# Patient Record
Sex: FEMALE | Race: BLACK OR AFRICAN AMERICAN | NOT HISPANIC OR LATINO | Employment: OTHER | URBAN - METROPOLITAN AREA
[De-identification: names, ages, dates, MRNs, and addresses within clinical notes are randomized per-mention and may not be internally consistent; named-entity substitution may affect disease eponyms.]

---

## 2017-01-13 ENCOUNTER — TELEPHONE (OUTPATIENT)
Dept: FAMILY MEDICINE | Facility: CLINIC | Age: 77
End: 2017-01-13

## 2017-01-13 NOTE — TELEPHONE ENCOUNTER
----- Message from Tiesha Lovett sent at 1/13/2017  9:00 AM CST -----  Patient wants to know if she has to fast for appt Monday. Please call at [phone] thank you!

## 2017-01-13 NOTE — TELEPHONE ENCOUNTER
Returned call, NINA for patient noting that it her her annual appt and labs may be done, she can fast or she can schedule to have labs done at another time if needed.

## 2017-01-16 ENCOUNTER — OFFICE VISIT (OUTPATIENT)
Dept: FAMILY MEDICINE | Facility: CLINIC | Age: 77
End: 2017-01-16
Payer: MEDICARE

## 2017-01-16 ENCOUNTER — LAB VISIT (OUTPATIENT)
Dept: LAB | Facility: HOSPITAL | Age: 77
End: 2017-01-16
Attending: FAMILY MEDICINE
Payer: MEDICARE

## 2017-01-16 VITALS
HEART RATE: 69 BPM | WEIGHT: 251.75 LBS | SYSTOLIC BLOOD PRESSURE: 126 MMHG | RESPIRATION RATE: 12 BRPM | DIASTOLIC BLOOD PRESSURE: 62 MMHG | TEMPERATURE: 99 F | HEIGHT: 62 IN | OXYGEN SATURATION: 99 % | BODY MASS INDEX: 46.33 KG/M2

## 2017-01-16 DIAGNOSIS — I73.9 PERIPHERAL VASCULAR DISEASE, UNSPECIFIED: ICD-10-CM

## 2017-01-16 DIAGNOSIS — N18.4 CONTROLLED TYPE 2 DIABETES MELLITUS WITH STAGE 4 CHRONIC KIDNEY DISEASE, WITH LONG-TERM CURRENT USE OF INSULIN: ICD-10-CM

## 2017-01-16 DIAGNOSIS — E11.65 UNCONTROLLED TYPE 2 DIABETES MELLITUS WITH STAGE 4 CHRONIC KIDNEY DISEASE, UNSPECIFIED LONG TERM INSULIN USE STATUS: ICD-10-CM

## 2017-01-16 DIAGNOSIS — N18.4 UNCONTROLLED TYPE 2 DIABETES MELLITUS WITH STAGE 4 CHRONIC KIDNEY DISEASE, UNSPECIFIED LONG TERM INSULIN USE STATUS: ICD-10-CM

## 2017-01-16 DIAGNOSIS — E11.65 UNCONTROLLED TYPE 2 DIABETES MELLITUS WITH STAGE 4 CHRONIC KIDNEY DISEASE, UNSPECIFIED LONG TERM INSULIN USE STATUS: Primary | ICD-10-CM

## 2017-01-16 DIAGNOSIS — Z00.00 WELL ADULT EXAM: Primary | ICD-10-CM

## 2017-01-16 DIAGNOSIS — E11.22 UNCONTROLLED TYPE 2 DIABETES MELLITUS WITH STAGE 4 CHRONIC KIDNEY DISEASE, UNSPECIFIED LONG TERM INSULIN USE STATUS: ICD-10-CM

## 2017-01-16 DIAGNOSIS — Z79.4 CONTROLLED TYPE 2 DIABETES MELLITUS WITH STAGE 4 CHRONIC KIDNEY DISEASE, WITH LONG-TERM CURRENT USE OF INSULIN: ICD-10-CM

## 2017-01-16 DIAGNOSIS — E66.01 MORBID OBESITY WITH BMI OF 45.0-49.9, ADULT: ICD-10-CM

## 2017-01-16 DIAGNOSIS — Z12.31 ENCOUNTER FOR SCREENING MAMMOGRAM FOR BREAST CANCER: ICD-10-CM

## 2017-01-16 DIAGNOSIS — N18.4 UNCONTROLLED TYPE 2 DIABETES MELLITUS WITH STAGE 4 CHRONIC KIDNEY DISEASE, UNSPECIFIED LONG TERM INSULIN USE STATUS: Primary | ICD-10-CM

## 2017-01-16 DIAGNOSIS — E11.9 TYPE 2 DIABETES MELLITUS WITHOUT COMPLICATION: ICD-10-CM

## 2017-01-16 DIAGNOSIS — E11.22 UNCONTROLLED TYPE 2 DIABETES MELLITUS WITH STAGE 4 CHRONIC KIDNEY DISEASE, UNSPECIFIED LONG TERM INSULIN USE STATUS: Primary | ICD-10-CM

## 2017-01-16 DIAGNOSIS — E11.22 CONTROLLED TYPE 2 DIABETES MELLITUS WITH STAGE 4 CHRONIC KIDNEY DISEASE, WITH LONG-TERM CURRENT USE OF INSULIN: ICD-10-CM

## 2017-01-16 DIAGNOSIS — I10 ESSENTIAL HYPERTENSION: ICD-10-CM

## 2017-01-16 DIAGNOSIS — G47.00 INSOMNIA, UNSPECIFIED TYPE: ICD-10-CM

## 2017-01-16 LAB
ALBUMIN SERPL BCP-MCNC: 3 G/DL
ALP SERPL-CCNC: 116 U/L
ALT SERPL W/O P-5'-P-CCNC: 13 U/L
ANION GAP SERPL CALC-SCNC: 5 MMOL/L
AST SERPL-CCNC: 18 U/L
BILIRUB SERPL-MCNC: 0.4 MG/DL
BUN SERPL-MCNC: 23 MG/DL
CALCIUM SERPL-MCNC: 9.2 MG/DL
CHLORIDE SERPL-SCNC: 111 MMOL/L
CHOLEST/HDLC SERPL: 3.3 {RATIO}
CO2 SERPL-SCNC: 23 MMOL/L
CREAT SERPL-MCNC: 2 MG/DL
EST. GFR  (AFRICAN AMERICAN): 27 ML/MIN/1.73 M^2
EST. GFR  (NON AFRICAN AMERICAN): 23.4 ML/MIN/1.73 M^2
GLUCOSE SERPL-MCNC: 71 MG/DL
HDL/CHOLESTEROL RATIO: 30.3 %
HDLC SERPL-MCNC: 27 MG/DL
HDLC SERPL-MCNC: 89 MG/DL
LDLC SERPL CALC-MCNC: 47.8 MG/DL
NONHDLC SERPL-MCNC: 62 MG/DL
POTASSIUM SERPL-SCNC: 5.3 MMOL/L
PROT SERPL-MCNC: 7.2 G/DL
SODIUM SERPL-SCNC: 139 MMOL/L
TRIGL SERPL-MCNC: 71 MG/DL

## 2017-01-16 PROCEDURE — 99499 UNLISTED E&M SERVICE: CPT | Mod: S$GLB,,, | Performed by: FAMILY MEDICINE

## 2017-01-16 PROCEDURE — 36415 COLL VENOUS BLD VENIPUNCTURE: CPT | Mod: PO

## 2017-01-16 PROCEDURE — 80061 LIPID PANEL: CPT

## 2017-01-16 PROCEDURE — 80053 COMPREHEN METABOLIC PANEL: CPT

## 2017-01-16 PROCEDURE — 99999 PR PBB SHADOW E&M-EST. PATIENT-LVL IV: CPT | Mod: PBBFAC,,, | Performed by: FAMILY MEDICINE

## 2017-01-16 PROCEDURE — 99397 PER PM REEVAL EST PAT 65+ YR: CPT | Mod: S$GLB,,, | Performed by: FAMILY MEDICINE

## 2017-01-16 PROCEDURE — 3078F DIAST BP <80 MM HG: CPT | Mod: S$GLB,,, | Performed by: FAMILY MEDICINE

## 2017-01-16 PROCEDURE — 83036 HEMOGLOBIN GLYCOSYLATED A1C: CPT

## 2017-01-16 PROCEDURE — 3074F SYST BP LT 130 MM HG: CPT | Mod: S$GLB,,, | Performed by: FAMILY MEDICINE

## 2017-01-16 RX ORDER — TRAZODONE HYDROCHLORIDE 100 MG/1
100 TABLET ORAL NIGHTLY
Qty: 90 TABLET | Refills: 3 | Status: SHIPPED | OUTPATIENT
Start: 2017-01-16 | End: 2017-02-22 | Stop reason: SDUPTHER

## 2017-01-16 RX ORDER — CILOSTAZOL 50 MG/1
50 TABLET ORAL 2 TIMES DAILY
Qty: 180 TABLET | Refills: 3 | Status: SHIPPED | OUTPATIENT
Start: 2017-01-16 | End: 2018-01-07 | Stop reason: SDUPTHER

## 2017-01-16 NOTE — PROGRESS NOTES
Subjective:       Patient ID: Ana James is a 76 y.o. female.    Chief Complaint: Annual Exam    HPI:  Here for annual exam.  Patient with chronic diabetes with stage 4 kidney disease.  Recently had kidney stones removed.  Hba1c in 6% range.  Reports chronic HILLS.  Seen by cardiology 2 months ago.  Diagnosed with PVD, not currently on treatment.  Review of Systems   Constitutional: Positive for fatigue and unexpected weight change. Negative for appetite change, chills, diaphoresis and fever.   HENT: Negative for hearing loss, sinus pressure and trouble swallowing.    Eyes: Negative for visual disturbance.   Respiratory: Positive for shortness of breath. Negative for cough, chest tightness and wheezing.    Cardiovascular: Negative for chest pain, palpitations and leg swelling.        Claudication   Gastrointestinal: Positive for constipation. Negative for abdominal pain, blood in stool, diarrhea, nausea and vomiting.        Occasional indigestion, nausea and diarrhea since gallbladder surgery   Genitourinary: Negative for decreased urine volume, difficulty urinating, dysuria, hematuria, menstrual problem, pelvic pain and vaginal discharge.   Musculoskeletal: Negative for back pain, joint swelling and neck pain.   Skin: Negative for rash.   Neurological: Negative for dizziness, numbness and headaches.   Hematological: Negative for adenopathy. Does not bruise/bleed easily.   Psychiatric/Behavioral: Positive for sleep disturbance. Negative for dysphoric mood. The patient is not nervous/anxious.        Objective:      Physical Exam   Constitutional: She is oriented to person, place, and time. She appears well-developed and well-nourished.   obese   Eyes: No scleral icterus.   Neck: Normal range of motion. Neck supple. No thyromegaly present.   Cardiovascular: Normal rate and regular rhythm.  Exam reveals no gallop and no friction rub.    No murmur heard.  Pulmonary/Chest: Effort normal and breath sounds  normal. She has no wheezes. She has no rales.   Abdominal: Soft. She exhibits no distension and no mass. There is no hepatosplenomegaly. There is no tenderness.   Musculoskeletal: She exhibits no edema.   Lymphadenopathy:     She has no cervical adenopathy.     She has no axillary adenopathy.        Right: No supraclavicular adenopathy present.        Left: No supraclavicular adenopathy present.   Neurological: She is alert and oriented to person, place, and time.   Skin: Skin is warm and dry. Rash (hyperpigmented rash LE, scaling noted) noted.   Psychiatric: She has a normal mood and affect. Her behavior is normal.         Assessment:       1. Well adult exam    2. Encounter for screening mammogram for breast cancer    3. 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 Morbid obesity with BMI of 45.0-49.9, adult    5. Peripheral vascular disease, unspecified    6. Insomnia, unspecified type        Plan:       Well adult exam  -     CBC auto differential; Future; Expected date: 1/16/17  -     TSH; Future; Expected date: 1/16/17  Health maintenance UTD.    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  Blood sugars stable.      Morbid obesity with BMI of 45.0-49.9, adult  Decrease calorie intake and Carbs in diet.    Peripheral vascular disease, unspecified  Start Pletal 50 mg 1 tablet twice daily.  Follow up with cardiology as recommended.      Reports occasional claudication.  Continue ASA    Return in about 4 weeks (around 2/13/2017) for PVD and insomnia.

## 2017-01-16 NOTE — PATIENT INSTRUCTIONS
Excess Gas  Certain foods produce gas when digested. In some people, the amounts of gas produced by these foods are excessive. This may cause bloating, burping or increased flatus (passing gas through the rectum).    The following foods are more likely to cause this problem. Reduce or eliminate them from your diet.  · Broccoli  · Cauliflower  · [place] sprouts  · Cabbage  · Cooked dried beans  · Carbonated beverages (sparkling water, soda, beer, champagne)  Other causes of excess gas include:  1. Eating too fast or talking while you chew may cause you to swallow air. This increases the amount of gas in the stomach and may worsen your symptoms. Chew each mouthful completely before swallowing. Take your time.  2. Chewing on gum or sucking on hard candy causes you to swallow more frequently, and some of what you are swallowing is air. This leads to increased gas in your stomach. Avoid chewing gum and hard candy.  3. Overeating may increase the feeling of being bloated and cause more gas. When you are full, stop eating.   4. Constipation can increase the amount of normal intestinal gas. Avoid constipation by increasing the amount of fiber in your diet by including whole cereal grains, fresh vegetables (except those in the above list) and fresh fruits. High-fiber foods absorb water and carry it out of the body. When increasing the amount of fiber in your diet, you also need to increase the amount of water that you drink. You should drink at least 8, 8-ounce glasses of water (2 quarts) per day.  © 8200-8745 [employer]. [address] [place] [address], [place] [address]. All rights reserved. This information is not intended as a substitute for professional medical care. Always follow your healthcare professional's instructions.

## 2017-01-16 NOTE — MR AVS SNAPSHOT
[facility]  [address]  [place] [address]  Phone: [phone]  Fax: [phone]                  Ana James   2017 10:40 AM   Office Visit    Description:  Female : 1940   Provider:  Jaky Schmidt MD   Department:  [facility]           Reason for Visit     Annual Exam           Diagnoses this Visit        Comments    Well adult exam    -  Primary     Encounter for screening mammogram for breast cancer         Controlled type 2 diabetes mellitus with stage 4 chronic kidney disease, with long-term current use of insulin         Morbid obesity with BMI of 45.0-49.9, adult         Peripheral vascular disease, unspecified         Insomnia, unspecified type                To Do List           Future Appointments        Provider Department Dept Phone    2017 10:40 AM Jaky Schmidt MD [facility] [phone]    2017 10:00 AM Alex Navarrete MD [facility] - Cardiology [phone]    3/23/2017 10:00 AM [facility] UROLOGY US1 [facility] [phone]    3/30/2017 10:00 AM Minnie Tellez MD [facility] Urology [phone]      Goals (5 Years of Data)     None      Follow-Up and Disposition     Return in about 4 weeks (around 2017) for PVD.       These Medications        Disp Refills Start End    trazodone (DESYREL) 100 MG tablet 90 tablet 3 2017    Take 1 tablet (100 mg total) by mouth every evening. - Oral    Pharmacy: [facility]/pharmacy #[id number] - [address] Ph #: [phone]       cilostazol (PLETAL) 50 MG Tab 180 tablet 3 2017    Take 1 tablet (50 mg total) by mouth 2 (two) times daily. - Oral    Pharmacy: [facility]/pharmacy #[id number] - [place] [address] [facility] Ph #: [phone]         [facility] On Call     [facility] On [facility] Nurse Care Line -  Assistance  Registered nurses in the [facility] On Call Center provide  "clinical advisement, health education, appointment booking, and other advisory services.  Call for this free service at [phone].             Medications           Message regarding Medications     Verify the changes and/or additions to your medication regime listed below are the same as discussed with your clinician today.  If any of these changes or additions are incorrect, please notify your healthcare provider.        START taking these NEW medications        Refills    trazodone (DESYREL) 100 MG tablet 3    Sig: Take 1 tablet (100 mg total) by mouth every evening.    Class: Normal    Route: Oral    cilostazol (PLETAL) 50 MG Tab 3    Sig: Take 1 tablet (50 mg total) by mouth 2 (two) times daily.    Class: Normal    Route: Oral      STOP taking these medications     oxybutynin (DITROPAN) 5 MG Tab Take 1 tablet (5 mg total) by mouth 3 (three) times daily as needed (bladder spasms).    escitalopram oxalate (LEXAPRO) 10 MG tablet Take 10 mg by mouth.            Verify that the below list of medications is an accurate representation of the medications you are currently taking.  If none reported, the list may be blank. If incorrect, please contact your healthcare provider. Carry this list with you in case of emergency.           Current Medications     albuterol 90 mcg/actuation inhaler Inhale 2 puffs into the lungs every 6 (six) hours as needed for Wheezing.    amlodipine (NORVASC) 10 MG tablet Take 1 tablet (10 mg total) by mouth once daily.    aspirin (ECOTRIN) 81 MG EC tablet Take 81 mg by mouth once daily.    atorvastatin (LIPITOR) 20 MG tablet TAKE 1 TABLET BY MOUTH EVERY DAY    BD ULTRA-FINE BREE PEN NEEDLES 32 gauge x 5/32" Ndle INJECT INSULIN THREE TIMES DAILY    BENICAR HCT 40-12.5 mg Tab TAKE 1 TABLET BY MOUTH ONCE DAILY.    blood sugar diagnostic Strp Test 3 times daily    carvedilol (COREG) 12.5 MG tablet Take 12.5 mg by mouth 2 (two) times daily.    clotrimazole-betamethasone 1-0.05% (LOTRISONE) " "cream Apply topically 2 (two) times daily.    hydrocodone-acetaminophen 5-325mg (NORCO) 5-325 mg per tablet Take 1 tablet by mouth every 6 (six) hours as needed for Pain.    insulin glargine (LANTUS SOLOSTAR) 100 unit/mL (3 mL) InPn pen Inject 32 Units into the skin once daily.    lancets Misc Test 3 times daily    latanoprost 0.005 % ophthalmic solution Place 1 drop into both eyes every evening.    linagliptin (TRADJENTA) 5 mg Tab tablet Take 1 tablet (5 mg total) by mouth once daily.    potassium citrate (UROCIT-K 15) 15 mEq TbSR Take 15 mEq by mouth once daily.    timolol maleate 0.5% (TIMOPTIC) 0.5 % Drop 1 drop every morning.     trospium (SANCTURA) 20 mg Tab tablet TAKE 1 TABLET BY MOUTH TWICE A DAY    blood-glucose meter kit Use as instructed    cilostazol (PLETAL) 50 MG Tab Take 1 tablet (50 mg total) by mouth 2 (two) times daily.    furosemide (LASIX) 40 MG tablet Take 40 mg by mouth once daily.    insulin aspart (NOVOLOG) 100 unit/mL InPn pen 4 units with with breakfast and 20 units with lunch and dinner    trazodone (DESYREL) 100 MG tablet Take 1 tablet (100 mg total) by mouth every evening.           Clinical Reference Information           Vital Signs - Last Recorded  Most recent update: 1/16/2017  9:39 AM by Safia Weeks MA    BP Pulse Temp Resp Ht Wt    126/62 (BP Location: Left arm, Patient Position: Sitting, BP Method: Manual) 69 98.9 °F (37.2 °C) (Oral) 12 5' 2" (1.575 m) 114.2 kg (251 lb 12.3 oz)    SpO2 BMI             99% 46.05 kg/m2         Blood Pressure          Most Recent Value    BP  126/62      Allergies as of 1/16/2017     Adhesive      Immunizations Administered on Date of Encounter - 1/16/2017     None      Orders Placed During Today's Visit      Normal Orders This Visit    Ambulatory referral to Nephrology     Ambulatory referral to Podiatry     Future Labs/Procedures Expected by Expires    CBC auto differential  1/16/2017 1/16/2018    Mammo Digital Screening Bilateral With CAD  " 1/16/2017 3/16/2018    TSH  1/16/2017 1/16/2018

## 2017-01-17 LAB
ESTIMATED AVG GLUCOSE: 154 MG/DL
HBA1C MFR BLD HPLC: 7 %

## 2017-01-18 ENCOUNTER — TELEPHONE (OUTPATIENT)
Dept: FAMILY MEDICINE | Facility: CLINIC | Age: 77
End: 2017-01-18

## 2017-01-18 DIAGNOSIS — E87.5 SERUM POTASSIUM ELEVATED: Primary | ICD-10-CM

## 2017-01-18 NOTE — TELEPHONE ENCOUNTER
Received letter from [facility] stating that benicar hct 40-12.5mg tablet is no longer covered; do you want prior authorization completed or change to different medication

## 2017-01-19 RX ORDER — CANDESARTAN CILEXETIL AND HYDROCHLOROTHIAZIDE 32; 12.5 MG/1; MG/1
1 TABLET ORAL DAILY
Qty: 90 TABLET | Refills: 1 | Status: SHIPPED | OUTPATIENT
Start: 2017-01-19 | End: 2017-07-20 | Stop reason: SDUPTHER

## 2017-01-19 NOTE — TELEPHONE ENCOUNTER
diovan HCT not covered; following is list of meds on tier 1 preferred list:  Amlodipine/valsartan;  Amlodipine/benazepril;  Amlodipine/valsartan/hctz;  Candesartan/cilexetil;  Candesartan/hctz;  Captopril/hctz;  Enalapril/hctz;  Irbesartan/hctz;  Lisinopril/hctz

## 2017-01-19 NOTE — TELEPHONE ENCOUNTER
----- Message from Sena Marino sent at 1/19/2017  9:24 AM CST -----  Contact: self  Patient requesting a call to discuss directions on medications and if she should stop taking aspirin. Please contact her at [phone].    Thanks!

## 2017-01-19 NOTE — TELEPHONE ENCOUNTER
I spoke to patient she just wanted to clarify the new medication pletal. Patient informed to take pletal twice a day and continue asa daily.

## 2017-01-20 NOTE — TELEPHONE ENCOUNTER
----- Message from Jaky Schmidt MD sent at 1/20/2017 12:57 AM CST -----  Kidney function remains stable at 2.0.  Her potassium is slightly elevated at 5.3.  Patient needs to follow a low potassium diet.  Please mail a low potassium diet to patient.  Cholesterol panel remains low normal.

## 2017-01-20 NOTE — TELEPHONE ENCOUNTER
I spoke to patient , she confirms she taking a potassium pill daily. She will stop the potassium and recheck potaaium 1/25/17

## 2017-01-23 ENCOUNTER — TELEPHONE (OUTPATIENT)
Dept: FAMILY MEDICINE | Facility: CLINIC | Age: 77
End: 2017-01-23

## 2017-01-23 NOTE — TELEPHONE ENCOUNTER
----- Message from Lashon Pierce sent at 1/23/2017 11:48 AM CST -----  Contact: Self/[phone]  Patient would like to speak to staff in regards to her prescription:  candesartan-hydrochlorothiazid (ATACAND HCT) 32-12.5 mg per tablet. She states that she was informed not to take this medication and BENICAR HCT 40-12.5 mg Tab. Thank you.

## 2017-01-23 NOTE — TELEPHONE ENCOUNTER
Patient stated that  gave her a new medication Atacand and was told by the pharmacist that she should not be taking that and Benicar together, patient inquiring if she should stop taking either one. Also that her insurance is no longer covering the Benicar. Please advise.

## 2017-02-03 ENCOUNTER — LAB VISIT (OUTPATIENT)
Dept: LAB | Facility: HOSPITAL | Age: 77
End: 2017-02-03
Attending: FAMILY MEDICINE
Payer: MEDICARE

## 2017-02-03 DIAGNOSIS — E11.22 UNCONTROLLED TYPE 2 DIABETES MELLITUS WITH STAGE 4 CHRONIC KIDNEY DISEASE, UNSPECIFIED LONG TERM INSULIN USE STATUS: ICD-10-CM

## 2017-02-03 DIAGNOSIS — N18.4 UNCONTROLLED TYPE 2 DIABETES MELLITUS WITH STAGE 4 CHRONIC KIDNEY DISEASE, UNSPECIFIED LONG TERM INSULIN USE STATUS: ICD-10-CM

## 2017-02-03 DIAGNOSIS — E11.65 UNCONTROLLED TYPE 2 DIABETES MELLITUS WITH STAGE 4 CHRONIC KIDNEY DISEASE, UNSPECIFIED LONG TERM INSULIN USE STATUS: ICD-10-CM

## 2017-02-03 LAB
ALBUMIN SERPL BCP-MCNC: 3 G/DL
ALP SERPL-CCNC: 114 U/L
ALT SERPL W/O P-5'-P-CCNC: 15 U/L
ANION GAP SERPL CALC-SCNC: 6 MMOL/L
AST SERPL-CCNC: 17 U/L
BILIRUB SERPL-MCNC: 0.4 MG/DL
BUN SERPL-MCNC: 26 MG/DL
CALCIUM SERPL-MCNC: 9.1 MG/DL
CHLORIDE SERPL-SCNC: 113 MMOL/L
CO2 SERPL-SCNC: 19 MMOL/L
CREAT SERPL-MCNC: 1.9 MG/DL
EST. GFR  (AFRICAN AMERICAN): 28.7 ML/MIN/1.73 M^2
EST. GFR  (NON AFRICAN AMERICAN): 24.9 ML/MIN/1.73 M^2
GLUCOSE SERPL-MCNC: 76 MG/DL
POTASSIUM SERPL-SCNC: 5.1 MMOL/L
PROT SERPL-MCNC: 7.2 G/DL
SODIUM SERPL-SCNC: 138 MMOL/L

## 2017-02-03 PROCEDURE — 80053 COMPREHEN METABOLIC PANEL: CPT

## 2017-02-03 PROCEDURE — 36415 COLL VENOUS BLD VENIPUNCTURE: CPT | Mod: PO

## 2017-02-16 ENCOUNTER — OFFICE VISIT (OUTPATIENT)
Dept: CARDIOLOGY | Facility: CLINIC | Age: 77
End: 2017-02-16
Payer: MEDICARE

## 2017-02-16 VITALS
BODY MASS INDEX: 48.64 KG/M2 | OXYGEN SATURATION: 98 % | WEIGHT: 264.31 LBS | DIASTOLIC BLOOD PRESSURE: 62 MMHG | SYSTOLIC BLOOD PRESSURE: 139 MMHG | HEIGHT: 62 IN | HEART RATE: 71 BPM

## 2017-02-16 DIAGNOSIS — N18.4 UNCONTROLLED TYPE 2 DIABETES MELLITUS WITH STAGE 4 CHRONIC KIDNEY DISEASE, UNSPECIFIED LONG TERM INSULIN USE STATUS: ICD-10-CM

## 2017-02-16 DIAGNOSIS — R06.02 SOB (SHORTNESS OF BREATH): Primary | ICD-10-CM

## 2017-02-16 DIAGNOSIS — I10 ESSENTIAL HYPERTENSION: ICD-10-CM

## 2017-02-16 DIAGNOSIS — I73.9 PERIPHERAL VASCULAR DISEASE, UNSPECIFIED: ICD-10-CM

## 2017-02-16 DIAGNOSIS — E11.65 UNCONTROLLED TYPE 2 DIABETES MELLITUS WITH STAGE 4 CHRONIC KIDNEY DISEASE, UNSPECIFIED LONG TERM INSULIN USE STATUS: ICD-10-CM

## 2017-02-16 DIAGNOSIS — E11.22 UNCONTROLLED TYPE 2 DIABETES MELLITUS WITH STAGE 4 CHRONIC KIDNEY DISEASE, UNSPECIFIED LONG TERM INSULIN USE STATUS: ICD-10-CM

## 2017-02-16 DIAGNOSIS — R42 DIZZINESS: ICD-10-CM

## 2017-02-16 PROCEDURE — 93000 ELECTROCARDIOGRAM COMPLETE: CPT | Mod: S$GLB,,, | Performed by: INTERNAL MEDICINE

## 2017-02-16 PROCEDURE — 1126F AMNT PAIN NOTED NONE PRSNT: CPT | Mod: S$GLB,,, | Performed by: INTERNAL MEDICINE

## 2017-02-16 PROCEDURE — 99999 PR PBB SHADOW E&M-EST. PATIENT-LVL IV: CPT | Mod: PBBFAC,,, | Performed by: INTERNAL MEDICINE

## 2017-02-16 PROCEDURE — 1157F ADVNC CARE PLAN IN RCRD: CPT | Mod: S$GLB,,, | Performed by: INTERNAL MEDICINE

## 2017-02-16 PROCEDURE — 1160F RVW MEDS BY RX/DR IN RCRD: CPT | Mod: S$GLB,,, | Performed by: INTERNAL MEDICINE

## 2017-02-16 PROCEDURE — 3075F SYST BP GE 130 - 139MM HG: CPT | Mod: S$GLB,,, | Performed by: INTERNAL MEDICINE

## 2017-02-16 PROCEDURE — 3078F DIAST BP <80 MM HG: CPT | Mod: S$GLB,,, | Performed by: INTERNAL MEDICINE

## 2017-02-16 PROCEDURE — 99214 OFFICE O/P EST MOD 30 MIN: CPT | Mod: S$GLB,,, | Performed by: INTERNAL MEDICINE

## 2017-02-16 PROCEDURE — 1159F MED LIST DOCD IN RCRD: CPT | Mod: S$GLB,,, | Performed by: INTERNAL MEDICINE

## 2017-02-16 PROCEDURE — 99499 UNLISTED E&M SERVICE: CPT | Mod: S$GLB,,, | Performed by: INTERNAL MEDICINE

## 2017-02-16 NOTE — PROGRESS NOTES
Subjective:    Patient ID:  Ana James is a 76 y.o. female who presents for follow-up of Hypertension and Shortness of Breath      HPI   HTN, PAD, DM, SOB, CRI - Cr 2.0       Referred by Dr. Farnsworth for abnormal JIGAR. She has bilateral leg pain mainly in her ankles. Follows with [facility] cardiology Dr. Martin. 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 Walks with walker for 2 blocks but also limited by sob. Wears diabetic shoes. Has some swelling occasionally but otherwise in her usual state of health.            LE arterial doppler 7/17/15  There is no definite areas of doubling of the velocity to suggest underlying greater than 50% stenosis.  There is decreased velocity seen within the distal left lower extremity arteries compared to the right lower extremity arteries and there are more monophasic waveform configuration in the distal left lower extremity concerning for distal left lower   extremity significant atherosclerotic plaque and vessel hardening.    JIGAR 10/27/16  Right leg: The JIGAR is 2.0 which is abnormally elevated .  Left leg: The JIGAR is 2.1 which abnormally elevated.      Stress test 10/27/16  LVEF: 68 %  Impression: NORMAL MYOCARDIAL PERFUSION  1. The perfusion scan is free of evidence for myocardial ischemia or injury.   2. Resting wall motion is physiologic.   3. Resting LV function is normal.     Echo 10/27/16    1 - Normal left ventricular systolic function (EF 55-60%).     2 - Left ventricular diastolic dysfunction.     3 - Normal appearing valves and Doppler exam.     Pletal was started by Dr Schmidt for PAD  Activity is limited by HILLS and not claudication  EKG NSR - ok  Denies CP    Review of Systems   Constitution: Negative for decreased appetite.   HENT: Negative for ear discharge.    Eyes: Negative for blurred vision.   Respiratory: Negative for hemoptysis.    Endocrine: Negative for polyphagia.   Hematologic/Lymphatic: Negative for adenopathy.   Skin: Negative for color change.    Musculoskeletal: Negative for joint swelling.   Neurological: Negative for brief paralysis.   Psychiatric/Behavioral: Negative for hallucinations.        Objective:    Physical Exam   Constitutional: She appears well-developed and well-nourished.   HENT:   Head: Normocephalic and atraumatic.   Neck: No thyromegaly present.   Cardiovascular: Normal rate and regular rhythm.    No murmur heard.  Pulmonary/Chest: Effort normal and breath sounds normal. No respiratory distress.   Abdominal: Soft. Bowel sounds are normal.   Musculoskeletal: She exhibits no edema.         Assessment:       1. SOB (shortness of breath)    2. Essential hypertension    3. Peripheral vascular disease, unspecified    4. Uncontrolled type 2 diabetes mellitus with stage 4 chronic kidney disease, unspecified long term insulin use status    5. Dizziness         Plan:       Continue with medical Rx for PAD  If claudication becomes limiting will refer to Dr Loza for possible LE angio  Otherwise cardiac stable  OV 6 months

## 2017-02-16 NOTE — MR AVS SNAPSHOT
[facility] [place] [place] Cardiology  [address] [facility] [place] [address]  Phone: [phone]                  Ana James   2017 10:15 AM   Office Visit    Description:  Female : 1940   Provider:  Alex Navarrete MD   [facility]  [facility]           Reason for Visit     Hypertension     Shortness of Breath                To Do List           Future Appointments        Provider Department Dept Phone    2017 10:15 AM Alex Navarrete MD [facility] [place] [place] Cardiology [phone]    2017 10:45 AM Janet Farnsworth DPM [place] - Podiatry [phone]    3/23/2017 10:00 AM [facility] UROLOGY US1 [facility] [phone]    3/30/2017 10:00 AM Minnie Tellez MD [facility] [place] [place] Urology [phone]    2017 1:00 PM Kann[facility] Rice MD [facility]  [facility] [phone]      Goals (5 Years of Data)     None      [facility] On Call     [facility] On [facility] Nurse Care Line -  Assistance  Registered nurses in the [facility] On Call Center provide clinical advisement, health education, appointment booking, and other advisory services.  Call for this free service at [phone].             Medications           Message regarding Medications     Verify the changes and/or additions to your medication regime listed below are the same as discussed with your clinician today.  If any of these changes or additions are incorrect, please notify your healthcare provider.             Verify that the below list of medications is an accurate representation of the medications you are currently taking.  If none reported, the list may be blank. If incorrect, please contact your healthcare provider. Carry this list with you in case of emergency.           Current Medications     albuterol 90 mcg/actuation inhaler Inhale 2 puffs into the lungs every 6 (six) hours as needed for Wheezing.    amlodipine (NORVASC) 10 MG tablet Take 1 tablet (10 mg total) by mouth once daily.    aspirin (ECOTRIN) 81  "MG EC tablet Take 81 mg by mouth once daily.    atorvastatin (LIPITOR) 20 MG tablet TAKE 1 TABLET BY MOUTH EVERY DAY    BD ULTRA-FINE BREE PEN NEEDLES 32 gauge x 5/32" Ndle INJECT INSULIN THREE TIMES DAILY    BENICAR HCT 40-12.5 mg Tab TAKE 1 TABLET BY MOUTH ONCE DAILY.    blood sugar diagnostic Strp Test 3 times daily    blood-glucose meter kit Use as instructed    candesartan-hydrochlorothiazid (ATACAND HCT) 32-12.5 mg per tablet Take 1 tablet by mouth once daily.    carvedilol (COREG) 12.5 MG tablet Take 12.5 mg by mouth 2 (two) times daily.    cilostazol (PLETAL) 50 MG Tab Take 1 tablet (50 mg total) by mouth 2 (two) times daily.    clotrimazole-betamethasone 1-0.05% (LOTRISONE) cream Apply topically 2 (two) times daily.    furosemide (LASIX) 40 MG tablet Take 40 mg by mouth once daily.    hydrocodone-acetaminophen 5-325mg (NORCO) 5-325 mg per tablet Take 1 tablet by mouth every 6 (six) hours as needed for Pain.    insulin aspart (NOVOLOG) 100 unit/mL InPn pen 4 units with with breakfast and 20 units with lunch and dinner    insulin glargine (LANTUS SOLOSTAR) 100 unit/mL (3 mL) InPn pen Inject 32 Units into the skin once daily.    lancets Misc Test 3 times daily    latanoprost 0.005 % ophthalmic solution Place 1 drop into both eyes every evening.    potassium citrate (UROCIT-K 15) 15 mEq TbSR Take 15 mEq by mouth once daily.    timolol maleate 0.5% (TIMOPTIC) 0.5 % Drop 1 drop every morning.     TRADJENTA 5 mg Tab tablet TAKE 1 TABLET (5 MG TOTAL) BY MOUTH ONCE DAILY.    trazodone (DESYREL) 100 MG tablet Take 1 tablet (100 mg total) by mouth every evening.    trospium (SANCTURA) 20 mg Tab tablet TAKE 1 TABLET BY MOUTH TWICE A DAY           Clinical Reference Information           Your Vitals Were     BP Pulse Height Weight SpO2 BMI    139/62 (BP Location: Left arm, Patient Position: Sitting, BP Method: Automatic) 71 5' 2" (1.575 m) 119.9 kg (264 lb 5.3 oz) 98% 48.35 kg/m2      Blood Pressure          Most Recent " Value    BP  139/62      Allergies as of 2/16/2017     Adhesive      Immunizations Administered on Date of Encounter - 2/16/2017     None      Language Assistance Services     ATTENTION: Language assistance services are available, free of charge. Please call [phone].      ATENCIÓN: Si habla ynes, tiene a calvo disposición servicios gratuitos de asistencia lingüística. Llame al [phone].     CHÚ Ý: N?u b?n nói Ti?ng Vi?t, có các d?ch v? h? tr? ngôn ng? mi?n phí dành cho b?n. G?i s? [phone].         [facility] complies with applicable Federal civil rights laws and does not discriminate on the basis of race, color, national origin, age, disability, or sex.

## 2017-02-21 ENCOUNTER — OFFICE VISIT (OUTPATIENT)
Dept: PODIATRY | Facility: CLINIC | Age: 77
End: 2017-02-21
Payer: MEDICARE

## 2017-02-21 VITALS
WEIGHT: 264 LBS | HEIGHT: 62 IN | DIASTOLIC BLOOD PRESSURE: 62 MMHG | BODY MASS INDEX: 48.58 KG/M2 | SYSTOLIC BLOOD PRESSURE: 140 MMHG

## 2017-02-21 DIAGNOSIS — B35.1 ONYCHOMYCOSIS DUE TO DERMATOPHYTE: ICD-10-CM

## 2017-02-21 DIAGNOSIS — E11.51 TYPE II DIABETES MELLITUS WITH PERIPHERAL CIRCULATORY DISORDER: Primary | ICD-10-CM

## 2017-02-21 DIAGNOSIS — I87.8 VENOUS STASIS OF BOTH LOWER EXTREMITIES: ICD-10-CM

## 2017-02-21 DIAGNOSIS — M20.10 HALLUX ABDUCTO VALGUS, UNSPECIFIED LATERALITY: ICD-10-CM

## 2017-02-21 DIAGNOSIS — I73.9 PAD (PERIPHERAL ARTERY DISEASE): ICD-10-CM

## 2017-02-21 DIAGNOSIS — L84 CORN OR CALLUS: ICD-10-CM

## 2017-02-21 PROCEDURE — 11721 DEBRIDE NAIL 6 OR MORE: CPT | Mod: 59,Q9,S$GLB, | Performed by: PODIATRIST

## 2017-02-21 PROCEDURE — 99499 UNLISTED E&M SERVICE: CPT | Mod: S$GLB,,, | Performed by: PODIATRIST

## 2017-02-21 PROCEDURE — 11056 PARNG/CUTG B9 HYPRKR LES 2-4: CPT | Mod: Q9,S$GLB,, | Performed by: PODIATRIST

## 2017-02-21 PROCEDURE — 99999 PR PBB SHADOW E&M-EST. PATIENT-LVL III: CPT | Mod: PBBFAC,,, | Performed by: PODIATRIST

## 2017-02-21 NOTE — MR AVS SNAPSHOT
[facility]  [address] [facility]  [place] [address]  Phone: [phone]                  Ana James   2017 10:45 AM   Office Visit    Description:  Female : 1940   Provider:  Janet Farnsworth DPM   Department:  [facility]           Reason for Visit     Diabetes Mellitus     Diabetic Foot Exam     Nail Care           Diagnoses this Visit        Comments    Type II diabetes mellitus with peripheral circulatory disorder    -  Primary     PAD (peripheral artery disease)         Venous stasis of both lower extremities         Hallux abducto valgus, unspecified laterality         Onychomycosis due to dermatophyte         Corn or callus                To Do List           Future Appointments        Provider Department Dept Phone    3/23/2017 10:00 AM [facility] [phone]    3/30/2017 10:00 AM Minnie Tellez MD [facility] [phone]    2017 1:00 PM Kannan Rice MD [facility] Nephrology [phone]      Goals (5 Years of Data)     None      [facility] -  Assistance  Registered nurses in the [facility] provide clinical advisement, health education, appointment booking, and other advisory services.  Call for this free service at [phone].             Medications           Message regarding Medications     Verify the changes and/or additions to your medication regime listed below are the same as discussed with your clinician today.  If any of these changes or additions are incorrect, please notify your healthcare provider.             Verify that the below list of medications is an accurate representation of the medications you are currently taking.  If none reported, the list may be blank. If incorrect, please contact your healthcare provider. Carry this list with you in case of emergency.           Current Medications     albuterol 90 mcg/actuation  "inhaler Inhale 2 puffs into the lungs every 6 (six) hours as needed for Wheezing.    amlodipine (NORVASC) 10 MG tablet Take 1 tablet (10 mg total) by mouth once daily.    aspirin (ECOTRIN) 81 MG EC tablet Take 81 mg by mouth once daily.    atorvastatin (LIPITOR) 20 MG tablet TAKE 1 TABLET BY MOUTH EVERY DAY    BD ULTRA-FINE BREE PEN NEEDLES 32 gauge x 5/32" Ndle INJECT INSULIN THREE TIMES DAILY    BENICAR HCT 40-12.5 mg Tab TAKE 1 TABLET BY MOUTH ONCE DAILY.    blood sugar diagnostic Strp Test 3 times daily    candesartan-hydrochlorothiazid (ATACAND HCT) 32-12.5 mg per tablet Take 1 tablet by mouth once daily.    carvedilol (COREG) 12.5 MG tablet Take 12.5 mg by mouth 2 (two) times daily.    cilostazol (PLETAL) 50 MG Tab Take 1 tablet (50 mg total) by mouth 2 (two) times daily.    clotrimazole-betamethasone 1-0.05% (LOTRISONE) cream Apply topically 2 (two) times daily.    furosemide (LASIX) 40 MG tablet Take 40 mg by mouth once daily.    hydrocodone-acetaminophen 5-325mg (NORCO) 5-325 mg per tablet Take 1 tablet by mouth every 6 (six) hours as needed for Pain.    insulin aspart (NOVOLOG) 100 unit/mL InPn pen 4 units with with breakfast and 20 units with lunch and dinner    insulin glargine (LANTUS SOLOSTAR) 100 unit/mL (3 mL) InPn pen Inject 32 Units into the skin once daily.    lancets Misc Test 3 times daily    latanoprost 0.005 % ophthalmic solution Place 1 drop into both eyes every evening.    potassium citrate (UROCIT-K 15) 15 mEq TbSR Take 15 mEq by mouth once daily.    timolol maleate 0.5% (TIMOPTIC) 0.5 % Drop 1 drop every morning.     TRADJENTA 5 mg Tab tablet TAKE 1 TABLET (5 MG TOTAL) BY MOUTH ONCE DAILY.    trazodone (DESYREL) 100 MG tablet Take 1 tablet (100 mg total) by mouth every evening.    trospium (SANCTURA) 20 mg Tab tablet TAKE 1 TABLET BY MOUTH TWICE A DAY    blood-glucose meter kit Use as instructed           Clinical Reference Information           Your Vitals Were     BP Height Weight BMI    " "   140/62 5' 2" (1.575 m) 119.7 kg (264 lb) 48.29 kg/m2       Blood Pressure          Most Recent Value    BP  (!)  140/62      Allergies as of 2/21/2017     Adhesive      Immunizations Administered on Date of Encounter - 2/21/2017     None      Instructions    Recommend lotions: eucerin, aquaphor, A&D ointment, gold bond for diabetics, sween    Shoe recommendations: (try [email or web address], [email or web address] , nordstromrack.[email or web address], or shoes.YupiCall for discounted prices) you can visit DSW shoes in [place] as well    Asics (GT 1000 or gel foundations), new balance, saucony (stabil c3),  Winter (transcend), vionic, propet (tennis shoe)    soft brand, clarks, crocs, aerosoles, naturalizers, SAS, ecco, chary, vinay chase, rockports (dress shoes)    Vionic, volitiles, burkenstocks, fitflops, propet (sandals)    Nike comfort thong sandals, crocs (house shoes)    Nail Home remedy:  Vicks Vapor rub OR Listerine and apple cider vinegar in a spray bottle to nails    Occasional soaks for 15-20 mins in luke warm water with 1 cup of listerine and 1 cup of apple cider vinegar are ok You may add several drops of oil of oregano or tea tree oil as well      Diabetes: Inspecting Your Feet  Diabetes increases your chances of developing foot problems. So inspect your feet every day. This helps you find small skin irritations before they become serious infections. If you have trouble seeing the bottoms of your feet, use a mirror or ask a family member or friend to help.     Pressure spots on the bottom of the foot are common areas where problems develop.   How to check your feet  Below are tips to help you look for foot problems. Try to check your feet at the same time each day, such as when you get out of bed in the morning:  · Check the top of each foot. The tops of toes, back of the heel, and outer edge of the foot can get a lot of rubbing from poor-fitting shoes.  · Check the bottom of each foot. Daily wear and tear often leads to problems at pressure " spots.  · Check the toes and nails. Fungal infections often occur between toes. Toenail problems can also be a sign of fungal infections or lead to breaks in the skin.  · Check your shoes, too. Loose objects inside a shoe can injure the foot. Use your hand to feel inside your shoes for things like avery, loose stitching, or rough areas that could irritate your skin.  Warning signs  Look for any color changes in the foot. Redness with streaks can signal a severe infection, which needs immediate medical attention. Tell your doctor right away if you have any of these problems:  · Swelling, sometimes with color changes, may be a sign of poor blood flow or infection. Symptoms include tenderness and an increase in the size of your foot.  · Warm or hot areas on your feet may be signs of infection. A foot that is cold may not be getting enough blood.  · Sensations such as burning, tingling, or pins and needles can be signs of a problem. Also check for areas that may be numb.  · Hot spots are caused by friction or pressure. Look for hot spots in areas that get a lot of rubbing. Hot spots can turn into blisters, calluses, or sores.  · Cracks and sores are caused by dry or irritated skin. They are a sign that the skin is breaking down, which can lead to infection.  · Toenail problems to watch for include nails growing into the skin (ingrown toenail) and causing redness or pain. Thick, yellow, or discolored nails can signal a fungal infection.  · Drainage and odor can develop from untreated sores and ulcers. Call your doctor right away if you notice white or yellow drainage, bleeding, or unpleasant odor.   © 6157-9756 [employer]. [address] [place] [address]. All rights reserved. This information is not intended as a substitute for professional medical care. Always follow your healthcare professional's instructions.               Language Assistance Services     ATTENTION: Language assistance  services are available, free of charge. Please call [phone].      ATENCIÓN: Si habla ynes, tiene a calvo disposición servicios gratuitos de asistencia lingüística. Llame al [phone].     CHÚ Ý: N?u b?n nói Ti?ng Vi?t, có các d?ch v? h? tr? ngôn ng? mi?n phí dành cho b?n. G?i s? [phone].         [facility] complies with applicable Federal civil rights laws and does not discriminate on the basis of race, color, national origin, age, disability, or sex.

## 2017-02-21 NOTE — PATIENT INSTRUCTIONS
Recommend lotions: eucerin, aquaphor, A&D ointment, gold bond for diabetics, sween    Shoe recommendations: (try 6pm.com, zappos.com , nordstromrack.com, or shoes.com for discounted prices) you can visit DSW shoes in [employer] as well    Asics (GT 1000 or gel foundations), new balance, saucony (stabil c3),  Winter (transcend), vionic, propet (tennis shoe)    soft brand, clarks, crocs, aerosoles, naturalizers, SAS, ecco, chary, vinay chase, rockports (dress shoes)    Vionic, volitiles, burkenstocks, fitflops, propet (sandals)    Nike comfort thong sandals, crocs (house shoes)    Nail Home remedy:  Vicks Vapor rub OR Listerine and apple cider vinegar in a spray bottle to nails    Occasional soaks for 15-20 mins in luke warm water with 1 cup of listerine and 1 cup of apple cider vinegar are ok You may add several drops of oil of oregano or tea tree oil as well      Diabetes: Inspecting Your Feet  Diabetes increases your chances of developing foot problems. So inspect your feet every day. This helps you find small skin irritations before they become serious infections. If you have trouble seeing the bottoms of your feet, use a mirror or ask a family member or friend to help.     Pressure spots on the bottom of the foot are common areas where problems develop.   How to check your feet  Below are tips to help you look for foot problems. Try to check your feet at the same time each day, such as when you get out of bed in the morning:  · Check the top of each foot. The tops of toes, back of the heel, and outer edge of the foot can get a lot of rubbing from poor-fitting shoes.  · Check the bottom of each foot. Daily wear and tear often leads to problems at pressure spots.  · Check the toes and nails. Fungal infections often occur between toes. Toenail problems can also be a sign of fungal infections or lead to breaks in the skin.  · Check your shoes, too. Loose objects inside a shoe can injure the foot. Use your hand to feel  inside your shoes for things like avery, loose stitching, or rough areas that could irritate your skin.  Warning signs  Look for any color changes in the foot. Redness with streaks can signal a severe infection, which needs immediate medical attention. Tell your doctor right away if you have any of these problems:  · Swelling, sometimes with color changes, may be a sign of poor blood flow or infection. Symptoms include tenderness and an increase in the size of your foot.  · Warm or hot areas on your feet may be signs of infection. A foot that is cold may not be getting enough blood.  · Sensations such as burning, tingling, or pins and needles can be signs of a problem. Also check for areas that may be numb.  · Hot spots are caused by friction or pressure. Look for hot spots in areas that get a lot of rubbing. Hot spots can turn into blisters, calluses, or sores.  · Cracks and sores are caused by dry or irritated skin. They are a sign that the skin is breaking down, which can lead to infection.  · Toenail problems to watch for include nails growing into the skin (ingrown toenail) and causing redness or pain. Thick, yellow, or discolored nails can signal a fungal infection.  · Drainage and odor can develop from untreated sores and ulcers. Call your doctor right away if you notice white or yellow drainage, bleeding, or unpleasant odor.   © 1093-5461 [employer]. [address] [place] [address], [place] [address]. All rights reserved. This information is not intended as a substitute for professional medical care. Always follow your healthcare professional's instructions.

## 2017-02-21 NOTE — PROGRESS NOTES
Subjective:      Patient ID: Ana James is a 76 y.o. female.    Chief Complaint: Diabetes Mellitus (Pcp Dr. Schmidt 01/16/2017); Diabetic Foot Exam; and Nail Care    Ana is a 76 y.o. female who presents to the clinic for evaluation and treatment of high risk feet. Ana has a past medical history of Arthritis (lower extremities); Asthma; Diabetes mellitus; Gall stones; Hypertension; Kidney stones; Obesity; Renal disorder; Retinopathy; and Vaginal delivery. The patient's chief complaint is long, thick toenails.  This patient has documented high risk feet requiring routine maintenance secondary to diabetes mellitis and those secondary complications of diabetes, as mentioned..    PCP: Jaky Schmidt MD    Date Last Seen by PCP:   Chief Complaint   Patient presents with    Diabetes Mellitus     Pcp Dr. Schmidt 01/16/2017    Diabetic Foot Exam    Nail Care       Current shoe gear:  rx diab shoes    Hemoglobin A1C   Date Value Ref Range Status   01/16/2017 7.0 (H) 4.5 - 6.2 % Final     Comment:     According to ADA guidelines, hemoglobin A1C <7.0% represents  optimal control in non-pregnant diabetic patients.  Different  metrics may apply to specific populations.   Standards of Medical Care in Diabetes - 2016.  For the purpose of screening for the presence of diabetes:  <5.7%     Consistent with the absence of diabetes  5.7-6.4%  Consistent with increasing risk for diabetes   (prediabetes)  >or=6.5%  Consistent with diabetes  Currently no consensus exists for use of hemoglobin A1C  for diagnosis of diabetes for children.     04/06/2016 6.6 (H) 4.5 - 6.2 % Final   01/12/2016 8.7 (H) 4.5 - 6.2 % Final     Patient Active Problem List   Diagnosis    Dizziness    SOB (shortness of breath)    Hypertension    Protein calorie malnutrition    Type 2 diabetes, uncontrolled, with renal manifestation    BMI 45.0-49.9, adult    CKD (chronic kidney disease) stage 3, GFR 30-59 ml/min    Peripheral  "vascular disease, unspecified    Urinary incontinence    Nephrolithiasis    Calculus of gallbladder with cholecystitis     Current Outpatient Prescriptions on File Prior to Visit   Medication Sig Dispense Refill    albuterol 90 mcg/actuation inhaler Inhale 2 puffs into the lungs every 6 (six) hours as needed for Wheezing. 1 each 2    amlodipine (NORVASC) 10 MG tablet Take 1 tablet (10 mg total) by mouth once daily. 90 tablet 3    aspirin (ECOTRIN) 81 MG EC tablet Take 81 mg by mouth once daily.      atorvastatin (LIPITOR) 20 MG tablet TAKE 1 TABLET BY MOUTH EVERY DAY 90 tablet 0    BD ULTRA-FINE BREE PEN NEEDLES 32 gauge x 5/32" Ndle INJECT INSULIN THREE TIMES DAILY 200 each 3    BENICAR HCT 40-12.5 mg Tab TAKE 1 TABLET BY MOUTH ONCE DAILY. 90 tablet 3    blood sugar diagnostic Strp Test 3 times daily 100 each 11    candesartan-hydrochlorothiazid (ATACAND HCT) 32-12.5 mg per tablet Take 1 tablet by mouth once daily. 90 tablet 1    carvedilol (COREG) 12.5 MG tablet Take 12.5 mg by mouth 2 (two) times daily.  3    cilostazol (PLETAL) 50 MG Tab Take 1 tablet (50 mg total) by mouth 2 (two) times daily. 180 tablet 3    clotrimazole-betamethasone 1-0.05% (LOTRISONE) cream Apply topically 2 (two) times daily. 45 g 5    furosemide (LASIX) 40 MG tablet Take 40 mg by mouth once daily.  4    hydrocodone-acetaminophen 5-325mg (NORCO) 5-325 mg per tablet Take 1 tablet by mouth every 6 (six) hours as needed for Pain. 30 tablet 0    insulin aspart (NOVOLOG) 100 unit/mL InPn pen 4 units with with breakfast and 20 units with lunch and dinner (Patient taking differently: 4 units with with breakfast and 16 units with lunch and  16 dinner) 45 mL 3    insulin glargine (LANTUS SOLOSTAR) 100 unit/mL (3 mL) InPn pen Inject 32 Units into the skin once daily. (Patient taking differently: Inject 20 Units into the skin every evening. ) 60 Syringe 3    lancets Misc Test 3 times daily 100 each 11    latanoprost 0.005 % " ophthalmic solution Place 1 drop into both eyes every evening.  3    potassium citrate (UROCIT-K 15) 15 mEq TbSR Take 15 mEq by mouth once daily. 90 tablet 3    timolol maleate 0.5% (TIMOPTIC) 0.5 % Drop 1 drop every morning.   2    TRADJENTA 5 mg Tab tablet TAKE 1 TABLET (5 MG TOTAL) BY MOUTH ONCE DAILY. 90 tablet 3    trospium (SANCTURA) 20 mg Tab tablet TAKE 1 TABLET BY MOUTH TWICE A  tablet 3    blood-glucose meter kit Use as instructed 1 each 0     No current facility-administered medications on file prior to visit.      Review of patient's allergies indicates:   Allergen Reactions    Adhesive Rash     Paper tape     Past Surgical History   Procedure Laterality Date    Cataract      Hysterectomy      Cardiac catheterization      Eye surgery       Rt cataract surgery    Cholecystectomy      Ureteral stent placement       Family History   Problem Relation Age of Onset    Kidney failure Mother     Hypertension Father     Diabetes Brother     Cancer Sister      Social History     Social History    Marital status:      Spouse name: N/A    Number of children: N/A    Years of education: N/A     Occupational History    retired      Social History Main Topics    Smoking status: Never Smoker    Smokeless tobacco: Never Used    Alcohol use No    Drug use: No    Sexual activity: Yes     Partners: Male     Other Topics Concern    Not on file     Social History Narrative       Review of Systems   Constitution: Negative for chills, fever and weakness.   Cardiovascular: Positive for claudication and leg swelling. Negative for chest pain.   Respiratory: Positive for cough and wheezing. Negative for shortness of breath.    Skin: Positive for dry skin and nail changes. Negative for itching and rash.   Musculoskeletal: Positive for arthritis, back pain, joint pain, muscle cramps and myalgias. Negative for muscle weakness.   Gastrointestinal: Negative for diarrhea, nausea and vomiting.  "  Neurological: Positive for paresthesias. Negative for numbness and tremors.   Psychiatric/Behavioral: Negative for altered mental status and hallucinations.           Objective:       Vitals:    02/21/17 1055   BP: (!) 140/62   Weight: 119.7 kg (264 lb)   Height: 5' 2" (1.575 m)   PainSc:   6   PainLoc: Leg       Physical Exam   Constitutional:   General: Pt. is well-developed, well-nourished, appears stated age, in no acute distress, alert and oriented x 3. No evidence of depression, anxiety, or agitation. Calm, cooperative, and communicative. Appropriate interactions and affect.       Cardiovascular:   Pulses:       Dorsalis pedis pulses are 1+ on the right side, and 1+ on the left side.        Posterior tibial pulses are 0 on the right side, and 1+ on the left side.   Dorsalis pedis and posterior tibial pulses are diminished Bilaterally. Toes are cool to touch. Feet are warm proximally.There is decreased digital hair. Skin is atrophic,  hyperpigmented, and edematous       Musculoskeletal:        Right ankle: She exhibits swelling.        Left ankle: She exhibits swelling.        Right foot: There is swelling. There is normal range of motion.        Left foot: There is swelling. There is normal range of motion.   Exquisite tenderness to calf bilaterally    Muscle strength is 5/5 in all groups bilaterally.    Patient has hammertoes of digits 2-5 bilateral partially reducible without symptom today.    Visible and palpable bunion without pain at dorsomedial 1st metatarsal head right and left.  Hallux abducted right and left partially reducible, tracks laterally without being track bound.  No ecchymosis, erythema, edema, or cardinal signs infection or signs of trauma same foot.     Neurological: No sensory deficit.   Sullivan-Marcelo 5.07 monofilament is intact bilateral feet. Sharp/dull sensation is also intact Bilateral feet.           Skin: Skin is warm, dry and intact. No abrasion, no ecchymosis and no lesion " noted. She is not diaphoretic. No cyanosis or erythema. No pallor. Nails show no clubbing.   Toenails 1-5 bilaterally are elongated by 2-3 mm, thickened by 2-3 mm, discolored/yellowed, dystrophic, brittle with subungual debris. Tender to distal nail plate pressure of right hallux, without periungual skin abnormality of each.     Focal hyperkeratotic lesion consisting entirely of hyperkeratotic tissue without open skin, drainage, pus, fluctuance, malodor, or signs of infection sub 1st MTPJ karan    Interdigital Spaces clean, dry and without evidence of break in skin integrity   Psychiatric: She has a normal mood and affect. Her speech is normal.   Nursing note and vitals reviewed.      Assessment:       Encounter Diagnoses   Name Primary?    Type II diabetes mellitus with peripheral circulatory disorder Yes    PAD (peripheral artery disease)     Venous stasis of both lower extremities     Hallux abducto valgus, unspecified laterality     Onychomycosis due to dermatophyte     Corn or callus          Plan:       Ana was seen today for diabetes mellitus, diabetic foot exam and nail care.    Diagnoses and all orders for this visit:    Type II diabetes mellitus with peripheral circulatory disorder    PAD (peripheral artery disease)    Venous stasis of both lower extremities    Hallux abducto valgus, unspecified laterality    Onychomycosis due to dermatophyte    Corn or callus    I counseled the patient on her conditions, their implications and medical management.     - Shoe inspection. Diabetic Foot Education. Patient reminded of the importance of good nutrition and blood sugar control to help prevent podiatric complications of diabetes. Patient instructed on proper foot hygeine. We discussed wearing proper shoe gear, daily foot inspections, never walking without protective shoe gear, never putting sharp instruments to feet.    - Tubigrips to BLE; patient is to elevate legs. When sleeping, place a pillow under  lower extremities. When sitting, support the legs so that they are level with the waist.    - With patient's permission, nails were aggressively reduced and debrided x 10 to their soft tissue attachment mechanically and with electric , removing all offending nail and debris. Patient relates relief following the procedure. After cleansing the  area w/ alcohol prep pad the above mentioned hyperkeratosis was trimmed utilizing No 15 scapel, to a smooth base with out incident. Patient tolerated this  well and reported comfort to the area of sub 1st MTPJ karan.   She will continue to monitor the areas daily, inspect her feet, wear protective shoe gear when ambulatory, moisturizer to maintain skin integrity and follow in this office in approximately 3-5 months, sooner p.r.n.

## 2017-02-22 ENCOUNTER — TELEPHONE (OUTPATIENT)
Dept: FAMILY MEDICINE | Facility: CLINIC | Age: 77
End: 2017-02-22

## 2017-02-22 DIAGNOSIS — G47.00 INSOMNIA, UNSPECIFIED TYPE: ICD-10-CM

## 2017-02-22 RX ORDER — TRAZODONE HYDROCHLORIDE 50 MG/1
50 TABLET ORAL NIGHTLY
Qty: 90 TABLET | Refills: 3 | Status: SHIPPED | OUTPATIENT
Start: 2017-02-22 | End: 2018-02-05 | Stop reason: SDUPTHER

## 2017-02-22 NOTE — TELEPHONE ENCOUNTER
Patient stated that her desyrel is too much for her and is requesting to take 1/2 of the medication. Please advise.

## 2017-02-22 NOTE — TELEPHONE ENCOUNTER
----- Message from Charosavanah Escobedo sent at 2/21/2017  2:50 PM CST -----  Contact: SELF  Pt would like to speak to the nurse regarding about her sleep medication. Please contact the pt at [phone]. Thanks!

## 2017-02-23 NOTE — TELEPHONE ENCOUNTER
Informed pt ok to to take 1/2 tablet; pt verbalized understanding; pt states she did not start taking the medication yet, was afraid it was going to make her sleep too hard and not be able to get up to use the restroom, she will try 1/2 tablet first to see how it affects her

## 2017-02-26 RX ORDER — ATORVASTATIN CALCIUM 20 MG/1
TABLET, FILM COATED ORAL
Qty: 90 TABLET | Refills: 0 | Status: SHIPPED | OUTPATIENT
Start: 2017-02-26 | End: 2017-05-31 | Stop reason: SDUPTHER

## 2017-03-14 DIAGNOSIS — R06.2 WHEEZING: ICD-10-CM

## 2017-03-15 RX ORDER — ALBUTEROL SULFATE 90 UG/1
AEROSOL, METERED RESPIRATORY (INHALATION)
Qty: 8.5 INHALER | Refills: 0 | OUTPATIENT
Start: 2017-03-15

## 2017-03-15 RX ORDER — ALBUTEROL SULFATE 90 UG/1
2 AEROSOL, METERED RESPIRATORY (INHALATION) EVERY 6 HOURS PRN
Qty: 8 G | Refills: 5 | Status: SHIPPED | OUTPATIENT
Start: 2017-03-15 | End: 2018-12-12 | Stop reason: SDUPTHER

## 2017-03-20 DIAGNOSIS — I10 ESSENTIAL HYPERTENSION: Primary | ICD-10-CM

## 2017-03-20 RX ORDER — AMLODIPINE BESYLATE 10 MG/1
10 TABLET ORAL DAILY
Qty: 90 TABLET | Refills: 3 | Status: SHIPPED | OUTPATIENT
Start: 2017-03-20 | End: 2018-02-05 | Stop reason: SDUPTHER

## 2017-03-20 NOTE — TELEPHONE ENCOUNTER
----- Message from Janice Calderón sent at 3/20/2017  9:28 AM CDT -----  Contact: self  Pt needs amlodipine (NORVASC) 10 MG tablet refilled. Please call [phone]. thanks

## 2017-03-22 ENCOUNTER — TELEPHONE (OUTPATIENT)
Dept: UROLOGY | Facility: CLINIC | Age: 77
End: 2017-03-22

## 2017-03-22 NOTE — TELEPHONE ENCOUNTER
----- Message from Naty Schmidt sent at 3/22/2017 11:12 AM CDT -----  Contact: Self  X   1st Request  _  2nd Request  _  3rd Request        Who: BRUNO ROBERT [[id number]]    Why: Pt states she had to change her Ultrasound appt to 04/11. Pt would like to know if she can still come on 03/30 or does she need to wait until after her Ultrasound. Please call, thanks!    What Number to Call Back: [phone]    When to Expect a call back: (Before the end of the day)   -- if the call is after 12:00, the call back will be tomorrow.

## 2017-04-04 ENCOUNTER — OFFICE VISIT (OUTPATIENT)
Dept: NEPHROLOGY | Facility: CLINIC | Age: 77
End: 2017-04-04
Payer: MEDICARE

## 2017-04-04 ENCOUNTER — LAB VISIT (OUTPATIENT)
Dept: LAB | Facility: HOSPITAL | Age: 77
End: 2017-04-04
Attending: INTERNAL MEDICINE
Payer: MEDICARE

## 2017-04-04 VITALS
WEIGHT: 261.94 LBS | SYSTOLIC BLOOD PRESSURE: 110 MMHG | DIASTOLIC BLOOD PRESSURE: 60 MMHG | BODY MASS INDEX: 48.2 KG/M2 | HEART RATE: 76 BPM | OXYGEN SATURATION: 96 % | HEIGHT: 62 IN

## 2017-04-04 DIAGNOSIS — E11.22 CONTROLLED TYPE 2 DIABETES MELLITUS WITH STAGE 3 CHRONIC KIDNEY DISEASE, WITH LONG-TERM CURRENT USE OF INSULIN: ICD-10-CM

## 2017-04-04 DIAGNOSIS — N20.0 NEPHROLITHIASIS: ICD-10-CM

## 2017-04-04 DIAGNOSIS — N18.30 CONTROLLED TYPE 2 DIABETES MELLITUS WITH STAGE 3 CHRONIC KIDNEY DISEASE, WITH LONG-TERM CURRENT USE OF INSULIN: ICD-10-CM

## 2017-04-04 DIAGNOSIS — N18.30 CHRONIC KIDNEY DISEASE, STAGE III (MODERATE): ICD-10-CM

## 2017-04-04 DIAGNOSIS — N18.30 CHRONIC KIDNEY DISEASE, STAGE III (MODERATE): Primary | ICD-10-CM

## 2017-04-04 DIAGNOSIS — Z79.4 CONTROLLED TYPE 2 DIABETES MELLITUS WITH STAGE 3 CHRONIC KIDNEY DISEASE, WITH LONG-TERM CURRENT USE OF INSULIN: ICD-10-CM

## 2017-04-04 PROCEDURE — 36415 COLL VENOUS BLD VENIPUNCTURE: CPT | Mod: PO

## 2017-04-04 PROCEDURE — 99499 UNLISTED E&M SERVICE: CPT | Mod: S$GLB,,, | Performed by: INTERNAL MEDICINE

## 2017-04-04 PROCEDURE — 1126F AMNT PAIN NOTED NONE PRSNT: CPT | Mod: S$GLB,,, | Performed by: INTERNAL MEDICINE

## 2017-04-04 PROCEDURE — 1159F MED LIST DOCD IN RCRD: CPT | Mod: S$GLB,,, | Performed by: INTERNAL MEDICINE

## 2017-04-04 PROCEDURE — 80069 RENAL FUNCTION PANEL: CPT

## 2017-04-04 PROCEDURE — 1157F ADVNC CARE PLAN IN RCRD: CPT | Mod: S$GLB,,, | Performed by: INTERNAL MEDICINE

## 2017-04-04 PROCEDURE — 3078F DIAST BP <80 MM HG: CPT | Mod: S$GLB,,, | Performed by: INTERNAL MEDICINE

## 2017-04-04 PROCEDURE — 99999 PR PBB SHADOW E&M-EST. PATIENT-LVL II: CPT | Mod: PBBFAC,,, | Performed by: INTERNAL MEDICINE

## 2017-04-04 PROCEDURE — 3074F SYST BP LT 130 MM HG: CPT | Mod: S$GLB,,, | Performed by: INTERNAL MEDICINE

## 2017-04-04 PROCEDURE — 99204 OFFICE O/P NEW MOD 45 MIN: CPT | Mod: S$GLB,,, | Performed by: INTERNAL MEDICINE

## 2017-04-04 PROCEDURE — 1160F RVW MEDS BY RX/DR IN RCRD: CPT | Mod: S$GLB,,, | Performed by: INTERNAL MEDICINE

## 2017-04-04 NOTE — LETTER
April 11, 2017      Jaky Schmidt MD  [address]  [place] [address]           [facility]  [address]  [place] [address]  Phone: [phone]          Patient: Ana James   MR Number: [id number]   YOB: 1940   Date of Visit: 4/4/2017       Dear Juan Schmidt:    Thank you for referring Ana James to me for evaluation. Attached you will find relevant portions of my assessment and plan of care.    If you have questions, please do not hesitate to call me. I look forward to following Ana James along with you.    Sincerely,    Kannan Rice MD    Enclosure  CC:  No Recipients    If you would like to receive this communication electronically, please contact [email or web address] or [phone] to request more information on [employer] access.    For providers and/or their staff who would like to refer a patient to [facility], please contact us through our one-stop-shop provider referral line, [facility] , at [phone].    If you feel you have received this communication in error or would no longer like to receive these types of communications, please e-mail [email or web address]

## 2017-04-05 LAB
ALBUMIN SERPL BCP-MCNC: 3.3 G/DL
ANION GAP SERPL CALC-SCNC: 10 MMOL/L
BUN SERPL-MCNC: 25 MG/DL
CALCIUM SERPL-MCNC: 9.2 MG/DL
CHLORIDE SERPL-SCNC: 111 MMOL/L
CO2 SERPL-SCNC: 20 MMOL/L
CREAT SERPL-MCNC: 2 MG/DL
EST. GFR  (AFRICAN AMERICAN): 27 ML/MIN/1.73 M^2
EST. GFR  (NON AFRICAN AMERICAN): 23.4 ML/MIN/1.73 M^2
GLUCOSE SERPL-MCNC: 96 MG/DL
PHOSPHATE SERPL-MCNC: 3.3 MG/DL
POTASSIUM SERPL-SCNC: 4.3 MMOL/L
SODIUM SERPL-SCNC: 141 MMOL/L

## 2017-04-11 ENCOUNTER — HOSPITAL ENCOUNTER (OUTPATIENT)
Dept: RADIOLOGY | Facility: HOSPITAL | Age: 77
Discharge: HOME | End: 2017-04-11
Attending: UROLOGY
Payer: MEDICARE

## 2017-04-11 DIAGNOSIS — N20.0 NEPHROLITHIASIS: ICD-10-CM

## 2017-04-11 PROCEDURE — 76770 US EXAM ABDO BACK WALL COMP: CPT | Mod: 26,,, | Performed by: RADIOLOGY

## 2017-04-11 PROCEDURE — 76770 US EXAM ABDO BACK WALL COMP: CPT | Mod: TC

## 2017-04-11 NOTE — PROGRESS NOTES
Subjective:       Patient ID: Ana James is a 76 y.o. Black or  female who presents for new evaluation of Chronic Kidney Disease    HPI  Ms. James is a 76 year old woman with medical history of diabetes, hypertension, nephrolithiasis presenting for evaluation of chronic kidney disease.  Patient creatinine previously 1.5-1.8mg/dL, elevated since urologic intervention May/June 2016 for uric acid stones, up to 3.0mg/dL, stable at 1.9-2.0mg/dL since.  She reports blood sugars well-controlled, blood pressure usually 120-130's systolic.  She reports moderate daily fluid intake, denies any NSAID use.  She otherwise denies any fever, chest pain, shortness of breath, abdominal pain, diarrhea, dysuria/hematuria.     Review of Systems   Constitutional: Negative for appetite change, fatigue and fever.   Respiratory: Negative for chest tightness and shortness of breath.    Cardiovascular: Negative for chest pain and leg swelling.   Gastrointestinal: Negative for abdominal pain, constipation, diarrhea, nausea and vomiting.   Genitourinary: Negative for difficulty urinating, dysuria, flank pain, frequency, hematuria and urgency.   Musculoskeletal: Negative for arthralgias, joint swelling and myalgias.   Skin: Negative for rash and wound.   Neurological: Negative for dizziness, weakness and light-headedness.   All other systems reviewed and are negative.      Objective:      Physical Exam   Constitutional: She appears well-developed and well-nourished.   Cardiovascular: Normal rate, regular rhythm and normal heart sounds.  Exam reveals no gallop and no friction rub.    No murmur heard.  Pulmonary/Chest: Effort normal and breath sounds normal. No respiratory distress. She has no wheezes. She has no rales.   Abdominal: Soft. Bowel sounds are normal. There is no tenderness.   Musculoskeletal: She exhibits no edema.   Neurological: She is alert.   Skin: Skin is warm and dry. No rash noted. No erythema.    Psychiatric: She has a normal mood and affect.   Vitals reviewed.      Assessment:       1. Chronic kidney disease, stage III (moderate)    2. Controlled type 2 diabetes mellitus with stage 3 chronic kidney disease, with long-term current use of insulin    3. Nephrolithiasis        Plan:     Ms. James is a 76 year old woman with medical history of diabetes, hypertension, nephrolithiasis presenting for evaluation of chronic kidney disease.  Patient creatinine previously 1.5-1.8mg/dL, elevated since urologic intervention May/June 2016, up to 3.0mg/dL, stable at 1.9-2.0mg/dL since.  Patient with baseline CKD stage III, likely due to diabetic nephropathy, previously with acute kidney injury likely due to obstructive uropathy/UTI, will repeat renal panel to trend.  Encouraged fluid intake, stressed importance of blood pressure/glycemic control to prevent any further progression of kidney disease, patient voiced understanding.     Return to clinic in 3-4 months with renal/heme panel, iron/TIBC/ferritin, urinalysis/culture, urine protein/creatinine ratio, PTH/VitD prior to next visit

## 2017-04-26 DIAGNOSIS — I10 ESSENTIAL HYPERTENSION: Primary | ICD-10-CM

## 2017-04-26 RX ORDER — CARVEDILOL 12.5 MG/1
12.5 TABLET ORAL 2 TIMES DAILY
Qty: 90 TABLET | Refills: 2 | Status: SHIPPED | OUTPATIENT
Start: 2017-04-26 | End: 2017-09-03 | Stop reason: SDUPTHER

## 2017-04-26 NOTE — TELEPHONE ENCOUNTER
----- Message from Janice Calderón sent at 4/26/2017 10:27 AM CDT -----  Contact: [facility]  Pt needs carvedilol (COREG) 12.5 MG tablet refills. Please call [phone]. Thanks

## 2017-05-01 ENCOUNTER — TELEPHONE (OUTPATIENT)
Dept: UROLOGY | Facility: CLINIC | Age: 77
End: 2017-05-01

## 2017-05-01 NOTE — TELEPHONE ENCOUNTER
----- Message from Charosavanah Escobedo sent at 5/1/2017  2:03 PM CDT -----  Contact: self   Pt request to speak to the nurse regarding about her upcoming appointment. Please contact the pt at [phone]. Thanks!

## 2017-05-19 RX ORDER — PEN NEEDLE, DIABETIC 31 GX5/16"
NEEDLE, DISPOSABLE MISCELLANEOUS
Qty: 200 EACH | Refills: 11 | Status: SHIPPED | OUTPATIENT
Start: 2017-05-19 | End: 2018-06-24 | Stop reason: SDUPTHER

## 2017-05-26 ENCOUNTER — HOSPITAL ENCOUNTER (OUTPATIENT)
Facility: HOSPITAL | Age: 77
Discharge: HOME | End: 2017-05-27
Attending: EMERGENCY MEDICINE | Admitting: INTERNAL MEDICINE
Payer: MEDICARE

## 2017-05-26 DIAGNOSIS — N18.9 RENAL FAILURE, ACUTE ON CHRONIC: ICD-10-CM

## 2017-05-26 DIAGNOSIS — N18.4 UNCONTROLLED TYPE 2 DIABETES MELLITUS WITH STAGE 4 CHRONIC KIDNEY DISEASE, UNSPECIFIED LONG TERM INSULIN USE STATUS: ICD-10-CM

## 2017-05-26 DIAGNOSIS — R19.7 VOMITING AND DIARRHEA: Primary | ICD-10-CM

## 2017-05-26 DIAGNOSIS — E11.22 UNCONTROLLED TYPE 2 DIABETES MELLITUS WITH STAGE 4 CHRONIC KIDNEY DISEASE, UNSPECIFIED LONG TERM INSULIN USE STATUS: ICD-10-CM

## 2017-05-26 DIAGNOSIS — I10 ESSENTIAL HYPERTENSION: ICD-10-CM

## 2017-05-26 DIAGNOSIS — E86.0 DEHYDRATION: ICD-10-CM

## 2017-05-26 DIAGNOSIS — N18.30 CKD (CHRONIC KIDNEY DISEASE) STAGE 3, GFR 30-59 ML/MIN: ICD-10-CM

## 2017-05-26 DIAGNOSIS — E11.65 UNCONTROLLED TYPE 2 DIABETES MELLITUS WITH STAGE 4 CHRONIC KIDNEY DISEASE, UNSPECIFIED LONG TERM INSULIN USE STATUS: ICD-10-CM

## 2017-05-26 DIAGNOSIS — E87.5 HYPERKALEMIA: ICD-10-CM

## 2017-05-26 DIAGNOSIS — N17.9 RENAL FAILURE, ACUTE ON CHRONIC: ICD-10-CM

## 2017-05-26 DIAGNOSIS — N17.9 AKI (ACUTE KIDNEY INJURY): ICD-10-CM

## 2017-05-26 DIAGNOSIS — R11.10 VOMITING AND DIARRHEA: Primary | ICD-10-CM

## 2017-05-26 LAB
ALBUMIN SERPL BCP-MCNC: 3.7 G/DL
ALP SERPL-CCNC: 102 U/L
ALT SERPL W/O P-5'-P-CCNC: 14 U/L
ANION GAP SERPL CALC-SCNC: 7 MMOL/L
AST SERPL-CCNC: 16 U/L
BASOPHILS # BLD AUTO: 0.01 K/UL
BASOPHILS NFR BLD: 0.2 %
BILIRUB SERPL-MCNC: 0.5 MG/DL
BUN SERPL-MCNC: 31 MG/DL
CALCIUM SERPL-MCNC: 10.1 MG/DL
CHLORIDE SERPL-SCNC: 113 MMOL/L
CO2 SERPL-SCNC: 20 MMOL/L
CREAT SERPL-MCNC: 2.7 MG/DL
DIFFERENTIAL METHOD: ABNORMAL
EOSINOPHIL # BLD AUTO: 0.1 K/UL
EOSINOPHIL NFR BLD: 2.2 %
ERYTHROCYTE [DISTWIDTH] IN BLOOD BY AUTOMATED COUNT: 14.3 %
EST. GFR  (AFRICAN AMERICAN): 19 ML/MIN/1.73 M^2
EST. GFR  (NON AFRICAN AMERICAN): 16 ML/MIN/1.73 M^2
GLUCOSE SERPL-MCNC: 123 MG/DL
HCT VFR BLD AUTO: 34.9 %
HGB BLD-MCNC: 11.4 G/DL
LYMPHOCYTES # BLD AUTO: 1.1 K/UL
LYMPHOCYTES NFR BLD: 17.2 %
MAGNESIUM SERPL-MCNC: 2 MG/DL
MCH RBC QN AUTO: 28.4 PG
MCHC RBC AUTO-ENTMCNC: 32.7 %
MCV RBC AUTO: 87 FL
MONOCYTES # BLD AUTO: 0.5 K/UL
MONOCYTES NFR BLD: 8.3 %
NEUTROPHILS # BLD AUTO: 4.5 K/UL
NEUTROPHILS NFR BLD: 71.9 %
PLATELET # BLD AUTO: 279 K/UL
PMV BLD AUTO: 9.6 FL
POCT GLUCOSE: 123 MG/DL (ref 70–110)
POCT GLUCOSE: 137 MG/DL (ref 70–110)
POTASSIUM SERPL-SCNC: 5.2 MMOL/L
PROT SERPL-MCNC: 8.6 G/DL
RBC # BLD AUTO: 4.01 M/UL
SODIUM SERPL-SCNC: 140 MMOL/L
WBC # BLD AUTO: 6.28 K/UL

## 2017-05-26 PROCEDURE — G0378 HOSPITAL OBSERVATION PER HR: HCPCS

## 2017-05-26 PROCEDURE — 99284 EMERGENCY DEPT VISIT MOD MDM: CPT | Mod: 25

## 2017-05-26 PROCEDURE — 63600175 PHARM REV CODE 636 W HCPCS: Performed by: EMERGENCY MEDICINE

## 2017-05-26 PROCEDURE — 85025 COMPLETE CBC W/AUTO DIFF WBC: CPT

## 2017-05-26 PROCEDURE — 80053 COMPREHEN METABOLIC PANEL: CPT

## 2017-05-26 PROCEDURE — 82962 GLUCOSE BLOOD TEST: CPT

## 2017-05-26 PROCEDURE — 96374 THER/PROPH/DIAG INJ IV PUSH: CPT

## 2017-05-26 PROCEDURE — 25000003 PHARM REV CODE 250: Performed by: EMERGENCY MEDICINE

## 2017-05-26 PROCEDURE — 83735 ASSAY OF MAGNESIUM: CPT

## 2017-05-26 RX ORDER — TRAZODONE HYDROCHLORIDE 50 MG/1
50 TABLET ORAL NIGHTLY
Status: DISCONTINUED | OUTPATIENT
Start: 2017-05-26 | End: 2017-05-27 | Stop reason: HOSPADM

## 2017-05-26 RX ORDER — GLUCAGON 1 MG
1 KIT INJECTION
Status: DISCONTINUED | OUTPATIENT
Start: 2017-05-26 | End: 2017-05-27 | Stop reason: HOSPADM

## 2017-05-26 RX ORDER — ENOXAPARIN SODIUM 100 MG/ML
30 INJECTION SUBCUTANEOUS EVERY 24 HOURS
Status: DISCONTINUED | OUTPATIENT
Start: 2017-05-26 | End: 2017-05-27 | Stop reason: HOSPADM

## 2017-05-26 RX ORDER — LATANOPROST 50 UG/ML
1 SOLUTION/ DROPS OPHTHALMIC NIGHTLY
Status: DISCONTINUED | OUTPATIENT
Start: 2017-05-26 | End: 2017-05-27 | Stop reason: HOSPADM

## 2017-05-26 RX ORDER — ATORVASTATIN CALCIUM 10 MG/1
20 TABLET, FILM COATED ORAL DAILY
Status: DISCONTINUED | OUTPATIENT
Start: 2017-05-27 | End: 2017-05-27 | Stop reason: HOSPADM

## 2017-05-26 RX ORDER — ASPIRIN 81 MG/1
81 TABLET ORAL DAILY
Status: DISCONTINUED | OUTPATIENT
Start: 2017-05-27 | End: 2017-05-27 | Stop reason: HOSPADM

## 2017-05-26 RX ORDER — CARVEDILOL 6.25 MG/1
12.5 TABLET ORAL 2 TIMES DAILY
Status: DISCONTINUED | OUTPATIENT
Start: 2017-05-26 | End: 2017-05-27 | Stop reason: HOSPADM

## 2017-05-26 RX ORDER — IBUPROFEN 200 MG
16 TABLET ORAL
Status: DISCONTINUED | OUTPATIENT
Start: 2017-05-26 | End: 2017-05-27 | Stop reason: HOSPADM

## 2017-05-26 RX ORDER — AMLODIPINE BESYLATE 5 MG/1
10 TABLET ORAL DAILY
Status: DISCONTINUED | OUTPATIENT
Start: 2017-05-27 | End: 2017-05-27 | Stop reason: HOSPADM

## 2017-05-26 RX ORDER — TIMOLOL MALEATE 5 MG/ML
1 SOLUTION/ DROPS OPHTHALMIC 2 TIMES DAILY
Status: DISCONTINUED | OUTPATIENT
Start: 2017-05-26 | End: 2017-05-27 | Stop reason: HOSPADM

## 2017-05-26 RX ORDER — CILOSTAZOL 50 MG/1
50 TABLET ORAL 2 TIMES DAILY
Status: DISCONTINUED | OUTPATIENT
Start: 2017-05-26 | End: 2017-05-27 | Stop reason: HOSPADM

## 2017-05-26 RX ORDER — ONDANSETRON 2 MG/ML
8 INJECTION INTRAMUSCULAR; INTRAVENOUS
Status: COMPLETED | OUTPATIENT
Start: 2017-05-26 | End: 2017-05-26

## 2017-05-26 RX ORDER — ONDANSETRON 2 MG/ML
4 INJECTION INTRAMUSCULAR; INTRAVENOUS EVERY 8 HOURS PRN
Status: DISCONTINUED | OUTPATIENT
Start: 2017-05-26 | End: 2017-05-26 | Stop reason: SDUPTHER

## 2017-05-26 RX ORDER — IBUPROFEN 200 MG
24 TABLET ORAL
Status: DISCONTINUED | OUTPATIENT
Start: 2017-05-26 | End: 2017-05-27 | Stop reason: HOSPADM

## 2017-05-26 RX ORDER — INSULIN ASPART 100 [IU]/ML
1-10 INJECTION, SOLUTION INTRAVENOUS; SUBCUTANEOUS
Status: DISCONTINUED | OUTPATIENT
Start: 2017-05-26 | End: 2017-05-27 | Stop reason: HOSPADM

## 2017-05-26 RX ORDER — ALBUTEROL SULFATE 90 UG/1
2 AEROSOL, METERED RESPIRATORY (INHALATION) EVERY 6 HOURS PRN
Status: DISCONTINUED | OUTPATIENT
Start: 2017-05-26 | End: 2017-05-27 | Stop reason: HOSPADM

## 2017-05-26 RX ORDER — HYDROCODONE BITARTRATE AND ACETAMINOPHEN 5; 325 MG/1; MG/1
1 TABLET ORAL EVERY 4 HOURS PRN
Status: DISCONTINUED | OUTPATIENT
Start: 2017-05-26 | End: 2017-05-27 | Stop reason: HOSPADM

## 2017-05-26 RX ORDER — SODIUM CHLORIDE 9 MG/ML
INJECTION, SOLUTION INTRAVENOUS CONTINUOUS
Status: DISCONTINUED | OUTPATIENT
Start: 2017-05-26 | End: 2017-05-27 | Stop reason: HOSPADM

## 2017-05-26 RX ORDER — ONDANSETRON 2 MG/ML
4 INJECTION INTRAMUSCULAR; INTRAVENOUS EVERY 8 HOURS PRN
Status: DISCONTINUED | OUTPATIENT
Start: 2017-05-26 | End: 2017-05-27 | Stop reason: HOSPADM

## 2017-05-26 RX ADMIN — SODIUM CHLORIDE: 0.9 INJECTION, SOLUTION INTRAVENOUS at 09:05

## 2017-05-26 RX ADMIN — ENOXAPARIN SODIUM 30 MG: 100 INJECTION SUBCUTANEOUS at 09:05

## 2017-05-26 RX ADMIN — TRAZODONE HYDROCHLORIDE 50 MG: 50 TABLET ORAL at 09:05

## 2017-05-26 RX ADMIN — ONDANSETRON 8 MG: 2 INJECTION INTRAMUSCULAR; INTRAVENOUS at 03:05

## 2017-05-26 RX ADMIN — LATANOPROST 1 DROP: 50 SOLUTION/ DROPS OPHTHALMIC at 09:05

## 2017-05-26 RX ADMIN — CILOSTAZOL 50 MG: 50 TABLET ORAL at 09:05

## 2017-05-26 RX ADMIN — CARVEDILOL 12.5 MG: 6.25 TABLET, FILM COATED ORAL at 09:05

## 2017-05-26 NOTE — ED TRIAGE NOTES
Pt presents to ER for vomiting and  For 1 day. Pt states any time she attempts to eat anything she has vomiting and diarrhea

## 2017-05-26 NOTE — ED PROVIDER NOTES
"Encounter Date: 5/26/2017    SCRIBE #1 NOTE: I, Emelia Cloud, am scribing for, and in the presence of,  Yosi Lerma MD. I have scribed the following portions of the note - Other sections scribed: ROS and HPI.       History     Chief Complaint   Patient presents with    Nausea     Pt arrived via EMS duee to nausea, vomitting,and diaarhea since yesterday" I'm a diabetic." denies abdomninal pain    Diarrhea     Review of patient's allergies indicates:   Allergen Reactions    Adhesive Rash     Paper tape     CC: Nausea; Diarrhea  HPI: This 76 y.o. female with a past medical history of Arthritis; Asthma; Diabetes mellitus; Hypertension; Kidney stones; Obesity; Renal disorder; Retinopathy, presents to the ED complaining of 3-4 episodes of diarrhea and vomiting since last night. No exacerbating or alleviating factors are reported. She denies fever, abdominal pain, dysuria, numbness, weakness or any other associated sx. She reports chronic shortness of breath upon exertion. No prior medical intervention.           Past Medical History:   Diagnosis Date    Arthritis lower extremities    Asthma     Diabetes mellitus     Gall stones     Hypertension     Kidney stones     Obesity     Renal disorder     Retinopathy     Vaginal delivery     x1     Past Surgical History:   Procedure Laterality Date    CARDIAC CATHETERIZATION      cataract      CHOLECYSTECTOMY      EYE SURGERY      Rt cataract surgery    HYSTERECTOMY      URETERAL STENT PLACEMENT       Family History   Problem Relation Age of Onset    Kidney failure Mother     Hypertension Father     Diabetes Brother     Cancer Sister      Social History   Substance Use Topics    Smoking status: Never Smoker    Smokeless tobacco: Never Used    Alcohol use No     Review of Systems   Constitutional: Negative for fever.   HENT: Negative for congestion.    Eyes: Negative for visual disturbance.   Respiratory: Negative for cough and shortness of breath.  "   Cardiovascular: Negative for chest pain.   Gastrointestinal: Positive for diarrhea and vomiting. Negative for abdominal pain and constipation.   Genitourinary: Negative for dysuria.   Musculoskeletal: Negative for back pain.   Skin: Negative for rash.   Neurological: Negative for headaches.       Physical Exam     Initial Vitals [05/26/17 1321]   BP Pulse Resp Temp SpO2   134/73 84 18 98.6 °F (37 °C) 97 %     Physical Exam  The patient was examined specifically for the following:   General:No significant distress, Good color, Warm and dry. Head and neck:Scalp atraumatic, Neck supple. Neurological:Appropriate conversation, Gross motor deficits. Eyes:Conjugate gaze, Clear corneas. ENT: No epistaxis. Cardiac: Regular rate and rhythm, Grossly normal heart tones. Pulmonary: Wheezing, Rales. Gastrointestinal: Abdominal tenderness, Abdominal distention. Musculoskeletal: Extremity deformity, Apparent pain with range of motion of the joints. Skin: Rash.   The findings on examination were normal except for the following: Patient is morbidly obese.  The lungs are clear and free of wheezing rales of the rhonchi.  Heart tones are normal.  The patient has a regular rate and rhythm.  There is no evidence of respiratory distress.  The lungs are clear and free wheezing rales rubs and rhonchi.  There is no significant pedal edema.  The abdomen is completely nontender.  ED Course   Critical Care  Date/Time: 6/5/2017 8:34 AM  Performed by: ISSA SUN  Authorized by: ANMOL SANDS   Direct patient critical care time: 22 minutes  Additional history critical care time: 11 minutes  Ordering / reviewing critical care time: 11 minutes  Documentation critical care time: 17 minutes  Consulting other physicians critical care time: 3 minutes  Consult with family critical care time: 3 minutes  Total critical care time (exclusive of procedural time) : 67 minutes  Critical care time was exclusive of separately billable procedures and  treating other patients and teaching time.  Critical care was necessary to treat or prevent imminent or life-threatening deterioration of the following conditions: dehydration, renal failure and metabolic crisis.  Critical care was time spent personally by me on the following activities: discussions with primary provider, evaluation of patient's response to treatment, examination of patient, obtaining history from patient or surrogate, ordering and performing treatments and interventions, ordering and review of laboratory studies and review of old charts.        Labs Reviewed   COMPREHENSIVE METABOLIC PANEL - Abnormal; Notable for the following:        Result Value    Potassium 5.2 (*)     Chloride 113 (*)     CO2 20 (*)     Glucose 123 (*)     BUN, Bld 31 (*)     Creatinine 2.7 (*)     Total Protein 8.6 (*)     Anion Gap 7 (*)     eGFR if  19 (*)     eGFR if non  16 (*)     All other components within normal limits   CBC W/ AUTO DIFFERENTIAL - Abnormal; Notable for the following:     Hemoglobin 11.4 (*)     Hematocrit 34.9 (*)     Lymph% 17.2 (*)     All other components within normal limits   POCT GLUCOSE - Abnormal; Notable for the following:     POCT Glucose 123 (*)     All other components within normal limits   MAGNESIUM   POCT GLUCOSE MONITORING CONTINUOUS         Medical decision making: This patient presents emergency with vomiting and diarrhea.  She has a potassium of 5.3.  Her creatinine is been raised from 2.0-2.7.  Her GFR is around 20.  I will admit this patient for hydration.  I will treat her for nausea.  I will hydrate her to have her followed by hospital medicine.  I will put her on a clear liquid diet.  I will treat her symptomatically.  If her creatinine improves, I will discharge her tomorrow.                   Scribe Attestation:   Scribe #1: I performed the above scribed service and the documentation accurately describes the services I performed. I attest to the  accuracy of the note.    Attending Attestation:           Physician Attestation for Scribe:  Physician Attestation Statement for Scribe #1: I, Yosi Lerma MD, reviewed documentation, as scribed by Emelia Cloud in my presence, and it is both accurate and complete.                 ED Course     Clinical Impression:   The primary encounter diagnosis was Vomiting and diarrhea. Diagnoses of Dehydration, Renal failure, acute on chronic, and Hyperkalemia were also pertinent to this visit.          Yosi Lerma MD  05/26/17 1739       Yosi Lerma MD  06/05/17 1730

## 2017-05-27 VITALS
WEIGHT: 248.88 LBS | HEIGHT: 62 IN | TEMPERATURE: 98 F | HEART RATE: 69 BPM | OXYGEN SATURATION: 96 % | BODY MASS INDEX: 45.8 KG/M2 | DIASTOLIC BLOOD PRESSURE: 62 MMHG | SYSTOLIC BLOOD PRESSURE: 134 MMHG | RESPIRATION RATE: 17 BRPM

## 2017-05-27 PROBLEM — E87.5 HYPERKALEMIA: Status: ACTIVE | Noted: 2017-05-27

## 2017-05-27 PROBLEM — N17.9 AKI (ACUTE KIDNEY INJURY): Status: ACTIVE | Noted: 2017-05-27

## 2017-05-27 LAB
ANION GAP SERPL CALC-SCNC: 7 MMOL/L
BUN SERPL-MCNC: 29 MG/DL
CALCIUM SERPL-MCNC: 9.3 MG/DL
CHLORIDE SERPL-SCNC: 116 MMOL/L
CO2 SERPL-SCNC: 18 MMOL/L
CREAT SERPL-MCNC: 2.4 MG/DL
EST. GFR  (AFRICAN AMERICAN): 22 ML/MIN/1.73 M^2
EST. GFR  (NON AFRICAN AMERICAN): 19 ML/MIN/1.73 M^2
GLUCOSE SERPL-MCNC: 81 MG/DL
POCT GLUCOSE: 89 MG/DL (ref 70–110)
POCT GLUCOSE: 97 MG/DL (ref 70–110)
POTASSIUM SERPL-SCNC: 5 MMOL/L
SODIUM SERPL-SCNC: 141 MMOL/L

## 2017-05-27 PROCEDURE — 94760 N-INVAS EAR/PLS OXIMETRY 1: CPT

## 2017-05-27 PROCEDURE — 96361 HYDRATE IV INFUSION ADD-ON: CPT

## 2017-05-27 PROCEDURE — 96372 THER/PROPH/DIAG INJ SC/IM: CPT

## 2017-05-27 PROCEDURE — 83036 HEMOGLOBIN GLYCOSYLATED A1C: CPT

## 2017-05-27 PROCEDURE — 36415 COLL VENOUS BLD VENIPUNCTURE: CPT

## 2017-05-27 PROCEDURE — 80048 BASIC METABOLIC PNL TOTAL CA: CPT

## 2017-05-27 PROCEDURE — 96360 HYDRATION IV INFUSION INIT: CPT

## 2017-05-27 PROCEDURE — G0378 HOSPITAL OBSERVATION PER HR: HCPCS

## 2017-05-27 PROCEDURE — 63600175 PHARM REV CODE 636 W HCPCS: Performed by: EMERGENCY MEDICINE

## 2017-05-27 PROCEDURE — 25000003 PHARM REV CODE 250: Performed by: PHYSICIAN ASSISTANT

## 2017-05-27 PROCEDURE — 25000003 PHARM REV CODE 250: Performed by: EMERGENCY MEDICINE

## 2017-05-27 RX ORDER — ONDANSETRON 4 MG/1
4 TABLET, ORALLY DISINTEGRATING ORAL EVERY 6 HOURS PRN
Qty: 9 TABLET | Refills: 0 | Status: SHIPPED | OUTPATIENT
Start: 2017-05-27 | End: 2017-06-16 | Stop reason: SDUPTHER

## 2017-05-27 RX ORDER — ONDANSETRON 4 MG/1
4 TABLET, ORALLY DISINTEGRATING ORAL EVERY 6 HOURS PRN
Qty: 1 TABLET | Refills: 0 | Status: SHIPPED | OUTPATIENT
Start: 2017-05-27 | End: 2017-11-20

## 2017-05-27 RX ADMIN — SODIUM CHLORIDE: 0.9 INJECTION, SOLUTION INTRAVENOUS at 10:05

## 2017-05-27 RX ADMIN — ATORVASTATIN CALCIUM 20 MG: 10 TABLET, FILM COATED ORAL at 09:05

## 2017-05-27 RX ADMIN — AMLODIPINE BESYLATE 10 MG: 5 TABLET ORAL at 09:05

## 2017-05-27 RX ADMIN — SODIUM CHLORIDE: 0.9 INJECTION, SOLUTION INTRAVENOUS at 04:05

## 2017-05-27 RX ADMIN — TIMOLOL MALEATE 1 DROP: 5 SOLUTION/ DROPS OPHTHALMIC at 09:05

## 2017-05-27 RX ADMIN — ONDANSETRON 4 MG: 2 INJECTION INTRAMUSCULAR; INTRAVENOUS at 07:05

## 2017-05-27 RX ADMIN — CILOSTAZOL 50 MG: 50 TABLET ORAL at 09:05

## 2017-05-27 RX ADMIN — CARVEDILOL 12.5 MG: 6.25 TABLET, FILM COATED ORAL at 09:05

## 2017-05-27 NOTE — H&P
"[facility]  History & Physical    Patient Name: Ana James  MRN: [id number]  Admission Date: 5/26/2017  Attending Physician: Darin Garcia MD   Primary Care Provider: Jaky Schmidt MD         Patient information was obtained from patient and ER records.     Subjective:     Principal Problem:Vomiting and diarrhea    Chief Complaint:   Chief Complaint   Patient presents with    Nausea     Pt arrived via EMS duee to nausea, vomitting,and diaarhea since yesterday" I'm a diabetic." denies abdomninal pain    Diarrhea        HPI: Patient is 75 yo female with HTN, DMII (A1c 7.0 Jan 2017) with renal manifestations, diastolic CHF (EF 55% +DD Oct. 2016), asthma, obesity who presents with complaint of acute onset vomiting and diarrhea. Patient reports around 4 am yesterday morning acute onset vomiting (2-3 episodes) and diarrhea (2-3 episodes). She was no feeling unwell prior to this and feels it may be related to her lack of gallbladder as sometimes has issues depending on what she eats. Recalls eating 2 baked potatoes, pudding, and cherry Dr. Pepper the night prior to onset and ate nothing yesterday. She denies sick contacts, recent illness, fever/chills, CP, dysuria. She did not eat any undercooked foods. No new medications. On presentation patient with acute on chronic kidney injury with creatinine of 2.7 (baseline usually 2.0) likely secondary to GI losses. Mildly elevated potassium at 5.2--no kayexalate given in ED given diarrhea and plan to attempt correction first with hydration as this may be secondary to fluid shift. Placed in observation for IVF hydration and supportive care.     Past Medical History:   Diagnosis Date    Arthritis lower extremities    Asthma     Diabetes mellitus     Gall stones     Hypertension     Kidney stones     Obesity     Renal disorder     Retinopathy     Vaginal delivery     x1       Past Surgical History:   Procedure " "Laterality Date    CARDIAC CATHETERIZATION      cataract      CHOLECYSTECTOMY      EYE SURGERY      Rt cataract surgery    HYSTERECTOMY      URETERAL STENT PLACEMENT         Review of patient's allergies indicates:   Allergen Reactions    Adhesive Rash     Paper tape       No current facility-administered medications on file prior to encounter.      Current Outpatient Prescriptions on File Prior to Encounter   Medication Sig    albuterol 90 mcg/actuation inhaler Inhale 2 puffs into the lungs every 6 (six) hours as needed for Wheezing. Rescue    amlodipine (NORVASC) 10 MG tablet Take 1 tablet (10 mg total) by mouth once daily.    aspirin (ECOTRIN) 81 MG EC tablet Take 81 mg by mouth once daily.    atorvastatin (LIPITOR) 20 MG tablet TAKE 1 TABLET BY MOUTH EVERY DAY    candesartan-hydrochlorothiazid (ATACAND HCT) 32-12.5 mg per tablet Take 1 tablet by mouth once daily.    carvedilol (COREG) 12.5 MG tablet Take 1 tablet (12.5 mg total) by mouth 2 (two) times daily.    cilostazol (PLETAL) 50 MG Tab Take 1 tablet (50 mg total) by mouth 2 (two) times daily.    furosemide (LASIX) 40 MG tablet Take 40 mg by mouth once daily.    hydrocodone-acetaminophen 5-325mg (NORCO) 5-325 mg per tablet Take 1 tablet by mouth every 6 (six) hours as needed for Pain.    insulin glargine (LANTUS SOLOSTAR) 100 unit/mL (3 mL) InPn pen Inject 32 Units into the skin once daily. (Patient taking differently: Inject 20 Units into the skin every evening. )    latanoprost 0.005 % ophthalmic solution Place 1 drop into both eyes every evening.    potassium citrate (UROCIT-K 15) 15 mEq TbSR Take 15 mEq by mouth once daily.    TRADJENTA 5 mg Tab tablet TAKE 1 TABLET (5 MG TOTAL) BY MOUTH ONCE DAILY.    trazodone (DESYREL) 50 MG tablet Take 1 tablet (50 mg total) by mouth every evening.    BD ULTRA-FINE BREE PEN NEEDLES 32 gauge x 5/32" Ndle INJECT INSULIN THREE TIMES DAILY    BENICAR HCT 40-12.5 mg Tab TAKE 1 TABLET BY MOUTH ONCE " DAILY.    blood sugar diagnostic Strp Test 3 times daily    blood-glucose meter kit Use as instructed    clotrimazole-betamethasone 1-0.05% (LOTRISONE) cream Apply topically 2 (two) times daily.    lancets Misc Test 3 times daily    timolol maleate 0.5% (TIMOPTIC) 0.5 % Drop 1 drop every morning.     trospium (SANCTURA) 20 mg Tab tablet TAKE 1 TABLET BY MOUTH TWICE A DAY     Family History     Problem Relation (Age of Onset)    Cancer Sister    Diabetes Brother    Hypertension Father    Kidney failure Mother        Social History Main Topics    Smoking status: Never Smoker    Smokeless tobacco: Never Used    Alcohol use No    Drug use: No    Sexual activity: Yes     Partners: Male     Review of Systems   Constitutional: Positive for appetite change. Negative for chills and fever.   HENT: Negative for congestion and sore throat.    Respiratory: Negative for cough and shortness of breath.    Cardiovascular: Negative for chest pain and palpitations.   Gastrointestinal: Positive for abdominal pain, diarrhea, nausea and vomiting.   Endocrine: Negative for cold intolerance and heat intolerance.   Genitourinary: Negative for dysuria and frequency.   Musculoskeletal: Negative for arthralgias and myalgias.   Skin: Negative for color change and rash.   Neurological: Negative for dizziness and headaches.   Psychiatric/Behavioral: Negative for behavioral problems and confusion.     Objective:     Vital Signs (Most Recent):  Temp: 98.1 °F (36.7 °C) (05/27/17 0835)  Pulse: 68 (05/27/17 0835)  Resp: 17 (05/27/17 0835)  BP: 128/60 (05/27/17 0835)  SpO2: 95 % (05/27/17 0835) Vital Signs (24h Range):  Temp:  [97.8 °F (36.6 °C)-98.6 °F (37 °C)] 98.1 °F (36.7 °C)  Pulse:  [68-84] 68  Resp:  [17-20] 17  SpO2:  [94 %-98 %] 95 %  BP: (121-169)/(58-73) 128/60     Weight: 112.9 kg (248 lb 14.4 oz)  Body mass index is 45.52 kg/m².    Physical Exam   Constitutional: She is oriented to person, place, and time. She appears  well-developed and well-nourished. No distress.   HENT:   Head: Normocephalic and atraumatic.   Eyes: EOM are normal. Pupils are equal, round, and reactive to light.   Neck: Normal range of motion. Neck supple.   Cardiovascular: Normal rate and regular rhythm.    No murmur heard.  Pulmonary/Chest: Effort normal and breath sounds normal.   Abdominal: Soft. Bowel sounds are normal. There is no tenderness.   Musculoskeletal: Normal range of motion.   Neurological: She is alert and oriented to person, place, and time.   Skin: Skin is warm and dry. No rash noted.   Psychiatric: She has a normal mood and affect. Her behavior is normal.        Significant Labs:   CBC:   Recent Labs  Lab 05/26/17  1433   WBC 6.28   HGB 11.4*   HCT 34.9*        CMP:   Recent Labs  Lab 05/26/17  1433      K 5.2*   *   CO2 20*   *   BUN 31*   CREATININE 2.7*   CALCIUM 10.1   PROT 8.6*   ALBUMIN 3.7   BILITOT 0.5   ALKPHOS 102   AST 16   ALT 14   ANIONGAP 7*   EGFRNONAA 16*           Assessment/Plan:     AUTUMN (acute kidney injury)    Patient with acute on chronic kidney injury--baseline creatinine around 2.0 and on presentation at 2.7. Likely 2/2 mild dehydration from GI losses. IVF hydration and repeat BMP.           * Vomiting and diarrhea    Patient with what seems to be acute viral illness and several episodes vomiting and diarrhea resulting in dehydration. No bleeding. Afebrile and without leukocytosis. Will provide IVFs and supportive care and repeat labs to assess for improvement.           Hyperkalemia    Patient with hyperkalemia 5.2 on presentation          CKD (chronic kidney disease) stage 3, GFR 30-59 ml/min    Patient with baseline creatinine of 2.0 and AUTUMN on chronic at presentation. Will monitor.           BMI 45.0-49.9, adult    Patient counseled on negative impact of obesity on health and urged toward diet and lifestyle modifications to aid in weight reduction.           Type 2 diabetes, uncontrolled,  with renal manifestation    A1c 7 Jan 2017. Well controlled. POCT checks, hypoglycemia protocol, diabetic diet, and medium dose SSI.         Hypertension    BP well controlled on presentation. Will continue home anti hypertensive regimen and monitor.             VTE Risk Mitigation         Ordered     enoxaparin injection 30 mg  Daily     Route:  Subcutaneous        05/26/17 1956     Medium Risk of VTE  Once      05/26/17 1956        Luisa Corona PA-C  Hospitalist-Department of Hospital Medicine  [address] [place] [address]  Office [phone] Pager [phone]

## 2017-05-27 NOTE — ASSESSMENT & PLAN NOTE
Patient counseled on negative impact of obesity on health and urged toward diet and lifestyle modifications to aid in weight reduction.

## 2017-05-27 NOTE — HOSPITAL COURSE
Patient presented with what seems to be acute viral GI illness vs upset stomach from food ingestion. She was with several episodes vomiting and diarrhea resulting in dehydration on top of chronic renal disease. Creatinine 2.7 on presentation and baseline ~2.0.  Afebrile and without leukocytosis. Provide IVFs, anti emetics, and supportive care. No further vomiting overnight but still with mild nausea. Repeat labs show renal function improving back toward baseline. Patient also with hyperkalemia 5.2 on presentation. Did not give kayexulate as patient was with diarrhea which was cause of dehydration. Instead reassessed following IVF and resolved. She was able to tolerate ordered diet. Will return to home today. She is to rest, drink plenty of fluids, and continue diet with GI bland foods until feeling better. She should return to ED if symptoms return or worsen.

## 2017-05-27 NOTE — SUBJECTIVE & OBJECTIVE
Past Medical History:   Diagnosis Date    Arthritis lower extremities    Asthma     Diabetes mellitus     Gall stones     Hypertension     Kidney stones     Obesity     Renal disorder     Retinopathy     Vaginal delivery     x1       Past Surgical History:   Procedure Laterality Date    CARDIAC CATHETERIZATION      cataract      CHOLECYSTECTOMY      EYE SURGERY      Rt cataract surgery    HYSTERECTOMY      URETERAL STENT PLACEMENT         Review of patient's allergies indicates:   Allergen Reactions    Adhesive Rash     Paper tape       No current facility-administered medications on file prior to encounter.      Current Outpatient Prescriptions on File Prior to Encounter   Medication Sig    albuterol 90 mcg/actuation inhaler Inhale 2 puffs into the lungs every 6 (six) hours as needed for Wheezing. Rescue    amlodipine (NORVASC) 10 MG tablet Take 1 tablet (10 mg total) by mouth once daily.    aspirin (ECOTRIN) 81 MG EC tablet Take 81 mg by mouth once daily.    atorvastatin (LIPITOR) 20 MG tablet TAKE 1 TABLET BY MOUTH EVERY DAY    candesartan-hydrochlorothiazid (ATACAND HCT) 32-12.5 mg per tablet Take 1 tablet by mouth once daily.    carvedilol (COREG) 12.5 MG tablet Take 1 tablet (12.5 mg total) by mouth 2 (two) times daily.    cilostazol (PLETAL) 50 MG Tab Take 1 tablet (50 mg total) by mouth 2 (two) times daily.    furosemide (LASIX) 40 MG tablet Take 40 mg by mouth once daily.    hydrocodone-acetaminophen 5-325mg (NORCO) 5-325 mg per tablet Take 1 tablet by mouth every 6 (six) hours as needed for Pain.    insulin glargine (LANTUS SOLOSTAR) 100 unit/mL (3 mL) InPn pen Inject 32 Units into the skin once daily. (Patient taking differently: Inject 20 Units into the skin every evening. )    latanoprost 0.005 % ophthalmic solution Place 1 drop into both eyes every evening.    potassium citrate (UROCIT-K 15) 15 mEq TbSR Take 15 mEq by mouth once daily.    TRADJENTA 5 mg Tab tablet TAKE 1  "TABLET (5 MG TOTAL) BY MOUTH ONCE DAILY.    trazodone (DESYREL) 50 MG tablet Take 1 tablet (50 mg total) by mouth every evening.    BD ULTRA-FINE BREE PEN NEEDLES 32 gauge x 5/32" Ndle INJECT INSULIN THREE TIMES DAILY    BENICAR HCT 40-12.5 mg Tab TAKE 1 TABLET BY MOUTH ONCE DAILY.    blood sugar diagnostic Strp Test 3 times daily    blood-glucose meter kit Use as instructed    clotrimazole-betamethasone 1-0.05% (LOTRISONE) cream Apply topically 2 (two) times daily.    lancets Misc Test 3 times daily    timolol maleate 0.5% (TIMOPTIC) 0.5 % Drop 1 drop every morning.     trospium (SANCTURA) 20 mg Tab tablet TAKE 1 TABLET BY MOUTH TWICE A DAY     Family History     Problem Relation (Age of Onset)    Cancer Sister    Diabetes Brother    Hypertension Father    Kidney failure Mother        Social History Main Topics    Smoking status: Never Smoker    Smokeless tobacco: Never Used    Alcohol use No    Drug use: No    Sexual activity: Yes     Partners: Male     Review of Systems   Constitutional: Positive for appetite change. Negative for chills and fever.   HENT: Negative for congestion and sore throat.    Respiratory: Negative for cough and shortness of breath.    Cardiovascular: Negative for chest pain and palpitations.   Gastrointestinal: Positive for abdominal pain, diarrhea, nausea and vomiting.   Endocrine: Negative for cold intolerance and heat intolerance.   Genitourinary: Negative for dysuria and frequency.   Musculoskeletal: Negative for arthralgias and myalgias.   Skin: Negative for color change and rash.   Neurological: Negative for dizziness and headaches.   Psychiatric/Behavioral: Negative for behavioral problems and confusion.     Objective:     Vital Signs (Most Recent):  Temp: 98.1 °F (36.7 °C) (05/27/17 0835)  Pulse: 68 (05/27/17 0835)  Resp: 17 (05/27/17 0835)  BP: 128/60 (05/27/17 0835)  SpO2: 95 % (05/27/17 0835) Vital Signs (24h Range):  Temp:  [97.8 °F (36.6 °C)-98.6 °F (37 °C)] 98.1 " °F (36.7 °C)  Pulse:  [68-84] 68  Resp:  [17-20] 17  SpO2:  [94 %-98 %] 95 %  BP: (121-169)/(58-73) 128/60     Weight: 112.9 kg (248 lb 14.4 oz)  Body mass index is 45.52 kg/m².    Physical Exam   Constitutional: She is oriented to person, place, and time. She appears well-developed and well-nourished. No distress.   HENT:   Head: Normocephalic and atraumatic.   Eyes: EOM are normal. Pupils are equal, round, and reactive to light.   Neck: Normal range of motion. Neck supple.   Cardiovascular: Normal rate and regular rhythm.    No murmur heard.  Pulmonary/Chest: Effort normal and breath sounds normal.   Abdominal: Soft. Bowel sounds are normal. There is no tenderness.   Musculoskeletal: Normal range of motion.   Neurological: She is alert and oriented to person, place, and time.   Skin: Skin is warm and dry. No rash noted.   Psychiatric: She has a normal mood and affect. Her behavior is normal.        Significant Labs:   CBC:   Recent Labs  Lab 05/26/17  1433   WBC 6.28   HGB 11.4*   HCT 34.9*        CMP:   Recent Labs  Lab 05/26/17  1433      K 5.2*   *   CO2 20*   *   BUN 31*   CREATININE 2.7*   CALCIUM 10.1   PROT 8.6*   ALBUMIN 3.7   BILITOT 0.5   ALKPHOS 102   AST 16   ALT 14   ANIONGAP 7*   EGFRNONAA 16*

## 2017-05-27 NOTE — PLAN OF CARE
05/27/17 1518   Final Note   Assessment Type Final Discharge Note   Discharge Disposition Home   Discharge planning education complete? Yes   What phone number can be called within the next 1-3 days to see how you are doing after discharge? [phone]   Hospital Follow Up  Appt(s) scheduled? No   Discharge plans and expectations educations in teach back method with documentation complete? Yes   Offered [facility] -- Bedside Delivery? n/a   [facility] n/a   Referral to Outpatient Case Management complete? n/a   Referral to / orders for Home Health Complete? n/a   30 day supply of medicines given at discharge, if documented non-compliance / non-adherence? n/a   Any social issues identified prior to discharge? No   Did you assess the readiness or willingness of the family or caregiver to support self management of care? Yes   Right Care Referral Info   Post Acute Recommendation No Care

## 2017-05-27 NOTE — ASSESSMENT & PLAN NOTE
A1c 7 Jan 2017. Well controlled. POCT checks, hypoglycemia protocol, diabetic diet, and medium dose SSI.

## 2017-05-27 NOTE — PROGRESS NOTES
Patient escorted to family vehicle via wheelchair for discharge home. Patient escorted by Pct.  No apparent distress noted.

## 2017-05-27 NOTE — PROGRESS NOTES
Discharge instructions given to patient and family at bedside. Patient and family verbalized understanding and states willingness to comply. Saline lock removed. Tele monitoring removed.

## 2017-05-27 NOTE — PLAN OF CARE
05/27/17 0916   Discharge Assessment   Assessment Type Discharge Planning Assessment   Confirmed/corrected address and phone number on facesheet? Yes   Assessment information obtained from? Patient   Communicated expected length of stay with patient/caregiver no   Type of Healthcare Directive Received (N/A [facility] Elder is next of Kin )   Prior to hospitilization cognitive status: Alert/Oriented   Prior to hospitalization functional status: Needs Assistance;Assistive Equipment  (Someone comes to clean once a week.)   Current cognitive status: Alert/Oriented   Current Functional Status: Assistive Equipment;Needs Assistance   Arrived From home or self-care   Lives With alone   Able to Return to Prior Arrangements yes   Is patient able to care for self after discharge? Yes   How many people do you have in your home that can help with your care after discharge? 1   Who are your caregiver(s) and their phone number(s)? Caregiver with [facility] 1 hour/week.  On the list for Medicaid for [facility]   Patient's perception of discharge disposition home or selfcare   Readmission Within The Last 30 Days no previous admission in last 30 days   Patient currently being followed by outpatient case management? No   Patient currently receives home health services? No   Does the patient currently use HME? Yes   Patient currently receives private duty nursing? No   Patient currently receives any other outside agency services? Yes   How many hours a day does the patient receive services? (Someone comes 1 hour/week.)   Name and contact number of agency or person providing outside services [facility]   Equipment Currently Used at Home wheelchair;walker, rolling;cane, straight;bath bench  (Scooter)   Do you have any problems affording any of your prescribed medications? No  (Sometimes)   Is the patient taking medications as prescribed? yes   Do you have any financial concerns preventing you from receiving the healthcare you  need? No   Does the patient have transportation to healthcare appointments? Yes   Transportation Available other (see comments)  ([employer] (3 days notice))   On Dialysis? No   Does the patient receive services at the [facility]? No   Are there any open cases? No   Discharge Plan A Home   Discharge Plan B Home   Patient/Family In Agreement With Plan yes       Patient prefers mid-morning appointments at discharge. She uses [employer] and will need 3 days notice.  Patient is unsure about transportation home after discharge. She stated he brother usually picks her up, but he is currently out of town.  Patient has no help at home, with the exception of Mondays when someone from [facility] comes to help with housekeeping.

## 2017-05-27 NOTE — ASSESSMENT & PLAN NOTE
Patient with acute on chronic kidney injury--baseline creatinine around 2.0 and on presentation at 2.7. Likely 2/2 mild dehydration from GI losses. IVF hydration and repeat BMP.

## 2017-05-27 NOTE — ASSESSMENT & PLAN NOTE
Patient with what seems to be acute viral illness and several episodes vomiting and diarrhea resulting in dehydration. No bleeding. Afebrile and without leukocytosis. Will provide IVFs and supportive care and repeat labs to assess for improvement.

## 2017-05-27 NOTE — HPI
Patient is 75 yo female with HTN, DMII (A1c 7.0 Jan 2017) with renal manifestations, diastolic CHF (EF 55% +DD Oct. 2016), asthma, obesity who presents with complaint of acute onset vomiting and diarrhea. Patient reports around 4 am yesterday morning acute onset vomiting (2-3 episodes) and diarrhea (2-3 episodes). She was no feeling unwell prior to this and feels it may be related to her lack of gallbladder as sometimes has issues depending on what she eats. Recalls eating 2 baked potatoes, pudding, and cherry Dr. Pepper the night prior to onset and ate nothing yesterday. She denies sick contacts, recent illness, fever/chills, CP, dysuria. She did not eat any undercooked foods. No new medications. On presentation patient with acute on chronic kidney injury with creatinine of 2.7 (baseline usually 2.0) likely secondary to GI losses. Mildly elevated potassium at 5.2--no kayexalate given in ED given diarrhea and plan to attempt correction first with hydration as this may be secondary to fluid shift. Placed in observation for IVF hydration and supportive care.

## 2017-05-27 NOTE — PLAN OF CARE
Problem: Fall Risk (Adult)  Goal: Identify Related Risk Factors and Signs and Symptoms  Related risk factors and signs and symptoms are identified upon initiation of Human Response Clinical Practice Guideline (CPG)    05/27/17 1432   Fall Risk   Related Risk Factors (Fall Risk) age-related changes;gait/mobility problems   Signs and Symptoms (Fall Risk) presence of risk factors     Goal: Absence of Falls  Patient will demonstrate the desired outcomes by discharge/transition of care.   Outcome: Outcome(s) achieved Date Met: 05/27/17 05/27/17 1432   Fall Risk (Adult)   Absence of Falls achieves outcome       Problem: Patient Care Overview  Goal: Plan of Care Review  Outcome: Outcome(s) achieved Date Met: 05/27/17 05/27/17 1432   Coping/Psychosocial   Plan Of Care Reviewed With patient       Problem: Nausea/Vomiting (Adult)  Goal: Symptom Relief  Patient will demonstrate the desired outcomes by discharge/transition of care.   Outcome: Outcome(s) achieved Date Met: 05/27/17 05/27/17 1432   Nausea/Vomiting (Adult)   Symptom Relief achieves outcome     Goal: Adequate Hydration  Patient will demonstrate the desired outcomes by discharge/transition of care.   Outcome: Outcome(s) achieved Date Met: 05/27/17 05/27/17 1432   Nausea/Vomiting (Adult)   Adequate Hydration achieves outcome

## 2017-05-27 NOTE — PROGRESS NOTES
Received patient from ER to room via stretcher. Patient accompanied by transport.Transferred patient to bed. Evaluated general patient appearance and condition. Patient denies any nausea or any other discomfort at this time.  Admit assessment initiated. Tele monitoring initiated. Saline lock intact. No apparent distress noted at this time. Oriented to room bed and call light is within reach. Instructed to call for assistance prior to getting out of bed.

## 2017-05-28 LAB
ESTIMATED AVG GLUCOSE: 146 MG/DL
HBA1C MFR BLD HPLC: 6.7 %

## 2017-05-29 NOTE — DISCHARGE SUMMARY
[facility] Hospital Medicine  Discharge Summary      Patient Name: Ana James  MRN: [id number]  Admission Date: 5/26/2017  Hospital Length of Stay: 0 days  Discharge Date and Time: 5/27/2017  3:37 PM  Attending Physician: No att. providers found   Discharging Provider: Luisa Corona PA-C  Primary Care Provider: Jaky Schmidt MD      HPI:   Patient is 77 yo female with HTN, DMII (A1c 7.0 Jan 2017) with renal manifestations, diastolic CHF (EF 55% +DD Oct. 2016), asthma, obesity who presents with complaint of acute onset vomiting and diarrhea. Patient reports around 4 am yesterday morning acute onset vomiting (2-3 episodes) and diarrhea (2-3 episodes). She was no feeling unwell prior to this and feels it may be related to her lack of gallbladder as sometimes has issues depending on what she eats. Recalls eating 2 baked potatoes, pudding, and cherry Dr. Pepper the night prior to onset and ate nothing yesterday. She denies sick contacts, recent illness, fever/chills, CP, dysuria. She did not eat any undercooked foods. No new medications. On presentation patient with acute on chronic kidney injury with creatinine of 2.7 (baseline usually 2.0) likely secondary to GI losses. Mildly elevated potassium at 5.2--no kayexalate given in ED given diarrhea and plan to attempt correction first with hydration as this may be secondary to fluid shift. Placed in observation for IVF hydration and supportive care.     * No surgery found *      Indwelling Lines/Drains at time of discharge:   Lines/Drains/Airways     Drain                 Ureteral Drain/Stent 08/05/16 0912 Right ureter 6 Fr. 297 days              Hospital Course:   Patient presented with what seems to be acute viral GI illness vs upset stomach from food ingestion. She was with several episodes vomiting and diarrhea resulting in dehydration on top of chronic renal disease. Creatinine 2.7 on presentation and baseline ~2.0.  Afebrile and  without leukocytosis. Provide IVFs, anti emetics, and supportive care. No further vomiting overnight but still with mild nausea. Repeat labs show renal function improving back toward baseline. Patient also with hyperkalemia 5.2 on presentation. Did not give kayexulate as patient was with diarrhea which was cause of dehydration. Instead reassessed following IVF and resolved. She was able to tolerate ordered diet. Will return to home today. She is to rest, drink plenty of fluids, and continue diet with GI bland foods until feeling better. She should return to ED if symptoms return or worsen.      Consults:     Significant Diagnostic Studies:     Pending Diagnostic Studies:     None        Final Active Diagnoses:    Diagnosis Date Noted POA    PRINCIPAL PROBLEM:  Vomiting and diarrhea [R11.10, R19.7] 05/26/2017 Yes    AUTUMN (acute kidney injury) [N17.9] 05/27/2017 Yes    Hyperkalemia [E87.5] 05/27/2017 Yes    Type 2 diabetes, uncontrolled, with renal manifestation [E11.29, E11.65] 11/12/2014 Yes    BMI 45.0-49.9, adult [Z68.42] 11/12/2014 Not Applicable    CKD (chronic kidney disease) stage 3, GFR 30-59 ml/min [N18.3] 11/12/2014 Yes    Hypertension [I10] 03/02/2014 Yes      Problems Resolved During this Admission:    Diagnosis Date Noted Date Resolved POA      No new Assessment & Plan notes have been filed under this hospital service since the last note was generated.  Service: Hospital Medicine      Discharged Condition: fair    Disposition: Home or Self Care    Follow Up:  Follow-up Information     Jaky Schmidt MD.    Specialty:  Family Medicine  Why:  Call for Appointment, As needed  Contact information:  [address]  [place] [id number] [phone]                 Patient Instructions:     Diet general   Order Specific Question Answer Comments   Additional restrictions: Cardiac (Low Na/Chol)    Additional restrictions: Diabetic 1800      Activity as tolerated       Medications:  Reconciled Home  "Medications:   Discharge Medication List as of 5/27/2017  3:00 PM      START taking these medications    Details   !! ondansetron (ZOFRAN-ODT) 4 MG TbDL Take 1 tablet (4 mg total) by mouth every 6 (six) hours as needed (nausea)., Starting Sat 5/27/2017, Normal      !! ondansetron (ZOFRAN-ODT) 4 MG TbDL Take 1 tablet (4 mg total) by mouth every 6 (six) hours as needed., Starting Sat 5/27/2017, Normal       !! - Potential duplicate medications found. Please discuss with provider.      CONTINUE these medications which have NOT CHANGED    Details   albuterol 90 mcg/actuation inhaler Inhale 2 puffs into the lungs every 6 (six) hours as needed for Wheezing. Rescue, Starting 3/15/2017, Until Discontinued, Normal      amlodipine (NORVASC) 10 MG tablet Take 1 tablet (10 mg total) by mouth once daily., Starting 3/20/2017, Until Discontinued, Normal      aspirin (ECOTRIN) 81 MG EC tablet Take 81 mg by mouth once daily., Until Discontinued, Historical Med      atorvastatin (LIPITOR) 20 MG tablet TAKE 1 TABLET BY MOUTH EVERY DAY, Normal      BD ULTRA-FINE BREE PEN NEEDLES 32 gauge x 5/32" Ndle INJECT INSULIN THREE TIMES DAILY, Normal      BENICAR HCT 40-12.5 mg Tab TAKE 1 TABLET BY MOUTH ONCE DAILY., Normal      blood sugar diagnostic Strp Test 3 times daily, Print      blood-glucose meter kit Use as instructed, Print      candesartan-hydrochlorothiazid (ATACAND HCT) 32-12.5 mg per tablet Take 1 tablet by mouth once daily., Starting 1/19/2017, Until Fri 1/19/18, Normal      carvedilol (COREG) 12.5 MG tablet Take 1 tablet (12.5 mg total) by mouth 2 (two) times daily., Starting Wed 4/26/2017, Normal      cilostazol (PLETAL) 50 MG Tab Take 1 tablet (50 mg total) by mouth 2 (two) times daily., Starting 1/16/2017, Until Tue 1/16/18, Normal      clotrimazole-betamethasone 1-0.05% (LOTRISONE) cream Apply topically 2 (two) times daily., Starting 3/24/2015, Until Discontinued, Normal      furosemide (LASIX) 40 MG tablet Take 40 mg by " mouth once daily., Starting 7/27/2016, Until Discontinued, Historical Med      hydrocodone-acetaminophen 5-325mg (NORCO) 5-325 mg per tablet Take 1 tablet by mouth every 6 (six) hours as needed for Pain., Starting 8/5/2016, Until Discontinued, Print      insulin glargine (LANTUS SOLOSTAR) 100 unit/mL (3 mL) InPn pen Inject 32 Units into the skin once daily., Starting 6/29/2016, Until Discontinued, Normal      lancets Misc Test 3 times daily, Print      latanoprost 0.005 % ophthalmic solution Place 1 drop into both eyes every evening., Starting 7/24/2014, Until Discontinued, Historical Med      potassium citrate (UROCIT-K 15) 15 mEq TbSR Take 15 mEq by mouth once daily., Starting 12/7/2016, Until Thu 12/7/17, Normal      timolol maleate 0.5% (TIMOPTIC) 0.5 % Drop 1 drop every morning. , Starting 6/27/2014, Until Discontinued, Historical Med      TRADJENTA 5 mg Tab tablet TAKE 1 TABLET (5 MG TOTAL) BY MOUTH ONCE DAILY., Normal      trazodone (DESYREL) 50 MG tablet Take 1 tablet (50 mg total) by mouth every evening., Starting 2/22/2017, Until Thu 2/22/18, Phone In      trospium (SANCTURA) 20 mg Tab tablet TAKE 1 TABLET BY MOUTH TWICE A DAY, Normal         STOP taking these medications       insulin aspart (NOVOLOG) 100 unit/mL InPn pen Comments:   Reason for Stopping:             Time spent on the discharge of patient: less than thirty minutes    Luisa Corona PA-C  Department of Hospital Medicine  [facility]

## 2017-05-31 RX ORDER — ATORVASTATIN CALCIUM 20 MG/1
TABLET, FILM COATED ORAL
Qty: 90 TABLET | Refills: 1 | Status: SHIPPED | OUTPATIENT
Start: 2017-05-31 | End: 2017-11-27 | Stop reason: SDUPTHER

## 2017-06-06 ENCOUNTER — OFFICE VISIT (OUTPATIENT)
Dept: FAMILY MEDICINE | Facility: CLINIC | Age: 77
End: 2017-06-06
Payer: MEDICARE

## 2017-06-06 ENCOUNTER — LAB VISIT (OUTPATIENT)
Dept: LAB | Facility: HOSPITAL | Age: 77
End: 2017-06-06
Attending: FAMILY MEDICINE
Payer: MEDICARE

## 2017-06-06 VITALS
WEIGHT: 260.38 LBS | RESPIRATION RATE: 20 BRPM | DIASTOLIC BLOOD PRESSURE: 64 MMHG | OXYGEN SATURATION: 97 % | SYSTOLIC BLOOD PRESSURE: 100 MMHG | TEMPERATURE: 98 F | HEIGHT: 62 IN | BODY MASS INDEX: 47.92 KG/M2 | HEART RATE: 74 BPM

## 2017-06-06 DIAGNOSIS — N18.4 CKD (CHRONIC KIDNEY DISEASE) STAGE 4, GFR 15-29 ML/MIN: ICD-10-CM

## 2017-06-06 DIAGNOSIS — K52.9 GASTROENTERITIS: Primary | ICD-10-CM

## 2017-06-06 LAB
ALBUMIN SERPL BCP-MCNC: 3.2 G/DL
ALP SERPL-CCNC: 105 U/L
ALT SERPL W/O P-5'-P-CCNC: 11 U/L
ANION GAP SERPL CALC-SCNC: 8 MMOL/L
AST SERPL-CCNC: 18 U/L
BILIRUB SERPL-MCNC: 0.4 MG/DL
BUN SERPL-MCNC: 31 MG/DL
CALCIUM SERPL-MCNC: 9.3 MG/DL
CHLORIDE SERPL-SCNC: 112 MMOL/L
CO2 SERPL-SCNC: 17 MMOL/L
CREAT SERPL-MCNC: 2.6 MG/DL
EST. GFR  (AFRICAN AMERICAN): 19.6 ML/MIN/1.73 M^2
EST. GFR  (NON AFRICAN AMERICAN): 17 ML/MIN/1.73 M^2
GLUCOSE SERPL-MCNC: 81 MG/DL
POTASSIUM SERPL-SCNC: 5 MMOL/L
PROT SERPL-MCNC: 7.6 G/DL
SODIUM SERPL-SCNC: 137 MMOL/L

## 2017-06-06 PROCEDURE — 99213 OFFICE O/P EST LOW 20 MIN: CPT | Mod: S$GLB,,, | Performed by: PHYSICIAN ASSISTANT

## 2017-06-06 PROCEDURE — 99999 PR PBB SHADOW E&M-EST. PATIENT-LVL V: CPT | Mod: PBBFAC,,, | Performed by: PHYSICIAN ASSISTANT

## 2017-06-06 PROCEDURE — 1159F MED LIST DOCD IN RCRD: CPT | Mod: S$GLB,,, | Performed by: PHYSICIAN ASSISTANT

## 2017-06-06 PROCEDURE — 1126F AMNT PAIN NOTED NONE PRSNT: CPT | Mod: S$GLB,,, | Performed by: PHYSICIAN ASSISTANT

## 2017-06-06 PROCEDURE — 80053 COMPREHEN METABOLIC PANEL: CPT

## 2017-06-06 PROCEDURE — 36415 COLL VENOUS BLD VENIPUNCTURE: CPT | Mod: PO

## 2017-06-06 PROCEDURE — 99499 UNLISTED E&M SERVICE: CPT | Mod: S$GLB,,, | Performed by: PHYSICIAN ASSISTANT

## 2017-06-06 NOTE — PROGRESS NOTES
Subjective:       Patient ID: Ana James is a 76 y.o. female.    Chief Complaint: Hospital Follow Up    HPI: 75 yo female presents for hospital follow up. Went to ED on 5/26 for uncontrolled vomiting and diarrhea that started the day before. Labs showed AUTUMN GFR of 19 and an elevated potassium and an elevated glucose. She was kept over night and sent home with zofran. GFR improved to 22 at discharge.  Last vomiting episode was 2 days ago. She was able to hold down food and liquids yesterday and today. Feeling good today. Denies fever.    Review of Systems   Constitutional: Negative for fever.   Respiratory: Positive for shortness of breath.    Cardiovascular: Negative for chest pain.   Gastrointestinal: Negative for abdominal pain, blood in stool, constipation, diarrhea, nausea and rectal pain.       Objective:      Physical Exam   Constitutional: She is oriented to person, place, and time. She appears well-developed.   HENT:   Head: Normocephalic and atraumatic.   Cardiovascular: Normal rate and regular rhythm.    Pulmonary/Chest: Effort normal and breath sounds normal.   Abdominal: Soft. Bowel sounds are normal. She exhibits no distension. There is no tenderness.   Neurological: She is alert and oriented to person, place, and time.   Psychiatric: She has a normal mood and affect. Her behavior is normal.   Vitals reviewed.      Assessment:       1. Gastroenteritis    2. CKD (chronic kidney disease) stage 4, GFR 15-29 ml/min    3. BMI 45.0-49.9, adult        Plan:         Ana was seen today for hospital follow up.    Diagnoses and all orders for this visit:    Gastroenteritis       -   Improved, monitor     CKD (chronic kidney disease) stage 4, GFR 15-29 ml/min  -     Comprehensive metabolic panel; Future  -     Lab today to assess    BMI 45.0-49.9, adult  -   Monitor, low fat low carb diet

## 2017-06-13 ENCOUNTER — TELEPHONE (OUTPATIENT)
Dept: FAMILY MEDICINE | Facility: CLINIC | Age: 77
End: 2017-06-13

## 2017-06-13 NOTE — TELEPHONE ENCOUNTER
----- Message from Ann Ward PA-C sent at 6/8/2017  7:14 AM CDT -----  Please inform pt kidney function went down since discharge. Increase fluid intake. Follow up with nephrology

## 2017-06-13 NOTE — TELEPHONE ENCOUNTER
Patient informed of lab results and need for follow up with nephrology.  Patient verbalized understanding, stated she has appointments scheduled with nephrology and urology.

## 2017-06-16 RX ORDER — ONDANSETRON 4 MG/1
4 TABLET, ORALLY DISINTEGRATING ORAL EVERY 6 HOURS PRN
Qty: 9 TABLET | Refills: 0 | Status: SHIPPED | OUTPATIENT
Start: 2017-06-16 | End: 2017-07-07 | Stop reason: SDUPTHER

## 2017-07-07 RX ORDER — ONDANSETRON 4 MG/1
4 TABLET, ORALLY DISINTEGRATING ORAL EVERY 6 HOURS PRN
Qty: 9 TABLET | Refills: 0 | Status: SHIPPED | OUTPATIENT
Start: 2017-07-07 | End: 2017-09-01 | Stop reason: SDUPTHER

## 2017-07-11 ENCOUNTER — OFFICE VISIT (OUTPATIENT)
Dept: PODIATRY | Facility: CLINIC | Age: 77
End: 2017-07-11
Payer: MEDICARE

## 2017-07-11 VITALS
BODY MASS INDEX: 47.84 KG/M2 | HEIGHT: 62 IN | SYSTOLIC BLOOD PRESSURE: 118 MMHG | HEART RATE: 72 BPM | WEIGHT: 260 LBS | DIASTOLIC BLOOD PRESSURE: 56 MMHG

## 2017-07-11 DIAGNOSIS — M20.10 HALLUX ABDUCTO VALGUS, UNSPECIFIED LATERALITY: ICD-10-CM

## 2017-07-11 DIAGNOSIS — M20.41 HAMMER TOES OF BOTH FEET: ICD-10-CM

## 2017-07-11 DIAGNOSIS — M21.41 PES PLANUS OF BOTH FEET: ICD-10-CM

## 2017-07-11 DIAGNOSIS — M21.42 PES PLANUS OF BOTH FEET: ICD-10-CM

## 2017-07-11 DIAGNOSIS — M20.42 HAMMER TOES OF BOTH FEET: ICD-10-CM

## 2017-07-11 DIAGNOSIS — B35.1 ONYCHOMYCOSIS DUE TO DERMATOPHYTE: ICD-10-CM

## 2017-07-11 DIAGNOSIS — L84 CORN OR CALLUS: ICD-10-CM

## 2017-07-11 DIAGNOSIS — I73.9 PAD (PERIPHERAL ARTERY DISEASE): ICD-10-CM

## 2017-07-11 DIAGNOSIS — I87.8 VENOUS STASIS OF BOTH LOWER EXTREMITIES: ICD-10-CM

## 2017-07-11 DIAGNOSIS — E11.51 TYPE II DIABETES MELLITUS WITH PERIPHERAL CIRCULATORY DISORDER: Primary | ICD-10-CM

## 2017-07-11 PROCEDURE — 99499 UNLISTED E&M SERVICE: CPT | Mod: S$GLB,,, | Performed by: PODIATRIST

## 2017-07-11 PROCEDURE — 11721 DEBRIDE NAIL 6 OR MORE: CPT | Mod: 59,Q9,S$GLB, | Performed by: PODIATRIST

## 2017-07-11 PROCEDURE — 99999 PR PBB SHADOW E&M-EST. PATIENT-LVL III: CPT | Mod: PBBFAC,,, | Performed by: PODIATRIST

## 2017-07-11 PROCEDURE — 11056 PARNG/CUTG B9 HYPRKR LES 2-4: CPT | Mod: Q9,S$GLB,, | Performed by: PODIATRIST

## 2017-07-11 NOTE — PROGRESS NOTES
Subjective:      Patient ID: Ana James is a 76 y.o. female.    Chief Complaint: Diabetes Mellitus (pcp Dr. Schmidt 6-6-17); Diabetic Foot Exam; and Nail Care    Ana is a 76 y.o. female who presents to the clinic for evaluation and treatment of high risk feet. Ana has a past medical history of Arthritis (lower extremities); Asthma; Diabetes mellitus; Gall stones; Hypertension; Kidney stones; Obesity; Renal disorder; Retinopathy; and Vaginal delivery. The patient's chief complaint is long, thick toenails.  This patient has documented high risk feet requiring routine maintenance secondary to diabetes mellitis and those secondary complications of diabetes, as mentioned..    PCP: Jaky Schmidt MD    Date Last Seen by PCP:   Chief Complaint   Patient presents with    Diabetes Mellitus     pcp Dr. Schmidt 6-6-17    Diabetic Foot Exam    Nail Care       Current shoe gear:  rx diab shoes    Hemoglobin A1C   Date Value Ref Range Status   05/27/2017 6.7 (H) 4.5 - 6.2 % Final     Comment:     According to ADA guidelines, hemoglobin A1C <7.0% represents  optimal control in non-pregnant diabetic patients.  Different  metrics may apply to specific populations.   Standards of Medical Care in Diabetes - 2016.  For the purpose of screening for the presence of diabetes:  <5.7%     Consistent with the absence of diabetes  5.7-6.4%  Consistent with increasing risk for diabetes   (prediabetes)  >or=6.5%  Consistent with diabetes  Currently no consensus exists for use of hemoglobin A1C  for diagnosis of diabetes for children.     01/16/2017 7.0 (H) 4.5 - 6.2 % Final     Comment:     According to ADA guidelines, hemoglobin A1C <7.0% represents  optimal control in non-pregnant diabetic patients.  Different  metrics may apply to specific populations.   Standards of Medical Care in Diabetes - 2016.  For the purpose of screening for the presence of diabetes:  <5.7%     Consistent with the absence of  "diabetes  5.7-6.4%  Consistent with increasing risk for diabetes   (prediabetes)  >or=6.5%  Consistent with diabetes  Currently no consensus exists for use of hemoglobin A1C  for diagnosis of diabetes for children.     04/06/2016 6.6 (H) 4.5 - 6.2 % Final     Patient Active Problem List   Diagnosis    Dizziness    SOB (shortness of breath)    Hypertension    Protein calorie malnutrition    Type 2 diabetes, uncontrolled, with renal manifestation    BMI 45.0-49.9, adult    CKD (chronic kidney disease) stage 4, GFR 15-29 ml/min    Peripheral vascular disease, unspecified    Urinary incontinence    Nephrolithiasis    Calculus of gallbladder with cholecystitis    Vomiting and diarrhea    AUTUMN (acute kidney injury)    Hyperkalemia     Current Outpatient Prescriptions on File Prior to Visit   Medication Sig Dispense Refill    albuterol 90 mcg/actuation inhaler Inhale 2 puffs into the lungs every 6 (six) hours as needed for Wheezing. Rescue 8 g 5    amlodipine (NORVASC) 10 MG tablet Take 1 tablet (10 mg total) by mouth once daily. 90 tablet 3    aspirin (ECOTRIN) 81 MG EC tablet Take 81 mg by mouth once daily.      atorvastatin (LIPITOR) 20 MG tablet TAKE 1 TABLET BY MOUTH EVERY DAY 90 tablet 1    BD ULTRA-FINE BREE PEN NEEDLES 32 gauge x 5/32" Ndle INJECT INSULIN THREE TIMES DAILY 200 each 11    BENICAR HCT 40-12.5 mg Tab TAKE 1 TABLET BY MOUTH ONCE DAILY. 90 tablet 3    blood sugar diagnostic Strp Test 3 times daily 100 each 11    candesartan-hydrochlorothiazid (ATACAND HCT) 32-12.5 mg per tablet Take 1 tablet by mouth once daily. 90 tablet 1    carvedilol (COREG) 12.5 MG tablet Take 1 tablet (12.5 mg total) by mouth 2 (two) times daily. 90 tablet 2    cilostazol (PLETAL) 50 MG Tab Take 1 tablet (50 mg total) by mouth 2 (two) times daily. 180 tablet 3    clotrimazole-betamethasone 1-0.05% (LOTRISONE) cream Apply topically 2 (two) times daily. 45 g 5    furosemide (LASIX) 40 MG tablet Take 40 mg by " mouth once daily.  4    hydrocodone-acetaminophen 5-325mg (NORCO) 5-325 mg per tablet Take 1 tablet by mouth every 6 (six) hours as needed for Pain. 30 tablet 0    insulin glargine (LANTUS SOLOSTAR) 100 unit/mL (3 mL) InPn pen Inject 32 Units into the skin once daily. (Patient taking differently: Inject 20 Units into the skin every evening. ) 60 Syringe 3    lancets Misc Test 3 times daily 100 each 11    latanoprost 0.005 % ophthalmic solution Place 1 drop into both eyes every evening.  3    ondansetron (ZOFRAN-ODT) 4 MG TbDL Take 1 tablet (4 mg total) by mouth every 6 (six) hours as needed (nausea). 1 tablet 0    ondansetron (ZOFRAN-ODT) 4 MG TbDL TAKE 1 TABLET (4 MG TOTAL) BY MOUTH EVERY 6 (SIX) HOURS AS NEEDED. 9 tablet 0    potassium citrate (UROCIT-K 15) 15 mEq TbSR Take 15 mEq by mouth once daily. 90 tablet 3    timolol maleate 0.5% (TIMOPTIC) 0.5 % Drop 1 drop every morning.   2    TRADJENTA 5 mg Tab tablet TAKE 1 TABLET (5 MG TOTAL) BY MOUTH ONCE DAILY. 90 tablet 3    trazodone (DESYREL) 50 MG tablet Take 1 tablet (50 mg total) by mouth every evening. 90 tablet 3    trospium (SANCTURA) 20 mg Tab tablet TAKE 1 TABLET BY MOUTH TWICE A  tablet 3    blood-glucose meter kit Use as instructed 1 each 0     No current facility-administered medications on file prior to visit.      Review of patient's allergies indicates:   Allergen Reactions    Adhesive Rash     Paper tape     Past Surgical History:   Procedure Laterality Date    CARDIAC CATHETERIZATION      cataract      CHOLECYSTECTOMY      EYE SURGERY      Rt cataract surgery    HYSTERECTOMY      URETERAL STENT PLACEMENT       Family History   Problem Relation Age of Onset    Kidney failure Mother     Hypertension Father     Diabetes Brother     Cancer Sister      Social History     Social History    Marital status:      Spouse name: N/A    Number of children: N/A    Years of education: N/A     Occupational History     "retired      Social History Main Topics    Smoking status: Never Smoker    Smokeless tobacco: Never Used    Alcohol use No    Drug use: No    Sexual activity: Yes     Partners: Male     Other Topics Concern    Not on file     Social History Narrative    No narrative on file       Review of Systems   Constitution: Negative for chills, fever and weakness.   Cardiovascular: Positive for claudication and leg swelling. Negative for chest pain.   Respiratory: Positive for cough and wheezing. Negative for shortness of breath.    Skin: Positive for dry skin and nail changes. Negative for itching and rash.   Musculoskeletal: Positive for arthritis, back pain, joint pain, muscle cramps and myalgias. Negative for muscle weakness.   Gastrointestinal: Negative for diarrhea, nausea and vomiting.   Neurological: Positive for paresthesias. Negative for numbness and tremors.   Psychiatric/Behavioral: Negative for altered mental status and hallucinations.           Objective:       Vitals:    07/11/17 0933   BP: (!) 118/56   Pulse: 72   Weight: 117.9 kg (260 lb)   Height: 5' 2" (1.575 m)   PainSc: 0-No pain       Physical Exam   Constitutional:   General: Pt. is well-developed, well-nourished, appears stated age, in no acute distress, alert and oriented x 3. No evidence of depression, anxiety, or agitation. Calm, cooperative, and communicative. Appropriate interactions and affect.       Cardiovascular:   Pulses:       Dorsalis pedis pulses are 1+ on the right side, and 1+ on the left side.        Posterior tibial pulses are 0 on the right side, and 1+ on the left side.   Dorsalis pedis and posterior tibial pulses are diminished Bilaterally. Toes are cool to touch. Feet are warm proximally.There is decreased digital hair. Skin is atrophic,  hyperpigmented, and edematous       Musculoskeletal:        Right ankle: She exhibits swelling.        Left ankle: She exhibits swelling.        Right foot: There is swelling. There is " normal range of motion.        Left foot: There is swelling. There is normal range of motion.   Exquisite tenderness to calf bilaterally    Muscle strength is 5/5 in all groups bilaterally.    Patient has hammertoes of digits 2-5 bilateral partially reducible without symptom today.    Visible and palpable bunion without pain at dorsomedial 1st metatarsal head right and left.  Hallux abducted right and left partially reducible, tracks laterally without being track bound.  No ecchymosis, erythema, edema, or cardinal signs infection or signs of trauma same foot.     Neurological: No sensory deficit.   McCrory-Marcelo 5.07 monofilament is intact bilateral feet. Sharp/dull sensation is also intact Bilateral feet.           Skin: Skin is warm, dry and intact. No abrasion, no ecchymosis and no lesion noted. She is not diaphoretic. No cyanosis or erythema. No pallor. Nails show no clubbing.   Toenails 1-5 bilaterally are elongated by 2-3 mm, thickened by 2-3 mm, discolored/yellowed, dystrophic, brittle with subungual debris. Tender to distal nail plate pressure of right hallux, without periungual skin abnormality of each.     Focal hyperkeratotic lesion consisting entirely of hyperkeratotic tissue without open skin, drainage, pus, fluctuance, malodor, or signs of infection sub 1st MTPJ karan    Interdigital Spaces clean, dry and without evidence of break in skin integrity   Psychiatric: She has a normal mood and affect. Her speech is normal.   Nursing note and vitals reviewed.      Assessment:       Encounter Diagnoses   Name Primary?    Type II diabetes mellitus with peripheral circulatory disorder Yes    PAD (peripheral artery disease)     Venous stasis of both lower extremities     Hallux abducto valgus, unspecified laterality     Hammer toes of both feet     Pes planus of both feet     Onychomycosis due to dermatophyte     Corn or callus          Plan:       Ana was seen today for diabetes mellitus, diabetic  foot exam and nail care.    Diagnoses and all orders for this visit:    Type II diabetes mellitus with peripheral circulatory disorder  -     DIABETIC SHOES FOR HOME USE    PAD (peripheral artery disease)    Venous stasis of both lower extremities    Hallux abducto valgus, unspecified laterality  -     DIABETIC SHOES FOR HOME USE    Hammer toes of both feet  -     DIABETIC SHOES FOR HOME USE    Pes planus of both feet  -     DIABETIC SHOES FOR HOME USE    Onychomycosis due to dermatophyte    Corn or callus  -     DIABETIC SHOES FOR HOME USE    Other orders  -     Cancel: DIABETIC SHOES FOR HOME USE      I counseled the patient on her conditions, their implications and medical management.     - Shoe inspection. Diabetic Foot Education. Patient reminded of the importance of good nutrition and blood sugar control to help prevent podiatric complications of diabetes. Patient instructed on proper foot hygeine. We discussed wearing proper shoe gear, daily foot inspections, never walking without protective shoe gear, never putting sharp instruments to feet.    - Rx diabetic shoes for protection and support    - Patient is to elevate legs. When sleeping, place a pillow under lower extremities. When sitting, support the legs so that they are level with the waist.    - With patient's permission, nails were aggressively reduced and debrided x 10 to their soft tissue attachment mechanically and with electric , removing all offending nail and debris. Patient relates relief following the procedure. After cleansing the  area w/ alcohol prep pad the above mentioned hyperkeratosis was trimmed utilizing No 15 scapel, to a smooth base with out incident. Patient tolerated this  well and reported comfort to the area of sub 1st MTPJ karan.   She will continue to monitor the areas daily, inspect her feet, wear protective shoe gear when ambulatory, moisturizer to maintain skin integrity and follow in this office in approximately 3-5  months, sooner p.r.n.

## 2017-07-11 NOTE — PATIENT INSTRUCTIONS
Recommend lotions: eucerin, aquaphor, A&D ointment, gold bond for diabetics, sween    Shoe recommendations: (try 6pm.com, zappos.com , [email or web address].[email or web address], or shoes.com for discounted prices) you can visit DSW shoes in [place] as well    Asics (GT 1000 or gel foundations), new balance, saucony (stabil c3),  Winter (transcend), vionic, propet (tennis shoe)    soft brand, clarks, crocs, aerosoles, naturalizers, SAS, ecco, chary, vinay chase, rockports (dress shoes)    Vionic, volitiles, burkenstocks, fitflops, propet (sandals)    Nike comfort thong sandals, crocs (house shoes)    Nail Home remedy:  Vicks Vapor rub OR Listerine and apple cider vinegar in a spray bottle to nails    Occasional soaks for 15-20 mins in luke warm water with 1 cup of listerine and 1 cup of apple cider vinegar are ok You may add several drops of oil of oregano or tea tree oil as well      Diabetes: Inspecting Your Feet  Diabetes increases your chances of developing foot problems. So inspect your feet every day. This helps you find small skin irritations before they become serious infections. If you have trouble seeing the bottoms of your feet, use a mirror or ask a family member or friend to help.     Pressure spots on the bottom of the foot are common areas where problems develop.   How to check your feet  Below are tips to help you look for foot problems. Try to check your feet at the same time each day, such as when you get out of bed in the morning:  · Check the top of each foot. The tops of toes, back of the heel, and outer edge of the foot can get a lot of rubbing from poor-fitting shoes.  · Check the bottom of each foot. Daily wear and tear often leads to problems at pressure spots.  · Check the toes and nails. Fungal infections often occur between toes. Toenail problems can also be a sign of fungal infections or lead to breaks in the skin.  · Check your shoes, too. Loose objects inside a shoe can injure the foot. Use your hand to feel  inside your shoes for things like avery, loose stitching, or rough areas that could irritate your skin.  Warning signs  Look for any color changes in the foot. Redness with streaks can signal a severe infection, which needs immediate medical attention. Tell your doctor right away if you have any of these problems:  · Swelling, sometimes with color changes, may be a sign of poor blood flow or infection. Symptoms include tenderness and an increase in the size of your foot.  · Warm or hot areas on your feet may be signs of infection. A foot that is cold may not be getting enough blood.  · Sensations such as burning, tingling, or pins and needles can be signs of a problem. Also check for areas that may be numb.  · Hot spots are caused by friction or pressure. Look for hot spots in areas that get a lot of rubbing. Hot spots can turn into blisters, calluses, or sores.  · Cracks and sores are caused by dry or irritated skin. They are a sign that the skin is breaking down, which can lead to infection.  · Toenail problems to watch for include nails growing into the skin (ingrown toenail) and causing redness or pain. Thick, yellow, or discolored nails can signal a fungal infection.  · Drainage and odor can develop from untreated sores and ulcers. Call your doctor right away if you notice white or yellow drainage, bleeding, or unpleasant odor.   © 8204-4475 [employer]. [address] [place] [address], [place] [address]. All rights reserved. This information is not intended as a substitute for professional medical care. Always follow your healthcare professional's instructions.

## 2017-07-13 ENCOUNTER — TELEPHONE (OUTPATIENT)
Dept: NEPHROLOGY | Facility: CLINIC | Age: 77
End: 2017-07-13

## 2017-07-13 DIAGNOSIS — N18.30 CHRONIC KIDNEY DISEASE, STAGE III (MODERATE): Primary | ICD-10-CM

## 2017-07-20 RX ORDER — CANDESARTAN CILEXETIL AND HYDROCHLOROTHIAZIDE 32; 12.5 MG/1; MG/1
1 TABLET ORAL DAILY
Qty: 90 TABLET | Refills: 1 | Status: SHIPPED | OUTPATIENT
Start: 2017-07-20 | End: 2018-01-12 | Stop reason: SDUPTHER

## 2017-07-24 ENCOUNTER — LAB VISIT (OUTPATIENT)
Dept: LAB | Facility: HOSPITAL | Age: 77
End: 2017-07-24
Attending: INTERNAL MEDICINE
Payer: MEDICARE

## 2017-07-24 DIAGNOSIS — N18.30 CHRONIC KIDNEY DISEASE, STAGE III (MODERATE): ICD-10-CM

## 2017-07-24 LAB
25(OH)D3+25(OH)D2 SERPL-MCNC: 5 NG/ML
ALBUMIN SERPL BCP-MCNC: 3.1 G/DL
ANION GAP SERPL CALC-SCNC: 6 MMOL/L
BUN SERPL-MCNC: 29 MG/DL
CALCIUM SERPL-MCNC: 9.3 MG/DL
CHLORIDE SERPL-SCNC: 115 MMOL/L
CO2 SERPL-SCNC: 20 MMOL/L
CREAT SERPL-MCNC: 2.2 MG/DL
EST. GFR  (AFRICAN AMERICAN): 24 ML/MIN/1.73 M^2
EST. GFR  (NON AFRICAN AMERICAN): 20.9 ML/MIN/1.73 M^2
GLUCOSE SERPL-MCNC: 76 MG/DL
PHOSPHATE SERPL-MCNC: 3.4 MG/DL
POTASSIUM SERPL-SCNC: 4.8 MMOL/L
PTH-INTACT SERPL-MCNC: 164 PG/ML
SODIUM SERPL-SCNC: 141 MMOL/L

## 2017-07-24 PROCEDURE — 36415 COLL VENOUS BLD VENIPUNCTURE: CPT | Mod: PO

## 2017-07-24 PROCEDURE — 82306 VITAMIN D 25 HYDROXY: CPT

## 2017-07-24 PROCEDURE — 83970 ASSAY OF PARATHORMONE: CPT

## 2017-07-24 PROCEDURE — 80069 RENAL FUNCTION PANEL: CPT

## 2017-07-25 ENCOUNTER — OFFICE VISIT (OUTPATIENT)
Dept: NEPHROLOGY | Facility: CLINIC | Age: 77
End: 2017-07-25
Payer: MEDICARE

## 2017-07-25 VITALS
SYSTOLIC BLOOD PRESSURE: 95 MMHG | OXYGEN SATURATION: 96 % | DIASTOLIC BLOOD PRESSURE: 53 MMHG | HEIGHT: 62 IN | WEIGHT: 256.81 LBS | HEART RATE: 83 BPM | TEMPERATURE: 99 F | BODY MASS INDEX: 47.26 KG/M2

## 2017-07-25 DIAGNOSIS — N18.30 CONTROLLED TYPE 2 DIABETES MELLITUS WITH STAGE 3 CHRONIC KIDNEY DISEASE, WITH LONG-TERM CURRENT USE OF INSULIN: Primary | ICD-10-CM

## 2017-07-25 DIAGNOSIS — Z79.4 CONTROLLED TYPE 2 DIABETES MELLITUS WITH STAGE 3 CHRONIC KIDNEY DISEASE, WITH LONG-TERM CURRENT USE OF INSULIN: Primary | ICD-10-CM

## 2017-07-25 DIAGNOSIS — E11.22 CONTROLLED TYPE 2 DIABETES MELLITUS WITH STAGE 3 CHRONIC KIDNEY DISEASE, WITH LONG-TERM CURRENT USE OF INSULIN: Primary | ICD-10-CM

## 2017-07-25 PROCEDURE — 99213 OFFICE O/P EST LOW 20 MIN: CPT | Mod: S$GLB,,, | Performed by: INTERNAL MEDICINE

## 2017-07-25 PROCEDURE — 1125F AMNT PAIN NOTED PAIN PRSNT: CPT | Mod: S$GLB,,, | Performed by: INTERNAL MEDICINE

## 2017-07-25 PROCEDURE — 1159F MED LIST DOCD IN RCRD: CPT | Mod: S$GLB,,, | Performed by: INTERNAL MEDICINE

## 2017-07-25 PROCEDURE — 99999 PR PBB SHADOW E&M-EST. PATIENT-LVL III: CPT | Mod: PBBFAC,,, | Performed by: INTERNAL MEDICINE

## 2017-07-25 NOTE — PROGRESS NOTES
Subjective:       Patient ID: Ana James is a 76 y.o. Black or  female who presents for follow up of Chronic Kidney Disease    HPI    Ms. James is a 76 year old woman with medical history of diabetes, hypertension, nephrolithiasis presenting for evaluation of chronic kidney disease.  Patien reports blood sugars well-controlled, blood pressure usually 120-130's systolic.  She reports more adequate daily fluid intake, denies any NSAID use.  She otherwise denies any fever, chest pain, shortness of breath, abdominal pain, diarrhea, dysuria/hematuria.     Review of Systems   Constitutional: Negative for appetite change, fatigue and fever.   Respiratory: Negative for chest tightness and shortness of breath.    Cardiovascular: Negative for chest pain and leg swelling.   Gastrointestinal: Negative for abdominal pain, constipation, diarrhea, nausea and vomiting.   Genitourinary: Negative for difficulty urinating, dysuria, flank pain, frequency, hematuria and urgency.   Musculoskeletal: Negative for arthralgias, joint swelling and myalgias.   Skin: Negative for rash and wound.   Neurological: Negative for dizziness, weakness and light-headedness.   All other systems reviewed and are negative.      Objective:      Physical Exam   Constitutional: She appears well-developed and well-nourished.   Cardiovascular: Normal rate, regular rhythm and normal heart sounds.  Exam reveals no gallop and no friction rub.    No murmur heard.  Pulmonary/Chest: Effort normal and breath sounds normal. No respiratory distress. She has no wheezes. She has no rales.   Abdominal: Soft. Bowel sounds are normal. There is no tenderness.   Musculoskeletal: She exhibits no edema.   Neurological: She is alert.   Skin: Skin is warm and dry. No rash noted. No erythema.   Psychiatric: She has a normal mood and affect.   Vitals reviewed.      Assessment:       1. Controlled type 2 diabetes mellitus with stage 3 chronic kidney  disease, with long-term current use of insulin        Plan:     Ms. James is a 76 year old woman with medical history of diabetes, hypertension, nephrolithiasis presenting for follow up of chronic kidney disease.  Patient creatinine previously 1.5-1.8mg/dL, elevated since urologic intervention May/June 2016, up to 3.0mg/dL, previously stable at 1.9-2.0mg/dL.  Patient with baseline CKD stage III, likely due to diabetic nephropathy, previously with acute kidney injury likely due to obstructive uropathy/UTI, will repeat renal panel to trend.  Encouraged fluid intake (with sodium restriction), stressed importance of blood pressure/glycemic control to prevent any further progression of kidney disease, patient voiced understanding.     Return to clinic in 3-4 months with renal/heme panel, iron/TIBC/ferritin, urinalysis/culture, urine protein/creatinine ratio, PTH/VitD prior to next visit

## 2017-08-03 RX ORDER — TROSPIUM CHLORIDE 20 MG/1
TABLET, FILM COATED ORAL
Qty: 180 TABLET | Refills: 1 | Status: SHIPPED | OUTPATIENT
Start: 2017-08-03 | End: 2018-01-24 | Stop reason: SDUPTHER

## 2017-09-03 DIAGNOSIS — I10 ESSENTIAL HYPERTENSION: ICD-10-CM

## 2017-09-05 RX ORDER — CARVEDILOL 12.5 MG/1
12.5 TABLET ORAL 2 TIMES DAILY
Qty: 90 TABLET | Refills: 2 | Status: SHIPPED | OUTPATIENT
Start: 2017-09-05 | End: 2018-01-14 | Stop reason: SDUPTHER

## 2017-09-07 RX ORDER — ONDANSETRON 4 MG/1
TABLET, ORALLY DISINTEGRATING ORAL
Qty: 9 TABLET | Refills: 0 | Status: SHIPPED | OUTPATIENT
Start: 2017-09-07 | End: 2017-11-20 | Stop reason: SDUPTHER

## 2017-11-20 RX ORDER — ONDANSETRON 4 MG/1
TABLET, ORALLY DISINTEGRATING ORAL
Qty: 9 TABLET | Refills: 0 | Status: SHIPPED | OUTPATIENT
Start: 2017-11-20 | End: 2018-03-10 | Stop reason: SDUPTHER

## 2017-11-27 RX ORDER — ATORVASTATIN CALCIUM 20 MG/1
TABLET, FILM COATED ORAL
Qty: 90 TABLET | Refills: 1 | Status: SHIPPED | OUTPATIENT
Start: 2017-11-27 | End: 2018-02-05 | Stop reason: SDUPTHER

## 2017-12-18 RX ORDER — INSULIN GLARGINE 100 [IU]/ML
32 INJECTION, SOLUTION SUBCUTANEOUS DAILY
Qty: 60 SYRINGE | Refills: 2 | Status: SHIPPED | OUTPATIENT
Start: 2017-12-18 | End: 2018-02-08

## 2018-01-07 DIAGNOSIS — I73.9 PERIPHERAL VASCULAR DISEASE, UNSPECIFIED: ICD-10-CM

## 2018-01-08 RX ORDER — CILOSTAZOL 50 MG/1
50 TABLET ORAL 2 TIMES DAILY
Qty: 180 TABLET | Refills: 3 | Status: SHIPPED | OUTPATIENT
Start: 2018-01-08 | End: 2019-01-16 | Stop reason: SDUPTHER

## 2018-01-12 RX ORDER — CANDESARTAN CILEXETIL AND HYDROCHLOROTHIAZIDE 32; 12.5 MG/1; MG/1
1 TABLET ORAL DAILY
Qty: 90 TABLET | Refills: 1 | Status: SHIPPED | OUTPATIENT
Start: 2018-01-12 | End: 2018-07-11 | Stop reason: SDUPTHER

## 2018-01-14 DIAGNOSIS — I10 ESSENTIAL HYPERTENSION: ICD-10-CM

## 2018-01-15 RX ORDER — CARVEDILOL 12.5 MG/1
12.5 TABLET ORAL 2 TIMES DAILY
Qty: 90 TABLET | Refills: 2 | Status: SHIPPED | OUTPATIENT
Start: 2018-01-15 | End: 2018-05-27 | Stop reason: SDUPTHER

## 2018-01-24 RX ORDER — TROSPIUM CHLORIDE 20 MG/1
TABLET, FILM COATED ORAL
Qty: 180 TABLET | Refills: 1 | Status: SHIPPED | OUTPATIENT
Start: 2018-01-24 | End: 2018-07-23 | Stop reason: SDUPTHER

## 2018-02-05 ENCOUNTER — LAB VISIT (OUTPATIENT)
Dept: LAB | Facility: HOSPITAL | Age: 78
End: 2018-02-05
Attending: FAMILY MEDICINE
Payer: MEDICARE

## 2018-02-05 ENCOUNTER — OFFICE VISIT (OUTPATIENT)
Dept: FAMILY MEDICINE | Facility: CLINIC | Age: 78
End: 2018-02-05
Payer: MEDICARE

## 2018-02-05 VITALS
HEART RATE: 79 BPM | BODY MASS INDEX: 47.75 KG/M2 | SYSTOLIC BLOOD PRESSURE: 112 MMHG | HEIGHT: 62 IN | TEMPERATURE: 98 F | RESPIRATION RATE: 16 BRPM | WEIGHT: 259.5 LBS | DIASTOLIC BLOOD PRESSURE: 60 MMHG | OXYGEN SATURATION: 95 %

## 2018-02-05 DIAGNOSIS — N18.4 CONTROLLED TYPE 2 DIABETES MELLITUS WITH STAGE 4 CHRONIC KIDNEY DISEASE, WITH LONG-TERM CURRENT USE OF INSULIN: ICD-10-CM

## 2018-02-05 DIAGNOSIS — M94.9 DISORDER OF BONE AND CARTILAGE: ICD-10-CM

## 2018-02-05 DIAGNOSIS — N25.81 HYPERPARATHYROIDISM, SECONDARY RENAL: ICD-10-CM

## 2018-02-05 DIAGNOSIS — Z23 NEED FOR PNEUMOCOCCAL VACCINATION: ICD-10-CM

## 2018-02-05 DIAGNOSIS — Z23 NEEDS FLU SHOT: ICD-10-CM

## 2018-02-05 DIAGNOSIS — Z79.4 CONTROLLED TYPE 2 DIABETES MELLITUS WITH STAGE 4 CHRONIC KIDNEY DISEASE, WITH LONG-TERM CURRENT USE OF INSULIN: ICD-10-CM

## 2018-02-05 DIAGNOSIS — I73.9 PVD (PERIPHERAL VASCULAR DISEASE): ICD-10-CM

## 2018-02-05 DIAGNOSIS — I10 ESSENTIAL HYPERTENSION: ICD-10-CM

## 2018-02-05 DIAGNOSIS — G47.00 INSOMNIA, UNSPECIFIED TYPE: ICD-10-CM

## 2018-02-05 DIAGNOSIS — N20.0 KIDNEY STONES: ICD-10-CM

## 2018-02-05 DIAGNOSIS — E11.22 CONTROLLED TYPE 2 DIABETES MELLITUS WITH STAGE 4 CHRONIC KIDNEY DISEASE, WITH LONG-TERM CURRENT USE OF INSULIN: ICD-10-CM

## 2018-02-05 DIAGNOSIS — I87.2 STASIS DERMATITIS OF BOTH LEGS: ICD-10-CM

## 2018-02-05 DIAGNOSIS — Z00.00 WELL ADULT EXAM: Primary | ICD-10-CM

## 2018-02-05 DIAGNOSIS — M89.9 DISORDER OF BONE AND CARTILAGE: ICD-10-CM

## 2018-02-05 LAB
ALBUMIN SERPL BCP-MCNC: 3.3 G/DL
ALP SERPL-CCNC: 119 U/L
ALT SERPL W/O P-5'-P-CCNC: 12 U/L
ANION GAP SERPL CALC-SCNC: 6 MMOL/L
AST SERPL-CCNC: 17 U/L
BILIRUB SERPL-MCNC: 0.3 MG/DL
BUN SERPL-MCNC: 25 MG/DL
CALCIUM SERPL-MCNC: 9.6 MG/DL
CHLORIDE SERPL-SCNC: 112 MMOL/L
CHOLEST SERPL-MCNC: 118 MG/DL
CHOLEST/HDLC SERPL: 2.8 {RATIO}
CO2 SERPL-SCNC: 23 MMOL/L
CREAT SERPL-MCNC: 2.1 MG/DL
EST. GFR  (AFRICAN AMERICAN): 25.2 ML/MIN/1.73 M^2
EST. GFR  (NON AFRICAN AMERICAN): 21.9 ML/MIN/1.73 M^2
ESTIMATED AVG GLUCOSE: 137 MG/DL
GLUCOSE SERPL-MCNC: 130 MG/DL
HBA1C MFR BLD HPLC: 6.4 %
HDLC SERPL-MCNC: 42 MG/DL
HDLC SERPL: 35.6 %
LDLC SERPL CALC-MCNC: 63.4 MG/DL
NONHDLC SERPL-MCNC: 76 MG/DL
POTASSIUM SERPL-SCNC: 5.2 MMOL/L
PROT SERPL-MCNC: 8 G/DL
PTH-INTACT SERPL-MCNC: 203 PG/ML
SODIUM SERPL-SCNC: 141 MMOL/L
TRIGL SERPL-MCNC: 63 MG/DL

## 2018-02-05 PROCEDURE — 80053 COMPREHEN METABOLIC PANEL: CPT

## 2018-02-05 PROCEDURE — 90662 IIV NO PRSV INCREASED AG IM: CPT | Mod: S$GLB,,, | Performed by: FAMILY MEDICINE

## 2018-02-05 PROCEDURE — 90732 PPSV23 VACC 2 YRS+ SUBQ/IM: CPT | Mod: S$GLB,,, | Performed by: FAMILY MEDICINE

## 2018-02-05 PROCEDURE — 83970 ASSAY OF PARATHORMONE: CPT

## 2018-02-05 PROCEDURE — 99499 UNLISTED E&M SERVICE: CPT | Mod: S$GLB,,, | Performed by: FAMILY MEDICINE

## 2018-02-05 PROCEDURE — G0008 ADMIN INFLUENZA VIRUS VAC: HCPCS | Mod: S$GLB,,, | Performed by: FAMILY MEDICINE

## 2018-02-05 PROCEDURE — 99397 PER PM REEVAL EST PAT 65+ YR: CPT | Mod: S$GLB,,, | Performed by: FAMILY MEDICINE

## 2018-02-05 PROCEDURE — G0009 ADMIN PNEUMOCOCCAL VACCINE: HCPCS | Mod: S$GLB,,, | Performed by: FAMILY MEDICINE

## 2018-02-05 PROCEDURE — 83036 HEMOGLOBIN GLYCOSYLATED A1C: CPT

## 2018-02-05 PROCEDURE — 36415 COLL VENOUS BLD VENIPUNCTURE: CPT | Mod: PO

## 2018-02-05 PROCEDURE — 80061 LIPID PANEL: CPT

## 2018-02-05 PROCEDURE — 99999 PR PBB SHADOW E&M-EST. PATIENT-LVL V: CPT | Mod: PBBFAC,,, | Performed by: FAMILY MEDICINE

## 2018-02-05 RX ORDER — AMLODIPINE BESYLATE 10 MG/1
10 TABLET ORAL DAILY
Qty: 90 TABLET | Refills: 3 | Status: SHIPPED | OUTPATIENT
Start: 2018-02-05 | End: 2019-02-15 | Stop reason: SDUPTHER

## 2018-02-05 RX ORDER — ATORVASTATIN CALCIUM 20 MG/1
20 TABLET, FILM COATED ORAL DAILY
Qty: 90 TABLET | Refills: 3 | Status: SHIPPED | OUTPATIENT
Start: 2018-02-05 | End: 2019-03-09 | Stop reason: SDUPTHER

## 2018-02-05 RX ORDER — POTASSIUM CITRATE 15 MEQ/1
15 TABLET, EXTENDED RELEASE ORAL DAILY
Qty: 90 TABLET | Refills: 3 | Status: SHIPPED | OUTPATIENT
Start: 2018-02-05 | End: 2022-01-24

## 2018-02-05 RX ORDER — TRAZODONE HYDROCHLORIDE 50 MG/1
50 TABLET ORAL NIGHTLY
Qty: 90 TABLET | Refills: 3 | Status: SHIPPED | OUTPATIENT
Start: 2018-02-05 | End: 2019-02-03 | Stop reason: SDUPTHER

## 2018-02-05 RX ORDER — POTASSIUM CITRATE 15 MEQ/1
15 TABLET, EXTENDED RELEASE ORAL DAILY
Qty: 90 TABLET | Refills: 3 | Status: CANCELLED | OUTPATIENT
Start: 2018-02-05 | End: 2019-02-05

## 2018-02-05 RX ORDER — TRIAMCINOLONE ACETONIDE 1 MG/G
CREAM TOPICAL 3 TIMES DAILY
Qty: 454 TUBE | Refills: 3 | Status: SHIPPED | OUTPATIENT
Start: 2018-02-05 | End: 2018-08-21 | Stop reason: SDUPTHER

## 2018-02-05 NOTE — PROGRESS NOTES
Pt tolerated flu vaccine to right deltoid without difficulty; no adverse reaction noted; VIS given; pt also tolerated pneumococcal vaccine to right deltoid without difficulty; no adverse reaction noted; VIS given

## 2018-02-05 NOTE — PROGRESS NOTES
Subjective:       Patient ID: Ana James is a 77 y.o. female.    Chief Complaint: Diabetes and Hypertension (Very dry skin,has used otc lotions nothing has helped)    HPI:  Here for annual exam. Patient with stable diabetes, HTN and CKD 4.  No cardiac complaints.    Review of Systems   Constitutional: Negative for appetite change, chills, diaphoresis, fatigue and fever.   HENT: Negative for hearing loss, sinus pressure and trouble swallowing.    Eyes: Negative for visual disturbance.   Respiratory: Negative for cough, chest tightness, shortness of breath and wheezing.    Cardiovascular: Negative for chest pain, palpitations and leg swelling.        Claudiaction   Gastrointestinal: Negative for abdominal pain, blood in stool, constipation, diarrhea, nausea and vomiting.   Genitourinary: Positive for frequency. Negative for decreased urine volume, difficulty urinating, dysuria, hematuria, menstrual problem, pelvic pain and vaginal discharge.   Musculoskeletal: Positive for back pain. Negative for joint swelling and neck pain.   Skin: Negative for rash.   Neurological: Negative for dizziness, numbness and headaches.   Hematological: Negative for adenopathy. Does not bruise/bleed easily.   Psychiatric/Behavioral: Negative for dysphoric mood and sleep disturbance. The patient is not nervous/anxious.        Objective:      Physical Exam   Constitutional: She is oriented to person, place, and time. She appears well-developed and well-nourished.   HENT:   Head: Normocephalic and atraumatic.   Mouth/Throat: No oropharyngeal exudate.   Eyes: No scleral icterus.   Cloudy cornea left eye   Neck: Normal range of motion. Neck supple. No thyromegaly present.   Cardiovascular: Normal rate and regular rhythm.  Exam reveals no gallop and no friction rub.    No murmur heard.  Pulmonary/Chest: Effort normal and breath sounds normal. She has no wheezes. She has no rales.   Abdominal: Soft. Bowel sounds are normal. She  exhibits no distension and no mass. There is no hepatosplenomegaly. There is no tenderness.   Musculoskeletal: She exhibits edema.   Lymphadenopathy:     She has no cervical adenopathy.     She has no axillary adenopathy.        Right: No supraclavicular adenopathy present.        Left: No supraclavicular adenopathy present.   Neurological: She is alert and oriented to person, place, and time.   Skin: Skin is warm and dry. Rash (LE) noted.   Psychiatric: She has a normal mood and affect. Her behavior is normal.         Assessment:       1. Well adult exam    2. Insomnia, unspecified type    3. Essential hypertension    4. Kidney stones    5. Needs flu shot    6. Need for pneumococcal vaccination    7. Disorder of bone and cartilage    8. Controlled type 2 diabetes mellitus with stage 4 chronic kidney disease, with long-term current use of insulin    9. Body mass index 45.0-49.9, adult    10. PVD (peripheral vascular disease)    11. Stasis dermatitis of both legs        Plan:       Well adult exam  Health maintenance updated. Discussed diet and weight loss    Insomnia, unspecified type  -     traZODone (DESYREL) 50 MG tablet; Take 1 tablet (50 mg total) by mouth every evening.  Dispense: 90 tablet; Refill: 3    Essential hypertension  -     amLODIPine (NORVASC) 10 MG tablet; Take 1 tablet (10 mg total) by mouth once daily.  Dispense: 90 tablet; Refill: 3  -     Comprehensive metabolic panel; Future; Expected date: 02/05/2018    Kidney stones  -     potassium citrate (UROCIT-K 15) 15 mEq TbSR; Take 15 mEq by mouth once daily.  Dispense: 90 tablet; Refill: 3    Needs flu shot  -     Influenza - High Dose (65+) (PF) (IM)    Need for pneumococcal vaccination  -     Pneumococcal Polysaccharide Vaccine (23 Valent) (SQ/IM)    Disorder of bone and cartilage  -     DXA Bone Density Spine And Hip; Future; Expected date: 02/05/2018    Controlled type 2 diabetes mellitus with stage 4 chronic kidney disease, with long-term  current use of insulin  -     atorvastatin (LIPITOR) 20 MG tablet; Take 1 tablet (20 mg total) by mouth once daily.  Dispense: 90 tablet; Refill: 3    Body mass index 45.0-49.9, adult  Mild weight gain,  Encourage diet and increase activity    PVD (peripheral vascular disease)  Intermittent claudication.      Stasis dermatitis of both legs  -     triamcinolone acetonide 0.1% (KENALOG) 0.1 % cream; Apply topically 3 (three) times daily.  Dispense: 454 Tube; Refill: 3     Follow-up in about 1 year (around 2/5/2019).

## 2018-02-08 ENCOUNTER — TELEPHONE (OUTPATIENT)
Dept: FAMILY MEDICINE | Facility: CLINIC | Age: 78
End: 2018-02-08

## 2018-02-08 DIAGNOSIS — N18.4 UNCONTROLLED TYPE 2 DIABETES MELLITUS WITH STAGE 4 CHRONIC KIDNEY DISEASE, UNSPECIFIED LONG TERM INSULIN USE STATUS: Primary | ICD-10-CM

## 2018-02-08 DIAGNOSIS — E11.65 UNCONTROLLED TYPE 2 DIABETES MELLITUS WITH STAGE 4 CHRONIC KIDNEY DISEASE, UNSPECIFIED LONG TERM INSULIN USE STATUS: Primary | ICD-10-CM

## 2018-02-08 DIAGNOSIS — E11.22 UNCONTROLLED TYPE 2 DIABETES MELLITUS WITH STAGE 4 CHRONIC KIDNEY DISEASE, UNSPECIFIED LONG TERM INSULIN USE STATUS: Primary | ICD-10-CM

## 2018-02-08 NOTE — TELEPHONE ENCOUNTER
Spoke with patient. States that she has plenty of the Lantus left. Would like to know what should she do. Per Ann Ward it is ok for patient to use all of the Lantus first and then use the knew script. Verbalized understanding.

## 2018-02-08 NOTE — TELEPHONE ENCOUNTER
----- Message from Marlin Erwin sent at 2/8/2018  2:40 PM CST -----  Contact: Self  Patients says she was given new insulin and would like to know is she supposed to just stop taking it our finish it them begin the new script. Please call at [phone].

## 2018-02-08 NOTE — TELEPHONE ENCOUNTER
Spoke with patient. States that she received a letter from [employer] stating they are no longer going to cover Lantus. Would like to know if it can be changed to something else.Please advise.

## 2018-02-08 NOTE — TELEPHONE ENCOUNTER
----- Message from Mariano Marie sent at 2/8/2018 10:36 AM CST -----  Contact: self  Pt called regarding additional questions about LANTUS SOLOSTAR 100 unit/mL (3 mL) InPn pen. Contact pt at [phone].    Thanks-

## 2018-02-09 ENCOUNTER — TELEPHONE (OUTPATIENT)
Dept: FAMILY MEDICINE | Facility: CLINIC | Age: 78
End: 2018-02-09

## 2018-02-09 NOTE — TELEPHONE ENCOUNTER
----- Message from Jaky Schmidt MD sent at 2/9/2018  2:22 PM CST -----  Kidney function stable.  Potassium slightly elevated.  Monitor potassium intake in diet.

## 2018-02-09 NOTE — TELEPHONE ENCOUNTER
Notified patient of information below. Verbalized understanding. Pt recently received Rx for potassium citrate (UROCIT-K 15) 15 mEq TbSR wondering if pt still need to be on it. Pt would like results for Lipid Panel and A1c as well. Please advise     Component      Latest Ref Rng & Units 2/5/2018   Cholesterol      120 - 199 mg/dL 118 (L)   Triglycerides      30 - 150 mg/dL 63   HDL      40 - 75 mg/dL 42   LDL Cholesterol      63.0 - 159.0 mg/dL 63.4   HDL/Chol Ratio      20.0 - 50.0 % 35.6   Total Cholesterol/HDL Ratio      2.0 - 5.0 2.8   Non-HDL Cholesterol      mg/dL 76   Hemoglobin A1C      4.0 - 5.6 % 6.4 (H)   Estimated Avg Glucose      68 - 131 mg/dL 137 (H)

## 2018-02-09 NOTE — TELEPHONE ENCOUNTER
I do not suggest starting potassium citrate for kidney stones.  Will have to consider another medication if stones reoccur.  Hba1c 6.4%, lipids normal as below.

## 2018-02-19 ENCOUNTER — TELEPHONE (OUTPATIENT)
Dept: FAMILY MEDICINE | Facility: CLINIC | Age: 78
End: 2018-02-19

## 2018-02-19 NOTE — TELEPHONE ENCOUNTER
----- Message from Sanjuana Lovett sent at 2/19/2018 11:02 AM CST -----  Contact: Self   Patient says she thinks her sleep medication is to strong. Please call at [phone].      traZODone (DESYREL) 50 MG tablet      [facility] #[id number] - [place] - [address]

## 2018-02-19 NOTE — TELEPHONE ENCOUNTER
Pt states the trazodone 50mg tablet is too strong; states the next day she feels lightheaded and weak, takes her 1/2 day to get herself together; pt is wanting to take half pill and see how that does; please advise

## 2018-03-12 ENCOUNTER — TELEPHONE (OUTPATIENT)
Dept: FAMILY MEDICINE | Facility: CLINIC | Age: 78
End: 2018-03-12

## 2018-03-12 RX ORDER — ONDANSETRON 4 MG/1
TABLET, ORALLY DISINTEGRATING ORAL
Qty: 20 TABLET | Refills: 2 | Status: SHIPPED | OUTPATIENT
Start: 2018-03-12 | End: 2021-01-27

## 2018-03-12 NOTE — TELEPHONE ENCOUNTER
Spoke to pt and verified she did request this ointment from pharmacy; form placed on Dr Schmidt desk to sign

## 2018-04-03 ENCOUNTER — TELEPHONE (OUTPATIENT)
Dept: FAMILY MEDICINE | Facility: CLINIC | Age: 78
End: 2018-04-03

## 2018-04-03 NOTE — TELEPHONE ENCOUNTER
Pt states she started with diarrhea on Monday, messed the bed up 3 times last night, she does not have anyway to get to office for appointment; also c/o dry cough, states when she blows her nose it is clear; pt would like medication sent in to pharmacy for diarrhea and cough

## 2018-04-03 NOTE — TELEPHONE ENCOUNTER
Recommend robitussin or Mucinex DM OTC for cough.  Patient may consider Imodium for diarrhea as long as she is not having fever, abdominal pain or blood in the stool.  If symptoms becomes worse, persists for > 1 week or if any of the other symptoms mentioned occurs, she may need office visit and testing of stool.  Increase fluid intake and avoid dairy or fatty foods.

## 2018-04-03 NOTE — TELEPHONE ENCOUNTER
----- Message from Luz Schultz sent at 4/3/2018 10:42 AM CDT -----  Contact: self  Pt calling to discuss upset stomach and cough. Please call [phone]

## 2018-05-07 RX ORDER — INSULIN DETEMIR 100 [IU]/ML
32 INJECTION, SOLUTION SUBCUTANEOUS NIGHTLY
Qty: 9.6 ML | Refills: 2 | Status: SHIPPED | OUTPATIENT
Start: 2018-05-07 | End: 2018-07-29 | Stop reason: SDUPTHER

## 2018-05-27 DIAGNOSIS — I10 ESSENTIAL HYPERTENSION: ICD-10-CM

## 2018-05-28 RX ORDER — CARVEDILOL 12.5 MG/1
12.5 TABLET ORAL 2 TIMES DAILY
Qty: 90 TABLET | Refills: 2 | Status: SHIPPED | OUTPATIENT
Start: 2018-05-28 | End: 2018-10-02 | Stop reason: SDUPTHER

## 2018-06-25 RX ORDER — PEN NEEDLE, DIABETIC 31 GX5/16"
NEEDLE, DISPOSABLE MISCELLANEOUS
Qty: 200 EACH | Refills: 1 | Status: SHIPPED | OUTPATIENT
Start: 2018-06-25 | End: 2018-10-17 | Stop reason: SDUPTHER

## 2018-06-27 ENCOUNTER — TELEPHONE (OUTPATIENT)
Dept: FAMILY MEDICINE | Facility: CLINIC | Age: 78
End: 2018-06-27

## 2018-06-27 NOTE — TELEPHONE ENCOUNTER
Spoke to patient and she states they came to pick her up in a big van and she is not able to climb the 4 steps to get in, she states they used to come in a smaller van and she was able to get in with no problem; spoke to Sarah with [facility] and was informed pt should be able to request small vehicle, I informed pt of this and she states she did tell them she needed small van or car and they told her they have to send whatever vehicle is available at that time; pt states she really needs to see Dr and doesn't know what else to do

## 2018-06-27 NOTE — TELEPHONE ENCOUNTER
Called pt to reschedule missed appointment . Pt stated she will not be able to make any appointments because [employer] does not want to help her get in and out of the van. Pt wanted to know if it is any way possible for us to send a letter to her insurance stating that she needs help. Please advise.

## 2018-07-11 ENCOUNTER — OFFICE VISIT (OUTPATIENT)
Dept: FAMILY MEDICINE | Facility: CLINIC | Age: 78
End: 2018-07-11
Payer: MEDICARE

## 2018-07-11 VITALS
DIASTOLIC BLOOD PRESSURE: 56 MMHG | OXYGEN SATURATION: 96 % | HEIGHT: 62 IN | SYSTOLIC BLOOD PRESSURE: 118 MMHG | HEART RATE: 78 BPM | BODY MASS INDEX: 49.74 KG/M2 | WEIGHT: 270.31 LBS | TEMPERATURE: 99 F

## 2018-07-11 DIAGNOSIS — Z79.4 CONTROLLED TYPE 2 DIABETES MELLITUS WITH STAGE 4 CHRONIC KIDNEY DISEASE, WITH LONG-TERM CURRENT USE OF INSULIN: ICD-10-CM

## 2018-07-11 DIAGNOSIS — N18.4 CKD (CHRONIC KIDNEY DISEASE) STAGE 4, GFR 15-29 ML/MIN: ICD-10-CM

## 2018-07-11 DIAGNOSIS — R06.09 DOE (DYSPNEA ON EXERTION): Primary | ICD-10-CM

## 2018-07-11 DIAGNOSIS — E11.22 CONTROLLED TYPE 2 DIABETES MELLITUS WITH STAGE 4 CHRONIC KIDNEY DISEASE, WITH LONG-TERM CURRENT USE OF INSULIN: ICD-10-CM

## 2018-07-11 DIAGNOSIS — N18.4 CONTROLLED TYPE 2 DIABETES MELLITUS WITH STAGE 4 CHRONIC KIDNEY DISEASE, WITH LONG-TERM CURRENT USE OF INSULIN: ICD-10-CM

## 2018-07-11 PROBLEM — N17.9 AKI (ACUTE KIDNEY INJURY): Status: RESOLVED | Noted: 2017-05-27 | Resolved: 2018-07-11

## 2018-07-11 PROBLEM — E87.5 HYPERKALEMIA: Status: RESOLVED | Noted: 2017-05-27 | Resolved: 2018-07-11

## 2018-07-11 PROBLEM — R19.7 VOMITING AND DIARRHEA: Status: RESOLVED | Noted: 2017-05-26 | Resolved: 2018-07-11

## 2018-07-11 PROBLEM — R11.10 VOMITING AND DIARRHEA: Status: RESOLVED | Noted: 2017-05-26 | Resolved: 2018-07-11

## 2018-07-11 PROCEDURE — 99214 OFFICE O/P EST MOD 30 MIN: CPT | Mod: S$GLB,,, | Performed by: FAMILY MEDICINE

## 2018-07-11 PROCEDURE — 99999 PR PBB SHADOW E&M-EST. PATIENT-LVL IV: CPT | Mod: PBBFAC,,, | Performed by: FAMILY MEDICINE

## 2018-07-11 PROCEDURE — 3078F DIAST BP <80 MM HG: CPT | Mod: CPTII,S$GLB,, | Performed by: FAMILY MEDICINE

## 2018-07-11 PROCEDURE — 3074F SYST BP LT 130 MM HG: CPT | Mod: CPTII,S$GLB,, | Performed by: FAMILY MEDICINE

## 2018-07-11 PROCEDURE — 93010 ELECTROCARDIOGRAM REPORT: CPT | Mod: S$GLB,,, | Performed by: INTERNAL MEDICINE

## 2018-07-11 PROCEDURE — 93005 ELECTROCARDIOGRAM TRACING: CPT | Mod: S$GLB,,, | Performed by: FAMILY MEDICINE

## 2018-07-11 RX ORDER — CANDESARTAN CILEXETIL AND HYDROCHLOROTHIAZIDE 32; 12.5 MG/1; MG/1
1 TABLET ORAL DAILY
Qty: 90 TABLET | Refills: 1 | Status: SHIPPED | OUTPATIENT
Start: 2018-07-11 | End: 2018-12-19 | Stop reason: SDUPTHER

## 2018-07-11 NOTE — PROGRESS NOTES
"Subjective:       Patient ID: Ana James     Chief Complaint: Shortness of Breath      HPIAna James is a 77 y.o. female. Reports wheezing, SOB and feeling like she wants to pass out with walking x 1 month.  Has not been using inhalers.  Reports intermittent sharp chest pain lasting 30 seconds.      Review of patient's allergies indicates:   Allergen Reactions    Adhesive Rash     Paper tape       Current Outpatient Prescriptions:     amLODIPine (NORVASC) 10 MG tablet, Take 1 tablet (10 mg total) by mouth once daily., Disp: 90 tablet, Rfl: 3    aspirin (ECOTRIN) 81 MG EC tablet, Take 81 mg by mouth once daily., Disp: , Rfl:     atorvastatin (LIPITOR) 20 MG tablet, Take 1 tablet (20 mg total) by mouth once daily., Disp: 90 tablet, Rfl: 3    BD ULTRA-FINE BREE PEN NEEDLE 32 gauge x 5/32" Ndle, INJECT INSULIN THREE TIMES DAILY, Disp: 200 each, Rfl: 1    blood sugar diagnostic Strp, Test 3 times daily, Disp: 100 each, Rfl: 11    candesartan-hydrochlorothiazid (ATACAND HCT) 32-12.5 mg per tablet, TAKE 1 TABLET BY MOUTH ONCE DAILY., Disp: 90 tablet, Rfl: 1    carvedilol (COREG) 12.5 MG tablet, TAKE 1 TABLET (12.5 MG TOTAL) BY MOUTH 2 (TWO) TIMES DAILY., Disp: 90 tablet, Rfl: 2    cilostazol (PLETAL) 50 MG Tab, TAKE 1 TABLET (50 MG TOTAL) BY MOUTH 2 (TWO) TIMES DAILY., Disp: 180 tablet, Rfl: 3    clotrimazole-betamethasone 1-0.05% (LOTRISONE) cream, Apply topically 2 (two) times daily., Disp: 45 g, Rfl: 5    lancets Misc, Test 3 times daily, Disp: 100 each, Rfl: 11    latanoprost 0.005 % ophthalmic solution, Place 1 drop into both eyes every evening., Disp: , Rfl: 3    LEVEMIR FLEXTOUCH U-100 INSULN 100 unit/mL (3 mL) InPn pen, INJECT 32 UNITS INTO THE SKIN EVERY EVENING., Disp: 9.6 mL, Rfl: 2    ondansetron (ZOFRAN-ODT) 4 MG TbDL, TAKE 1 TABLET BY MOUTH EVERY 6 HOURS AS NEEDED, Disp: 20 tablet, Rfl: 2    potassium citrate (UROCIT-K 15) 15 mEq TbSR, Take 15 mEq by mouth once daily., " Disp: 90 tablet, Rfl: 3    timolol maleate 0.5% (TIMOPTIC) 0.5 % Drop, 1 drop every morning. , Disp: , Rfl: 2    TRADJENTA 5 mg Tab tablet, TAKE 1 TABLET (5 MG TOTAL) BY MOUTH ONCE DAILY., Disp: 90 tablet, Rfl: 3    traZODone (DESYREL) 50 MG tablet, Take 1 tablet (50 mg total) by mouth every evening., Disp: 90 tablet, Rfl: 3    trospium (SANCTURA) 20 mg Tab tablet, TAKE 1 TABLET BY MOUTH TWICE A DAY, Disp: 180 tablet, Rfl: 1    albuterol 90 mcg/actuation inhaler, Inhale 2 puffs into the lungs every 6 (six) hours as needed for Wheezing. Rescue, Disp: 8 g, Rfl: 5    blood-glucose meter kit, Use as instructed, Disp: 1 each, Rfl: 0    triamcinolone acetonide 0.1% (KENALOG) 0.1 % cream, Apply topically 3 (three) times daily., Disp: 454 Tube, Rfl: 3    Past Medical History:   Diagnosis Date    Arthritis lower extremities    Asthma     Diabetes mellitus     Gall stones     Hypertension     Kidney stones     Nephrolithiasis 1/22/2016    Obesity     Renal disorder     Retinopathy     Vaginal delivery     x1     Review of Systems   Constitutional: Negative for diaphoresis.   HENT:        No jaw pain   Gastrointestinal: Negative for nausea and vomiting.   Endocrine: Positive for cold intolerance.   Musculoskeletal: Negative for neck pain.   Psychiatric/Behavioral: Positive for dysphoric mood.       Objective:      Physical Exam   Constitutional: She appears well-developed.   Morbidly obese   Cardiovascular: Regular rhythm.    Pulmonary/Chest: Effort normal and breath sounds normal. She has no wheezes.   Musculoskeletal: She exhibits edema.   Neurological: She is alert.   Psychiatric: She has a normal mood and affect.       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 HILLS (dyspnea on exertion)    2. Controlled type 2 diabetes mellitus with stage 4 chronic kidney disease, with long-term current use of insulin    3. CKD (chronic kidney disease) stage 4, GFR  15-29 ml/min        Plan:       Ana was seen today for shortness of breath.    Diagnoses and all orders for this visit:    HILLS (dyspnea on exertion)  Suspect cardiac.  Patient with abnormal EKG.  Will follow up wit cardiology  -     Basic metabolic panel; Future  -     CBC auto differential; Future  -     TSH; Future  -     Ambulatory referral to Cardiology    Controlled type 2 diabetes mellitus with stage 4 chronic kidney disease, with long-term current use of insulin  -     Ambulatory referral to Nephrology    CKD (chronic kidney disease) stage 4, GFR 15-29 ml/min  Kidney function unchanged

## 2018-07-13 ENCOUNTER — LAB VISIT (OUTPATIENT)
Dept: LAB | Facility: HOSPITAL | Age: 78
End: 2018-07-13
Attending: FAMILY MEDICINE
Payer: MEDICARE

## 2018-07-13 ENCOUNTER — OFFICE VISIT (OUTPATIENT)
Dept: CARDIOLOGY | Facility: CLINIC | Age: 78
End: 2018-07-13
Payer: MEDICARE

## 2018-07-13 VITALS
OXYGEN SATURATION: 100 % | BODY MASS INDEX: 49.65 KG/M2 | HEIGHT: 62 IN | HEART RATE: 75 BPM | WEIGHT: 269.81 LBS | DIASTOLIC BLOOD PRESSURE: 58 MMHG | SYSTOLIC BLOOD PRESSURE: 130 MMHG

## 2018-07-13 DIAGNOSIS — Z79.4 CONTROLLED TYPE 2 DIABETES MELLITUS WITH STAGE 4 CHRONIC KIDNEY DISEASE, WITH LONG-TERM CURRENT USE OF INSULIN: ICD-10-CM

## 2018-07-13 DIAGNOSIS — E11.22 CONTROLLED TYPE 2 DIABETES MELLITUS WITH STAGE 4 CHRONIC KIDNEY DISEASE, WITH LONG-TERM CURRENT USE OF INSULIN: ICD-10-CM

## 2018-07-13 DIAGNOSIS — Z91.89 AT RISK FOR CORONARY ARTERY DISEASE: ICD-10-CM

## 2018-07-13 DIAGNOSIS — I10 ESSENTIAL HYPERTENSION: Primary | ICD-10-CM

## 2018-07-13 DIAGNOSIS — R07.89 CHEST PAIN, ATYPICAL: ICD-10-CM

## 2018-07-13 DIAGNOSIS — R06.09 DOE (DYSPNEA ON EXERTION): ICD-10-CM

## 2018-07-13 DIAGNOSIS — N18.4 CONTROLLED TYPE 2 DIABETES MELLITUS WITH STAGE 4 CHRONIC KIDNEY DISEASE, WITH LONG-TERM CURRENT USE OF INSULIN: ICD-10-CM

## 2018-07-13 DIAGNOSIS — I73.9 PERIPHERAL VASCULAR DISEASE, UNSPECIFIED: ICD-10-CM

## 2018-07-13 PROCEDURE — 84443 ASSAY THYROID STIM HORMONE: CPT

## 2018-07-13 PROCEDURE — 85025 COMPLETE CBC W/AUTO DIFF WBC: CPT

## 2018-07-13 PROCEDURE — 99214 OFFICE O/P EST MOD 30 MIN: CPT | Mod: S$GLB,,, | Performed by: INTERNAL MEDICINE

## 2018-07-13 PROCEDURE — 36415 COLL VENOUS BLD VENIPUNCTURE: CPT | Mod: PO

## 2018-07-13 PROCEDURE — 99999 PR PBB SHADOW E&M-EST. PATIENT-LVL III: CPT | Mod: PBBFAC,,, | Performed by: INTERNAL MEDICINE

## 2018-07-13 PROCEDURE — 3078F DIAST BP <80 MM HG: CPT | Mod: CPTII,S$GLB,, | Performed by: INTERNAL MEDICINE

## 2018-07-13 PROCEDURE — 80048 BASIC METABOLIC PNL TOTAL CA: CPT

## 2018-07-13 PROCEDURE — 3075F SYST BP GE 130 - 139MM HG: CPT | Mod: CPTII,S$GLB,, | Performed by: INTERNAL MEDICINE

## 2018-07-13 NOTE — PROGRESS NOTES
Subjective:    Patient ID:  Ana James is a 77 y.o. female who presents for evaluation of Abnormal ECG      HPI     Last saw me 2/16/17    HTN, PAD, DM, SOB, CRI - Cr 2.0        Referred by Dr. Farnsworth for abnormal JIGAR. She has bilateral leg pain mainly in her ankles. Follows with [facility] Dr. Martin. 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 Walks with walker for 2 blocks but also limited by sob. Wears diabetic shoes. Has some swelling occasionally but otherwise in her usual state of health.            LE arterial doppler 7/17/15  There is no definite areas of doubling of the velocity to suggest underlying greater than 50% stenosis.  There is decreased velocity seen within the distal left lower extremity arteries compared to the right lower extremity arteries and there are more monophasic waveform configuration in the distal left lower extremity concerning for distal left lower   extremity significant atherosclerotic plaque and vessel hardening.     JIGAR 10/27/16  Right leg: The JIGAR is 2.0 which is abnormally elevated .  Left leg: The JIGAR is 2.1 which abnormally elevated.      Stress test 10/27/16  LVEF: 68 %  Impression: NORMAL MYOCARDIAL PERFUSION  1. The perfusion scan is free of evidence for myocardial ischemia or injury.   2. Resting wall motion is physiologic.   3. Resting LV function is normal.     Echo 10/27/16    1 - Normal left ventricular systolic function (EF 55-60%).     2 - Left ventricular diastolic dysfunction.     3 - Normal appearing valves and Doppler exam.      2/16/17 Pletal was started by Dr Schmidt for PAD  Activity is limited by HILLS and not claudication  EKG NSR - ok  Denies CP    Saw Dr Schmidt 7/11/18  HPIAna James is a 77 y.o. female. Reports wheezing, SOB and feeling like she wants to pass out with walking x 1 month.  Has not been using inhalers.  Reports intermittent sharp chest pain lasting 30 seconds.    EKG 7/11/18 NSR old IMI, PRWP, NSSTT changes  -similar  to previous EKG    Review of Systems   Constitution: Negative for decreased appetite.   HENT: Negative for ear discharge.    Eyes: Negative for blurred vision.   Respiratory: Negative for hemoptysis.    Endocrine: Negative for polyphagia.   Hematologic/Lymphatic: Negative for adenopathy.   Skin: Negative for color change.   Musculoskeletal: Negative for joint swelling.   Neurological: Negative for brief paralysis.   Psychiatric/Behavioral: Negative for hallucinations.        Objective:    Physical Exam   Constitutional: She appears well-developed and well-nourished.   HENT:   Head: Normocephalic and atraumatic.   Neck: No thyromegaly present.   Cardiovascular: Normal rate and regular rhythm.    No murmur heard.  Pulmonary/Chest: Effort normal and breath sounds normal. No respiratory distress.   Abdominal: Soft. Bowel sounds are normal.   Musculoskeletal: She exhibits edema.         Assessment:       1. Essential hypertension    2. Peripheral vascular disease, unspecified    3. 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 Chest pain, atypical         Plan:       Echo and lexiscan myoview for HILLS and CP  JIGAR for PAD  CMP, BNP - may need additional diuresis

## 2018-07-14 LAB
ANION GAP SERPL CALC-SCNC: 10 MMOL/L
BASOPHILS # BLD AUTO: 0.02 K/UL
BASOPHILS NFR BLD: 0.3 %
BUN SERPL-MCNC: 31 MG/DL
CALCIUM SERPL-MCNC: 9.4 MG/DL
CHLORIDE SERPL-SCNC: 113 MMOL/L
CO2 SERPL-SCNC: 20 MMOL/L
CREAT SERPL-MCNC: 2 MG/DL
DIFFERENTIAL METHOD: ABNORMAL
EOSINOPHIL # BLD AUTO: 0.3 K/UL
EOSINOPHIL NFR BLD: 4.3 %
ERYTHROCYTE [DISTWIDTH] IN BLOOD BY AUTOMATED COUNT: 15.9 %
EST. GFR  (AFRICAN AMERICAN): 26.8 ML/MIN/1.73 M^2
EST. GFR  (NON AFRICAN AMERICAN): 23.2 ML/MIN/1.73 M^2
GLUCOSE SERPL-MCNC: 89 MG/DL
HCT VFR BLD AUTO: 31.7 %
HGB BLD-MCNC: 9.7 G/DL
IMM GRANULOCYTES # BLD AUTO: 0.03 K/UL
IMM GRANULOCYTES NFR BLD AUTO: 0.4 %
LYMPHOCYTES # BLD AUTO: 1.4 K/UL
LYMPHOCYTES NFR BLD: 19.1 %
MCH RBC QN AUTO: 28.4 PG
MCHC RBC AUTO-ENTMCNC: 30.6 G/DL
MCV RBC AUTO: 93 FL
MONOCYTES # BLD AUTO: 0.8 K/UL
MONOCYTES NFR BLD: 10.6 %
NEUTROPHILS # BLD AUTO: 4.9 K/UL
NEUTROPHILS NFR BLD: 65.3 %
NRBC BLD-RTO: 0 /100 WBC
PLATELET # BLD AUTO: 225 K/UL
PMV BLD AUTO: 10.1 FL
POTASSIUM SERPL-SCNC: 4.5 MMOL/L
RBC # BLD AUTO: 3.41 M/UL
SODIUM SERPL-SCNC: 143 MMOL/L
TSH SERPL DL<=0.005 MIU/L-ACNC: 2.37 UIU/ML
WBC # BLD AUTO: 7.47 K/UL

## 2018-07-16 ENCOUNTER — TELEPHONE (OUTPATIENT)
Dept: CARDIOLOGY | Facility: CLINIC | Age: 78
End: 2018-07-16

## 2018-07-16 ENCOUNTER — TELEPHONE (OUTPATIENT)
Dept: FAMILY MEDICINE | Facility: CLINIC | Age: 78
End: 2018-07-16

## 2018-07-16 ENCOUNTER — TELEPHONE (OUTPATIENT)
Dept: ADMINISTRATIVE | Facility: HOSPITAL | Age: 78
End: 2018-07-16

## 2018-07-16 DIAGNOSIS — Z12.11 COLON CANCER SCREENING: Primary | ICD-10-CM

## 2018-07-16 NOTE — TELEPHONE ENCOUNTER
Pt informed of results and recommendations; verbalized understanding; ok with doing ifobt  [employer] was given to patient on 7/16/2018 10:52 AM

## 2018-07-16 NOTE — TELEPHONE ENCOUNTER
----- Message from Jaky Schmidt MD sent at 7/15/2018  6:04 PM CDT -----  Kidney function unchanged.  Patient now with anemia, may be related to kidney disease or colon.  Recommend ifobt

## 2018-07-18 ENCOUNTER — TELEPHONE (OUTPATIENT)
Dept: CARDIOLOGY | Facility: CLINIC | Age: 78
End: 2018-07-18

## 2018-07-18 RX ORDER — OXYCODONE AND ACETAMINOPHEN 5; 325 MG/1; MG/1
1 TABLET ORAL EVERY 4 HOURS PRN
Qty: 1 TABLET | Refills: 0 | Status: SHIPPED | OUTPATIENT
Start: 2018-07-18 | End: 2018-12-12 | Stop reason: ALTCHOICE

## 2018-07-23 ENCOUNTER — TELEPHONE (OUTPATIENT)
Dept: PODIATRY | Facility: CLINIC | Age: 78
End: 2018-07-23

## 2018-07-23 RX ORDER — TROSPIUM CHLORIDE 20 MG/1
TABLET, FILM COATED ORAL
Qty: 180 TABLET | Refills: 1 | Status: SHIPPED | OUTPATIENT
Start: 2018-07-23 | End: 2018-12-12

## 2018-07-23 NOTE — TELEPHONE ENCOUNTER
----- Message from Jasmyn Bailey sent at 7/20/2018  4:06 PM CDT -----  Contact: [phone]/Pt  Calling To speak with nurse regarding a sooner appt. Pt states toenails are horrible and worrying about health concerns

## 2018-07-23 NOTE — TELEPHONE ENCOUNTER
Spoke with pt and schedule appt with Dr. Luna. Pt hasn't been seen since July/2017. Mailed appt letter today. Pt states that she needs her toe nails clipped.

## 2018-07-25 ENCOUNTER — TELEPHONE (OUTPATIENT)
Dept: FAMILY MEDICINE | Facility: CLINIC | Age: 78
End: 2018-07-25

## 2018-07-25 NOTE — TELEPHONE ENCOUNTER
----- Message from Marlin Erwin sent at 7/25/2018 12:31 PM CDT -----  Contact: Self   She Is checking the status of her paperwork that was dropped off for [employer] transportation. Please call at [phone].

## 2018-07-25 NOTE — TELEPHONE ENCOUNTER
Left message for patient to return call. Unable to find forms regarding information below. Unsure if PCP had took paper work with her to her office to advise of it

## 2018-07-30 RX ORDER — INSULIN DETEMIR 100 [IU]/ML
32 INJECTION, SOLUTION SUBCUTANEOUS NIGHTLY
Qty: 9 ML | Refills: 2 | Status: SHIPPED | OUTPATIENT
Start: 2018-07-30 | End: 2018-12-13

## 2018-08-01 ENCOUNTER — TELEPHONE (OUTPATIENT)
Dept: FAMILY MEDICINE | Facility: CLINIC | Age: 78
End: 2018-08-01

## 2018-08-01 NOTE — TELEPHONE ENCOUNTER
----- Message from Mariano Marie sent at 8/1/2018  9:22 AM CDT -----  Contact: self  Pt would like to pick forms up from the office. Pt [phone].    Thanks-

## 2018-08-01 NOTE — TELEPHONE ENCOUNTER
Called to inform pt forms completed, need to know if she will  or wants mailed to her; LM to call office

## 2018-08-10 ENCOUNTER — OFFICE VISIT (OUTPATIENT)
Dept: PODIATRY | Facility: CLINIC | Age: 78
End: 2018-08-10
Payer: MEDICARE

## 2018-08-10 VITALS
WEIGHT: 269.81 LBS | SYSTOLIC BLOOD PRESSURE: 120 MMHG | DIASTOLIC BLOOD PRESSURE: 70 MMHG | BODY MASS INDEX: 49.65 KG/M2 | HEIGHT: 62 IN

## 2018-08-10 DIAGNOSIS — M21.42 PES PLANUS OF BOTH FEET: ICD-10-CM

## 2018-08-10 DIAGNOSIS — I73.9 PAD (PERIPHERAL ARTERY DISEASE): ICD-10-CM

## 2018-08-10 DIAGNOSIS — E11.51 TYPE II DIABETES MELLITUS WITH PERIPHERAL CIRCULATORY DISORDER: Primary | ICD-10-CM

## 2018-08-10 DIAGNOSIS — I87.8 VENOUS STASIS OF BOTH LOWER EXTREMITIES: ICD-10-CM

## 2018-08-10 DIAGNOSIS — M20.42 HAMMER TOES OF BOTH FEET: ICD-10-CM

## 2018-08-10 DIAGNOSIS — M21.41 PES PLANUS OF BOTH FEET: ICD-10-CM

## 2018-08-10 DIAGNOSIS — B35.1 ONYCHOMYCOSIS DUE TO DERMATOPHYTE: ICD-10-CM

## 2018-08-10 DIAGNOSIS — M20.41 HAMMER TOES OF BOTH FEET: ICD-10-CM

## 2018-08-10 PROCEDURE — 11721 DEBRIDE NAIL 6 OR MORE: CPT | Mod: PBBFAC,PO | Performed by: PODIATRIST

## 2018-08-10 PROCEDURE — 99999 PR PBB SHADOW E&M-EST. PATIENT-LVL IV: CPT | Mod: PBBFAC,,, | Performed by: PODIATRIST

## 2018-08-10 PROCEDURE — 99499 UNLISTED E&M SERVICE: CPT | Mod: S$GLB,,, | Performed by: PODIATRIST

## 2018-08-10 NOTE — PROGRESS NOTES
Subjective:      Patient ID: Ana James is a 77 y.o. female.    Chief Complaint: Diabetes Mellitus (pcp Brayan ); Diabetic Foot Exam; and Nail Care    Ana is a 77 y.o. female who presents to the clinic for evaluation and treatment of high risk feet. Ana has a past medical history of Arthritis (lower extremities); Asthma; Diabetes mellitus; Gall stones; Hypertension; Kidney stones; Nephrolithiasis (1/22/2016); Obesity; Renal disorder; Retinopathy; and Vaginal delivery. The patient's chief complaint is long, thick toenails. States she has not been able to come to clinic due to transportation issues.   This patient has documented high risk feet requiring routine maintenance secondary to diabetes mellitis and those secondary complications of diabetes, as mentioned..    PCP: Jaky Schmidt MD    Date Last Seen by PCP: 7/11/18  Chief Complaint   Patient presents with    Diabetes Mellitus     pcp Brayan     Diabetic Foot Exam    Nail Care       Current shoe gear:  rx diab shoes    Hemoglobin A1C   Date Value Ref Range Status   02/05/2018 6.4 (H) 4.0 - 5.6 % Final     Comment:     According to ADA guidelines, hemoglobin A1c <7.0% represents  optimal control in non-pregnant diabetic patients. Different  metrics may apply to specific patient populations.   Standards of Medical Care in Diabetes-2016.  For the purpose of screening for the presence of diabetes:  <5.7%     Consistent with the absence of diabetes  5.7-6.4%  Consistent with increasing risk for diabetes   (prediabetes)  >or=6.5%  Consistent with diabetes  Currently, no consensus exists for use of hemoglobin A1c  for diagnosis of diabetes for children.  This Hemoglobin A1c assay has significant interference with fetal   hemoglobin   (HbF). The results are invalid for patients with abnormal amounts of   HbF,   including those with known Hereditary Persistence   of Fetal Hemoglobin. Heterozygous hemoglobin variants (HbAS, HbAC,   HbAD,  HbAE, HbA2) do not significantly interfere with this assay;   however, presence of multiple variants in a sample may impact the %   interference.     05/27/2017 6.7 (H) 4.5 - 6.2 % Final     Comment:     According to ADA guidelines, hemoglobin A1C <7.0% represents  optimal control in non-pregnant diabetic patients.  Different  metrics may apply to specific populations.   Standards of Medical Care in Diabetes - 2016.  For the purpose of screening for the presence of diabetes:  <5.7%     Consistent with the absence of diabetes  5.7-6.4%  Consistent with increasing risk for diabetes   (prediabetes)  >or=6.5%  Consistent with diabetes  Currently no consensus exists for use of hemoglobin A1C  for diagnosis of diabetes for children.     01/16/2017 7.0 (H) 4.5 - 6.2 % Final     Comment:     According to ADA guidelines, hemoglobin A1C <7.0% represents  optimal control in non-pregnant diabetic patients.  Different  metrics may apply to specific populations.   Standards of Medical Care in Diabetes - 2016.  For the purpose of screening for the presence of diabetes:  <5.7%     Consistent with the absence of diabetes  5.7-6.4%  Consistent with increasing risk for diabetes   (prediabetes)  >or=6.5%  Consistent with diabetes  Currently no consensus exists for use of hemoglobin A1C  for diagnosis of diabetes for children.       Patient Active Problem List   Diagnosis    Hypertension    BMI 45.0-49.9, adult    Peripheral vascular disease, unspecified    Controlled type 2 diabetes mellitus with stage 4 chronic kidney disease, with long-term current use of insulin    Chest pain, atypical     Current Outpatient Prescriptions on File Prior to Visit   Medication Sig Dispense Refill    albuterol 90 mcg/actuation inhaler Inhale 2 puffs into the lungs every 6 (six) hours as needed for Wheezing. Rescue 8 g 5    amLODIPine (NORVASC) 10 MG tablet Take 1 tablet (10 mg total) by mouth once daily. 90 tablet 3    aspirin (ECOTRIN) 81 MG EC  "tablet Take 81 mg by mouth once daily.      atorvastatin (LIPITOR) 20 MG tablet Take 1 tablet (20 mg total) by mouth once daily. 90 tablet 3    BD ULTRA-FINE BREE PEN NEEDLE 32 gauge x 5/32" Ndle INJECT INSULIN THREE TIMES DAILY 200 each 1    blood sugar diagnostic Strp Test 3 times daily 100 each 11    candesartan-hydrochlorothiazid (ATACAND HCT) 32-12.5 mg per tablet TAKE 1 TABLET BY MOUTH ONCE DAILY. 90 tablet 1    carvedilol (COREG) 12.5 MG tablet TAKE 1 TABLET (12.5 MG TOTAL) BY MOUTH 2 (TWO) TIMES DAILY. 90 tablet 2    cilostazol (PLETAL) 50 MG Tab TAKE 1 TABLET (50 MG TOTAL) BY MOUTH 2 (TWO) TIMES DAILY. 180 tablet 3    clotrimazole-betamethasone 1-0.05% (LOTRISONE) cream Apply topically 2 (two) times daily. 45 g 5    lancets Misc Test 3 times daily 100 each 11    latanoprost 0.005 % ophthalmic solution Place 1 drop into both eyes every evening.  3    LEVEMIR FLEXTOUCH U-100 INSULN 100 unit/mL (3 mL) InPn pen INJECT 32 UNITS INTO THE SKIN EVERY EVENING. 9 mL 2    ondansetron (ZOFRAN-ODT) 4 MG TbDL TAKE 1 TABLET BY MOUTH EVERY 6 HOURS AS NEEDED 20 tablet 2    oxyCODONE-acetaminophen (PERCOCET) 5-325 mg per tablet Take 1 tablet by mouth every 4 (four) hours as needed for Pain. 1 tablet 0    potassium citrate (UROCIT-K 15) 15 mEq TbSR Take 15 mEq by mouth once daily. 90 tablet 3    timolol maleate 0.5% (TIMOPTIC) 0.5 % Drop 1 drop every morning.   2    TRADJENTA 5 mg Tab tablet TAKE 1 TABLET (5 MG TOTAL) BY MOUTH ONCE DAILY. 90 tablet 3    traZODone (DESYREL) 50 MG tablet Take 1 tablet (50 mg total) by mouth every evening. 90 tablet 3    trospium (SANCTURA) 20 mg Tab tablet TAKE 1 TABLET BY MOUTH TWICE A  tablet 1    blood-glucose meter kit Use as instructed 1 each 0    triamcinolone acetonide 0.1% (KENALOG) 0.1 % cream Apply topically 3 (three) times daily. 454 Tube 3     No current facility-administered medications on file prior to visit.      Review of patient's allergies indicates: " "  Allergen Reactions    Adhesive Rash     Paper tape     Past Surgical History:   Procedure Laterality Date    CARDIAC CATHETERIZATION      cataract      CHOLECYSTECTOMY      EYE SURGERY      Rt cataract surgery    HYSTERECTOMY      URETERAL STENT PLACEMENT       Family History   Problem Relation Age of Onset    Kidney failure Mother     Hypertension Father     Diabetes Brother     Cancer Sister      Social History     Social History    Marital status:      Spouse name: N/A    Number of children: N/A    Years of education: N/A     Occupational History    retired      Social History Main Topics    Smoking status: Never Smoker    Smokeless tobacco: Never Used    Alcohol use No    Drug use: No    Sexual activity: Yes     Partners: Male     Other Topics Concern    Not on file     Social History Narrative    No narrative on file       Review of Systems   Constitution: Negative for chills, fever and weakness.   Cardiovascular: Positive for claudication and leg swelling. Negative for chest pain.   Respiratory: Positive for cough and wheezing. Negative for shortness of breath.    Skin: Positive for dry skin and nail changes. Negative for itching and rash.   Musculoskeletal: Positive for arthritis, back pain, joint pain, muscle cramps and myalgias. Negative for muscle weakness.   Gastrointestinal: Negative for diarrhea, nausea and vomiting.   Neurological: Positive for paresthesias. Negative for numbness and tremors.   Psychiatric/Behavioral: Negative for altered mental status and hallucinations.           Objective:       Vitals:    08/10/18 1334   BP: 120/70   Weight: 122.4 kg (269 lb 13.5 oz)   Height: 5' 2" (1.575 m)   PainSc: 0-No pain       Physical Exam   Constitutional:   General: Pt. is well-developed, well-nourished, appears stated age, in no acute distress, alert and oriented x 3. No evidence of depression, anxiety, or agitation. Calm, cooperative, and communicative. Appropriate " interactions and affect.       Cardiovascular:   Pulses:       Dorsalis pedis pulses are 1+ on the right side, and 1+ on the left side.        Posterior tibial pulses are 0 on the right side, and 1+ on the left side.   Dorsalis pedis and posterior tibial pulses are diminished Bilaterally. Toes are cool to touch. Feet are warm proximally.There is decreased digital hair. Skin is atrophic,  hyperpigmented, and edematous       Musculoskeletal:        Right ankle: She exhibits swelling.        Left ankle: She exhibits swelling.        Right foot: There is swelling. There is normal range of motion.        Left foot: There is swelling. There is normal range of motion.   Exquisite tenderness to calf bilaterally    Muscle strength is 5/5 in all groups bilaterally.    Patient has hammertoes of digits 2-5 bilateral partially reducible without symptom today.    Visible and palpable bunion without pain at dorsomedial 1st metatarsal head right and left.  Hallux abducted right and left partially reducible, tracks laterally without being track bound.  No ecchymosis, erythema, edema, or cardinal signs infection or signs of trauma same foot.     Feet:   Right Foot:   Protective Sensation: 10 sites tested. 8 sites sensed.   Left Foot:   Protective Sensation: 10 sites tested. 7 sites sensed.   Neurological: No sensory deficit.   Bern-Marcelo 5.07 monofilament is diminished bilateral feet. Sharp/dull sensation is also diminished  Bilateral feet.           Skin: Skin is warm, dry and intact. No abrasion, no ecchymosis and no lesion noted. She is not diaphoretic. No cyanosis or erythema. No pallor. Nails show no clubbing.   Toenails 1-5 bilaterally are elongated by 2-3 mm, thickened by 2-3 mm, discolored/yellowed, dystrophic, brittle with subungual debris. Tender to distal nail plate pressure of right hallux, without periungual skin abnormality of each.       Interdigital Spaces clean, dry and without evidence of break in skin  integrity   Psychiatric: She has a normal mood and affect. Her speech is normal.   Nursing note and vitals reviewed.      Assessment:       Encounter Diagnoses   Name Primary?    Type II diabetes mellitus with peripheral circulatory disorder Yes    PAD (peripheral artery disease)     Venous stasis of both lower extremities     Hammer toes of both feet     Pes planus of both feet     Onychomycosis due to dermatophyte          Plan:       Ana was seen today for diabetes mellitus, diabetic foot exam and nail care.    Diagnoses and all orders for this visit:    Type II diabetes mellitus with peripheral circulatory disorder  -     DIABETIC SHOES FOR HOME USE  -     COMPRESSION STOCKINGS    PAD (peripheral artery disease)    Venous stasis of both lower extremities    Hammer toes of both feet  -     DIABETIC SHOES FOR HOME USE  -     COMPRESSION STOCKINGS    Pes planus of both feet  -     DIABETIC SHOES FOR HOME USE  -     COMPRESSION STOCKINGS    Onychomycosis due to dermatophyte  -     DIABETIC SHOES FOR HOME USE  -     COMPRESSION STOCKINGS      I counseled the patient on her conditions, their implications and medical management.     - Shoe inspection. Diabetic Foot Education. Patient reminded of the importance of good nutrition and blood sugar control to help prevent podiatric complications of diabetes. Patient instructed on proper foot hygeine. We discussed wearing proper shoe gear, daily foot inspections, never walking without protective shoe gear, never putting sharp instruments to feet.    - Rx diabetic shoes with custom molded inserts to be worn at all times while ambulating. Prescription provided with list of local retailers.     - Patient is to elevate legs. When sleeping, place a pillow under lower extremities. When sitting, support the legs so that they are level with the waist.    Discussed the use of compression stockings b/l to help control edema. Tubigrip applied today. Discussed proper and  consistent elevation of lower extremities, above the level of the heart, while at rest, to help control/improve edema. Prescription for compression stockings 15-20 mmhg dispensed.     - Nail debridement done see procedure note    F/u 3 months    Ioana Luna DPM

## 2018-08-10 NOTE — PROCEDURES
Routine Foot Care  Date/Time: 8/10/2018 2:42 PM  Performed by: TERESO FAITH  Authorized by: TERESO FAITH     Consent Done?:  Yes (Verbal)  Hyperkeratotic Skin Lesions?: No      Nail Care Type:  Debride  Location(s): All  (Left 1st Toe, Left 3rd Toe, Left 2nd Toe, Left 4th Toe, Left 5th Toe, Right 1st Toe, Right 2nd Toe, Right 3rd Toe, Right 4th Toe and Right 5th Toe)  Patient tolerance:  Patient tolerated the procedure well with no immediate complications

## 2018-08-10 NOTE — PATIENT INSTRUCTIONS
Recommend lotions: eucerin, eucerin for diabetics, aquaphor, A&D ointment, gold bond for diabetics, sween, [place]'s Bees all purpose baby ointment,  urea 40 with aloe (found on amazon.com)    Shoe recommendations: (try 6pm.com, zappos.com , nordstromrack.EasyCopay, or shoes.EasyCopay for discounted prices) you can visit [employer]SW shoes in [place]  or [facility] in the [facility] (there are also several shoe brand outlets in the [facility])    Asics (GT 2000 or gel foundations), new balance stability type shoes, saucony (stabil c3),  Winter (GTS or Beast or transcend), vionic, propet (tennis shoe)    sofft brand, clarks, crocs, aerosoles, naturalizers, SAS, ecco, born, vinay chase, rockports (dress shoes)    Vionic, burkenstocks, fitflops, propet (sandals)  Nike comfort thong sandals, crocs, propet (house shoes)    Nail Home remedy:  Vicks Vapor rub to nails for easier managability    Occasional soaks for 15-20 mins in luke warm water with 1 cup of listerine and 1 cup of apple cider vinegar are ok You may add several drops of oil of oregano or tea tree oil as well        Diabetes: Inspecting Your Feet  Diabetes increases your chances of developing foot problems. So inspect your feet every day. This helps you find small skin irritations before they become serious infections. If you have trouble seeing the bottoms of your feet, use a mirror or ask a family member or friend to help.     Pressure spots on the bottom of the foot are common areas where problems develop.   How to check your feet  Below are tips to help you look for foot problems. Try to check your feet at the same time each day, such as when you get out of bed in the morning:  · Check the top of each foot. The tops of toes, back of the heel, and outer edge of the foot can get a lot of rubbing from poor-fitting shoes.  · Check the bottom of each foot. Daily wear and tear often leads to problems at pressure spots.  · Check the toes and nails. Fungal infections often occur  between toes. Toenail problems can also be a sign of fungal infections or lead to breaks in the skin.  · Check your shoes, too. Loose objects inside a shoe can injure the foot. Use your hand to feel inside your shoes for things like avery, loose stitching, or rough areas that could irritate your skin.  Warning signs  Look for any color changes in the foot. Redness with streaks can signal a severe infection, which needs immediate medical attention. Tell your doctor right away if you have any of these problems:  · Swelling, sometimes with color changes, may be a sign of poor blood flow or infection. Symptoms include tenderness and an increase in the size of your foot.  · Warm or hot areas on your feet may be signs of infection. A foot that is cold may not be getting enough blood.  · Sensations such as burning, tingling, or pins and needles can be signs of a problem. Also check for areas that may be numb.  · Hot spots are caused by friction or pressure. Look for hot spots in areas that get a lot of rubbing. Hot spots can turn into blisters, calluses, or sores.  · Cracks and sores are caused by dry or irritated skin. They are a sign that the skin is breaking down, which can lead to infection.  · Toenail problems to watch for include nails growing into the skin (ingrown toenail) and causing redness or pain. Thick, yellow, or discolored nails can signal a fungal infection.  · Drainage and odor can develop from untreated sores and ulcers. Call your doctor right away if you notice white or yellow drainage, bleeding, or unpleasant odor.   © 7367-2364 [employer]. [address] [place] [address]. All rights reserved. This information is not intended as a substitute for professional medical care. Always follow your healthcare professional's instructions.        Step-by-Step:  Inspecting Your Feet (Diabetes)    Date Last Reviewed: 10/1/2016  © 6342-0413 [employer]. [address] [place] [address]  Road, [place] [address]. All rights reserved. This information is not intended as a substitute for professional medical care. Always follow your healthcare professional's instructions.

## 2018-08-13 DIAGNOSIS — N18.4 CHRONIC KIDNEY DISEASE, STAGE IV (SEVERE): Primary | ICD-10-CM

## 2018-08-14 ENCOUNTER — OFFICE VISIT (OUTPATIENT)
Dept: NEPHROLOGY | Facility: CLINIC | Age: 78
End: 2018-08-14
Payer: MEDICARE

## 2018-08-14 VITALS
HEIGHT: 62 IN | OXYGEN SATURATION: 95 % | DIASTOLIC BLOOD PRESSURE: 60 MMHG | WEIGHT: 270 LBS | SYSTOLIC BLOOD PRESSURE: 110 MMHG | HEART RATE: 79 BPM | BODY MASS INDEX: 49.69 KG/M2

## 2018-08-14 DIAGNOSIS — N18.30 CONTROLLED TYPE 2 DIABETES MELLITUS WITH STAGE 3 CHRONIC KIDNEY DISEASE, WITH LONG-TERM CURRENT USE OF INSULIN: Primary | ICD-10-CM

## 2018-08-14 DIAGNOSIS — N18.30 CHRONIC KIDNEY DISEASE, STAGE III (MODERATE): ICD-10-CM

## 2018-08-14 DIAGNOSIS — E11.22 CONTROLLED TYPE 2 DIABETES MELLITUS WITH STAGE 3 CHRONIC KIDNEY DISEASE, WITH LONG-TERM CURRENT USE OF INSULIN: Primary | ICD-10-CM

## 2018-08-14 DIAGNOSIS — N20.0 NEPHROLITHIASIS: ICD-10-CM

## 2018-08-14 DIAGNOSIS — Z79.4 CONTROLLED TYPE 2 DIABETES MELLITUS WITH STAGE 3 CHRONIC KIDNEY DISEASE, WITH LONG-TERM CURRENT USE OF INSULIN: Primary | ICD-10-CM

## 2018-08-14 PROCEDURE — 3074F SYST BP LT 130 MM HG: CPT | Mod: CPTII,S$GLB,, | Performed by: INTERNAL MEDICINE

## 2018-08-14 PROCEDURE — 3078F DIAST BP <80 MM HG: CPT | Mod: CPTII,S$GLB,, | Performed by: INTERNAL MEDICINE

## 2018-08-14 PROCEDURE — 99499 UNLISTED E&M SERVICE: CPT | Mod: S$GLB,,, | Performed by: INTERNAL MEDICINE

## 2018-08-14 PROCEDURE — 99999 PR PBB SHADOW E&M-EST. PATIENT-LVL III: CPT | Mod: PBBFAC,,, | Performed by: INTERNAL MEDICINE

## 2018-08-14 PROCEDURE — 99214 OFFICE O/P EST MOD 30 MIN: CPT | Mod: S$GLB,,, | Performed by: INTERNAL MEDICINE

## 2018-08-14 NOTE — PROGRESS NOTES
Subjective:       Patient ID: Ana James is a 77 y.o. Black or  female who presents for follow up of Chronic Kidney Disease    HPI    Ms. James is a 77 year old woman with medical history of diabetes, hypertension, nephrolithiasis presenting for follow up of chronic kidney disease.  Patien reports blood sugars well-controlled, blood pressure usually 120-130's systolic.  She reports more adequate daily fluid intake, denies any NSAID use.  She otherwise denies any fever, chest pain, shortness of breath, abdominal pain, diarrhea, dysuria/hematuria.     Review of Systems   Constitutional: Negative for appetite change, fatigue and fever.   Respiratory: Negative for chest tightness and shortness of breath.    Cardiovascular: Negative for chest pain and leg swelling.   Gastrointestinal: Negative for abdominal pain, constipation, diarrhea, nausea and vomiting.   Genitourinary: Negative for difficulty urinating, dysuria, flank pain, frequency, hematuria and urgency.   Musculoskeletal: Negative for arthralgias, joint swelling and myalgias.   Skin: Negative for rash and wound.   Neurological: Negative for dizziness, weakness and light-headedness.   All other systems reviewed and are negative.      Objective:      Physical Exam   Constitutional: She appears well-developed and well-nourished.   Cardiovascular: Normal rate, regular rhythm and normal heart sounds. Exam reveals no gallop and no friction rub.   No murmur heard.  Pulmonary/Chest: Effort normal and breath sounds normal. No respiratory distress. She has no wheezes. She has no rales.   Abdominal: Soft. Bowel sounds are normal. There is no tenderness.   Musculoskeletal: She exhibits no edema.   Neurological: She is alert.   Skin: Skin is warm and dry. No rash noted. No erythema.   Psychiatric: She has a normal mood and affect.   Vitals reviewed.      Assessment:       1. Controlled type 2 diabetes mellitus with stage 3 chronic kidney  disease, with long-term current use of insulin    2. Chronic kidney disease, stage III (moderate)    3. Nephrolithiasis        Plan:     Ms. James is a 77 year old woman with medical history of diabetes, hypertension, nephrolithiasis presenting for follow up of chronic kidney disease.  Patient creatinine previously 1.5-1.8mg/dL, elevated since urologic intervention May/June 2016, up to 3.0mg/dL, previously stable at 1.9-2.0mg/dL.  Patient with baseline CKD stage III, likely due to diabetic nephropathy, previously with acute kidney injury likely due to obstructive uropathy/UTI, will repeat renal panel to trend, improved since last visit.  Encouraged fluid intake (with sodium restriction), stressed importance of blood pressure/glycemic control to prevent any further progression of kidney disease, patient voiced understanding.     Return to clinic in 3-4 months with renal/heme panel, iron/TIBC/ferritin, urinalysis/culture, urine protein/creatinine ratio, PTH/VitD prior to next visit

## 2018-08-14 NOTE — LETTER
August 17, 2018      Jaky Schmidt MD  [address]  [place] [address]           [facility]  [address] [facility]  [place] [address]  Phone: [phone]          Patient: Ana James   MR Number: [id number]   YOB: 1940   Date of Visit: 8/14/2018       Dear Dr. Jaky Schmidt:    Thank you for referring Ana James to me for evaluation. Attached you will find relevant portions of my assessment and plan of care.    If you have questions, please do not hesitate to call me. I look forward to following Ana James along with you.    Sincerely,    Kannan Rice MD    Enclosure  CC:  No Recipients    If you would like to receive this communication electronically, please contact [email or web address] or [phone] to request more information on [employer] access.    For providers and/or their staff who would like to refer a patient to [facility], please contact us through our one-stop-shop provider referral line, [facility], at [phone].    If you feel you have received this communication in error or would no longer like to receive these types of communications, please e-mail [email or web address]

## 2018-08-21 ENCOUNTER — TELEPHONE (OUTPATIENT)
Dept: FAMILY MEDICINE | Facility: CLINIC | Age: 78
End: 2018-08-21

## 2018-08-21 DIAGNOSIS — I87.2 STASIS DERMATITIS OF BOTH LEGS: ICD-10-CM

## 2018-08-21 RX ORDER — TRIAMCINOLONE ACETONIDE 1 MG/G
CREAM TOPICAL 3 TIMES DAILY
Qty: 80 G | Refills: 0 | Status: SHIPPED | OUTPATIENT
Start: 2018-08-21 | End: 2022-01-24

## 2018-08-21 NOTE — TELEPHONE ENCOUNTER
----- Message from Marlin Erwin sent at 8/21/2018  9:59 AM CDT -----  Contact: Self   Pt needs a medication for itching . She has little scratches on her leg and now its beginning to spread to her shoulder. She hasn't taken any new meds or anything. She would like to know if there is anything you can give her because the itching is interfering with her sleeping at night. Please call at [phone].    Pt says she has scratched so much she has a sore on her right leg, they have a little sore with a scab. She said she has no way of getting here and she would like a script to be sent to the pharmacy. .     [facility]

## 2018-08-22 ENCOUNTER — TELEPHONE (OUTPATIENT)
Dept: PODIATRY | Facility: CLINIC | Age: 78
End: 2018-08-22

## 2018-08-22 NOTE — TELEPHONE ENCOUNTER
----- Message from Letha pepekristaamanda sent at 8/22/2018 12:31 PM CDT -----  Contact: self / [phone]  Pt would like to check on the status of her shoe and compression stockings that Dr. uLna ordered for her. She states she called the company and they states they do not yet have the orders.

## 2018-08-22 NOTE — TELEPHONE ENCOUNTER
Spoke with patient. Advised MNF, order and notes faxed to [facility] for authorization     Order and notes also faxed to [facility] [phone] as requested

## 2018-10-02 DIAGNOSIS — I10 ESSENTIAL HYPERTENSION: ICD-10-CM

## 2018-10-02 RX ORDER — CARVEDILOL 12.5 MG/1
12.5 TABLET ORAL 2 TIMES DAILY
Qty: 90 TABLET | Refills: 3 | Status: SHIPPED | OUTPATIENT
Start: 2018-10-02 | End: 2019-02-20 | Stop reason: SDUPTHER

## 2018-10-17 RX ORDER — PEN NEEDLE, DIABETIC 31 GX5/16"
NEEDLE, DISPOSABLE MISCELLANEOUS
Qty: 200 EACH | Refills: 1 | Status: SHIPPED | OUTPATIENT
Start: 2018-10-17 | End: 2019-02-02 | Stop reason: SDUPTHER

## 2018-11-02 ENCOUNTER — TELEPHONE (OUTPATIENT)
Dept: FAMILY MEDICINE | Facility: CLINIC | Age: 78
End: 2018-11-02

## 2018-11-02 RX ORDER — INSULIN PUMP SYRINGE, 3 ML
EACH MISCELLANEOUS
Qty: 1 EACH | Refills: 0 | Status: SHIPPED | OUTPATIENT
Start: 2018-11-02 | End: 2021-06-18

## 2018-11-02 NOTE — TELEPHONE ENCOUNTER
----- Message from Francine Maldonado sent at 11/2/2018  2:58 PM CDT -----  Contact: Karo with [facility]/ [phone]  Member requesting new gluometer to test diabetes level. Please fax new orders and medical necessity form to [phone]. Thank you.

## 2018-12-12 ENCOUNTER — LAB VISIT (OUTPATIENT)
Dept: LAB | Facility: HOSPITAL | Age: 78
End: 2018-12-12
Attending: FAMILY MEDICINE
Payer: MEDICARE

## 2018-12-12 ENCOUNTER — OFFICE VISIT (OUTPATIENT)
Dept: FAMILY MEDICINE | Facility: CLINIC | Age: 78
End: 2018-12-12
Payer: MEDICARE

## 2018-12-12 VITALS
RESPIRATION RATE: 16 BRPM | DIASTOLIC BLOOD PRESSURE: 68 MMHG | SYSTOLIC BLOOD PRESSURE: 130 MMHG | HEIGHT: 62 IN | BODY MASS INDEX: 49.38 KG/M2 | HEART RATE: 74 BPM | OXYGEN SATURATION: 96 % | TEMPERATURE: 97 F

## 2018-12-12 DIAGNOSIS — Z79.4 CONTROLLED TYPE 2 DIABETES MELLITUS WITH STAGE 4 CHRONIC KIDNEY DISEASE, WITH LONG-TERM CURRENT USE OF INSULIN: Primary | ICD-10-CM

## 2018-12-12 DIAGNOSIS — N18.4 CONTROLLED TYPE 2 DIABETES MELLITUS WITH STAGE 4 CHRONIC KIDNEY DISEASE, WITH LONG-TERM CURRENT USE OF INSULIN: Primary | ICD-10-CM

## 2018-12-12 DIAGNOSIS — E11.22 CONTROLLED TYPE 2 DIABETES MELLITUS WITH STAGE 4 CHRONIC KIDNEY DISEASE, WITH LONG-TERM CURRENT USE OF INSULIN: Primary | ICD-10-CM

## 2018-12-12 DIAGNOSIS — R06.2 WHEEZING: ICD-10-CM

## 2018-12-12 DIAGNOSIS — E11.22 CONTROLLED TYPE 2 DIABETES MELLITUS WITH STAGE 4 CHRONIC KIDNEY DISEASE, WITH LONG-TERM CURRENT USE OF INSULIN: ICD-10-CM

## 2018-12-12 DIAGNOSIS — Z79.4 CONTROLLED TYPE 2 DIABETES MELLITUS WITH STAGE 4 CHRONIC KIDNEY DISEASE, WITH LONG-TERM CURRENT USE OF INSULIN: ICD-10-CM

## 2018-12-12 DIAGNOSIS — N18.4 CONTROLLED TYPE 2 DIABETES MELLITUS WITH STAGE 4 CHRONIC KIDNEY DISEASE, WITH LONG-TERM CURRENT USE OF INSULIN: ICD-10-CM

## 2018-12-12 PROBLEM — R07.89 CHEST PAIN, ATYPICAL: Status: RESOLVED | Noted: 2018-07-13 | Resolved: 2018-12-12

## 2018-12-12 LAB
ESTIMATED AVG GLUCOSE: 143 MG/DL
GLUCOSE SERPL-MCNC: 85 MG/DL (ref 70–110)
HBA1C MFR BLD HPLC: 6.6 %

## 2018-12-12 PROCEDURE — 36415 COLL VENOUS BLD VENIPUNCTURE: CPT | Mod: PO

## 2018-12-12 PROCEDURE — 1100F PTFALLS ASSESS-DOCD GE2>/YR: CPT | Mod: CPTII,S$GLB,, | Performed by: FAMILY MEDICINE

## 2018-12-12 PROCEDURE — 99214 OFFICE O/P EST MOD 30 MIN: CPT | Mod: S$GLB,,, | Performed by: FAMILY MEDICINE

## 2018-12-12 PROCEDURE — 99999 PR PBB SHADOW E&M-EST. PATIENT-LVL III: CPT | Mod: PBBFAC,,, | Performed by: FAMILY MEDICINE

## 2018-12-12 PROCEDURE — 3288F FALL RISK ASSESSMENT DOCD: CPT | Mod: CPTII,S$GLB,, | Performed by: FAMILY MEDICINE

## 2018-12-12 PROCEDURE — 3075F SYST BP GE 130 - 139MM HG: CPT | Mod: CPTII,S$GLB,, | Performed by: FAMILY MEDICINE

## 2018-12-12 PROCEDURE — 83036 HEMOGLOBIN GLYCOSYLATED A1C: CPT

## 2018-12-12 PROCEDURE — 3078F DIAST BP <80 MM HG: CPT | Mod: CPTII,S$GLB,, | Performed by: FAMILY MEDICINE

## 2018-12-12 PROCEDURE — 82962 GLUCOSE BLOOD TEST: CPT | Mod: S$GLB,,, | Performed by: FAMILY MEDICINE

## 2018-12-12 RX ORDER — ALBUTEROL SULFATE 90 UG/1
2 AEROSOL, METERED RESPIRATORY (INHALATION) EVERY 6 HOURS PRN
Qty: 8 G | Refills: 11 | Status: SHIPPED | OUTPATIENT
Start: 2018-12-12 | End: 2020-06-05 | Stop reason: SDUPTHER

## 2018-12-12 RX ORDER — TRAVOPROST 0.04 MG/ML
1 SOLUTION/ DROPS OPHTHALMIC NIGHTLY
COMMUNITY
Start: 2018-12-10 | End: 2022-01-24

## 2018-12-12 RX ORDER — TROSPIUM CHLORIDE 20 MG/1
20 TABLET, FILM COATED ORAL NIGHTLY
Qty: 90 TABLET | Refills: 3 | Status: SHIPPED | OUTPATIENT
Start: 2018-12-12 | End: 2019-01-16

## 2018-12-12 NOTE — PROGRESS NOTES
"Subjective:       Patient ID: Ana James     Chief Complaint: Diabetes and Shortness of Breath      HPIAna James is a 78 y.o. female reports recurrence of wheezing and  SOB with exertion x several weeks.  Requesting refill on Albuterol.  Reports lightheadedness due to low sugar.      Review of patient's allergies indicates:   Allergen Reactions    Adhesive Rash     Paper tape       Current Outpatient Medications:     amLODIPine (NORVASC) 10 MG tablet, Take 1 tablet (10 mg total) by mouth once daily., Disp: 90 tablet, Rfl: 3    aspirin (ECOTRIN) 81 MG EC tablet, Take 81 mg by mouth once daily., Disp: , Rfl:     atorvastatin (LIPITOR) 20 MG tablet, Take 1 tablet (20 mg total) by mouth once daily., Disp: 90 tablet, Rfl: 3    BD ULTRA-FINE BREE PEN NEEDLE 32 gauge x 5/32" Ndle, INJECT INSULIN THREE TIMES DAILY, Disp: 200 each, Rfl: 1    blood sugar diagnostic Strp, Test 3 times daily, Disp: 100 each, Rfl: 11    blood-glucose meter kit, Use as instructed to check blood sugar 3 times daily, Disp: 1 each, Rfl: 0    candesartan-hydrochlorothiazid (ATACAND HCT) 32-12.5 mg per tablet, TAKE 1 TABLET BY MOUTH ONCE DAILY., Disp: 90 tablet, Rfl: 1    carvedilol (COREG) 12.5 MG tablet, TAKE 1 TABLET (12.5 MG TOTAL) BY MOUTH 2 (TWO) TIMES DAILY., Disp: 90 tablet, Rfl: 3    cilostazol (PLETAL) 50 MG Tab, TAKE 1 TABLET (50 MG TOTAL) BY MOUTH 2 (TWO) TIMES DAILY., Disp: 180 tablet, Rfl: 3    lancets Misc, Test 3 times daily, Disp: 100 each, Rfl: 11    latanoprost 0.005 % ophthalmic solution, Place 1 drop into both eyes every evening., Disp: , Rfl: 3    LEVEMIR FLEXTOUCH U-100 INSULN 100 unit/mL (3 mL) InPn pen, INJECT 32 UNITS INTO THE SKIN EVERY EVENING., Disp: 9 mL, Rfl: 2    ondansetron (ZOFRAN-ODT) 4 MG TbDL, TAKE 1 TABLET BY MOUTH EVERY 6 HOURS AS NEEDED, Disp: 20 tablet, Rfl: 2    potassium citrate (UROCIT-K 15) 15 mEq TbSR, Take 15 mEq by mouth once daily., Disp: 90 tablet, Rfl: 3    " timolol maleate 0.5% (TIMOPTIC) 0.5 % Drop, 1 drop every morning. , Disp: , Rfl: 2    TRADJENTA 5 mg Tab tablet, TAKE 1 TABLET (5 MG TOTAL) BY MOUTH ONCE DAILY., Disp: 90 tablet, Rfl: 3    TRAVATAN Z 0.004 % ophthalmic solution, , Disp: , Rfl:     traZODone (DESYREL) 50 MG tablet, Take 1 tablet (50 mg total) by mouth every evening., Disp: 90 tablet, Rfl: 3    trospium (SANCTURA) 20 mg Tab tablet, Take 1 tablet (20 mg total) by mouth every evening., Disp: 90 tablet, Rfl: 3    albuterol (PROVENTIL/VENTOLIN HFA) 90 mcg/actuation inhaler, Inhale 2 puffs into the lungs every 6 (six) hours as needed for Wheezing. Rescue, Disp: 8 g, Rfl: 11    clotrimazole-betamethasone 1-0.05% (LOTRISONE) cream, Apply topically 2 (two) times daily., Disp: 45 g, Rfl: 5    triamcinolone acetonide 0.1% (KENALOG) 0.1 % cream, Apply topically 3 (three) times daily. for 10 days, Disp: 80 g, Rfl: 0    Past Medical History:   Diagnosis Date    Arthritis lower extremities    Asthma     Diabetes mellitus     Gall stones     Hypertension     Kidney stones     Nephrolithiasis 1/22/2016    Obesity     Renal disorder     Retinopathy     Vaginal delivery     x1     Review of Systems   Constitutional: Negative for fever.   HENT: Positive for rhinorrhea.    Respiratory: Positive for cough and wheezing. Negative for chest tightness.    Cardiovascular: Negative for chest pain.   Genitourinary: Positive for frequency.   Neurological: Positive for light-headedness.       Objective:      Physical Exam   Constitutional: She appears well-developed and well-nourished.   HENT:   Head: Normocephalic.   Neck: Neck supple.   Cardiovascular: Normal rate and regular rhythm.   Pulmonary/Chest: Effort normal. She has wheezes.   Abdominal: Soft. Bowel sounds are normal. She exhibits no mass. There is no tenderness.   Musculoskeletal: She exhibits no edema.   Neurological: She is alert.   Skin: Skin is warm and dry.   Psychiatric: She has a normal mood  and affect.      Results for orders placed or performed in visit on 12/12/18   POCT Glucose, Hand-Held Device   Result Value Ref Range    POC Glucose 85 70 - 110 MG/DL     Assessment:       1. Controlled type 2 diabetes mellitus with stage 4 chronic kidney disease, with long-term current use of insulin    2. Wheezing        Plan:       Ana was seen today for diabetes and shortness of breath.    Diagnoses and all orders for this visit:    Controlled type 2 diabetes mellitus with stage 4 chronic kidney disease, with long-term current use of insulin  Patient with symptoms of hypoglycemia, despite low normal blood sugars.  Recommend decreasing Levemir to 30 units.    -     Hemoglobin A1c; Future    Wheezing  -     albuterol (PROVENTIL/VENTOLIN HFA) 90 mcg/actuation inhaler; Inhale 2 puffs into the lungs every 6 (six) hours as needed for Wheezing. Rescue    Other orders  -     trospium (SANCTURA) 20 mg Tab tablet; Take 1 tablet (20 mg total) by mouth every evening.

## 2018-12-19 RX ORDER — CANDESARTAN CILEXETIL AND HYDROCHLOROTHIAZIDE 32; 12.5 MG/1; MG/1
1 TABLET ORAL DAILY
Qty: 90 TABLET | Refills: 1 | Status: SHIPPED | OUTPATIENT
Start: 2018-12-19 | End: 2019-02-20 | Stop reason: SDUPTHER

## 2019-01-02 NOTE — TELEPHONE ENCOUNTER
----- Message from Marlin Erwin sent at 1/2/2019  1:22 PM CST -----  Contact: Self   Request medicaton- [phone]  Patients says she is completely out of medication.     TRADJENTA 5 mg Tab tablet    [id number] - [facility] [address] [address]

## 2019-01-09 ENCOUNTER — TELEPHONE (OUTPATIENT)
Dept: FAMILY MEDICINE | Facility: CLINIC | Age: 79
End: 2019-01-09

## 2019-01-09 NOTE — TELEPHONE ENCOUNTER
Placed call to pharmacy and was told refill not due until 1/20/19; pt states she does not know what happened to medication, she has been out for 3 weeks; informed pt to call PH[facility] and explain to them that she lost medication so they can approve override for her to get filled early; pt verbalized understanding

## 2019-01-09 NOTE — TELEPHONE ENCOUNTER
----- Message from Francine Nolasco sent at 1/9/2019 11:12 AM CST -----  Contact: pt  Can the clinic reply in [facility]:       Please refill the medication(s) listed below. The patient can be reached at this phone number [phone] once it is called into the pharmacy.      Medication #1linagliptin (TRADJENTA) 5 mg Tab tablet - please call the pt. Pt is out of this medication before refill date.  Cash is $1000.00      Medication #      Preferred Pharmacy:[facility]

## 2019-01-16 DIAGNOSIS — I73.9 PERIPHERAL VASCULAR DISEASE, UNSPECIFIED: ICD-10-CM

## 2019-01-16 RX ORDER — CILOSTAZOL 50 MG/1
50 TABLET ORAL 2 TIMES DAILY
Qty: 180 TABLET | Refills: 3 | Status: SHIPPED | OUTPATIENT
Start: 2019-01-16 | End: 2019-02-20 | Stop reason: SDUPTHER

## 2019-01-16 RX ORDER — TROSPIUM CHLORIDE 20 MG/1
TABLET, FILM COATED ORAL
Qty: 180 TABLET | Refills: 1 | Status: SHIPPED | OUTPATIENT
Start: 2019-01-16 | End: 2019-02-20 | Stop reason: SDUPTHER

## 2019-02-03 DIAGNOSIS — G47.00 INSOMNIA, UNSPECIFIED TYPE: ICD-10-CM

## 2019-02-04 RX ORDER — TRAZODONE HYDROCHLORIDE 50 MG/1
50 TABLET ORAL NIGHTLY
Qty: 90 TABLET | Refills: 3 | Status: ON HOLD | OUTPATIENT
Start: 2019-02-04 | End: 2020-02-27 | Stop reason: SDUPTHER

## 2019-02-04 RX ORDER — PEN NEEDLE, DIABETIC 31 GX5/16"
NEEDLE, DISPOSABLE MISCELLANEOUS
Qty: 200 EACH | Refills: 1 | Status: SHIPPED | OUTPATIENT
Start: 2019-02-04 | End: 2019-05-03 | Stop reason: SDUPTHER

## 2019-02-11 DIAGNOSIS — E11.22 CONTROLLED TYPE 2 DIABETES MELLITUS WITH STAGE 4 CHRONIC KIDNEY DISEASE, WITH LONG-TERM CURRENT USE OF INSULIN: ICD-10-CM

## 2019-02-11 DIAGNOSIS — Z79.4 CONTROLLED TYPE 2 DIABETES MELLITUS WITH STAGE 4 CHRONIC KIDNEY DISEASE, WITH LONG-TERM CURRENT USE OF INSULIN: ICD-10-CM

## 2019-02-11 DIAGNOSIS — N18.4 CONTROLLED TYPE 2 DIABETES MELLITUS WITH STAGE 4 CHRONIC KIDNEY DISEASE, WITH LONG-TERM CURRENT USE OF INSULIN: ICD-10-CM

## 2019-02-14 RX ORDER — INSULIN DETEMIR 100 [IU]/ML
32 INJECTION, SOLUTION SUBCUTANEOUS NIGHTLY
Qty: 9 ML | Refills: 2 | Status: SHIPPED | OUTPATIENT
Start: 2019-02-14 | End: 2019-04-26 | Stop reason: SDUPTHER

## 2019-02-15 DIAGNOSIS — I73.9 PERIPHERAL VASCULAR DISEASE, UNSPECIFIED: ICD-10-CM

## 2019-02-15 DIAGNOSIS — I10 ESSENTIAL HYPERTENSION: ICD-10-CM

## 2019-02-18 RX ORDER — AMLODIPINE BESYLATE 10 MG/1
10 TABLET ORAL DAILY
Qty: 90 TABLET | Refills: 1 | Status: SHIPPED | OUTPATIENT
Start: 2019-02-18 | End: 2019-05-21 | Stop reason: SDUPTHER

## 2019-02-20 RX ORDER — CANDESARTAN CILEXETIL AND HYDROCHLOROTHIAZIDE 32; 12.5 MG/1; MG/1
1 TABLET ORAL DAILY
Qty: 90 TABLET | Refills: 1 | Status: SHIPPED | OUTPATIENT
Start: 2019-02-20 | End: 2019-10-14 | Stop reason: SDUPTHER

## 2019-02-20 RX ORDER — TROSPIUM CHLORIDE 20 MG/1
20 TABLET, FILM COATED ORAL 2 TIMES DAILY
Qty: 180 TABLET | Refills: 1 | Status: SHIPPED | OUTPATIENT
Start: 2019-02-20 | End: 2019-10-14 | Stop reason: SDUPTHER

## 2019-02-20 RX ORDER — CILOSTAZOL 50 MG/1
50 TABLET ORAL 2 TIMES DAILY
Qty: 180 TABLET | Refills: 3 | Status: SHIPPED | OUTPATIENT
Start: 2019-02-20 | End: 2019-10-24 | Stop reason: SDUPTHER

## 2019-02-20 RX ORDER — CARVEDILOL 12.5 MG/1
12.5 TABLET ORAL 2 TIMES DAILY
Qty: 90 TABLET | Refills: 3 | Status: SHIPPED | OUTPATIENT
Start: 2019-02-20 | End: 2019-09-06 | Stop reason: SDUPTHER

## 2019-03-09 DIAGNOSIS — Z79.4 CONTROLLED TYPE 2 DIABETES MELLITUS WITH STAGE 4 CHRONIC KIDNEY DISEASE, WITH LONG-TERM CURRENT USE OF INSULIN: ICD-10-CM

## 2019-03-09 DIAGNOSIS — E11.22 CONTROLLED TYPE 2 DIABETES MELLITUS WITH STAGE 4 CHRONIC KIDNEY DISEASE, WITH LONG-TERM CURRENT USE OF INSULIN: ICD-10-CM

## 2019-03-09 DIAGNOSIS — N18.4 CONTROLLED TYPE 2 DIABETES MELLITUS WITH STAGE 4 CHRONIC KIDNEY DISEASE, WITH LONG-TERM CURRENT USE OF INSULIN: ICD-10-CM

## 2019-03-09 RX ORDER — ATORVASTATIN CALCIUM 20 MG/1
20 TABLET, FILM COATED ORAL DAILY
Qty: 90 TABLET | Refills: 2 | Status: SHIPPED | OUTPATIENT
Start: 2019-03-09 | End: 2019-12-09 | Stop reason: SDUPTHER

## 2019-04-11 RX ORDER — LINAGLIPTIN 5 MG/1
TABLET, FILM COATED ORAL
Qty: 90 TABLET | Refills: 0 | Status: SHIPPED | OUTPATIENT
Start: 2019-04-11 | End: 2019-11-06 | Stop reason: SDUPTHER

## 2019-04-29 RX ORDER — INSULIN DETEMIR 100 [IU]/ML
32 INJECTION, SOLUTION SUBCUTANEOUS NIGHTLY
Qty: 28.8 ML | Refills: 0 | Status: SHIPPED | OUTPATIENT
Start: 2019-04-29 | End: 2019-07-25 | Stop reason: SDUPTHER

## 2019-05-03 RX ORDER — PEN NEEDLE, DIABETIC 31 GX5/16"
NEEDLE, DISPOSABLE MISCELLANEOUS
Qty: 200 EACH | Refills: 0 | Status: SHIPPED | OUTPATIENT
Start: 2019-05-03 | End: 2019-06-24 | Stop reason: SDUPTHER

## 2019-05-06 RX ORDER — PEN NEEDLE, DIABETIC 31 GX5/16"
NEEDLE, DISPOSABLE MISCELLANEOUS
Qty: 200 EACH | Refills: 1 | Status: SHIPPED | OUTPATIENT
Start: 2019-05-06 | End: 2020-10-09 | Stop reason: SDUPTHER

## 2019-05-16 ENCOUNTER — OFFICE VISIT (OUTPATIENT)
Dept: PODIATRY | Facility: CLINIC | Age: 79
End: 2019-05-16
Payer: MEDICARE

## 2019-05-16 VITALS — BODY MASS INDEX: 49.69 KG/M2 | HEIGHT: 62 IN | WEIGHT: 270 LBS

## 2019-05-16 DIAGNOSIS — M21.42 PES PLANUS OF BOTH FEET: ICD-10-CM

## 2019-05-16 DIAGNOSIS — I87.8 VENOUS STASIS OF BOTH LOWER EXTREMITIES: ICD-10-CM

## 2019-05-16 DIAGNOSIS — M20.41 HAMMER TOES OF BOTH FEET: ICD-10-CM

## 2019-05-16 DIAGNOSIS — L84 CORN OR CALLUS: ICD-10-CM

## 2019-05-16 DIAGNOSIS — I73.9 PAD (PERIPHERAL ARTERY DISEASE): ICD-10-CM

## 2019-05-16 DIAGNOSIS — M20.5X1 HALLUX LIMITUS, ACQUIRED, RIGHT: ICD-10-CM

## 2019-05-16 DIAGNOSIS — B35.1 ONYCHOMYCOSIS DUE TO DERMATOPHYTE: ICD-10-CM

## 2019-05-16 DIAGNOSIS — E11.51 TYPE II DIABETES MELLITUS WITH PERIPHERAL CIRCULATORY DISORDER: Primary | ICD-10-CM

## 2019-05-16 DIAGNOSIS — M20.42 HAMMER TOES OF BOTH FEET: ICD-10-CM

## 2019-05-16 DIAGNOSIS — M21.41 PES PLANUS OF BOTH FEET: ICD-10-CM

## 2019-05-16 DIAGNOSIS — M20.5X2 HALLUX LIMITUS, ACQUIRED, LEFT: ICD-10-CM

## 2019-05-16 PROCEDURE — 11056 PR TRIM BENIGN HYPERKERATOTIC SKIN LESION,2-4: ICD-10-PCS | Mod: Q9,S$GLB,, | Performed by: PODIATRIST

## 2019-05-16 PROCEDURE — 11056 PARNG/CUTG B9 HYPRKR LES 2-4: CPT | Mod: Q9,S$GLB,, | Performed by: PODIATRIST

## 2019-05-16 PROCEDURE — 99499 NO LOS: ICD-10-PCS | Mod: S$GLB,,, | Performed by: PODIATRIST

## 2019-05-16 PROCEDURE — 99999 PR PBB SHADOW E&M-EST. PATIENT-LVL III: ICD-10-PCS | Mod: PBBFAC,,, | Performed by: PODIATRIST

## 2019-05-16 PROCEDURE — 99499 UNLISTED E&M SERVICE: CPT | Mod: S$GLB,,, | Performed by: PODIATRIST

## 2019-05-16 PROCEDURE — 11721 PR DEBRIDEMENT OF NAILS, 6 OR MORE: ICD-10-PCS | Mod: 59,Q9,S$GLB, | Performed by: PODIATRIST

## 2019-05-16 PROCEDURE — 99999 PR PBB SHADOW E&M-EST. PATIENT-LVL III: CPT | Mod: PBBFAC,,, | Performed by: PODIATRIST

## 2019-05-16 PROCEDURE — 11721 DEBRIDE NAIL 6 OR MORE: CPT | Mod: 59,Q9,S$GLB, | Performed by: PODIATRIST

## 2019-05-16 NOTE — PROGRESS NOTES
Subjective:      Patient ID: Ana James is a 78 y.o. female.    Chief Complaint: Diabetes Mellitus (left great toenail pain (PCP Dr Schmidt 12/12/18)); Diabetic Foot Exam; and Nail Care    Ana is a 78 y.o. female who presents to the clinic for evaluation and treatment of high risk feet. Ana has a past medical history of Arthritis (lower extremities), Asthma, Diabetes mellitus, Gall stones, Hypertension, Kidney stones, Nephrolithiasis (1/22/2016), Obesity, Renal disorder, Retinopathy, and Vaginal delivery. The patient's chief complaint is long, thick toenails.  This patient has documented high risk feet requiring routine maintenance secondary to diabetes mellitis and those secondary complications of diabetes, as mentioned..    PCP: Jaky Schmidt MD    Date Last Seen by PCP:   Chief Complaint   Patient presents with    Diabetes Mellitus     left great toenail pain (PCP Dr Schmidt 12/12/18)    Diabetic Foot Exam    Nail Care       Current shoe gear:  rx diab shoes, worn    Hemoglobin A1C   Date Value Ref Range Status   12/12/2018 6.6 (H) 4.0 - 5.6 % Final     Comment:     ADA Screening Guidelines:  5.7-6.4%  Consistent with prediabetes  >or=6.5%  Consistent with diabetes  High levels of fetal hemoglobin interfere with the HbA1C  assay. Heterozygous hemoglobin variants (HbS, HgC, etc)do  not significantly interfere with this assay.   However, presence of multiple variants may affect accuracy.     02/05/2018 6.4 (H) 4.0 - 5.6 % Final     Comment:     According to ADA guidelines, hemoglobin A1c <7.0% represents  optimal control in non-pregnant diabetic patients. Different  metrics may apply to specific patient populations.   Standards of Medical Care in Diabetes-2016.  For the purpose of screening for the presence of diabetes:  <5.7%     Consistent with the absence of diabetes  5.7-6.4%  Consistent with increasing risk for diabetes   (prediabetes)  >or=6.5%  Consistent with  "diabetes  Currently, no consensus exists for use of hemoglobin A1c  for diagnosis of diabetes for children.  This Hemoglobin A1c assay has significant interference with fetal   hemoglobin   (HbF). The results are invalid for patients with abnormal amounts of   HbF,   including those with known Hereditary Persistence   of Fetal Hemoglobin. Heterozygous hemoglobin variants (HbAS, HbAC,   HbAD, HbAE, HbA2) do not significantly interfere with this assay;   however, presence of multiple variants in a sample may impact the %   interference.     05/27/2017 6.7 (H) 4.5 - 6.2 % Final     Comment:     According to ADA guidelines, hemoglobin A1C <7.0% represents  optimal control in non-pregnant diabetic patients.  Different  metrics may apply to specific populations.   Standards of Medical Care in Diabetes - 2016.  For the purpose of screening for the presence of diabetes:  <5.7%     Consistent with the absence of diabetes  5.7-6.4%  Consistent with increasing risk for diabetes   (prediabetes)  >or=6.5%  Consistent with diabetes  Currently no consensus exists for use of hemoglobin A1C  for diagnosis of diabetes for children.       Patient Active Problem List   Diagnosis    Hypertension    BMI 45.0-49.9, adult    Peripheral vascular disease, unspecified    Controlled type 2 diabetes mellitus with stage 4 chronic kidney disease, with long-term current use of insulin     Current Outpatient Medications on File Prior to Visit   Medication Sig Dispense Refill    albuterol (PROVENTIL/VENTOLIN HFA) 90 mcg/actuation inhaler Inhale 2 puffs into the lungs every 6 (six) hours as needed for Wheezing. Rescue 8 g 11    amLODIPine (NORVASC) 10 MG tablet TAKE 1 TABLET (10 MG TOTAL) BY MOUTH ONCE DAILY. 90 tablet 1    aspirin (ECOTRIN) 81 MG EC tablet Take 81 mg by mouth once daily.      atorvastatin (LIPITOR) 20 MG tablet TAKE 1 TABLET (20 MG TOTAL) BY MOUTH ONCE DAILY. 90 tablet 2    BD ULTRA-FINE BREE PEN NEEDLE 32 gauge x 5/32" " "Ndle INJECT INSULIN THREE TIMES DAILY 200 each 0    BD ULTRA-FINE BREE PEN NEEDLE 32 gauge x 5/32" Ndle INJECT INSULIN THREE TIMES DAILY 200 each 1    blood sugar diagnostic Strp Test 3 times daily 100 each 11    blood-glucose meter kit Use as instructed to check blood sugar 3 times daily 1 each 0    candesartan-hydrochlorothiazid (ATACAND HCT) 32-12.5 mg per tablet Take 1 tablet by mouth once daily. 90 tablet 1    carvedilol (COREG) 12.5 MG tablet Take 1 tablet (12.5 mg total) by mouth 2 (two) times daily. 90 tablet 3    cilostazol (PLETAL) 50 MG Tab Take 1 tablet (50 mg total) by mouth 2 (two) times daily. 180 tablet 3    clotrimazole-betamethasone 1-0.05% (LOTRISONE) cream Apply topically 2 (two) times daily. 45 g 5    insulin detemir U-100 (LEVEMIR FLEXTOUCH U-100 INSULN) 100 unit/mL (3 mL) SubQ InPn pen Inject 30 Units into the skin every evening. 9 mL 2    lancets Misc Test 3 times daily 100 each 11    latanoprost 0.005 % ophthalmic solution Place 1 drop into both eyes every evening.  3    LEVEMIR FLEXTOUCH U-100 INSULN 100 unit/mL (3 mL) InPn pen INJECT 32 UNITS INTO THE SKIN EVERY EVENING 28.8 mL 0    ondansetron (ZOFRAN-ODT) 4 MG TbDL TAKE 1 TABLET BY MOUTH EVERY 6 HOURS AS NEEDED 20 tablet 2    timolol maleate 0.5% (TIMOPTIC) 0.5 % Drop 1 drop every morning.   2    TRADJENTA 5 mg Tab tablet TAKE 1 TABLET BY MOUTH EVERY DAY 90 tablet 0    TRAVATAN Z 0.004 % ophthalmic solution       traZODone (DESYREL) 50 MG tablet TAKE 1 TABLET (50 MG TOTAL) BY MOUTH EVERY EVENING. 90 tablet 3    trospium (SANCTURA) 20 mg Tab tablet Take 1 tablet (20 mg total) by mouth 2 (two) times daily. 180 tablet 1    potassium citrate (UROCIT-K 15) 15 mEq TbSR Take 15 mEq by mouth once daily. 90 tablet 3    triamcinolone acetonide 0.1% (KENALOG) 0.1 % cream Apply topically 3 (three) times daily. for 10 days 80 g 0     No current facility-administered medications on file prior to visit.      Review of patient's " allergies indicates:   Allergen Reactions    Adhesive Rash     Paper tape     Past Surgical History:   Procedure Laterality Date    CARDIAC CATHETERIZATION      cataract      CHOLECYSTECTOMY      CHOLECYSTECTOMY-LAPAROSCOPIC N/A 5/11/2016    [facility]    CYSTOSCOPY/ RETROGRADE PYELOGRAM/ STENT PLACEMENT  5/24/2016    [facility]    EXTRACTION-STONE-URETEROSCOPY Right 8/5/2016    [facility]    EYE SURGERY      Rt cataract surgery    HYSTERECTOMY      LITHOTRIPSY-EXTRACORPOREAL SHOCK WAVE Right 5/24/2016    [facility] OR    LITHOTRIPSY-LASER Right 8/5/2016    [facility]    REPLACEMENT-STENT Right 8/5/2016    [facility]    URETERAL STENT PLACEMENT       Family History   Problem Relation Age of Onset    Kidney failure Mother     Hypertension Father     Diabetes Brother     Cancer Sister      Social History     Socioeconomic History    Marital status:      Spouse name: Not on file    Number of children: Not on file    Years of education: Not on file    Highest education level: Not on file   Occupational History    Occupation: retired   Social Needs    Financial resource strain: Not on file    Food insecurity:     Worry: Not on file     Inability: Not on file    Transportation needs:     Medical: Not on file     Non-medical: Not on file   Tobacco Use    Smoking status: Never Smoker    Smokeless tobacco: Never Used   Substance and Sexual Activity    Alcohol use: No    Drug use: No    Sexual activity: Yes     Partners: Male   Lifestyle    Physical activity:     Days per week: Not on file     Minutes per session: Not on file    Stress: Not on file   Relationships    Social connections:     Talks on phone: Not on file     Gets together: Not on file     Attends Church  "service: Not on file     Active member of club or organization: Not on file     Attends meetings of clubs or organizations: Not on file     Relationship status: Not on file   Other Topics Concern    Not on file   Social History Narrative    Not on file       Review of Systems   Constitution: Negative for chills and fever.   Cardiovascular: Positive for claudication and leg swelling. Negative for chest pain.   Respiratory: Positive for cough. Negative for shortness of breath.    Skin: Positive for dry skin and nail changes. Negative for itching and rash.   Musculoskeletal: Positive for arthritis, back pain, joint pain, muscle cramps and myalgias. Negative for muscle weakness.   Gastrointestinal: Negative for diarrhea, nausea and vomiting.   Neurological: Positive for paresthesias. Negative for numbness, tremors and weakness.   Psychiatric/Behavioral: Negative for altered mental status and hallucinations.           Objective:       Vitals:    05/16/19 1505   Weight: 122.5 kg (270 lb)   Height: 5' 2" (1.575 m)   PainSc:   6       Physical Exam   Constitutional:   General: Pt. is well-developed, well-nourished, appears stated age, in no acute distress, alert and oriented x 3. No evidence of depression, anxiety, or agitation. Calm, cooperative, and communicative. Appropriate interactions and affect.       Cardiovascular:   Pulses:       Dorsalis pedis pulses are 1+ on the right side, and 1+ on the left side.        Posterior tibial pulses are 0 on the right side, and 1+ on the left side.   Dorsalis pedis and posterior tibial pulses are diminished Bilaterally. Toes are cool to touch. Feet are warm proximally.There is decreased digital hair. Skin is atrophic,  hyperpigmented, and edematous       Musculoskeletal:        Right ankle: She exhibits swelling.        Left ankle: She exhibits swelling.        Right foot: There is swelling. There is normal range of motion.        Left foot: There is swelling. There is normal " range of motion.   Exquisite tenderness to calf bilaterally    Muscle strength is 5/5 in all groups bilaterally.    Patient has hammertoes of digits 2-5 bilateral partially reducible without symptom today.    Visible and palpable bunion without pain at dorsomedial 1st metatarsal head right and left.  Hallux abducted right and left partially reducible, tracks laterally without being track bound.  No ecchymosis, erythema, edema, or cardinal signs infection or signs of trauma same foot.     Neurological: No sensory deficit.   [place] 5.07 monofilament is intact bilateral feet. Sharp/dull sensation is also intact Bilateral feet.           Skin: Skin is warm, dry and intact. No abrasion, no ecchymosis and no lesion noted. She is not diaphoretic. No cyanosis or erythema. No pallor. Nails show no clubbing.   Toenails 1-5 bilaterally are elongated by 3-5 mm, thickened by 2-3 mm, discolored/yellowed, dystrophic, brittle with subungual debris. Tender to distal nail plate pressure of right hallux, without periungual skin abnormality of each.     Focal hyperkeratotic lesion consisting entirely of hyperkeratotic tissue without open skin, drainage, pus, fluctuance, malodor, or signs of infection sub 1st MTPJ karan    Interdigital Spaces clean, dry and without evidence of break in skin integrity   Psychiatric: She has a normal mood and affect. Her speech is normal.   Nursing note and vitals reviewed.      Assessment:       Encounter Diagnoses   Name Primary?    Type II diabetes mellitus with peripheral circulatory disorder Yes    PAD (peripheral artery disease)     Venous stasis of both lower extremities     Hammer toes of both feet     Pes planus of both feet     Onychomycosis due to dermatophyte     Corn or callus     Hallux limitus, acquired, left     Hallux limitus, acquired, right          Plan:       Ana was seen today for diabetes mellitus, diabetic foot exam and nail care.    Diagnoses and all orders  for this visit:    Type II diabetes mellitus with peripheral circulatory disorder  -     DIABETIC SHOES FOR HOME USE    PAD (peripheral artery disease)  -     DIABETIC SHOES FOR HOME USE    Venous stasis of both lower extremities    Hammer toes of both feet  -     DIABETIC SHOES FOR HOME USE    Pes planus of both feet  -     DIABETIC SHOES FOR HOME USE    Onychomycosis due to dermatophyte    Corn or callus  -     DIABETIC SHOES FOR HOME USE    Hallux limitus, acquired, left  -     DIABETIC SHOES FOR HOME USE    Hallux limitus, acquired, right  -     DIABETIC SHOES FOR HOME USE      I counseled the patient on her conditions, their implications and medical management.     - Shoe inspection. Diabetic Foot Education. Patient reminded of the importance of good nutrition and blood sugar control to help prevent podiatric complications of diabetes. Patient instructed on proper foot hygeine. We discussed wearing proper shoe gear, daily foot inspections, never walking without protective shoe gear, never putting sharp instruments to feet.    - Rx diabetic shoes for protection and support    - Patient is to elevate legs. When sleeping, place a pillow under lower extremities. When sitting, support the legs so that they are level with the waist.    - With patient's permission, nails were aggressively reduced and debrided x 10 to their soft tissue attachment mechanically and with electric , removing all offending nail and debris. Patient relates relief following the procedure. After cleansing the  area w/ alcohol prep pad the above mentioned hyperkeratosis was trimmed utilizing No 15 scapel, to a smooth base with out incident. Patient tolerated this  well and reported comfort to the area of sub 1st MTPJ karan.   She will continue to monitor the areas daily, inspect her feet, wear protective shoe gear when ambulatory, moisturizer to maintain skin integrity and follow in this office in approximately 3-5 months, sooner  [place]

## 2019-05-16 NOTE — PATIENT INSTRUCTIONS
Recommend lotions: eucerin, eucerin for diabetics, aquaphor, A&D ointment, gold bond for diabetics, sween, Roscoe's Bees all purpose baby ointment,  urea 40 with aloe (found on amazon.com)    Shoe recommendations: (try 6pm.com, zappos.com , nordstromrack.[place], or shoes.[place] for discounted prices) you can visit [employer] shoes in [place]  or [facility] in the [facility] (there are also several shoe brand outlets in the [facility])    Asics (GT 2000 or gel foundations), new balance stability type shoes, saucony (stabil c3),  Winter (GTS or Beast or transcend), propet (tennis shoe)    Sofhayes Carter (women) Armen&Josemanuel (men), clarks, crocs, aerosoles, naturalizers, SAS, ecco, born, vinay chase, rockports (dress shoes)    Vionic, burkenstocks, fitflops, propet (sandals)  Nike comfort thong sandals, crocs, propet (house shoes)    Nail Home remedy:  Vicks Vapor rub to nails for easier manageability      Diabetes: Inspecting Your Feet  Diabetes increases your chances of developing foot problems. So inspect your feet every day. This helps you find small skin irritations before they become serious infections. If you have trouble seeing the bottoms of your feet, use a mirror or ask a family member or friend to help.     Pressure spots on the bottom of the foot are common areas where problems develop.   How to check your feet  Below are tips to help you look for foot problems. Try to check your feet at the same time each day, such as when you get out of bed in the morning:  · Check the top of each foot. The tops of toes, back of the heel, and outer edge of the foot can get a lot of rubbing from poor-fitting shoes.  · Check the bottom of each foot. Daily wear and tear often leads to problems at pressure spots.  · Check the toes and nails. Fungal infections often occur between toes. Toenail problems can also be a sign of fungal infections or lead to breaks in the skin.  · Check your shoes, too. Loose objects inside a shoe can injure the  foot. Use your hand to feel inside your shoes for things like avery, loose stitching, or rough areas that could irritate your skin.  Warning signs  Look for any color changes in the foot. Redness with streaks can signal a severe infection, which needs immediate medical attention. Tell your doctor right away if you have any of these problems:  · Swelling, sometimes with color changes, may be a sign of poor blood flow or infection. Symptoms include tenderness and an increase in the size of your foot.  · Warm or hot areas on your feet may be signs of infection. A foot that is cold may not be getting enough blood.  · Sensations such as burning, tingling, or pins and needles can be signs of a problem. Also check for areas that may be numb.  · Hot spots are caused by friction or pressure. Look for hot spots in areas that get a lot of rubbing. Hot spots can turn into blisters, calluses, or sores.  · Cracks and sores are caused by dry or irritated skin. They are a sign that the skin is breaking down, which can lead to infection.  · Toenail problems to watch for include nails growing into the skin (ingrown toenail) and causing redness or pain. Thick, yellow, or discolored nails can signal a fungal infection.  · Drainage and odor can develop from untreated sores and ulcers. Call your doctor right away if you notice white or yellow drainage, bleeding, or unpleasant odor.   © 1790-2079 [employer]. [address] [place] [address]. All rights reserved. This information is not intended as a substitute for professional medical care. Always follow your healthcare professional's instructions.        Step-by-Step:  Inspecting Your Feet (Diabetes)    Date Last Reviewed: 10/1/2016  © 0987-6429 [employer]. [address] [place] [address]. All rights reserved. This information is not intended as a substitute for professional medical care. Always follow your healthcare professional's  instructions.

## 2019-05-21 DIAGNOSIS — I10 ESSENTIAL HYPERTENSION: ICD-10-CM

## 2019-05-21 RX ORDER — AMLODIPINE BESYLATE 10 MG/1
10 TABLET ORAL DAILY
Qty: 90 TABLET | Refills: 1 | Status: SHIPPED | OUTPATIENT
Start: 2019-05-21 | End: 2020-02-13 | Stop reason: SDUPTHER

## 2019-05-29 ENCOUNTER — TELEPHONE (OUTPATIENT)
Dept: PODIATRY | Facility: CLINIC | Age: 79
End: 2019-05-29

## 2019-05-29 NOTE — TELEPHONE ENCOUNTER
----- Message from Cuhck Mccauley sent at 5/29/2019  9:57 AM CDT -----  Contact: Self  Type: Patient Call Back    Who called:Self    What is the request in detail: Prior auth need to be sent to [facility]  for orthopedic shoes at [facility]. Pateint called [facility] they are waiting for the script and clinicals to be submitted.    Can the clinic reply by [facility]?no    Would the patient rather a call back or a response via [facility]? Call    Best call back number: [phone]

## 2019-05-29 NOTE — TELEPHONE ENCOUNTER
----- Message from Mica Maxwell sent at 5/29/2019  9:45 AM CDT -----  Contact: [facility]  Type: Patient Call Back    Who called: Edward Abreu    What is the request in detail: Edward Abreu is requesting medical necessity form and clinical notes for diabetic shoes.    Can the clinic reply by [facility]? No    Would the patient rather a call back or a response via [facility]? Call back    Best call back number: [phone]    Additional Information:

## 2019-06-17 NOTE — PROGRESS NOTES
Patient, Ana James (MRN #[id number]), presented with a recorded BMI of 49.38 kg/m^2 consistent with the definition of morbid obesity (ICD-10 E66.01). The patient's morbid obesity was monitored, evaluated, addressed and/or treated. This addendum to the medical record is made on 06/17/2019.

## 2019-06-24 ENCOUNTER — TELEPHONE (OUTPATIENT)
Dept: FAMILY MEDICINE | Facility: CLINIC | Age: 79
End: 2019-06-24

## 2019-06-24 RX ORDER — LANCETS
EACH MISCELLANEOUS
Qty: 100 EACH | Refills: 11 | Status: ON HOLD | OUTPATIENT
Start: 2019-06-24 | End: 2022-07-18

## 2019-06-24 RX ORDER — PEN NEEDLE, DIABETIC 30 GX3/16"
NEEDLE, DISPOSABLE MISCELLANEOUS
Qty: 200 EACH | Refills: 0 | Status: SHIPPED | OUTPATIENT
Start: 2019-06-24 | End: 2021-01-27

## 2019-06-24 NOTE — TELEPHONE ENCOUNTER
----- Message from Francine Nolasco sent at 6/24/2019 10:51 AM CDT -----  Contact: pecry with [employer] [phone]  Type: RX Refill Request    Who Called: percy with [employer] [phone]    Refill or New Rx:lancets and test strips - needs mnf and orders to be faxed [phone]  RX Name and Strength:    How is the patient currently taking it? (ex. 1XDay):    Is this a 30 day or 90 day RX:    Preferred Pharmacy with phone number:    Local or Mail Order:    Ordering Provider:    Would the patient rather a call back or a response via [facility]?     Best Call Back Number:    Additional Information:

## 2019-07-22 ENCOUNTER — TELEPHONE (OUTPATIENT)
Dept: FAMILY MEDICINE | Facility: CLINIC | Age: 79
End: 2019-07-22

## 2019-07-22 DIAGNOSIS — I10 ESSENTIAL HYPERTENSION: ICD-10-CM

## 2019-07-22 NOTE — TELEPHONE ENCOUNTER
Orders signed for CMP, lipid and hemoglobin A1c; pt had abnormal CBC 7/13/18 do you want to repeat

## 2019-07-22 NOTE — TELEPHONE ENCOUNTER
----- Message from Chuck Mccauley sent at 7/22/2019 12:49 PM CDT -----  Contact: Self  Type: Lab    Caller is requesting to schedule their Lab appointment prior to annual appointment.    Order is not listed in EPIC.  Please enter order and contact patient to schedule.    Name of Caller: self    Preferred Date and Time of Labs: 08/01/19    Date of EPP Appointment:08/01/19    Where would they like the lab performed?lab    Would the patient rather a call back or a response via [facility]? call    Best Call Back Number:[phone]

## 2019-07-23 NOTE — TELEPHONE ENCOUNTER
Pt informed lab orders entered; pt states she will have to wait until OV to get done due to transportation

## 2019-07-25 RX ORDER — INSULIN DETEMIR 100 [IU]/ML
32 INJECTION, SOLUTION SUBCUTANEOUS NIGHTLY
Qty: 9.6 ML | Refills: 0 | Status: SHIPPED | OUTPATIENT
Start: 2019-07-25 | End: 2019-09-08 | Stop reason: SDUPTHER

## 2019-08-01 ENCOUNTER — OFFICE VISIT (OUTPATIENT)
Dept: FAMILY MEDICINE | Facility: CLINIC | Age: 79
End: 2019-08-01
Payer: MEDICARE

## 2019-08-01 ENCOUNTER — LAB VISIT (OUTPATIENT)
Dept: LAB | Facility: HOSPITAL | Age: 79
End: 2019-08-01
Attending: FAMILY MEDICINE
Payer: MEDICARE

## 2019-08-01 VITALS
RESPIRATION RATE: 16 BRPM | OXYGEN SATURATION: 97 % | BODY MASS INDEX: 47.46 KG/M2 | TEMPERATURE: 98 F | HEART RATE: 70 BPM | DIASTOLIC BLOOD PRESSURE: 58 MMHG | WEIGHT: 259.5 LBS | SYSTOLIC BLOOD PRESSURE: 94 MMHG

## 2019-08-01 DIAGNOSIS — E66.01 MORBID OBESITY WITH BMI OF 40.0-44.9, ADULT: ICD-10-CM

## 2019-08-01 DIAGNOSIS — Z79.4 CONTROLLED TYPE 2 DIABETES MELLITUS WITH STAGE 4 CHRONIC KIDNEY DISEASE, WITH LONG-TERM CURRENT USE OF INSULIN: ICD-10-CM

## 2019-08-01 DIAGNOSIS — M89.9 DISORDER OF BONE AND ARTICULAR CARTILAGE: ICD-10-CM

## 2019-08-01 DIAGNOSIS — M94.9 DISORDER OF BONE AND ARTICULAR CARTILAGE: ICD-10-CM

## 2019-08-01 DIAGNOSIS — Z00.00 WELL ADULT EXAM: Primary | ICD-10-CM

## 2019-08-01 DIAGNOSIS — N25.81 SECONDARY HYPERPARATHYROIDISM OF RENAL ORIGIN: ICD-10-CM

## 2019-08-01 DIAGNOSIS — N18.4 CONTROLLED TYPE 2 DIABETES MELLITUS WITH STAGE 4 CHRONIC KIDNEY DISEASE, WITH LONG-TERM CURRENT USE OF INSULIN: ICD-10-CM

## 2019-08-01 DIAGNOSIS — I10 ESSENTIAL HYPERTENSION: ICD-10-CM

## 2019-08-01 DIAGNOSIS — E11.22 CONTROLLED TYPE 2 DIABETES MELLITUS WITH STAGE 4 CHRONIC KIDNEY DISEASE, WITH LONG-TERM CURRENT USE OF INSULIN: ICD-10-CM

## 2019-08-01 PROBLEM — R53.81 PHYSICAL DEBILITY: Status: ACTIVE | Noted: 2019-08-01

## 2019-08-01 LAB
ALBUMIN SERPL BCP-MCNC: 3.4 G/DL (ref 3.5–5.2)
ALP SERPL-CCNC: 117 U/L (ref 55–135)
ALT SERPL W/O P-5'-P-CCNC: 8 U/L (ref 10–44)
ANION GAP SERPL CALC-SCNC: 13 MMOL/L (ref 8–16)
AST SERPL-CCNC: 16 U/L (ref 10–40)
BASOPHILS # BLD AUTO: 0.03 K/UL (ref 0–0.2)
BASOPHILS NFR BLD: 0.5 % (ref 0–1.9)
BILIRUB SERPL-MCNC: 0.4 MG/DL (ref 0.1–1)
BUN SERPL-MCNC: 30 MG/DL (ref 8–23)
CALCIUM SERPL-MCNC: 9.4 MG/DL (ref 8.7–10.5)
CHLORIDE SERPL-SCNC: 112 MMOL/L (ref 95–110)
CHOLEST SERPL-MCNC: 105 MG/DL (ref 120–199)
CHOLEST/HDLC SERPL: 3.2 {RATIO} (ref 2–5)
CO2 SERPL-SCNC: 16 MMOL/L (ref 23–29)
CREAT SERPL-MCNC: 2.5 MG/DL (ref 0.5–1.4)
DIFFERENTIAL METHOD: ABNORMAL
EOSINOPHIL # BLD AUTO: 0.3 K/UL (ref 0–0.5)
EOSINOPHIL NFR BLD: 5 % (ref 0–8)
ERYTHROCYTE [DISTWIDTH] IN BLOOD BY AUTOMATED COUNT: 15.4 % (ref 11.5–14.5)
EST. GFR  (AFRICAN AMERICAN): 20.3 ML/MIN/1.73 M^2
EST. GFR  (NON AFRICAN AMERICAN): 17.6 ML/MIN/1.73 M^2
ESTIMATED AVG GLUCOSE: 134 MG/DL (ref 68–131)
GLUCOSE SERPL-MCNC: 85 MG/DL (ref 70–110)
HBA1C MFR BLD HPLC: 6.3 % (ref 4–5.6)
HCT VFR BLD AUTO: 33.2 % (ref 37–48.5)
HDLC SERPL-MCNC: 33 MG/DL (ref 40–75)
HDLC SERPL: 31.4 % (ref 20–50)
HGB BLD-MCNC: 10.5 G/DL (ref 12–16)
IMM GRANULOCYTES # BLD AUTO: 0.02 K/UL (ref 0–0.04)
IMM GRANULOCYTES NFR BLD AUTO: 0.3 % (ref 0–0.5)
LDLC SERPL CALC-MCNC: 55.8 MG/DL (ref 63–159)
LYMPHOCYTES # BLD AUTO: 1.4 K/UL (ref 1–4.8)
LYMPHOCYTES NFR BLD: 21.5 % (ref 18–48)
MCH RBC QN AUTO: 28.5 PG (ref 27–31)
MCHC RBC AUTO-ENTMCNC: 31.6 G/DL (ref 32–36)
MCV RBC AUTO: 90 FL (ref 82–98)
MONOCYTES # BLD AUTO: 0.6 K/UL (ref 0.3–1)
MONOCYTES NFR BLD: 9.8 % (ref 4–15)
NEUTROPHILS # BLD AUTO: 4.1 K/UL (ref 1.8–7.7)
NEUTROPHILS NFR BLD: 62.9 % (ref 38–73)
NONHDLC SERPL-MCNC: 72 MG/DL
NRBC BLD-RTO: 0 /100 WBC
PLATELET # BLD AUTO: 230 K/UL (ref 150–350)
PMV BLD AUTO: 9.8 FL (ref 9.2–12.9)
POTASSIUM SERPL-SCNC: 4.7 MMOL/L (ref 3.5–5.1)
PROT SERPL-MCNC: 7.7 G/DL (ref 6–8.4)
RBC # BLD AUTO: 3.69 M/UL (ref 4–5.4)
SODIUM SERPL-SCNC: 141 MMOL/L (ref 136–145)
TRIGL SERPL-MCNC: 81 MG/DL (ref 30–150)
WBC # BLD AUTO: 6.43 K/UL (ref 3.9–12.7)

## 2019-08-01 PROCEDURE — 3074F SYST BP LT 130 MM HG: CPT | Mod: CPTII,S$GLB,, | Performed by: FAMILY MEDICINE

## 2019-08-01 PROCEDURE — 80061 LIPID PANEL: CPT

## 2019-08-01 PROCEDURE — 99499 RISK ADDL DX/OHS AUDIT: ICD-10-PCS | Mod: S$GLB,,, | Performed by: FAMILY MEDICINE

## 2019-08-01 PROCEDURE — 85025 COMPLETE CBC W/AUTO DIFF WBC: CPT

## 2019-08-01 PROCEDURE — 3074F PR MOST RECENT SYSTOLIC BLOOD PRESSURE < 130 MM HG: ICD-10-PCS | Mod: CPTII,S$GLB,, | Performed by: FAMILY MEDICINE

## 2019-08-01 PROCEDURE — 99499 UNLISTED E&M SERVICE: CPT | Mod: S$GLB,,, | Performed by: FAMILY MEDICINE

## 2019-08-01 PROCEDURE — 3078F DIAST BP <80 MM HG: CPT | Mod: CPTII,S$GLB,, | Performed by: FAMILY MEDICINE

## 2019-08-01 PROCEDURE — 99999 PR PBB SHADOW E&M-EST. PATIENT-LVL III: ICD-10-PCS | Mod: PBBFAC,,, | Performed by: FAMILY MEDICINE

## 2019-08-01 PROCEDURE — 83036 HEMOGLOBIN GLYCOSYLATED A1C: CPT

## 2019-08-01 PROCEDURE — 3078F PR MOST RECENT DIASTOLIC BLOOD PRESSURE < 80 MM HG: ICD-10-PCS | Mod: CPTII,S$GLB,, | Performed by: FAMILY MEDICINE

## 2019-08-01 PROCEDURE — 36415 COLL VENOUS BLD VENIPUNCTURE: CPT | Mod: PO

## 2019-08-01 PROCEDURE — 99999 PR PBB SHADOW E&M-EST. PATIENT-LVL III: CPT | Mod: PBBFAC,,, | Performed by: FAMILY MEDICINE

## 2019-08-01 PROCEDURE — 80053 COMPREHEN METABOLIC PANEL: CPT

## 2019-08-01 PROCEDURE — 99214 PR OFFICE/OUTPT VISIT, EST, LEVL IV, 30-39 MIN: ICD-10-PCS | Mod: S$GLB,,, | Performed by: FAMILY MEDICINE

## 2019-08-01 PROCEDURE — 99214 OFFICE O/P EST MOD 30 MIN: CPT | Mod: S$GLB,,, | Performed by: FAMILY MEDICINE

## 2019-08-01 NOTE — PROGRESS NOTES
"Subjective:       Patient ID: Ana James     Chief Complaint: Annual Exam      HPIAna James is a 78 y.o. female.here for annual exam.  Diabetes stable.  Decreased mobility.    Review of patient's allergies indicates:   Allergen Reactions    Adhesive Rash     Paper tape       Current Outpatient Medications:     albuterol (PROVENTIL/VENTOLIN HFA) 90 mcg/actuation inhaler, Inhale 2 puffs into the lungs every 6 (six) hours as needed for Wheezing. Rescue, Disp: 8 g, Rfl: 11    amLODIPine (NORVASC) 10 MG tablet, TAKE 1 TABLET (10 MG TOTAL) BY MOUTH ONCE DAILY., Disp: 90 tablet, Rfl: 1    aspirin (ECOTRIN) 81 MG EC tablet, Take 81 mg by mouth once daily., Disp: , Rfl:     atorvastatin (LIPITOR) 20 MG tablet, TAKE 1 TABLET (20 MG TOTAL) BY MOUTH ONCE DAILY., Disp: 90 tablet, Rfl: 2    BD ULTRA-FINE BREE PEN NEEDLE 32 gauge x 5/32" Ndle, INJECT INSULIN THREE TIMES DAILY, Disp: 200 each, Rfl: 1    blood sugar diagnostic Strp, Test 3 times daily, Disp: 100 each, Rfl: 11    blood-glucose meter kit, Use as instructed to check blood sugar 3 times daily, Disp: 1 each, Rfl: 0    candesartan-hydrochlorothiazid (ATACAND HCT) 32-12.5 mg per tablet, Take 1 tablet by mouth once daily., Disp: 90 tablet, Rfl: 1    carvedilol (COREG) 12.5 MG tablet, Take 1 tablet (12.5 mg total) by mouth 2 (two) times daily., Disp: 90 tablet, Rfl: 3    cilostazol (PLETAL) 50 MG Tab, Take 1 tablet (50 mg total) by mouth 2 (two) times daily., Disp: 180 tablet, Rfl: 3    clotrimazole-betamethasone 1-0.05% (LOTRISONE) cream, Apply topically 2 (two) times daily., Disp: 45 g, Rfl: 5    insulin detemir U-100 (LEVEMIR FLEXTOUCH U-100 INSULN) 100 unit/mL (3 mL) SubQ InPn pen, Inject 30 Units into the skin every evening., Disp: 9 mL, Rfl: 2    lancets Misc, Test 3 times daily, Disp: 100 each, Rfl: 11    latanoprost 0.005 % ophthalmic solution, Place 1 drop into both eyes every evening., Disp: , Rfl: 3    LEVEMIR FLEXTOUCH " "U-100 INSULN 100 unit/mL (3 mL) InPn pen, INJECT 32 UNITS INTO THE SKIN EVERY EVENING, Disp: 9.6 mL, Rfl: 0    ondansetron (ZOFRAN-ODT) 4 MG TbDL, TAKE 1 TABLET BY MOUTH EVERY 6 HOURS AS NEEDED, Disp: 20 tablet, Rfl: 2    pen needle, diabetic (BD ULTRA-FINE BREE PEN NEEDLE) 32 gauge x 5/32" Ndle, INJECT INSULIN THREE TIMES DAILY, Disp: 200 each, Rfl: 0    potassium citrate (UROCIT-K 15) 15 mEq TbSR, Take 15 mEq by mouth once daily., Disp: 90 tablet, Rfl: 3    timolol maleate 0.5% (TIMOPTIC) 0.5 % Drop, 1 drop every morning. , Disp: , Rfl: 2    TRADJENTA 5 mg Tab tablet, TAKE 1 TABLET BY MOUTH EVERY DAY, Disp: 90 tablet, Rfl: 0    TRAVATAN Z 0.004 % ophthalmic solution, , Disp: , Rfl:     traZODone (DESYREL) 50 MG tablet, TAKE 1 TABLET (50 MG TOTAL) BY MOUTH EVERY EVENING., Disp: 90 tablet, Rfl: 3    triamcinolone acetonide 0.1% (KENALOG) 0.1 % cream, Apply topically 3 (three) times daily. for 10 days, Disp: 80 g, Rfl: 0    trospium (SANCTURA) 20 mg Tab tablet, Take 1 tablet (20 mg total) by mouth 2 (two) times daily., Disp: 180 tablet, Rfl: 1    Past Medical History:   Diagnosis Date    Arthritis lower extremities    Asthma     Diabetes mellitus     Gall stones     Hypertension     Kidney stones     Nephrolithiasis 1/22/2016    Obesity     Renal disorder     Retinopathy     Vaginal delivery     x1     Review of Systems   Constitutional: Positive for unexpected weight change. Negative for appetite change, chills, diaphoresis, fatigue and fever.   HENT: Negative for sinus pressure and trouble swallowing.    Eyes: Negative for pain and visual disturbance.   Respiratory: Positive for shortness of breath. Negative for cough, chest tightness and wheezing.    Cardiovascular: Negative for chest pain, palpitations and leg swelling.   Gastrointestinal: Negative for abdominal pain, blood in stool, constipation, diarrhea, nausea and vomiting.   Endocrine: Negative for polydipsia and polyphagia. "   Genitourinary: Negative for difficulty urinating, dysuria, hematuria, menstrual problem, pelvic pain and vaginal discharge.   Musculoskeletal: Negative for back pain, joint swelling and neck pain.   Skin: Negative for color change and rash.   Allergic/Immunologic: Negative for environmental allergies and food allergies.   Neurological: Negative for dizziness, numbness and headaches.   Hematological: Negative for adenopathy. Does not bruise/bleed easily.   Psychiatric/Behavioral: Negative for dysphoric mood and sleep disturbance. The patient is nervous/anxious.        Objective:      Physical Exam   Constitutional: She appears well-developed and well-nourished.   HENT:   Head: Normocephalic and atraumatic.   Neck: Normal range of motion. Neck supple.   Cardiovascular: Normal rate and regular rhythm.   Pulmonary/Chest: Effort normal and breath sounds normal.   Abdominal: Soft. Bowel sounds are normal. There is no tenderness.   Musculoskeletal: She exhibits no edema or deformity.   Neurological: She is alert.   Skin: Skin is warm and dry.   Psychiatric: She has a normal mood and affect.       Assessment:       1. Well adult exam    2. Controlled type 2 diabetes mellitus with stage 4 chronic kidney disease, with long-term current use of insulin    3. Morbid obesity with BMI of 40.0-44.9, adult    4. Secondary hyperparathyroidism of renal origin    5. Physical debility    6. Disorder of bone and articular cartilage        Plan:       Ana was seen today for annual exam.    Diagnoses and all orders for this visit:    Well adult exam  Encourage weight loss    Controlled type 2 diabetes mellitus with stage 4 chronic kidney disease, with long-term current use of insulin  Continue current regimen, no complications    Morbid obesity with BMI of 40.0-44.9, adult  Patient with weight loss of 11 lbs over past 3 months    Secondary hyperparathyroidism of renal origin  Calcium levels stable    Disorder of bone and articular  cartilage  -     DXA Bone Density Spine And Hip; Future

## 2019-08-05 ENCOUNTER — TELEPHONE (OUTPATIENT)
Dept: FAMILY MEDICINE | Facility: CLINIC | Age: 79
End: 2019-08-05

## 2019-08-05 NOTE — TELEPHONE ENCOUNTER
----- Message from Jaky Schmidt MD sent at 8/2/2019  6:43 PM CDT -----  Inform patient her kidney function is slightly worse, therefore recommend she sees Dr. Rice, her nephrologist. Her diabetes, blood count, and cholesterol panel acceptable

## 2019-09-06 DIAGNOSIS — I10 ESSENTIAL HYPERTENSION: ICD-10-CM

## 2019-09-06 RX ORDER — CARVEDILOL 12.5 MG/1
12.5 TABLET ORAL 2 TIMES DAILY
Qty: 180 TABLET | Refills: 1 | Status: SHIPPED | OUTPATIENT
Start: 2019-09-06 | End: 2019-12-23 | Stop reason: SDUPTHER

## 2019-09-06 NOTE — TELEPHONE ENCOUNTER
----- Message from Michelle James sent at 9/6/2019  1:28 PM CDT -----  Contact: Self  Type: RX Refill Request    Who Called: Ana Arriaga or New Rx:Refill    RX Name and Strength:carvedilol (COREG) 12.5 MG tablet 90 tablet     How is the patient currently taking it? (ex. 1XDay):1 twice a day    Preferred Pharmacy with phone number:[facility]/pharmacy #[id number] - [place] [address] [facility] [phone] (Phone)  [phone] (Fax)    Local or Mail Order:Local    Ordering Provider:Brayan/Shakira    Would the patient rather a call back or a response via [facility]? Call    Best Call Back Number:[phone]    Additional Information: Medication refill

## 2019-09-06 NOTE — TELEPHONE ENCOUNTER
----- Message from Michelle James sent at 9/6/2019  1:28 PM CDT -----  Contact: Self  Type: RX Refill Request    Who Called: Ana Arriaga or New Rx:Refill    RX Name and Strength:carvedilol (COREG) 12.5 MG tablet 90 tablet     How is the patient currently taking it? (ex. 1XDay):1 twice a day    Preferred Pharmacy with phone number:[facility]/pharmacy #[id number] - [place] [id number] [facility] [phone] (Phone)  [phone] (Fax)    Local or Mail Order:Local    Ordering Provider:Brayan/Shakira    Would the patient rather a call back or a response via My [facility]? Call    Best Call Back Number:[phone]    Additional Information: Medication refill

## 2019-09-08 RX ORDER — INSULIN DETEMIR 100 [IU]/ML
32 INJECTION, SOLUTION SUBCUTANEOUS NIGHTLY
Qty: 28.8 ML | Refills: 1 | Status: SHIPPED | OUTPATIENT
Start: 2019-09-08 | End: 2020-02-24

## 2019-09-17 ENCOUNTER — OFFICE VISIT (OUTPATIENT)
Dept: NEPHROLOGY | Facility: CLINIC | Age: 79
End: 2019-09-17
Payer: MEDICARE

## 2019-09-17 ENCOUNTER — OFFICE VISIT (OUTPATIENT)
Dept: PODIATRY | Facility: CLINIC | Age: 79
End: 2019-09-17
Payer: MEDICARE

## 2019-09-17 VITALS
SYSTOLIC BLOOD PRESSURE: 130 MMHG | HEIGHT: 62 IN | BODY MASS INDEX: 48.03 KG/M2 | OXYGEN SATURATION: 97 % | DIASTOLIC BLOOD PRESSURE: 70 MMHG | HEART RATE: 78 BPM | WEIGHT: 261 LBS

## 2019-09-17 VITALS — HEIGHT: 62 IN | WEIGHT: 261 LBS | BODY MASS INDEX: 48.03 KG/M2

## 2019-09-17 DIAGNOSIS — E11.51 TYPE II DIABETES MELLITUS WITH PERIPHERAL CIRCULATORY DISORDER: Primary | ICD-10-CM

## 2019-09-17 DIAGNOSIS — B35.1 ONYCHOMYCOSIS DUE TO DERMATOPHYTE: ICD-10-CM

## 2019-09-17 DIAGNOSIS — E11.22 CONTROLLED TYPE 2 DIABETES MELLITUS WITH STAGE 3 CHRONIC KIDNEY DISEASE, WITH LONG-TERM CURRENT USE OF INSULIN: Primary | ICD-10-CM

## 2019-09-17 DIAGNOSIS — M20.42 HAMMER TOES OF BOTH FEET: ICD-10-CM

## 2019-09-17 DIAGNOSIS — M20.41 HAMMER TOES OF BOTH FEET: ICD-10-CM

## 2019-09-17 DIAGNOSIS — Z79.4 CONTROLLED TYPE 2 DIABETES MELLITUS WITH STAGE 3 CHRONIC KIDNEY DISEASE, WITH LONG-TERM CURRENT USE OF INSULIN: Primary | ICD-10-CM

## 2019-09-17 DIAGNOSIS — N18.30 CHRONIC KIDNEY DISEASE, STAGE III (MODERATE): ICD-10-CM

## 2019-09-17 DIAGNOSIS — I73.9 PAD (PERIPHERAL ARTERY DISEASE): ICD-10-CM

## 2019-09-17 DIAGNOSIS — I87.8 VENOUS STASIS OF BOTH LOWER EXTREMITIES: ICD-10-CM

## 2019-09-17 DIAGNOSIS — M21.41 PES PLANUS OF BOTH FEET: ICD-10-CM

## 2019-09-17 DIAGNOSIS — L84 CORN OR CALLUS: ICD-10-CM

## 2019-09-17 DIAGNOSIS — N18.30 CONTROLLED TYPE 2 DIABETES MELLITUS WITH STAGE 3 CHRONIC KIDNEY DISEASE, WITH LONG-TERM CURRENT USE OF INSULIN: Primary | ICD-10-CM

## 2019-09-17 DIAGNOSIS — M21.42 PES PLANUS OF BOTH FEET: ICD-10-CM

## 2019-09-17 DIAGNOSIS — N20.0 NEPHROLITHIASIS: ICD-10-CM

## 2019-09-17 PROCEDURE — 1101F PR PT FALLS ASSESS DOC 0-1 FALLS W/OUT INJ PAST YR: ICD-10-PCS | Mod: CPTII,S$GLB,, | Performed by: INTERNAL MEDICINE

## 2019-09-17 PROCEDURE — 99999 PR PBB SHADOW E&M-EST. PATIENT-LVL III: CPT | Mod: PBBFAC,,, | Performed by: PODIATRIST

## 2019-09-17 PROCEDURE — 11721 DEBRIDE NAIL 6 OR MORE: CPT | Mod: Q9,59,S$GLB, | Performed by: PODIATRIST

## 2019-09-17 PROCEDURE — 11721 PR DEBRIDEMENT OF NAILS, 6 OR MORE: ICD-10-PCS | Mod: Q9,59,S$GLB, | Performed by: PODIATRIST

## 2019-09-17 PROCEDURE — 3075F SYST BP GE 130 - 139MM HG: CPT | Mod: CPTII,S$GLB,, | Performed by: INTERNAL MEDICINE

## 2019-09-17 PROCEDURE — 99999 PR PBB SHADOW E&M-EST. PATIENT-LVL IV: ICD-10-PCS | Mod: PBBFAC,,, | Performed by: INTERNAL MEDICINE

## 2019-09-17 PROCEDURE — 99999 PR PBB SHADOW E&M-EST. PATIENT-LVL III: ICD-10-PCS | Mod: PBBFAC,,, | Performed by: PODIATRIST

## 2019-09-17 PROCEDURE — 99499 UNLISTED E&M SERVICE: CPT | Mod: S$GLB,,, | Performed by: PODIATRIST

## 2019-09-17 PROCEDURE — 99499 UNLISTED E&M SERVICE: CPT | Mod: S$GLB,,, | Performed by: INTERNAL MEDICINE

## 2019-09-17 PROCEDURE — 99213 PR OFFICE/OUTPT VISIT, EST, LEVL III, 20-29 MIN: ICD-10-PCS | Mod: S$GLB,,, | Performed by: INTERNAL MEDICINE

## 2019-09-17 PROCEDURE — 1101F PT FALLS ASSESS-DOCD LE1/YR: CPT | Mod: CPTII,S$GLB,, | Performed by: INTERNAL MEDICINE

## 2019-09-17 PROCEDURE — 3078F PR MOST RECENT DIASTOLIC BLOOD PRESSURE < 80 MM HG: ICD-10-PCS | Mod: CPTII,S$GLB,, | Performed by: INTERNAL MEDICINE

## 2019-09-17 PROCEDURE — 3075F PR MOST RECENT SYSTOLIC BLOOD PRESS GE 130-139MM HG: ICD-10-PCS | Mod: CPTII,S$GLB,, | Performed by: INTERNAL MEDICINE

## 2019-09-17 PROCEDURE — 11056 PARNG/CUTG B9 HYPRKR LES 2-4: CPT | Mod: Q9,S$GLB,, | Performed by: PODIATRIST

## 2019-09-17 PROCEDURE — 99499 RISK ADDL DX/OHS AUDIT: ICD-10-PCS | Mod: S$GLB,,, | Performed by: INTERNAL MEDICINE

## 2019-09-17 PROCEDURE — 99999 PR PBB SHADOW E&M-EST. PATIENT-LVL IV: CPT | Mod: PBBFAC,,, | Performed by: INTERNAL MEDICINE

## 2019-09-17 PROCEDURE — 3078F DIAST BP <80 MM HG: CPT | Mod: CPTII,S$GLB,, | Performed by: INTERNAL MEDICINE

## 2019-09-17 PROCEDURE — 99499 NO LOS: ICD-10-PCS | Mod: S$GLB,,, | Performed by: PODIATRIST

## 2019-09-17 PROCEDURE — 11056 PR TRIM BENIGN HYPERKERATOTIC SKIN LESION,2-4: ICD-10-PCS | Mod: Q9,S$GLB,, | Performed by: PODIATRIST

## 2019-09-17 PROCEDURE — 99213 OFFICE O/P EST LOW 20 MIN: CPT | Mod: S$GLB,,, | Performed by: INTERNAL MEDICINE

## 2019-09-17 NOTE — PROGRESS NOTES
Subjective:      Patient ID: Ana James is a 78 y.o. female.    Chief Complaint: Diabetes Mellitus (PCP Dr Schmidt 8/1/19); Diabetic Foot Exam; and Nail Care    Ana is a 78 y.o. female who presents to the clinic for evaluation and treatment of high risk feet. Ana has a past medical history of Arthritis (lower extremities), Asthma, Diabetes mellitus, Gall stones, Hypertension, Kidney stones, Nephrolithiasis (1/22/2016), Obesity, Renal disorder, Retinopathy, and Vaginal delivery. The patient's chief complaint is long, thick toenails.  This patient has documented high risk feet requiring routine maintenance secondary to diabetes mellitis and those secondary complications of diabetes, as mentioned..    PCP: Jaky Schmidt MD    Date Last Seen by PCP:   Chief Complaint   Patient presents with    Diabetes Mellitus     PCP Dr Schmidt 8/1/19    Diabetic Foot Exam    Nail Care       Current shoe gear:  rx diab shoes, worn (new shoes at home)    Hemoglobin A1C   Date Value Ref Range Status   08/01/2019 6.3 (H) 4.0 - 5.6 % Final     Comment:     ADA Screening Guidelines:  5.7-6.4%  Consistent with prediabetes  >or=6.5%  Consistent with diabetes  High levels of fetal hemoglobin interfere with the HbA1C  assay. Heterozygous hemoglobin variants (HbS, HgC, etc)do  not significantly interfere with this assay.   However, presence of multiple variants may affect accuracy.     12/12/2018 6.6 (H) 4.0 - 5.6 % Final     Comment:     ADA Screening Guidelines:  5.7-6.4%  Consistent with prediabetes  >or=6.5%  Consistent with diabetes  High levels of fetal hemoglobin interfere with the HbA1C  assay. Heterozygous hemoglobin variants (HbS, HgC, etc)do  not significantly interfere with this assay.   However, presence of multiple variants may affect accuracy.     02/05/2018 6.4 (H) 4.0 - 5.6 % Final     Comment:     According to ADA guidelines, hemoglobin A1c <7.0% represents  optimal control in non-pregnant  "diabetic patients. Different  metrics may apply to specific patient populations.   Standards of Medical Care in Diabetes-2016.  For the purpose of screening for the presence of diabetes:  <5.7%     Consistent with the absence of diabetes  5.7-6.4%  Consistent with increasing risk for diabetes   (prediabetes)  >or=6.5%  Consistent with diabetes  Currently, no consensus exists for use of hemoglobin A1c  for diagnosis of diabetes for children.  This Hemoglobin A1c assay has significant interference with fetal   hemoglobin   (HbF). The results are invalid for patients with abnormal amounts of   HbF,   including those with known Hereditary Persistence   of Fetal Hemoglobin. Heterozygous hemoglobin variants (HbAS, HbAC,   HbAD, HbAE, HbA2) do not significantly interfere with this assay;   however, presence of multiple variants in a sample may impact the %   interference.       Patient Active Problem List   Diagnosis    Hypertension    BMI 45.0-49.9, adult    Peripheral vascular disease, unspecified    Controlled type 2 diabetes mellitus with stage 4 chronic kidney disease, with long-term current use of insulin    Physical debility     Current Outpatient Medications on File Prior to Visit   Medication Sig Dispense Refill    albuterol (PROVENTIL/VENTOLIN HFA) 90 mcg/actuation inhaler Inhale 2 puffs into the lungs every 6 (six) hours as needed for Wheezing. Rescue 8 g 11    amLODIPine (NORVASC) 10 MG tablet TAKE 1 TABLET (10 MG TOTAL) BY MOUTH ONCE DAILY. 90 tablet 1    aspirin (ECOTRIN) 81 MG EC tablet Take 81 mg by mouth once daily.      atorvastatin (LIPITOR) 20 MG tablet TAKE 1 TABLET (20 MG TOTAL) BY MOUTH ONCE DAILY. 90 tablet 2    BD ULTRA-FINE BREE PEN NEEDLE 32 gauge x 5/32" Ndle INJECT INSULIN THREE TIMES DAILY 200 each 1    blood sugar diagnostic Strp Test 3 times daily 100 each 11    blood-glucose meter kit Use as instructed to check blood sugar 3 times daily 1 each 0    " "candesartan-hydrochlorothiazid (ATACAND HCT) 32-12.5 mg per tablet Take 1 tablet by mouth once daily. 90 tablet 1    carvedilol (COREG) 12.5 MG tablet Take 1 tablet (12.5 mg total) by mouth 2 (two) times daily. 180 tablet 1    cilostazol (PLETAL) 50 MG Tab Take 1 tablet (50 mg total) by mouth 2 (two) times daily. 180 tablet 3    clotrimazole-betamethasone 1-0.05% (LOTRISONE) cream Apply topically 2 (two) times daily. 45 g 5    insulin detemir U-100 (LEVEMIR FLEXTOUCH U-100 INSULN) 100 unit/mL (3 mL) SubQ InPn pen Inject 30 Units into the skin every evening. 9 mL 2    lancets Misc Test 3 times daily 100 each 11    latanoprost 0.005 % ophthalmic solution Place 1 drop into both eyes every evening.  3    LEVEMIR FLEXTOUCH U-100 INSULN 100 unit/mL (3 mL) InPn pen INJECT 32 UNITS INTO THE SKIN EVERY EVENING 28.8 mL 1    ondansetron (ZOFRAN-ODT) 4 MG TbDL TAKE 1 TABLET BY MOUTH EVERY 6 HOURS AS NEEDED 20 tablet 2    pen needle, diabetic (BD ULTRA-FINE BREE PEN NEEDLE) 32 gauge x 5/32" Ndle INJECT INSULIN THREE TIMES DAILY 200 each 0    timolol maleate 0.5% (TIMOPTIC) 0.5 % Drop 1 drop every morning.   2    TRADJENTA 5 mg Tab tablet TAKE 1 TABLET BY MOUTH EVERY DAY 90 tablet 0    TRAVATAN Z 0.004 % ophthalmic solution       traZODone (DESYREL) 50 MG tablet TAKE 1 TABLET (50 MG TOTAL) BY MOUTH EVERY EVENING. 90 tablet 3    trospium (SANCTURA) 20 mg Tab tablet Take 1 tablet (20 mg total) by mouth 2 (two) times daily. 180 tablet 1    potassium citrate (UROCIT-K 15) 15 mEq TbSR Take 15 mEq by mouth once daily. 90 tablet 3    triamcinolone acetonide 0.1% (KENALOG) 0.1 % cream Apply topically 3 (three) times daily. for 10 days 80 g 0     No current facility-administered medications on file prior to visit.      Review of patient's allergies indicates:   Allergen Reactions    Adhesive Rash     Paper tape     Past Surgical History:   Procedure Laterality Date    CARDIAC CATHETERIZATION      cataract      " CHOLECYSTECTOMY      CHOLECYSTECTOMY-LAPAROSCOPIC N/A 5/11/2016    [facility]    CYSTOSCOPY/ RETROGRADE PYELOGRAM/ STENT PLACEMENT  5/24/2016    [facility] at [facility] OR    EXTRACTION-STONE-URETEROSCOPY Right 8/5/2016    [facility] OR    EYE SURGERY      Rt cataract surgery    HYSTERECTOMY      LITHOTRIPSY-EXTRACORPOREAL SHOCK WAVE Right 5/24/2016    [facility] OR    LITHOTRIPSY-LASER Right 8/5/2016    [facility]    REPLACEMENT-STENT Right 8/5/2016    [facility] OR    URETERAL STENT PLACEMENT       Family History   Problem Relation Age of Onset    Kidney failure Mother     Hypertension Father     Diabetes Brother     Cancer Sister      Social History     Socioeconomic History    Marital status:      Spouse name: Not on file    Number of children: Not on file    Years of education: Not on file    Highest education level: Not on file   Occupational History    Occupation: retired   Social Needs    Financial resource strain: Not on file    Food insecurity:     Worry: Not on file     Inability: Not on file    Transportation needs:     Medical: Not on file     Non-medical: Not on file   Tobacco Use    Smoking status: Never Smoker    Smokeless tobacco: Never Used   Substance and Sexual Activity    Alcohol use: No    Drug use: No    Sexual activity: Yes     Partners: Male   Lifestyle    Physical activity:     Days per week: Not on file     Minutes per session: Not on file    Stress: Not on file   Relationships    Social connections:     Talks on phone: Not on file     Gets together: Not on file     Attends Denominational service: Not on file     Active member of club or organization: Not on file     Attends meetings of clubs or organizations: Not on file     Relationship status: Not on file  "  Other Topics Concern    Not on file   Social History Narrative    Not on file       Review of Systems   Constitution: Negative for chills and fever.   Cardiovascular: Positive for claudication and leg swelling. Negative for chest pain.   Respiratory: Positive for cough. Negative for shortness of breath.    Skin: Positive for dry skin and nail changes. Negative for itching and rash.   Musculoskeletal: Positive for arthritis, back pain, joint pain, muscle cramps and myalgias. Negative for muscle weakness.   Gastrointestinal: Negative for diarrhea, nausea and vomiting.   Neurological: Positive for paresthesias. Negative for numbness, tremors and weakness.   Psychiatric/Behavioral: Negative for altered mental status and hallucinations.           Objective:       Vitals:    09/17/19 1200   Weight: 118.4 kg (261 lb)   Height: 5' 2" (1.575 m)   PainSc: 0-No pain       Physical Exam   Constitutional:   General: Pt. is well-developed, well-nourished, appears stated age, in no acute distress, alert and oriented x 3. No evidence of depression, anxiety, or agitation. Calm, cooperative, and communicative. Appropriate interactions and affect.       Cardiovascular:   Pulses:       Dorsalis pedis pulses are 1+ on the right side, and 1+ on the left side.        Posterior tibial pulses are 0 on the right side, and 1+ on the left side.   Dorsalis pedis and posterior tibial pulses are diminished Bilaterally. Toes are cool to touch. Feet are warm proximally.There is decreased digital hair. Skin is atrophic,  hyperpigmented, and edematous       Musculoskeletal:        Right ankle: She exhibits swelling.        Left ankle: She exhibits swelling.        Right foot: There is swelling. There is normal range of motion.        Left foot: There is swelling. There is normal range of motion.   Exquisite tenderness to calf bilaterally    Muscle strength is 5/5 in all groups bilaterally.    Patient has hammertoes of digits 2-5 bilateral " partially reducible without symptom today.    Visible and palpable bunion without pain at dorsomedial 1st metatarsal head right and left.  Hallux abducted right and left partially reducible, tracks laterally without being track bound.  No ecchymosis, erythema, edema, or cardinal signs infection or signs of trauma same foot.     Neurological: No sensory deficit.   [place] 5.07 monofilament is intact bilateral feet. Sharp/dull sensation is also intact Bilateral feet.           Skin: Skin is warm, dry and intact. No abrasion, no ecchymosis and no lesion noted. She is not diaphoretic. No cyanosis or erythema. No pallor. Nails show no clubbing.   Toenails 1-5 bilaterally are elongated by 3-5 mm, thickened by 2-3 mm, discolored/yellowed, dystrophic, brittle with subungual debris. Tender to distal nail plate pressure of right hallux, without periungual skin abnormality of each.     Focal hyperkeratotic lesion consisting entirely of hyperkeratotic tissue without open skin, drainage, pus, fluctuance, malodor, or signs of infection sub 1st MTPJ karan    Interdigital Spaces clean, dry and without evidence of break in skin integrity   Psychiatric: She has a normal mood and affect. Her speech is normal.   Nursing note and vitals reviewed.      Assessment:       Encounter Diagnoses   Name Primary?    Type II diabetes mellitus with peripheral circulatory disorder Yes    PAD (peripheral artery disease)     Venous stasis of both lower extremities     Hammer toes of both feet     Pes planus of both feet     Onychomycosis due to dermatophyte     Corn or callus          Plan:       Ana was seen today for diabetes mellitus, diabetic foot exam and nail care.    Diagnoses and all orders for this visit:    Type II diabetes mellitus with peripheral circulatory disorder    PAD (peripheral artery disease)    Venous stasis of both lower extremities    Hammer toes of both feet    Pes planus of both feet    Onychomycosis due  to dermatophyte    Corn or callus      I counseled the patient on her conditions, their implications and medical management.     - Shoe inspection. Diabetic Foot Education. Patient reminded of the importance of good nutrition and blood sugar control to help prevent podiatric complications of diabetes. Patient instructed on proper foot hygeine. We discussed wearing proper shoe gear, daily foot inspections, never walking without protective shoe gear, never putting sharp instruments to feet.    - Patient is to elevate legs. When sleeping, place a pillow under lower extremities. When sitting, support the legs so that they are level with the waist.    - With patient's permission, nails were aggressively reduced and debrided x 10 to their soft tissue attachment mechanically and with electric , removing all offending nail and debris. Patient relates relief following the procedure. After cleansing the  area w/ alcohol prep pad the above mentioned hyperkeratosis was trimmed utilizing No 15 scapel, to a smooth base with out incident. Patient tolerated this  well and reported comfort to the area of sub 1st MTPJ karan.   She will continue to monitor the areas daily, inspect her feet, wear protective shoe gear when ambulatory, moisturizer to maintain skin integrity and follow in this office in approximately 3-5 months, sooner p.r.n.

## 2019-09-17 NOTE — PROGRESS NOTES
Subjective:       Patient ID: Ana James is a 78 y.o. Black or  female who presents for follow up of Chronic Kidney Disease    HPI    Ms. James is an 78 year old woman with medical history of diabetes, hypertension, nephrolithiasis presenting for follow up of chronic kidney disease.  Patien reports blood sugars well-controlled, blood pressure usually 120-130's systolic.  She reports more adequate daily fluid intake, denies any NSAID use.  She otherwise denies any fever, chest pain, shortness of breath, abdominal pain, diarrhea, dysuria/hematuria.     Review of Systems   Constitutional: Negative for appetite change, fatigue and fever.   Respiratory: Negative for chest tightness and shortness of breath.    Cardiovascular: Negative for chest pain and leg swelling.   Gastrointestinal: Negative for abdominal pain, constipation, diarrhea, nausea and vomiting.   Genitourinary: Negative for difficulty urinating, dysuria, flank pain, frequency, hematuria and urgency.   Musculoskeletal: Negative for arthralgias, joint swelling and myalgias.   Skin: Negative for rash and wound.   Neurological: Negative for dizziness, weakness and light-headedness.   All other systems reviewed and are negative.      Objective:      Physical Exam   Constitutional: She appears well-developed and well-nourished.   Cardiovascular: Normal rate, regular rhythm and normal heart sounds. Exam reveals no gallop and no friction rub.   No murmur heard.  Pulmonary/Chest: Effort normal and breath sounds normal. No respiratory distress. She has no wheezes. She has no rales.   Abdominal: Soft. Bowel sounds are normal. There is no tenderness.   Musculoskeletal: She exhibits no edema.   Neurological: She is alert.   Skin: Skin is warm and dry. No rash noted. No erythema.   Psychiatric: She has a normal mood and affect.   Vitals reviewed.      Assessment:       1. Controlled type 2 diabetes mellitus with stage 3 chronic kidney  disease, with long-term current use of insulin    2. Chronic kidney disease, stage III (moderate)    3. Nephrolithiasis        Plan:     Ms. James is an 78 year old woman with medical history of diabetes, hypertension, nephrolithiasis presenting for follow up of chronic kidney disease.  Patient creatinine previously 1.5-1.8mg/dL, elevated since urologic intervention May/June 2016, up to 3.0mg/dL, previously stable at 1.9-2.0mg/dL.  Patient with baseline CKD stage IV, likely due to diabetic nephropathy, previously with acute kidney injury likely due to obstructive uropathy/UTI, will repeat renal panel to trend.  Encouraged fluid intake (with sodium restriction), stressed importance of blood pressure/glycemic control to prevent any further progression of kidney disease, patient voiced understanding.   - Anemia: Hgb at goal, will trend CBC/iron studies, no indication for erythropoetin therapy  - Bone/mineral metabolism: patient with secondary hyperparathyroidism, will trend PTH/VitD    Return to clinic in 3-4 months with renal panel 8 weeks, then with renal/heme panel, iron/TIBC/ferritin, urinalysis/culture, urine protein/creatinine ratio, PTH/VitD prior to next visit

## 2019-09-25 ENCOUNTER — TELEPHONE (OUTPATIENT)
Dept: NEPHROLOGY | Facility: CLINIC | Age: 79
End: 2019-09-25

## 2019-09-25 NOTE — TELEPHONE ENCOUNTER
----- Message from Kannan Rice MD sent at 9/25/2019  8:58 AM CDT -----  Please schedule labs 6 weeks, ordered, thank you.       done

## 2019-09-30 ENCOUNTER — TELEPHONE (OUTPATIENT)
Dept: FAMILY MEDICINE | Facility: CLINIC | Age: 79
End: 2019-09-30

## 2019-09-30 ENCOUNTER — TELEPHONE (OUTPATIENT)
Dept: NEPHROLOGY | Facility: CLINIC | Age: 79
End: 2019-09-30

## 2019-09-30 NOTE — TELEPHONE ENCOUNTER
----- Message from Mariah Mae sent at 9/30/2019  1:10 PM CDT -----  Contact: Pt. [phone]  The patient requested to speak to you regarding changing her lab appointment to a different location. Please contact the patient to discuss further.      Called pt no answer couldn't leave message

## 2019-09-30 NOTE — TELEPHONE ENCOUNTER
----- Message from Oscar Lovett sent at 9/30/2019 12:24 PM CDT -----  Contact: BRUNO ROBERT [[id number]]  Name of Who is Calling: BRUNO ROBERT [[id number]]    What is the request in detail:Patient is requesting a call back in regards to appointment for specimen  Please contact to further discuss and advise      Can the clinic reply by [facility]: No     What Number to Call Back if not in [facility]:   [phone]

## 2019-10-14 ENCOUNTER — TELEPHONE (OUTPATIENT)
Dept: FAMILY MEDICINE | Facility: CLINIC | Age: 79
End: 2019-10-14

## 2019-10-14 DIAGNOSIS — I10 ESSENTIAL HYPERTENSION: Primary | ICD-10-CM

## 2019-10-14 RX ORDER — CANDESARTAN CILEXETIL AND HYDROCHLOROTHIAZIDE 32; 12.5 MG/1; MG/1
1 TABLET ORAL DAILY
Qty: 90 TABLET | Refills: 1 | Status: SHIPPED | OUTPATIENT
Start: 2019-10-14 | End: 2020-04-23 | Stop reason: SDUPTHER

## 2019-10-14 RX ORDER — TROSPIUM CHLORIDE 20 MG/1
20 TABLET, FILM COATED ORAL 2 TIMES DAILY
Qty: 180 TABLET | Refills: 1 | Status: SHIPPED | OUTPATIENT
Start: 2019-10-14 | End: 2019-11-25 | Stop reason: ALTCHOICE

## 2019-10-14 NOTE — TELEPHONE ENCOUNTER
----- Message from Marilyn Guzmán sent at 10/14/2019 10:36 AM CDT -----  Contact: BRUNO ROBERT [[id number]]  Can the clinic reply in [facility]: Prefer a phone call .      Please refill the medication(s) listed below. Please call the patient when the prescription(s) is ready for  at this phone number  [phone]      Medication #1 trospium (SANCTURA) 20 mg Tab tablet    Medication #2 candesartan-hydrochlorothiazid (ATACAND HCT) 32-12.5 mg per tablet      Preferred Pharmacy:  [address] [place] [address] [place] [address]

## 2019-10-24 DIAGNOSIS — I73.9 PERIPHERAL VASCULAR DISEASE, UNSPECIFIED: ICD-10-CM

## 2019-10-24 RX ORDER — CILOSTAZOL 50 MG/1
50 TABLET ORAL 2 TIMES DAILY
Qty: 180 TABLET | Refills: 1 | Status: SHIPPED | OUTPATIENT
Start: 2019-10-24 | End: 2020-04-20

## 2019-10-24 NOTE — TELEPHONE ENCOUNTER
Pt states pharmacy told her medication was discontinued; I informed pt refills were at mail order pharmacy and local pharmacy did not request refill; pt states she is having problems with mail order and would like refill sent to local [facility], she has 2 pills left

## 2019-11-06 RX ORDER — LINAGLIPTIN 5 MG/1
TABLET, FILM COATED ORAL
Qty: 90 TABLET | Refills: 0 | Status: SHIPPED | OUTPATIENT
Start: 2019-11-06 | End: 2020-02-03

## 2019-11-11 ENCOUNTER — LAB VISIT (OUTPATIENT)
Dept: LAB | Facility: HOSPITAL | Age: 79
End: 2019-11-11
Attending: INTERNAL MEDICINE
Payer: MEDICARE

## 2019-11-11 DIAGNOSIS — E11.22 CONTROLLED TYPE 2 DIABETES MELLITUS WITH STAGE 3 CHRONIC KIDNEY DISEASE, WITH LONG-TERM CURRENT USE OF INSULIN: ICD-10-CM

## 2019-11-11 DIAGNOSIS — Z79.4 CONTROLLED TYPE 2 DIABETES MELLITUS WITH STAGE 3 CHRONIC KIDNEY DISEASE, WITH LONG-TERM CURRENT USE OF INSULIN: ICD-10-CM

## 2019-11-11 DIAGNOSIS — N18.30 CONTROLLED TYPE 2 DIABETES MELLITUS WITH STAGE 3 CHRONIC KIDNEY DISEASE, WITH LONG-TERM CURRENT USE OF INSULIN: ICD-10-CM

## 2019-11-11 PROCEDURE — 81001 URINALYSIS AUTO W/SCOPE: CPT

## 2019-11-11 PROCEDURE — 82570 ASSAY OF URINE CREATININE: CPT

## 2019-11-14 LAB
BACTERIA #/AREA URNS AUTO: ABNORMAL /HPF
BILIRUB UR QL STRIP: NEGATIVE
CLARITY UR REFRACT.AUTO: ABNORMAL
COLOR UR AUTO: ABNORMAL
CREAT UR-MCNC: 161 MG/DL (ref 15–325)
GLUCOSE UR QL STRIP: NEGATIVE
HGB UR QL STRIP: NEGATIVE
HYALINE CASTS UR QL AUTO: 0 /LPF
KETONES UR QL STRIP: NEGATIVE
LEUKOCYTE ESTERASE UR QL STRIP: ABNORMAL
MICROSCOPIC COMMENT: ABNORMAL
NITRITE UR QL STRIP: NEGATIVE
PH UR STRIP: 5 [PH] (ref 5–8)
PROT UR QL STRIP: ABNORMAL
PROT UR-MCNC: 25 MG/DL (ref 0–15)
PROT/CREAT UR: 0.16 MG/G{CREAT} (ref 0–0.2)
RBC #/AREA URNS AUTO: 3 /HPF (ref 0–4)
SP GR UR STRIP: 1.01 (ref 1–1.03)
SQUAMOUS #/AREA URNS AUTO: 4 /HPF
URN SPEC COLLECT METH UR: ABNORMAL
WBC #/AREA URNS AUTO: 73 /HPF (ref 0–5)

## 2019-11-25 ENCOUNTER — TELEPHONE (OUTPATIENT)
Dept: FAMILY MEDICINE | Facility: CLINIC | Age: 79
End: 2019-11-25

## 2019-11-25 RX ORDER — OXYBUTYNIN CHLORIDE 15 MG/1
15 TABLET, EXTENDED RELEASE ORAL DAILY
Qty: 90 TABLET | Refills: 3 | Status: SHIPPED | OUTPATIENT
Start: 2019-11-25 | End: 2020-11-19 | Stop reason: SDUPTHER

## 2019-11-25 NOTE — TELEPHONE ENCOUNTER
Patient called to inform  that her insurance will no longer cover medication  - trospium (Sanctura).  Stated the alternatives that will be covered are -  · Oxybutynin tablet 15 mg ER - tier 2  · Solifenacin tablet 10 mg - tier 3  · Myrbetriq tablet 50 mg - tier 3    Please inform patient of medication chosen.  Change will not take effect until 1/1/2020.

## 2019-11-25 NOTE — TELEPHONE ENCOUNTER
----- Message from Lucy Lovett sent at 11/25/2019 10:30 AM CST -----  Contact: Self  Type: Patient Call Back    Who called:Self    What is the request in detail: She states her insurance wont be paying for her bladder medication anymore starting 01/01/2019     trospium (SANCTURA) 20 mg Tab tablet    Can the clinic reply by [facility]?No    Would the patient rather a call back or a response via [facility]? Callback    Best call back number:[phone]    Additional Information:n/a

## 2019-12-09 DIAGNOSIS — E11.22 CONTROLLED TYPE 2 DIABETES MELLITUS WITH STAGE 4 CHRONIC KIDNEY DISEASE, WITH LONG-TERM CURRENT USE OF INSULIN: ICD-10-CM

## 2019-12-09 DIAGNOSIS — Z79.4 CONTROLLED TYPE 2 DIABETES MELLITUS WITH STAGE 4 CHRONIC KIDNEY DISEASE, WITH LONG-TERM CURRENT USE OF INSULIN: ICD-10-CM

## 2019-12-09 DIAGNOSIS — N18.4 CONTROLLED TYPE 2 DIABETES MELLITUS WITH STAGE 4 CHRONIC KIDNEY DISEASE, WITH LONG-TERM CURRENT USE OF INSULIN: ICD-10-CM

## 2019-12-09 RX ORDER — ATORVASTATIN CALCIUM 20 MG/1
20 TABLET, FILM COATED ORAL DAILY
Qty: 90 TABLET | Refills: 2 | Status: SHIPPED | OUTPATIENT
Start: 2019-12-09 | End: 2020-09-09 | Stop reason: SDUPTHER

## 2019-12-23 DIAGNOSIS — I10 ESSENTIAL HYPERTENSION: ICD-10-CM

## 2019-12-23 RX ORDER — CARVEDILOL 12.5 MG/1
12.5 TABLET ORAL 2 TIMES DAILY
Qty: 180 TABLET | Refills: 1 | Status: SHIPPED | OUTPATIENT
Start: 2019-12-23 | End: 2020-06-09 | Stop reason: SDUPTHER

## 2019-12-23 NOTE — TELEPHONE ENCOUNTER
"Last Office Visit Info:   The patient's last visit with Jaky Schmidt MD was on 8/1/2019.    The patient's last visit in current department was on 8/1/2019.      Last refill 9/6/2019    Last CBC Results:   Lab Results   Component Value Date    WBC 5.76 11/11/2019    HGB 9.4 (L) 11/11/2019    HCT 30.6 (L) 11/11/2019     11/11/2019       Last CMP Results  Lab Results   Component Value Date     11/11/2019    K 4.4 11/11/2019     (H) 11/11/2019    CO2 21 (L) 11/11/2019    BUN 26 (H) 11/11/2019    CREATININE 2.1 (H) 11/11/2019    CALCIUM 8.8 11/11/2019    ALBUMIN 3.1 (L) 11/11/2019    AST 16 08/01/2019    ALT 8 (L) 08/01/2019       Last Lipids  Lab Results   Component Value Date    CHOL 105 (L) 08/01/2019    TRIG 81 08/01/2019    HDL 33 (L) 08/01/2019    LDLCALC 55.8 (L) 08/01/2019       Last A1C  Lab Results   Component Value Date    HGBA1C 6.3 (H) 08/01/2019       Last TSH  Lab Results   Component Value Date    TSH 2.371 07/13/2018         Current Med Refills  Medication List with Changes/Refills   Current Medications    ALBUTEROL (PROVENTIL/VENTOLIN HFA) 90 MCG/ACTUATION INHALER    Inhale 2 puffs into the lungs every 6 (six) hours as needed for Wheezing. Rescue       Start Date: 12/12/2018End Date: --    AMLODIPINE (NORVASC) 10 MG TABLET    TAKE 1 TABLET (10 MG TOTAL) BY MOUTH ONCE DAILY.       Start Date: 5/21/2019 End Date: --    ASPIRIN (ECOTRIN) 81 MG EC TABLET    Take 81 mg by mouth once daily.       Start Date: --        End Date: --    ATORVASTATIN (LIPITOR) 20 MG TABLET    TAKE 1 TABLET (20 MG TOTAL) BY MOUTH ONCE DAILY.       Start Date: 12/9/2019 End Date: --    BD ULTRA-FINE BREE PEN NEEDLE 32 GAUGE X 5/32" NDLE    INJECT INSULIN THREE TIMES DAILY       Start Date: 5/6/2019  End Date: --    BLOOD SUGAR DIAGNOSTIC STRP    Test 3 times daily       Start Date: 6/24/2019 End Date: --    BLOOD-GLUCOSE METER KIT    Use as instructed to check blood sugar 3 times daily       Start Date: " "11/2/2018 End Date: 4/12/2020    CANDESARTAN-HYDROCHLOROTHIAZID (ATACAND HCT) 32-12.5 MG PER TABLET    Take 1 tablet by mouth once daily.       Start Date: 10/14/2019End Date: 10/13/2020    CARVEDILOL (COREG) 12.5 MG TABLET    Take 1 tablet (12.5 mg total) by mouth 2 (two) times daily.       Start Date: 9/6/2019  End Date: --    CILOSTAZOL (PLETAL) 50 MG TAB    Take 1 tablet (50 mg total) by mouth 2 (two) times daily.       Start Date: 10/24/2019End Date: 4/21/2020    CLOTRIMAZOLE-BETAMETHASONE 1-0.05% (LOTRISONE) CREAM    Apply topically 2 (two) times daily.       Start Date: 3/24/2015 End Date: --    INSULIN DETEMIR U-100 (LEVEMIR FLEXTOUCH U-100 INSULN) 100 UNIT/ML (3 ML) SUBQ INPN PEN    Inject 30 Units into the skin every evening.       Start Date: 2/11/2019 End Date: 2/11/2020    LANCETS MISC    Test 3 times daily       Start Date: 6/24/2019 End Date: --    LATANOPROST 0.005 % OPHTHALMIC SOLUTION    Place 1 drop into both eyes every evening.       Start Date: 7/24/2014 End Date: --    LEVEMIR FLEXTOUCH U-100 INSULN 100 UNIT/ML (3 ML) INPN PEN    INJECT 32 UNITS INTO THE SKIN EVERY EVENING       Start Date: 9/8/2019  End Date: 9/7/2020    ONDANSETRON (ZOFRAN-ODT) 4 MG TBDL    TAKE 1 TABLET BY MOUTH EVERY 6 HOURS AS NEEDED       Start Date: 3/12/2018 End Date: --    OXYBUTYNIN (DITROPAN XL) 15 MG TR24    Take 1 tablet (15 mg total) by mouth once daily.       Start Date: 11/25/2019End Date: 11/24/2020    PEN NEEDLE, DIABETIC (BD ULTRA-FINE BREE PEN NEEDLE) 32 GAUGE X 5/32" NDLE    INJECT INSULIN THREE TIMES DAILY       Start Date: 6/24/2019 End Date: --    POTASSIUM CITRATE (UROCIT-K 15) 15 MEQ TBSR    Take 15 mEq by mouth once daily.       Start Date: 2/5/2018  End Date: 2/5/2019    TIMOLOL MALEATE 0.5% (TIMOPTIC) 0.5 % DROP    1 drop every morning.        Start Date: 6/27/2014 End Date: --    TRADJENTA 5 MG TAB TABLET    TAKE 1 TABLET BY MOUTH EVERY DAY       Start Date: 11/6/2019 End Date: --    EFRAIN ESTRELLA" 0.004 % OPHTHALMIC SOLUTION           Start Date: 12/10/2018End Date: --    TRAZODONE (DESYREL) 50 MG TABLET    TAKE 1 TABLET (50 MG TOTAL) BY MOUTH EVERY EVENING.       Start Date: 2/4/2019  End Date: 2/4/2020    TRIAMCINOLONE ACETONIDE 0.1% (KENALOG) 0.1 % CREAM    Apply topically 3 (three) times daily. for 10 days       Start Date: 8/21/2018 End Date: 8/31/2018       Order(s) placed per written order guidelines: none    Please advise.

## 2019-12-23 NOTE — TELEPHONE ENCOUNTER
----- Message from Skylar Brunner sent at 12/23/2019 10:14 AM CST -----  Contact: Ana [phone]  Type: RX Refill Request    Who Called: Ana     Refill or New Rx: refill     RX Name and Strength:carvedilol (COREG) 12.5 MG tablet    Is this a 30 day or 90 day RX:30 day     Preferred Pharmacy with phone number:[facility]/PHARMACY #[id number] [facility] [address] [facility]    Would the patient rather a call back or a response via [facility]? Call back     Best Call Back Number:[phone]

## 2019-12-27 ENCOUNTER — TELEPHONE (OUTPATIENT)
Dept: FAMILY MEDICINE | Facility: CLINIC | Age: 79
End: 2019-12-27

## 2019-12-27 NOTE — TELEPHONE ENCOUNTER
----- Message from Dede Rodriguez sent at 12/27/2019 11:03 AM CST -----  Contact: [phone]  Type: RX Refill Request    Who Called: Patient    Have you contacted your pharmacy: Yes, but being told it's not there. And than someone told her it will be filled next week. Pharmacy is giving pt 7 pills today for $15. Please call.    Refill or New Rx: Refill    RX Name and Strength: carvedilol (COREG) 12.5 MG tablet    Is this a 30 day or 90 day RX: 90 day    Preferred Pharmacy with phone number:  .  [facility]/pharmacy #[id number] - [facility] [address] [facility]  [address] [facility] [address]  Phone: [phone] Fax: [phone]    Local or Mail Order: Local    Would the patient rather a call back or a response via [facility]? Call back    Best Call Back Number: [phone]

## 2019-12-27 NOTE — TELEPHONE ENCOUNTER
I spoke to pharmacist and was told they did receive new rx sent in on 12/23/19; states insurance is saying too soon to fill, they gave pt 7 day supply for $15 until able to fill next week; I called and explained to pt; she verbalized understanding

## 2020-01-30 ENCOUNTER — HOSPITAL ENCOUNTER (OUTPATIENT)
Facility: HOSPITAL | Age: 80
Discharge: HOME HEALTH | End: 2020-01-31
Attending: EMERGENCY MEDICINE | Admitting: EMERGENCY MEDICINE
Payer: MEDICARE

## 2020-01-30 DIAGNOSIS — Z79.4 CONTROLLED TYPE 2 DIABETES MELLITUS WITH STAGE 4 CHRONIC KIDNEY DISEASE, WITH LONG-TERM CURRENT USE OF INSULIN: Chronic | ICD-10-CM

## 2020-01-30 DIAGNOSIS — R42 DIZZINESS: ICD-10-CM

## 2020-01-30 DIAGNOSIS — R79.89 ELEVATED D-DIMER: ICD-10-CM

## 2020-01-30 DIAGNOSIS — R06.02 SOB (SHORTNESS OF BREATH): ICD-10-CM

## 2020-01-30 DIAGNOSIS — N39.0 URINARY TRACT INFECTION WITHOUT HEMATURIA, SITE UNSPECIFIED: ICD-10-CM

## 2020-01-30 DIAGNOSIS — N30.00 ACUTE CYSTITIS WITHOUT HEMATURIA: Primary | ICD-10-CM

## 2020-01-30 DIAGNOSIS — E66.01 MORBIDLY OBESE: Chronic | ICD-10-CM

## 2020-01-30 DIAGNOSIS — N18.4 CONTROLLED TYPE 2 DIABETES MELLITUS WITH STAGE 4 CHRONIC KIDNEY DISEASE, WITH LONG-TERM CURRENT USE OF INSULIN: Chronic | ICD-10-CM

## 2020-01-30 DIAGNOSIS — R79.89 POSITIVE D DIMER: ICD-10-CM

## 2020-01-30 DIAGNOSIS — I10 ESSENTIAL HYPERTENSION: Chronic | ICD-10-CM

## 2020-01-30 DIAGNOSIS — E11.22 CONTROLLED TYPE 2 DIABETES MELLITUS WITH STAGE 4 CHRONIC KIDNEY DISEASE, WITH LONG-TERM CURRENT USE OF INSULIN: Chronic | ICD-10-CM

## 2020-01-30 DIAGNOSIS — R53.81 PHYSICAL DEBILITY: Chronic | ICD-10-CM

## 2020-01-30 LAB
ALBUMIN SERPL BCP-MCNC: 3.4 G/DL (ref 3.5–5.2)
ALP SERPL-CCNC: 108 U/L (ref 55–135)
ALT SERPL W/O P-5'-P-CCNC: 7 U/L (ref 10–44)
ANION GAP SERPL CALC-SCNC: 8 MMOL/L (ref 8–16)
AST SERPL-CCNC: 15 U/L (ref 10–40)
BACTERIA #/AREA URNS HPF: ABNORMAL /HPF
BASOPHILS # BLD AUTO: 0.02 K/UL (ref 0–0.2)
BASOPHILS NFR BLD: 0.3 % (ref 0–1.9)
BILIRUB SERPL-MCNC: 0.5 MG/DL (ref 0.1–1)
BILIRUB UR QL STRIP: NEGATIVE
BNP SERPL-MCNC: 44 PG/ML (ref 0–99)
BUN SERPL-MCNC: 26 MG/DL (ref 8–23)
CALCIUM SERPL-MCNC: 9.2 MG/DL (ref 8.7–10.5)
CHLORIDE SERPL-SCNC: 113 MMOL/L (ref 95–110)
CLARITY UR: CLEAR
CO2 SERPL-SCNC: 21 MMOL/L (ref 23–29)
COLOR UR: YELLOW
CREAT SERPL-MCNC: 2.3 MG/DL (ref 0.5–1.4)
D DIMER PPP IA.FEU-MCNC: 2.99 MG/L FEU
DIFFERENTIAL METHOD: ABNORMAL
EOSINOPHIL # BLD AUTO: 0.3 K/UL (ref 0–0.5)
EOSINOPHIL NFR BLD: 4.4 % (ref 0–8)
ERYTHROCYTE [DISTWIDTH] IN BLOOD BY AUTOMATED COUNT: 15.5 % (ref 11.5–14.5)
EST. GFR  (AFRICAN AMERICAN): 22 ML/MIN/1.73 M^2
EST. GFR  (NON AFRICAN AMERICAN): 19 ML/MIN/1.73 M^2
GLUCOSE SERPL-MCNC: 111 MG/DL (ref 70–110)
GLUCOSE UR QL STRIP: NEGATIVE
HCT VFR BLD AUTO: 31.4 % (ref 37–48.5)
HGB BLD-MCNC: 9.8 G/DL (ref 12–16)
HGB UR QL STRIP: ABNORMAL
IMM GRANULOCYTES # BLD AUTO: 0.02 K/UL (ref 0–0.04)
IMM GRANULOCYTES NFR BLD AUTO: 0.3 % (ref 0–0.5)
KETONES UR QL STRIP: NEGATIVE
LEUKOCYTE ESTERASE UR QL STRIP: ABNORMAL
LYMPHOCYTES # BLD AUTO: 1.4 K/UL (ref 1–4.8)
LYMPHOCYTES NFR BLD: 22.1 % (ref 18–48)
MCH RBC QN AUTO: 28.4 PG (ref 27–31)
MCHC RBC AUTO-ENTMCNC: 31.2 G/DL (ref 32–36)
MCV RBC AUTO: 91 FL (ref 82–98)
MICROSCOPIC COMMENT: ABNORMAL
MONOCYTES # BLD AUTO: 0.7 K/UL (ref 0.3–1)
MONOCYTES NFR BLD: 11.1 % (ref 4–15)
NEUTROPHILS # BLD AUTO: 4 K/UL (ref 1.8–7.7)
NEUTROPHILS NFR BLD: 61.8 % (ref 38–73)
NITRITE UR QL STRIP: POSITIVE
NRBC BLD-RTO: 0 /100 WBC
PH UR STRIP: 5 [PH] (ref 5–8)
PLATELET # BLD AUTO: 205 K/UL (ref 150–350)
PMV BLD AUTO: 9.8 FL (ref 9.2–12.9)
POCT GLUCOSE: 103 MG/DL (ref 70–110)
POCT GLUCOSE: 152 MG/DL (ref 70–110)
POTASSIUM SERPL-SCNC: 4.6 MMOL/L (ref 3.5–5.1)
PROT SERPL-MCNC: 7.8 G/DL (ref 6–8.4)
PROT UR QL STRIP: NEGATIVE
RBC # BLD AUTO: 3.45 M/UL (ref 4–5.4)
RBC #/AREA URNS HPF: 0 /HPF (ref 0–4)
SODIUM SERPL-SCNC: 142 MMOL/L (ref 136–145)
SP GR UR STRIP: 1.01 (ref 1–1.03)
SQUAMOUS #/AREA URNS HPF: 3 /HPF
TROPONIN I SERPL DL<=0.01 NG/ML-MCNC: <0.006 NG/ML (ref 0–0.03)
TROPONIN I SERPL DL<=0.01 NG/ML-MCNC: <0.006 NG/ML (ref 0–0.03)
URN SPEC COLLECT METH UR: ABNORMAL
UROBILINOGEN UR STRIP-ACNC: ABNORMAL EU/DL
WBC # BLD AUTO: 6.39 K/UL (ref 3.9–12.7)
WBC #/AREA URNS HPF: 15 /HPF (ref 0–5)
WBC CLUMPS URNS QL MICRO: ABNORMAL

## 2020-01-30 PROCEDURE — 84484 ASSAY OF TROPONIN QUANT: CPT | Mod: 91

## 2020-01-30 PROCEDURE — 96372 THER/PROPH/DIAG INJ SC/IM: CPT | Mod: 59 | Performed by: EMERGENCY MEDICINE

## 2020-01-30 PROCEDURE — 63600175 PHARM REV CODE 636 W HCPCS: Performed by: EMERGENCY MEDICINE

## 2020-01-30 PROCEDURE — 93010 EKG 12-LEAD: ICD-10-PCS | Mod: ,,, | Performed by: INTERNAL MEDICINE

## 2020-01-30 PROCEDURE — 87086 URINE CULTURE/COLONY COUNT: CPT

## 2020-01-30 PROCEDURE — 36415 COLL VENOUS BLD VENIPUNCTURE: CPT

## 2020-01-30 PROCEDURE — 83880 ASSAY OF NATRIURETIC PEPTIDE: CPT

## 2020-01-30 PROCEDURE — 99285 EMERGENCY DEPT VISIT HI MDM: CPT | Mod: 25

## 2020-01-30 PROCEDURE — G0378 HOSPITAL OBSERVATION PER HR: HCPCS

## 2020-01-30 PROCEDURE — 93005 ELECTROCARDIOGRAM TRACING: CPT

## 2020-01-30 PROCEDURE — 80053 COMPREHEN METABOLIC PANEL: CPT

## 2020-01-30 PROCEDURE — 25000003 PHARM REV CODE 250: Performed by: EMERGENCY MEDICINE

## 2020-01-30 PROCEDURE — 85379 FIBRIN DEGRADATION QUANT: CPT

## 2020-01-30 PROCEDURE — 83036 HEMOGLOBIN GLYCOSYLATED A1C: CPT

## 2020-01-30 PROCEDURE — 87077 CULTURE AEROBIC IDENTIFY: CPT

## 2020-01-30 PROCEDURE — 85025 COMPLETE CBC W/AUTO DIFF WBC: CPT

## 2020-01-30 PROCEDURE — P9612 CATHETERIZE FOR URINE SPEC: HCPCS

## 2020-01-30 PROCEDURE — 93010 ELECTROCARDIOGRAM REPORT: CPT | Mod: ,,, | Performed by: INTERNAL MEDICINE

## 2020-01-30 PROCEDURE — 82962 GLUCOSE BLOOD TEST: CPT

## 2020-01-30 PROCEDURE — 87186 SC STD MICRODIL/AGAR DIL: CPT

## 2020-01-30 PROCEDURE — 81000 URINALYSIS NONAUTO W/SCOPE: CPT

## 2020-01-30 PROCEDURE — 96365 THER/PROPH/DIAG IV INF INIT: CPT

## 2020-01-30 PROCEDURE — 87088 URINE BACTERIA CULTURE: CPT

## 2020-01-30 RX ORDER — AMLODIPINE BESYLATE 5 MG/1
10 TABLET ORAL DAILY
Status: DISCONTINUED | OUTPATIENT
Start: 2020-01-31 | End: 2020-01-31 | Stop reason: HOSPADM

## 2020-01-30 RX ORDER — ACETAMINOPHEN 325 MG/1
650 TABLET ORAL EVERY 8 HOURS PRN
Status: DISCONTINUED | OUTPATIENT
Start: 2020-01-30 | End: 2020-01-31 | Stop reason: HOSPADM

## 2020-01-30 RX ORDER — ASPIRIN 325 MG
325 TABLET ORAL
Status: DISCONTINUED | OUTPATIENT
Start: 2020-01-30 | End: 2020-01-30

## 2020-01-30 RX ORDER — ASPIRIN 81 MG/1
81 TABLET ORAL DAILY
Status: DISCONTINUED | OUTPATIENT
Start: 2020-01-31 | End: 2020-01-31 | Stop reason: HOSPADM

## 2020-01-30 RX ORDER — IBUPROFEN 200 MG
24 TABLET ORAL
Status: DISCONTINUED | OUTPATIENT
Start: 2020-01-30 | End: 2020-01-31 | Stop reason: HOSPADM

## 2020-01-30 RX ORDER — CARVEDILOL 6.25 MG/1
12.5 TABLET ORAL 2 TIMES DAILY
Status: DISCONTINUED | OUTPATIENT
Start: 2020-01-30 | End: 2020-01-31 | Stop reason: HOSPADM

## 2020-01-30 RX ORDER — ENOXAPARIN SODIUM 150 MG/ML
1 INJECTION SUBCUTANEOUS
Status: DISCONTINUED | OUTPATIENT
Start: 2020-01-30 | End: 2020-01-31 | Stop reason: HOSPADM

## 2020-01-30 RX ORDER — IBUPROFEN 200 MG
16 TABLET ORAL
Status: DISCONTINUED | OUTPATIENT
Start: 2020-01-30 | End: 2020-01-31 | Stop reason: HOSPADM

## 2020-01-30 RX ORDER — ASPIRIN 325 MG
325 TABLET ORAL
Status: COMPLETED | OUTPATIENT
Start: 2020-01-30 | End: 2020-01-30

## 2020-01-30 RX ORDER — INSULIN ASPART 100 [IU]/ML
0-5 INJECTION, SOLUTION INTRAVENOUS; SUBCUTANEOUS
Status: DISCONTINUED | OUTPATIENT
Start: 2020-01-30 | End: 2020-01-31 | Stop reason: HOSPADM

## 2020-01-30 RX ORDER — PANTOPRAZOLE SODIUM 40 MG/1
40 TABLET, DELAYED RELEASE ORAL DAILY
Status: DISCONTINUED | OUTPATIENT
Start: 2020-01-31 | End: 2020-01-31 | Stop reason: HOSPADM

## 2020-01-30 RX ORDER — GLUCAGON 1 MG
1 KIT INJECTION
Status: DISCONTINUED | OUTPATIENT
Start: 2020-01-30 | End: 2020-01-31 | Stop reason: HOSPADM

## 2020-01-30 RX ORDER — ATORVASTATIN CALCIUM 10 MG/1
20 TABLET, FILM COATED ORAL DAILY
Status: DISCONTINUED | OUTPATIENT
Start: 2020-01-31 | End: 2020-01-31 | Stop reason: HOSPADM

## 2020-01-30 RX ORDER — ONDANSETRON 2 MG/ML
4 INJECTION INTRAMUSCULAR; INTRAVENOUS EVERY 8 HOURS PRN
Status: DISCONTINUED | OUTPATIENT
Start: 2020-01-30 | End: 2020-01-31 | Stop reason: HOSPADM

## 2020-01-30 RX ORDER — SODIUM CHLORIDE 0.9 % (FLUSH) 0.9 %
10 SYRINGE (ML) INJECTION
Status: DISCONTINUED | OUTPATIENT
Start: 2020-01-30 | End: 2020-01-31 | Stop reason: HOSPADM

## 2020-01-30 RX ADMIN — ASPIRIN 325 MG ORAL TABLET 325 MG: 325 PILL ORAL at 01:01

## 2020-01-30 RX ADMIN — CEFTRIAXONE 1 G: 1 INJECTION, SOLUTION INTRAVENOUS at 03:01

## 2020-01-30 RX ADMIN — ENOXAPARIN SODIUM 120 MG: 150 INJECTION SUBCUTANEOUS at 08:01

## 2020-01-30 RX ADMIN — CARVEDILOL 12.5 MG: 6.25 TABLET, FILM COATED ORAL at 08:01

## 2020-01-30 NOTE — SUBJECTIVE & OBJECTIVE
Past Medical History:   Diagnosis Date    Arthritis lower extremities    Asthma     Diabetes mellitus     Gall stones     Hypertension     Kidney stones     Nephrolithiasis 1/22/2016    Obesity     Renal disorder     Retinopathy     Vaginal delivery     x1    Weakness of both lower extremities     uses a cane, rollator & power chair       Past Surgical History:   Procedure Laterality Date    CARDIAC CATHETERIZATION      cataract      CHOLECYSTECTOMY      EYE SURGERY      Rt cataract surgery    HYSTERECTOMY      URETERAL STENT PLACEMENT         Review of patient's allergies indicates:   Allergen Reactions    Adhesive Rash     Paper tape       No current facility-administered medications on file prior to encounter.      Current Outpatient Medications on File Prior to Encounter   Medication Sig    amLODIPine (NORVASC) 10 MG tablet TAKE 1 TABLET (10 MG TOTAL) BY MOUTH ONCE DAILY.    aspirin (ECOTRIN) 81 MG EC tablet Take 81 mg by mouth once daily.    atorvastatin (LIPITOR) 20 MG tablet TAKE 1 TABLET (20 MG TOTAL) BY MOUTH ONCE DAILY.    carvedilol (COREG) 12.5 MG tablet Take 1 tablet (12.5 mg total) by mouth 2 (two) times daily.    cilostazol (PLETAL) 50 MG Tab Take 1 tablet (50 mg total) by mouth 2 (two) times daily.    insulin detemir U-100 (LEVEMIR FLEXTOUCH U-100 INSULN) 100 unit/mL (3 mL) SubQ InPn pen Inject 30 Units into the skin every evening.    latanoprost 0.005 % ophthalmic solution Place 1 drop into both eyes every evening.    LEVEMIR FLEXTOUCH U-100 INSULN 100 unit/mL (3 mL) InPn pen INJECT 32 UNITS INTO THE SKIN EVERY EVENING    oxybutynin (DITROPAN XL) 15 MG TR24 Take 1 tablet (15 mg total) by mouth once daily.    timolol maleate 0.5% (TIMOPTIC) 0.5 % Drop 1 drop every morning.     TRAVATAN Z 0.004 % ophthalmic solution     albuterol (PROVENTIL/VENTOLIN HFA) 90 mcg/actuation inhaler Inhale 2 puffs into the lungs every 6 (six) hours as needed for Wheezing. Rescue    BD  "ULTRA-FINE BREE PEN NEEDLE 32 gauge x 5/32" Ndle INJECT INSULIN THREE TIMES DAILY    blood sugar diagnostic Strp Test 3 times daily    blood-glucose meter kit Use as instructed to check blood sugar 3 times daily    candesartan-hydrochlorothiazid (ATACAND HCT) 32-12.5 mg per tablet Take 1 tablet by mouth once daily.    clotrimazole-betamethasone 1-0.05% (LOTRISONE) cream Apply topically 2 (two) times daily.    lancets Misc Test 3 times daily    ondansetron (ZOFRAN-ODT) 4 MG TbDL TAKE 1 TABLET BY MOUTH EVERY 6 HOURS AS NEEDED    pen needle, diabetic (BD ULTRA-FINE BREE PEN NEEDLE) 32 gauge x 5/32" Ndle INJECT INSULIN THREE TIMES DAILY    potassium citrate (UROCIT-K 15) 15 mEq TbSR Take 15 mEq by mouth once daily.    TRADJENTA 5 mg Tab tablet TAKE 1 TABLET BY MOUTH EVERY DAY    traZODone (DESYREL) 50 MG tablet TAKE 1 TABLET (50 MG TOTAL) BY MOUTH EVERY EVENING.    triamcinolone acetonide 0.1% (KENALOG) 0.1 % cream Apply topically 3 (three) times daily. for 10 days     Family History     Problem Relation (Age of Onset)    Cancer Sister    Diabetes Brother    Hypertension Father    Kidney failure Mother        Tobacco Use    Smoking status: Never Smoker    Smokeless tobacco: Never Used   Substance and Sexual Activity    Alcohol use: No    Drug use: No    Sexual activity: Not Currently     Partners: Male     Review of Systems   Constitutional: Negative for chills, fatigue and fever.   Eyes: Negative for photophobia and visual disturbance.   Respiratory: Positive for cough and shortness of breath.    Cardiovascular: Negative for chest pain, palpitations and leg swelling.   Gastrointestinal: Positive for nausea. Negative for abdominal pain, diarrhea and vomiting.   Genitourinary: Negative for dysuria, frequency and urgency.   Skin: Negative for pallor, rash and wound.   Neurological: Positive for dizziness and light-headedness. Negative for headaches.   Psychiatric/Behavioral: Negative for confusion and " decreased concentration.     Objective:     Vital Signs (Most Recent):  Temp: 98.6 °F (37 °C) (01/30/20 1231)  Pulse: 74 (01/30/20 1701)  Resp: 18 (01/30/20 1531)  BP: (!) 109/53 (01/30/20 1701)  SpO2: 97 % (01/30/20 1659) Vital Signs (24h Range):  Temp:  [98.6 °F (37 °C)] 98.6 °F (37 °C)  Pulse:  [65-87] 74  Resp:  [16-23] 18  SpO2:  [97 %-99 %] 97 %  BP: (103-135)/(53-83) 109/53     Weight: 117.9 kg (260 lb)  Body mass index is 47.55 kg/m².    Physical Exam   Constitutional: She is oriented to person, place, and time. She appears well-developed and well-nourished. No distress.   HENT:   Head: Normocephalic and atraumatic.   Right Ear: External ear normal.   Left Ear: External ear normal.   Nose: Nose normal.   Mouth/Throat: Oropharynx is clear and moist.   Eyes: Pupils are equal, round, and reactive to light. Conjunctivae and EOM are normal.   Neck: Normal range of motion. Neck supple.   Cardiovascular: Normal rate, regular rhythm and intact distal pulses.   Pulmonary/Chest: Effort normal and breath sounds normal. No respiratory distress. She has no wheezes.   Abdominal: Soft. Bowel sounds are normal. She exhibits no distension. There is no tenderness.   No palpable hepatomegaly or splenomegaly    Musculoskeletal: Normal range of motion. She exhibits no edema or tenderness.   Neurological: She is alert and oriented to person, place, and time.   Skin: Skin is warm and dry.   Psychiatric: She has a normal mood and affect. Thought content normal.   Nursing note and vitals reviewed.        CRANIAL NERVES     CN III, IV, VI   Pupils are equal, round, and reactive to light.  Extraocular motions are normal.        Significant Labs: All pertinent labs within the past 24 hours have been reviewed.    Significant Imaging: I have reviewed all pertinent imaging results/findings within the past 24 hours.

## 2020-01-30 NOTE — ASSESSMENT & PLAN NOTE
Complaint of dyspnea with elevated D-dimer, V Q scan and extremity ultrasound pending, she was empirically started on full-dose enoxaparin in the ED.

## 2020-01-30 NOTE — HPI
79 y.o. female with HTN, PVD, DM2, physical debility, and morbid obesity presents with a complaint of dizziness and lightheadedness that began acutely this morning.  She denies a sensation of the room spinning.  Lightheadedness associated with nausea, cough, and dyspnea on exertion.  No attempted self treatment.  Denies fever, chills, chest pain, palpitations, emesis, diarrhea, abdominal pain, bloody black stool.  In the ED, as evidence of infection on her urinalysis as well as an elevated D-dimer.  Given her degree of renal function V/Q scan and extremity ultrasounds were ordered and are currently pending.  Urine culture obtained, IV antibiotics initiated.  Placed in observation.

## 2020-01-30 NOTE — ED TRIAGE NOTES
"Pt presents to ED via EMS from home complaining of dizziness starting this AM. Pt denies falling. Pt reports she still feels "a little dizzy," worse with head movement. Pt reports transient right eye blurred vision, is blind in her left eye. "I felt like I was gonna fall out"    Pt denies N/V/D, SOB, CP, pain anywhere to body.     Pt is AAOx4, able to verbalize and follow commands, ambulates with cane, walker, wheelchair      "

## 2020-01-30 NOTE — ED PROVIDER NOTES
"Encounter Date: 1/30/2020    SCRIBE #1 NOTE: I, Tommy Escobedo, am scribing for, and in the presence of,  Char Voss MD. I have scribed the following portions of the note - Other sections scribed: HPI, ROS.       History     Chief Complaint   Patient presents with    Dizziness     pt reports dizziness and lightheadedness. "feeling like I will pass out" since this morning. no neuro deficits noted at this time.      Time seen by provider: 12:44 PM    This is a 79 y.o. female with self-reported history of atrial fibrillation who presents with complaint of dizziness described as "like passing out and knees going weak" Patient denies room-spinning. She reports mild headache. She states that at initial onset of her dizziness, she also had nausea and blurred vision which are now resolved. Patient also reports shortness of breath when walking small distances in her house, as well as non-productive cough for the last few days. Denies chest pain, numbness, vomiting, dysuria, history of blood clots. She reports using Aspirin but no other blood thinners.    The history is provided by the patient.     Review of patient's allergies indicates:   Allergen Reactions    Adhesive Rash     Paper tape     Past Medical History:   Diagnosis Date    Arthritis lower extremities    Asthma     Diabetes mellitus     Gall stones     Hypertension     Kidney stones     Nephrolithiasis 1/22/2016    Obesity     Renal disorder     Retinopathy     Vaginal delivery     x1     Past Surgical History:   Procedure Laterality Date    CARDIAC CATHETERIZATION      cataract      CHOLECYSTECTOMY      EYE SURGERY      Rt cataract surgery    HYSTERECTOMY      URETERAL STENT PLACEMENT       Family History   Problem Relation Age of Onset    Kidney failure Mother     Hypertension Father     Diabetes Brother     Cancer Sister      Social History     Tobacco Use    Smoking status: Never Smoker    Smokeless tobacco: Never Used   Substance Use " Topics    Alcohol use: No    Drug use: No     Review of Systems   Constitutional: Negative for fever.   HENT: Negative for sore throat.    Eyes: Positive for visual disturbance (blurred vision, resolved).   Respiratory: Positive for cough and shortness of breath (dyspnea on exertion).    Cardiovascular: Negative for chest pain.   Gastrointestinal: Positive for nausea (resolved).   Genitourinary: Negative for dysuria.   Musculoskeletal: Negative for back pain.   Skin: Negative for rash.   Neurological: Positive for dizziness (like passing out, no room-spinning) and headaches. Negative for weakness.   Hematological: Does not bruise/bleed easily.       Physical Exam     Initial Vitals [01/30/20 1231]   BP Pulse Resp Temp SpO2   127/83 87 16 98.6 °F (37 °C) 98 %      MAP       --         Physical Exam    Nursing note and vitals reviewed.  Constitutional: She is not diaphoretic. No distress.   Obese.    HENT:   Head: Normocephalic and atraumatic.   Mouth/Throat: Oropharynx is clear and moist.   Eyes: No scleral icterus.   Left eye cataract.    Neck: Normal range of motion. Neck supple. No JVD present.   Cardiovascular: Normal rate and intact distal pulses.   Irregularly irregular rhythm.    Pulmonary/Chest: No stridor. No respiratory distress.   Rales to bilateral bases.    Abdominal: Soft. Bowel sounds are normal. She exhibits no distension. There is no tenderness.   Musculoskeletal: Normal range of motion. She exhibits no tenderness.   Symmetrical lower extremity edema.    Neurological: She is alert. She has normal strength. No cranial nerve deficit or sensory deficit. GCS score is 15. GCS eye subscore is 4. GCS verbal subscore is 5. GCS motor subscore is 6.   Skin: Skin is warm and dry.   Psychiatric: She has a normal mood and affect.         ED Course   Procedures  Labs Reviewed   CBC W/ AUTO DIFFERENTIAL - Abnormal; Notable for the following components:       Result Value    RBC 3.45 (*)     Hemoglobin 9.8 (*)      Hematocrit 31.4 (*)     Mean Corpuscular Hemoglobin Conc 31.2 (*)     RDW 15.5 (*)     All other components within normal limits   COMPREHENSIVE METABOLIC PANEL - Abnormal; Notable for the following components:    Chloride 113 (*)     CO2 21 (*)     Glucose 111 (*)     BUN, Bld 26 (*)     Creatinine 2.3 (*)     Albumin 3.4 (*)     ALT 7 (*)     eGFR if  22 (*)     eGFR if non  19 (*)     All other components within normal limits   D DIMER, QUANTITATIVE - Abnormal; Notable for the following components:    D-Dimer 2.99 (*)     All other components within normal limits   URINALYSIS, REFLEX TO URINE CULTURE - Abnormal; Notable for the following components:    Occult Blood UA 1+ (*)     Nitrite, UA Positive (*)     Urobilinogen, UA 2.0-3.0 (*)     Leukocytes, UA Trace (*)     All other components within normal limits    Narrative:     Preferred Collection Type->Urine, Clean Catch   URINALYSIS MICROSCOPIC - Abnormal; Notable for the following components:    WBC, UA 15 (*)     WBC Clumps, UA Few (*)     Bacteria Many (*)     All other components within normal limits    Narrative:     Preferred Collection Type->Urine, Clean Catch   CULTURE, URINE   TROPONIN I   B-TYPE NATRIURETIC PEPTIDE   TROPONIN I   POCT GLUCOSE   POCT GLUCOSE MONITORING CONTINUOUS     EKG Readings: (Independently Interpreted)   Initial Reading: No STEMI. Rhythm: Normal Sinus Rhythm. Heart Rate: 67. Conduction: 1st Degree AV Block. ST Segments: Normal ST Segments.   Nonspecific T-wave flattening       Imaging Results          X-Ray Chest AP Portable (Final result)  Result time 01/30/20 13:45:01    Final result by Pancho Jay MD (01/30/20 13:45:01)                 Impression:      See above      Electronically signed by: Pancho Jay MD  Date:    01/30/2020  Time:    13:45             Narrative:    EXAMINATION:  XR CHEST AP PORTABLE    CLINICAL HISTORY:  dizziness;    TECHNIQUE:  Single frontal view of the  chest was performed.    COMPARISON:  06/03/2016    FINDINGS:  Cardiac size is mildly enlarged.  Lungs are clear without infiltrate or vascular congestion.                               CT Head Without Contrast (Final result)  Result time 01/30/20 13:36:47    Final result by Jayjay Romero MD (01/30/20 13:36:47)                 Impression:      No acute large vascular territory infarct or intracranial hemorrhage definitively seen.  Further evaluation/follow-up as warranted.    Generalized cerebral volume loss and supratentorial white matter chronic small vessel ischemic change.      Electronically signed by: Jayjay Romero MD  Date:    01/30/2020  Time:    13:36             Narrative:    EXAMINATION:  CT HEAD WITHOUT CONTRAST    CLINICAL HISTORY:  Dizziness;    TECHNIQUE:  Low dose axial CT images obtained throughout the head without intravenous contrast. Sagittal and coronal reconstructions were performed.    COMPARISON:  Head CT 11/25/2011 and MRI brain 11/26/2011    FINDINGS:  Beam hardening with streak artifact somewhat limits evaluation, even with repeat scanning.    Intracranial compartment:    Generalized cerebral volume loss.  The ventricles are midline and stable in size and configuration without distortion by mass effect or acute hydrocephalus.  No extra-axial blood or fluid collections.    Mild degree of patchy hypoattenuation of the bilateral subcortical and periventricular white matter, likely sequela of chronic small vessel ischemic change.  No definite new focal areas of abnormal parenchymal attenuation.  Bilateral basal ganglia calcifications and skull base atherosclerotic vascular calcifications noted.  No parenchymal mass, hemorrhage, edema or major vascular distribution infarct.    Skull/extracranial contents (limited evaluation): No fracture. Mastoid air cells and paranasal sinuses are essentially clear.  Chronic left globe phthisis bulbi again noted.                                 Medical  Decision Making:   History:   Old Medical Records: I decided to obtain old medical records.  Old Records Summarized: records from clinic visits.       <> Summary of Records: Last seen by PMD in 08/2019  Differential Diagnosis:   My initial differential diagnoses include but are not limited to hyperglycemia, dehydration, electrolyte abnormality, arrhythmia, ACS, CVA.   Independently Interpreted Test(s):   I have ordered and independently interpreted EKG Reading(s) - see prior notes  Clinical Tests:   Lab Tests: Ordered and Reviewed  Radiological Study: Ordered and Reviewed  Medical Tests: Ordered and Reviewed  Other:   I have discussed this case with another health care provider.       <> Summary of the Discussion: I have discussed the patient's presentation and workup with the hospitalist who agrees with placing the patient in the hospital.  I have placed orders for the hospitalist.     This chart was completed using dictation software, as a result there may be some transcription errors.              Scribe Attestation:   Scribe #1: I performed the above scribed service and the documentation accurately describes the services I performed. I attest to the accuracy of the note.            ED Course as of Jan 30 1613   Thu Jan 30, 2020   1311 On initial assessment patient is afebrile and not toxic appearing.  She complains of dizziness that feels like she knee have have she denies vertiginous symptoms.  She does not appear to obvious focal neurological deficits.  Vital signs are reassuring.  Unclear etiology of patient's symptoms.  Will obtain lab testing and imaging to further evaluate.    [SG]   1550 Lab significantFor nitrite positive UTI.  Patient has renal insufficiency that is consistent with her baseline.  D-dimer is elevated.  Unclear if this is related to PE or DVT.  Patient reports history of atrial fibrillation.  On initial assessment she appear to have an irregular pulse however EKG appears to be sinus  rhythm. Patient denies being on anticoagulation for atrial fibrillation she reports she only takes aspirin.  Given patient's lower extremity edema and complaints of dyspnea on exertion will anticoagulate empirically pending lower extremity venous ultrasounds and V/Q scan.  Will start on ceftriaxone for UTI.  Patient to be placed in observation for further management and to rule out ACS    [SG]      ED Course User Index  [SG] Char Voss MD                Clinical Impression:       ICD-10-CM ICD-9-CM   1. Dizziness R42 780.4   2. Urinary tract infection without hematuria, site unspecified N39.0 599.0   3. Positive D dimer R79.89 790.92   4. SOB (shortness of breath) R06.02 786.05         Disposition:   Disposition: Placed in Observation  Condition: Stable                 I, Char Voss , personally performed the services described in this documentation. All medical record entries made by the scribe were at my direction and in my presence.  I have reviewed the chart and agree that the record reflects my personal performance and is accurate and complete.       Char Voss MD  01/30/20 [id number]

## 2020-01-31 VITALS
RESPIRATION RATE: 18 BRPM | OXYGEN SATURATION: 95 % | TEMPERATURE: 98 F | SYSTOLIC BLOOD PRESSURE: 129 MMHG | DIASTOLIC BLOOD PRESSURE: 59 MMHG | WEIGHT: 252 LBS | BODY MASS INDEX: 46.38 KG/M2 | HEIGHT: 62 IN | HEART RATE: 68 BPM

## 2020-01-31 LAB
ANION GAP SERPL CALC-SCNC: 7 MMOL/L (ref 8–16)
AORTIC ROOT ANNULUS: 3.29 CM
AORTIC VALVE CUSP SEPERATION: 1.95 CM
ASCENDING AORTA: 3.09 CM
AV INDEX (PROSTH): 0.6
AV MEAN GRADIENT: 13 MMHG
AV PEAK GRADIENT: 23 MMHG
AV VALVE AREA: 2.17 CM2
AV VELOCITY RATIO: 0.58
BASOPHILS # BLD AUTO: 0.02 K/UL (ref 0–0.2)
BASOPHILS NFR BLD: 0.4 % (ref 0–1.9)
BSA FOR ECHO PROCEDURE: 2.24 M2
BUN SERPL-MCNC: 24 MG/DL (ref 8–23)
CALCIUM SERPL-MCNC: 8.9 MG/DL (ref 8.7–10.5)
CHLORIDE SERPL-SCNC: 114 MMOL/L (ref 95–110)
CO2 SERPL-SCNC: 21 MMOL/L (ref 23–29)
CREAT SERPL-MCNC: 2 MG/DL (ref 0.5–1.4)
CV ECHO LV RWT: 0.68 CM
DIFFERENTIAL METHOD: ABNORMAL
DOP CALC AO PEAK VEL: 2.38 M/S
DOP CALC AO VTI: 57.74 CM
DOP CALC LVOT AREA: 3.6 CM2
DOP CALC LVOT DIAMETER: 2.15 CM
DOP CALC LVOT PEAK VEL: 1.39 M/S
DOP CALC LVOT STROKE VOLUME: 125.52 CM3
DOP CALCLVOT PEAK VEL VTI: 34.59 CM
E WAVE DECELERATION TIME: 307.64 MSEC
E/A RATIO: 1.01
E/E' RATIO: 23.67 M/S
ECHO LV POSTERIOR WALL: 1.42 CM (ref 0.6–1.1)
EOSINOPHIL # BLD AUTO: 0.3 K/UL (ref 0–0.5)
EOSINOPHIL NFR BLD: 4.5 % (ref 0–8)
ERYTHROCYTE [DISTWIDTH] IN BLOOD BY AUTOMATED COUNT: 15.6 % (ref 11.5–14.5)
EST. GFR  (AFRICAN AMERICAN): 26 ML/MIN/1.73 M^2
EST. GFR  (NON AFRICAN AMERICAN): 23 ML/MIN/1.73 M^2
ESTIMATED AVG GLUCOSE: 128 MG/DL (ref 68–131)
FRACTIONAL SHORTENING: 36 % (ref 28–44)
GLUCOSE SERPL-MCNC: 132 MG/DL (ref 70–110)
HBA1C MFR BLD HPLC: 6.1 % (ref 4–5.6)
HCT VFR BLD AUTO: 28.8 % (ref 37–48.5)
HGB BLD-MCNC: 8.9 G/DL (ref 12–16)
IMM GRANULOCYTES # BLD AUTO: 0.02 K/UL (ref 0–0.04)
IMM GRANULOCYTES NFR BLD AUTO: 0.4 % (ref 0–0.5)
INTERVENTRICULAR SEPTUM: 1.36 CM (ref 0.6–1.1)
IVRT: 0.09 MSEC
LA MAJOR: 5.22 CM
LA MINOR: 5.23 CM
LA WIDTH: 4.02 CM
LEFT ATRIUM SIZE: 3.27 CM
LEFT ATRIUM VOLUME INDEX: 27.7 ML/M2
LEFT ATRIUM VOLUME: 58.38 CM3
LEFT INTERNAL DIMENSION IN SYSTOLE: 2.69 CM (ref 2.1–4)
LEFT VENTRICLE DIASTOLIC VOLUME INDEX: 36.97 ML/M2
LEFT VENTRICLE DIASTOLIC VOLUME: 77.97 ML
LEFT VENTRICLE MASS INDEX: 105 G/M2
LEFT VENTRICLE SYSTOLIC VOLUME INDEX: 12.7 ML/M2
LEFT VENTRICLE SYSTOLIC VOLUME: 26.7 ML
LEFT VENTRICULAR INTERNAL DIMENSION IN DIASTOLE: 4.19 CM (ref 3.5–6)
LEFT VENTRICULAR MASS: 221.12 G
LV LATERAL E/E' RATIO: 20.29 M/S
LV SEPTAL E/E' RATIO: 28.4 M/S
LYMPHOCYTES # BLD AUTO: 1.2 K/UL (ref 1–4.8)
LYMPHOCYTES NFR BLD: 21.4 % (ref 18–48)
MCH RBC QN AUTO: 28.6 PG (ref 27–31)
MCHC RBC AUTO-ENTMCNC: 30.9 G/DL (ref 32–36)
MCV RBC AUTO: 93 FL (ref 82–98)
MONOCYTES # BLD AUTO: 0.5 K/UL (ref 0.3–1)
MONOCYTES NFR BLD: 9.7 % (ref 4–15)
MV PEAK A VEL: 1.41 M/S
MV PEAK E VEL: 1.42 M/S
NEUTROPHILS # BLD AUTO: 3.6 K/UL (ref 1.8–7.7)
NEUTROPHILS NFR BLD: 63.6 % (ref 38–73)
NRBC BLD-RTO: 0 /100 WBC
PISA TR MAX VEL: 1.06 M/S
PLATELET # BLD AUTO: 188 K/UL (ref 150–350)
PMV BLD AUTO: 9.4 FL (ref 9.2–12.9)
POCT GLUCOSE: 103 MG/DL (ref 70–110)
POCT GLUCOSE: 109 MG/DL (ref 70–110)
POCT GLUCOSE: 121 MG/DL (ref 70–110)
POCT GLUCOSE: 135 MG/DL (ref 70–110)
POTASSIUM SERPL-SCNC: 4.3 MMOL/L (ref 3.5–5.1)
PULM VEIN S/D RATIO: 1.28
PV PEAK D VEL: 0.57 M/S
PV PEAK S VEL: 0.73 M/S
PV PEAK VELOCITY: 1.21 CM/S
RA MAJOR: 5.42 CM
RA PRESSURE: 15 MMHG
RA WIDTH: 3.57 CM
RBC # BLD AUTO: 3.11 M/UL (ref 4–5.4)
RIGHT VENTRICULAR END-DIASTOLIC DIMENSION: 3.5 CM
RV TISSUE DOPPLER FREE WALL SYSTOLIC VELOCITY 1 (APICAL 4 CHAMBER VIEW): 15.71 CM/S
SINUS: 3.1 CM
SODIUM SERPL-SCNC: 142 MMOL/L (ref 136–145)
STJ: 2.77 CM
TDI LATERAL: 0.07 M/S
TDI SEPTAL: 0.05 M/S
TDI: 0.06 M/S
TR MAX PG: 4 MMHG
TRICUSPID ANNULAR PLANE SYSTOLIC EXCURSION: 2.17 CM
TROPONIN I SERPL DL<=0.01 NG/ML-MCNC: <0.006 NG/ML (ref 0–0.03)
TV REST PULMONARY ARTERY PRESSURE: 19 MMHG
WBC # BLD AUTO: 5.57 K/UL (ref 3.9–12.7)

## 2020-01-31 PROCEDURE — 94640 AIRWAY INHALATION TREATMENT: CPT

## 2020-01-31 PROCEDURE — 80048 BASIC METABOLIC PNL TOTAL CA: CPT

## 2020-01-31 PROCEDURE — G0378 HOSPITAL OBSERVATION PER HR: HCPCS

## 2020-01-31 PROCEDURE — 25000242 PHARM REV CODE 250 ALT 637 W/ HCPCS: Performed by: HOSPITALIST

## 2020-01-31 PROCEDURE — 96375 TX/PRO/DX INJ NEW DRUG ADDON: CPT | Performed by: EMERGENCY MEDICINE

## 2020-01-31 PROCEDURE — 36415 COLL VENOUS BLD VENIPUNCTURE: CPT

## 2020-01-31 PROCEDURE — 96376 TX/PRO/DX INJ SAME DRUG ADON: CPT | Mod: 59 | Performed by: EMERGENCY MEDICINE

## 2020-01-31 PROCEDURE — 85025 COMPLETE CBC W/AUTO DIFF WBC: CPT

## 2020-01-31 PROCEDURE — 25000003 PHARM REV CODE 250: Performed by: EMERGENCY MEDICINE

## 2020-01-31 PROCEDURE — 63600175 PHARM REV CODE 636 W HCPCS: Performed by: NURSE PRACTITIONER

## 2020-01-31 PROCEDURE — 84484 ASSAY OF TROPONIN QUANT: CPT

## 2020-01-31 PROCEDURE — 63600175 PHARM REV CODE 636 W HCPCS: Performed by: EMERGENCY MEDICINE

## 2020-01-31 RX ORDER — LORAZEPAM 2 MG/ML
0.25 INJECTION INTRAMUSCULAR
Status: DISCONTINUED | OUTPATIENT
Start: 2020-01-31 | End: 2020-01-31 | Stop reason: HOSPADM

## 2020-01-31 RX ORDER — IPRATROPIUM BROMIDE AND ALBUTEROL SULFATE 2.5; .5 MG/3ML; MG/3ML
3 SOLUTION RESPIRATORY (INHALATION) EVERY 4 HOURS PRN
Status: DISCONTINUED | OUTPATIENT
Start: 2020-01-31 | End: 2020-01-31 | Stop reason: HOSPADM

## 2020-01-31 RX ORDER — CEPHALEXIN 500 MG/1
500 CAPSULE ORAL EVERY 12 HOURS
Qty: 14 CAPSULE | Refills: 0 | Status: SHIPPED | OUTPATIENT
Start: 2020-01-31 | End: 2020-02-07

## 2020-01-31 RX ORDER — FUROSEMIDE 10 MG/ML
20 INJECTION INTRAMUSCULAR; INTRAVENOUS ONCE
Status: COMPLETED | OUTPATIENT
Start: 2020-01-31 | End: 2020-01-31

## 2020-01-31 RX ADMIN — CARVEDILOL 12.5 MG: 6.25 TABLET, FILM COATED ORAL at 08:01

## 2020-01-31 RX ADMIN — ATORVASTATIN CALCIUM 20 MG: 10 TABLET, FILM COATED ORAL at 08:01

## 2020-01-31 RX ADMIN — IPRATROPIUM BROMIDE AND ALBUTEROL SULFATE 3 ML: .5; 2.5 SOLUTION RESPIRATORY (INHALATION) at 01:01

## 2020-01-31 RX ADMIN — ASPIRIN 81 MG: 81 TABLET ORAL at 08:01

## 2020-01-31 RX ADMIN — FUROSEMIDE 20 MG: 10 INJECTION, SOLUTION INTRAMUSCULAR; INTRAVENOUS at 06:01

## 2020-01-31 RX ADMIN — PANTOPRAZOLE SODIUM 40 MG: 40 TABLET, DELAYED RELEASE ORAL at 08:01

## 2020-01-31 RX ADMIN — CEFTRIAXONE 1 G: 1 INJECTION, SOLUTION INTRAVENOUS at 02:01

## 2020-01-31 RX ADMIN — LORAZEPAM 0.25 MG: 2 INJECTION INTRAMUSCULAR; INTRAVENOUS at 04:01

## 2020-01-31 RX ADMIN — AMLODIPINE BESYLATE 10 MG: 5 TABLET ORAL at 08:01

## 2020-01-31 NOTE — H&P
"[facility] Hospital Medicine  History & Physical    Patient Name: Ana James  MRN: [id number]  Admission Date: 1/30/2020  Attending Physician: Theresa Markham MD   Primary Care Provider: Jaky Schmidt MD         Patient information was obtained from patient, past medical records and ER records.     Subjective:     Principal Problem:Acute cystitis without hematuria    Chief Complaint:   Chief Complaint   Patient presents with    Dizziness     pt reports dizziness and lightheadedness. "feeling like I will pass out" since this morning. no neuro deficits noted at this time.         HPI: 79 y.o. female with HTN, PVD, DM2, physical debility, and morbid obesity presents with a complaint of dizziness and lightheadedness that began acutely this morning.  She denies a sensation of the room spinning.  Lightheadedness associated with nausea, cough, and dyspnea on exertion.  No attempted self treatment.  Denies fever, chills, chest pain, palpitations, emesis, diarrhea, abdominal pain, bloody black stool.  In the ED, as evidence of infection on her urinalysis as well as an elevated D-dimer.  Given her degree of renal function V/Q scan and extremity ultrasounds were ordered and are currently pending.  Urine culture obtained, IV antibiotics initiated.  Placed in observation.    Past Medical History:   Diagnosis Date    Arthritis lower extremities    Asthma     Diabetes mellitus     Gall stones     Hypertension     Kidney stones     Nephrolithiasis 1/22/2016    Obesity     Renal disorder     Retinopathy     Vaginal delivery     x1    Weakness of both lower extremities     uses a cane, rollator & power chair       Past Surgical History:   Procedure Laterality Date    CARDIAC CATHETERIZATION      cataract      CHOLECYSTECTOMY      EYE SURGERY      Rt cataract surgery    HYSTERECTOMY      URETERAL STENT PLACEMENT         Review of patient's allergies indicates:   Allergen Reactions    " "Adhesive Rash     Paper tape       No current facility-administered medications on file prior to encounter.      Current Outpatient Medications on File Prior to Encounter   Medication Sig    amLODIPine (NORVASC) 10 MG tablet TAKE 1 TABLET (10 MG TOTAL) BY MOUTH ONCE DAILY.    aspirin (ECOTRIN) 81 MG EC tablet Take 81 mg by mouth once daily.    atorvastatin (LIPITOR) 20 MG tablet TAKE 1 TABLET (20 MG TOTAL) BY MOUTH ONCE DAILY.    carvedilol (COREG) 12.5 MG tablet Take 1 tablet (12.5 mg total) by mouth 2 (two) times daily.    cilostazol (PLETAL) 50 MG Tab Take 1 tablet (50 mg total) by mouth 2 (two) times daily.    insulin detemir U-100 (LEVEMIR FLEXTOUCH U-100 INSULN) 100 unit/mL (3 mL) SubQ InPn pen Inject 30 Units into the skin every evening.    latanoprost 0.005 % ophthalmic solution Place 1 drop into both eyes every evening.    LEVEMIR FLEXTOUCH U-100 INSULN 100 unit/mL (3 mL) InPn pen INJECT 32 UNITS INTO THE SKIN EVERY EVENING    oxybutynin (DITROPAN XL) 15 MG TR24 Take 1 tablet (15 mg total) by mouth once daily.    timolol maleate 0.5% (TIMOPTIC) 0.5 % Drop 1 drop every morning.     TRAVATAN Z 0.004 % ophthalmic solution     albuterol (PROVENTIL/VENTOLIN HFA) 90 mcg/actuation inhaler Inhale 2 puffs into the lungs every 6 (six) hours as needed for Wheezing. Rescue    BD ULTRA-FINE BREE PEN NEEDLE 32 gauge x 5/32" Ndle INJECT INSULIN THREE TIMES DAILY    blood sugar diagnostic Strp Test 3 times daily    blood-glucose meter kit Use as instructed to check blood sugar 3 times daily    candesartan-hydrochlorothiazid (ATACAND HCT) 32-12.5 mg per tablet Take 1 tablet by mouth once daily.    clotrimazole-betamethasone 1-0.05% (LOTRISONE) cream Apply topically 2 (two) times daily.    lancets Misc Test 3 times daily    ondansetron (ZOFRAN-ODT) 4 MG TbDL TAKE 1 TABLET BY MOUTH EVERY 6 HOURS AS NEEDED    pen needle, diabetic (BD ULTRA-FINE BREE PEN NEEDLE) 32 gauge x 5/32" Ndle INJECT INSULIN THREE " TIMES DAILY    potassium citrate (UROCIT-K 15) 15 mEq TbSR Take 15 mEq by mouth once daily.    TRADJENTA 5 mg Tab tablet TAKE 1 TABLET BY MOUTH EVERY DAY    traZODone (DESYREL) 50 MG tablet TAKE 1 TABLET (50 MG TOTAL) BY MOUTH EVERY EVENING.    triamcinolone acetonide 0.1% (KENALOG) 0.1 % cream Apply topically 3 (three) times daily. for 10 days     Family History     Problem Relation (Age of Onset)    Cancer Sister    Diabetes Brother    Hypertension Father    Kidney failure Mother        Tobacco Use    Smoking status: Never Smoker    Smokeless tobacco: Never Used   Substance and Sexual Activity    Alcohol use: No    Drug use: No    Sexual activity: Not Currently     Partners: Male     Review of Systems   Constitutional: Negative for chills, fatigue and fever.   Eyes: Negative for photophobia and visual disturbance.   Respiratory: Positive for cough and shortness of breath.    Cardiovascular: Negative for chest pain, palpitations and leg swelling.   Gastrointestinal: Positive for nausea. Negative for abdominal pain, diarrhea and vomiting.   Genitourinary: Negative for dysuria, frequency and urgency.   Skin: Negative for pallor, rash and wound.   Neurological: Positive for dizziness and light-headedness. Negative for headaches.   Psychiatric/Behavioral: Negative for confusion and decreased concentration.     Objective:     Vital Signs (Most Recent):  Temp: 98.6 °F (37 °C) (01/30/20 1231)  Pulse: 74 (01/30/20 1701)  Resp: 18 (01/30/20 1531)  BP: (!) 109/53 (01/30/20 1701)  SpO2: 97 % (01/30/20 1659) Vital Signs (24h Range):  Temp:  [98.6 °F (37 °C)] 98.6 °F (37 °C)  Pulse:  [65-87] 74  Resp:  [16-23] 18  SpO2:  [97 %-99 %] 97 %  BP: (103-135)/(53-83) 109/53     Weight: 117.9 kg (260 lb)  Body mass index is 47.55 kg/m².    Physical Exam   Constitutional: She is oriented to person, place, and time. She appears well-developed and well-nourished. No distress.   HENT:   Head: Normocephalic and atraumatic.   Right  Ear: External ear normal.   Left Ear: External ear normal.   Nose: Nose normal.   Mouth/Throat: Oropharynx is clear and moist.   Eyes: Pupils are equal, round, and reactive to light. Conjunctivae and EOM are normal.   Neck: Normal range of motion. Neck supple.   Cardiovascular: Normal rate, regular rhythm and intact distal pulses.   Pulmonary/Chest: Effort normal and breath sounds normal. No respiratory distress. She has no wheezes.   Abdominal: Soft. Bowel sounds are normal. She exhibits no distension. There is no tenderness.   No palpable hepatomegaly or splenomegaly    Musculoskeletal: Normal range of motion. She exhibits no edema or tenderness.   Neurological: She is alert and oriented to person, place, and time.   Skin: Skin is warm and dry.   Psychiatric: She has a normal mood and affect. Thought content normal.   Nursing note and vitals reviewed.        CRANIAL NERVES     CN III, IV, VI   Pupils are equal, round, and reactive to light.  Extraocular motions are normal.        Significant Labs: All pertinent labs within the past 24 hours have been reviewed.    Significant Imaging: I have reviewed all pertinent imaging results/findings within the past 24 hours.    Assessment/Plan:     * Acute cystitis without hematuria  Evidence of infection on urinalysis, urine culture pending, continue antibiotics    Elevated d-dimer  Complaint of dyspnea with elevated D-dimer, V Q scan and extremity ultrasound pending, she was empirically started on full-dose enoxaparin in the ED.    Physical debility  At baseline, no acute issue    Controlled type 2 diabetes mellitus with stage 4 chronic kidney disease, with long-term current use of insulin  Last HgbA1c   Lab Results   Component Value Date    HGBA1C 6.3 (H) 08/01/2019     Hold oral antihyperglycemics while inpatient  PRN sliding scale insulin  ACHS glucose monitoring   ADA diet     Renal function Stable, at baseline, maintain euvolemic state, strict I/O's, monitor renal  function and electrolytes, avoid nephrotoxic agents.     Morbidly obese  BMI Body mass index is 47.55 kg/m².  Patient counseled on risks obesity imparts on overall health. Urged toward diet and lifestyle modifications to aid in weight reduction.      Essential hypertension  Well controlled, continue home medications and monitor blood pressure, adjust as needed.       VTE Risk Mitigation (From admission, onward)         Ordered     enoxaparin injection 120 mg  Every 24 hours (non-standard times)      01/30/20 1940     IP VTE HIGH RISK PATIENT  Once      01/30/20 1940     Place sequential compression device  Until discontinued      01/30/20 1940              Dimas Ferrer Jr., APRN, AGACNP-BC  Hospitalist - Department of Hospital Medicine  [facility] [address] [place] [address]  Office #: [phone]; Pager #: [phone]

## 2020-01-31 NOTE — ASSESSMENT & PLAN NOTE
Last HgbA1c   Lab Results   Component Value Date    HGBA1C 6.3 (H) 08/01/2019     Hold oral antihyperglycemics while inpatient  PRN sliding scale insulin  ACHS glucose monitoring   ADA diet     Renal function Stable, at baseline, maintain euvolemic state, strict I/O's, monitor renal function and electrolytes, avoid nephrotoxic agents.

## 2020-01-31 NOTE — ASSESSMENT & PLAN NOTE
BMI Body mass index is 47.55 kg/m².  Patient counseled on risks obesity imparts on overall health. Urged toward diet and lifestyle modifications to aid in weight reduction.

## 2020-01-31 NOTE — PLAN OF CARE
[facility]    HOME HEALTH ORDERS  FACE TO FACE ENCOUNTER    Patient Name: Ana James  YOB: 1940    PCP: Jaky Schmidt MD   PCP Address: [address] / [place] [address]  PCP Phone Number: [phone]  PCP Fax: [phone]       Encounter Date: 01/31/2020    Admit to Home Health    Diagnoses:  Active Hospital Problems    Diagnosis  POA    *Acute cystitis without hematuria [N30.00]  Yes    Elevated d-dimer [R79.89]  Yes    Physical debility [R53.81]  Yes     Chronic    Controlled type 2 diabetes mellitus with stage 4 chronic kidney disease, with long-term current use of insulin [E11.22, N18.4, Z79.4]  Not Applicable     Chronic    Morbidly obese [E66.01]  Yes     Chronic    Essential hypertension [I10]  Yes     Chronic      Resolved Hospital Problems   No resolved problems to display.       Future Appointments   Date Time Provider Department Center   2/17/2020  9:20 AM Jaky Schmidt MD [facility]     Follow-up Information     Jaky Schmidt MD On 2/17/2020.    Specialty:  Family Medicine  Why:  Appointment scheduled for February 17th at 9:20am  Contact information:  [address]  [place] [address]  [phone]                     I have seen and examined this patient face to face today. My clinical findings that support the need for the home health skilled services and home bound status are the following:    Allergies:  Review of patient's allergies indicates:   Allergen Reactions    Adhesive Rash     Paper tape       Diet: regular diet    Activities: activity as tolerated    Nursing:   SN to complete comprehensive assessment including routine vital signs. Instruct on disease process and s/s of complications to report to MD. Review/verify medication list sent home with the patient at time of discharge  and instruct patient/caregiver as needed. Frequency may be adjusted depending on start of care date.    Notify MD if SBP > 160 or  < 90; DBP > 90 or < 50; HR > 120 or < 50; Temp > 101      CONSULTS:     to evaluate for community resources/long-range planning.  Aide to provide assistance with personal care, ADLs, and vital signs.    MISCELLANEOUS CARE:  N/A    WOUND CARE ORDERS  n/a      Medications: Review discharge medications with patient and family and provide education.      Current Discharge Medication List      START taking these medications    Details   cephALEXin (KEFLEX) 500 MG capsule Take 1 capsule (500 mg total) by mouth every 12 (twelve) hours. for 7 days  Qty: 14 capsule, Refills: 0         CONTINUE these medications which have NOT CHANGED    Details   amLODIPine (NORVASC) 10 MG tablet TAKE 1 TABLET (10 MG TOTAL) BY MOUTH ONCE DAILY.  Qty: 90 tablet, Refills: 1    Associated Diagnoses: Essential hypertension      aspirin (ECOTRIN) 81 MG EC tablet Take 81 mg by mouth once daily.      atorvastatin (LIPITOR) 20 MG tablet TAKE 1 TABLET (20 MG TOTAL) BY MOUTH ONCE DAILY.  Qty: 90 tablet, Refills: 2    Associated Diagnoses: Controlled type 2 diabetes mellitus with stage 4 chronic kidney disease, with long-term current use of insulin      carvedilol (COREG) 12.5 MG tablet Take 1 tablet (12.5 mg total) by mouth 2 (two) times daily.  Qty: 180 tablet, Refills: 1    Associated Diagnoses: Essential hypertension      cilostazol (PLETAL) 50 MG Tab Take 1 tablet (50 mg total) by mouth 2 (two) times daily.  Qty: 180 tablet, Refills: 1    Associated Diagnoses: Peripheral vascular disease, unspecified      !! insulin detemir U-100 (LEVEMIR FLEXTOUCH U-100 INSULN) 100 unit/mL (3 mL) SubQ InPn pen Inject 30 Units into the skin every evening.  Qty: 9 mL, Refills: 2    Associated Diagnoses: Controlled type 2 diabetes mellitus with stage 4 chronic kidney disease, with long-term current use of insulin      latanoprost 0.005 % ophthalmic solution Place 1 drop into both eyes every evening.  Refills: 3      !! LEVEMIR FLEXTOUCH U-100 INSULN 100  "unit/mL (3 mL) InPn pen INJECT 32 UNITS INTO THE SKIN EVERY EVENING  Qty: 28.8 mL, Refills: 1    Associated Diagnoses: Uncontrolled type 2 diabetes mellitus with stage 4 chronic kidney disease      oxybutynin (DITROPAN XL) 15 MG TR24 Take 1 tablet (15 mg total) by mouth once daily.  Qty: 90 tablet, Refills: 3      timolol maleate 0.5% (TIMOPTIC) 0.5 % Drop 1 drop every morning.   Refills: 2      TRAVATAN Z 0.004 % ophthalmic solution       albuterol (PROVENTIL/VENTOLIN HFA) 90 mcg/actuation inhaler Inhale 2 puffs into the lungs every 6 (six) hours as needed for Wheezing. Rescue  Qty: 8 g, Refills: 11    Comments: Ventolin  Associated Diagnoses: Wheezing      !! BD ULTRA-FINE BREE PEN NEEDLE 32 gauge x 5/32" Ndle INJECT INSULIN THREE TIMES DAILY  Qty: 200 each, Refills: 1      blood sugar diagnostic Strp Test 3 times daily  Qty: 100 each, Refills: 11    Comments: True Results  Associated Diagnoses: Type 2 diabetes, uncontrolled, with renal manifestation      blood-glucose meter kit Use as instructed to check blood sugar 3 times daily  Qty: 1 each, Refills: 0    Associated Diagnoses: Type 2 diabetes, uncontrolled, with renal manifestation      candesartan-hydrochlorothiazid (ATACAND HCT) 32-12.5 mg per tablet Take 1 tablet by mouth once daily.  Qty: 90 tablet, Refills: 1    Associated Diagnoses: Essential hypertension      clotrimazole-betamethasone 1-0.05% (LOTRISONE) cream Apply topically 2 (two) times daily.  Qty: 45 g, Refills: 5    Associated Diagnoses: Intertrigo      lancets Misc Test 3 times daily  Qty: 100 each, Refills: 11    Comments: True Results  Associated Diagnoses: Type 2 diabetes, uncontrolled, with renal manifestation      ondansetron (ZOFRAN-ODT) 4 MG TbDL TAKE 1 TABLET BY MOUTH EVERY 6 HOURS AS NEEDED  Qty: 20 tablet, Refills: 2      !! pen needle, diabetic (BD ULTRA-FINE BREE PEN NEEDLE) 32 gauge x 5/32" Ndle INJECT INSULIN THREE TIMES DAILY  Qty: 200 each, Refills: 0    Associated Diagnoses: Type " 2 diabetes, uncontrolled, with renal manifestation      potassium citrate (UROCIT-K 15) 15 mEq TbSR Take 15 mEq by mouth once daily.  Qty: 90 tablet, Refills: 3    Associated Diagnoses: Kidney stones      TRADJENTA 5 mg Tab tablet TAKE 1 TABLET BY MOUTH EVERY DAY  Qty: 90 tablet, Refills: 0    Associated Diagnoses: Type 2 diabetes, uncontrolled, with renal manifestation      traZODone (DESYREL) 50 MG tablet TAKE 1 TABLET (50 MG TOTAL) BY MOUTH EVERY EVENING.  Qty: 90 tablet, Refills: 3    Associated Diagnoses: Insomnia, unspecified type      triamcinolone acetonide 0.1% (KENALOG) 0.1 % cream Apply topically 3 (three) times daily. for 10 days  Qty: 80 g, Refills: 0    Associated Diagnoses: Stasis dermatitis of both legs       !! - Potential duplicate medications found. Please discuss with provider.          I certify that this patient is confined to her home and needs intermittent skilled nursing care + JENNIE Walker, FNP-C  Hospitalist - Department of Hospital Medicine  [address], [place] [address]  Office [phone]; Pager [phone]  .

## 2020-01-31 NOTE — PROGRESS NOTES
Pt can't be injected with IV contrast which is required to perform a CTA Chest exam as ordered by ALEXIS Lyon due to the pt's lab values being to abnormal. Pt creatinine is 2.0 and the pt's eGFR is 26 as of last lab draw on 1/31/2020. eGFR must be higher than 30 before pt can receive IV contrast per CT protocol. Attempted to reach ALEXIS Lyon first by phone and then by pager to discuss but no response. Pt's nurse was informed of the situation and that test would be canceled until pt's eGFR meets CT protocol standard in order to receive IV contrast.

## 2020-01-31 NOTE — PLAN OF CARE
01/31/20 1000   Discharge Assessment   Assessment Type Discharge Planning Assessment   Assessment information obtained from? Patient   Communicated expected length of stay with patient/caregiver yes   Prior to hospitilization cognitive status: Alert/Oriented   Prior to hospitalization functional status: Assistive Equipment;Independent   Current cognitive status: Alert/Oriented   Current Functional Status: Independent;Assistive Equipment   Facility Arrived From: home   Lives With alone  (has brother, sister and best friend who is able to help with all needs)   Able to Return to Prior Arrangements yes   Is patient able to care for self after discharge? Yes   Who are your caregiver(s) and their phone number(s)? [place]- [phone]   Patient's perception of discharge disposition home or selfcare   Readmission Within the Last 30 Days no previous admission in last 30 days   Patient currently being followed by outpatient case management? No   Patient currently receives any other outside agency services? No   Equipment Currently Used at Home cane, straight;power chair;rollator;wheelchair;glucometer;walker, rolling;shower chair   Do you have any problems affording any of your prescribed medications? No   Is the patient taking medications as prescribed? yes   Does the patient have transportation home? Yes  (will call brother and if he is unable to do so he will contact someone to pick her up )   Transportation Anticipated family or friend will provide  (pt has 48 trips through [facility])   Does the patient receive services at the Coumadin Clinic? No   Discharge Plan A Home  (with follow up appointment)   DME Needed Upon Discharge  none   Patient/Family in Agreement with Plan yes       [facility]pharm[id number] - [place], [place] - [address] [facility]  [address] [facility] [address]  Phone: [phone] Fax: [phone]    [facility] [place] [address] AT Portal to  Registered [facility]  [address]  [facility] [address]  Phone: [phone] Fax: [phone]

## 2020-01-31 NOTE — PROGRESS NOTES
WRITTEN HEALTHCARE DISCHARGE INFORMATION      Things that YOU are RESPONSIBLE for to Manage Your Care At Home:     1. Getting your prescriptions filled.  2. Taking you medications as directed. DO NOT MISS ANY DOSES!  3. Going to your follow-up doctor appointments. This is important because it allows the doctor to monitor your progress and to determine if any changes need to be made to your treatment plan.     If you are unable to make your follow up appointments, please call the number listed and reschedule this appointment.      ____________HELP AT HOME____________________     Experiencing any SIGNS or SYMPTOMS: YOU CAN     Schedule a same day appopintment with your Primary Care Doctor or  you can call [facility] On Call Nurse Care Line for 24/7 assistance at [phone]     If you are experience any signs or symptoms that have become severe, Call 911 and come to your nearest Emergency Room.     Thank you for choosing [facility] and allowing us to care for you.   From your care management team:      You should receive a call from [facility] Discharge Department within 48-72 hours to help manage your care after discharge. Please try to make sure that you answer your phone for this important phone call.    Follow-up Information     Jaky Schmidt MD On 2/17/2020.    Specialty:  Family Medicine  Why:  Appointment scheduled for February 17th at 9:20am  Contact information:  [address] [place] [address]  [phone]

## 2020-01-31 NOTE — DISCHARGE SUMMARY
[facility] Hospital Medicine  Discharge Summary      Patient Name: Ana James  MRN: [id number]  Admission Date: 1/30/2020  Hospital Length of Stay: 0 days  Discharge Date and Time:  01/31/2020 5:39 PM  Attending Physician: Theresa Markham MD   Discharging Provider: HARIS Luong  Primary Care Provider: Jaky Schmidt MD      HPI:   79 y.o. female with HTN, PVD, DM2, physical debility, and morbid obesity presents with a complaint of dizziness and lightheadedness that began acutely this morning.  She denies a sensation of the room spinning.  Lightheadedness associated with nausea, cough, and dyspnea on exertion.  No attempted self treatment.  Denies fever, chills, chest pain, palpitations, emesis, diarrhea, abdominal pain, bloody black stool.  In the ED, as evidence of infection on her urinalysis as well as an elevated D-dimer.  Given her degree of renal function V/Q scan and extremity ultrasounds were ordered and are currently pending.  Urine culture obtained, IV antibiotics initiated.  Placed in observation.    * No surgery found *      Hospital Course:   Patient with history of CKDIV placed in observation for UTI after presenting with SOB and general malaise. She remained afebrile without leukocytosis overnight. CXR had findings suspicious for pulmonary vascular congestion - however, the patient did not appear clinically in FVO. 2D echo was obtained that showed a preserved LVEF 65% with mild concentric Left ventricular hypertrophy. Placed on IV antibiotics for UTI and administered one dose lasix IV 20 mg.    Serial troponins, BNP all remained normal.  She has CKD noted on chemistry which was consistent to her chronic CKD 4 in fact her creatinine improved to 2.0 which is lower than it has been over 5 months.  She will convert to oral Keflex at the time of discharge as urine culture shows presumptive E coli.  She originally refused VQ scan after DDimer in ED found to have 2.99.  She is not a candidate for CTA. Her personal Dr/Friend, Dr. Lambert was at bedside and convinced the patient to undergo VQ scan. V/Q scan showed low probability for PE, her lower extremity ultrasound was negative.  Patient denies history of smoking, denies new chest pain, reports she is back to baseline.  Vital signs grossly stable oxygen sat 95%. Discharged to home in stable condition.     Consults:   Consults (From admission, onward)        Status Ordering Provider     IP consult to case management  Once     Provider:  (Not yet assigned)    Acknowledged ROSMERY DOWD          No new Assessment & Plan notes have been filed under this hospital service since the last note was generated.  Service: Hospital Medicine    Final Active Diagnoses:    Diagnosis Date Noted POA    PRINCIPAL PROBLEM:  Acute cystitis without hematuria [N30.00] 01/30/2020 Yes    Elevated d-dimer [R79.89] 01/30/2020 Yes    Physical debility [R53.81] 08/01/2019 Yes     Chronic    Controlled type 2 diabetes mellitus with stage 4 chronic kidney disease, with long-term current use of insulin [E11.22, N18.4, Z79.4] 07/11/2018 Not Applicable     Chronic    Morbidly obese [E66.01] 03/02/2014 Yes     Chronic    Essential hypertension [I10] 03/02/2014 Yes     Chronic      Problems Resolved During this Admission:       Discharged Condition: stable    Disposition: Home or Self Care    Follow Up:  Follow-up Information     Jaky Schmidt MD On 2/17/2020.    Specialty:  Family Medicine  Why:  Appointment scheduled for February 17th at 9:20am  Contact information:  [address]  [place] [address] [phone]                 Patient Instructions:      Diet Cardiac     Activity as tolerated       Significant Diagnostic Studies: Labs:   CMP   Recent Labs   Lab 01/30/20  1326 01/31/20  0223    142   K 4.6 4.3   * 114*   CO2 21* 21*   * 132*   BUN 26* 24*   CREATININE 2.3* 2.0*   CALCIUM 9.2 8.9   PROT 7.8  --    ALBUMIN 3.4*  --   "  BILITOT 0.5  --    ALKPHOS 108  --    AST 15  --    ALT 7*  --    ANIONGAP 8 7*   ESTGFRAFRICA 22* 26*   EGFRNONAA 19* 23*   , CBC   Recent Labs   Lab 01/30/20  1326 01/31/20  0223   WBC 6.39 5.57   HGB 9.8* 8.9*   HCT 31.4* 28.8*    188   , INR   Lab Results   Component Value Date    INR 1.1 06/03/2016    INR 1.0 03/01/2014   , Lipid Panel   Lab Results   Component Value Date    CHOL 105 (L) 08/01/2019    HDL 33 (L) 08/01/2019    LDLCALC 55.8 (L) 08/01/2019    TRIG 81 08/01/2019    CHOLHDL 31.4 08/01/2019   , Troponin   Recent Labs   Lab 01/31/20  0223   TROPONINI <0.006    and A1C:   Recent Labs   Lab 01/30/20 2027   HGBA1C 6.1*      Medications:  Reconciled Home Medications:      Medication List      START taking these medications    cephALEXin 500 MG capsule  Commonly known as:  KEFLEX  Take 1 capsule (500 mg total) by mouth every 12 (twelve) hours. for 7 days        CONTINUE taking these medications    albuterol 90 mcg/actuation inhaler  Commonly known as:  PROVENTIL/VENTOLIN HFA  Inhale 2 puffs into the lungs every 6 (six) hours as needed for Wheezing. Rescue     amLODIPine 10 MG tablet  Commonly known as:  NORVASC  TAKE 1 TABLET (10 MG TOTAL) BY MOUTH ONCE DAILY.     aspirin 81 MG EC tablet  Commonly known as:  ECOTRIN  Take 81 mg by mouth once daily.     atorvastatin 20 MG tablet  Commonly known as:  LIPITOR  TAKE 1 TABLET (20 MG TOTAL) BY MOUTH ONCE DAILY.     * BD Ultra-Fine [place] Pen Needle 32 gauge x 5/32" Ndle  Generic drug:  pen needle, diabetic  INJECT INSULIN THREE TIMES DAILY     * pen needle, diabetic 32 gauge x 5/32" Ndle  Commonly known as:  BD Ultra-Fine [place] Pen Needle  INJECT INSULIN THREE TIMES DAILY     blood sugar diagnostic Strp  Test 3 times daily     blood-glucose meter kit  Use as instructed to check blood sugar 3 times daily     candesartan-hydrochlorothiazid 32-12.5 mg per tablet  Commonly known as:  ATACAND HCT  Take 1 tablet by mouth once daily.     carvediloL 12.5 MG " tablet  Commonly known as:  COREG  Take 1 tablet (12.5 mg total) by mouth 2 (two) times daily.     cilostazoL 50 MG Tab  Commonly known as:  PLETAL  Take 1 tablet (50 mg total) by mouth 2 (two) times daily.     clotrimazole-betamethasone 1-0.05% cream  Commonly known as:  LOTRISONE  Apply topically 2 (two) times daily.     * insulin detemir U-100 100 unit/mL (3 mL) Inpn pen  Commonly known as:  Levemir FlexTouch U-100 Insuln  Inject 30 Units into the skin every evening.     * Levemir FlexTouch U-100 Insuln 100 unit/mL (3 mL) Inpn pen  Generic drug:  insulin detemir U-100  INJECT 32 UNITS INTO THE SKIN EVERY EVENING     lancets Misc  Test 3 times daily     latanoprost 0.005 % ophthalmic solution  Place 1 drop into both eyes every evening.     ondansetron 4 MG Tbdl  Commonly known as:  ZOFRAN-ODT  TAKE 1 TABLET BY MOUTH EVERY 6 HOURS AS NEEDED     oxybutynin 15 MG Tr24  Commonly known as:  DITROPAN XL  Take 1 tablet (15 mg total) by mouth once daily.     potassium citrate 15 mEq Tbsr  Commonly known as:  Urocit-K 15  Take 15 mEq by mouth once daily.     timolol maleate 0.5% 0.5 % Drop  Commonly known as:  TIMOPTIC  1 drop every morning.     Tradjenta 5 mg Tab tablet  Generic drug:  linaGLIPtin  TAKE 1 TABLET BY MOUTH EVERY DAY     Travatan Z 0.004 % ophthalmic solution  Generic drug:  travoprost     traZODone 50 MG tablet  Commonly known as:  DESYREL  TAKE 1 TABLET (50 MG TOTAL) BY MOUTH EVERY EVENING.     triamcinolone acetonide 0.1% 0.1 % cream  Commonly known as:  KENALOG  Apply topically 3 (three) times daily. for 10 days         * This list has 4 medication(s) that are the same as other medications prescribed for you. Read the directions carefully, and ask your doctor or other care provider to review them with you.                Indwelling Lines/Drains at time of discharge:   Lines/Drains/Airways     None                 Time spent on the discharge of patient: 35 minutes  Patient was seen and examined on the  date of discharge and determined to be suitable for discharge.         JENNIE Ramirez, FNP-C  Hospitalist - Department of Hospital Medicine  [address] [place] [address]  Office [phone]; Pager [phone]

## 2020-02-01 LAB — BACTERIA UR CULT: ABNORMAL

## 2020-02-01 NOTE — NURSING
Report received, discharge papers already reviewed, IV and tele monitor were d/c, waiting on family member to pick her up. Will request transport.

## 2020-02-03 RX ORDER — LINAGLIPTIN 5 MG/1
TABLET, FILM COATED ORAL
Qty: 90 TABLET | Refills: 0 | Status: SHIPPED | OUTPATIENT
Start: 2020-02-03 | End: 2020-05-04

## 2020-02-03 NOTE — PROGRESS NOTES
Call received from [facility] with N to advise TN that the NP had ordered HH for pt on Friday January 31st at 5:41pm and that they had not received the request for these services. TN followed up and sent via fax orders and clinicals to [facility]. TN to follow for arrangements

## 2020-02-03 NOTE — PLAN OF CARE
02/03/20 1337   Final Note   Assessment Type Final Discharge Note   Anticipated Discharge Disposition Home-Health   Hospital Follow Up  Appt(s) scheduled? Yes   Discharge plans and expectations educations in teach back method with documentation complete? Yes   Right Care Referral Info   Post Acute Recommendation Home-care   Referral Type home health    Facility Name [facility]   Post-Acute Status   Post-Acute Authorization Home Health/Hospice   Home Health/Hospice Status Set-up Complete

## 2020-02-04 PROCEDURE — G0180 PR HOME HEALTH MD CERTIFICATION: ICD-10-PCS | Mod: ,,, | Performed by: HOSPITALIST

## 2020-02-04 PROCEDURE — G0180 MD CERTIFICATION HHA PATIENT: HCPCS | Mod: ,,, | Performed by: HOSPITALIST

## 2020-02-13 DIAGNOSIS — I10 ESSENTIAL HYPERTENSION: ICD-10-CM

## 2020-02-13 RX ORDER — AMLODIPINE BESYLATE 10 MG/1
10 TABLET ORAL DAILY
Qty: 90 TABLET | Refills: 1 | Status: SHIPPED | OUTPATIENT
Start: 2020-02-13 | End: 2020-08-10

## 2020-02-13 NOTE — TELEPHONE ENCOUNTER
"Last Office Visit Info:   The patient's last visit with Jaky Schmidt MD was on 8/1/2019.    The patient's last visit in current department was on Visit date not found.        Last CBC Results:   Lab Results   Component Value Date    WBC 5.57 01/31/2020    HGB 8.9 (L) 01/31/2020    HCT 28.8 (L) 01/31/2020     01/31/2020       Last CMP Results  Lab Results   Component Value Date     01/31/2020    K 4.3 01/31/2020     (H) 01/31/2020    CO2 21 (L) 01/31/2020    BUN 24 (H) 01/31/2020    CREATININE 2.0 (H) 01/31/2020    CALCIUM 8.9 01/31/2020    ALBUMIN 3.4 (L) 01/30/2020    AST 15 01/30/2020    ALT 7 (L) 01/30/2020       Last Lipids  Lab Results   Component Value Date    CHOL 105 (L) 08/01/2019    TRIG 81 08/01/2019    HDL 33 (L) 08/01/2019    LDLCALC 55.8 (L) 08/01/2019       Last A1C  Lab Results   Component Value Date    HGBA1C 6.1 (H) 01/30/2020       Last TSH  Lab Results   Component Value Date    TSH 2.371 07/13/2018         Current Med Refills  Medication List with Changes/Refills   Current Medications    ALBUTEROL (PROVENTIL/VENTOLIN HFA) 90 MCG/ACTUATION INHALER    Inhale 2 puffs into the lungs every 6 (six) hours as needed for Wheezing. Rescue       Start Date: 12/12/2018End Date: --    AMLODIPINE (NORVASC) 10 MG TABLET    TAKE 1 TABLET (10 MG TOTAL) BY MOUTH ONCE DAILY.       Start Date: 5/21/2019 End Date: --    ASPIRIN (ECOTRIN) 81 MG EC TABLET    Take 81 mg by mouth once daily.       Start Date: --        End Date: --    ATORVASTATIN (LIPITOR) 20 MG TABLET    TAKE 1 TABLET (20 MG TOTAL) BY MOUTH ONCE DAILY.       Start Date: 12/9/2019 End Date: --    BD ULTRA-FINE BREE PEN NEEDLE 32 GAUGE X 5/32" NDLE    INJECT INSULIN THREE TIMES DAILY       Start Date: 5/6/2019  End Date: --    BLOOD SUGAR DIAGNOSTIC STRP    Test 3 times daily       Start Date: 6/24/2019 End Date: --    BLOOD-GLUCOSE METER KIT    Use as instructed to check blood sugar 3 times daily       Start Date: 11/2/2018 End " "Date: 4/12/2020    CANDESARTAN-HYDROCHLOROTHIAZID (ATACAND HCT) 32-12.5 MG PER TABLET    Take 1 tablet by mouth once daily.       Start Date: 10/14/2019End Date: 10/13/2020    CARVEDILOL (COREG) 12.5 MG TABLET    Take 1 tablet (12.5 mg total) by mouth 2 (two) times daily.       Start Date: 12/23/2019End Date: --    CILOSTAZOL (PLETAL) 50 MG TAB    Take 1 tablet (50 mg total) by mouth 2 (two) times daily.       Start Date: 10/24/2019End Date: 4/21/2020    CLOTRIMAZOLE-BETAMETHASONE 1-0.05% (LOTRISONE) CREAM    Apply topically 2 (two) times daily.       Start Date: 3/24/2015 End Date: --    LANCETS MISC    Test 3 times daily       Start Date: 6/24/2019 End Date: --    LATANOPROST 0.005 % OPHTHALMIC SOLUTION    Place 1 drop into both eyes every evening.       Start Date: 7/24/2014 End Date: --    LEVEMIR FLEXTOUCH U-100 INSULN 100 UNIT/ML (3 ML) INPN PEN    INJECT 32 UNITS INTO THE SKIN EVERY EVENING       Start Date: 9/8/2019  End Date: 9/7/2020    ONDANSETRON (ZOFRAN-ODT) 4 MG TBDL    TAKE 1 TABLET BY MOUTH EVERY 6 HOURS AS NEEDED       Start Date: 3/12/2018 End Date: --    OXYBUTYNIN (DITROPAN XL) 15 MG TR24    Take 1 tablet (15 mg total) by mouth once daily.       Start Date: 11/25/2019End Date: 11/24/2020    PEN NEEDLE, DIABETIC (BD ULTRA-FINE BREE PEN NEEDLE) 32 GAUGE X 5/32" NDLE    INJECT INSULIN THREE TIMES DAILY       Start Date: 6/24/2019 End Date: --    POTASSIUM CITRATE (UROCIT-K 15) 15 MEQ TBSR    Take 15 mEq by mouth once daily.       Start Date: 2/5/2018  End Date: 2/5/2019    TIMOLOL MALEATE 0.5% (TIMOPTIC) 0.5 % DROP    1 drop every morning.        Start Date: 6/27/2014 End Date: --    TRADJENTA 5 MG TAB TABLET    TAKE 1 TABLET BY MOUTH EVERY DAY       Start Date: 2/3/2020  End Date: --    TRAVATAN Z 0.004 % OPHTHALMIC SOLUTION           Start Date: 12/10/2018End Date: --    TRAZODONE (DESYREL) 50 MG TABLET    TAKE 1 TABLET (50 MG TOTAL) BY MOUTH EVERY EVENING.       Start Date: 2/4/2019  End Date: " 2/4/2020    TRIAMCINOLONE ACETONIDE 0.1% (KENALOG) 0.1 % CREAM    Apply topically 3 (three) times daily. for 10 days       Start Date: 8/21/2018 End Date: 8/31/2018       Order(s) placed per written order guidelines:     Please advise.

## 2020-02-14 ENCOUNTER — TELEPHONE (OUTPATIENT)
Dept: NEPHROLOGY | Facility: CLINIC | Age: 80
End: 2020-02-14

## 2020-02-14 NOTE — TELEPHONE ENCOUNTER
----- Message from Jocy Avilez MA sent at 2/14/2020  2:02 PM CST -----  Contact: self/[phone]  PT stated she missed a call from Cristopher, but would like to speak with Estefany (personal reason).  Pt stated she is not sure on why she received the call.     Called pt no answer l/m

## 2020-02-24 RX ORDER — INSULIN DETEMIR 100 [IU]/ML
32 INJECTION, SOLUTION SUBCUTANEOUS NIGHTLY
Qty: 28.8 ML | Refills: 1 | Status: SHIPPED | OUTPATIENT
Start: 2020-02-24 | End: 2020-07-08

## 2020-02-26 ENCOUNTER — HOSPITAL ENCOUNTER (OUTPATIENT)
Facility: HOSPITAL | Age: 80
Discharge: HOME | End: 2020-02-27
Attending: EMERGENCY MEDICINE | Admitting: INTERNAL MEDICINE
Payer: MEDICARE

## 2020-02-26 DIAGNOSIS — N18.4 CONTROLLED TYPE 2 DIABETES MELLITUS WITH STAGE 4 CHRONIC KIDNEY DISEASE, WITH LONG-TERM CURRENT USE OF INSULIN: Chronic | ICD-10-CM

## 2020-02-26 DIAGNOSIS — E11.22 CONTROLLED TYPE 2 DIABETES MELLITUS WITH STAGE 4 CHRONIC KIDNEY DISEASE, WITH LONG-TERM CURRENT USE OF INSULIN: Chronic | ICD-10-CM

## 2020-02-26 DIAGNOSIS — R07.9 CHEST PAIN: ICD-10-CM

## 2020-02-26 DIAGNOSIS — R06.02 SHORTNESS OF BREATH: ICD-10-CM

## 2020-02-26 DIAGNOSIS — E66.01 MORBID OBESITY WITH BMI OF 45.0-49.9, ADULT: Chronic | ICD-10-CM

## 2020-02-26 DIAGNOSIS — Z79.4 CONTROLLED TYPE 2 DIABETES MELLITUS WITH STAGE 4 CHRONIC KIDNEY DISEASE, WITH LONG-TERM CURRENT USE OF INSULIN: Chronic | ICD-10-CM

## 2020-02-26 DIAGNOSIS — I50.9 CONGESTIVE HEART FAILURE, UNSPECIFIED HF CHRONICITY, UNSPECIFIED HEART FAILURE TYPE: Primary | ICD-10-CM

## 2020-02-26 DIAGNOSIS — E87.5 HYPERKALEMIA: ICD-10-CM

## 2020-02-26 DIAGNOSIS — R06.02 SOB (SHORTNESS OF BREATH): ICD-10-CM

## 2020-02-26 DIAGNOSIS — I10 ESSENTIAL HYPERTENSION: Chronic | ICD-10-CM

## 2020-02-26 DIAGNOSIS — I50.33 ACUTE ON CHRONIC DIASTOLIC HEART FAILURE: ICD-10-CM

## 2020-02-26 DIAGNOSIS — E78.5 HYPERLIPIDEMIA, UNSPECIFIED HYPERLIPIDEMIA TYPE: Chronic | ICD-10-CM

## 2020-02-26 LAB
ALBUMIN SERPL BCP-MCNC: 3.5 G/DL (ref 3.5–5.2)
ALP SERPL-CCNC: 114 U/L (ref 55–135)
ALT SERPL W/O P-5'-P-CCNC: 11 U/L (ref 10–44)
ANION GAP SERPL CALC-SCNC: 9 MMOL/L (ref 8–16)
AST SERPL-CCNC: 20 U/L (ref 10–40)
BASOPHILS # BLD AUTO: 0.01 K/UL (ref 0–0.2)
BASOPHILS NFR BLD: 0.2 % (ref 0–1.9)
BILIRUB SERPL-MCNC: 0.7 MG/DL (ref 0.1–1)
BNP SERPL-MCNC: 80 PG/ML (ref 0–99)
BUN SERPL-MCNC: 24 MG/DL (ref 8–23)
CALCIUM SERPL-MCNC: 9.3 MG/DL (ref 8.7–10.5)
CHLORIDE SERPL-SCNC: 119 MMOL/L (ref 95–110)
CO2 SERPL-SCNC: 16 MMOL/L (ref 23–29)
CREAT SERPL-MCNC: 2.2 MG/DL (ref 0.5–1.4)
DIFFERENTIAL METHOD: ABNORMAL
EOSINOPHIL # BLD AUTO: 0.1 K/UL (ref 0–0.5)
EOSINOPHIL NFR BLD: 2.7 % (ref 0–8)
ERYTHROCYTE [DISTWIDTH] IN BLOOD BY AUTOMATED COUNT: 16.3 % (ref 11.5–14.5)
EST. GFR  (AFRICAN AMERICAN): 24 ML/MIN/1.73 M^2
EST. GFR  (NON AFRICAN AMERICAN): 20 ML/MIN/1.73 M^2
GLUCOSE SERPL-MCNC: 87 MG/DL (ref 70–110)
HCT VFR BLD AUTO: 32.7 % (ref 37–48.5)
HGB BLD-MCNC: 9.7 G/DL (ref 12–16)
IMM GRANULOCYTES # BLD AUTO: 0.02 K/UL (ref 0–0.04)
IMM GRANULOCYTES NFR BLD AUTO: 0.5 % (ref 0–0.5)
INR PPP: 1.1 (ref 0.8–1.2)
LYMPHOCYTES # BLD AUTO: 1.1 K/UL (ref 1–4.8)
LYMPHOCYTES NFR BLD: 26.2 % (ref 18–48)
MCH RBC QN AUTO: 29.1 PG (ref 27–31)
MCHC RBC AUTO-ENTMCNC: 29.7 G/DL (ref 32–36)
MCV RBC AUTO: 98 FL (ref 82–98)
MONOCYTES # BLD AUTO: 0.5 K/UL (ref 0.3–1)
MONOCYTES NFR BLD: 12.8 % (ref 4–15)
NEUTROPHILS # BLD AUTO: 2.3 K/UL (ref 1.8–7.7)
NEUTROPHILS NFR BLD: 57.6 % (ref 38–73)
NRBC BLD-RTO: 0 /100 WBC
PLATELET # BLD AUTO: 159 K/UL (ref 150–350)
PMV BLD AUTO: 10 FL (ref 9.2–12.9)
POCT GLUCOSE: 82 MG/DL (ref 70–110)
POTASSIUM SERPL-SCNC: 5.5 MMOL/L (ref 3.5–5.1)
PROT SERPL-MCNC: 7.9 G/DL (ref 6–8.4)
PROTHROMBIN TIME: 11.8 SEC (ref 9–12.5)
RBC # BLD AUTO: 3.33 M/UL (ref 4–5.4)
SODIUM SERPL-SCNC: 144 MMOL/L (ref 136–145)
TROPONIN I SERPL DL<=0.01 NG/ML-MCNC: <0.006 NG/ML (ref 0–0.03)
WBC # BLD AUTO: 4.05 K/UL (ref 3.9–12.7)

## 2020-02-26 PROCEDURE — 85610 PROTHROMBIN TIME: CPT

## 2020-02-26 PROCEDURE — 63600175 PHARM REV CODE 636 W HCPCS: Performed by: EMERGENCY MEDICINE

## 2020-02-26 PROCEDURE — 25000003 PHARM REV CODE 250: Performed by: EMERGENCY MEDICINE

## 2020-02-26 PROCEDURE — 96374 THER/PROPH/DIAG INJ IV PUSH: CPT

## 2020-02-26 PROCEDURE — 80053 COMPREHEN METABOLIC PANEL: CPT

## 2020-02-26 PROCEDURE — 85025 COMPLETE CBC W/AUTO DIFF WBC: CPT

## 2020-02-26 PROCEDURE — 93010 EKG 12-LEAD: ICD-10-PCS | Mod: ,,, | Performed by: INTERNAL MEDICINE

## 2020-02-26 PROCEDURE — 99285 EMERGENCY DEPT VISIT HI MDM: CPT | Mod: 25

## 2020-02-26 PROCEDURE — 93005 ELECTROCARDIOGRAM TRACING: CPT

## 2020-02-26 PROCEDURE — G0378 HOSPITAL OBSERVATION PER HR: HCPCS

## 2020-02-26 PROCEDURE — 82962 GLUCOSE BLOOD TEST: CPT

## 2020-02-26 PROCEDURE — 84484 ASSAY OF TROPONIN QUANT: CPT

## 2020-02-26 PROCEDURE — 83880 ASSAY OF NATRIURETIC PEPTIDE: CPT

## 2020-02-26 PROCEDURE — 93010 ELECTROCARDIOGRAM REPORT: CPT | Mod: ,,, | Performed by: INTERNAL MEDICINE

## 2020-02-26 RX ORDER — FUROSEMIDE 10 MG/ML
40 INJECTION INTRAMUSCULAR; INTRAVENOUS
Status: COMPLETED | OUTPATIENT
Start: 2020-02-26 | End: 2020-02-26

## 2020-02-26 RX ORDER — MICONAZOLE NITRATE 2 %
POWDER (GRAM) TOPICAL 2 TIMES DAILY
Status: DISCONTINUED | OUTPATIENT
Start: 2020-02-26 | End: 2020-02-27 | Stop reason: HOSPADM

## 2020-02-26 RX ADMIN — ANTI-FUNGAL POWDER MICONAZOLE NITRATE TALC FREE: 1.42 POWDER TOPICAL at 09:02

## 2020-02-26 RX ADMIN — FUROSEMIDE 40 MG: 10 INJECTION, SOLUTION INTRAVENOUS at 07:02

## 2020-02-26 NOTE — ED PROVIDER NOTES
"Encounter Date: 2/26/2020    SCRIBE #1 NOTE: I, Sarah Dhillon, am scribing for, and in the presence of,  Maribel Verde MD. I have scribed the entire note.       History     Chief Complaint   Patient presents with    Shortness of Breath     EMS reports pt c/o increasing shortness of breath x 1 week, unable to lay flat and increased edema to bilateral legs.      CC: Shortness of breath    HPI:  This is a 79 y.o. female with a PMHx of Hypertension, DM, Renal disorder, Arthritis, Kidney Stones, Obesity, and Nephrolithiasis who presents to the Emergency Department with a cc of shortness of breath that began one week ago. The patient reports associated leg swelling, weight gain (subjective), dry cough, and mild chest pain. She describes her chest pain as " it feels like something's standing in my chest" whenever she tries to lie flat. Denies fever, headache, chills, or abdominal pain. Her symptoms are worsened with prolonged movement or walking. Patient reports that sometimes she sleep on her recliner and other times she sleep with two pillows on top of her bed which is at a 30 degree incline. The patient also mentions that she has problems with transportation that makes it hard to go to doctor. No medications or treatments attempted prior to arrival. Patient was treated with lasix at recent hospitalization, but not on an diuretics outpatient. +diastolic dysfunction on most recent Echo, (ef 65%). At that evaluation V/Q scan low probability to PE. Home health nurse noted increase in peripheral edema and SOB today  and recommended ED evaluation.       The history is provided by the patient.     Review of patient's allergies indicates:   Allergen Reactions    Adhesive Rash     Paper tape     Past Medical History:   Diagnosis Date    Arthritis lower extremities    Asthma     Diabetes mellitus     Gall stones     Hypertension     Kidney stones     Nephrolithiasis 1/22/2016    Obesity     Renal disorder     " "Retinopathy     Vaginal delivery     x1    Weakness of both lower extremities     uses a cane, rollator & power chair     Past Surgical History:   Procedure Laterality Date    CARDIAC CATHETERIZATION      cataract      CHOLECYSTECTOMY      EYE SURGERY      Rt cataract surgery    HYSTERECTOMY      URETERAL STENT PLACEMENT       Family History   Problem Relation Age of Onset    Kidney failure Mother     Hypertension Father     Diabetes Brother     Cancer Sister      Social History     Tobacco Use    Smoking status: Never Smoker    Smokeless tobacco: Never Used   Substance Use Topics    Alcohol use: No    Drug use: No     Review of Systems   Constitutional: Positive for unexpected weight change ("weight gain"). Negative for chills, diaphoresis and fever.   HENT: Negative for congestion and sore throat.    Eyes: Negative for photophobia and visual disturbance.   Respiratory: Positive for cough (dry) and shortness of breath.    Cardiovascular: Positive for chest pain and leg swelling.   Gastrointestinal: Negative for abdominal pain, blood in stool, constipation, diarrhea, nausea and vomiting.   Genitourinary: Negative for dysuria, flank pain, frequency, hematuria, urgency and vaginal discharge.   Musculoskeletal: Negative for neck pain and neck stiffness.   Skin: Negative for rash and wound.   Neurological: Negative for weakness, light-headedness, numbness and headaches.   Psychiatric/Behavioral: Negative for confusion, decreased concentration and suicidal ideas.   All other systems reviewed and are negative.      Physical Exam     Initial Vitals [02/26/20 1644]   BP Pulse Resp Temp SpO2   (!) 118/56 63 (!) 24 97.9 °F (36.6 °C) (!) 94 %      MAP       --         Physical Exam    Nursing note and vitals reviewed.  Constitutional: She appears well-developed and well-nourished. She is not diaphoretic. No distress.   +morbidly obese female    HENT:   Head: Normocephalic and atraumatic.   Mouth/Throat: " Oropharynx is clear and moist. No oropharyngeal exudate.   Eyes: Conjunctivae and EOM are normal. Pupils are equal, round, and reactive to light. Right eye exhibits no discharge. Left eye exhibits no discharge. No scleral icterus.   Neck: Normal range of motion. Neck supple. JVD (mild) present.   Mild JVD.   Cardiovascular: Normal rate, regular rhythm, normal heart sounds and intact distal pulses. Exam reveals no gallop and no friction rub.    No murmur heard.  Pulmonary/Chest: No stridor. She is in respiratory distress. She has no wheezes. She has no rhonchi. She has rales in the left lower field.   Slight rales in lower left lobe. + tachypnea and patient appears SOB with movement in bed. Unable to tolerate laying flat in bed 2/2 orthopnea.     Bedside cardiac US with no large pericardial effusion. +B lines bilateral lungs.    Abdominal: Soft. Bowel sounds are normal. She exhibits no distension. There is no tenderness. There is no rebound and no guarding.   Musculoskeletal: She exhibits edema. She exhibits no tenderness.   chronic venous stasis changes to her legs. 1+ pulses. 1+ pitting edema. Chronic nonpitting edema.   Lymphadenopathy:     She has no cervical adenopathy.   Neurological: She is alert and oriented to person, place, and time. She has normal strength. No cranial nerve deficit or sensory deficit. GCS score is 15. GCS eye subscore is 4. GCS verbal subscore is 5. GCS motor subscore is 6.   Skin: Skin is warm and dry. Capillary refill takes less than 2 seconds.   +yeast like changes under pannus, + erythema, +slight odor, no discharge noted.    Psychiatric: She has a normal mood and affect. Thought content normal.         ED Course   Procedures  Labs Reviewed   CBC W/ AUTO DIFFERENTIAL - Abnormal; Notable for the following components:       Result Value    RBC 3.33 (*)     Hemoglobin 9.7 (*)     Hematocrit 32.7 (*)     Mean Corpuscular Hemoglobin Conc 29.7 (*)     RDW 16.3 (*)     All other components  within normal limits   COMPREHENSIVE METABOLIC PANEL - Abnormal; Notable for the following components:    Potassium 5.5 (*)     Chloride 119 (*)     CO2 16 (*)     BUN, Bld 24 (*)     Creatinine 2.2 (*)     eGFR if  24 (*)     eGFR if non  20 (*)     All other components within normal limits   TROPONIN I   B-TYPE NATRIURETIC PEPTIDE   PROTIME-INR    Narrative:     Recoll. [id number] by ADK at 02/26/2020 19:09, reason: Insufficient   specimen 02/26/2020  19:09   POCT GLUCOSE          Imaging Results          X-Ray Chest AP Portable (Final result)  Result time 02/26/20 17:38:48    Final result by Brenda Boyle MD (02/26/20 17:38:48)                 Impression:      As above.      Electronically signed by: Brenda Boyle MD  Date:    02/26/2020  Time:    17:38             Narrative:    EXAMINATION:  XR CHEST AP PORTABLE    CLINICAL HISTORY:  CHF;    TECHNIQUE:  Single frontal view of the chest was performed.    COMPARISON:  01/31/2020.    FINDINGS:  Heart is enlarged but stable in size.  Lungs are symmetrically expanded.  Mild increased attenuation is seen within the lungs which can be seen with mild edema or may in part relate to increased overlying soft tissue attenuation.  Asymmetric airspace opacity is seen within the right lower lung zone.  Findings could reflect asymmetric edema versus potential aspiration or developing pneumonia in the right clinical setting.  No evidence of pneumothorax or large pleural effusion.                                 Medical Decision Making:   History:   Old Records Summarized: other records.       <> Summary of Records: 1/30/20: Diagnosed with acute cystitis without hematuria.   MDM  Pt presenting 2/2 rales in fluid overload and shortness of breath consistent with CHF exacerbation.  Patient is given IV Lasix.  Considered nitroglycerin.  If worsening respiratory status will consider BiPAP and/or intubation. Patient labs significant for CKD with  mild worsening of crea (2.2 from 2.0). Elevated potassium to 5.5 without peaked T waves noted on EKG. Patient given IV lasix to improve fluid overload and potassium. Labs with BNP of 80 (doubled from previous, patient morbidly obese and may be falsely low). Bedside US with B lines present, CXR with pulm edema.     Also considered but less likely:  Acs: ekg doesn't show stemi. Troponin ordered and WNL  Pna: symptoms bilaterally and no fevers, no leukocytosis, no productive cough.   Bronchitis: considered but hpi most c/w CHF  COPD:  Considered but less likely  Pneumothorax: bilateral breath sounds    Discussed case with Dr. Ly who agrees to place patient in observation for diuresis and recheck of potassium. Patient in agreement with plan and all questions answered.             Scribe Attestation:   Scribe #1: I performed the above scribed service and the documentation accurately describes the services I performed. I attest to the accuracy of the note.                          Clinical Impression:       ICD-10-CM ICD-9-CM   1. Congestive heart failure, unspecified HF chronicity, unspecified heart failure type I50.9 428.0   2. Shortness of breath R06.02 786.05   3. SOB (shortness of breath) R06.02 786.05             ED Disposition Condition    Observation              Scribe attestation: I, Maribel Verde, personally performed the services described in this documentation. All medical record entries made by the scribe were at my direction and in my presence. I have reviewed the chart and agree that the record reflects my personal performance and is accurate and complete                 Maribel Verde MD  02/26/20 [id number]

## 2020-02-26 NOTE — ED TRIAGE NOTES
"Pt presents to ED via EMS from home complaining of SOB worsened with lying flat. "I can't lay down and I got this cough that won't quit."    Pt denies any pain at this time, N/V/D    PMHx CHF    Pt is AAOx4, able to verbalize and follow commands, ambulate with assistance, walker    Pt is blind in her left eye  "

## 2020-02-27 VITALS
BODY MASS INDEX: 48.68 KG/M2 | HEART RATE: 68 BPM | TEMPERATURE: 98 F | SYSTOLIC BLOOD PRESSURE: 142 MMHG | DIASTOLIC BLOOD PRESSURE: 68 MMHG | WEIGHT: 264.56 LBS | OXYGEN SATURATION: 99 % | RESPIRATION RATE: 18 BRPM | HEIGHT: 62 IN

## 2020-02-27 DIAGNOSIS — G47.00 INSOMNIA, UNSPECIFIED TYPE: ICD-10-CM

## 2020-02-27 PROBLEM — E78.5 HYPERLIPIDEMIA: Status: ACTIVE | Noted: 2020-02-27

## 2020-02-27 PROBLEM — E87.5 HYPERKALEMIA: Status: ACTIVE | Noted: 2020-02-27

## 2020-02-27 PROBLEM — I50.32 CHRONIC DIASTOLIC HEART FAILURE: Status: ACTIVE | Noted: 2020-02-27

## 2020-02-27 PROBLEM — I50.33 ACUTE ON CHRONIC DIASTOLIC HEART FAILURE: Status: ACTIVE | Noted: 2020-02-27

## 2020-02-27 PROBLEM — N18.4 CKD (CHRONIC KIDNEY DISEASE) STAGE 4, GFR 15-29 ML/MIN: Chronic | Status: ACTIVE | Noted: 2020-02-27

## 2020-02-27 PROBLEM — I50.9 CONGESTIVE HEART FAILURE: Status: RESOLVED | Noted: 2020-02-26 | Resolved: 2020-02-27

## 2020-02-27 PROBLEM — D63.8 ANEMIA OF CHRONIC DISEASE: Status: ACTIVE | Noted: 2020-02-27

## 2020-02-27 PROBLEM — D63.8 ANEMIA OF CHRONIC DISEASE: Chronic | Status: ACTIVE | Noted: 2020-02-27

## 2020-02-27 PROBLEM — I50.32 CHRONIC DIASTOLIC HEART FAILURE: Chronic | Status: ACTIVE | Noted: 2020-02-27

## 2020-02-27 PROBLEM — E78.5 HYPERLIPIDEMIA: Chronic | Status: ACTIVE | Noted: 2020-02-27

## 2020-02-27 LAB
ANION GAP SERPL CALC-SCNC: 9 MMOL/L (ref 8–16)
BUN SERPL-MCNC: 24 MG/DL (ref 8–23)
CALCIUM SERPL-MCNC: 9 MG/DL (ref 8.7–10.5)
CHLORIDE SERPL-SCNC: 113 MMOL/L (ref 95–110)
CO2 SERPL-SCNC: 22 MMOL/L (ref 23–29)
CREAT SERPL-MCNC: 2 MG/DL (ref 0.5–1.4)
EST. GFR  (AFRICAN AMERICAN): 26 ML/MIN/1.73 M^2
EST. GFR  (NON AFRICAN AMERICAN): 23 ML/MIN/1.73 M^2
ESTIMATED AVG GLUCOSE: 128 MG/DL (ref 68–131)
GLUCOSE SERPL-MCNC: 89 MG/DL (ref 70–110)
HBA1C MFR BLD HPLC: 6.1 % (ref 4–5.6)
POCT GLUCOSE: 137 MG/DL (ref 70–110)
POCT GLUCOSE: 84 MG/DL (ref 70–110)
POCT GLUCOSE: 97 MG/DL (ref 70–110)
POTASSIUM SERPL-SCNC: 4.4 MMOL/L (ref 3.5–5.1)
SODIUM SERPL-SCNC: 144 MMOL/L (ref 136–145)

## 2020-02-27 PROCEDURE — 63600175 PHARM REV CODE 636 W HCPCS: Performed by: INTERNAL MEDICINE

## 2020-02-27 PROCEDURE — 25000003 PHARM REV CODE 250: Performed by: INTERNAL MEDICINE

## 2020-02-27 PROCEDURE — G0378 HOSPITAL OBSERVATION PER HR: HCPCS

## 2020-02-27 PROCEDURE — 96372 THER/PROPH/DIAG INJ SC/IM: CPT | Mod: 59 | Performed by: EMERGENCY MEDICINE

## 2020-02-27 PROCEDURE — 36415 COLL VENOUS BLD VENIPUNCTURE: CPT

## 2020-02-27 PROCEDURE — 80048 BASIC METABOLIC PNL TOTAL CA: CPT

## 2020-02-27 PROCEDURE — 96376 TX/PRO/DX INJ SAME DRUG ADON: CPT | Performed by: EMERGENCY MEDICINE

## 2020-02-27 PROCEDURE — 96374 THER/PROPH/DIAG INJ IV PUSH: CPT | Mod: 59 | Performed by: EMERGENCY MEDICINE

## 2020-02-27 PROCEDURE — 83036 HEMOGLOBIN GLYCOSYLATED A1C: CPT

## 2020-02-27 RX ORDER — TALC
9 POWDER (GRAM) TOPICAL NIGHTLY PRN
Status: DISCONTINUED | OUTPATIENT
Start: 2020-02-27 | End: 2020-02-27 | Stop reason: HOSPADM

## 2020-02-27 RX ORDER — TIMOLOL MALEATE 5 MG/ML
1 SOLUTION/ DROPS OPHTHALMIC EVERY MORNING
Status: DISCONTINUED | OUTPATIENT
Start: 2020-02-27 | End: 2020-02-27 | Stop reason: HOSPADM

## 2020-02-27 RX ORDER — INSULIN ASPART 100 [IU]/ML
3 INJECTION, SOLUTION INTRAVENOUS; SUBCUTANEOUS
Status: DISCONTINUED | OUTPATIENT
Start: 2020-02-27 | End: 2020-02-27 | Stop reason: HOSPADM

## 2020-02-27 RX ORDER — AMLODIPINE BESYLATE 5 MG/1
10 TABLET ORAL DAILY
Status: DISCONTINUED | OUTPATIENT
Start: 2020-02-27 | End: 2020-02-27 | Stop reason: HOSPADM

## 2020-02-27 RX ORDER — FUROSEMIDE 10 MG/ML
40 INJECTION INTRAMUSCULAR; INTRAVENOUS
Status: DISCONTINUED | OUTPATIENT
Start: 2020-02-27 | End: 2020-02-27 | Stop reason: HOSPADM

## 2020-02-27 RX ORDER — ACETAMINOPHEN 500 MG
500 TABLET ORAL EVERY 6 HOURS PRN
Status: DISCONTINUED | OUTPATIENT
Start: 2020-02-27 | End: 2020-02-27 | Stop reason: HOSPADM

## 2020-02-27 RX ORDER — ALBUTEROL SULFATE 90 UG/1
2 AEROSOL, METERED RESPIRATORY (INHALATION) EVERY 6 HOURS PRN
Status: DISCONTINUED | OUTPATIENT
Start: 2020-02-27 | End: 2020-02-27

## 2020-02-27 RX ORDER — ATORVASTATIN CALCIUM 10 MG/1
20 TABLET, FILM COATED ORAL DAILY
Status: DISCONTINUED | OUTPATIENT
Start: 2020-02-27 | End: 2020-02-27 | Stop reason: HOSPADM

## 2020-02-27 RX ORDER — HEPARIN SODIUM 5000 [USP'U]/ML
5000 INJECTION, SOLUTION INTRAVENOUS; SUBCUTANEOUS EVERY 12 HOURS
Status: DISCONTINUED | OUTPATIENT
Start: 2020-02-27 | End: 2020-02-27 | Stop reason: HOSPADM

## 2020-02-27 RX ORDER — IPRATROPIUM BROMIDE AND ALBUTEROL SULFATE 2.5; .5 MG/3ML; MG/3ML
3 SOLUTION RESPIRATORY (INHALATION) EVERY 6 HOURS PRN
Status: DISCONTINUED | OUTPATIENT
Start: 2020-02-27 | End: 2020-02-27 | Stop reason: HOSPADM

## 2020-02-27 RX ORDER — LOSARTAN POTASSIUM AND HYDROCHLOROTHIAZIDE 12.5; 5 MG/1; MG/1
1 TABLET ORAL DAILY
Status: DISCONTINUED | OUTPATIENT
Start: 2020-02-27 | End: 2020-02-27 | Stop reason: HOSPADM

## 2020-02-27 RX ORDER — GLUCAGON 1 MG
1 KIT INJECTION
Status: DISCONTINUED | OUTPATIENT
Start: 2020-02-27 | End: 2020-02-27 | Stop reason: HOSPADM

## 2020-02-27 RX ORDER — TRAZODONE HYDROCHLORIDE 50 MG/1
50 TABLET ORAL NIGHTLY
Qty: 90 TABLET | Refills: 3 | Status: SHIPPED | OUTPATIENT
Start: 2020-02-27 | End: 2021-02-22

## 2020-02-27 RX ORDER — IBUPROFEN 200 MG
16 TABLET ORAL
Status: DISCONTINUED | OUTPATIENT
Start: 2020-02-27 | End: 2020-02-27 | Stop reason: HOSPADM

## 2020-02-27 RX ORDER — IBUPROFEN 200 MG
24 TABLET ORAL
Status: DISCONTINUED | OUTPATIENT
Start: 2020-02-27 | End: 2020-02-27 | Stop reason: HOSPADM

## 2020-02-27 RX ORDER — ONDANSETRON 2 MG/ML
8 INJECTION INTRAMUSCULAR; INTRAVENOUS EVERY 8 HOURS PRN
Status: DISCONTINUED | OUTPATIENT
Start: 2020-02-27 | End: 2020-02-27 | Stop reason: HOSPADM

## 2020-02-27 RX ORDER — CLONIDINE HYDROCHLORIDE 0.1 MG/1
0.1 TABLET ORAL 3 TIMES DAILY PRN
Status: DISCONTINUED | OUTPATIENT
Start: 2020-02-27 | End: 2020-02-27 | Stop reason: HOSPADM

## 2020-02-27 RX ORDER — PROCHLORPERAZINE EDISYLATE 5 MG/ML
2.5 INJECTION INTRAMUSCULAR; INTRAVENOUS EVERY 6 HOURS PRN
Status: DISCONTINUED | OUTPATIENT
Start: 2020-02-27 | End: 2020-02-27 | Stop reason: HOSPADM

## 2020-02-27 RX ORDER — SENNOSIDES 8.6 MG/1
8.6 TABLET ORAL DAILY PRN
Status: DISCONTINUED | OUTPATIENT
Start: 2020-02-27 | End: 2020-02-27 | Stop reason: HOSPADM

## 2020-02-27 RX ORDER — INSULIN ASPART 100 [IU]/ML
0-5 INJECTION, SOLUTION INTRAVENOUS; SUBCUTANEOUS
Status: DISCONTINUED | OUTPATIENT
Start: 2020-02-27 | End: 2020-02-27 | Stop reason: HOSPADM

## 2020-02-27 RX ORDER — ASPIRIN 81 MG/1
81 TABLET ORAL DAILY
Status: DISCONTINUED | OUTPATIENT
Start: 2020-02-27 | End: 2020-02-27 | Stop reason: HOSPADM

## 2020-02-27 RX ADMIN — FUROSEMIDE 40 MG: 10 INJECTION, SOLUTION INTRAVENOUS at 06:02

## 2020-02-27 RX ADMIN — ANTI-FUNGAL POWDER MICONAZOLE NITRATE TALC FREE: 1.42 POWDER TOPICAL at 09:02

## 2020-02-27 RX ADMIN — HEPARIN SODIUM 5000 UNITS: 5000 INJECTION, SOLUTION INTRAVENOUS; SUBCUTANEOUS at 09:02

## 2020-02-27 RX ADMIN — TIMOLOL MALEATE 1 DROP: 5 SOLUTION OPHTHALMIC at 06:02

## 2020-02-27 RX ADMIN — LOSARTAN POTASSIUM AND HYDROCHLOROTHIAZIDE 1 TABLET: 50; 12.5 TABLET, FILM COATED ORAL at 09:02

## 2020-02-27 RX ADMIN — AMLODIPINE BESYLATE 10 MG: 5 TABLET ORAL at 09:02

## 2020-02-27 RX ADMIN — ACETAMINOPHEN 500 MG: 500 TABLET ORAL at 06:02

## 2020-02-27 RX ADMIN — ONDANSETRON HYDROCHLORIDE 8 MG: 2 SOLUTION INTRAMUSCULAR; INTRAVENOUS at 11:02

## 2020-02-27 RX ADMIN — ASPIRIN 81 MG: 81 TABLET, COATED ORAL at 09:02

## 2020-02-27 RX ADMIN — INSULIN ASPART 3 UNITS: 100 INJECTION, SOLUTION INTRAVENOUS; SUBCUTANEOUS at 05:02

## 2020-02-27 RX ADMIN — FUROSEMIDE 40 MG: 10 INJECTION, SOLUTION INTRAVENOUS at 05:02

## 2020-02-27 RX ADMIN — ATORVASTATIN CALCIUM 20 MG: 10 TABLET, FILM COATED ORAL at 09:02

## 2020-02-27 NOTE — ASSESSMENT & PLAN NOTE
Patient initially had an elevated potassium level 5.5 mmol/L and was without any EKG changes.  Repeat potassium this morning is 4.4 mmol/L.  Will continue monitor.

## 2020-02-27 NOTE — PLAN OF CARE
Problem: Adult Inpatient Plan of Care  Goal: Plan of Care Review  Outcome: Ongoing, Progressing  Pt remained free of falls during current shift. Pt reported having a headache and received prn tylenol. IV lasix administered and monitoring pt's intake and output. Plan of care and fall precautions reviewed with pt and verbalized understanding. Bed locked, lowered, SR up x2 and call light placed within reach.

## 2020-02-27 NOTE — PLAN OF CARE
[facility] ORDERS      Patient Name: Ana James  YOB: 1940    PCP: Jaky Schmidt MD   PCP Address: [address] / [place] [address]  PCP Phone Number: [phone]  PCP Fax: [phone]       Encounter Date: 02/27/2020    Admit to Home Health    Diagnoses:  Active Hospital Problems    Diagnosis  POA    *Acute on chronic diastolic heart failure [I50.33]  Yes    Hyperkalemia [E87.5]  Yes    Hyperlipidemia [E78.5]  Yes     Chronic    Chronic diastolic heart failure [I50.32]  Yes     Chronic    CKD stage 4 [N18.4]  Yes     Chronic    Anemia of chronic disease [D63.8]  Yes     Chronic    Controlled type 2 diabetes mellitus with stage 4 chronic kidney disease, with long-term current use of insulin [E11.22, N18.4, Z79.4]  Not Applicable     Chronic    Essential hypertension [I10]  Yes     Chronic    Morbid obesity with BMI of 45.0-49.9, adult [E66.01, Z68.42]  Not Applicable     Chronic      Resolved Hospital Problems    Diagnosis Date Resolved POA    Congestive heart failure [I50.9] 02/27/2020 Yes       No future appointments.  Follow-up Information     Jaky Schmidt MD In 1 week.    Specialty:  Family Medicine  Why:  Call the office to schedule appointment, For outpatient follow-up/post hospitalizaton  Contact information:  [address] [place] [address] [phone]                     I have seen and examined this patient face to face today. My clinical findings that support the need for the home health skilled services and home bound status are the following:  Weakness/numbness causing balance and gait disturbance due to Heart Failure, Weakness/Debility and debility making it taxing to leave home.  Requiring assistive device to leave home due to unsteady gait caused by  Weakness/Debility and debility.  Patient with medication mismanagement issues requiring home bound status as evidenced by  Poor understanding of medication  regimen/dosage and Poor adherence to medication regimen/dosage.  Medical restrictions requiring assistance of another human to leave home due to  Unstable ambulation, Decreased range of motions in extremities and Morbid Obesity.    Allergies:  Review of patient's allergies indicates:   Allergen Reactions    Adhesive Rash     Paper tape       Diet: cardiac diet, diabetic diet: 1800 calorie, fluid restriction: 1.5L and 2 gram sodium diet    Activities: activity as tolerated    Nursing:   SN to complete comprehensive assessment including routine vital signs. Instruct on disease process and s/s of complications to report to MD. Review/verify medication list sent home with the patient at time of discharge  and instruct patient/caregiver as needed. Frequency may be adjusted depending on start of care date.    Notify MD if SBP > 160 or < 90; DBP > 90 or < 50; HR > 120 or < 50; Temp > 101; Other:         CONSULTS:     to evaluate for community resources/long-range planning.  Aide to provide assistance with personal care, ADLs, and vital signs.    MISCELLANEOUS CARE:  N/A    WOUND CARE ORDERS  n/a and lower extremity PVD      Medications: Review discharge medications with patient and family and provide education.      Current Discharge Medication List      CONTINUE these medications which have NOT CHANGED    Details   amLODIPine (NORVASC) 10 MG tablet Take 1 tablet (10 mg total) by mouth once daily.  Qty: 90 tablet, Refills: 1    Associated Diagnoses: Essential hypertension      aspirin (ECOTRIN) 81 MG EC tablet Take 81 mg by mouth once daily.      atorvastatin (LIPITOR) 20 MG tablet TAKE 1 TABLET (20 MG TOTAL) BY MOUTH ONCE DAILY.  Qty: 90 tablet, Refills: 2    Associated Diagnoses: Controlled type 2 diabetes mellitus with stage 4 chronic kidney disease, with long-term current use of insulin      candesartan-hydrochlorothiazid (ATACAND HCT) 32-12.5 mg per tablet Take 1 tablet by mouth once daily.  Qty: 90  "tablet, Refills: 1    Associated Diagnoses: Essential hypertension      carvedilol (COREG) 12.5 MG tablet Take 1 tablet (12.5 mg total) by mouth 2 (two) times daily.  Qty: 180 tablet, Refills: 1    Associated Diagnoses: Essential hypertension      cilostazol (PLETAL) 50 MG Tab Take 1 tablet (50 mg total) by mouth 2 (two) times daily.  Qty: 180 tablet, Refills: 1    Associated Diagnoses: Peripheral vascular disease, unspecified      oxybutynin (DITROPAN XL) 15 MG TR24 Take 1 tablet (15 mg total) by mouth once daily.  Qty: 90 tablet, Refills: 3      albuterol (PROVENTIL/VENTOLIN HFA) 90 mcg/actuation inhaler Inhale 2 puffs into the lungs every 6 (six) hours as needed for Wheezing. Rescue  Qty: 8 g, Refills: 11    Comments: Ventolin  Associated Diagnoses: Wheezing      !! BD ULTRA-FINE BREE PEN NEEDLE 32 gauge x 5/32" Ndle INJECT INSULIN THREE TIMES DAILY  Qty: 200 each, Refills: 1      blood sugar diagnostic Strp Test 3 times daily  Qty: 100 each, Refills: 11    Comments: True Results  Associated Diagnoses: Type 2 diabetes, uncontrolled, with renal manifestation      blood-glucose meter kit Use as instructed to check blood sugar 3 times daily  Qty: 1 each, Refills: 0    Associated Diagnoses: Type 2 diabetes, uncontrolled, with renal manifestation      clotrimazole-betamethasone 1-0.05% (LOTRISONE) cream Apply topically 2 (two) times daily.  Qty: 45 g, Refills: 5    Associated Diagnoses: Intertrigo      lancets Misc Test 3 times daily  Qty: 100 each, Refills: 11    Comments: True Results  Associated Diagnoses: Type 2 diabetes, uncontrolled, with renal manifestation      latanoprost 0.005 % ophthalmic solution Place 1 drop into both eyes every evening.  Refills: 3      LEVEMIR FLEXTOUCH U-100 INSULN 100 unit/mL (3 mL) InPn pen INJECT 32 UNITS INTO THE SKIN EVERY EVENING  Qty: 28.8 mL, Refills: 1    Associated Diagnoses: Uncontrolled type 2 diabetes mellitus with stage 4 chronic kidney disease      ondansetron " "(ZOFRAN-ODT) 4 MG TbDL TAKE 1 TABLET BY MOUTH EVERY 6 HOURS AS NEEDED  Qty: 20 tablet, Refills: 2      !! pen needle, diabetic (BD ULTRA-FINE BREE PEN NEEDLE) 32 gauge x 5/32" Ndle INJECT INSULIN THREE TIMES DAILY  Qty: 200 each, Refills: 0    Associated Diagnoses: Type 2 diabetes, uncontrolled, with renal manifestation      potassium citrate (UROCIT-K 15) 15 mEq TbSR Take 15 mEq by mouth once daily.  Qty: 90 tablet, Refills: 3    Associated Diagnoses: Kidney stones      timolol maleate 0.5% (TIMOPTIC) 0.5 % Drop 1 drop every morning.   Refills: 2      TRADJENTA 5 mg Tab tablet TAKE 1 TABLET BY MOUTH EVERY DAY  Qty: 90 tablet, Refills: 0    Associated Diagnoses: Type 2 diabetes, uncontrolled, with renal manifestation      TRAVATAN Z 0.004 % ophthalmic solution       traZODone (DESYREL) 50 MG tablet Take 1 tablet (50 mg total) by mouth every evening.  Qty: 90 tablet, Refills: 3    Associated Diagnoses: Insomnia, unspecified type      triamcinolone acetonide 0.1% (KENALOG) 0.1 % cream Apply topically 3 (three) times daily. for 10 days  Qty: 80 g, Refills: 0    Associated Diagnoses: Stasis dermatitis of both legs       !! - Potential duplicate medications found. Please discuss with provider.          I certify that this patient is confined to her home and needs intermittent skilled nursing care + JENNIE Farias, FNP-C  Hospitalist - Department of Hospital Medicine  [address], [place] [address]  Office [phone]; Pager [phone]  .      "

## 2020-02-27 NOTE — PLAN OF CARE
[facility]/pharmacy #[id number] - [place], [place] - [address]  [address] [facility] [address] [address]  Phone: [phone] Fax: [phone]    [facility] - [place], [place] - [address] AT Portal to [facility] Sites  [address]  [facility] [address]  Phone: [phone] Fax: [phone]       02/27/20 1755   Discharge Assessment   Assessment Type Discharge Planning Assessment   Confirmed/corrected address and phone number on facesheet? Yes   Assessment information obtained from? Patient   Expected Length of Stay (days)   (discharged)   Communicated expected length of stay with patient/caregiver yes   Prior to hospitilization cognitive status: Alert/Oriented   Prior to hospitalization functional status: Assistive Equipment   Current cognitive status: Alert/Oriented   Current Functional Status: Needs Assistance   Lives With alone   Able to Return to Prior Arrangements yes   Is patient able to care for self after discharge? Yes   Patient's perception of discharge disposition home or selfcare   Readmission Within the Last 30 Days no previous admission in last 30 days   Patient currently being followed by outpatient case management? No   Patient currently receives any other outside agency services? Yes   Name and contact number of agency or person providing outside services [facility]   Is it the patient/care giver preference to resume care with the current outside agency? Yes   Equipment Currently Used at Home cane, straight;power chair;rollator;glucometer   Part D Coverage PHN   Do you have any problems affording any of your prescribed medications? No   Is the patient taking medications as prescribed? yes   Does the patient have transportation home? Yes   Transportation Anticipated family or friend will provide   Does the patient receive services at the [facility]? No   Discharge Plan A Home Health   DME Needed Upon Discharge  none   Patient/Family in  Agreement with Plan yes

## 2020-02-27 NOTE — TELEPHONE ENCOUNTER
"Last Office Visit Info:   The patient's last visit with Jaky Schmidt MD was on 8/1/2019.    The patient's last visit in current department was on Visit date not found.        Last CBC Results:   Lab Results   Component Value Date    WBC 4.05 02/26/2020    HGB 9.7 (L) 02/26/2020    HCT 32.7 (L) 02/26/2020     02/26/2020       Last CMP Results  Lab Results   Component Value Date     02/27/2020    K 4.4 02/27/2020     (H) 02/27/2020    CO2 22 (L) 02/27/2020    BUN 24 (H) 02/27/2020    CREATININE 2.0 (H) 02/27/2020    CALCIUM 9.0 02/27/2020    ALBUMIN 3.5 02/26/2020    AST 20 02/26/2020    ALT 11 02/26/2020       Last Lipids  Lab Results   Component Value Date    CHOL 105 (L) 08/01/2019    TRIG 81 08/01/2019    HDL 33 (L) 08/01/2019    LDLCALC 55.8 (L) 08/01/2019       Last A1C  Lab Results   Component Value Date    HGBA1C 6.1 (H) 02/27/2020       Last TSH  Lab Results   Component Value Date    TSH 2.371 07/13/2018         Current Med Refills  Medication List with Changes/Refills   Current Medications    ALBUTEROL (PROVENTIL/VENTOLIN HFA) 90 MCG/ACTUATION INHALER    Inhale 2 puffs into the lungs every 6 (six) hours as needed for Wheezing. Rescue       Start Date: 12/12/2018End Date: --    AMLODIPINE (NORVASC) 10 MG TABLET    Take 1 tablet (10 mg total) by mouth once daily.       Start Date: 2/13/2020 End Date: --    ASPIRIN (ECOTRIN) 81 MG EC TABLET    Take 81 mg by mouth once daily.       Start Date: --        End Date: --    ATORVASTATIN (LIPITOR) 20 MG TABLET    TAKE 1 TABLET (20 MG TOTAL) BY MOUTH ONCE DAILY.       Start Date: 12/9/2019 End Date: --    BD ULTRA-FINE BREE PEN NEEDLE 32 GAUGE X 5/32" NDLE    INJECT INSULIN THREE TIMES DAILY       Start Date: 5/6/2019  End Date: --    BLOOD SUGAR DIAGNOSTIC STRP    Test 3 times daily       Start Date: 6/24/2019 End Date: --    BLOOD-GLUCOSE METER KIT    Use as instructed to check blood sugar 3 times daily       Start Date: 11/2/2018 End Date: " "4/12/2020    CANDESARTAN-HYDROCHLOROTHIAZID (ATACAND HCT) 32-12.5 MG PER TABLET    Take 1 tablet by mouth once daily.       Start Date: 10/14/2019End Date: 10/13/2020    CARVEDILOL (COREG) 12.5 MG TABLET    Take 1 tablet (12.5 mg total) by mouth 2 (two) times daily.       Start Date: 12/23/2019End Date: --    CILOSTAZOL (PLETAL) 50 MG TAB    Take 1 tablet (50 mg total) by mouth 2 (two) times daily.       Start Date: 10/24/2019End Date: 4/21/2020    CLOTRIMAZOLE-BETAMETHASONE 1-0.05% (LOTRISONE) CREAM    Apply topically 2 (two) times daily.       Start Date: 3/24/2015 End Date: --    LANCETS MISC    Test 3 times daily       Start Date: 6/24/2019 End Date: --    LATANOPROST 0.005 % OPHTHALMIC SOLUTION    Place 1 drop into both eyes every evening.       Start Date: 7/24/2014 End Date: --    LEVEMIR FLEXTOUCH U-100 INSULN 100 UNIT/ML (3 ML) INPN PEN    INJECT 32 UNITS INTO THE SKIN EVERY EVENING       Start Date: 2/24/2020 End Date: 2/23/2021    ONDANSETRON (ZOFRAN-ODT) 4 MG TBDL    TAKE 1 TABLET BY MOUTH EVERY 6 HOURS AS NEEDED       Start Date: 3/12/2018 End Date: --    OXYBUTYNIN (DITROPAN XL) 15 MG TR24    Take 1 tablet (15 mg total) by mouth once daily.       Start Date: 11/25/2019End Date: 11/24/2020    PEN NEEDLE, DIABETIC (BD ULTRA-FINE BREE PEN NEEDLE) 32 GAUGE X 5/32" NDLE    INJECT INSULIN THREE TIMES DAILY       Start Date: 6/24/2019 End Date: --    POTASSIUM CITRATE (UROCIT-K 15) 15 MEQ TBSR    Take 15 mEq by mouth once daily.       Start Date: 2/5/2018  End Date: 2/5/2019    TIMOLOL MALEATE 0.5% (TIMOPTIC) 0.5 % DROP    1 drop every morning.        Start Date: 6/27/2014 End Date: --    TRADJENTA 5 MG TAB TABLET    TAKE 1 TABLET BY MOUTH EVERY DAY       Start Date: 2/3/2020  End Date: --    TRAVATAN Z 0.004 % OPHTHALMIC SOLUTION           Start Date: 12/10/2018End Date: --    TRAZODONE (DESYREL) 50 MG TABLET    TAKE 1 TABLET (50 MG TOTAL) BY MOUTH EVERY EVENING.       Start Date: 2/4/2019  End Date: " 2/4/2020    TRIAMCINOLONE ACETONIDE 0.1% (KENALOG) 0.1 % CREAM    Apply topically 3 (three) times daily. for 10 days       Start Date: 8/21/2018 End Date: 8/31/2018       Order(s) placed per written order guidelines:     Please advise.

## 2020-02-27 NOTE — NURSING
Report received from ED nurse, Lizzeth, however pt's room is being cleaned. Will call nurse when room is ready.

## 2020-02-27 NOTE — H&P
[facility] Medicine  History & Physical    Patient Name: Ana James  MRN: [id number]  Admission Date: 2/26/2020  Attending Physician: Darin Garcia MD   Primary Care Provider: Jaky Schmidt MD         Patient information was obtained from patient.     Subjective:     Principal Problem:Acute on chronic diastolic heart failure    Chief Complaint:  Shortness of breath for the past week.    HPI: Mrs. Ana James is a 79 y.o. female with essential hypertension, type 2 diabetes mellitus (HbA1c 6.1% Jan 2020), hyperlipidemia (LDL 55.8 Aug 2019), chronic diastolic heart failure (LVEF 65% Jan 2020), CKD stage 4, anemia of chronic disease, morbid obesity (BMI 48.4) who presents to [facility] ED with complaints of dyspnea for one week.  The dyspnea is mainly on exertion but is also sometimes at rest.  She has had some wheezing and a nonproductive cough as well as some orthopnea and PND.  She denies any chest pains, palpitations, diaphoresis, fevers, chills, nausea, vomiting, hemoptysis, nor any lower extremity pain or swelling. She denies any recent sick contacts or recent travel but did have some orthopnea.  She did not have any PND.  Her appetite has been good she has not had any weight loss.    Chart Review:  Previous Hospitalizations  Date Hospital Diagnosis   Jan 2020 [facility] Acute cystitis   May 2017 [facility] Intractable nausea and vomiting   Aug 2016 [facility] Right ureteroscopy, laser lithotripsy, and stone extraction   May 24, 2016 [facility] Right extracorporeal shockwave lithotripsy, right ureteral stent placement   May 11, 2016 [facility] Laparoscopic cholecystectomy   Mar 2014 [facility] Acute cystitis   Nov 2011 [facility] Serous otitis media     Outpatient Follow-Up  Date of Visit Physician Service   Sept 2019 Janet Farnsworth DPM Podiatry   Sept 2019 Kannan Rice MD Nephrology   Aug 2019 Jaky Schmidt MD Primary Care   Jul 2018 Alex Navarrete MD Cardiology   Sept 2016 ISHMAEL   "MD Geoff Urology   May 2016 Pancho Jorge MD General Surgery   Mar 2016 Ann Clemens MD Endocrinology   Jan 2015 Don Troncoso MD Pulmonology     Past Medical History:   Diagnosis Date    Arthritis lower extremities    Asthma     Diabetes mellitus     Gall stones     Hypertension     Kidney stones     Nephrolithiasis 1/22/2016    Obesity     Renal disorder     Retinopathy     Vaginal delivery     x1    Weakness of both lower extremities     uses a cane, rollator & power chair       Past Surgical History:   Procedure Laterality Date    CARDIAC CATHETERIZATION      cataract      CHOLECYSTECTOMY      EYE SURGERY      Rt cataract surgery    HYSTERECTOMY      URETERAL STENT PLACEMENT         Review of patient's allergies indicates:   Allergen Reactions    Adhesive Rash     Paper tape       No current facility-administered medications on file prior to encounter.      Current Outpatient Medications on File Prior to Encounter   Medication Sig    amLODIPine (NORVASC) 10 MG tablet Take 1 tablet (10 mg total) by mouth once daily.    aspirin (ECOTRIN) 81 MG EC tablet Take 81 mg by mouth once daily.    atorvastatin (LIPITOR) 20 MG tablet TAKE 1 TABLET (20 MG TOTAL) BY MOUTH ONCE DAILY.    candesartan-hydrochlorothiazid (ATACAND HCT) 32-12.5 mg per tablet Take 1 tablet by mouth once daily.    carvedilol (COREG) 12.5 MG tablet Take 1 tablet (12.5 mg total) by mouth 2 (two) times daily.    cilostazol (PLETAL) 50 MG Tab Take 1 tablet (50 mg total) by mouth 2 (two) times daily.    oxybutynin (DITROPAN XL) 15 MG TR24 Take 1 tablet (15 mg total) by mouth once daily.    albuterol (PROVENTIL/VENTOLIN HFA) 90 mcg/actuation inhaler Inhale 2 puffs into the lungs every 6 (six) hours as needed for Wheezing. Rescue    BD ULTRA-FINE BREE PEN NEEDLE 32 gauge x 5/32" Ndle INJECT INSULIN THREE TIMES DAILY    blood sugar diagnostic Strp Test 3 times daily    blood-glucose meter kit Use as instructed to check blood " "sugar 3 times daily    clotrimazole-betamethasone 1-0.05% (LOTRISONE) cream Apply topically 2 (two) times daily.    lancets Misc Test 3 times daily    latanoprost 0.005 % ophthalmic solution Place 1 drop into both eyes every evening.    LEVEMIR FLEXTOUCH U-100 INSULN 100 unit/mL (3 mL) InPn pen INJECT 32 UNITS INTO THE SKIN EVERY EVENING    ondansetron (ZOFRAN-ODT) 4 MG TbDL TAKE 1 TABLET BY MOUTH EVERY 6 HOURS AS NEEDED    pen needle, diabetic (BD ULTRA-FINE BREE PEN NEEDLE) 32 gauge x 5/32" Ndle INJECT INSULIN THREE TIMES DAILY    potassium citrate (UROCIT-K 15) 15 mEq TbSR Take 15 mEq by mouth once daily.    timolol maleate 0.5% (TIMOPTIC) 0.5 % Drop 1 drop every morning.     TRADJENTA 5 mg Tab tablet TAKE 1 TABLET BY MOUTH EVERY DAY    TRAVATAN Z 0.004 % ophthalmic solution     traZODone (DESYREL) 50 MG tablet TAKE 1 TABLET (50 MG TOTAL) BY MOUTH EVERY EVENING.    triamcinolone acetonide 0.1% (KENALOG) 0.1 % cream Apply topically 3 (three) times daily. for 10 days     Family History     Problem Relation (Age of Onset)    Cancer Sister    Diabetes Brother    Hypertension Father    Kidney failure Mother        Tobacco Use    Smoking status: Never Smoker    Smokeless tobacco: Never Used   Substance and Sexual Activity    Alcohol use: No    Drug use: No    Sexual activity: Not Currently     Partners: Male     Review of Systems   Constitutional: Negative for activity change, appetite change, chills, diaphoresis, fatigue, fever and unexpected weight change.   HENT: Negative.    Eyes: Negative.    Respiratory: Positive for cough, shortness of breath and wheezing. Negative for chest tightness.    Cardiovascular: Negative for chest pain, palpitations and leg swelling.   Gastrointestinal: Positive for constipation. Negative for abdominal distention, abdominal pain, blood in stool, diarrhea, nausea and vomiting.   Genitourinary: Negative for dysuria and hematuria.   Musculoskeletal: Negative.    Skin: " Negative.    Neurological: Negative for dizziness, seizures, syncope, weakness and light-headedness.   Psychiatric/Behavioral: Negative.      Objective:     Vital Signs (Most Recent):  Temp: 98.2 °F (36.8 °C) (02/27/20 0335)  Pulse: 75 (02/27/20 0335)  Resp: 18 (02/27/20 0335)  BP: (!) 175/2 (02/27/20 0335)  SpO2: (!) 93 % (02/27/20 0335) Vital Signs (24h Range):  Temp:  [97.9 °F (36.6 °C)-98.3 °F (36.8 °C)] 98.2 °F (36.8 °C)  Pulse:  [61-75] 75  Resp:  [16-27] 18  SpO2:  [93 %-100 %] 93 %  BP: (118-175)/(2-88) 175/2     Weight: 120 kg (264 lb 8.8 oz)  Body mass index is 48.39 kg/m².    Physical Exam   Constitutional: She is oriented to person, place, and time. She appears well-developed and well-nourished. No distress.   Morbidly obese   HENT:   Head: Normocephalic and atraumatic.   Right Ear: External ear normal.   Left Ear: External ear normal.   Nose: Nose normal.   Eyes: Right eye exhibits no discharge. Left eye exhibits no discharge.   Neck: Normal range of motion.   Cardiovascular: Normal rate, regular rhythm, normal heart sounds and intact distal pulses. Exam reveals no gallop and no friction rub.   No murmur heard.  Trace bilateral lower extremity edema but no JVD   Pulmonary/Chest:   Mildly increased respiratory effort with bibasilar crackles, no wheezing   Abdominal: Soft. Bowel sounds are normal. She exhibits no distension. There is no tenderness. There is no rebound and no guarding.   Musculoskeletal: Normal range of motion.   Neurological: She is alert and oriented to person, place, and time.   Skin: Skin is warm and dry. She is not diaphoretic. No erythema.   Psychiatric: She has a normal mood and affect. Her behavior is normal. Judgment and thought content normal.   Nursing note and vitals reviewed.          Significant Labs: All pertinent labs within the past 24 hours have been reviewed.    Significant Imaging: I have reviewed and interpreted all pertinent imaging results/findings within the past 24  hours.    Assessment/Plan:     * Acute on chronic diastolic heart failure  Patient was having symptoms of orthopnea and PND at home.  She does have a history of diastolic heart failure but her most recent echocardiogram in January 2020 did not mention any diastolic dysfunction.  She does appear volume overloaded.  Her BNP was normal at 80 but given her morbid obesity that may be artificially low.  Her troponin was < 0.006.  I personally reviewed her EKG which was significant for normal sinus rhythm with a first-degree AV block but no signs of acute ischemia.  She has been started on intravenous diuresis with furosemide and her intake/output will be monitored carefully.  Will defer repeat echocardiogram since her last echocardiogram was last month.    Hyperkalemia  Patient initially had an elevated potassium level 5.5 mmol/L and was without any EKG changes.  Repeat potassium this morning is 4.4 mmol/L.  Will continue monitor.    Essential hypertension  Patient's blood pressure is well-controlled; will continue home regimen of amlodipine, candesartan, and hydrochlorothiazide, and provide as-needed clonidine.  Will hold her home regimen of carvedilol due to acute heart failure.    Controlled type 2 diabetes mellitus with stage 4 chronic kidney disease, with long-term current use of insulin  Well controlled on home regimen of a DPP-4 inhibitor and basal insulin therapy; will provide basal-prandial insulin therapy along with insulin sliding scale.    Hyperlipidemia  Well controlled; will continue her home regimen of atorvastatin.    Chronic diastolic heart failure  As addressed above.    CKD stage 4  Her renal function appears to be at baseline; will continue to monitor urine output.    Anemia of chronic disease  The patient's H/H is stable and consistent with previous laboratory measurements, and the patient exhibits no signs or symptoms of acute bleeding; there is no indication for transfusion.  Will continue to  monitor.    Morbid obesity with BMI of 45.0-49.9, adult  The patient has been counseled on the negative impact that obesity imparts on her health and was encouraged to make lifestyle changes in order to lose weight and decrease her modifiable risk factors.    VTE Risk Mitigation (From admission, onward)         Ordered     heparin (porcine) injection 5,000 Units  Every 12 hours      02/27/20 0544     IP VTE HIGH RISK PATIENT  Once      02/27/20 0544                   The patient will be placed in observation status.          Dipak Ly M.D.  Staff Nocturnist  Department of Hospital Medicine  [facility]  Pager: [phone]          N.B.: Portions of this note was dictated using M*Modal Fluency Direct--there may be voice recognition errors occasionally missed on review.

## 2020-02-27 NOTE — NURSING TRANSFER
Nursing Transfer Note      2/27/2020     Transfer From: ED; To: Obs room 328A    Transfer via stretcher    Transfer with cardiac monitoring    Transported by Transport tech    Medicines sent: No    Chart send with patient: Yes    Notified: none    Patient reassessed at: 0139 on 2/27/2020    Upon arrival to floor: cardiac monitor applied, patient oriented to room, call bell in reach and bed in lowest position and SR up x3. Pt is AAO x4. Also, pt denies having any pain and no apparent distress apparent. Skin is clean, however pt has yeast present under abdomen, excoriation to groin and buttock present. Also, pt's BLE have 3+ edema present. Admit completed per Navigator. Plan of care reviewed with pt and pt verbalized understanding. Will continue to monitor pt closely.

## 2020-02-27 NOTE — ASSESSMENT & PLAN NOTE
Patient's blood pressure is well-controlled; will continue home regimen of amlodipine, candesartan, and hydrochlorothiazide, and provide as-needed clonidine.  Will hold her home regimen of carvedilol due to acute heart failure.

## 2020-02-27 NOTE — ASSESSMENT & PLAN NOTE
Well controlled on home regimen of a DPP-4 inhibitor and basal insulin therapy; will provide basal-prandial insulin therapy along with insulin sliding scale.

## 2020-02-27 NOTE — ED NOTES
Pt assisted to stand on scale for accurate weight. Pt became very SOB, in distress, O2 sat dropped to 88. Pt has extreme difficulty going from sitting to standing and vice versa. Pt placed on 2L O2

## 2020-02-27 NOTE — HPI
Mrs. Ana James is a 79 y.o. female with essential hypertension, type 2 diabetes mellitus (HbA1c 6.1% Jan 2020), hyperlipidemia (LDL 55.8 Aug 2019), chronic diastolic heart failure (LVEF 65% Jan 2020), CKD stage 4, anemia of chronic disease, morbid obesity (BMI 48.4) who presents to [facility] ED with complaints of dyspnea for one week.  The dyspnea is mainly on exertion but is also sometimes at rest.  She has had some wheezing and a nonproductive cough as well as some orthopnea and PND.  She denies any chest pains, palpitations, diaphoresis, fevers, chills, nausea, vomiting, hemoptysis, nor any lower extremity pain or swelling. She denies any recent sick contacts or recent travel but did have some orthopnea.  She did not have any PND.  Her appetite has been good she has not had any weight loss.

## 2020-02-27 NOTE — SUBJECTIVE & OBJECTIVE
"Past Medical History:   Diagnosis Date    Arthritis lower extremities    Asthma     Diabetes mellitus     Gall stones     Hypertension     Kidney stones     Nephrolithiasis 1/22/2016    Obesity     Renal disorder     Retinopathy     Vaginal delivery     x1    Weakness of both lower extremities     uses a cane, rollator & power chair       Past Surgical History:   Procedure Laterality Date    CARDIAC CATHETERIZATION      cataract      CHOLECYSTECTOMY      EYE SURGERY      Rt cataract surgery    HYSTERECTOMY      URETERAL STENT PLACEMENT         Review of patient's allergies indicates:   Allergen Reactions    Adhesive Rash     Paper tape       No current facility-administered medications on file prior to encounter.      Current Outpatient Medications on File Prior to Encounter   Medication Sig    amLODIPine (NORVASC) 10 MG tablet Take 1 tablet (10 mg total) by mouth once daily.    aspirin (ECOTRIN) 81 MG EC tablet Take 81 mg by mouth once daily.    atorvastatin (LIPITOR) 20 MG tablet TAKE 1 TABLET (20 MG TOTAL) BY MOUTH ONCE DAILY.    candesartan-hydrochlorothiazid (ATACAND HCT) 32-12.5 mg per tablet Take 1 tablet by mouth once daily.    carvedilol (COREG) 12.5 MG tablet Take 1 tablet (12.5 mg total) by mouth 2 (two) times daily.    cilostazol (PLETAL) 50 MG Tab Take 1 tablet (50 mg total) by mouth 2 (two) times daily.    oxybutynin (DITROPAN XL) 15 MG TR24 Take 1 tablet (15 mg total) by mouth once daily.    albuterol (PROVENTIL/VENTOLIN HFA) 90 mcg/actuation inhaler Inhale 2 puffs into the lungs every 6 (six) hours as needed for Wheezing. Rescue    BD ULTRA-FINE BREE PEN NEEDLE 32 gauge x 5/32" Ndle INJECT INSULIN THREE TIMES DAILY    blood sugar diagnostic Strp Test 3 times daily    blood-glucose meter kit Use as instructed to check blood sugar 3 times daily    clotrimazole-betamethasone 1-0.05% (LOTRISONE) cream Apply topically 2 (two) times daily.    lancets Misc Test 3 times daily " "   latanoprost 0.005 % ophthalmic solution Place 1 drop into both eyes every evening.    LEVEMIR FLEXTOUCH U-100 INSULN 100 unit/mL (3 mL) InPn pen INJECT 32 UNITS INTO THE SKIN EVERY EVENING    ondansetron (ZOFRAN-ODT) 4 MG TbDL TAKE 1 TABLET BY MOUTH EVERY 6 HOURS AS NEEDED    pen needle, diabetic (BD ULTRA-FINE BREE PEN NEEDLE) 32 gauge x 5/32" Ndle INJECT INSULIN THREE TIMES DAILY    potassium citrate (UROCIT-K 15) 15 mEq TbSR Take 15 mEq by mouth once daily.    timolol maleate 0.5% (TIMOPTIC) 0.5 % Drop 1 drop every morning.     TRADJENTA 5 mg Tab tablet TAKE 1 TABLET BY MOUTH EVERY DAY    TRAVATAN Z 0.004 % ophthalmic solution     traZODone (DESYREL) 50 MG tablet TAKE 1 TABLET (50 MG TOTAL) BY MOUTH EVERY EVENING.    triamcinolone acetonide 0.1% (KENALOG) 0.1 % cream Apply topically 3 (three) times daily. for 10 days     Family History     Problem Relation (Age of Onset)    Cancer Sister    Diabetes Brother    Hypertension Father    Kidney failure Mother        Tobacco Use    Smoking status: Never Smoker    Smokeless tobacco: Never Used   Substance and Sexual Activity    Alcohol use: No    Drug use: No    Sexual activity: Not Currently     Partners: Male     Review of Systems   Constitutional: Negative for activity change, appetite change, chills, diaphoresis, fatigue, fever and unexpected weight change.   HENT: Negative.    Eyes: Negative.    Respiratory: Positive for cough, shortness of breath and wheezing. Negative for chest tightness.    Cardiovascular: Negative for chest pain, palpitations and leg swelling.   Gastrointestinal: Positive for constipation. Negative for abdominal distention, abdominal pain, blood in stool, diarrhea, nausea and vomiting.   Genitourinary: Negative for dysuria and hematuria.   Musculoskeletal: Negative.    Skin: Negative.    Neurological: Negative for dizziness, seizures, syncope, weakness and light-headedness.   Psychiatric/Behavioral: Negative.      Objective: "     Vital Signs (Most Recent):  Temp: 98.2 °F (36.8 °C) (02/27/20 0335)  Pulse: 75 (02/27/20 0335)  Resp: 18 (02/27/20 0335)  BP: (!) 175/2 (02/27/20 0335)  SpO2: (!) 93 % (02/27/20 0335) Vital Signs (24h Range):  Temp:  [97.9 °F (36.6 °C)-98.3 °F (36.8 °C)] 98.2 °F (36.8 °C)  Pulse:  [61-75] 75  Resp:  [16-27] 18  SpO2:  [93 %-100 %] 93 %  BP: (118-175)/(2-88) 175/2     Weight: 120 kg (264 lb 8.8 oz)  Body mass index is 48.39 kg/m².    Physical Exam   Constitutional: She is oriented to person, place, and time. She appears well-developed and well-nourished. No distress.   Morbidly obese   HENT:   Head: Normocephalic and atraumatic.   Right Ear: External ear normal.   Left Ear: External ear normal.   Nose: Nose normal.   Eyes: Right eye exhibits no discharge. Left eye exhibits no discharge.   Neck: Normal range of motion.   Cardiovascular: Normal rate, regular rhythm, normal heart sounds and intact distal pulses. Exam reveals no gallop and no friction rub.   No murmur heard.  Trace bilateral lower extremity edema but no JVD   Pulmonary/Chest:   Mildly increased respiratory effort with bibasilar crackles, no wheezing   Abdominal: Soft. Bowel sounds are normal. She exhibits no distension. There is no tenderness. There is no rebound and no guarding.   Musculoskeletal: Normal range of motion.   Neurological: She is alert and oriented to person, place, and time.   Skin: Skin is warm and dry. She is not diaphoretic. No erythema.   Psychiatric: She has a normal mood and affect. Her behavior is normal. Judgment and thought content normal.   Nursing note and vitals reviewed.          Significant Labs: All pertinent labs within the past 24 hours have been reviewed.    Significant Imaging: I have reviewed and interpreted all pertinent imaging results/findings within the past 24 hours.

## 2020-02-28 DIAGNOSIS — I50.43 ACUTE ON CHRONIC COMBINED SYSTOLIC AND DIASTOLIC HEART FAILURE: Primary | ICD-10-CM

## 2020-02-28 NOTE — HOSPITAL COURSE
79 year old AAF chronically ill, placed in observation for acute onset SOB and progressively worsening dyspnea on exertion. Patient has known heart failure and her most recent 2D echo 01/2020 did not mention DD and LVEF showed  Normal at 65%. She did receive IV lasix which helped resolve her LE edema along with LE elevation. Patient has what appears to be PVD which is thought to be contributory to her chronic LE edema. Her renal functions was noted as BUN/creatine/GFR 24/2.2/24 which improved to 24/2.0/26 improved without treatment. This level of CKDIII is chronic and consistent with baseline. HgA1c was noted to be 6.1 which is consistent with standards for her age. She tells me that she has had problems with transportation to the doctor which is why she has not been following up in the clinic outpatient. Discussed palliative care options and patient was amenable in early morning but appeared to change her mind. Requested CM to speak to patient regarding Palliative care at discharge.

## 2020-02-28 NOTE — PLAN OF CARE
02/27/20 1850   Post-Acute Status   Post-Acute Authorization Home Health/Hospice   Home Health/Hospice Status Pending Payor Review   Discharge Plan   Discharge Plan A Home Health

## 2020-02-28 NOTE — DISCHARGE SUMMARY
[facility] Hospital Medicine  Discharge Summary      Patient Name: Ana James  MRN: [id number]  Admission Date: 2/26/2020  Hospital Length of Stay: 0 days  Discharge Date and Time:  02/27/2020 6:10 PM  Attending Physician: Darin Garcia MD   Discharging Provider: HARIS Luong  Primary Care Provider: Jaky Schmidt MD      HPI:   Mrs. Ana James is a 79 y.o. female with essential hypertension, type 2 diabetes mellitus (HbA1c 6.1% Jan 2020), hyperlipidemia (LDL 55.8 Aug 2019), chronic diastolic heart failure (LVEF 65% Jan 2020), CKD stage 4, anemia of chronic disease, morbid obesity (BMI 48.4) who presents to [facility] ED with complaints of dyspnea for one week.  The dyspnea is mainly on exertion but is also sometimes at rest.  She has had some wheezing and a nonproductive cough as well as some orthopnea and PND.  She denies any chest pains, palpitations, diaphoresis, fevers, chills, nausea, vomiting, hemoptysis, nor any lower extremity pain or swelling. She denies any recent sick contacts or recent travel but did have some orthopnea.  She did not have any PND.  Her appetite has been good she has not had any weight loss.    * No surgery found *      Hospital Course:   79 year old AAF chronically ill, placed in observation for acute onset SOB and progressively worsening dyspnea on exertion. Patient has known heart failure and her most recent 2D echo 01/2020 did not mention DD and LVEF showed  Normal at 65%. She did receive IV lasix which helped resolve her LE edema along with LE elevation. Patient has what appears to be PVD which is thought to be contributory to her chronic LE edema. Her renal functions was noted as BUN/creatine/GFR 24/2.2/24 which improved to 24/2.0/26 improved without treatment. This level of CKDIII is chronic and consistent with baseline. HgA1c was noted to be 6.1 which is consistent with standards for her age. She tells me that she has had  problems with transportation to the doctor which is why she has not been following up in the clinic outpatient. Discussed palliative care options and patient was amenable in early morning but appeared to change her mind. Requested CM to speak to patient regarding Palliative care at discharge.     Consults:   Consults (From admission, onward)        Status Ordering Provider     Inpatient consult to Registered Dietitian/Nutritionist  Once     Provider:  (Not yet assigned)    Acknowledged VALERIE PRICE     Inpatient consult to Social Work/Case Management  Once     Provider:  (Not yet assigned)    Acknowledged VALERIE PRICE          No new Assessment & Plan notes have been filed under this hospital service since the last note was generated.  Service: Hospital Medicine    Final Active Diagnoses:    Diagnosis Date Noted POA    PRINCIPAL PROBLEM:  Acute on chronic diastolic heart failure [I50.33] 02/27/2020 Yes    Hyperkalemia [E87.5] 02/27/2020 Yes    Hyperlipidemia [E78.5] 02/27/2020 Yes     Chronic    Chronic diastolic heart failure [I50.32] 02/27/2020 Yes     Chronic    CKD stage 4 [N18.4] 02/27/2020 Yes     Chronic    Anemia of chronic disease [D63.8] 02/27/2020 Yes     Chronic    Controlled type 2 diabetes mellitus with stage 4 chronic kidney disease, with long-term current use of insulin [E11.22, N18.4, Z79.4] 07/11/2018 Not Applicable     Chronic    Essential hypertension [I10] 03/02/2014 Yes     Chronic    Morbid obesity with BMI of 45.0-49.9, adult [E66.01, Z68.42] 03/02/2014 Not Applicable     Chronic      Problems Resolved During this Admission:    Diagnosis Date Noted Date Resolved POA    Congestive heart failure [I50.9] 02/26/2020 02/27/2020 Yes       Discharged Condition: stable    Disposition: Home or Self Care    Follow Up:  Follow-up Information     Jaky Schmidt MD In 1 week.    Specialty:  Family Medicine  Why:  Call the office to schedule appointment, For outpatient follow-up/post  "salvador  Contact information:  [address] [place] [address]  [phone]                 Patient Instructions:      Diet Cardiac     Activity as tolerated       Significant Diagnostic Studies: Labs:   CMP   Recent Labs   Lab 02/26/20  1746 02/27/20  0326    144   K 5.5* 4.4   * 113*   CO2 16* 22*   GLU 87 89   BUN 24* 24*   CREATININE 2.2* 2.0*   CALCIUM 9.3 9.0   PROT 7.9  --    ALBUMIN 3.5  --    BILITOT 0.7  --    ALKPHOS 114  --    AST 20  --    ALT 11  --    ANIONGAP 9 9   ESTGFRAFRICA 24* 26*   EGFRNONAA 20* 23*   , CBC   Recent Labs   Lab 02/26/20  1746   WBC 4.05   HGB 9.7*   HCT 32.7*      , INR   Lab Results   Component Value Date    INR 1.1 02/26/2020    INR 1.1 06/03/2016    INR 1.0 03/01/2014   , Lipid Panel   Lab Results   Component Value Date    CHOL 105 (L) 08/01/2019    HDL 33 (L) 08/01/2019    LDLCALC 55.8 (L) 08/01/2019    TRIG 81 08/01/2019    CHOLHDL 31.4 08/01/2019   , Troponin   Recent Labs   Lab 02/26/20  1746   TROPONINI <0.006    and A1C:   Recent Labs   Lab 01/30/20 2027 02/27/20  0326   HGBA1C 6.1* 6.1*       Pending Diagnostic Studies:     None         Medications:  Reconciled Home Medications:      Medication List      CONTINUE taking these medications    albuterol 90 mcg/actuation inhaler  Commonly known as:  PROVENTIL/VENTOLIN HFA  Inhale 2 puffs into the lungs every 6 (six) hours as needed for Wheezing. Rescue     amLODIPine 10 MG tablet  Commonly known as:  NORVASC  Take 1 tablet (10 mg total) by mouth once daily.     aspirin 81 MG EC tablet  Commonly known as:  ECOTRIN  Take 81 mg by mouth once daily.     atorvastatin 20 MG tablet  Commonly known as:  LIPITOR  TAKE 1 TABLET (20 MG TOTAL) BY MOUTH ONCE DAILY.     * BD Ultra-Fine [place] Pen Needle 32 gauge x 5/32" Ndle  Generic drug:  pen needle, diabetic  INJECT INSULIN THREE TIMES DAILY     * pen needle, diabetic 32 gauge x 5/32" Ndle  Commonly known as:  BD Ultra-Fine [place] Pen Needle  INJECT " INSULIN THREE TIMES DAILY     blood sugar diagnostic Strp  Test 3 times daily     blood-glucose meter kit  Use as instructed to check blood sugar 3 times daily     candesartan-hydrochlorothiazid 32-12.5 mg per tablet  Commonly known as:  ATACAND HCT  Take 1 tablet by mouth once daily.     carvediloL 12.5 MG tablet  Commonly known as:  COREG  Take 1 tablet (12.5 mg total) by mouth 2 (two) times daily.     cilostazoL 50 MG Tab  Commonly known as:  PLETAL  Take 1 tablet (50 mg total) by mouth 2 (two) times daily.     clotrimazole-betamethasone 1-0.05% cream  Commonly known as:  LOTRISONE  Apply topically 2 (two) times daily.     lancets Misc  Test 3 times daily     latanoprost 0.005 % ophthalmic solution  Place 1 drop into both eyes every evening.     Levemir FlexTouch U-100 Insuln 100 unit/mL (3 mL) Inpn pen  Generic drug:  insulin detemir U-100  INJECT 32 UNITS INTO THE SKIN EVERY EVENING     ondansetron 4 MG Tbdl  Commonly known as:  ZOFRAN-ODT  TAKE 1 TABLET BY MOUTH EVERY 6 HOURS AS NEEDED     oxybutynin 15 MG Tr24  Commonly known as:  DITROPAN XL  Take 1 tablet (15 mg total) by mouth once daily.     potassium citrate 15 mEq Tbsr  Commonly known as:  Urocit-K 15  Take 15 mEq by mouth once daily.     timolol maleate 0.5% 0.5 % Drop  Commonly known as:  TIMOPTIC  1 drop every morning.     Tradjenta 5 mg Tab tablet  Generic drug:  linaGLIPtin  TAKE 1 TABLET BY MOUTH EVERY DAY     Travatan Z 0.004 % ophthalmic solution  Generic drug:  travoprost     traZODone 50 MG tablet  Commonly known as:  DESYREL  Take 1 tablet (50 mg total) by mouth every evening.     triamcinolone acetonide 0.1% 0.1 % cream  Commonly known as:  KENALOG  Apply topically 3 (three) times daily. for 10 days         * This list has 2 medication(s) that are the same as other medications prescribed for you. Read the directions carefully, and ask your doctor or other care provider to review them with you.                Indwelling Lines/Drains at time  of discharge:   Lines/Drains/Airways     None                 Time spent on the discharge of patient: 35 minutes  Patient was seen and examined on the date of discharge and determined to be suitable for discharge.         JENNIE Ramirez, FNP-C  Hospitalist - Department of Hospital Medicine  [address] [place] [address]  Office [phone]; Pager [phone]

## 2020-02-28 NOTE — PLAN OF CARE
02/27/20 1853   Final Note   Assessment Type Final Discharge Note   Anticipated Discharge Disposition Home-Health   Hospital Follow Up  Appt(s) scheduled? No   Discharge plans and expectations educations in teach back method with documentation complete? Yes   [facility] Referral Info   Post Acute Recommendation Home-care   Referral Type HH   Facility Name [facility]   Orders sent to resume [facility] to [facility] and [facility] via [facility]

## 2020-03-03 ENCOUNTER — EXTERNAL HOME HEALTH (OUTPATIENT)
Dept: HOME HEALTH SERVICES | Facility: HOSPITAL | Age: 80
End: 2020-03-03
Payer: MEDICARE

## 2020-03-11 ENCOUNTER — OFFICE VISIT (OUTPATIENT)
Dept: FAMILY MEDICINE | Facility: CLINIC | Age: 80
End: 2020-03-11
Payer: MEDICARE

## 2020-03-11 VITALS
WEIGHT: 264.31 LBS | OXYGEN SATURATION: 95 % | RESPIRATION RATE: 16 BRPM | BODY MASS INDEX: 48.64 KG/M2 | DIASTOLIC BLOOD PRESSURE: 60 MMHG | HEART RATE: 64 BPM | HEIGHT: 62 IN | TEMPERATURE: 98 F | SYSTOLIC BLOOD PRESSURE: 126 MMHG

## 2020-03-11 DIAGNOSIS — R53.81 PHYSICAL DEBILITY: Chronic | ICD-10-CM

## 2020-03-11 DIAGNOSIS — N25.81 SECONDARY HYPERPARATHYROIDISM OF RENAL ORIGIN: ICD-10-CM

## 2020-03-11 DIAGNOSIS — B37.2 YEAST INFECTION OF THE SKIN: ICD-10-CM

## 2020-03-11 DIAGNOSIS — I73.9 PVD (PERIPHERAL VASCULAR DISEASE): ICD-10-CM

## 2020-03-11 DIAGNOSIS — Z23 NEED FOR INFLUENZA VACCINATION: ICD-10-CM

## 2020-03-11 DIAGNOSIS — I50.33 ACUTE ON CHRONIC DIASTOLIC HEART FAILURE: Primary | ICD-10-CM

## 2020-03-11 PROCEDURE — 90662 IIV NO PRSV INCREASED AG IM: CPT | Mod: S$GLB,,, | Performed by: FAMILY MEDICINE

## 2020-03-11 PROCEDURE — 99214 PR OFFICE/OUTPT VISIT, EST, LEVL IV, 30-39 MIN: ICD-10-PCS | Mod: 25,S$GLB,, | Performed by: FAMILY MEDICINE

## 2020-03-11 PROCEDURE — 99999 PR PBB SHADOW E&M-EST. PATIENT-LVL III: ICD-10-PCS | Mod: PBBFAC,,, | Performed by: FAMILY MEDICINE

## 2020-03-11 PROCEDURE — 90662 FLU VACCINE - HIGH DOSE (65+) PRESERVATIVE FREE IM: ICD-10-PCS | Mod: S$GLB,,, | Performed by: FAMILY MEDICINE

## 2020-03-11 PROCEDURE — 99214 OFFICE O/P EST MOD 30 MIN: CPT | Mod: 25,S$GLB,, | Performed by: FAMILY MEDICINE

## 2020-03-11 PROCEDURE — G0008 ADMIN INFLUENZA VIRUS VAC: HCPCS | Mod: S$GLB,,, | Performed by: FAMILY MEDICINE

## 2020-03-11 PROCEDURE — 99999 PR PBB SHADOW E&M-EST. PATIENT-LVL III: CPT | Mod: PBBFAC,,, | Performed by: FAMILY MEDICINE

## 2020-03-11 PROCEDURE — 99499 RISK ADDL DX/OHS AUDIT: ICD-10-PCS | Mod: S$GLB,,, | Performed by: FAMILY MEDICINE

## 2020-03-11 PROCEDURE — 99499 UNLISTED E&M SERVICE: CPT | Mod: S$GLB,,, | Performed by: FAMILY MEDICINE

## 2020-03-11 PROCEDURE — G0008 FLU VACCINE - HIGH DOSE (65+) PRESERVATIVE FREE IM: ICD-10-PCS | Mod: S$GLB,,, | Performed by: FAMILY MEDICINE

## 2020-03-11 RX ORDER — ALPRAZOLAM 0.5 MG/1
0.5 TABLET ORAL ONCE
Qty: 1 TABLET | Refills: 0 | Status: SHIPPED | OUTPATIENT
Start: 2020-03-11 | End: 2021-09-09

## 2020-03-11 RX ORDER — NYSTATIN 100000 [USP'U]/G
POWDER TOPICAL 3 TIMES DAILY
Qty: 180 G | Refills: 3 | Status: SHIPPED | OUTPATIENT
Start: 2020-03-11 | End: 2022-01-24

## 2020-03-11 NOTE — PROGRESS NOTES
Any in the the Transitional Care Note  Subjective:       Patient ID: Ana James is a 79 y.o. female.  Chief Complaint: Hospital Follow Up (diabetes, fluid)    Family and/or Caretaker present at visit?  No.  Diagnostic tests reviewed/disposition: No diagnosic tests pending after this hospitalization.  Disease/illness education: yes  Home health/community services discussion/referrals: Patient does not have home health established from hospital visit.  They do not need home health.  If needed, we will set up home health for the patient.   Establishment or re-establishment of referral orders for community resources: No other necessary community resources.   Discussion with other health care providers: No discussion with other health care providers necessary.   HPI:  Here for follow up recent hospital admission for CHF.  Patient responded well to IVFs.  She is now at her baseline SOB. Patient requesting medication for chronic rash under abdomen.   Review of Systems   Constitutional: Negative for appetite change, chills, diaphoresis, fatigue and fever.   HENT: Negative for sinus pressure and trouble swallowing.    Eyes: Negative for pain and visual disturbance.   Respiratory: Positive for shortness of breath and wheezing. Negative for cough and chest tightness.         PND   Cardiovascular: Positive for leg swelling. Negative for chest pain and palpitations.   Gastrointestinal: Positive for constipation. Negative for abdominal pain, blood in stool, diarrhea, nausea and vomiting.   Endocrine: Negative for polydipsia and polyphagia.   Genitourinary: Negative for decreased urine volume, difficulty urinating, dysuria, hematuria, menstrual problem, pelvic pain and vaginal discharge.   Musculoskeletal: Negative for back pain, joint swelling and neck pain.   Skin: Negative for color change and rash.   Allergic/Immunologic: Negative for environmental allergies and food allergies.   Neurological: Negative for  dizziness, numbness and headaches.   Hematological: Negative for adenopathy. Does not bruise/bleed easily.   Psychiatric/Behavioral: Negative for dysphoric mood and sleep disturbance. The patient is not nervous/anxious.        Objective:      Physical Exam   Constitutional: She is oriented to person, place, and time. She appears well-developed and well-nourished.   HENT:   Head: Normocephalic and atraumatic.   Eyes: Pupils are equal, round, and reactive to light. No scleral icterus.   Neck: Normal range of motion. Neck supple. No thyromegaly present.   Cardiovascular: Normal rate and regular rhythm. Exam reveals no gallop and no friction rub.   No murmur heard.  Pulmonary/Chest: Effort normal and breath sounds normal. She has no wheezes. She has no rales. No breast tenderness.   Abdominal: Soft. Bowel sounds are normal. She exhibits no distension and no mass. There is no hepatosplenomegaly. There is no tenderness.   Genitourinary: No breast tenderness.   Musculoskeletal: She exhibits edema. She exhibits no deformity.   Lymphadenopathy:     She has no cervical adenopathy.     She has no axillary adenopathy.        Right: No supraclavicular adenopathy present.        Left: No supraclavicular adenopathy present.   Neurological: She is alert and oriented to person, place, and time.   Skin: Skin is warm and dry. Rash (hyperpigmented rash under abdomen) noted.   Psychiatric: She has a normal mood and affect. Her behavior is normal.       Assessment:       1. Acute on chronic diastolic heart failure    2. Need for influenza vaccination    3. Physical debility    4. Yeast infection of the skin    5. PVD (peripheral vascular disease)    6. Secondary hyperparathyroidism of renal origin        Plan:       Ana was seen today for hospital follow up.    Diagnoses and all orders for this visit:    Acute on chronic diastolic heart failure  Patient back to baseline.  Edema LE improved    Need for influenza vaccination  -      Influenza - High Dose (65+) (PF) (IM)    Physical debility  Ambulates with walker    Yeast infection of the skin  -     nystatin (MYCOSTATIN) powder; Apply topically 3 (three) times daily.    PVD (peripheral vascular disease)  -     CV Ankle Brachial Indices W Stress W Toe Tracings; Future    Secondary hyperparathyroidism of renal origin  Calcium level normal

## 2020-03-11 NOTE — PROGRESS NOTES
.Pt received influenza vaccination. Tolerated well. Pt instructed to wait 15mins to be assessed for adverse reactions. verbalized understanding.

## 2020-04-01 ENCOUNTER — TELEPHONE (OUTPATIENT)
Dept: FAMILY MEDICINE | Facility: CLINIC | Age: 80
End: 2020-04-01

## 2020-04-01 NOTE — TELEPHONE ENCOUNTER
Pt just wanted to inform you she had a fall yesterday going to answer the door, she hit back of her head, did not lose consciousness, she had a small knot but it is better now; states she did not hurt anything else, she feels better today; she did not want to go to hospital yesterday and unable to do virtual visit

## 2020-04-01 NOTE — TELEPHONE ENCOUNTER
----- Message from Chuck Mccauley sent at 4/1/2020  9:51 AM CDT -----  Contact: Self  Type: Patient Call Back    Who called:self    What is the request in detail: Patient fell on yesterday and had Fire Department to come pick her up. She noticed that she has a knot on the back of her head. Please advise her on what to do. No dizziness, headache she did take Tylenol for her pain  Can the clinic reply by [facility]? no    Would the patient rather a call back or a response via [facility]? call    Best call back number: [phone]

## 2020-04-01 NOTE — TELEPHONE ENCOUNTER
Spoke with patient. Denies headache and present. Says swelling has gone down. Recommend ice compresses. Patient warned about possible changes in mental status. She is to report this immediately.  Advised to have a family member check on her periodically.

## 2020-04-02 ENCOUNTER — TELEPHONE (OUTPATIENT)
Dept: HOME HEALTH SERVICES | Facility: HOSPITAL | Age: 80
End: 2020-04-02

## 2020-04-20 DIAGNOSIS — I73.9 PERIPHERAL VASCULAR DISEASE, UNSPECIFIED: ICD-10-CM

## 2020-04-20 RX ORDER — CILOSTAZOL 50 MG/1
TABLET ORAL
Qty: 180 TABLET | Refills: 1 | Status: SHIPPED | OUTPATIENT
Start: 2020-04-20 | End: 2020-10-09

## 2020-04-23 DIAGNOSIS — I10 ESSENTIAL HYPERTENSION: ICD-10-CM

## 2020-04-23 RX ORDER — CANDESARTAN CILEXETIL AND HYDROCHLOROTHIAZIDE 32; 12.5 MG/1; MG/1
1 TABLET ORAL DAILY
Qty: 90 TABLET | Refills: 1 | Status: SHIPPED | OUTPATIENT
Start: 2020-04-23 | End: 2020-10-13

## 2020-04-23 NOTE — TELEPHONE ENCOUNTER
"Last Office Visit Info:   The patient's last visit with Jaky Schmidt MD was on 3/11/2020.    The patient's last visit in current department was on 3/11/2020.        Last CBC Results:   Lab Results   Component Value Date    WBC 4.05 02/26/2020    HGB 9.7 (L) 02/26/2020    HCT 32.7 (L) 02/26/2020     02/26/2020       Last CMP Results  Lab Results   Component Value Date     02/27/2020    K 4.4 02/27/2020     (H) 02/27/2020    CO2 22 (L) 02/27/2020    BUN 24 (H) 02/27/2020    CREATININE 2.0 (H) 02/27/2020    CALCIUM 9.0 02/27/2020    ALBUMIN 3.5 02/26/2020    AST 20 02/26/2020    ALT 11 02/26/2020       Last Lipids  Lab Results   Component Value Date    CHOL 105 (L) 08/01/2019    TRIG 81 08/01/2019    HDL 33 (L) 08/01/2019    LDLCALC 55.8 (L) 08/01/2019       Last A1C  Lab Results   Component Value Date    HGBA1C 6.1 (H) 02/27/2020       Last TSH  Lab Results   Component Value Date    TSH 2.371 07/13/2018         Current Med Refills  Medication List with Changes/Refills   Current Medications    ALBUTEROL (PROVENTIL/VENTOLIN HFA) 90 MCG/ACTUATION INHALER    Inhale 2 puffs into the lungs every 6 (six) hours as needed for Wheezing. Rescue       Start Date: 12/12/2018End Date: --    ALPRAZOLAM (XANAX) 0.5 MG TABLET    Take 1 tablet (0.5 mg total) by mouth once. Take 30 minutes before test for 1 dose       Start Date: 3/11/2020 End Date: 3/11/2020    AMLODIPINE (NORVASC) 10 MG TABLET    Take 1 tablet (10 mg total) by mouth once daily.       Start Date: 2/13/2020 End Date: --    ASPIRIN (ECOTRIN) 81 MG EC TABLET    Take 81 mg by mouth once daily.       Start Date: --        End Date: --    ATORVASTATIN (LIPITOR) 20 MG TABLET    TAKE 1 TABLET (20 MG TOTAL) BY MOUTH ONCE DAILY.       Start Date: 12/9/2019 End Date: --    BD ULTRA-FINE BREE PEN NEEDLE 32 GAUGE X 5/32" NDLE    INJECT INSULIN THREE TIMES DAILY       Start Date: 5/6/2019  End Date: --    BLOOD SUGAR DIAGNOSTIC STRP    Test 3 times daily      " " Start Date: 6/24/2019 End Date: --    BLOOD-GLUCOSE METER KIT    Use as instructed to check blood sugar 3 times daily       Start Date: 11/2/2018 End Date: 4/12/2020    CANDESARTAN-HYDROCHLOROTHIAZID (ATACAND HCT) 32-12.5 MG PER TABLET    Take 1 tablet by mouth once daily.       Start Date: 10/14/2019End Date: 10/13/2020    CARVEDILOL (COREG) 12.5 MG TABLET    Take 1 tablet (12.5 mg total) by mouth 2 (two) times daily.       Start Date: 12/23/2019End Date: --    CILOSTAZOL (PLETAL) 50 MG TAB    TAKE 1 TABLET BY MOUTH TWICE A DAY       Start Date: 4/20/2020 End Date: --    CLOTRIMAZOLE-BETAMETHASONE 1-0.05% (LOTRISONE) CREAM    Apply topically 2 (two) times daily.       Start Date: 3/24/2015 End Date: --    LANCETS MISC    Test 3 times daily       Start Date: 6/24/2019 End Date: --    LATANOPROST 0.005 % OPHTHALMIC SOLUTION    Place 1 drop into both eyes every evening.       Start Date: 7/24/2014 End Date: --    LEVEMIR FLEXTOUCH U-100 INSULN 100 UNIT/ML (3 ML) INPN PEN    INJECT 32 UNITS INTO THE SKIN EVERY EVENING       Start Date: 2/24/2020 End Date: 2/23/2021    NYSTATIN (MYCOSTATIN) POWDER    Apply topically 3 (three) times daily.       Start Date: 3/11/2020 End Date: --    ONDANSETRON (ZOFRAN-ODT) 4 MG TBDL    TAKE 1 TABLET BY MOUTH EVERY 6 HOURS AS NEEDED       Start Date: 3/12/2018 End Date: --    OXYBUTYNIN (DITROPAN XL) 15 MG TR24    Take 1 tablet (15 mg total) by mouth once daily.       Start Date: 11/25/2019End Date: 11/24/2020    PEN NEEDLE, DIABETIC (BD ULTRA-FINE BREE PEN NEEDLE) 32 GAUGE X 5/32" NDLE    INJECT INSULIN THREE TIMES DAILY       Start Date: 6/24/2019 End Date: --    POTASSIUM CITRATE (UROCIT-K 15) 15 MEQ TBSR    Take 15 mEq by mouth once daily.       Start Date: 2/5/2018  End Date: 2/5/2019    TIMOLOL MALEATE 0.5% (TIMOPTIC) 0.5 % DROP    1 drop every morning.        Start Date: 6/27/2014 End Date: --    TRADJENTA 5 MG TAB TABLET    TAKE 1 TABLET BY MOUTH EVERY DAY       Start Date: " 2/3/2020  End Date: --    TRAVATAN Z 0.004 % OPHTHALMIC SOLUTION           Start Date: 12/10/2018End Date: --    TRAZODONE (DESYREL) 50 MG TABLET    Take 1 tablet (50 mg total) by mouth every evening.       Start Date: 2/27/2020 End Date: 2/26/2021    TRIAMCINOLONE ACETONIDE 0.1% (KENALOG) 0.1 % CREAM    Apply topically 3 (three) times daily. for 10 days       Start Date: 8/21/2018 End Date: 8/31/2018       Order(s) placed per written order guidelines:     Please advise.

## 2020-05-04 RX ORDER — LINAGLIPTIN 5 MG/1
TABLET, FILM COATED ORAL
Qty: 90 TABLET | Refills: 0 | Status: SHIPPED | OUTPATIENT
Start: 2020-05-04 | End: 2020-07-28

## 2020-06-05 DIAGNOSIS — R06.2 WHEEZING: ICD-10-CM

## 2020-06-05 RX ORDER — ALBUTEROL SULFATE 90 UG/1
2 AEROSOL, METERED RESPIRATORY (INHALATION) EVERY 6 HOURS PRN
Qty: 8 G | Refills: 11 | Status: SHIPPED | OUTPATIENT
Start: 2020-06-05 | End: 2021-06-03

## 2020-06-05 NOTE — TELEPHONE ENCOUNTER
----- Message from Sanjuana Lovett sent at 6/5/2020  3:18 PM CDT -----  Type: Patient Call Back    Who called: Self     What is the request in detail: patient would like to know if she can get an inhaler called in she states the one she had is old. Patient states she is not having shortness of breath at the time of call but when she move around in the house she get short  Of breath. Please call to advise    Can the clinic reply by [facility]? No     Would the patient rather a call back or a response via [facility]?  Call     Best call back number: [phone]      [facility]/pharmacy #[id number] [place], [place] - [address] [phone] (Phone)  [phone] (Fax)

## 2020-06-05 NOTE — TELEPHONE ENCOUNTER
"Last Office Visit Info:   The patient's last visit with Jaky Schmidt MD was on 3/11/2020.    The patient's last visit in current department was on Visit date not found.  Last refill       Last CBC Results:   Lab Results   Component Value Date    WBC 4.05 02/26/2020    HGB 9.7 (L) 02/26/2020    HCT 32.7 (L) 02/26/2020     02/26/2020       Last CMP Results  Lab Results   Component Value Date     02/27/2020    K 4.4 02/27/2020     (H) 02/27/2020    CO2 22 (L) 02/27/2020    BUN 24 (H) 02/27/2020    CREATININE 2.0 (H) 02/27/2020    CALCIUM 9.0 02/27/2020    ALBUMIN 3.5 02/26/2020    AST 20 02/26/2020    ALT 11 02/26/2020       Last Lipids  Lab Results   Component Value Date    CHOL 105 (L) 08/01/2019    TRIG 81 08/01/2019    HDL 33 (L) 08/01/2019    LDLCALC 55.8 (L) 08/01/2019       Last A1C  Lab Results   Component Value Date    HGBA1C 6.1 (H) 02/27/2020       Last TSH  Lab Results   Component Value Date    TSH 2.371 07/13/2018         Current Med Refills  Medication List with Changes/Refills   Current Medications    ALBUTEROL (PROVENTIL/VENTOLIN HFA) 90 MCG/ACTUATION INHALER    Inhale 2 puffs into the lungs every 6 (six) hours as needed for Wheezing. Rescue       Start Date: 12/12/2018End Date: --    ALPRAZOLAM (XANAX) 0.5 MG TABLET    Take 1 tablet (0.5 mg total) by mouth once. Take 30 minutes before test for 1 dose       Start Date: 3/11/2020 End Date: 3/11/2020    AMLODIPINE (NORVASC) 10 MG TABLET    Take 1 tablet (10 mg total) by mouth once daily.       Start Date: 2/13/2020 End Date: --    ASPIRIN (ECOTRIN) 81 MG EC TABLET    Take 81 mg by mouth once daily.       Start Date: --        End Date: --    ATORVASTATIN (LIPITOR) 20 MG TABLET    TAKE 1 TABLET (20 MG TOTAL) BY MOUTH ONCE DAILY.       Start Date: 12/9/2019 End Date: --    BD ULTRA-FINE BREE PEN NEEDLE 32 GAUGE X 5/32" NDLE    INJECT INSULIN THREE TIMES DAILY       Start Date: 5/6/2019  End Date: --    BLOOD SUGAR DIAGNOSTIC " "STRP    Test 3 times daily       Start Date: 6/24/2019 End Date: --    BLOOD-GLUCOSE METER KIT    Use as instructed to check blood sugar 3 times daily       Start Date: 11/2/2018 End Date: 4/12/2020    CANDESARTAN-HYDROCHLOROTHIAZID (ATACAND HCT) 32-12.5 MG PER TABLET    Take 1 tablet by mouth once daily.       Start Date: 4/23/2020 End Date: 4/23/2021    CARVEDILOL (COREG) 12.5 MG TABLET    Take 1 tablet (12.5 mg total) by mouth 2 (two) times daily.       Start Date: 12/23/2019End Date: --    CILOSTAZOL (PLETAL) 50 MG TAB    TAKE 1 TABLET BY MOUTH TWICE A DAY       Start Date: 4/20/2020 End Date: --    CLOTRIMAZOLE-BETAMETHASONE 1-0.05% (LOTRISONE) CREAM    Apply topically 2 (two) times daily.       Start Date: 3/24/2015 End Date: --    LANCETS MISC    Test 3 times daily       Start Date: 6/24/2019 End Date: --    LATANOPROST 0.005 % OPHTHALMIC SOLUTION    Place 1 drop into both eyes every evening.       Start Date: 7/24/2014 End Date: --    LEVEMIR FLEXTOUCH U-100 INSULN 100 UNIT/ML (3 ML) INPN PEN    INJECT 32 UNITS INTO THE SKIN EVERY EVENING       Start Date: 2/24/2020 End Date: 2/23/2021    NYSTATIN (MYCOSTATIN) POWDER    Apply topically 3 (three) times daily.       Start Date: 3/11/2020 End Date: --    ONDANSETRON (ZOFRAN-ODT) 4 MG TBDL    TAKE 1 TABLET BY MOUTH EVERY 6 HOURS AS NEEDED       Start Date: 3/12/2018 End Date: --    OXYBUTYNIN (DITROPAN XL) 15 MG TR24    Take 1 tablet (15 mg total) by mouth once daily.       Start Date: 11/25/2019End Date: 11/24/2020    PEN NEEDLE, DIABETIC (BD ULTRA-FINE BREE PEN NEEDLE) 32 GAUGE X 5/32" NDLE    INJECT INSULIN THREE TIMES DAILY       Start Date: 6/24/2019 End Date: --    POTASSIUM CITRATE (UROCIT-K 15) 15 MEQ TBSR    Take 15 mEq by mouth once daily.       Start Date: 2/5/2018  End Date: 2/5/2019    TIMOLOL MALEATE 0.5% (TIMOPTIC) 0.5 % DROP    1 drop every morning.        Start Date: 6/27/2014 End Date: --    TRADJENTA 5 MG TAB TABLET    TAKE 1 TABLET BY MOUTH " EVERY DAY       Start Date: 5/4/2020  End Date: --    TRAVATAN Z 0.004 % OPHTHALMIC SOLUTION           Start Date: 12/10/2018End Date: --    TRAZODONE (DESYREL) 50 MG TABLET    Take 1 tablet (50 mg total) by mouth every evening.       Start Date: 2/27/2020 End Date: 2/26/2021    TRIAMCINOLONE ACETONIDE 0.1% (KENALOG) 0.1 % CREAM    Apply topically 3 (three) times daily. for 10 days       Start Date: 8/21/2018 End Date: 8/31/2018

## 2020-06-09 DIAGNOSIS — I10 ESSENTIAL HYPERTENSION: ICD-10-CM

## 2020-06-09 RX ORDER — CARVEDILOL 12.5 MG/1
12.5 TABLET ORAL 2 TIMES DAILY
Qty: 180 TABLET | Refills: 1 | Status: SHIPPED | OUTPATIENT
Start: 2020-06-09 | End: 2020-12-01 | Stop reason: SDUPTHER

## 2020-06-09 NOTE — TELEPHONE ENCOUNTER
----- Message from Francine Nolasco sent at 6/9/2020 11:46 AM CDT -----  Contact: pt  Type: RX Refill Request    Who Called: pt    Have you contacted your pharmacy:    Refill or New Rx:carvedilol (COREG) 12.5 MG tablet     RX Name and Strength:    How is the patient currently taking it? (ex. 1XDay):    Is this a 30 day or 90 day RX:    Preferred Pharmacy with phone number:  [facility]/pharmacy #[id number] - [facility] [address] [address]  Phone: [phone] Fax: [phone]          Local or Mail Order:    Ordering Provider:    Would the patient rather a call back or a response via [facility]? call    Best Call Back Number:[phone] (home) [phone]      Additional Information:

## 2020-06-30 ENCOUNTER — TELEPHONE (OUTPATIENT)
Dept: FAMILY MEDICINE | Facility: CLINIC | Age: 80
End: 2020-06-30

## 2020-06-30 NOTE — TELEPHONE ENCOUNTER
----- Message from Tiffany Donald sent at 6/30/2020 11:27 AM CDT -----  Type: Patient Call Back    Who called:Self    What is the request in detail:Patient called to notify staff that she may be a little late for appt tomorrow due to construction going on around her house.    Can the clinic reply by [facility]?    Would the patient rather a call back or a response via [facility]? N/A    Best call back number:[phone]

## 2020-06-30 NOTE — TELEPHONE ENCOUNTER
Pt having stomach issues was supposed to be seen tomorrow by willy but was rescheduled due to willy being out tomorrow.      She is having trouble keeping food down and wants to know if something can be sent in to [place] [address] [address].

## 2020-06-30 NOTE — TELEPHONE ENCOUNTER
----- Message from Oscar Lovett sent at 6/30/2020  3:52 PM CDT -----  Name of Who is Calling: BRUNO ROBERT [[id number]]    What is the request in detail: BRUNO ROBERT [[id number]] is requesting something be sent over to Pharmacy for not being about to keep food down sent to Pharmacy today [facility]/PHARMACY #[id number] - [address].... Please contact to further discuss and advise      Can the clinic reply by [facility]: no     What Number to Call Back if not in [facility]:  [phone]

## 2020-07-01 RX ORDER — METOCLOPRAMIDE 5 MG/1
5 TABLET ORAL 4 TIMES DAILY
Qty: 120 TABLET | Refills: 0 | Status: CANCELLED | OUTPATIENT
Start: 2020-07-01

## 2020-07-01 NOTE — TELEPHONE ENCOUNTER
----- Message from Dede Rodriguez sent at 7/1/2020  8:43 AM CDT -----  Contact: Patient [phone]  Type: Patient Call Back    Who called:Patient    What is the request in detail: Pt's appointment for today was cancelled, due to Ann being out sick. She's scheduled for 07-15-20 now, and is coming in for digestive issues. Pt states that she left a message on yesterday to find out if she can get medication sent to pharmacy for throwing up. Nothing was sent in and no one called her. Please call in something to pharmacy for throwing up. Pt states a long time ago she was given medication where she had to take a half hour before eating. Please call pt when this is done.   .  [facility]/pharmacy #[id number] - [place], [place] - [address]  [address] [address]  Phone: [phone] Fax: [phone]    Would the patient rather a call back or a response via [facility]? Call back    Best call back number: [phone]

## 2020-07-01 NOTE — TELEPHONE ENCOUNTER
Pt unable to come in tomorrow because she has to let transportation know couple days ahead of time; she was taking metoclopramide 5mg prior to meals in the past and it helped, wants to know if you can send medication to pharmacy; orders pended

## 2020-07-02 ENCOUNTER — TELEPHONE (OUTPATIENT)
Dept: FAMILY MEDICINE | Facility: CLINIC | Age: 80
End: 2020-07-02

## 2020-07-02 RX ORDER — ESOMEPRAZOLE MAGNESIUM 40 MG/1
40 CAPSULE, DELAYED RELEASE ORAL
Qty: 90 CAPSULE | Refills: 0 | Status: SHIPPED | OUTPATIENT
Start: 2020-07-02 | End: 2020-07-15

## 2020-07-02 RX ORDER — ESOMEPRAZOLE MAGNESIUM 40 MG/1
40 CAPSULE, DELAYED RELEASE ORAL
Qty: 30 CAPSULE | Refills: 11 | Status: SHIPPED | OUTPATIENT
Start: 2020-07-02 | End: 2020-07-22

## 2020-07-02 NOTE — TELEPHONE ENCOUNTER
----- Message from Jennifer Luciano sent at 7/1/2020  6:21 PM CDT -----  Regarding: patient called  Requesting Call back:     Who Called :  Ana    Reason of call:  Patient called because she has been requesting to get a prescription vomiting.   Best contact number:  [phone]   Additional information: no

## 2020-07-15 ENCOUNTER — LAB VISIT (OUTPATIENT)
Dept: LAB | Facility: HOSPITAL | Age: 80
End: 2020-07-15
Attending: PHYSICIAN ASSISTANT
Payer: MEDICARE

## 2020-07-15 ENCOUNTER — OFFICE VISIT (OUTPATIENT)
Dept: FAMILY MEDICINE | Facility: CLINIC | Age: 80
End: 2020-07-15
Payer: MEDICARE

## 2020-07-15 VITALS
DIASTOLIC BLOOD PRESSURE: 60 MMHG | BODY MASS INDEX: 44.1 KG/M2 | WEIGHT: 239.63 LBS | HEIGHT: 62 IN | HEART RATE: 64 BPM | RESPIRATION RATE: 18 BRPM | OXYGEN SATURATION: 99 % | TEMPERATURE: 99 F | SYSTOLIC BLOOD PRESSURE: 110 MMHG

## 2020-07-15 DIAGNOSIS — N18.30 CONTROLLED TYPE 2 DIABETES MELLITUS WITH STAGE 3 CHRONIC KIDNEY DISEASE, WITH LONG-TERM CURRENT USE OF INSULIN: ICD-10-CM

## 2020-07-15 DIAGNOSIS — Z79.4 CONTROLLED TYPE 2 DIABETES MELLITUS WITH STAGE 4 CHRONIC KIDNEY DISEASE, WITH LONG-TERM CURRENT USE OF INSULIN: ICD-10-CM

## 2020-07-15 DIAGNOSIS — N18.4 CONTROLLED TYPE 2 DIABETES MELLITUS WITH STAGE 4 CHRONIC KIDNEY DISEASE, WITH LONG-TERM CURRENT USE OF INSULIN: ICD-10-CM

## 2020-07-15 DIAGNOSIS — R11.2 NAUSEA AND VOMITING, INTRACTABILITY OF VOMITING NOT SPECIFIED, UNSPECIFIED VOMITING TYPE: Primary | ICD-10-CM

## 2020-07-15 DIAGNOSIS — Z79.4 CONTROLLED TYPE 2 DIABETES MELLITUS WITH STAGE 3 CHRONIC KIDNEY DISEASE, WITH LONG-TERM CURRENT USE OF INSULIN: ICD-10-CM

## 2020-07-15 DIAGNOSIS — E11.22 CONTROLLED TYPE 2 DIABETES MELLITUS WITH STAGE 4 CHRONIC KIDNEY DISEASE, WITH LONG-TERM CURRENT USE OF INSULIN: ICD-10-CM

## 2020-07-15 DIAGNOSIS — E11.22 CONTROLLED TYPE 2 DIABETES MELLITUS WITH STAGE 3 CHRONIC KIDNEY DISEASE, WITH LONG-TERM CURRENT USE OF INSULIN: ICD-10-CM

## 2020-07-15 LAB
ANION GAP SERPL CALC-SCNC: 6 MMOL/L (ref 8–16)
BUN SERPL-MCNC: 13 MG/DL (ref 8–23)
CALCIUM SERPL-MCNC: 9 MG/DL (ref 8.7–10.5)
CHLORIDE SERPL-SCNC: 109 MMOL/L (ref 95–110)
CO2 SERPL-SCNC: 26 MMOL/L (ref 23–29)
CREAT SERPL-MCNC: 1.6 MG/DL (ref 0.5–1.4)
EST. GFR  (AFRICAN AMERICAN): 34.6 ML/MIN/1.73 M^2
EST. GFR  (NON AFRICAN AMERICAN): 30 ML/MIN/1.73 M^2
GLUCOSE SERPL-MCNC: 66 MG/DL (ref 70–110)
POTASSIUM SERPL-SCNC: 4.2 MMOL/L (ref 3.5–5.1)
SODIUM SERPL-SCNC: 141 MMOL/L (ref 136–145)

## 2020-07-15 PROCEDURE — 83036 HEMOGLOBIN GLYCOSYLATED A1C: CPT

## 2020-07-15 PROCEDURE — 99999 PR PBB SHADOW E&M-EST. PATIENT-LVL V: CPT | Mod: PBBFAC,,, | Performed by: PHYSICIAN ASSISTANT

## 2020-07-15 PROCEDURE — 1159F MED LIST DOCD IN RCRD: CPT | Mod: S$GLB,,, | Performed by: PHYSICIAN ASSISTANT

## 2020-07-15 PROCEDURE — 3074F PR MOST RECENT SYSTOLIC BLOOD PRESSURE < 130 MM HG: ICD-10-PCS | Mod: CPTII,S$GLB,, | Performed by: PHYSICIAN ASSISTANT

## 2020-07-15 PROCEDURE — 99499 UNLISTED E&M SERVICE: CPT | Mod: S$GLB,,, | Performed by: PHYSICIAN ASSISTANT

## 2020-07-15 PROCEDURE — 99499 RISK ADDL DX/OHS AUDIT: ICD-10-PCS | Mod: S$GLB,,, | Performed by: PHYSICIAN ASSISTANT

## 2020-07-15 PROCEDURE — 3078F DIAST BP <80 MM HG: CPT | Mod: CPTII,S$GLB,, | Performed by: PHYSICIAN ASSISTANT

## 2020-07-15 PROCEDURE — 3078F PR MOST RECENT DIASTOLIC BLOOD PRESSURE < 80 MM HG: ICD-10-PCS | Mod: CPTII,S$GLB,, | Performed by: PHYSICIAN ASSISTANT

## 2020-07-15 PROCEDURE — 3288F FALL RISK ASSESSMENT DOCD: CPT | Mod: CPTII,S$GLB,, | Performed by: PHYSICIAN ASSISTANT

## 2020-07-15 PROCEDURE — 80048 BASIC METABOLIC PNL TOTAL CA: CPT

## 2020-07-15 PROCEDURE — 36415 COLL VENOUS BLD VENIPUNCTURE: CPT | Mod: PO

## 2020-07-15 PROCEDURE — 1100F PTFALLS ASSESS-DOCD GE2>/YR: CPT | Mod: CPTII,S$GLB,, | Performed by: PHYSICIAN ASSISTANT

## 2020-07-15 PROCEDURE — 1100F PR PT FALLS ASSESS DOC 2+ FALLS/FALL W/INJURY/YR: ICD-10-PCS | Mod: CPTII,S$GLB,, | Performed by: PHYSICIAN ASSISTANT

## 2020-07-15 PROCEDURE — 99214 OFFICE O/P EST MOD 30 MIN: CPT | Mod: S$GLB,,, | Performed by: PHYSICIAN ASSISTANT

## 2020-07-15 PROCEDURE — 1159F PR MEDICATION LIST DOCUMENTED IN MEDICAL RECORD: ICD-10-PCS | Mod: S$GLB,,, | Performed by: PHYSICIAN ASSISTANT

## 2020-07-15 PROCEDURE — 99214 PR OFFICE/OUTPT VISIT, EST, LEVL IV, 30-39 MIN: ICD-10-PCS | Mod: S$GLB,,, | Performed by: PHYSICIAN ASSISTANT

## 2020-07-15 PROCEDURE — 99999 PR PBB SHADOW E&M-EST. PATIENT-LVL V: ICD-10-PCS | Mod: PBBFAC,,, | Performed by: PHYSICIAN ASSISTANT

## 2020-07-15 PROCEDURE — 3074F SYST BP LT 130 MM HG: CPT | Mod: CPTII,S$GLB,, | Performed by: PHYSICIAN ASSISTANT

## 2020-07-15 PROCEDURE — 1126F PR PAIN SEVERITY QUANTIFIED, NO PAIN PRESENT: ICD-10-PCS | Mod: S$GLB,,, | Performed by: PHYSICIAN ASSISTANT

## 2020-07-15 PROCEDURE — 3288F PR FALLS RISK ASSESSMENT DOCUMENTED: ICD-10-PCS | Mod: CPTII,S$GLB,, | Performed by: PHYSICIAN ASSISTANT

## 2020-07-15 PROCEDURE — 1126F AMNT PAIN NOTED NONE PRSNT: CPT | Mod: S$GLB,,, | Performed by: PHYSICIAN ASSISTANT

## 2020-07-15 RX ORDER — ONDANSETRON 4 MG/1
4 TABLET, FILM COATED ORAL 3 TIMES DAILY PRN
Qty: 30 TABLET | Refills: 0 | Status: SHIPPED | OUTPATIENT
Start: 2020-07-15 | End: 2020-07-24 | Stop reason: SDUPTHER

## 2020-07-16 PROBLEM — N18.30 CONTROLLED TYPE 2 DIABETES MELLITUS WITH STAGE 3 CHRONIC KIDNEY DISEASE, WITH LONG-TERM CURRENT USE OF INSULIN: Status: ACTIVE | Noted: 2018-07-11

## 2020-07-16 LAB
ESTIMATED AVG GLUCOSE: 103 MG/DL (ref 68–131)
HBA1C MFR BLD HPLC: 5.2 % (ref 4–5.6)

## 2020-07-16 NOTE — PROGRESS NOTES
Subjective:       Patient ID: Ana James is a 79 y.o. female.    Chief Complaint: Emesis    HPI:  79-year-old female with diabetes, CKD, CHF, morbid obesity presents for nausea and vomiting.  Patient states this has been occurring intermittently over the past few weeks.  She states when she eats she feels like food sits in her chest especially when eating rice or ground meat.  She states this causes her to vomit.  She also feels nauseous for most part of the day.  She denies abdominal pain.  She denies trouble swallowing, acid reflux, excess gas.  She has tried PPIs for her symptoms with no relief.  She states she does not see a GI doctor and has never had a EGD.  Her diabetes is well controlled.  She denies frequent lows.  Social History     Socioeconomic History    Marital status:      Spouse name: Not on file    Number of children: Not on file    Years of education: Not on file    Highest education level: Not on file   Occupational History    Occupation: retired   Social Needs    Financial resource strain: Not on file    Food insecurity     Worry: Not on file     Inability: Not on file    Transportation needs     Medical: Not on file     Non-medical: Not on file   Tobacco Use    Smoking status: Never Smoker    Smokeless tobacco: Never Used   Substance and Sexual Activity    Alcohol use: No    Drug use: No    Sexual activity: Not Currently     Partners: Male   Lifestyle    Physical activity     Days per week: Not on file     Minutes per session: Not on file    Stress: Not on file   Relationships    Social connections     Talks on phone: Not on file     Gets together: Not on file     Attends Gnosticism service: Not on file     Active member of club or organization: Not on file     Attends meetings of clubs or organizations: Not on file     Relationship status: Not on file   Other Topics Concern    Not on file   Social History Narrative    Not on file       Review of Systems    Constitutional: Negative for fever.   Gastrointestinal: Positive for nausea and vomiting.         Objective:      Physical Exam  Constitutional:       Appearance: She is obese.   HENT:      Head: Normocephalic and atraumatic.   Abdominal:      General: Abdomen is flat. Bowel sounds are normal. There is no distension.      Palpations: There is no mass.      Tenderness: There is no abdominal tenderness. There is no guarding.   Neurological:      General: No focal deficit present.      Mental Status: She is alert and oriented to person, place, and time.   Psychiatric:         Mood and Affect: Mood normal.         Thought Content: Thought content normal.         Assessment:       1. Nausea and vomiting, intractability of vomiting not specified, unspecified vomiting type    2. Controlled type 2 diabetes mellitus with stage 4 chronic kidney disease, with long-term current use of insulin        Plan:         Ana was seen today for emesis.    Diagnoses and all orders for this visit:    Nausea and vomiting, intractability of vomiting not specified, unspecified vomiting type  -     Ambulatory referral/consult to Gastroenterology; Future  -     ondansetron (ZOFRAN) 4 MG tablet; Take 1 tablet (4 mg total) by mouth 3 (three) times daily as needed for Nausea.  -     refer to GI for further evaluation.  Zofran written for as needed use.  Discussed possibility of EGD, possible gastroparesis.    Controlled type 2 diabetes mellitus with stage 4 chronic kidney disease, with long-term current use of insulin  -     Hemoglobin A1C; Future  -     Basic metabolic panel; Future  -     kidney function is improving diabetes well controlled

## 2020-07-20 ENCOUNTER — TELEPHONE (OUTPATIENT)
Dept: FAMILY MEDICINE | Facility: CLINIC | Age: 80
End: 2020-07-20

## 2020-07-24 DIAGNOSIS — R11.2 NAUSEA AND VOMITING, INTRACTABILITY OF VOMITING NOT SPECIFIED, UNSPECIFIED VOMITING TYPE: ICD-10-CM

## 2020-07-24 RX ORDER — ONDANSETRON 4 MG/1
4 TABLET, FILM COATED ORAL 3 TIMES DAILY PRN
Qty: 30 TABLET | Refills: 0 | Status: SHIPPED | OUTPATIENT
Start: 2020-07-24 | End: 2020-08-10

## 2020-07-24 NOTE — TELEPHONE ENCOUNTER
----- Message from Dede Rodriguez sent at 7/24/2020  9:47 AM CDT -----  Contact: Patient [phone]  Type: RX Refill Request    Who Called: Patient    Have you contacted your pharmacy:No    Refill or New Rx: Refill    RX Name and Strength:ondansetron (ZOFRAN) 4 MG tablet    Is this a 30 day or 90 day RX:90 day    Preferred Pharmacy with phone number:.  [facility]/pharmacy #[id number] - [place], [place] - [address] [place] [address] [facility] [address]  Phone: [phone] Fax: [phone]    Local or Mail Order:Local    Would the patient rather a call back or a response via [facility]? Call back    Best Call Back Number: [phone]    Additional Information: Patient states that the medication is doing very good with her nausea. Would like to know if Ann would let her continue taking it? Also, patient would like to get this refilled before the rain starts. Please send in and call pt when this is done.

## 2020-07-24 NOTE — TELEPHONE ENCOUNTER
Pt saw you on 7/15 for N/V and prescribed zofran; she states this is helping and she would like refill

## 2020-09-09 DIAGNOSIS — Z79.4 CONTROLLED TYPE 2 DIABETES MELLITUS WITH STAGE 4 CHRONIC KIDNEY DISEASE, WITH LONG-TERM CURRENT USE OF INSULIN: ICD-10-CM

## 2020-09-09 DIAGNOSIS — N18.4 CONTROLLED TYPE 2 DIABETES MELLITUS WITH STAGE 4 CHRONIC KIDNEY DISEASE, WITH LONG-TERM CURRENT USE OF INSULIN: ICD-10-CM

## 2020-09-09 DIAGNOSIS — E11.22 CONTROLLED TYPE 2 DIABETES MELLITUS WITH STAGE 4 CHRONIC KIDNEY DISEASE, WITH LONG-TERM CURRENT USE OF INSULIN: ICD-10-CM

## 2020-09-09 RX ORDER — ATORVASTATIN CALCIUM 20 MG/1
20 TABLET, FILM COATED ORAL DAILY
Qty: 90 TABLET | Refills: 0 | Status: SHIPPED | OUTPATIENT
Start: 2020-09-09 | End: 2020-12-14

## 2020-09-09 NOTE — TELEPHONE ENCOUNTER
"Last Office Visit Info:   The patient's last visit with Jaky Schmidt MD was on 3/11/2020.    The patient's last visit in current department was on 7/15/2020.        Last CBC Results:   Lab Results   Component Value Date    WBC 4.05 02/26/2020    HGB 9.7 (L) 02/26/2020    HCT 32.7 (L) 02/26/2020     02/26/2020       Last CMP Results  Lab Results   Component Value Date     07/15/2020    K 4.2 07/15/2020     07/15/2020    CO2 26 07/15/2020    BUN 13 07/15/2020    CREATININE 1.6 (H) 07/15/2020    CALCIUM 9.0 07/15/2020    ALBUMIN 3.5 02/26/2020    AST 20 02/26/2020    ALT 11 02/26/2020       Last Lipids  Lab Results   Component Value Date    CHOL 105 (L) 08/01/2019    TRIG 81 08/01/2019    HDL 33 (L) 08/01/2019    LDLCALC 55.8 (L) 08/01/2019       Last A1C  Lab Results   Component Value Date    HGBA1C 5.2 07/15/2020       Last TSH  Lab Results   Component Value Date    TSH 2.371 07/13/2018         Current Med Refills  Medication List with Changes/Refills   Current Medications    ALBUTEROL (PROVENTIL/VENTOLIN HFA) 90 MCG/ACTUATION INHALER    Inhale 2 puffs into the lungs every 6 (six) hours as needed for Wheezing. Rescue       Start Date: 6/5/2020  End Date: --    ALPRAZOLAM (XANAX) 0.5 MG TABLET    Take 1 tablet (0.5 mg total) by mouth once. Take 30 minutes before test for 1 dose       Start Date: 3/11/2020 End Date: 3/11/2020    AMLODIPINE (NORVASC) 10 MG TABLET    TAKE 1 TABLET BY MOUTH EVERY DAY       Start Date: 8/10/2020 End Date: --    ASPIRIN (ECOTRIN) 81 MG EC TABLET    Take 81 mg by mouth once daily.       Start Date: --        End Date: --    ATORVASTATIN (LIPITOR) 20 MG TABLET    TAKE 1 TABLET (20 MG TOTAL) BY MOUTH ONCE DAILY.       Start Date: 12/9/2019 End Date: --    BD ULTRA-FINE BREE PEN NEEDLE 32 GAUGE X 5/32" NDLE    INJECT INSULIN THREE TIMES DAILY       Start Date: 5/6/2019  End Date: --    BLOOD SUGAR DIAGNOSTIC STRP    Test 3 times daily       Start Date: 6/24/2019 End " "Date: --    BLOOD-GLUCOSE METER KIT    Use as instructed to check blood sugar 3 times daily       Start Date: 11/2/2018 End Date: 4/12/2020    CANDESARTAN-HYDROCHLOROTHIAZID (ATACAND HCT) 32-12.5 MG PER TABLET    Take 1 tablet by mouth once daily.       Start Date: 4/23/2020 End Date: 4/23/2021    CARVEDILOL (COREG) 12.5 MG TABLET    Take 1 tablet (12.5 mg total) by mouth 2 (two) times daily.       Start Date: 6/9/2020  End Date: --    CILOSTAZOL (PLETAL) 50 MG TAB    TAKE 1 TABLET BY MOUTH TWICE A DAY       Start Date: 4/20/2020 End Date: --    CLOTRIMAZOLE-BETAMETHASONE 1-0.05% (LOTRISONE) CREAM    Apply topically 2 (two) times daily.       Start Date: 3/24/2015 End Date: --    ESOMEPRAZOLE (NEXIUM) 40 MG CAPSULE    TAKE 1 CAPSULE BY MOUTH DAILY BEFORE BREAKFAST.       Start Date: 7/22/2020 End Date: --    LANCETS MISC    Test 3 times daily       Start Date: 6/24/2019 End Date: --    LATANOPROST 0.005 % OPHTHALMIC SOLUTION    Place 1 drop into both eyes every evening.       Start Date: 7/24/2014 End Date: --    LEVEMIR FLEXTOUCH U-100 INSULN 100 UNIT/ML (3 ML) INPN PEN    INJECT 32 UNITS INTO THE SKIN EVERY EVENING       Start Date: 7/8/2020  End Date: 7/8/2021    NYSTATIN (MYCOSTATIN) POWDER    Apply topically 3 (three) times daily.       Start Date: 3/11/2020 End Date: --    ONDANSETRON (ZOFRAN) 4 MG TABLET    TAKE 1 TABLET (4 MG TOTAL) BY MOUTH 3 (THREE) TIMES DAILY AS NEEDED FOR NAUSEA.       Start Date: 8/10/2020 End Date: --    ONDANSETRON (ZOFRAN-ODT) 4 MG TBDL    TAKE 1 TABLET BY MOUTH EVERY 6 HOURS AS NEEDED       Start Date: 3/12/2018 End Date: --    OXYBUTYNIN (DITROPAN XL) 15 MG TR24    Take 1 tablet (15 mg total) by mouth once daily.       Start Date: 11/25/2019End Date: 11/24/2020    PEN NEEDLE, DIABETIC (BD ULTRA-FINE BREE PEN NEEDLE) 32 GAUGE X 5/32" NDLE    INJECT INSULIN THREE TIMES DAILY       Start Date: 6/24/2019 End Date: --    POTASSIUM CITRATE (UROCIT-K 15) 15 MEQ TBSR    Take 15 mEq by " mouth once daily.       Start Date: 2/5/2018  End Date: 2/5/2019    TIMOLOL MALEATE 0.5% (TIMOPTIC) 0.5 % DROP    1 drop every morning.        Start Date: 6/27/2014 End Date: --    TRADJENTA 5 MG TAB TABLET    TAKE 1 TABLET BY MOUTH EVERY DAY       Start Date: 8/4/2020  End Date: --    TRAVATAN Z 0.004 % OPHTHALMIC SOLUTION           Start Date: 12/10/2018End Date: --    TRAZODONE (DESYREL) 50 MG TABLET    Take 1 tablet (50 mg total) by mouth every evening.       Start Date: 2/27/2020 End Date: 2/26/2021    TRIAMCINOLONE ACETONIDE 0.1% (KENALOG) 0.1 % CREAM    Apply topically 3 (three) times daily. for 10 days       Start Date: 8/21/2018 End Date: 8/31/2018       Order(s) placed per written order guidelines:     Please advise.

## 2020-10-09 DIAGNOSIS — I73.9 PERIPHERAL VASCULAR DISEASE, UNSPECIFIED: ICD-10-CM

## 2020-10-09 RX ORDER — PEN NEEDLE, DIABETIC 30 GX3/16"
NEEDLE, DISPOSABLE MISCELLANEOUS
Qty: 200 EACH | Refills: 1 | Status: SHIPPED | OUTPATIENT
Start: 2020-10-09 | End: 2021-02-05

## 2020-10-09 RX ORDER — CILOSTAZOL 50 MG/1
TABLET ORAL
Qty: 180 TABLET | Refills: 1 | Status: SHIPPED | OUTPATIENT
Start: 2020-10-09 | End: 2021-03-29

## 2020-10-09 NOTE — TELEPHONE ENCOUNTER
"----- Message from Imani Medrano sent at 10/9/2020  2:01 PM CDT -----  Type: Patient Call Back       What is the request in detail:  pt calling to speak to a nurse regarding status on pen needles. Pt does not have any needles.      BD ULTRA-FINE BREE PEN NEEDLE 32 gauge x 5/32" Ndle     Can the clinic reply by [facility]? No       Would the patient rather a call back or a response via [facility]? Call back       Best call back number: [phone]        Thank you.    "

## 2020-10-09 NOTE — TELEPHONE ENCOUNTER
"----- Message from Michelle James sent at 10/8/2020  4:53 PM CDT -----  Regarding: Refill  Contact: Ana  Type: RX Refill Request    Who Called: Ana    Have you contacted your pharmacy:No    Refill or New Rx Refill    RX Name and Strength:BD ULTRA-FINE BREE PEN NEEDLE 32 gauge x 5/32" Ndle 200 each     Preferred Pharmacy with phone number:[facility]/pharmacy #[id number] - [place] [address] [facility] [phone] (Phone)  [phone] (Fax)    Local or Mail Order:Local    Ordering Provider:Brayan/Shakira    Would the patient rather a call back or a response via My [facility]? Call    Best Call Back Number:[phone]      "

## 2020-10-09 NOTE — TELEPHONE ENCOUNTER
"----- Message from Skylar Brunner sent at 10/9/2020 12:01 PM CDT -----  Regarding: refill  Type: RX Refill Request    Who Called: Ana     Refill or New Rx:refill     RX Name and Strength:pen needle, diabetic (BD ULTRA-FINE BREE PEN NEEDLE) 32 gauge x 5/32" Ndle    Is this a 30 day or 90 day Rx:30 day     Preferred Pharmacy with phone number:[facility]/PHARMACY #[id number] - [place] [address] [facility]    Would the patient rather a call back or a response via My [facility]? Call back     Best Call Back Number:[phone]        "

## 2020-10-09 NOTE — TELEPHONE ENCOUNTER
"----- Message from Michelle James sent at 10/8/2020  4:53 PM CDT -----  Regarding: Refill  Contact: Ana  Type: RX Refill Request    Who Called: Ana    Have you contacted your pharmacy:No    Refill or New Rx Refill    RX Name and Strength:BD ULTRA-FINE BREE PEN NEEDLE 32 gauge x 5/32" Ndle 200 each     Preferred Pharmacy with phone number:[facility]/pharmacy #[id number] - [place] [address] [facility] [phone] (Phone)  [phone] (Fax)    Local or Mail Order:Local    Ordering Provider:Brayan/Shakira    Would the patient rather a call back or a response via My [facility] Call    Best Call Back Number:[phone]      "

## 2020-10-19 ENCOUNTER — TELEPHONE (OUTPATIENT)
Dept: PODIATRY | Facility: CLINIC | Age: 80
End: 2020-10-19

## 2020-10-19 NOTE — TELEPHONE ENCOUNTER
----- Message from Jayda Sorenson sent at 10/19/2020  2:12 PM CDT -----  Type: Patient Call Back    Who called: Self    What is the request in detail: patient would like an earlier appointment first available is 11/3/2020 patient stated that need to be seen before that date. Please call    Can the clinic reply by [facility]? No    Would the patient rather a call back or a response via My [facility]? Call    Best call back number:[phone]

## 2020-10-19 NOTE — TELEPHONE ENCOUNTER
----- Message from Elise Petersen sent at 10/19/2020  2:21 PM CDT -----  Contact: Self  Type:  Sooner Appointment Request    Patient is requesting a sooner appointment.  Patient declined first available appointment listed as well as another facility and provider .  Patient will not accept being placed on the waitlist and is requesting a message be sent to doctor.    Name of Caller:Self     When is the first available appointment? 11/03/2020    Symptoms: Foot pain, on and off    Would the patient rather a call back or a response via My [facility]? Call Back     Best Call Back Number: [phone]

## 2020-11-18 ENCOUNTER — OFFICE VISIT (OUTPATIENT)
Dept: PODIATRY | Facility: CLINIC | Age: 80
End: 2020-11-18
Payer: MEDICARE

## 2020-11-18 VITALS — BODY MASS INDEX: 44.1 KG/M2 | WEIGHT: 239.63 LBS | HEIGHT: 62 IN

## 2020-11-18 DIAGNOSIS — M20.41 HAMMER TOES OF BOTH FEET: ICD-10-CM

## 2020-11-18 DIAGNOSIS — I73.9 PAD (PERIPHERAL ARTERY DISEASE): ICD-10-CM

## 2020-11-18 DIAGNOSIS — I87.8 VENOUS STASIS OF BOTH LOWER EXTREMITIES: ICD-10-CM

## 2020-11-18 DIAGNOSIS — M20.42 HAMMER TOES OF BOTH FEET: ICD-10-CM

## 2020-11-18 DIAGNOSIS — E11.51 TYPE II DIABETES MELLITUS WITH PERIPHERAL CIRCULATORY DISORDER: Primary | ICD-10-CM

## 2020-11-18 PROCEDURE — 99499 UNLISTED E&M SERVICE: CPT | Mod: S$GLB,,, | Performed by: PODIATRIST

## 2020-11-18 PROCEDURE — 3288F PR FALLS RISK ASSESSMENT DOCUMENTED: ICD-10-PCS | Mod: CPTII,S$GLB,, | Performed by: PODIATRIST

## 2020-11-18 PROCEDURE — 1101F PT FALLS ASSESS-DOCD LE1/YR: CPT | Mod: CPTII,S$GLB,, | Performed by: PODIATRIST

## 2020-11-18 PROCEDURE — 1126F AMNT PAIN NOTED NONE PRSNT: CPT | Mod: S$GLB,,, | Performed by: PODIATRIST

## 2020-11-18 PROCEDURE — 99999 PR PBB SHADOW E&M-EST. PATIENT-LVL IV: ICD-10-PCS | Mod: PBBFAC,,, | Performed by: PODIATRIST

## 2020-11-18 PROCEDURE — 1101F PR PT FALLS ASSESS DOC 0-1 FALLS W/OUT INJ PAST YR: ICD-10-PCS | Mod: CPTII,S$GLB,, | Performed by: PODIATRIST

## 2020-11-18 PROCEDURE — 11721 DEBRIDE NAIL 6 OR MORE: CPT | Mod: Q9,S$GLB,, | Performed by: PODIATRIST

## 2020-11-18 PROCEDURE — 11721 PR DEBRIDEMENT OF NAILS, 6 OR MORE: ICD-10-PCS | Mod: Q9,S$GLB,, | Performed by: PODIATRIST

## 2020-11-18 PROCEDURE — 1126F PR PAIN SEVERITY QUANTIFIED, NO PAIN PRESENT: ICD-10-PCS | Mod: S$GLB,,, | Performed by: PODIATRIST

## 2020-11-18 PROCEDURE — 3288F FALL RISK ASSESSMENT DOCD: CPT | Mod: CPTII,S$GLB,, | Performed by: PODIATRIST

## 2020-11-18 PROCEDURE — 99999 PR PBB SHADOW E&M-EST. PATIENT-LVL IV: CPT | Mod: PBBFAC,,, | Performed by: PODIATRIST

## 2020-11-18 PROCEDURE — 99499 NO LOS: ICD-10-PCS | Mod: S$GLB,,, | Performed by: PODIATRIST

## 2020-11-18 RX ORDER — CICLOPIROX 80 MG/ML
SOLUTION TOPICAL NIGHTLY
Qty: 1 BOTTLE | Refills: 3 | Status: SHIPPED | OUTPATIENT
Start: 2020-11-18 | End: 2022-01-24

## 2020-11-18 NOTE — PATIENT INSTRUCTIONS
Kerasal for fungal nails apply daily to all toenails.  Can be found at [facility]

## 2020-11-19 RX ORDER — LINAGLIPTIN 5 MG/1
5 TABLET, FILM COATED ORAL DAILY
Qty: 90 TABLET | Refills: 1 | Status: SHIPPED | OUTPATIENT
Start: 2020-11-19 | End: 2021-06-23 | Stop reason: SDUPTHER

## 2020-11-19 RX ORDER — OXYBUTYNIN CHLORIDE 15 MG/1
15 TABLET, EXTENDED RELEASE ORAL DAILY
Qty: 90 TABLET | Refills: 1 | Status: SHIPPED | OUTPATIENT
Start: 2020-11-19 | End: 2021-05-20 | Stop reason: SDUPTHER

## 2020-11-19 NOTE — TELEPHONE ENCOUNTER
----- Message from Mica Maxwell sent at 11/19/2020  9:10 AM CST -----  Regarding: Refill  Contact: Patient  Type: RX Refill Request    Who Called:  Patient     Have you contacted your pharmacy: yes     Refill or New Rx: Refill     RX Name and Strength: TRADJENTA 5 mg Tab tablet, oxybutynin (DITROPAN XL) 15 MG TR24    How is the patient currently taking it? (ex. 1XDay):    Is this a 30 day or 90 day RX:    Preferred Pharmacy with phone number:   [facility]/pharmacy #[id number] - [place], [place] - [address]  [address] [facility] [address]  Phone: [phone] Fax: [phone]      Local or Mail Order: Local     Ordering Provider: Dr. Schmidt     Would the patient rather a call back or a response via My [facility]? Call back     Best Call Back Number: [phone]

## 2020-11-23 NOTE — PROGRESS NOTES
Subjective:      Patient ID: Ana James is a 80 y.o. female.    Chief Complaint: Diabetes Mellitus (Dr Schmidt PCP 3/11/20) and Nail Care    Ana is a 80 y.o. female who presents to the clinic for evaluation and treatment of high risk feet. Ana has a past medical history of Arthritis (lower extremities), Asthma, Diabetes mellitus, Gall stones, Hypertension, Kidney stones, Nephrolithiasis (1/22/2016), Obesity, Renal disorder, Retinopathy, Vaginal delivery, and Weakness of both lower extremities. The patient's chief complaint is long, thick toenails.  This patient has documented high risk feet requiring routine maintenance secondary to diabetes mellitis and those secondary complications of diabetes, as mentioned..    PCP: Jaky Schmidt MD    Date Last Seen by PCP:   Chief Complaint   Patient presents with    Diabetes Mellitus     Dr Schmidt PCP 3/11/20    Nail Care       Current shoe gear:  rx diab shoes, worn (new shoes at home)    Hemoglobin A1C   Date Value Ref Range Status   07/15/2020 5.2 4.0 - 5.6 % Final     Comment:     ADA Screening Guidelines:  5.7-6.4%  Consistent with prediabetes  >or=6.5%  Consistent with diabetes  High levels of fetal hemoglobin interfere with the HbA1C  assay. Heterozygous hemoglobin variants (HbS, HgC, etc)do  not significantly interfere with this assay.   However, presence of multiple variants may affect accuracy.     02/27/2020 6.1 (H) 4.0 - 5.6 % Final     Comment:     ADA Screening Guidelines:  5.7-6.4%  Consistent with prediabetes  >or=6.5%  Consistent with diabetes  High levels of fetal hemoglobin interfere with the HbA1C  assay. Heterozygous hemoglobin variants (HbS, HgC, etc)do  not significantly interfere with this assay.   However, presence of multiple variants may affect accuracy.     01/30/2020 6.1 (H) 4.0 - 5.6 % Final     Comment:     ADA Screening Guidelines:  5.7-6.4%  Consistent with prediabetes  >or=6.5%  Consistent with diabetes  High  levels of fetal hemoglobin interfere with the HbA1C  assay. Heterozygous hemoglobin variants (HbS, HgC, etc)do  not significantly interfere with this assay.   However, presence of multiple variants may affect accuracy.       Patient Active Problem List   Diagnosis    Essential hypertension    Morbid obesity with BMI of 45.0-49.9, adult    Peripheral vascular disease, unspecified    Controlled type 2 diabetes mellitus with stage 3 chronic kidney disease, with long-term current use of insulin    Physical debility    Acute cystitis without hematuria    Elevated d-dimer    Acute on chronic diastolic heart failure    Hyperkalemia    Hyperlipidemia    Chronic diastolic heart failure    CKD stage 4    Anemia of chronic disease    Secondary hyperparathyroidism of renal origin     Current Outpatient Medications on File Prior to Visit   Medication Sig Dispense Refill    albuterol (PROVENTIL/VENTOLIN HFA) 90 mcg/actuation inhaler Inhale 2 puffs into the lungs every 6 (six) hours as needed for Wheezing. Rescue 8 g 11    amLODIPine (NORVASC) 10 MG tablet TAKE 1 TABLET BY MOUTH EVERY DAY 90 tablet 1    aspirin (ECOTRIN) 81 MG EC tablet Take 81 mg by mouth once daily.      atorvastatin (LIPITOR) 20 MG tablet Take 1 tablet (20 mg total) by mouth once daily. 90 tablet 0    blood sugar diagnostic Strp Test 3 times daily 100 each 11    candesartan-hydrochlorothiazid (ATACAND HCT) 32-12.5 mg per tablet TAKE 1 TABLET BY MOUTH EVERY DAY 90 tablet 1    carvediloL (COREG) 12.5 MG tablet Take 1 tablet (12.5 mg total) by mouth 2 (two) times daily. 180 tablet 1    cilostazoL (PLETAL) 50 MG Tab TAKE 1 TABLET BY MOUTH TWICE A  tablet 1    clotrimazole-betamethasone 1-0.05% (LOTRISONE) cream Apply topically 2 (two) times daily. 45 g 5    esomeprazole (NEXIUM) 40 MG capsule TAKE 1 CAPSULE BY MOUTH DAILY BEFORE BREAKFAST. 90 capsule 0    lancets Misc Test 3 times daily 100 each 11    latanoprost 0.005 % ophthalmic  "solution Place 1 drop into both eyes every evening.  3    LEVEMIR FLEXTOUCH U-100 INSULN 100 unit/mL (3 mL) InPn pen INJECT 32 UNITS INTO THE SKIN EVERY EVENING 30 mL 1    nystatin (MYCOSTATIN) powder Apply topically 3 (three) times daily. 180 g 3    ondansetron (ZOFRAN) 4 MG tablet TAKE 1 TABLET (4 MG TOTAL) BY MOUTH 3 (THREE) TIMES DAILY AS NEEDED FOR NAUSEA. 30 tablet 0    ondansetron (ZOFRAN-ODT) 4 MG TbDL TAKE 1 TABLET BY MOUTH EVERY 6 HOURS AS NEEDED 20 tablet 2    pen needle, diabetic (BD ULTRA-FINE BREE PEN NEEDLE) 32 gauge x 5/32" Ndle INJECT INSULIN THREE TIMES DAILY 200 each 0    pen needle, diabetic (BD ULTRA-FINE BREE PEN NEEDLE) 32 gauge x 5/32" Ndle INJECT INSULIN THREE TIMES DAILY 200 each 1    timolol maleate 0.5% (TIMOPTIC) 0.5 % Drop 1 drop every morning.   2    TRAVATAN Z 0.004 % ophthalmic solution       traZODone (DESYREL) 50 MG tablet Take 1 tablet (50 mg total) by mouth every evening. 90 tablet 3    ALPRAZolam (XANAX) 0.5 MG tablet Take 1 tablet (0.5 mg total) by mouth once. Take 30 minutes before test for 1 dose 1 tablet 0    blood-glucose meter kit Use as instructed to check blood sugar 3 times daily 1 each 0    potassium citrate (UROCIT-K 15) 15 mEq TbSR Take 15 mEq by mouth once daily. 90 tablet 3    triamcinolone acetonide 0.1% (KENALOG) 0.1 % cream Apply topically 3 (three) times daily. for 10 days 80 g 0     No current facility-administered medications on file prior to visit.      Review of patient's allergies indicates:   Allergen Reactions    Adhesive Rash     Paper tape     Past Surgical History:   Procedure Laterality Date    CARDIAC CATHETERIZATION      cataract      CHOLECYSTECTOMY      EYE SURGERY      Rt cataract surgery    HYSTERECTOMY      URETERAL STENT PLACEMENT       Family History   Problem Relation Age of Onset    Kidney failure Mother     Hypertension Father     Diabetes Brother     Cancer Sister      Social History     Socioeconomic History    " "Marital status:      Spouse name: Not on file    Number of children: Not on file    Years of education: Not on file    Highest education level: Not on file   Occupational History    Occupation: retired   Social Needs    Financial resource strain: Not on file    Food insecurity     Worry: Not on file     Inability: Not on file    Transportation needs     Medical: Not on file     Non-medical: Not on file   Tobacco Use    Smoking status: Never Smoker    Smokeless tobacco: Never Used   Substance and Sexual Activity    Alcohol use: No    Drug use: No    Sexual activity: Not Currently     Partners: Male   Lifestyle    Physical activity     Days per week: Not on file     Minutes per session: Not on file    Stress: Not on file   Relationships    Social connections     Talks on phone: Not on file     Gets together: Not on file     Attends Orthodox service: Not on file     Active member of club or organization: Not on file     Attends meetings of clubs or organizations: Not on file     Relationship status: Not on file   Other Topics Concern    Not on file   Social History Narrative    Not on file       Review of Systems   Constitution: Negative for chills and fever.   Cardiovascular: Positive for claudication and leg swelling. Negative for chest pain.   Respiratory: Positive for cough. Negative for shortness of breath.    Skin: Positive for dry skin and nail changes. Negative for itching and rash.   Musculoskeletal: Positive for arthritis, back pain, joint pain, muscle cramps and myalgias. Negative for muscle weakness.   Gastrointestinal: Negative for diarrhea, nausea and vomiting.   Neurological: Positive for paresthesias. Negative for numbness, tremors and weakness.   Psychiatric/Behavioral: Negative for altered mental status and hallucinations.           Objective:       Vitals:    11/18/20 1041   Weight: 108.7 kg (239 lb 10.2 oz)   Height: 5' 2" (1.575 m)   PainSc: 0-No pain       Physical " Exam  Vitals signs and nursing note reviewed.   Constitutional:       Appearance: She is not diaphoretic.      Comments: General: Pt. is well-developed, well-nourished, appears stated age, in no acute distress, alert and oriented x 3. No evidence of depression, anxiety, or agitation. Calm, cooperative, and communicative. Appropriate interactions and affect.       Cardiovascular:      Pulses:           Dorsalis pedis pulses are 1+ on the right side and 1+ on the left side.        Posterior tibial pulses are 0 on the right side and 1+ on the left side.      Comments: Dorsalis pedis and posterior tibial pulses are diminished Bilaterally. Toes are cool to touch. Feet are warm proximally.There is decreased digital hair. Skin is atrophic,  hyperpigmented, and edematous      Musculoskeletal:      Right ankle: She exhibits swelling.      Left ankle: She exhibits swelling.      Right foot: Normal range of motion. Swelling present.      Left foot: Normal range of motion. Swelling present.      Comments: Exquisite tenderness to calf bilaterally    Muscle strength is 5/5 in all groups bilaterally.    Patient has hammertoes of digits 2-5 bilateral partially reducible without symptom today.    Visible and palpable bunion without pain at dorsomedial 1st metatarsal head right and left.  Hallux abducted right and left partially reducible, tracks laterally without being track bound.  No ecchymosis, erythema, edema, or cardinal signs infection or signs of trauma same foot.     Skin:     General: Skin is warm and dry.      Coloration: Skin is not pale.      Findings: No abrasion, ecchymosis, erythema or lesion.      Nails: There is no clubbing.        Comments: Toenails 1-5 bilaterally are elongated by 3-5 mm, thickened by 2-3 mm, discolored/yellowed, dystrophic, brittle with subungual debris. Tender to distal nail plate pressure of right hallux, without periungual skin abnormality of each.         Interdigital Spaces clean, dry and  without evidence of break in skin integrity   Neurological:      Sensory: No sensory deficit.      Comments: [place] 5.07 monofilament is intact bilateral feet. Sharp/dull sensation is also intact Bilateral feet.           Psychiatric:         Speech: Speech normal.         Assessment:       Encounter Diagnoses   Name Primary?    Type II diabetes mellitus with peripheral circulatory disorder Yes    PAD (peripheral artery disease)     Venous stasis of both lower extremities     Hammer toes of both feet          Plan:       Ana was seen today for diabetes mellitus and nail care.    Diagnoses and all orders for this visit:    Type II diabetes mellitus with peripheral circulatory disorder  -     DIABETIC SHOES FOR HOME USE    PAD (peripheral artery disease)  -     DIABETIC SHOES FOR HOME USE    Venous stasis of both lower extremities  -     DIABETIC SHOES FOR HOME USE    Hammer toes of both feet  -     DIABETIC SHOES FOR HOME USE    Other orders  -     ciclopirox (PENLAC) 8 % Soln; Apply topically nightly.      I counseled the patient on her conditions, their implications and medical management.     - Shoe inspection. Diabetic Foot Education. Patient reminded of the importance of good nutrition and blood sugar control to help prevent podiatric complications of diabetes. Patient instructed on proper foot hygeine. We discussed wearing proper shoe gear, daily foot inspections, never walking without protective shoe gear, never putting sharp instruments to feet.    - Patient is to elevate legs. When sleeping, place a pillow under lower extremities. When sitting, support the legs so that they are level with the waist.    - With patient's permission, nails were aggressively reduced and debrided x 10 to their soft tissue attachment mechanically and with electric , removing all offending nail and debris. Patient relates relief following the procedure. After cleansing the  area w/ alcohol prep pad the  above mentioned hyperkeratosis was trimmed utilizing No 15 scapel, to a smooth base with out incident. Patient tolerated this  well and reported comfort to the area of sub 1st MTPJ karan.   She will continue to monitor the areas daily, inspect her feet, wear protective shoe gear when ambulatory, moisturizer to maintain skin integrity and follow in this office in approximately 3-5 months, sooner p.r.n.          At patient's request, I discussed different treatments for toenail fungus. We discussed oral antifungals but I did not recommend them as a first line treatment since the medication is taken internally and can have side effects such as rash, taste disturbances, and liver enzyme elevation. We discussed topical Penlac to be applied daily and removed weekly. Pt. Expresses understanding and would like to try the Penlac. Rx sent to the pharmacy.     Rx diabetic shoes with custom molded inserts to be worn at all times while ambulating. Prescription provided with list of local retailers.

## 2020-12-01 DIAGNOSIS — I10 ESSENTIAL HYPERTENSION: ICD-10-CM

## 2020-12-01 RX ORDER — CARVEDILOL 12.5 MG/1
12.5 TABLET ORAL 2 TIMES DAILY
Qty: 180 TABLET | Refills: 1 | Status: SHIPPED | OUTPATIENT
Start: 2020-12-01 | End: 2021-05-27

## 2020-12-01 NOTE — TELEPHONE ENCOUNTER
----- Message from Denise Lopez sent at 12/1/2020 11:09 AM CST -----  Regarding: refill  Contact: pt  Type: RX Refill Request    Who Called: PT     Have you contacted your pharmacy: No     Refill or New Rx: Refill    RX Name and Strength: carvediloL (COREG) 12.5 MG tablet    How is the patient currently taking it?  Take 1 tablet (12.5 mg total) by mouth 2 (two) times daily. - Oral    Is this a 30 day or 90 day RX: 180 Tablets    Preferred Pharmacy with phone number: [facility]/pharmacy #[id number] - [facility] [address] [phone] (Phone)  [phone] (Fax)        Would the patient rather a call back or a response via [facility]? Callback     Best Call Back Number: [phone] (home)      Additional Information: PT only has one pill left

## 2020-12-09 ENCOUNTER — TELEPHONE (OUTPATIENT)
Dept: FAMILY MEDICINE | Facility: CLINIC | Age: 80
End: 2020-12-09

## 2020-12-09 NOTE — TELEPHONE ENCOUNTER
Informed pt I spoke to the pharmacist and was told it is too soon, she will be able to pick it up tomorrow; pt verbalized understanding

## 2020-12-09 NOTE — TELEPHONE ENCOUNTER
----- Message from Stacy Mahan sent at 12/9/2020  2:30 PM CST -----  Regarding: self  .Type: Patient Call Back    Who called: self     What is the request in detail: Pt stated that she went to the pharmacy & her Rx for insulin was not ready and was also told that she'll have to pay for the Rx     Can the clinic reply by [facility]? Call back     Would the patient rather a call back or a response via [facility]? Callback     Best call back [phone]

## 2020-12-09 NOTE — TELEPHONE ENCOUNTER
----- Message from Michelle Vinson sent at 12/9/2020 10:53 AM CST -----  Regarding: self [phone]  .Type: Patient Call Back    Who called: self     What is the request in detail: [facility] on LEVEMIR FLEXTOUCH U-100 INSULN 100 unit/mL (3 mL) InPn pen but pt is out and would like an order to be put in for a refill     [facility]/pharmacy #[id number] - [place], [place] - [address] [facility]  [address] [address]  Phone: [phone] Fax: [phone]      Can the clinic reply by [facility]? No     Would the patient rather a call back or a response via [facility]? Call back     Best call back number: [phone]

## 2020-12-23 ENCOUNTER — TELEPHONE (OUTPATIENT)
Dept: FAMILY MEDICINE | Facility: CLINIC | Age: 80
End: 2020-12-23

## 2020-12-23 RX ORDER — LINAGLIPTIN 5 MG/1
5 TABLET, FILM COATED ORAL DAILY
Qty: 90 TABLET | Refills: 1 | Status: CANCELLED | OUTPATIENT
Start: 2020-12-23

## 2020-12-23 NOTE — TELEPHONE ENCOUNTER
----- Message from Roxana Saravia sent at 12/23/2020 11:01 AM CST -----  Regarding: Self/  [phone]  Type: RX Refill Request    Who Called:  Patient    Refill or New Rx:  Refill    RX Name and Strength:  TRADJENTA 5 mg Tab tablet     Is this a 30 day or 90 day RX:  90 Day    Preferred Pharmacy with phone number:  [facility]/PHARMACY #[id number] - [address]    Local or Mail Order:  Local    Ordering Provider:  NOHELIA Schmidt    Would the patient rather a call back or a response via My [facility]?   Call back    Best Call Back Number:  [phone]    Additional Information: Pharmacy stated pt can not get refill until February, but pt is completely out of this medication.  Thank you

## 2020-12-24 ENCOUNTER — TELEPHONE (OUTPATIENT)
Dept: FAMILY MEDICINE | Facility: CLINIC | Age: 80
End: 2020-12-24

## 2020-12-24 NOTE — TELEPHONE ENCOUNTER
Pt states she has been calling [facility] and [facility] all evening yesterday and this morning to see about getting her medications; states she looked all over and is unable to find the bottle; informed her I would call [facility] to see if [facility] had authorized an override; I spoke to pharmacist and was told they will contact [facility] for override; pt informed

## 2020-12-24 NOTE — TELEPHONE ENCOUNTER
----- Message from Dagmarbethany Valentin sent at 12/23/2020  4:08 PM CST -----  Contact: Self [phone]  Type: Patient Call Back    Who called: Self     What is the request in detail: Pt is calling to see if someone can call [facility] for her to see how much her TRADJENTA 5 mg Tab tablet  will cost    Can the clinic reply by [facility]? Call back    Would the patient rather a call back or a response via [facility]? Call back    Best call back number: [phone]

## 2021-01-27 ENCOUNTER — LAB VISIT (OUTPATIENT)
Dept: LAB | Facility: HOSPITAL | Age: 81
End: 2021-01-27
Payer: MEDICARE

## 2021-01-27 ENCOUNTER — OFFICE VISIT (OUTPATIENT)
Dept: FAMILY MEDICINE | Facility: CLINIC | Age: 81
End: 2021-01-27
Payer: MEDICARE

## 2021-01-27 VITALS
OXYGEN SATURATION: 96 % | BODY MASS INDEX: 43.61 KG/M2 | TEMPERATURE: 98 F | DIASTOLIC BLOOD PRESSURE: 62 MMHG | RESPIRATION RATE: 18 BRPM | HEIGHT: 62 IN | SYSTOLIC BLOOD PRESSURE: 126 MMHG | WEIGHT: 237 LBS | HEART RATE: 68 BPM

## 2021-01-27 DIAGNOSIS — N18.4 CONTROLLED TYPE 2 DIABETES MELLITUS WITH STAGE 4 CHRONIC KIDNEY DISEASE, WITH LONG-TERM CURRENT USE OF INSULIN: Primary | ICD-10-CM

## 2021-01-27 DIAGNOSIS — I50.33 ACUTE ON CHRONIC DIASTOLIC HEART FAILURE: ICD-10-CM

## 2021-01-27 DIAGNOSIS — E11.22 CONTROLLED TYPE 2 DIABETES MELLITUS WITH STAGE 4 CHRONIC KIDNEY DISEASE, WITH LONG-TERM CURRENT USE OF INSULIN: Primary | ICD-10-CM

## 2021-01-27 DIAGNOSIS — E11.51 TYPE II DIABETES MELLITUS WITH PERIPHERAL CIRCULATORY DISORDER: ICD-10-CM

## 2021-01-27 DIAGNOSIS — N25.81 SECONDARY HYPERPARATHYROIDISM OF RENAL ORIGIN: ICD-10-CM

## 2021-01-27 DIAGNOSIS — Z13.6 ENCOUNTER FOR LIPID SCREENING FOR CARDIOVASCULAR DISEASE: ICD-10-CM

## 2021-01-27 DIAGNOSIS — Z13.220 ENCOUNTER FOR LIPID SCREENING FOR CARDIOVASCULAR DISEASE: ICD-10-CM

## 2021-01-27 DIAGNOSIS — N18.30 CONTROLLED TYPE 2 DIABETES MELLITUS WITH STAGE 3 CHRONIC KIDNEY DISEASE, WITH LONG-TERM CURRENT USE OF INSULIN: ICD-10-CM

## 2021-01-27 DIAGNOSIS — Z78.0 POSTMENOPAUSAL: ICD-10-CM

## 2021-01-27 DIAGNOSIS — Z79.4 CONTROLLED TYPE 2 DIABETES MELLITUS WITH STAGE 4 CHRONIC KIDNEY DISEASE, WITH LONG-TERM CURRENT USE OF INSULIN: Primary | ICD-10-CM

## 2021-01-27 DIAGNOSIS — Z23 FLU VACCINE NEED: ICD-10-CM

## 2021-01-27 DIAGNOSIS — E11.22 CONTROLLED TYPE 2 DIABETES MELLITUS WITH STAGE 3 CHRONIC KIDNEY DISEASE, WITH LONG-TERM CURRENT USE OF INSULIN: ICD-10-CM

## 2021-01-27 DIAGNOSIS — Z79.4 CONTROLLED TYPE 2 DIABETES MELLITUS WITH STAGE 3 CHRONIC KIDNEY DISEASE, WITH LONG-TERM CURRENT USE OF INSULIN: ICD-10-CM

## 2021-01-27 LAB
BASOPHILS # BLD AUTO: 0.02 K/UL (ref 0–0.2)
BASOPHILS NFR BLD: 0.5 % (ref 0–1.9)
DIFFERENTIAL METHOD: ABNORMAL
EOSINOPHIL # BLD AUTO: 0.2 K/UL (ref 0–0.5)
EOSINOPHIL NFR BLD: 4.5 % (ref 0–8)
ERYTHROCYTE [DISTWIDTH] IN BLOOD BY AUTOMATED COUNT: 15.1 % (ref 11.5–14.5)
HCT VFR BLD AUTO: 33 % (ref 37–48.5)
HGB BLD-MCNC: 10.1 G/DL (ref 12–16)
IMM GRANULOCYTES # BLD AUTO: 0.02 K/UL (ref 0–0.04)
IMM GRANULOCYTES NFR BLD AUTO: 0.5 % (ref 0–0.5)
LYMPHOCYTES # BLD AUTO: 1.2 K/UL (ref 1–4.8)
LYMPHOCYTES NFR BLD: 29.6 % (ref 18–48)
MCH RBC QN AUTO: 27.7 PG (ref 27–31)
MCHC RBC AUTO-ENTMCNC: 30.6 G/DL (ref 32–36)
MCV RBC AUTO: 91 FL (ref 82–98)
MONOCYTES # BLD AUTO: 0.7 K/UL (ref 0.3–1)
MONOCYTES NFR BLD: 16.5 % (ref 4–15)
NEUTROPHILS # BLD AUTO: 2 K/UL (ref 1.8–7.7)
NEUTROPHILS NFR BLD: 48.4 % (ref 38–73)
NRBC BLD-RTO: 0 /100 WBC
PLATELET # BLD AUTO: 202 K/UL (ref 150–350)
PMV BLD AUTO: 10.1 FL (ref 9.2–12.9)
RBC # BLD AUTO: 3.64 M/UL (ref 4–5.4)
WBC # BLD AUTO: 4.19 K/UL (ref 3.9–12.7)

## 2021-01-27 PROCEDURE — 99999 PR PBB SHADOW E&M-EST. PATIENT-LVL V: CPT | Mod: PBBFAC,,, | Performed by: PHYSICIAN ASSISTANT

## 2021-01-27 PROCEDURE — 1101F PT FALLS ASSESS-DOCD LE1/YR: CPT | Mod: CPTII,S$GLB,, | Performed by: PHYSICIAN ASSISTANT

## 2021-01-27 PROCEDURE — 80069 RENAL FUNCTION PANEL: CPT

## 2021-01-27 PROCEDURE — 85025 COMPLETE CBC W/AUTO DIFF WBC: CPT

## 2021-01-27 PROCEDURE — 1159F MED LIST DOCD IN RCRD: CPT | Mod: S$GLB,,, | Performed by: PHYSICIAN ASSISTANT

## 2021-01-27 PROCEDURE — 3074F SYST BP LT 130 MM HG: CPT | Mod: CPTII,S$GLB,, | Performed by: PHYSICIAN ASSISTANT

## 2021-01-27 PROCEDURE — G0008 ADMIN INFLUENZA VIRUS VAC: HCPCS | Mod: S$GLB,,, | Performed by: PHYSICIAN ASSISTANT

## 2021-01-27 PROCEDURE — 3288F FALL RISK ASSESSMENT DOCD: CPT | Mod: CPTII,S$GLB,, | Performed by: PHYSICIAN ASSISTANT

## 2021-01-27 PROCEDURE — 1125F PR PAIN SEVERITY QUANTIFIED, PAIN PRESENT: ICD-10-PCS | Mod: S$GLB,,, | Performed by: PHYSICIAN ASSISTANT

## 2021-01-27 PROCEDURE — 99499 RISK ADDL DX/OHS AUDIT: ICD-10-PCS | Mod: S$GLB,,, | Performed by: PHYSICIAN ASSISTANT

## 2021-01-27 PROCEDURE — 3288F PR FALLS RISK ASSESSMENT DOCUMENTED: ICD-10-PCS | Mod: CPTII,S$GLB,, | Performed by: PHYSICIAN ASSISTANT

## 2021-01-27 PROCEDURE — 36415 COLL VENOUS BLD VENIPUNCTURE: CPT | Mod: PO

## 2021-01-27 PROCEDURE — 3078F PR MOST RECENT DIASTOLIC BLOOD PRESSURE < 80 MM HG: ICD-10-PCS | Mod: CPTII,S$GLB,, | Performed by: PHYSICIAN ASSISTANT

## 2021-01-27 PROCEDURE — 82728 ASSAY OF FERRITIN: CPT

## 2021-01-27 PROCEDURE — 83540 ASSAY OF IRON: CPT

## 2021-01-27 PROCEDURE — 90694 VACC AIIV4 NO PRSRV 0.5ML IM: CPT | Mod: S$GLB,,, | Performed by: PHYSICIAN ASSISTANT

## 2021-01-27 PROCEDURE — 3078F DIAST BP <80 MM HG: CPT | Mod: CPTII,S$GLB,, | Performed by: PHYSICIAN ASSISTANT

## 2021-01-27 PROCEDURE — 83036 HEMOGLOBIN GLYCOSYLATED A1C: CPT

## 2021-01-27 PROCEDURE — 1101F PR PT FALLS ASSESS DOC 0-1 FALLS W/OUT INJ PAST YR: ICD-10-PCS | Mod: CPTII,S$GLB,, | Performed by: PHYSICIAN ASSISTANT

## 2021-01-27 PROCEDURE — 80061 LIPID PANEL: CPT

## 2021-01-27 PROCEDURE — 99214 PR OFFICE/OUTPT VISIT, EST, LEVL IV, 30-39 MIN: ICD-10-PCS | Mod: 25,S$GLB,, | Performed by: PHYSICIAN ASSISTANT

## 2021-01-27 PROCEDURE — 3074F PR MOST RECENT SYSTOLIC BLOOD PRESSURE < 130 MM HG: ICD-10-PCS | Mod: CPTII,S$GLB,, | Performed by: PHYSICIAN ASSISTANT

## 2021-01-27 PROCEDURE — 99999 PR PBB SHADOW E&M-EST. PATIENT-LVL V: ICD-10-PCS | Mod: PBBFAC,,, | Performed by: PHYSICIAN ASSISTANT

## 2021-01-27 PROCEDURE — 90694 FLU VACCINE - QUADRIVALENT - ADJUVANTED: ICD-10-PCS | Mod: S$GLB,,, | Performed by: PHYSICIAN ASSISTANT

## 2021-01-27 PROCEDURE — 99499 UNLISTED E&M SERVICE: CPT | Mod: S$GLB,,, | Performed by: PHYSICIAN ASSISTANT

## 2021-01-27 PROCEDURE — 1159F PR MEDICATION LIST DOCUMENTED IN MEDICAL RECORD: ICD-10-PCS | Mod: S$GLB,,, | Performed by: PHYSICIAN ASSISTANT

## 2021-01-27 PROCEDURE — 1125F AMNT PAIN NOTED PAIN PRSNT: CPT | Mod: S$GLB,,, | Performed by: PHYSICIAN ASSISTANT

## 2021-01-27 PROCEDURE — G0008 FLU VACCINE - QUADRIVALENT - ADJUVANTED: ICD-10-PCS | Mod: S$GLB,,, | Performed by: PHYSICIAN ASSISTANT

## 2021-01-27 PROCEDURE — 99214 OFFICE O/P EST MOD 30 MIN: CPT | Mod: 25,S$GLB,, | Performed by: PHYSICIAN ASSISTANT

## 2021-01-28 LAB
ALBUMIN SERPL BCP-MCNC: 3.1 G/DL (ref 3.5–5.2)
ANION GAP SERPL CALC-SCNC: 7 MMOL/L (ref 8–16)
BUN SERPL-MCNC: 26 MG/DL (ref 8–23)
CALCIUM SERPL-MCNC: 9.2 MG/DL (ref 8.7–10.5)
CHLORIDE SERPL-SCNC: 112 MMOL/L (ref 95–110)
CHOLEST SERPL-MCNC: 126 MG/DL (ref 120–199)
CHOLEST/HDLC SERPL: 3.5 {RATIO} (ref 2–5)
CO2 SERPL-SCNC: 24 MMOL/L (ref 23–29)
CREAT SERPL-MCNC: 1.8 MG/DL (ref 0.5–1.4)
EST. GFR  (AFRICAN AMERICAN): 29.8 ML/MIN/1.73 M^2
EST. GFR  (NON AFRICAN AMERICAN): 25.8 ML/MIN/1.73 M^2
ESTIMATED AVG GLUCOSE: 128 MG/DL (ref 68–131)
FERRITIN SERPL-MCNC: 221 NG/ML (ref 20–300)
GLUCOSE SERPL-MCNC: 79 MG/DL (ref 70–110)
HBA1C MFR BLD HPLC: 6.1 % (ref 4–5.6)
HDLC SERPL-MCNC: 36 MG/DL (ref 40–75)
HDLC SERPL: 28.6 % (ref 20–50)
IRON SERPL-MCNC: 61 UG/DL (ref 30–160)
LDLC SERPL CALC-MCNC: 72 MG/DL (ref 63–159)
NONHDLC SERPL-MCNC: 90 MG/DL
PHOSPHATE SERPL-MCNC: 3.6 MG/DL (ref 2.7–4.5)
POTASSIUM SERPL-SCNC: 4.5 MMOL/L (ref 3.5–5.1)
SATURATED IRON: 22 % (ref 20–50)
SODIUM SERPL-SCNC: 143 MMOL/L (ref 136–145)
TOTAL IRON BINDING CAPACITY: 274 UG/DL (ref 250–450)
TRANSFERRIN SERPL-MCNC: 185 MG/DL (ref 200–375)
TRIGL SERPL-MCNC: 90 MG/DL (ref 30–150)

## 2021-02-01 ENCOUNTER — TELEPHONE (OUTPATIENT)
Dept: FAMILY MEDICINE | Facility: CLINIC | Age: 81
End: 2021-02-01

## 2021-03-02 ENCOUNTER — OFFICE VISIT (OUTPATIENT)
Dept: PODIATRY | Facility: CLINIC | Age: 81
End: 2021-03-02
Payer: MEDICARE

## 2021-03-02 VITALS
BODY MASS INDEX: 43.61 KG/M2 | SYSTOLIC BLOOD PRESSURE: 124 MMHG | WEIGHT: 237 LBS | HEIGHT: 62 IN | DIASTOLIC BLOOD PRESSURE: 68 MMHG

## 2021-03-02 DIAGNOSIS — M20.42 HAMMER TOES OF BOTH FEET: ICD-10-CM

## 2021-03-02 DIAGNOSIS — E11.51 TYPE II DIABETES MELLITUS WITH PERIPHERAL CIRCULATORY DISORDER: Primary | ICD-10-CM

## 2021-03-02 DIAGNOSIS — M21.41 PES PLANUS OF BOTH FEET: ICD-10-CM

## 2021-03-02 DIAGNOSIS — M20.41 HAMMER TOES OF BOTH FEET: ICD-10-CM

## 2021-03-02 DIAGNOSIS — B35.1 ONYCHOMYCOSIS DUE TO DERMATOPHYTE: ICD-10-CM

## 2021-03-02 DIAGNOSIS — M21.42 PES PLANUS OF BOTH FEET: ICD-10-CM

## 2021-03-02 DIAGNOSIS — L84 CORN OR CALLUS: ICD-10-CM

## 2021-03-02 DIAGNOSIS — I73.9 PAD (PERIPHERAL ARTERY DISEASE): ICD-10-CM

## 2021-03-02 DIAGNOSIS — I87.8 VENOUS STASIS OF BOTH LOWER EXTREMITIES: ICD-10-CM

## 2021-03-02 PROCEDURE — 11721 PR DEBRIDEMENT OF NAILS, 6 OR MORE: ICD-10-PCS | Mod: Q9,59,S$GLB, | Performed by: PODIATRIST

## 2021-03-02 PROCEDURE — 3288F PR FALLS RISK ASSESSMENT DOCUMENTED: ICD-10-PCS | Mod: CPTII,S$GLB,, | Performed by: PODIATRIST

## 2021-03-02 PROCEDURE — 3288F FALL RISK ASSESSMENT DOCD: CPT | Mod: CPTII,S$GLB,, | Performed by: PODIATRIST

## 2021-03-02 PROCEDURE — 99999 PR PBB SHADOW E&M-EST. PATIENT-LVL IV: ICD-10-PCS | Mod: PBBFAC,,, | Performed by: PODIATRIST

## 2021-03-02 PROCEDURE — 11056 PR TRIM BENIGN HYPERKERATOTIC SKIN LESION,2-4: ICD-10-PCS | Mod: Q9,S$GLB,, | Performed by: PODIATRIST

## 2021-03-02 PROCEDURE — 99999 PR PBB SHADOW E&M-EST. PATIENT-LVL IV: CPT | Mod: PBBFAC,,, | Performed by: PODIATRIST

## 2021-03-02 PROCEDURE — 99499 UNLISTED E&M SERVICE: CPT | Mod: S$GLB,,, | Performed by: PODIATRIST

## 2021-03-02 PROCEDURE — 1125F AMNT PAIN NOTED PAIN PRSNT: CPT | Mod: S$GLB,,, | Performed by: PODIATRIST

## 2021-03-02 PROCEDURE — 11056 PARNG/CUTG B9 HYPRKR LES 2-4: CPT | Mod: Q9,S$GLB,, | Performed by: PODIATRIST

## 2021-03-02 PROCEDURE — 1101F PT FALLS ASSESS-DOCD LE1/YR: CPT | Mod: CPTII,S$GLB,, | Performed by: PODIATRIST

## 2021-03-02 PROCEDURE — 99499 NO LOS: ICD-10-PCS | Mod: S$GLB,,, | Performed by: PODIATRIST

## 2021-03-02 PROCEDURE — 11721 DEBRIDE NAIL 6 OR MORE: CPT | Mod: Q9,59,S$GLB, | Performed by: PODIATRIST

## 2021-03-02 PROCEDURE — 1125F PR PAIN SEVERITY QUANTIFIED, PAIN PRESENT: ICD-10-PCS | Mod: S$GLB,,, | Performed by: PODIATRIST

## 2021-03-02 PROCEDURE — 1101F PR PT FALLS ASSESS DOC 0-1 FALLS W/OUT INJ PAST YR: ICD-10-PCS | Mod: CPTII,S$GLB,, | Performed by: PODIATRIST

## 2021-03-05 ENCOUNTER — TELEPHONE (OUTPATIENT)
Dept: PODIATRY | Facility: CLINIC | Age: 81
End: 2021-03-05

## 2021-03-08 LAB
LEFT EYE DM RETINOPATHY: NEGATIVE
RIGHT EYE DM RETINOPATHY: NEGATIVE

## 2021-03-23 DIAGNOSIS — E11.22 CONTROLLED TYPE 2 DIABETES MELLITUS WITH STAGE 4 CHRONIC KIDNEY DISEASE, WITH LONG-TERM CURRENT USE OF INSULIN: ICD-10-CM

## 2021-03-23 DIAGNOSIS — N18.4 CONTROLLED TYPE 2 DIABETES MELLITUS WITH STAGE 4 CHRONIC KIDNEY DISEASE, WITH LONG-TERM CURRENT USE OF INSULIN: ICD-10-CM

## 2021-03-23 DIAGNOSIS — Z79.4 CONTROLLED TYPE 2 DIABETES MELLITUS WITH STAGE 4 CHRONIC KIDNEY DISEASE, WITH LONG-TERM CURRENT USE OF INSULIN: ICD-10-CM

## 2021-03-24 ENCOUNTER — TELEPHONE (OUTPATIENT)
Dept: FAMILY MEDICINE | Facility: CLINIC | Age: 81
End: 2021-03-24

## 2021-03-24 RX ORDER — ATORVASTATIN CALCIUM 20 MG/1
20 TABLET, FILM COATED ORAL DAILY
Qty: 90 TABLET | Refills: 2 | Status: SHIPPED | OUTPATIENT
Start: 2021-03-24 | End: 2021-11-30

## 2021-03-28 DIAGNOSIS — I73.9 PERIPHERAL VASCULAR DISEASE, UNSPECIFIED: ICD-10-CM

## 2021-03-29 RX ORDER — CILOSTAZOL 50 MG/1
TABLET ORAL
Qty: 180 TABLET | Refills: 1 | Status: SHIPPED | OUTPATIENT
Start: 2021-03-29 | End: 2021-08-26

## 2021-04-01 ENCOUNTER — TELEPHONE (OUTPATIENT)
Dept: PODIATRY | Facility: CLINIC | Age: 81
End: 2021-04-01

## 2021-04-06 ENCOUNTER — TELEPHONE (OUTPATIENT)
Dept: PODIATRY | Facility: CLINIC | Age: 81
End: 2021-04-06

## 2021-04-08 ENCOUNTER — TELEPHONE (OUTPATIENT)
Dept: PODIATRY | Facility: CLINIC | Age: 81
End: 2021-04-08

## 2021-04-19 DIAGNOSIS — I10 ESSENTIAL HYPERTENSION: ICD-10-CM

## 2021-04-20 RX ORDER — CANDESARTAN CILEXETIL AND HYDROCHLOROTHIAZIDE 32; 12.5 MG/1; MG/1
1 TABLET ORAL DAILY
Qty: 90 TABLET | Refills: 0 | Status: SHIPPED | OUTPATIENT
Start: 2021-04-20 | End: 2021-07-15

## 2021-04-23 ENCOUNTER — OFFICE VISIT (OUTPATIENT)
Dept: CARDIOLOGY | Facility: CLINIC | Age: 81
End: 2021-04-23
Payer: MEDICARE

## 2021-04-23 VITALS
OXYGEN SATURATION: 96 % | HEART RATE: 62 BPM | SYSTOLIC BLOOD PRESSURE: 140 MMHG | DIASTOLIC BLOOD PRESSURE: 63 MMHG | WEIGHT: 244.25 LBS | BODY MASS INDEX: 44.95 KG/M2 | HEIGHT: 62 IN

## 2021-04-23 DIAGNOSIS — E78.5 HYPERLIPIDEMIA, UNSPECIFIED HYPERLIPIDEMIA TYPE: Chronic | ICD-10-CM

## 2021-04-23 DIAGNOSIS — I50.32 CHRONIC DIASTOLIC HEART FAILURE: Chronic | ICD-10-CM

## 2021-04-23 DIAGNOSIS — R06.09 DOE (DYSPNEA ON EXERTION): ICD-10-CM

## 2021-04-23 DIAGNOSIS — I10 ESSENTIAL HYPERTENSION: Primary | Chronic | ICD-10-CM

## 2021-04-23 DIAGNOSIS — R07.89 CHEST PAIN, ATYPICAL: ICD-10-CM

## 2021-04-23 DIAGNOSIS — I73.9 PERIPHERAL VASCULAR DISEASE, UNSPECIFIED: ICD-10-CM

## 2021-04-23 PROCEDURE — 1159F PR MEDICATION LIST DOCUMENTED IN MEDICAL RECORD: ICD-10-PCS | Mod: S$GLB,,, | Performed by: INTERNAL MEDICINE

## 2021-04-23 PROCEDURE — 3078F PR MOST RECENT DIASTOLIC BLOOD PRESSURE < 80 MM HG: ICD-10-PCS | Mod: CPTII,S$GLB,, | Performed by: INTERNAL MEDICINE

## 2021-04-23 PROCEDURE — 99499 RISK ADDL DX/OHS AUDIT: ICD-10-PCS | Mod: S$GLB,,, | Performed by: INTERNAL MEDICINE

## 2021-04-23 PROCEDURE — 99999 PR PBB SHADOW E&M-EST. PATIENT-LVL V: ICD-10-PCS | Mod: PBBFAC,,, | Performed by: INTERNAL MEDICINE

## 2021-04-23 PROCEDURE — 99214 OFFICE O/P EST MOD 30 MIN: CPT | Mod: S$GLB,,, | Performed by: INTERNAL MEDICINE

## 2021-04-23 PROCEDURE — 1159F MED LIST DOCD IN RCRD: CPT | Mod: S$GLB,,, | Performed by: INTERNAL MEDICINE

## 2021-04-23 PROCEDURE — 3077F PR MOST RECENT SYSTOLIC BLOOD PRESSURE >= 140 MM HG: ICD-10-PCS | Mod: CPTII,S$GLB,, | Performed by: INTERNAL MEDICINE

## 2021-04-23 PROCEDURE — 1126F AMNT PAIN NOTED NONE PRSNT: CPT | Mod: S$GLB,,, | Performed by: INTERNAL MEDICINE

## 2021-04-23 PROCEDURE — 1126F PR PAIN SEVERITY QUANTIFIED, NO PAIN PRESENT: ICD-10-PCS | Mod: S$GLB,,, | Performed by: INTERNAL MEDICINE

## 2021-04-23 PROCEDURE — 3077F SYST BP >= 140 MM HG: CPT | Mod: CPTII,S$GLB,, | Performed by: INTERNAL MEDICINE

## 2021-04-23 PROCEDURE — 1101F PT FALLS ASSESS-DOCD LE1/YR: CPT | Mod: CPTII,S$GLB,, | Performed by: INTERNAL MEDICINE

## 2021-04-23 PROCEDURE — 99999 PR PBB SHADOW E&M-EST. PATIENT-LVL V: CPT | Mod: PBBFAC,,, | Performed by: INTERNAL MEDICINE

## 2021-04-23 PROCEDURE — 99214 PR OFFICE/OUTPT VISIT, EST, LEVL IV, 30-39 MIN: ICD-10-PCS | Mod: S$GLB,,, | Performed by: INTERNAL MEDICINE

## 2021-04-23 PROCEDURE — 93000 ELECTROCARDIOGRAM COMPLETE: CPT | Mod: S$GLB,,, | Performed by: INTERNAL MEDICINE

## 2021-04-23 PROCEDURE — 1101F PR PT FALLS ASSESS DOC 0-1 FALLS W/OUT INJ PAST YR: ICD-10-PCS | Mod: CPTII,S$GLB,, | Performed by: INTERNAL MEDICINE

## 2021-04-23 PROCEDURE — 99499 UNLISTED E&M SERVICE: CPT | Mod: S$GLB,,, | Performed by: INTERNAL MEDICINE

## 2021-04-23 PROCEDURE — 3288F FALL RISK ASSESSMENT DOCD: CPT | Mod: CPTII,S$GLB,, | Performed by: INTERNAL MEDICINE

## 2021-04-23 PROCEDURE — 3078F DIAST BP <80 MM HG: CPT | Mod: CPTII,S$GLB,, | Performed by: INTERNAL MEDICINE

## 2021-04-23 PROCEDURE — 93000 EKG 12-LEAD: ICD-10-PCS | Mod: S$GLB,,, | Performed by: INTERNAL MEDICINE

## 2021-04-23 PROCEDURE — 3288F PR FALLS RISK ASSESSMENT DOCUMENTED: ICD-10-PCS | Mod: CPTII,S$GLB,, | Performed by: INTERNAL MEDICINE

## 2021-05-04 ENCOUNTER — PATIENT OUTREACH (OUTPATIENT)
Dept: ADMINISTRATIVE | Facility: HOSPITAL | Age: 81
End: 2021-05-04

## 2021-05-05 DIAGNOSIS — Z79.4 CONTROLLED TYPE 2 DIABETES MELLITUS WITH STAGE 3 CHRONIC KIDNEY DISEASE, WITH LONG-TERM CURRENT USE OF INSULIN: ICD-10-CM

## 2021-05-05 DIAGNOSIS — E11.22 CONTROLLED TYPE 2 DIABETES MELLITUS WITH STAGE 3 CHRONIC KIDNEY DISEASE, WITH LONG-TERM CURRENT USE OF INSULIN: ICD-10-CM

## 2021-05-05 DIAGNOSIS — N18.30 CONTROLLED TYPE 2 DIABETES MELLITUS WITH STAGE 3 CHRONIC KIDNEY DISEASE, WITH LONG-TERM CURRENT USE OF INSULIN: ICD-10-CM

## 2021-05-11 ENCOUNTER — HOSPITAL ENCOUNTER (OUTPATIENT)
Dept: RADIOLOGY | Facility: HOSPITAL | Age: 81
Discharge: HOME | End: 2021-05-11
Attending: INTERNAL MEDICINE
Payer: MEDICARE

## 2021-05-11 ENCOUNTER — HOSPITAL ENCOUNTER (OUTPATIENT)
Dept: CARDIOLOGY | Facility: HOSPITAL | Age: 81
Discharge: HOME | End: 2021-05-11
Attending: INTERNAL MEDICINE
Payer: MEDICARE

## 2021-05-11 VITALS — HEIGHT: 62 IN | BODY MASS INDEX: 44.9 KG/M2 | WEIGHT: 244 LBS

## 2021-05-11 DIAGNOSIS — I73.9 PERIPHERAL VASCULAR DISEASE, UNSPECIFIED: ICD-10-CM

## 2021-05-11 DIAGNOSIS — I10 ESSENTIAL HYPERTENSION: Chronic | ICD-10-CM

## 2021-05-11 DIAGNOSIS — I50.32 CHRONIC DIASTOLIC HEART FAILURE: Chronic | ICD-10-CM

## 2021-05-11 DIAGNOSIS — R07.89 CHEST PAIN, ATYPICAL: ICD-10-CM

## 2021-05-11 DIAGNOSIS — E78.5 HYPERLIPIDEMIA, UNSPECIFIED HYPERLIPIDEMIA TYPE: ICD-10-CM

## 2021-05-11 DIAGNOSIS — I50.32 CHRONIC DIASTOLIC HEART FAILURE: ICD-10-CM

## 2021-05-11 DIAGNOSIS — R06.09 DOE (DYSPNEA ON EXERTION): ICD-10-CM

## 2021-05-11 DIAGNOSIS — E78.5 HYPERLIPIDEMIA, UNSPECIFIED HYPERLIPIDEMIA TYPE: Chronic | ICD-10-CM

## 2021-05-11 DIAGNOSIS — I10 ESSENTIAL HYPERTENSION: ICD-10-CM

## 2021-05-11 LAB
ASCENDING AORTA: 3.32 CM
AV INDEX (PROSTH): 0.61
AV MEAN GRADIENT: 8 MMHG
AV PEAK GRADIENT: 13 MMHG
AV VALVE AREA: 1.97 CM2
AV VELOCITY RATIO: 0.61
BSA FOR ECHO PROCEDURE: 2.2 M2
CV ECHO LV RWT: 0.64 CM
CV STRESS BASE HR: 78 BPM
DIASTOLIC BLOOD PRESSURE: 85 MMHG
DOP CALC AO PEAK VEL: 1.78 M/S
DOP CALC AO VTI: 49.56 CM
DOP CALC LVOT AREA: 3.2 CM2
DOP CALC LVOT DIAMETER: 2.03 CM
DOP CALC LVOT PEAK VEL: 1.08 M/S
DOP CALC LVOT STROKE VOLUME: 97.56 CM3
DOP CALCLVOT PEAK VEL VTI: 30.16 CM
E WAVE DECELERATION TIME: 176.03 MSEC
E/A RATIO: 0.9
E/E' RATIO: 17.75 M/S
ECHO LV POSTERIOR WALL: 1.15 CM (ref 0.6–1.1)
EJECTION FRACTION: 65 %
FRACTIONAL SHORTENING: 23 % (ref 28–44)
INTERVENTRICULAR SEPTUM: 1.24 CM (ref 0.6–1.1)
LEFT ATRIUM SIZE: 3.41 CM
LEFT INTERNAL DIMENSION IN SYSTOLE: 2.76 CM (ref 2.1–4)
LEFT VENTRICLE DIASTOLIC VOLUME INDEX: 26.16 ML/M2
LEFT VENTRICLE DIASTOLIC VOLUME: 54.42 ML
LEFT VENTRICLE MASS INDEX: 68 G/M2
LEFT VENTRICLE SYSTOLIC VOLUME INDEX: 13.7 ML/M2
LEFT VENTRICLE SYSTOLIC VOLUME: 28.45 ML
LEFT VENTRICULAR INTERNAL DIMENSION IN DIASTOLE: 3.6 CM (ref 3.5–6)
LEFT VENTRICULAR MASS: 140.6 G
LV LATERAL E/E' RATIO: 15.78 M/S
LV SEPTAL E/E' RATIO: 20.29 M/S
MV MEAN GRADIENT: 1 MMHG
MV PEAK A VEL: 1.57 M/S
MV PEAK E VEL: 1.42 M/S
MV PEAK GRADIENT: 10 MMHG
MV STENOSIS PRESSURE HALF TIME: 51.05 MS
MV VALVE AREA P 1/2 METHOD: 4.31 CM2
NUC STRESS DIASTOLIC VOLUME INDEX: 46
NUC STRESS EJECTION FRACTION: 80 %
NUC STRESS SYSTOLIC VOLUME INDEX: 9
OHS CV CPX 85 PERCENT MAX PREDICTED HEART RATE MALE: 114
OHS CV CPX MAX PREDICTED HEART RATE: 134
OHS CV CPX PATIENT IS FEMALE: 1
OHS CV CPX PATIENT IS MALE: 0
OHS CV CPX PEAK DIASTOLIC BLOOD PRESSURE: 64 MMHG
OHS CV CPX PEAK HEAR RATE: 90 BPM
OHS CV CPX PEAK RATE PRESSURE PRODUCT: NORMAL
OHS CV CPX PEAK SYSTOLIC BLOOD PRESSURE: 134 MMHG
OHS CV CPX PERCENT MAX PREDICTED HEART RATE ACHIEVED: 67
OHS CV CPX RATE PRESSURE PRODUCT PRESENTING: 9438
PISA TR MAX VEL: 1.26 M/S
PV PEAK VELOCITY: 0.87 CM/S
RV TISSUE DOPPLER FREE WALL SYSTOLIC VELOCITY 1 (APICAL 4 CHAMBER VIEW): 12.68 CM/S
SINUS: 3.21 CM
STJ: 3.23 CM
SYSTOLIC BLOOD PRESSURE: 121 MMHG
TDI LATERAL: 0.09 M/S
TDI SEPTAL: 0.07 M/S
TDI: 0.08 M/S
TR MAX PG: 6 MMHG
TRICUSPID ANNULAR PLANE SYSTOLIC EXCURSION: 2.15 CM

## 2021-05-11 PROCEDURE — 78452 STRESS TEST WITH MYOCARDIAL PERFUSION (CUPID ONLY): ICD-10-PCS | Mod: 26,,, | Performed by: INTERNAL MEDICINE

## 2021-05-11 PROCEDURE — 93306 ECHO (CUPID ONLY): ICD-10-PCS | Mod: 26,,, | Performed by: INTERNAL MEDICINE

## 2021-05-11 PROCEDURE — 93016 STRESS TEST WITH MYOCARDIAL PERFUSION (CUPID ONLY): ICD-10-PCS | Mod: ,,, | Performed by: INTERNAL MEDICINE

## 2021-05-11 PROCEDURE — 93017 CV STRESS TEST TRACING ONLY: CPT

## 2021-05-11 PROCEDURE — 78452 HT MUSCLE IMAGE SPECT MULT: CPT

## 2021-05-11 PROCEDURE — A9502 TC99M TETROFOSMIN: HCPCS

## 2021-05-11 PROCEDURE — 93018 CV STRESS TEST I&R ONLY: CPT | Mod: ,,, | Performed by: INTERNAL MEDICINE

## 2021-05-11 PROCEDURE — 93306 TTE W/DOPPLER COMPLETE: CPT

## 2021-05-11 PROCEDURE — 93306 TTE W/DOPPLER COMPLETE: CPT | Mod: 26,,, | Performed by: INTERNAL MEDICINE

## 2021-05-11 PROCEDURE — 93016 CV STRESS TEST SUPVJ ONLY: CPT | Mod: ,,, | Performed by: INTERNAL MEDICINE

## 2021-05-11 PROCEDURE — 93018 STRESS TEST WITH MYOCARDIAL PERFUSION (CUPID ONLY): ICD-10-PCS | Mod: ,,, | Performed by: INTERNAL MEDICINE

## 2021-05-11 PROCEDURE — 78452 HT MUSCLE IMAGE SPECT MULT: CPT | Mod: 26,,, | Performed by: INTERNAL MEDICINE

## 2021-05-11 PROCEDURE — 63600175 PHARM REV CODE 636 W HCPCS: Performed by: INTERNAL MEDICINE

## 2021-05-11 RX ORDER — REGADENOSON 0.08 MG/ML
0.4 INJECTION, SOLUTION INTRAVENOUS ONCE
Status: COMPLETED | OUTPATIENT
Start: 2021-05-11 | End: 2021-05-11

## 2021-05-11 RX ADMIN — REGADENOSON 0.4 MG: 0.08 INJECTION, SOLUTION INTRAVENOUS at 10:05

## 2021-05-20 RX ORDER — OXYBUTYNIN CHLORIDE 15 MG/1
TABLET, EXTENDED RELEASE ORAL
Qty: 90 TABLET | Refills: 1 | Status: SHIPPED | OUTPATIENT
Start: 2021-05-20 | End: 2021-11-30

## 2021-06-02 ENCOUNTER — OFFICE VISIT (OUTPATIENT)
Dept: CARDIOLOGY | Facility: CLINIC | Age: 81
End: 2021-06-02
Payer: MEDICARE

## 2021-06-02 VITALS
HEART RATE: 62 BPM | BODY MASS INDEX: 45.03 KG/M2 | WEIGHT: 244.69 LBS | SYSTOLIC BLOOD PRESSURE: 152 MMHG | HEIGHT: 62 IN | OXYGEN SATURATION: 100 % | DIASTOLIC BLOOD PRESSURE: 69 MMHG

## 2021-06-02 DIAGNOSIS — I50.32 CHRONIC DIASTOLIC HEART FAILURE: Chronic | ICD-10-CM

## 2021-06-02 DIAGNOSIS — I10 ESSENTIAL HYPERTENSION: Primary | Chronic | ICD-10-CM

## 2021-06-02 DIAGNOSIS — E78.5 HYPERLIPIDEMIA, UNSPECIFIED HYPERLIPIDEMIA TYPE: Chronic | ICD-10-CM

## 2021-06-02 DIAGNOSIS — R07.89 CHEST PAIN, ATYPICAL: ICD-10-CM

## 2021-06-02 DIAGNOSIS — I73.9 PERIPHERAL VASCULAR DISEASE, UNSPECIFIED: ICD-10-CM

## 2021-06-02 DIAGNOSIS — R06.09 DOE (DYSPNEA ON EXERTION): ICD-10-CM

## 2021-06-02 PROCEDURE — 3288F FALL RISK ASSESSMENT DOCD: CPT | Mod: CPTII,S$GLB,, | Performed by: INTERNAL MEDICINE

## 2021-06-02 PROCEDURE — 3288F PR FALLS RISK ASSESSMENT DOCUMENTED: ICD-10-PCS | Mod: CPTII,S$GLB,, | Performed by: INTERNAL MEDICINE

## 2021-06-02 PROCEDURE — 99999 PR PBB SHADOW E&M-EST. PATIENT-LVL V: ICD-10-PCS | Mod: PBBFAC,,, | Performed by: INTERNAL MEDICINE

## 2021-06-02 PROCEDURE — 99499 RISK ADDL DX/OHS AUDIT: ICD-10-PCS | Mod: S$GLB,,, | Performed by: INTERNAL MEDICINE

## 2021-06-02 PROCEDURE — 3077F SYST BP >= 140 MM HG: CPT | Mod: CPTII,S$GLB,, | Performed by: INTERNAL MEDICINE

## 2021-06-02 PROCEDURE — 1159F PR MEDICATION LIST DOCUMENTED IN MEDICAL RECORD: ICD-10-PCS | Mod: S$GLB,,, | Performed by: INTERNAL MEDICINE

## 2021-06-02 PROCEDURE — 3078F DIAST BP <80 MM HG: CPT | Mod: CPTII,S$GLB,, | Performed by: INTERNAL MEDICINE

## 2021-06-02 PROCEDURE — 1101F PR PT FALLS ASSESS DOC 0-1 FALLS W/OUT INJ PAST YR: ICD-10-PCS | Mod: CPTII,S$GLB,, | Performed by: INTERNAL MEDICINE

## 2021-06-02 PROCEDURE — 99499 UNLISTED E&M SERVICE: CPT | Mod: S$GLB,,, | Performed by: INTERNAL MEDICINE

## 2021-06-02 PROCEDURE — 99214 OFFICE O/P EST MOD 30 MIN: CPT | Mod: S$GLB,,, | Performed by: INTERNAL MEDICINE

## 2021-06-02 PROCEDURE — 3077F PR MOST RECENT SYSTOLIC BLOOD PRESSURE >= 140 MM HG: ICD-10-PCS | Mod: CPTII,S$GLB,, | Performed by: INTERNAL MEDICINE

## 2021-06-02 PROCEDURE — 99999 PR PBB SHADOW E&M-EST. PATIENT-LVL V: CPT | Mod: PBBFAC,,, | Performed by: INTERNAL MEDICINE

## 2021-06-02 PROCEDURE — 1101F PT FALLS ASSESS-DOCD LE1/YR: CPT | Mod: CPTII,S$GLB,, | Performed by: INTERNAL MEDICINE

## 2021-06-02 PROCEDURE — 1126F AMNT PAIN NOTED NONE PRSNT: CPT | Mod: S$GLB,,, | Performed by: INTERNAL MEDICINE

## 2021-06-02 PROCEDURE — 1159F MED LIST DOCD IN RCRD: CPT | Mod: S$GLB,,, | Performed by: INTERNAL MEDICINE

## 2021-06-02 PROCEDURE — 99214 PR OFFICE/OUTPT VISIT, EST, LEVL IV, 30-39 MIN: ICD-10-PCS | Mod: S$GLB,,, | Performed by: INTERNAL MEDICINE

## 2021-06-02 PROCEDURE — 1126F PR PAIN SEVERITY QUANTIFIED, NO PAIN PRESENT: ICD-10-PCS | Mod: S$GLB,,, | Performed by: INTERNAL MEDICINE

## 2021-06-02 PROCEDURE — 3078F PR MOST RECENT DIASTOLIC BLOOD PRESSURE < 80 MM HG: ICD-10-PCS | Mod: CPTII,S$GLB,, | Performed by: INTERNAL MEDICINE

## 2021-06-09 ENCOUNTER — OFFICE VISIT (OUTPATIENT)
Dept: PODIATRY | Facility: CLINIC | Age: 81
End: 2021-06-09
Payer: MEDICARE

## 2021-06-09 VITALS — HEIGHT: 62 IN | WEIGHT: 244 LBS | BODY MASS INDEX: 44.9 KG/M2

## 2021-06-09 DIAGNOSIS — E11.51 TYPE II DIABETES MELLITUS WITH PERIPHERAL CIRCULATORY DISORDER: Primary | ICD-10-CM

## 2021-06-09 DIAGNOSIS — L84 CORN OR CALLUS: ICD-10-CM

## 2021-06-09 DIAGNOSIS — I87.8 VENOUS STASIS OF BOTH LOWER EXTREMITIES: ICD-10-CM

## 2021-06-09 DIAGNOSIS — M20.41 HAMMER TOES OF BOTH FEET: ICD-10-CM

## 2021-06-09 DIAGNOSIS — M20.42 HAMMER TOES OF BOTH FEET: ICD-10-CM

## 2021-06-09 DIAGNOSIS — B35.1 ONYCHOMYCOSIS DUE TO DERMATOPHYTE: ICD-10-CM

## 2021-06-09 DIAGNOSIS — M21.41 PES PLANUS OF BOTH FEET: ICD-10-CM

## 2021-06-09 DIAGNOSIS — M21.42 PES PLANUS OF BOTH FEET: ICD-10-CM

## 2021-06-09 DIAGNOSIS — I73.9 PAD (PERIPHERAL ARTERY DISEASE): ICD-10-CM

## 2021-06-09 PROCEDURE — 11056 PARNG/CUTG B9 HYPRKR LES 2-4: CPT | Mod: Q9,S$GLB,, | Performed by: PODIATRIST

## 2021-06-09 PROCEDURE — 1126F PR PAIN SEVERITY QUANTIFIED, NO PAIN PRESENT: ICD-10-PCS | Mod: S$GLB,,, | Performed by: PODIATRIST

## 2021-06-09 PROCEDURE — 1126F AMNT PAIN NOTED NONE PRSNT: CPT | Mod: S$GLB,,, | Performed by: PODIATRIST

## 2021-06-09 PROCEDURE — 1101F PT FALLS ASSESS-DOCD LE1/YR: CPT | Mod: CPTII,S$GLB,, | Performed by: PODIATRIST

## 2021-06-09 PROCEDURE — 11056 PR TRIM BENIGN HYPERKERATOTIC SKIN LESION,2-4: ICD-10-PCS | Mod: Q9,S$GLB,, | Performed by: PODIATRIST

## 2021-06-09 PROCEDURE — 11721 DEBRIDE NAIL 6 OR MORE: CPT | Mod: 59,Q9,S$GLB, | Performed by: PODIATRIST

## 2021-06-09 PROCEDURE — 3288F PR FALLS RISK ASSESSMENT DOCUMENTED: ICD-10-PCS | Mod: CPTII,S$GLB,, | Performed by: PODIATRIST

## 2021-06-09 PROCEDURE — 11721 PR DEBRIDEMENT OF NAILS, 6 OR MORE: ICD-10-PCS | Mod: 59,Q9,S$GLB, | Performed by: PODIATRIST

## 2021-06-09 PROCEDURE — 99999 PR PBB SHADOW E&M-EST. PATIENT-LVL IV: CPT | Mod: PBBFAC,,, | Performed by: PODIATRIST

## 2021-06-09 PROCEDURE — 99999 PR PBB SHADOW E&M-EST. PATIENT-LVL IV: ICD-10-PCS | Mod: PBBFAC,,, | Performed by: PODIATRIST

## 2021-06-09 PROCEDURE — 99499 UNLISTED E&M SERVICE: CPT | Mod: S$GLB,,, | Performed by: PODIATRIST

## 2021-06-09 PROCEDURE — 3288F FALL RISK ASSESSMENT DOCD: CPT | Mod: CPTII,S$GLB,, | Performed by: PODIATRIST

## 2021-06-09 PROCEDURE — 99499 NO LOS: ICD-10-PCS | Mod: S$GLB,,, | Performed by: PODIATRIST

## 2021-06-09 PROCEDURE — 1101F PR PT FALLS ASSESS DOC 0-1 FALLS W/OUT INJ PAST YR: ICD-10-PCS | Mod: CPTII,S$GLB,, | Performed by: PODIATRIST

## 2021-06-10 ENCOUNTER — TELEPHONE (OUTPATIENT)
Dept: FAMILY MEDICINE | Facility: CLINIC | Age: 81
End: 2021-06-10

## 2021-06-10 DIAGNOSIS — M62.838 MUSCLE SPASM: Primary | ICD-10-CM

## 2021-06-14 RX ORDER — INSULIN DETEMIR 100 [IU]/ML
32 INJECTION, SOLUTION SUBCUTANEOUS NIGHTLY
Qty: 30 ML | Refills: 1 | Status: SHIPPED | OUTPATIENT
Start: 2021-06-14 | End: 2021-11-30

## 2021-06-18 ENCOUNTER — LAB VISIT (OUTPATIENT)
Dept: LAB | Facility: HOSPITAL | Age: 81
End: 2021-06-18
Attending: FAMILY MEDICINE
Payer: MEDICARE

## 2021-06-18 ENCOUNTER — OFFICE VISIT (OUTPATIENT)
Dept: FAMILY MEDICINE | Facility: CLINIC | Age: 81
End: 2021-06-18
Payer: MEDICARE

## 2021-06-18 VITALS
HEART RATE: 69 BPM | OXYGEN SATURATION: 94 % | HEIGHT: 62 IN | SYSTOLIC BLOOD PRESSURE: 140 MMHG | WEIGHT: 243.19 LBS | BODY MASS INDEX: 44.75 KG/M2 | RESPIRATION RATE: 20 BRPM | TEMPERATURE: 99 F | DIASTOLIC BLOOD PRESSURE: 88 MMHG

## 2021-06-18 DIAGNOSIS — D63.8 ANEMIA OF CHRONIC DISEASE: ICD-10-CM

## 2021-06-18 DIAGNOSIS — Z79.4 CONTROLLED TYPE 2 DIABETES MELLITUS WITH STAGE 4 CHRONIC KIDNEY DISEASE, WITH LONG-TERM CURRENT USE OF INSULIN: ICD-10-CM

## 2021-06-18 DIAGNOSIS — E11.22 CONTROLLED TYPE 2 DIABETES MELLITUS WITH STAGE 4 CHRONIC KIDNEY DISEASE, WITH LONG-TERM CURRENT USE OF INSULIN: ICD-10-CM

## 2021-06-18 DIAGNOSIS — N25.81 SECONDARY HYPERPARATHYROIDISM OF RENAL ORIGIN: ICD-10-CM

## 2021-06-18 DIAGNOSIS — N18.4 CONTROLLED TYPE 2 DIABETES MELLITUS WITH STAGE 4 CHRONIC KIDNEY DISEASE, WITH LONG-TERM CURRENT USE OF INSULIN: ICD-10-CM

## 2021-06-18 DIAGNOSIS — M62.838 MUSCLE SPASM: ICD-10-CM

## 2021-06-18 DIAGNOSIS — M62.838 MUSCLE SPASM: Primary | ICD-10-CM

## 2021-06-18 LAB
ALBUMIN SERPL BCP-MCNC: 3.4 G/DL (ref 3.5–5.2)
ALP SERPL-CCNC: 104 U/L (ref 55–135)
ALT SERPL W/O P-5'-P-CCNC: 11 U/L (ref 10–44)
ANION GAP SERPL CALC-SCNC: 11 MMOL/L (ref 8–16)
AST SERPL-CCNC: 18 U/L (ref 10–40)
BASOPHILS # BLD AUTO: 0.02 K/UL (ref 0–0.2)
BASOPHILS NFR BLD: 0.4 % (ref 0–1.9)
BILIRUB SERPL-MCNC: 0.6 MG/DL (ref 0.1–1)
BUN SERPL-MCNC: 31 MG/DL (ref 8–23)
CALCIUM SERPL-MCNC: 9.9 MG/DL (ref 8.7–10.5)
CHLORIDE SERPL-SCNC: 109 MMOL/L (ref 95–110)
CK SERPL-CCNC: 142 U/L (ref 20–180)
CO2 SERPL-SCNC: 20 MMOL/L (ref 23–29)
CREAT SERPL-MCNC: 1.8 MG/DL (ref 0.5–1.4)
DIFFERENTIAL METHOD: ABNORMAL
EOSINOPHIL # BLD AUTO: 0.6 K/UL (ref 0–0.5)
EOSINOPHIL NFR BLD: 9.8 % (ref 0–8)
ERYTHROCYTE [DISTWIDTH] IN BLOOD BY AUTOMATED COUNT: 14.2 % (ref 11.5–14.5)
EST. GFR  (AFRICAN AMERICAN): 29.8 ML/MIN/1.73 M^2
EST. GFR  (NON AFRICAN AMERICAN): 25.8 ML/MIN/1.73 M^2
ESTIMATED AVG GLUCOSE: 151 MG/DL (ref 68–131)
GLUCOSE SERPL-MCNC: 84 MG/DL (ref 70–110)
HBA1C MFR BLD: 6.9 % (ref 4–5.6)
HCT VFR BLD AUTO: 33 % (ref 37–48.5)
HGB BLD-MCNC: 10.5 G/DL (ref 12–16)
IMM GRANULOCYTES # BLD AUTO: 0.03 K/UL (ref 0–0.04)
IMM GRANULOCYTES NFR BLD AUTO: 0.5 % (ref 0–0.5)
LYMPHOCYTES # BLD AUTO: 1.6 K/UL (ref 1–4.8)
LYMPHOCYTES NFR BLD: 28.5 % (ref 18–48)
MCH RBC QN AUTO: 27.6 PG (ref 27–31)
MCHC RBC AUTO-ENTMCNC: 31.8 G/DL (ref 32–36)
MCV RBC AUTO: 87 FL (ref 82–98)
MONOCYTES # BLD AUTO: 0.7 K/UL (ref 0.3–1)
MONOCYTES NFR BLD: 12.8 % (ref 4–15)
NEUTROPHILS # BLD AUTO: 2.7 K/UL (ref 1.8–7.7)
NEUTROPHILS NFR BLD: 48 % (ref 38–73)
NRBC BLD-RTO: 0 /100 WBC
PLATELET # BLD AUTO: 199 K/UL (ref 150–450)
PMV BLD AUTO: 10.3 FL (ref 9.2–12.9)
POTASSIUM SERPL-SCNC: 4.7 MMOL/L (ref 3.5–5.1)
PROT SERPL-MCNC: 8.1 G/DL (ref 6–8.4)
PTH-INTACT SERPL-MCNC: 120 PG/ML (ref 9–77)
RBC # BLD AUTO: 3.8 M/UL (ref 4–5.4)
SODIUM SERPL-SCNC: 140 MMOL/L (ref 136–145)
WBC # BLD AUTO: 5.71 K/UL (ref 3.9–12.7)

## 2021-06-18 PROCEDURE — 36415 COLL VENOUS BLD VENIPUNCTURE: CPT | Mod: PO | Performed by: PHYSICIAN ASSISTANT

## 2021-06-18 PROCEDURE — 83970 ASSAY OF PARATHORMONE: CPT | Performed by: PHYSICIAN ASSISTANT

## 2021-06-18 PROCEDURE — 1101F PT FALLS ASSESS-DOCD LE1/YR: CPT | Mod: CPTII,S$GLB,, | Performed by: PHYSICIAN ASSISTANT

## 2021-06-18 PROCEDURE — 85025 COMPLETE CBC W/AUTO DIFF WBC: CPT | Performed by: PHYSICIAN ASSISTANT

## 2021-06-18 PROCEDURE — 99999 PR PBB SHADOW E&M-EST. PATIENT-LVL V: CPT | Mod: PBBFAC,,, | Performed by: PHYSICIAN ASSISTANT

## 2021-06-18 PROCEDURE — 83036 HEMOGLOBIN GLYCOSYLATED A1C: CPT | Performed by: PHYSICIAN ASSISTANT

## 2021-06-18 PROCEDURE — 1101F PR PT FALLS ASSESS DOC 0-1 FALLS W/OUT INJ PAST YR: ICD-10-PCS | Mod: CPTII,S$GLB,, | Performed by: PHYSICIAN ASSISTANT

## 2021-06-18 PROCEDURE — 99213 PR OFFICE/OUTPT VISIT, EST, LEVL III, 20-29 MIN: ICD-10-PCS | Mod: S$GLB,,, | Performed by: PHYSICIAN ASSISTANT

## 2021-06-18 PROCEDURE — 1125F PR PAIN SEVERITY QUANTIFIED, PAIN PRESENT: ICD-10-PCS | Mod: S$GLB,,, | Performed by: PHYSICIAN ASSISTANT

## 2021-06-18 PROCEDURE — 80053 COMPREHEN METABOLIC PANEL: CPT | Performed by: PHYSICIAN ASSISTANT

## 2021-06-18 PROCEDURE — 1159F MED LIST DOCD IN RCRD: CPT | Mod: S$GLB,,, | Performed by: PHYSICIAN ASSISTANT

## 2021-06-18 PROCEDURE — 99999 PR PBB SHADOW E&M-EST. PATIENT-LVL V: ICD-10-PCS | Mod: PBBFAC,,, | Performed by: PHYSICIAN ASSISTANT

## 2021-06-18 PROCEDURE — 3288F FALL RISK ASSESSMENT DOCD: CPT | Mod: CPTII,S$GLB,, | Performed by: PHYSICIAN ASSISTANT

## 2021-06-18 PROCEDURE — 1125F AMNT PAIN NOTED PAIN PRSNT: CPT | Mod: S$GLB,,, | Performed by: PHYSICIAN ASSISTANT

## 2021-06-18 PROCEDURE — 3288F PR FALLS RISK ASSESSMENT DOCUMENTED: ICD-10-PCS | Mod: CPTII,S$GLB,, | Performed by: PHYSICIAN ASSISTANT

## 2021-06-18 PROCEDURE — 1159F PR MEDICATION LIST DOCUMENTED IN MEDICAL RECORD: ICD-10-PCS | Mod: S$GLB,,, | Performed by: PHYSICIAN ASSISTANT

## 2021-06-18 PROCEDURE — 99499 UNLISTED E&M SERVICE: CPT | Mod: S$GLB,,, | Performed by: PHYSICIAN ASSISTANT

## 2021-06-18 PROCEDURE — 99499 RISK ADDL DX/OHS AUDIT: ICD-10-PCS | Mod: S$GLB,,, | Performed by: PHYSICIAN ASSISTANT

## 2021-06-18 PROCEDURE — 99213 OFFICE O/P EST LOW 20 MIN: CPT | Mod: S$GLB,,, | Performed by: PHYSICIAN ASSISTANT

## 2021-06-18 PROCEDURE — 82550 ASSAY OF CK (CPK): CPT | Performed by: PHYSICIAN ASSISTANT

## 2021-06-18 RX ORDER — ORPHENADRINE CITRATE, ASPIRIN AND CAFFEINE 50; 770; 60 MG/1; MG/1; MG/1
TABLET ORAL
COMMUNITY
Start: 2021-04-08 | End: 2022-01-24

## 2021-06-24 ENCOUNTER — TELEPHONE (OUTPATIENT)
Dept: PODIATRY | Facility: CLINIC | Age: 81
End: 2021-06-24

## 2021-06-24 RX ORDER — LINAGLIPTIN 5 MG/1
5 TABLET, FILM COATED ORAL DAILY
Qty: 90 TABLET | Refills: 1 | Status: SHIPPED | OUTPATIENT
Start: 2021-06-24 | End: 2021-11-30

## 2021-08-04 DIAGNOSIS — I10 ESSENTIAL HYPERTENSION: ICD-10-CM

## 2021-08-04 RX ORDER — AMLODIPINE BESYLATE 10 MG/1
10 TABLET ORAL DAILY
Qty: 90 TABLET | Refills: 0 | Status: SHIPPED | OUTPATIENT
Start: 2021-08-04 | End: 2021-11-30

## 2021-08-10 ENCOUNTER — TELEPHONE (OUTPATIENT)
Dept: FAMILY MEDICINE | Facility: CLINIC | Age: 81
End: 2021-08-10

## 2021-08-18 ENCOUNTER — TELEPHONE (OUTPATIENT)
Dept: FAMILY MEDICINE | Facility: CLINIC | Age: 81
End: 2021-08-18

## 2021-08-20 ENCOUNTER — OFFICE VISIT (OUTPATIENT)
Dept: FAMILY MEDICINE | Facility: CLINIC | Age: 81
End: 2021-08-20
Payer: MEDICARE

## 2021-08-20 DIAGNOSIS — N18.4 ANEMIA DUE TO STAGE 4 CHRONIC KIDNEY DISEASE: ICD-10-CM

## 2021-08-20 DIAGNOSIS — D63.1 ANEMIA DUE TO STAGE 4 CHRONIC KIDNEY DISEASE: ICD-10-CM

## 2021-08-20 DIAGNOSIS — R25.2 BILATERAL LEG CRAMPS: Primary | ICD-10-CM

## 2021-08-20 PROCEDURE — 1160F RVW MEDS BY RX/DR IN RCRD: CPT | Mod: CPTII,95,, | Performed by: FAMILY MEDICINE

## 2021-08-20 PROCEDURE — 99442 PR PHYSICIAN TELEPHONE EVALUATION 11-20 MIN: ICD-10-PCS | Mod: 95,,, | Performed by: FAMILY MEDICINE

## 2021-08-20 PROCEDURE — 1160F PR REVIEW ALL MEDS BY PRESCRIBER/CLIN PHARMACIST DOCUMENTED: ICD-10-PCS | Mod: CPTII,95,, | Performed by: FAMILY MEDICINE

## 2021-08-20 PROCEDURE — 1159F PR MEDICATION LIST DOCUMENTED IN MEDICAL RECORD: ICD-10-PCS | Mod: CPTII,95,, | Performed by: FAMILY MEDICINE

## 2021-08-20 PROCEDURE — 99442 PR PHYSICIAN TELEPHONE EVALUATION 11-20 MIN: CPT | Mod: 95,,, | Performed by: FAMILY MEDICINE

## 2021-08-20 PROCEDURE — 1159F MED LIST DOCD IN RCRD: CPT | Mod: CPTII,95,, | Performed by: FAMILY MEDICINE

## 2021-08-24 RX ORDER — INSULIN PUMP SYRINGE, 3 ML
EACH MISCELLANEOUS
Qty: 1 EACH | Refills: 0 | Status: SHIPPED | OUTPATIENT
Start: 2021-08-24 | End: 2021-08-26 | Stop reason: SDUPTHER

## 2021-08-24 RX ORDER — LANCETS
EACH MISCELLANEOUS
Qty: 200 EACH | Refills: 3 | Status: SHIPPED | OUTPATIENT
Start: 2021-08-24 | End: 2021-08-26 | Stop reason: SDUPTHER

## 2021-08-25 DIAGNOSIS — I73.9 PERIPHERAL VASCULAR DISEASE, UNSPECIFIED: ICD-10-CM

## 2021-08-26 RX ORDER — INSULIN PUMP SYRINGE, 3 ML
EACH MISCELLANEOUS
Qty: 1 EACH | Refills: 0 | Status: SHIPPED | OUTPATIENT
Start: 2021-08-26 | End: 2021-09-24 | Stop reason: SDUPTHER

## 2021-08-26 RX ORDER — LANCETS
EACH MISCELLANEOUS
Qty: 200 EACH | Refills: 3 | Status: ON HOLD | OUTPATIENT
Start: 2021-08-26 | End: 2022-07-18

## 2021-08-26 RX ORDER — CILOSTAZOL 50 MG/1
TABLET ORAL
Qty: 180 TABLET | Refills: 0 | Status: SHIPPED | OUTPATIENT
Start: 2021-08-26 | End: 2021-11-30

## 2021-08-27 ENCOUNTER — TELEPHONE (OUTPATIENT)
Dept: FAMILY MEDICINE | Facility: CLINIC | Age: 81
End: 2021-08-27

## 2021-08-27 RX ORDER — BLOOD-GLUCOSE,RECEIVER,CONT
EACH MISCELLANEOUS
Qty: 1 EACH | Refills: 3 | Status: SHIPPED | OUTPATIENT
Start: 2021-08-27 | End: 2021-09-14 | Stop reason: SDUPTHER

## 2021-09-08 ENCOUNTER — TELEPHONE (OUTPATIENT)
Dept: PODIATRY | Facility: CLINIC | Age: 81
End: 2021-09-08

## 2021-09-08 ENCOUNTER — TELEPHONE (OUTPATIENT)
Dept: FAMILY MEDICINE | Facility: CLINIC | Age: 81
End: 2021-09-08

## 2021-09-08 RX ORDER — BLOOD-GLUCOSE,RECEIVER,CONT
EACH MISCELLANEOUS
Qty: 1 EACH | Refills: 0 | Status: SHIPPED | OUTPATIENT
Start: 2021-09-08 | End: 2022-01-24 | Stop reason: SDUPTHER

## 2021-09-08 RX ORDER — BLOOD-GLUCOSE SENSOR
EACH MISCELLANEOUS
Qty: 12 EACH | Refills: 0 | Status: SHIPPED | OUTPATIENT
Start: 2021-09-08 | End: 2021-09-14 | Stop reason: SDUPTHER

## 2021-09-09 ENCOUNTER — TELEPHONE (OUTPATIENT)
Dept: FAMILY MEDICINE | Facility: CLINIC | Age: 81
End: 2021-09-09

## 2021-09-14 RX ORDER — BLOOD-GLUCOSE,RECEIVER,CONT
EACH MISCELLANEOUS
Qty: 1 EACH | Refills: 3 | Status: ON HOLD | OUTPATIENT
Start: 2021-09-14 | End: 2022-05-25

## 2021-09-14 RX ORDER — BLOOD-GLUCOSE TRANSMITTER
EACH MISCELLANEOUS
Qty: 3 EACH | Refills: 1 | Status: SHIPPED | OUTPATIENT
Start: 2021-09-14 | End: 2022-01-24

## 2021-09-14 RX ORDER — BLOOD-GLUCOSE SENSOR
EACH MISCELLANEOUS
Qty: 12 EACH | Refills: 0 | Status: SHIPPED | OUTPATIENT
Start: 2021-09-14 | End: 2022-01-24 | Stop reason: SDUPTHER

## 2021-09-15 DIAGNOSIS — I10 ESSENTIAL HYPERTENSION: ICD-10-CM

## 2021-09-15 RX ORDER — CARVEDILOL 12.5 MG/1
12.5 TABLET ORAL 2 TIMES DAILY
Qty: 180 TABLET | Refills: 2 | Status: SHIPPED | OUTPATIENT
Start: 2021-09-15 | End: 2021-12-03 | Stop reason: SDUPTHER

## 2021-09-24 ENCOUNTER — TELEPHONE (OUTPATIENT)
Dept: FAMILY MEDICINE | Facility: CLINIC | Age: 81
End: 2021-09-24

## 2021-09-24 DIAGNOSIS — Z79.4 CONTROLLED TYPE 2 DIABETES MELLITUS WITH STAGE 3 CHRONIC KIDNEY DISEASE, WITH LONG-TERM CURRENT USE OF INSULIN: Primary | ICD-10-CM

## 2021-09-24 DIAGNOSIS — E11.22 CONTROLLED TYPE 2 DIABETES MELLITUS WITH STAGE 3 CHRONIC KIDNEY DISEASE, WITH LONG-TERM CURRENT USE OF INSULIN: Primary | ICD-10-CM

## 2021-09-24 DIAGNOSIS — N18.30 CONTROLLED TYPE 2 DIABETES MELLITUS WITH STAGE 3 CHRONIC KIDNEY DISEASE, WITH LONG-TERM CURRENT USE OF INSULIN: Primary | ICD-10-CM

## 2021-09-24 RX ORDER — INSULIN PUMP SYRINGE, 3 ML
EACH MISCELLANEOUS
Qty: 1 EACH | Refills: 0 | Status: SHIPPED | OUTPATIENT
Start: 2021-09-24 | End: 2022-01-24

## 2021-10-01 ENCOUNTER — TELEPHONE (OUTPATIENT)
Dept: FAMILY MEDICINE | Facility: CLINIC | Age: 81
End: 2021-10-01

## 2021-11-13 ENCOUNTER — PATIENT OUTREACH (OUTPATIENT)
Dept: ADMINISTRATIVE | Facility: OTHER | Age: 81
End: 2021-11-13
Payer: MEDICARE

## 2021-11-17 ENCOUNTER — OFFICE VISIT (OUTPATIENT)
Dept: PODIATRY | Facility: CLINIC | Age: 81
End: 2021-11-17
Payer: MEDICARE

## 2021-11-17 VITALS — WEIGHT: 243 LBS | HEIGHT: 62 IN | BODY MASS INDEX: 44.72 KG/M2

## 2021-11-17 DIAGNOSIS — E11.51 TYPE II DIABETES MELLITUS WITH PERIPHERAL CIRCULATORY DISORDER: Primary | ICD-10-CM

## 2021-11-17 DIAGNOSIS — M20.41 HAMMER TOES OF BOTH FEET: ICD-10-CM

## 2021-11-17 DIAGNOSIS — I73.9 PAD (PERIPHERAL ARTERY DISEASE): ICD-10-CM

## 2021-11-17 DIAGNOSIS — B35.1 ONYCHOMYCOSIS DUE TO DERMATOPHYTE: ICD-10-CM

## 2021-11-17 DIAGNOSIS — L84 CORN OR CALLUS: ICD-10-CM

## 2021-11-17 DIAGNOSIS — M21.42 PES PLANUS OF BOTH FEET: ICD-10-CM

## 2021-11-17 DIAGNOSIS — M21.41 PES PLANUS OF BOTH FEET: ICD-10-CM

## 2021-11-17 DIAGNOSIS — I87.8 VENOUS STASIS OF BOTH LOWER EXTREMITIES: ICD-10-CM

## 2021-11-17 DIAGNOSIS — M20.42 HAMMER TOES OF BOTH FEET: ICD-10-CM

## 2021-11-17 PROCEDURE — 99999 PR PBB SHADOW E&M-EST. PATIENT-LVL IV: ICD-10-PCS | Mod: PBBFAC,,, | Performed by: PODIATRIST

## 2021-11-17 PROCEDURE — 1160F PR REVIEW ALL MEDS BY PRESCRIBER/CLIN PHARMACIST DOCUMENTED: ICD-10-PCS | Mod: CPTII,S$GLB,, | Performed by: PODIATRIST

## 2021-11-17 PROCEDURE — 1101F PR PT FALLS ASSESS DOC 0-1 FALLS W/OUT INJ PAST YR: ICD-10-PCS | Mod: CPTII,S$GLB,, | Performed by: PODIATRIST

## 2021-11-17 PROCEDURE — 1159F MED LIST DOCD IN RCRD: CPT | Mod: CPTII,S$GLB,, | Performed by: PODIATRIST

## 2021-11-17 PROCEDURE — 99499 UNLISTED E&M SERVICE: CPT | Mod: S$GLB,,, | Performed by: PODIATRIST

## 2021-11-17 PROCEDURE — 1101F PT FALLS ASSESS-DOCD LE1/YR: CPT | Mod: CPTII,S$GLB,, | Performed by: PODIATRIST

## 2021-11-17 PROCEDURE — 11721 PR DEBRIDEMENT OF NAILS, 6 OR MORE: ICD-10-PCS | Mod: 59,Q9,S$GLB, | Performed by: PODIATRIST

## 2021-11-17 PROCEDURE — 1159F PR MEDICATION LIST DOCUMENTED IN MEDICAL RECORD: ICD-10-PCS | Mod: CPTII,S$GLB,, | Performed by: PODIATRIST

## 2021-11-17 PROCEDURE — 3288F PR FALLS RISK ASSESSMENT DOCUMENTED: ICD-10-PCS | Mod: CPTII,S$GLB,, | Performed by: PODIATRIST

## 2021-11-17 PROCEDURE — 1160F RVW MEDS BY RX/DR IN RCRD: CPT | Mod: CPTII,S$GLB,, | Performed by: PODIATRIST

## 2021-11-17 PROCEDURE — 99499 NO LOS: ICD-10-PCS | Mod: S$GLB,,, | Performed by: PODIATRIST

## 2021-11-17 PROCEDURE — 11056 PR TRIM BENIGN HYPERKERATOTIC SKIN LESION,2-4: ICD-10-PCS | Mod: Q9,S$GLB,, | Performed by: PODIATRIST

## 2021-11-17 PROCEDURE — 3288F FALL RISK ASSESSMENT DOCD: CPT | Mod: CPTII,S$GLB,, | Performed by: PODIATRIST

## 2021-11-17 PROCEDURE — 99999 PR PBB SHADOW E&M-EST. PATIENT-LVL IV: CPT | Mod: PBBFAC,,, | Performed by: PODIATRIST

## 2021-11-17 PROCEDURE — 11721 DEBRIDE NAIL 6 OR MORE: CPT | Mod: 59,Q9,S$GLB, | Performed by: PODIATRIST

## 2021-11-17 PROCEDURE — 11056 PARNG/CUTG B9 HYPRKR LES 2-4: CPT | Mod: Q9,S$GLB,, | Performed by: PODIATRIST

## 2021-12-03 DIAGNOSIS — I10 ESSENTIAL HYPERTENSION: ICD-10-CM

## 2021-12-06 ENCOUNTER — TELEPHONE (OUTPATIENT)
Dept: FAMILY MEDICINE | Facility: CLINIC | Age: 81
End: 2021-12-06
Payer: MEDICARE

## 2021-12-06 RX ORDER — CANDESARTAN CILEXETIL AND HYDROCHLOROTHIAZIDE 32; 12.5 MG/1; MG/1
1 TABLET ORAL DAILY
Qty: 90 TABLET | Refills: 0 | Status: ON HOLD | OUTPATIENT
Start: 2021-12-06 | End: 2022-01-28 | Stop reason: HOSPADM

## 2021-12-06 RX ORDER — CARVEDILOL 12.5 MG/1
12.5 TABLET ORAL 2 TIMES DAILY
Qty: 180 TABLET | Refills: 0 | Status: SHIPPED | OUTPATIENT
Start: 2021-12-06 | End: 2022-04-28

## 2021-12-10 ENCOUNTER — TELEPHONE (OUTPATIENT)
Dept: FAMILY MEDICINE | Facility: CLINIC | Age: 81
End: 2021-12-10
Payer: MEDICARE

## 2021-12-13 ENCOUNTER — TELEPHONE (OUTPATIENT)
Dept: FAMILY MEDICINE | Facility: CLINIC | Age: 81
End: 2021-12-13
Payer: MEDICARE

## 2021-12-20 ENCOUNTER — HOSPITAL ENCOUNTER (EMERGENCY)
Facility: HOSPITAL | Age: 81
Discharge: HOME | End: 2021-12-20
Attending: EMERGENCY MEDICINE
Payer: MEDICARE

## 2021-12-20 VITALS
DIASTOLIC BLOOD PRESSURE: 85 MMHG | HEIGHT: 65 IN | SYSTOLIC BLOOD PRESSURE: 158 MMHG | HEART RATE: 78 BPM | TEMPERATURE: 98 F | WEIGHT: 220 LBS | RESPIRATION RATE: 24 BRPM | OXYGEN SATURATION: 97 % | BODY MASS INDEX: 36.65 KG/M2

## 2021-12-20 DIAGNOSIS — R05.9 COUGH: ICD-10-CM

## 2021-12-20 DIAGNOSIS — R06.09 DYSPNEA ON EXERTION: Primary | ICD-10-CM

## 2021-12-20 LAB
ALBUMIN SERPL BCP-MCNC: 3.3 G/DL (ref 3.5–5.2)
ALP SERPL-CCNC: 96 U/L (ref 55–135)
ALT SERPL W/O P-5'-P-CCNC: 15 U/L (ref 10–44)
ANION GAP SERPL CALC-SCNC: 10 MMOL/L (ref 8–16)
AST SERPL-CCNC: 19 U/L (ref 10–40)
BASOPHILS # BLD AUTO: 0.01 K/UL (ref 0–0.2)
BASOPHILS NFR BLD: 0.2 % (ref 0–1.9)
BILIRUB SERPL-MCNC: 0.6 MG/DL (ref 0.1–1)
BNP SERPL-MCNC: 42 PG/ML (ref 0–99)
BUN SERPL-MCNC: 24 MG/DL (ref 8–23)
CALCIUM SERPL-MCNC: 9.6 MG/DL (ref 8.7–10.5)
CHLORIDE SERPL-SCNC: 112 MMOL/L (ref 95–110)
CO2 SERPL-SCNC: 21 MMOL/L (ref 23–29)
CREAT SERPL-MCNC: 1.7 MG/DL (ref 0.5–1.4)
CTP QC/QA: YES
CTP QC/QA: YES
DIFFERENTIAL METHOD: ABNORMAL
EOSINOPHIL # BLD AUTO: 0.2 K/UL (ref 0–0.5)
EOSINOPHIL NFR BLD: 4.1 % (ref 0–8)
ERYTHROCYTE [DISTWIDTH] IN BLOOD BY AUTOMATED COUNT: 14.8 % (ref 11.5–14.5)
EST. GFR  (AFRICAN AMERICAN): 32 ML/MIN/1.73 M^2
EST. GFR  (NON AFRICAN AMERICAN): 27 ML/MIN/1.73 M^2
GLUCOSE SERPL-MCNC: 109 MG/DL (ref 70–110)
HCT VFR BLD AUTO: 33.9 % (ref 37–48.5)
HGB BLD-MCNC: 10.8 G/DL (ref 12–16)
IMM GRANULOCYTES # BLD AUTO: 0.01 K/UL (ref 0–0.04)
IMM GRANULOCYTES NFR BLD AUTO: 0.2 % (ref 0–0.5)
LYMPHOCYTES # BLD AUTO: 1.4 K/UL (ref 1–4.8)
LYMPHOCYTES NFR BLD: 22.8 % (ref 18–48)
MCH RBC QN AUTO: 28.5 PG (ref 27–31)
MCHC RBC AUTO-ENTMCNC: 31.9 G/DL (ref 32–36)
MCV RBC AUTO: 89 FL (ref 82–98)
MONOCYTES # BLD AUTO: 0.6 K/UL (ref 0.3–1)
MONOCYTES NFR BLD: 9.6 % (ref 4–15)
NEUTROPHILS # BLD AUTO: 3.7 K/UL (ref 1.8–7.7)
NEUTROPHILS NFR BLD: 63.1 % (ref 38–73)
NRBC BLD-RTO: 0 /100 WBC
PLATELET # BLD AUTO: 210 K/UL (ref 150–450)
PMV BLD AUTO: 9.4 FL (ref 9.2–12.9)
POC MOLECULAR INFLUENZA A AGN: NEGATIVE
POC MOLECULAR INFLUENZA B AGN: NEGATIVE
POCT GLUCOSE: 122 MG/DL (ref 70–110)
POTASSIUM SERPL-SCNC: 5 MMOL/L (ref 3.5–5.1)
PROT SERPL-MCNC: 8 G/DL (ref 6–8.4)
RBC # BLD AUTO: 3.79 M/UL (ref 4–5.4)
SARS-COV-2 RDRP RESP QL NAA+PROBE: NEGATIVE
SODIUM SERPL-SCNC: 143 MMOL/L (ref 136–145)
TROPONIN I SERPL DL<=0.01 NG/ML-MCNC: 0.01 NG/ML (ref 0–0.03)
WBC # BLD AUTO: 5.91 K/UL (ref 3.9–12.7)

## 2021-12-20 PROCEDURE — 80053 COMPREHEN METABOLIC PANEL: CPT | Performed by: EMERGENCY MEDICINE

## 2021-12-20 PROCEDURE — U0002 COVID-19 LAB TEST NON-CDC: HCPCS | Performed by: EMERGENCY MEDICINE

## 2021-12-20 PROCEDURE — 84484 ASSAY OF TROPONIN QUANT: CPT | Performed by: EMERGENCY MEDICINE

## 2021-12-20 PROCEDURE — 87502 INFLUENZA DNA AMP PROBE: CPT

## 2021-12-20 PROCEDURE — 93005 ELECTROCARDIOGRAM TRACING: CPT

## 2021-12-20 PROCEDURE — 82962 GLUCOSE BLOOD TEST: CPT

## 2021-12-20 PROCEDURE — 93010 ELECTROCARDIOGRAM REPORT: CPT | Mod: ,,, | Performed by: INTERNAL MEDICINE

## 2021-12-20 PROCEDURE — 83880 ASSAY OF NATRIURETIC PEPTIDE: CPT | Performed by: EMERGENCY MEDICINE

## 2021-12-20 PROCEDURE — 93010 EKG 12-LEAD: ICD-10-PCS | Mod: ,,, | Performed by: INTERNAL MEDICINE

## 2021-12-20 PROCEDURE — 96374 THER/PROPH/DIAG INJ IV PUSH: CPT

## 2021-12-20 PROCEDURE — 99284 EMERGENCY DEPT VISIT MOD MDM: CPT | Mod: 25

## 2021-12-20 PROCEDURE — 85025 COMPLETE CBC W/AUTO DIFF WBC: CPT | Performed by: EMERGENCY MEDICINE

## 2021-12-20 PROCEDURE — 63600175 PHARM REV CODE 636 W HCPCS: Performed by: EMERGENCY MEDICINE

## 2021-12-20 RX ORDER — FUROSEMIDE 10 MG/ML
40 INJECTION INTRAMUSCULAR; INTRAVENOUS
Status: COMPLETED | OUTPATIENT
Start: 2021-12-20 | End: 2021-12-20

## 2021-12-20 RX ORDER — FUROSEMIDE 20 MG/1
40 TABLET ORAL DAILY
Qty: 6 TABLET | Refills: 0 | Status: SHIPPED | OUTPATIENT
Start: 2021-12-21 | End: 2022-01-24

## 2021-12-20 RX ADMIN — FUROSEMIDE 40 MG: 10 INJECTION, SOLUTION INTRAMUSCULAR; INTRAVENOUS at 03:12

## 2021-12-22 ENCOUNTER — TELEPHONE (OUTPATIENT)
Dept: FAMILY MEDICINE | Facility: CLINIC | Age: 81
End: 2021-12-22
Payer: MEDICARE

## 2021-12-27 ENCOUNTER — TELEPHONE (OUTPATIENT)
Dept: FAMILY MEDICINE | Facility: CLINIC | Age: 81
End: 2021-12-27
Payer: MEDICARE

## 2021-12-27 DIAGNOSIS — I50.33 ACUTE ON CHRONIC DIASTOLIC HEART FAILURE: ICD-10-CM

## 2021-12-27 DIAGNOSIS — R25.2 BILATERAL LEG CRAMPS: Primary | ICD-10-CM

## 2022-01-12 ENCOUNTER — OFFICE VISIT (OUTPATIENT)
Dept: FAMILY MEDICINE | Facility: CLINIC | Age: 82
End: 2022-01-12
Payer: MEDICARE

## 2022-01-12 ENCOUNTER — LAB VISIT (OUTPATIENT)
Dept: LAB | Facility: HOSPITAL | Age: 82
End: 2022-01-12
Attending: INTERNAL MEDICINE
Payer: MEDICARE

## 2022-01-12 VITALS
SYSTOLIC BLOOD PRESSURE: 130 MMHG | HEIGHT: 65 IN | RESPIRATION RATE: 18 BRPM | DIASTOLIC BLOOD PRESSURE: 70 MMHG | OXYGEN SATURATION: 96 % | TEMPERATURE: 98 F | WEIGHT: 258.63 LBS | BODY MASS INDEX: 43.09 KG/M2 | HEART RATE: 74 BPM

## 2022-01-12 DIAGNOSIS — I50.33 ACUTE ON CHRONIC DIASTOLIC HEART FAILURE: Primary | ICD-10-CM

## 2022-01-12 DIAGNOSIS — E11.51 TYPE II DIABETES MELLITUS WITH PERIPHERAL CIRCULATORY DISORDER: ICD-10-CM

## 2022-01-12 DIAGNOSIS — I50.33 ACUTE ON CHRONIC DIASTOLIC HEART FAILURE: ICD-10-CM

## 2022-01-12 DIAGNOSIS — N18.4 ANEMIA DUE TO STAGE 4 CHRONIC KIDNEY DISEASE: ICD-10-CM

## 2022-01-12 DIAGNOSIS — N25.81 SECONDARY HYPERPARATHYROIDISM OF RENAL ORIGIN: ICD-10-CM

## 2022-01-12 DIAGNOSIS — Z23 NEED FOR IMMUNIZATION AGAINST INFLUENZA: ICD-10-CM

## 2022-01-12 DIAGNOSIS — R06.09 DOE (DYSPNEA ON EXERTION): ICD-10-CM

## 2022-01-12 DIAGNOSIS — D63.1 ANEMIA DUE TO STAGE 4 CHRONIC KIDNEY DISEASE: ICD-10-CM

## 2022-01-12 LAB
ALBUMIN SERPL BCP-MCNC: 3.3 G/DL (ref 3.5–5.2)
ALP SERPL-CCNC: 90 U/L (ref 55–135)
ALT SERPL W/O P-5'-P-CCNC: 10 U/L (ref 10–44)
ANION GAP SERPL CALC-SCNC: 9 MMOL/L (ref 8–16)
AST SERPL-CCNC: 17 U/L (ref 10–40)
BASOPHILS # BLD AUTO: 0.01 K/UL (ref 0–0.2)
BASOPHILS NFR BLD: 0.2 % (ref 0–1.9)
BILIRUB SERPL-MCNC: 0.6 MG/DL (ref 0.1–1)
BNP SERPL-MCNC: 44 PG/ML (ref 0–99)
BUN SERPL-MCNC: 22 MG/DL (ref 8–23)
CALCIUM SERPL-MCNC: 9.8 MG/DL (ref 8.7–10.5)
CHLORIDE SERPL-SCNC: 110 MMOL/L (ref 95–110)
CO2 SERPL-SCNC: 23 MMOL/L (ref 23–29)
CREAT SERPL-MCNC: 1.7 MG/DL (ref 0.5–1.4)
DIFFERENTIAL METHOD: ABNORMAL
EOSINOPHIL # BLD AUTO: 0.2 K/UL (ref 0–0.5)
EOSINOPHIL NFR BLD: 4.7 % (ref 0–8)
ERYTHROCYTE [DISTWIDTH] IN BLOOD BY AUTOMATED COUNT: 14.6 % (ref 11.5–14.5)
EST. GFR  (AFRICAN AMERICAN): 31.7 ML/MIN/1.73 M^2
EST. GFR  (NON AFRICAN AMERICAN): 27.5 ML/MIN/1.73 M^2
ESTIMATED AVG GLUCOSE: 148 MG/DL (ref 68–131)
GLUCOSE SERPL-MCNC: 97 MG/DL (ref 70–110)
HBA1C MFR BLD: 6.8 % (ref 4–5.6)
HCT VFR BLD AUTO: 33.6 % (ref 37–48.5)
HGB BLD-MCNC: 10.4 G/DL (ref 12–16)
IMM GRANULOCYTES # BLD AUTO: 0.01 K/UL (ref 0–0.04)
IMM GRANULOCYTES NFR BLD AUTO: 0.2 % (ref 0–0.5)
LYMPHOCYTES # BLD AUTO: 1.4 K/UL (ref 1–4.8)
LYMPHOCYTES NFR BLD: 26.7 % (ref 18–48)
MCH RBC QN AUTO: 28 PG (ref 27–31)
MCHC RBC AUTO-ENTMCNC: 31 G/DL (ref 32–36)
MCV RBC AUTO: 90 FL (ref 82–98)
MONOCYTES # BLD AUTO: 0.6 K/UL (ref 0.3–1)
MONOCYTES NFR BLD: 11.5 % (ref 4–15)
NEUTROPHILS # BLD AUTO: 2.9 K/UL (ref 1.8–7.7)
NEUTROPHILS NFR BLD: 56.7 % (ref 38–73)
NRBC BLD-RTO: 0 /100 WBC
PLATELET # BLD AUTO: 214 K/UL (ref 150–450)
PMV BLD AUTO: 10.1 FL (ref 9.2–12.9)
POTASSIUM SERPL-SCNC: 4.8 MMOL/L (ref 3.5–5.1)
PROT SERPL-MCNC: 7.9 G/DL (ref 6–8.4)
RBC # BLD AUTO: 3.72 M/UL (ref 4–5.4)
SODIUM SERPL-SCNC: 142 MMOL/L (ref 136–145)
WBC # BLD AUTO: 5.14 K/UL (ref 3.9–12.7)

## 2022-01-12 PROCEDURE — 83036 HEMOGLOBIN GLYCOSYLATED A1C: CPT | Performed by: INTERNAL MEDICINE

## 2022-01-12 PROCEDURE — 3075F SYST BP GE 130 - 139MM HG: CPT | Mod: CPTII,S$GLB,, | Performed by: INTERNAL MEDICINE

## 2022-01-12 PROCEDURE — 90694 VACC AIIV4 NO PRSRV 0.5ML IM: CPT | Mod: S$GLB,,, | Performed by: INTERNAL MEDICINE

## 2022-01-12 PROCEDURE — 80053 COMPREHEN METABOLIC PANEL: CPT | Performed by: INTERNAL MEDICINE

## 2022-01-12 PROCEDURE — 3078F PR MOST RECENT DIASTOLIC BLOOD PRESSURE < 80 MM HG: ICD-10-PCS | Mod: CPTII,S$GLB,, | Performed by: INTERNAL MEDICINE

## 2022-01-12 PROCEDURE — 1160F RVW MEDS BY RX/DR IN RCRD: CPT | Mod: CPTII,S$GLB,, | Performed by: INTERNAL MEDICINE

## 2022-01-12 PROCEDURE — 99999 PR PBB SHADOW E&M-EST. PATIENT-LVL V: CPT | Mod: PBBFAC,,, | Performed by: INTERNAL MEDICINE

## 2022-01-12 PROCEDURE — 99999 PR PBB SHADOW E&M-EST. PATIENT-LVL V: ICD-10-PCS | Mod: PBBFAC,,, | Performed by: INTERNAL MEDICINE

## 2022-01-12 PROCEDURE — 3078F DIAST BP <80 MM HG: CPT | Mod: CPTII,S$GLB,, | Performed by: INTERNAL MEDICINE

## 2022-01-12 PROCEDURE — 83880 ASSAY OF NATRIURETIC PEPTIDE: CPT | Performed by: INTERNAL MEDICINE

## 2022-01-12 PROCEDURE — 1126F AMNT PAIN NOTED NONE PRSNT: CPT | Mod: CPTII,S$GLB,, | Performed by: INTERNAL MEDICINE

## 2022-01-12 PROCEDURE — 3288F PR FALLS RISK ASSESSMENT DOCUMENTED: ICD-10-PCS | Mod: CPTII,S$GLB,, | Performed by: INTERNAL MEDICINE

## 2022-01-12 PROCEDURE — 99214 OFFICE O/P EST MOD 30 MIN: CPT | Mod: 25,S$GLB,, | Performed by: INTERNAL MEDICINE

## 2022-01-12 PROCEDURE — 99499 RISK ADDL DX/OHS AUDIT: ICD-10-PCS | Mod: S$GLB,,, | Performed by: INTERNAL MEDICINE

## 2022-01-12 PROCEDURE — 1101F PR PT FALLS ASSESS DOC 0-1 FALLS W/OUT INJ PAST YR: ICD-10-PCS | Mod: CPTII,S$GLB,, | Performed by: INTERNAL MEDICINE

## 2022-01-12 PROCEDURE — 3288F FALL RISK ASSESSMENT DOCD: CPT | Mod: CPTII,S$GLB,, | Performed by: INTERNAL MEDICINE

## 2022-01-12 PROCEDURE — 85025 COMPLETE CBC W/AUTO DIFF WBC: CPT | Performed by: INTERNAL MEDICINE

## 2022-01-12 PROCEDURE — G0008 ADMIN INFLUENZA VIRUS VAC: HCPCS | Mod: S$GLB,,, | Performed by: INTERNAL MEDICINE

## 2022-01-12 PROCEDURE — 1126F PR PAIN SEVERITY QUANTIFIED, NO PAIN PRESENT: ICD-10-PCS | Mod: CPTII,S$GLB,, | Performed by: INTERNAL MEDICINE

## 2022-01-12 PROCEDURE — 3075F PR MOST RECENT SYSTOLIC BLOOD PRESS GE 130-139MM HG: ICD-10-PCS | Mod: CPTII,S$GLB,, | Performed by: INTERNAL MEDICINE

## 2022-01-12 PROCEDURE — 99499 UNLISTED E&M SERVICE: CPT | Mod: S$GLB,,, | Performed by: INTERNAL MEDICINE

## 2022-01-12 PROCEDURE — 36415 COLL VENOUS BLD VENIPUNCTURE: CPT | Mod: PO | Performed by: INTERNAL MEDICINE

## 2022-01-12 PROCEDURE — 1160F PR REVIEW ALL MEDS BY PRESCRIBER/CLIN PHARMACIST DOCUMENTED: ICD-10-PCS | Mod: CPTII,S$GLB,, | Performed by: INTERNAL MEDICINE

## 2022-01-12 PROCEDURE — 99214 PR OFFICE/OUTPT VISIT, EST, LEVL IV, 30-39 MIN: ICD-10-PCS | Mod: 25,S$GLB,, | Performed by: INTERNAL MEDICINE

## 2022-01-12 PROCEDURE — 90694 FLU VACCINE - QUADRIVALENT - ADJUVANTED: ICD-10-PCS | Mod: S$GLB,,, | Performed by: INTERNAL MEDICINE

## 2022-01-12 PROCEDURE — 1159F PR MEDICATION LIST DOCUMENTED IN MEDICAL RECORD: ICD-10-PCS | Mod: CPTII,S$GLB,, | Performed by: INTERNAL MEDICINE

## 2022-01-12 PROCEDURE — 1159F MED LIST DOCD IN RCRD: CPT | Mod: CPTII,S$GLB,, | Performed by: INTERNAL MEDICINE

## 2022-01-12 PROCEDURE — G0008 FLU VACCINE - QUADRIVALENT - ADJUVANTED: ICD-10-PCS | Mod: S$GLB,,, | Performed by: INTERNAL MEDICINE

## 2022-01-12 PROCEDURE — 1101F PT FALLS ASSESS-DOCD LE1/YR: CPT | Mod: CPTII,S$GLB,, | Performed by: INTERNAL MEDICINE

## 2022-01-12 NOTE — PROGRESS NOTES
Health Maintenance Due   Topic Date Due    Diabetes Urine Screening  Consult with pcp    Shingles Vaccine (1 of 2) Hx chickenpox, notified can get it at the pharmacy      DEXA SCAN  Consult with pcp    COVID-19 Vaccine (3 - Booster for Pfizer series) Pt completed    Influenza Vaccine (1) Pt will complete    Hemoglobin A1c  Pt will complete

## 2022-01-12 NOTE — PROGRESS NOTES
"Subjective:       Patient ID: Ana James is a pleasant 81 y.o. Black or  female patient    Chief Complaint: Follow-up      Patient is a new pt to me but was pt from Dr. Schmidt. Last visit with her in August 2020, see her last notes and the list of problems below.    HPI     Pt with past medical history of asthma, diabetes, hypertension, who  went to the ED on 12/20/2021.  As per ED notes:  " presented for cough for 3 days,  some slight increase in her lower extremity swelling, sand worsened SOB.  Workup remarkable for EKG - nsr, rate 76 bpm, no ischemic pattern noted, no STEMI, trop - 0.014, BNP - 42, CBC/CMP - wnl, CXR - mild vascular congestion. Pt presentation consistent with suspected volume overload with reported history of heart failure, evidence of hypovolemia, treated with Lasix x1 here in the ED, will treat with oral medication over the next couple of days and have her follow-up with her regular doctor/Cardiology".  She reports being a bit better. She took medications as directed and symptoms are better, less leg swelling.    Patient Active Problem List   Diagnosis    HILLS (dyspnea on exertion)    Essential hypertension    Morbid obesity with BMI of 45.0-49.9, adult    Peripheral vascular disease, unspecified    Controlled type 2 diabetes mellitus with stage 3 chronic kidney disease, with long-term current use of insulin    Chest pain, atypical    Physical debility    Acute cystitis without hematuria    Elevated d-dimer    Acute on chronic diastolic heart failure    Hyperkalemia    Hyperlipidemia    Chronic diastolic heart failure    Anemia of chronic disease    Secondary hyperparathyroidism of renal origin          ACTIVE MEDICAL ISSUES:  Documented in Problem List     PAST MEDICAL HISTORY  Documented     PAST SURGICAL HISTORY:  Documented     SOCIAL HISTORY:  Documented     FAMILY HISTORY:  Documented     ALLERGIES AND MEDICATIONS: updated and " reviewed.  Documented    Review of Systems   Constitutional: Negative for appetite change, chills, diaphoresis, fatigue and fever.   HENT: Negative for sinus pressure and trouble swallowing.    Eyes: Negative for pain and visual disturbance.   Respiratory: Positive for shortness of breath (better). Negative for cough, chest tightness and wheezing.    Cardiovascular: Positive for leg swelling (better). Negative for chest pain and palpitations.   Gastrointestinal: Negative for abdominal pain, blood in stool, constipation, diarrhea, nausea and vomiting.   Endocrine: Negative for polydipsia and polyphagia.   Genitourinary: Negative for decreased urine volume, difficulty urinating, dysuria, hematuria, menstrual problem, pelvic pain and vaginal discharge.   Musculoskeletal: Negative for back pain, joint swelling and neck pain.   Skin: Negative for color change and rash.   Allergic/Immunologic: Negative for environmental allergies and food allergies.   Neurological: Negative for dizziness, numbness and headaches.   Hematological: Negative for adenopathy. Does not bruise/bleed easily.   Psychiatric/Behavioral: Negative for dysphoric mood and sleep disturbance. The patient is not nervous/anxious.        Objective:      Physical Exam  Constitutional:       Appearance: Normal appearance. She is obese.      Comments: Mild leg swelling, non pitting.   HENT:      Right Ear: Tympanic membrane normal.      Left Ear: Tympanic membrane normal.   Cardiovascular:      Rate and Rhythm: Normal rate and regular rhythm.      Pulses: Normal pulses.      Heart sounds: Normal heart sounds.   Neurological:      General: No focal deficit present.      Mental Status: She is alert and oriented to person, place, and time.   Psychiatric:         Mood and Affect: Mood normal.         Behavior: Behavior normal.         Vitals:    01/12/22 1129   BP: 130/70   BP Location: Right arm   Patient Position: Sitting   BP Method: Large (Manual)   Pulse: 74  "  Resp: 18   Temp: 97.7 °F (36.5 °C)   TempSrc: Oral   SpO2: 96%   Weight: 117.3 kg (258 lb 9.6 oz)   Height: 5' 5" (1.651 m)     Body mass index is 43.03 kg/m².    RESULTS: Reviewed labs from last 12 months    Last Lab Results:     Lab Results   Component Value Date    WBC 5.14 01/12/2022    HGB 10.4 (L) 01/12/2022    HCT 33.6 (L) 01/12/2022     01/12/2022     01/12/2022    K 4.8 01/12/2022     01/12/2022    CO2 23 01/12/2022    BUN 22 01/12/2022    CREATININE 1.7 (H) 01/12/2022    CALCIUM 9.8 01/12/2022    ALBUMIN 3.3 (L) 01/12/2022    AST 17 01/12/2022    ALT 10 01/12/2022    CHOL 126 01/27/2021    TRIG 90 01/27/2021    HDL 36 (L) 01/27/2021    LDLCALC 72.0 01/27/2021    HGBA1C 6.8 (H) 01/12/2022    TSH 2.371 07/13/2018     EKG 12/21/2021:    Vent. Rate : 076 BPM     Atrial Rate : 089 BPM      P-R Int : 288 ms          QRS Dur : 072 ms       QT Int : 360 ms       P-R-T Axes : 040 020 092 degrees      QTc Int : 405 ms     TTE 05/11/2021:    Sinus rhythm with marked sinus arrythmia with 1st degree A-V block   Low voltage QRS   Abnormal QRS-T angle, consider primary T wave abnormality   Abnormal ECG   When compared with ECG of 11-MAY-2021 09:10,   Significant changes have occurred     Summary    · Technically difficult study.  · The left ventricle is normal in size with concentric remodeling and normal systolic function.  · The estimated ejection fraction is 65%.  · Indeterminate left ventricular diastolic function.  · Normal right ventricular size with normal right ventricular systolic function.  · There is no pulmonary hypertension.    SPECT 05/11/2021:     Normal myocardial perfusion scan. There is no evidence of myocardial ischemia or infarction.    There is a mild to moderate intensity defect in the anterior wall of the left ventricle, secondary to breast attenuation.    The gated perfusion images showed an ejection fraction of 80% post stress.    There is normal wall motion post " stress.    The EKG portion of this study is negative for ischemia.    The patient reported no chest pain during the stress test.    Assessment:       1. Acute on chronic diastolic heart failure    2. HILLS (dyspnea on exertion)    3. Type II diabetes mellitus with peripheral circulatory disorder    4. Anemia due to stage 4 chronic kidney disease    5. Secondary hyperparathyroidism of renal origin    6. Body mass index (BMI) 45.0-49.9, adult    7. Need for immunization against influenza        Plan:   Ana was seen today for follow-up.    Diagnoses and all orders for this visit:    Acute on chronic diastolic heart failure  -     Ambulatory referral/consult to Cardiology; Future  -     CBC Auto Differential; Future  -     Comprehensive Metabolic Panel; Future  -     BNP; Future    See HPI. Feeling better with diuresis at ED, less LE swelling. BP fine.  Will f-up with Dr. Navarrete, Cardiology. Blood work stable as above. BNP normal. See  TTE and stress test above from last year.    HILLS (dyspnea on exertion)    See above.     Type II diabetes mellitus with peripheral circulatory disorder  -     Hemoglobin A1C; Future    Stable.    Anemia due to stage 4 chronic kidney disease    Secondary hyperparathyroidism of renal origin    Same, as per blood work.    Body mass index (BMI) 45.0-49.9, adult    Would need to work on lifestyle.    Other orders  -     Influenza (FLUAD) - Quadrivalent (Adjuvanted) *Preferred* (65+) (PF)      Follow up in about 3 months (around 4/12/2022) for f-p 3 months.    This note was created by combination of typed  and M-Modal dictation.  Transcription errors may be present.  If there are any questions, please contact me.

## 2022-01-19 ENCOUNTER — TELEPHONE (OUTPATIENT)
Dept: FAMILY MEDICINE | Facility: CLINIC | Age: 82
End: 2022-01-19
Payer: MEDICARE

## 2022-01-19 RX ORDER — ROBITUSSIN DM 10; 100 MG/5ML; MG/5ML
LIQUID ORAL
Status: CANCELLED | OUTPATIENT
Start: 2022-01-19

## 2022-01-19 NOTE — TELEPHONE ENCOUNTER
----- Message from Quincy Hawley MA sent at 1/19/2022  3:26 PM CST -----  Type: Patient Call Back    Who called:self    What is the request in detail:pt. Is asking for a prescription for bed pads .. she would like for a nurse to call her ..     Can the clinic reply by [facility]?no    Would the patient rather a call back or a response via [facility]? yes    Best call back number:[phone] (home)

## 2022-01-20 NOTE — TELEPHONE ENCOUNTER
Spoke to Sarah with [facility], pt has to order these supplies through the [facility] catalog, it is not covered if you send in order; pt informed and verbalized understanding

## 2022-01-21 ENCOUNTER — TELEPHONE (OUTPATIENT)
Dept: FAMILY MEDICINE | Facility: CLINIC | Age: 82
End: 2022-01-21
Payer: MEDICARE

## 2022-01-24 ENCOUNTER — HOSPITAL ENCOUNTER (OUTPATIENT)
Facility: HOSPITAL | Age: 82
Discharge: HOME | End: 2022-01-25
Attending: EMERGENCY MEDICINE | Admitting: INTERNAL MEDICINE
Payer: MEDICARE

## 2022-01-24 DIAGNOSIS — I48.91 ATRIAL FIBRILLATION, UNSPECIFIED TYPE: ICD-10-CM

## 2022-01-24 DIAGNOSIS — R07.9 CHEST PAIN: Primary | ICD-10-CM

## 2022-01-24 PROBLEM — R79.89 ELEVATED D-DIMER: Status: RESOLVED | Noted: 2020-01-30 | Resolved: 2022-01-24

## 2022-01-24 PROBLEM — E87.5 HYPERKALEMIA: Status: RESOLVED | Noted: 2020-02-27 | Resolved: 2022-01-24

## 2022-01-24 PROBLEM — N30.00 ACUTE CYSTITIS WITHOUT HEMATURIA: Status: RESOLVED | Noted: 2020-01-30 | Resolved: 2022-01-24

## 2022-01-24 PROBLEM — R07.89 CHEST PAIN, ATYPICAL: Status: RESOLVED | Noted: 2018-07-13 | Resolved: 2022-01-24

## 2022-01-24 PROBLEM — N18.4 CKD (CHRONIC KIDNEY DISEASE) STAGE 4, GFR 15-29 ML/MIN: Status: RESOLVED | Noted: 2020-02-27 | Resolved: 2021-01-27

## 2022-01-24 PROBLEM — N18.30 CONTROLLED TYPE 2 DIABETES MELLITUS WITH STAGE 3 CHRONIC KIDNEY DISEASE, WITH LONG-TERM CURRENT USE OF INSULIN: Chronic | Status: ACTIVE | Noted: 2018-07-11

## 2022-01-24 PROBLEM — I50.33 ACUTE ON CHRONIC DIASTOLIC HEART FAILURE: Status: RESOLVED | Noted: 2020-02-27 | Resolved: 2022-01-24

## 2022-01-24 LAB
ALBUMIN SERPL BCP-MCNC: 3.1 G/DL (ref 3.5–5.2)
ALP SERPL-CCNC: 85 U/L (ref 55–135)
ALT SERPL W/O P-5'-P-CCNC: 15 U/L (ref 10–44)
ANION GAP SERPL CALC-SCNC: 6 MMOL/L (ref 8–16)
AST SERPL-CCNC: 21 U/L (ref 10–40)
BASOPHILS # BLD AUTO: 0.02 K/UL (ref 0–0.2)
BASOPHILS NFR BLD: 0.4 % (ref 0–1.9)
BILIRUB SERPL-MCNC: 0.7 MG/DL (ref 0.1–1)
BNP SERPL-MCNC: 32 PG/ML (ref 0–99)
BUN SERPL-MCNC: 15 MG/DL (ref 8–23)
CALCIUM SERPL-MCNC: 9.4 MG/DL (ref 8.7–10.5)
CHLORIDE SERPL-SCNC: 108 MMOL/L (ref 95–110)
CO2 SERPL-SCNC: 25 MMOL/L (ref 23–29)
CREAT SERPL-MCNC: 1.4 MG/DL (ref 0.5–1.4)
CTP QC/QA: YES
DIFFERENTIAL METHOD: ABNORMAL
EOSINOPHIL # BLD AUTO: 0.1 K/UL (ref 0–0.5)
EOSINOPHIL NFR BLD: 2.4 % (ref 0–8)
ERYTHROCYTE [DISTWIDTH] IN BLOOD BY AUTOMATED COUNT: 14.6 % (ref 11.5–14.5)
EST. GFR  (AFRICAN AMERICAN): 40 ML/MIN/1.73 M^2
EST. GFR  (NON AFRICAN AMERICAN): 35 ML/MIN/1.73 M^2
GLUCOSE SERPL-MCNC: 85 MG/DL (ref 70–110)
HCT VFR BLD AUTO: 35.1 % (ref 37–48.5)
HGB BLD-MCNC: 11.2 G/DL (ref 12–16)
IMM GRANULOCYTES # BLD AUTO: 0.02 K/UL (ref 0–0.04)
IMM GRANULOCYTES NFR BLD AUTO: 0.4 % (ref 0–0.5)
LYMPHOCYTES # BLD AUTO: 1.7 K/UL (ref 1–4.8)
LYMPHOCYTES NFR BLD: 33.1 % (ref 18–48)
MCH RBC QN AUTO: 28.6 PG (ref 27–31)
MCHC RBC AUTO-ENTMCNC: 31.9 G/DL (ref 32–36)
MCV RBC AUTO: 90 FL (ref 82–98)
MONOCYTES # BLD AUTO: 0.9 K/UL (ref 0.3–1)
MONOCYTES NFR BLD: 17.4 % (ref 4–15)
NEUTROPHILS # BLD AUTO: 2.3 K/UL (ref 1.8–7.7)
NEUTROPHILS NFR BLD: 46.3 % (ref 38–73)
NRBC BLD-RTO: 0 /100 WBC
PLATELET # BLD AUTO: 209 K/UL (ref 150–450)
PMV BLD AUTO: 9.9 FL (ref 9.2–12.9)
POTASSIUM SERPL-SCNC: 4.1 MMOL/L (ref 3.5–5.1)
PROT SERPL-MCNC: 7.8 G/DL (ref 6–8.4)
RBC # BLD AUTO: 3.92 M/UL (ref 4–5.4)
SARS-COV-2 RDRP RESP QL NAA+PROBE: NEGATIVE
SODIUM SERPL-SCNC: 139 MMOL/L (ref 136–145)
TROPONIN I SERPL DL<=0.01 NG/ML-MCNC: 0.01 NG/ML (ref 0–0.03)
TROPONIN I SERPL DL<=0.01 NG/ML-MCNC: 0.01 NG/ML (ref 0–0.03)
TROPONIN I SERPL DL<=0.01 NG/ML-MCNC: 0.02 NG/ML (ref 0–0.03)
TROPONIN I SERPL DL<=0.01 NG/ML-MCNC: 0.02 NG/ML (ref 0–0.03)
WBC # BLD AUTO: 4.99 K/UL (ref 3.9–12.7)

## 2022-01-24 PROCEDURE — 93005 ELECTROCARDIOGRAM TRACING: CPT

## 2022-01-24 PROCEDURE — 83880 ASSAY OF NATRIURETIC PEPTIDE: CPT | Performed by: EMERGENCY MEDICINE

## 2022-01-24 PROCEDURE — 63600175 PHARM REV CODE 636 W HCPCS: Performed by: HOSPITALIST

## 2022-01-24 PROCEDURE — 84484 ASSAY OF TROPONIN QUANT: CPT | Mod: 91 | Performed by: HOSPITALIST

## 2022-01-24 PROCEDURE — G0378 HOSPITAL OBSERVATION PER HR: HCPCS

## 2022-01-24 PROCEDURE — 99285 EMERGENCY DEPT VISIT HI MDM: CPT | Mod: 25

## 2022-01-24 PROCEDURE — 80053 COMPREHEN METABOLIC PANEL: CPT | Performed by: EMERGENCY MEDICINE

## 2022-01-24 PROCEDURE — 93010 ELECTROCARDIOGRAM REPORT: CPT | Mod: ,,, | Performed by: INTERNAL MEDICINE

## 2022-01-24 PROCEDURE — 96372 THER/PROPH/DIAG INJ SC/IM: CPT

## 2022-01-24 PROCEDURE — 84484 ASSAY OF TROPONIN QUANT: CPT | Performed by: EMERGENCY MEDICINE

## 2022-01-24 PROCEDURE — 85025 COMPLETE CBC W/AUTO DIFF WBC: CPT | Performed by: EMERGENCY MEDICINE

## 2022-01-24 PROCEDURE — U0002 COVID-19 LAB TEST NON-CDC: HCPCS | Performed by: EMERGENCY MEDICINE

## 2022-01-24 PROCEDURE — 93010 EKG 12-LEAD: ICD-10-PCS | Mod: ,,, | Performed by: INTERNAL MEDICINE

## 2022-01-24 PROCEDURE — 25000003 PHARM REV CODE 250: Performed by: HOSPITALIST

## 2022-01-24 RX ORDER — IBUPROFEN 200 MG
16 TABLET ORAL
Status: DISCONTINUED | OUTPATIENT
Start: 2022-01-24 | End: 2022-01-25 | Stop reason: HOSPADM

## 2022-01-24 RX ORDER — SIMETHICONE 80 MG
1 TABLET,CHEWABLE ORAL 4 TIMES DAILY PRN
Status: DISCONTINUED | OUTPATIENT
Start: 2022-01-24 | End: 2022-01-25 | Stop reason: HOSPADM

## 2022-01-24 RX ORDER — NALOXONE HCL 0.4 MG/ML
0.02 VIAL (ML) INJECTION
Status: DISCONTINUED | OUTPATIENT
Start: 2022-01-24 | End: 2022-01-25 | Stop reason: HOSPADM

## 2022-01-24 RX ORDER — OXYBUTYNIN CHLORIDE 5 MG/1
15 TABLET, EXTENDED RELEASE ORAL DAILY
Status: DISCONTINUED | OUTPATIENT
Start: 2022-01-25 | End: 2022-01-25 | Stop reason: HOSPADM

## 2022-01-24 RX ORDER — ASPIRIN 81 MG/1
81 TABLET ORAL DAILY
Status: DISCONTINUED | OUTPATIENT
Start: 2022-01-25 | End: 2022-01-25 | Stop reason: HOSPADM

## 2022-01-24 RX ORDER — AMLODIPINE BESYLATE 5 MG/1
10 TABLET ORAL DAILY
Status: DISCONTINUED | OUTPATIENT
Start: 2022-01-25 | End: 2022-01-25 | Stop reason: HOSPADM

## 2022-01-24 RX ORDER — TALC
6 POWDER (GRAM) TOPICAL NIGHTLY PRN
Status: DISCONTINUED | OUTPATIENT
Start: 2022-01-24 | End: 2022-01-25 | Stop reason: HOSPADM

## 2022-01-24 RX ORDER — CILOSTAZOL 50 MG/1
50 TABLET ORAL 2 TIMES DAILY
Status: DISCONTINUED | OUTPATIENT
Start: 2022-01-24 | End: 2022-01-25 | Stop reason: HOSPADM

## 2022-01-24 RX ORDER — IPRATROPIUM BROMIDE AND ALBUTEROL SULFATE 2.5; .5 MG/3ML; MG/3ML
3 SOLUTION RESPIRATORY (INHALATION) EVERY 4 HOURS PRN
Status: DISCONTINUED | OUTPATIENT
Start: 2022-01-24 | End: 2022-01-25 | Stop reason: HOSPADM

## 2022-01-24 RX ORDER — PROCHLORPERAZINE EDISYLATE 5 MG/ML
5 INJECTION INTRAMUSCULAR; INTRAVENOUS EVERY 6 HOURS PRN
Status: DISCONTINUED | OUTPATIENT
Start: 2022-01-24 | End: 2022-01-25 | Stop reason: HOSPADM

## 2022-01-24 RX ORDER — ASPIRIN 325 MG
325 TABLET ORAL
Status: ACTIVE | OUTPATIENT
Start: 2022-01-24 | End: 2022-01-24

## 2022-01-24 RX ORDER — GLUCAGON 1 MG
1 KIT INJECTION
Status: DISCONTINUED | OUTPATIENT
Start: 2022-01-24 | End: 2022-01-25 | Stop reason: HOSPADM

## 2022-01-24 RX ORDER — POLYETHYLENE GLYCOL 3350 17 G/17G
17 POWDER, FOR SOLUTION ORAL DAILY
Status: DISCONTINUED | OUTPATIENT
Start: 2022-01-24 | End: 2022-01-25 | Stop reason: HOSPADM

## 2022-01-24 RX ORDER — ATORVASTATIN CALCIUM 10 MG/1
20 TABLET, FILM COATED ORAL DAILY
Status: DISCONTINUED | OUTPATIENT
Start: 2022-01-25 | End: 2022-01-25 | Stop reason: HOSPADM

## 2022-01-24 RX ORDER — TRAZODONE HYDROCHLORIDE 50 MG/1
50 TABLET ORAL NIGHTLY
Status: DISCONTINUED | OUTPATIENT
Start: 2022-01-24 | End: 2022-01-25 | Stop reason: HOSPADM

## 2022-01-24 RX ORDER — VALSARTAN 80 MG/1
320 TABLET ORAL DAILY
Status: DISCONTINUED | OUTPATIENT
Start: 2022-01-25 | End: 2022-01-25 | Stop reason: HOSPADM

## 2022-01-24 RX ORDER — IBUPROFEN 200 MG
24 TABLET ORAL
Status: DISCONTINUED | OUTPATIENT
Start: 2022-01-24 | End: 2022-01-25 | Stop reason: HOSPADM

## 2022-01-24 RX ORDER — HYDROCHLOROTHIAZIDE 12.5 MG/1
12.5 TABLET ORAL DAILY
Status: DISCONTINUED | OUTPATIENT
Start: 2022-01-25 | End: 2022-01-25 | Stop reason: HOSPADM

## 2022-01-24 RX ORDER — CARVEDILOL 12.5 MG/1
12.5 TABLET ORAL 2 TIMES DAILY
Status: DISCONTINUED | OUTPATIENT
Start: 2022-01-24 | End: 2022-01-25 | Stop reason: HOSPADM

## 2022-01-24 RX ORDER — ACETAMINOPHEN 325 MG/1
650 TABLET ORAL EVERY 6 HOURS PRN
Status: DISCONTINUED | OUTPATIENT
Start: 2022-01-24 | End: 2022-01-25 | Stop reason: HOSPADM

## 2022-01-24 RX ORDER — ONDANSETRON 2 MG/ML
4 INJECTION INTRAMUSCULAR; INTRAVENOUS EVERY 8 HOURS PRN
Status: DISCONTINUED | OUTPATIENT
Start: 2022-01-24 | End: 2022-01-25 | Stop reason: HOSPADM

## 2022-01-24 RX ORDER — ENOXAPARIN SODIUM 100 MG/ML
1 INJECTION SUBCUTANEOUS ONCE
Status: COMPLETED | OUTPATIENT
Start: 2022-01-24 | End: 2022-01-24

## 2022-01-24 RX ORDER — SODIUM CHLORIDE 0.9 % (FLUSH) 0.9 %
10 SYRINGE (ML) INJECTION EVERY 8 HOURS PRN
Status: DISCONTINUED | OUTPATIENT
Start: 2022-01-24 | End: 2022-01-25 | Stop reason: HOSPADM

## 2022-01-24 RX ORDER — MAG HYDROX/ALUMINUM HYD/SIMETH 200-200-20
30 SUSPENSION, ORAL (FINAL DOSE FORM) ORAL 4 TIMES DAILY PRN
Status: DISCONTINUED | OUTPATIENT
Start: 2022-01-24 | End: 2022-01-25 | Stop reason: HOSPADM

## 2022-01-24 RX ADMIN — TRAZODONE HYDROCHLORIDE 50 MG: 50 TABLET ORAL at 09:01

## 2022-01-24 RX ADMIN — CARVEDILOL 12.5 MG: 12.5 TABLET, FILM COATED ORAL at 09:01

## 2022-01-24 RX ADMIN — CILOSTAZOL 50 MG: 50 TABLET ORAL at 09:01

## 2022-01-24 RX ADMIN — ENOXAPARIN SODIUM 100 MG: 100 INJECTION SUBCUTANEOUS at 11:01

## 2022-01-24 NOTE — ASSESSMENT & PLAN NOTE
Pain since yesterday without evidence of acute ischemia on EKG and negative troponin.  -monitor on tele  -obtain serial troponin  -check TSH, lipid panel  -check echo  -continue home regimen of ASA/statin/ARB/BBl  -Cardiology consult  -NPO p MN

## 2022-01-24 NOTE — HPI
81 y.o. female with HTN, obesity, PVD, DM2, physical debility, HLD, chronic diastolic heart failure, and anemia of chronic disease presents with a complaint of chest pain.  Pain is acute onset yesterday evening, located left chest, radiates down left arm, has been intermittent, associated with orthopnea, exacerbated by laying flat.  Denies fever, chills, palpitation, dizziness, syncope, n/v/d, abdominal pain, or dysuria.  Her Cardiologist is Dr. Navarrete.  In the ED, she was found to be in a fib, presumed new onset.  EKG without evidence of acute ischemia, initial troponin negative, otherwise unremarkable for acute abnormality.  Placed in observation for Cardiology evaluation.

## 2022-01-24 NOTE — ED PROVIDER NOTES
"Encounter Date: 1/24/2022    SCRIBE #1 NOTE: I, Bony Lovett, am scribing for, and in the presence of,  Maribel Verde MD. I have scribed the following portions of the note - Other sections scribed: HPI, ROS, PE, MDM.       History     Chief Complaint   Patient presents with    Chest Pain     EMS called to 82yo female that has intermittent left side chest pain that radiates down her left arm to hand since last night. Reports some SOB and a dry cough.  Denies any N/V/D     Anabrandon James is a 81 y.o. female with a PMHx of DM II, HTN, and diastolic heart failure (EF 65%) who presents to the ED for left-sided chest pain. Patient reports this began last night and is intermittent. Pain radiates to her left shoulder and occasionally down the left arm. She states the pain feels like an "elephant sitting on her chest." She notes being unable to lie flat and having to elevate her head with pillows in bed, but she reports this is her normal. Patient reports that lying flat triggers the onset of pain and walking to the restroom sometimes worsens her pain. She notes the chest pain sometimes makes her feel short of breath and "hot." She endorses associated light-headedness this morning, cough, and chronic leg swelling.  Patient is complaint with Coreg, Candesartan-Hydrochlorothiazide, Amlodipine, Atorvastatin, Lasix. She admits noncompliance with all medications this morning. Patient reports her pain has "eased up" some currently. Pt has 3 COVID-19 vaccinations. PSHx cardiac catheterization, cholecystectomy. Denies fever, nausea, emesis, abdominal pain, recent illness. Denies tobacco, EtOH or recreational drug use. No other exacerbating or alleviating factors. No other associated symptoms. Patient states she normally gets around with a walker at home.     Pt notes she was in the ED 1 month ago for "fluid around her lungs."       The history is provided by the patient.     Review of patient's allergies indicates:   Allergen " Reactions    Adhesive Rash     Paper tape     Past Medical History:   Diagnosis Date    Arthritis lower extremities    Asthma     Blind left eye     Diabetes mellitus     Edema of both lower legs     CHRONIC    Gall stones     Hypertension     Kidney stones     Nephrolithiasis 1/22/2016    Obesity     PVD (peripheral vascular disease)     Renal disorder     Retinopathy     Sleep apnea     Vaginal delivery     x1    Weakness of both lower extremities     uses a cane, rollator & power chair     Past Surgical History:   Procedure Laterality Date    CARDIAC CATHETERIZATION      cataract      CHOLECYSTECTOMY      EYE SURGERY      Rt cataract surgery    HYSTERECTOMY      URETERAL STENT PLACEMENT       Family History   Problem Relation Age of Onset    Kidney failure Mother     Hypertension Father     Diabetes Brother     Cancer Sister      Social History     Tobacco Use    Smoking status: Never Smoker    Smokeless tobacco: Never Used   Substance Use Topics    Alcohol use: No    Drug use: No     Review of Systems   Constitutional: Negative for chills, diaphoresis and fever.   Eyes: Negative for photophobia and visual disturbance.   Respiratory: Positive for cough. Negative for shortness of breath.    Cardiovascular: Positive for chest pain and leg swelling (chronic).   Gastrointestinal: Negative for abdominal pain, blood in stool, constipation, diarrhea, nausea and vomiting.   Genitourinary: Negative for dysuria, flank pain, frequency, hematuria and urgency.   Musculoskeletal: Negative for neck pain and neck stiffness.   Skin: Negative for rash and wound.   Neurological: Positive for light-headedness. Negative for weakness, numbness and headaches.   Psychiatric/Behavioral: Negative for confusion and suicidal ideas.   All other systems reviewed and are negative.      Physical Exam     Initial Vitals [01/24/22 1019]   BP Pulse Resp Temp SpO2   (!) 190/92 75 18 98.2 °F (36.8 °C) 97 %      MAP        --         Physical Exam    Nursing note and vitals reviewed.  Constitutional: She appears well-developed and well-nourished. She is not diaphoretic. No distress.   HENT:   Head: Normocephalic and atraumatic.   Mouth/Throat: Oropharynx is clear and moist. No oropharyngeal exudate.   Eyes: Conjunctivae and EOM are normal. Pupils are equal, round, and reactive to light. Right eye exhibits no discharge. Left eye exhibits no discharge.   Patient has left eye opacity.   Neck: Neck supple. No JVD present.   Normal range of motion.  Cardiovascular: Normal rate, normal heart sounds and intact distal pulses. Exam reveals no gallop and no friction rub.    No murmur heard.  Irregularly irregular rhythm is in the 90s.    Pulmonary/Chest: Breath sounds normal. No respiratory distress. She has no wheezes. She has no rhonchi. She has no rales. She exhibits no tenderness.   Lungs are clear.    Abdominal: Abdomen is soft. Bowel sounds are normal. She exhibits no distension. There is no abdominal tenderness. There is no rebound and no guarding.   Musculoskeletal:         General: No tenderness.      Cervical back: Normal range of motion and neck supple.      Right lower le+ Pitting Edema (mostly chronic) present.      Left lower le+ Pitting Edema (mostly chornic) present.      Comments: There appears to be some pitting 1+ edema on top of chronic lower extremity edema.      Lymphadenopathy:     She has no cervical adenopathy.   Neurological: She is alert and oriented to person, place, and time. No cranial nerve deficit.   Skin: Skin is warm and dry. Capillary refill takes less than 2 seconds.   Psychiatric: She has a normal mood and affect. Thought content normal.         ED Course   Procedures  Labs Reviewed   CBC W/ AUTO DIFFERENTIAL - Abnormal; Notable for the following components:       Result Value    RBC 3.92 (*)     Hemoglobin 11.2 (*)     Hematocrit 35.1 (*)     MCHC 31.9 (*)     RDW 14.6 (*)     Mono % 17.4 (*)      "All other components within normal limits   COMPREHENSIVE METABOLIC PANEL - Abnormal; Notable for the following components:    Albumin 3.1 (*)     Anion Gap 6 (*)     eGFR if  40 (*)     eGFR if non  35 (*)     All other components within normal limits   TROPONIN I   TROPONIN I   B-TYPE NATRIURETIC PEPTIDE   TROPONIN I   TROPONIN I   SARS-COV-2 RDRP GENE          Imaging Results          X-Ray Chest AP Portable (Final result)  Result time 01/24/22 11:55:36    Final result by Colt Treviño MD (01/24/22 11:55:36)                 Impression:      No acute process or detrimental change when compared with 12/20/2021.      Electronically signed by: Colt Treviño MD  Date:    01/24/2022  Time:    11:55             Narrative:    EXAMINATION:  XR CHEST AP PORTABLE    CLINICAL HISTORY:  Provided history is "Chest Pain;  ".    TECHNIQUE:  One view of the chest.    COMPARISON:  12/20/2021.    FINDINGS:  Cardiac wires overlie the chest.  Lung volumes are low.  Cardiomediastinal silhouette is magnified by portable technique and appears similar to the prior study.  No large focal consolidation.  No sizable pleural effusion.  No pneumothorax.                                 Medications   aspirin tablet 325 mg (325 mg Oral Not Given 1/24/22 1100)   amLODIPine tablet 10 mg (has no administration in time range)   aspirin EC tablet 81 mg (has no administration in time range)   atorvastatin tablet 20 mg (has no administration in time range)   carvediloL tablet 12.5 mg (12.5 mg Oral Given 1/24/22 2121)   cilostazoL tablet 50 mg (50 mg Oral Given 1/24/22 2123)   valsartan tablet 320 mg (has no administration in time range)   hydroCHLOROthiazide tablet 12.5 mg (has no administration in time range)   oxybutynin 24 hr tablet 15 mg (has no administration in time range)   traZODone tablet 50 mg (50 mg Oral Given 1/24/22 2122)   sodium chloride 0.9% flush 10 mL (has no administration in time range) "   polyethylene glycol packet 17 g (17 g Oral Not Given 1/24/22 1600)   ondansetron injection 4 mg (has no administration in time range)   prochlorperazine injection Soln 5 mg (has no administration in time range)   glucose chewable tablet 16 g (has no administration in time range)   glucose chewable tablet 24 g (has no administration in time range)   dextrose 50% injection 12.5 g (has no administration in time range)   dextrose 50% injection 25 g (has no administration in time range)   glucagon (human recombinant) injection 1 mg (has no administration in time range)   albuterol-ipratropium 2.5 mg-0.5 mg/3 mL nebulizer solution 3 mL (has no administration in time range)   acetaminophen tablet 650 mg (has no administration in time range)   melatonin tablet 6 mg (has no administration in time range)   simethicone chewable tablet 80 mg (has no administration in time range)   aluminum-magnesium hydroxide-simethicone 200-200-20 mg/5 mL suspension 30 mL (has no administration in time range)   naloxone 0.4 mg/mL injection 0.02 mg (has no administration in time range)   enoxaparin injection 100 mg (100 mg Subcutaneous Given 1/24/22 2328)     Medical Decision Making:   History:   Old Medical Records: I decided to obtain old medical records.  Old Records Summarized: other records.       <> Summary of Records: 12/20/2021 patient was seen in the ED for similar presentation of symptoms including cough, shortness of breath and leg swelling. Pt treated with Lasix x1 in the ED and was treated with oral medication over the next couple of days. Pt was advised to follow-up with her regular doctor/Cardiology in the next week.  Independently Interpreted Test(s):   I have ordered and independently interpreted EKG Reading(s) - see summary below  Clinical Tests:   Lab Tests: Reviewed and Ordered  Radiological Study: Reviewed and Ordered  Medical Tests: Reviewed and Ordered    Additional MDM:   Heart Score:    History:          Moderately  suspicious.  ECG:             Nonspecific repolarisation disturbance  Age:               >65 years  Risk factors: >= 3 risk factors or history of atherosclerotic disease  Troponin:       Less than or equal to normal limit  Final Score: 6      MDM  81 year old patient with hx of HTN, DM, HLD, PVF HFpEF presenting secondary to chest pain. Patient is at higher risk of ACS due to risk factors and HPI with a heart score of 6. Patient has received an aspirin. Troponins, Cxr, ekg, and labs ordered which showed no acute findings to describe pain except possible new onset afib.     Also considered but less likely:     PE: normal rate, no sob/recent immovilization/surgery/travel/family history  Pneumonia: chest xray negative. No fever or leukoscytosis. No cough and lungs non consistent with pna  Tamponade: unlikely due to chest xray and ekg  STEMI: No STEMI on ekg  Dissection: equal pulses bilaterally and no ripping chest pain to the back  Esophageal rupture: no dysphagia or vomiting and chest xray negative for mediastinal air  Arrhythmia: no arrhythmia on ekg  CHF: no fluid overload on Cxr and physical exam  Pneumothorax: bilateral breath sounds and no signs of pneumothorax on chest xray    Patient also noted to be in afib, although possible self-report of h/o afib in 2020 (patient was not in afib at that time) I do not see any history of afib in her chart, including in her most recent cardiology note 6/2021.     Discussed patient with Dimas who agrees to place patient in observation for trending troponin, echo and cardiology eval re: new onset afib. Patient is not currently on AC, is currently rate controlled and patient takes coreg at home. Patient understands and agrees with plan.  All questions answered.        Scribe Attestation:   Scribe #1: I performed the above scribed service and the documentation accurately describes the services I performed. I attest to the accuracy of the note.        ED Course as of 01/24/22 1748    Mon Jan 24, 2022   1220 EKG 12-lead  AFib at 83 no significant ST elevation or depression.  T-wave flattening in 1 and T-wave inversions in aVL.  Normal axis.  Low voltage.  QTC is 401.  [JT]      ED Course User Index  [JT] Maribel Verde MD             Clinical Impression:   Final diagnoses:  [R07.9] Chest pain (Primary)  [I48.91] Atrial fibrillation, unspecified type          ED Disposition Condition    Observation           I, Maribel Verde, personally performed the services described in this documentation. All medical record entries made by the scribe were at my direction and in my presence. I have reviewed the chart and agree that the record reflects my personal performance and is accurate and complete.       Maribel Verde MD  01/24/22 3349

## 2022-01-24 NOTE — ASSESSMENT & PLAN NOTE
Body mass index is 42.07 kg/m². Morbid obesity complicates all aspects of disease management from diagnostic modalities to treatment. Weight loss encouraged and health benefits explained to patient.

## 2022-01-24 NOTE — ASSESSMENT & PLAN NOTE
Last HgbA1c   Lab Results   Component Value Date    HGBA1C 6.8 (H) 01/12/2022     Hold oral antihyperglycemics while inpatient  PRN sliding scale insulin  ACHS glucose monitoring   ADA diet

## 2022-01-24 NOTE — PHARMACY MED REC
"  Admission Medication History     The home medication history was taken by Velia Lott CPhT.      You may go to "Admission" then "Reconcile Home Medications" tabs to review and/or act upon these items.      The home medication list has been updated by the Pharmacy department.    Please read ALL comments highlighted in yellow.    Please address this information as you see fit.     Feel free to contact us if you have any questions or require assistance.      The medications listed below were removed from the home medication list. Please reorder if appropriate:  Patient reports no longer taking the following medication(s):   [place]   Mycostatin   Lasix   Travatan Z 0.004%   Potassium Citrate    Esomeprazole   Orphenadrine-ASA-Caffiene   Triamcinolone    Patient verified medications at the bedside and stated that she was taking Cilostazal 50 mg but it was found by checking her EMR that it was discontinued on 1/18/22 by ERNIE Bean MD.      Velia Lott CPhT.  [phone]                .          "

## 2022-01-24 NOTE — ED NOTES
Report received from ALEXANDRA Alaniz. Pt a&ox3, calm and cooperative, endorses intermittent chest pain. Respirations even/unlabored, NAD noted, sinus on cardiac monitor.

## 2022-01-24 NOTE — ASSESSMENT & PLAN NOTE
Patient with Paroxysmal (<7 days) atrial fibrillation which is controlled currently with Beta Blocker. Patient is currently in atrial fibrillation.[id number] Score: 4. Anticoagulation indicated. Anticoagulation done with full dose enoxaparin for now pending Cardiology.

## 2022-01-24 NOTE — H&P
[facility] Medicine  History & Physical    Patient Name: Ana James  MRN: [id number]  Patient Class: OP- Observation  Admission Date: 1/24/2022  Attending Physician: Darin Garcia MD   Primary Care Provider: Viky Bean MD         Patient information was obtained from patient, past medical records and ER records.     Subjective:     Principal Problem:Chest pain    Chief Complaint:   Chief Complaint   Patient presents with    Chest Pain     EMS called to 82yo female that has intermittent left side chest pain that radiates down her left arm to hand since last night. Reports some SOB and a dry cough.  Denies any N/V/D        HPI: 81 y.o. female with HTN, obesity, PVD, DM2, physical debility, HLD, chronic diastolic heart failure, and anemia of chronic disease presents with a complaint of chest pain.  Pain is acute onset yesterday evening, located left chest, radiates down left arm, has been intermittent, associated with orthopnea, exacerbated by laying flat.  Denies fever, chills, palpitation, dizziness, syncope, n/v/d, abdominal pain, or dysuria.  Her Cardiologist is Dr. Navarrete.  In the ED, she was found to be in a fib, presumed new onset.  EKG without evidence of acute ischemia, initial troponin negative, otherwise unremarkable for acute abnormality.  Placed in observation for Cardiology evaluation.      Past Medical History:   Diagnosis Date    Arthritis lower extremities    Asthma     Blind left eye     Diabetes mellitus     Edema of both lower legs     CHRONIC    Gall stones     Hypertension     Kidney stones     Nephrolithiasis 1/22/2016    Obesity     PVD (peripheral vascular disease)     Renal disorder     Retinopathy     Sleep apnea     Vaginal delivery     x1    Weakness of both lower extremities     uses a cane, rollator & power chair       Past Surgical History:   Procedure Laterality Date    CARDIAC CATHETERIZATION      cataract       CHOLECYSTECTOMY      EYE SURGERY      Rt cataract surgery    HYSTERECTOMY      URETERAL STENT PLACEMENT         Review of patient's allergies indicates:   Allergen Reactions    Adhesive Rash     Paper tape       No current facility-administered medications on file prior to encounter.     Current Outpatient Medications on File Prior to Encounter   Medication Sig    albuterol (PROVENTIL/VENTOLIN HFA) 90 mcg/actuation inhaler INHALE 2 PUFFS INTO THE LUNGS EVERY 6 (SIX) HOURS AS NEEDED FOR WHEEZING. RESCUE    amLODIPine (NORVASC) 10 MG tablet TAKE 1 TABLET BY MOUTH EVERY DAY    aspirin (ECOTRIN) 81 MG EC tablet Take 81 mg by mouth nightly.    atorvastatin (LIPITOR) 20 MG tablet TAKE 1 TABLET BY MOUTH EVERY DAY    blood sugar diagnostic Strp Test 3 times daily    blood-glucose meter,continuous (DEXCOM G6 ) Misc Test glucose twice daily    blood-glucose transmitter (DEXCOM G5 TRANSMITTER) Lea Test glucose twice daily    candesartan-hydrochlorothiazid (ATACAND HCT) 32-12.5 mg per tablet Take 1 tablet by mouth once daily.    carvediloL (COREG) 12.5 MG tablet Take 1 tablet (12.5 mg total) by mouth 2 (two) times daily.    latanoprost 0.005 % ophthalmic solution Place 1 drop into both eyes every evening.    LEVEMIR FLEXTOUCH U-100 INSULN 100 unit/mL (3 mL) InPn pen INJECT 32 UNITS INTO THE SKIN EVERY EVENING. (Patient taking differently: Inject 26 Units into the skin every evening.)    oxybutynin (DITROPAN XL) 15 MG TR24 TAKE 1 TABLET BY MOUTH EVERY DAY    timolol maleate 0.5% (TIMOPTIC) 0.5 % Drop Place 1 drop into both eyes every morning.    TRADJENTA 5 mg Tab tablet TAKE 1 TABLET BY MOUTH EVERY DAY    traZODone (DESYREL) 50 MG tablet TAKE 1 TABLET (50 MG TOTAL) BY MOUTH EVERY EVENING.    cilostazoL (PLETAL) 50 MG Tab TAKE 1 TABLET BY MOUTH TWICE A DAY    lancets Misc Test 3 times daily    lancets Misc To check BG 3 times daily, to use with insurance preferred meter    pen needle, diabetic (BD  "ULTRA-FINE BREE PEN NEEDLE) 32 gauge x 5/32" Ndle INJECT INSULIN THREE TIMES DAILY    [DISCONTINUED] blood sugar diagnostic Strp To check BG 3 times daily, to use with insurance preferred meter    [DISCONTINUED] blood-glucose meter kit To check BG 3 times daily, to use with insurance preferred meter    [DISCONTINUED] blood-glucose meter,continuous (DEXCOM G6 ) Misc Test glucose twice daily    [DISCONTINUED] blood-glucose sensor (DEXCOM G6 SENSOR) Lea Test glucose twice daily    [DISCONTINUED] blood-glucose transmitter (DEXCOM G6 TRANSMITTER) Lea Test glucose twice daily    [DISCONTINUED] ciclopirox (PENLAC) 8 % Soln Apply topically nightly.    [DISCONTINUED] esomeprazole (NEXIUM) 40 MG capsule TAKE 1 CAPSULE BY MOUTH DAILY BEFORE BREAKFAST.    [DISCONTINUED] furosemide (LASIX) 20 MG tablet Take 2 tablets (40 mg total) by mouth once daily. for 3 days    [DISCONTINUED] nystatin (MYCOSTATIN) powder Apply topically 3 (three) times daily.    [DISCONTINUED] orphenadrine-ASA-caffeine -60 mg Tab CRUSH 1 TABLET AND COMBINE WITH UP TO 4 GRAMS (8 PUMPS) OF LIDOCAINE 5%. APPLY TO AFFECTED AREAS 2 TIMES DAILY.    [DISCONTINUED] potassium citrate (UROCIT-K 15) 15 mEq TbSR Take 15 mEq by mouth once daily.    [DISCONTINUED] TRAVATAN Z 0.004 % ophthalmic solution 1 drop every evening.     [DISCONTINUED] triamcinolone acetonide 0.1% (KENALOG) 0.1 % cream Apply topically 3 (three) times daily. for 10 days (Patient not taking: Reported on 6/18/2021)     Family History     Problem Relation (Age of Onset)    Cancer Sister    Diabetes Brother    Hypertension Father    Kidney failure Mother        Tobacco Use    Smoking status: Never Smoker    Smokeless tobacco: Never Used   Substance and Sexual Activity    Alcohol use: No    Drug use: No    Sexual activity: Not Currently     Partners: Male     Review of Systems   Constitutional: Negative for chills, fatigue and fever.   Eyes: Negative for photophobia and " visual disturbance.   Respiratory: Positive for shortness of breath. Negative for cough.    Cardiovascular: Positive for chest pain and leg swelling. Negative for palpitations.   Gastrointestinal: Negative for abdominal pain, diarrhea, nausea and vomiting.   Genitourinary: Negative for dysuria, frequency and urgency.   Skin: Negative for pallor, rash and wound.   Neurological: Negative for light-headedness and headaches.   Psychiatric/Behavioral: Negative for confusion and decreased concentration.     Objective:     Vital Signs (Most Recent):  Temp: 98.2 °F (36.8 °C) (01/24/22 1019)  Pulse: 72 (01/24/22 1519)  Resp: 20 (01/24/22 1519)  BP: (!) 180/75 (01/24/22 1519)  SpO2: 95 % (01/24/22 1519) Vital Signs (24h Range):  Temp:  [98.2 °F (36.8 °C)] 98.2 °F (36.8 °C)  Pulse:  [72-92] 72  Resp:  [18-21] 20  SpO2:  [95 %-98 %] 95 %  BP: (146-190)/(75-92) 180/75     Weight: 104.3 kg (230 lb)  Body mass index is 42.07 kg/m².    Physical Exam  Vitals and nursing note reviewed.   Constitutional:       General: She is not in acute distress.     Appearance: She is well-developed and well-nourished. She is obese.   HENT:      Head: Normocephalic and atraumatic.      Right Ear: External ear normal.      Left Ear: External ear normal.      Nose: Nose normal.      Mouth/Throat:      Mouth: Oropharynx is clear and moist.   Eyes:      Extraocular Movements: EOM normal.      Conjunctiva/sclera: Conjunctivae normal.      Pupils: Pupils are equal, round, and reactive to light.   Cardiovascular:      Rate and Rhythm: Normal rate. Rhythm irregularly irregular.      Pulses: Intact distal pulses.   Pulmonary:      Effort: Pulmonary effort is normal. No respiratory distress.      Breath sounds: Examination of the right-lower field reveals decreased breath sounds. Examination of the left-lower field reveals decreased breath sounds. Decreased breath sounds present. No wheezing.   Abdominal:      General: Bowel sounds are normal. There is no  distension.      Palpations: Abdomen is soft.      Tenderness: There is no abdominal tenderness.      Comments: No palpable hepatomegaly or splenomegaly    Musculoskeletal:         General: No tenderness. Normal range of motion.      Cervical back: Normal range of motion and neck supple.      Right lower leg: Edema present.      Left lower leg: Edema present.   Skin:     General: Skin is warm and dry.   Neurological:      Mental Status: She is alert and oriented to person, place, and time.   Psychiatric:         Mood and Affect: Mood and affect normal.         Thought Content: Thought content normal.           CRANIAL NERVES     CN III, IV, VI   Pupils are equal, round, and reactive to light.  Extraocular motions are normal.        Significant Labs: All pertinent labs within the past 24 hours have been reviewed.    Significant Imaging: I have reviewed all pertinent imaging results/findings within the past 24 hours.    Assessment/Plan:     * Chest pain  Pain since yesterday without evidence of acute ischemia on EKG and negative troponin.  -monitor on tele  -obtain serial troponin  -check TSH, lipid panel  -check echo  -continue home regimen of ASA/statin/ARB/BBl  -Cardiology consult  -NPO p MN    Atrial fibrillation  Patient with Paroxysmal (<7 days) atrial fibrillation which is controlled currently with Beta Blocker. Patient is currently in atrial fibrillation.[id number] Score: 4. Anticoagulation indicated. Anticoagulation done with full dose enoxaparin for now pending Cardiology.        Anemia of chronic disease  Patient H/H stable and consistent with baseline. No evidence acute bleeding or indication for transfusion at this time.      Chronic diastolic heart failure  No evidence of acute decompensation or fluid overload from baseline, continue home regimen    Hyperlipidemia  Continue statin    Physical debility  At baseline    Controlled type 2 diabetes mellitus with stage 3 chronic kidney disease, with long-term  current use of insulin  Last HgbA1c   Lab Results   Component Value Date    HGBA1C 6.8 (H) 01/12/2022     Hold oral antihyperglycemics while inpatient  PRN sliding scale insulin  ACHS glucose monitoring   ADA diet     Peripheral vascular disease, unspecified  Continue home regimen of ASA/statin/cilostazol    Morbid obesity with BMI of 45.0-49.9, adult  Body mass index is 42.07 kg/m². Morbid obesity complicates all aspects of disease management from diagnostic modalities to treatment. Weight loss encouraged and health benefits explained to patient.         Essential hypertension  Well controlled, continue home medications and monitor blood pressure, adjust as needed.       VTE Risk Mitigation (From admission, onward)         Ordered     Reason for No Pharmacological VTE Prophylaxis  Once        Question:  Reasons:  Answer:  Already adequately anticoagulated on oral Anticoagulants    01/24/22 1547     IP VTE HIGH RISK PATIENT  Once         01/24/22 1547     Place sequential compression device  Until discontinued         01/24/22 1547               As clarification, on 01/24/2022, patient should be placed in hospital observation services under my care in collaboration with MD Dimas Harmon Jr., APRN, AGAAMRTHAP-BC  Hospitalist - Department of Hospital Medicine  [facility] [address] [place] [address]  Office #: [phone]; Pager #: [phone]

## 2022-01-24 NOTE — SUBJECTIVE & OBJECTIVE
Past Medical History:   Diagnosis Date    Arthritis lower extremities    Asthma     Blind left eye     Diabetes mellitus     Edema of both lower legs     CHRONIC    Gall stones     Hypertension     Kidney stones     Nephrolithiasis 1/22/2016    Obesity     PVD (peripheral vascular disease)     Renal disorder     Retinopathy     Sleep apnea     Vaginal delivery     x1    Weakness of both lower extremities     uses a cane, rollator & power chair       Past Surgical History:   Procedure Laterality Date    CARDIAC CATHETERIZATION      cataract      CHOLECYSTECTOMY      EYE SURGERY      Rt cataract surgery    HYSTERECTOMY      URETERAL STENT PLACEMENT         Review of patient's allergies indicates:   Allergen Reactions    Adhesive Rash     Paper tape       No current facility-administered medications on file prior to encounter.     Current Outpatient Medications on File Prior to Encounter   Medication Sig    albuterol (PROVENTIL/VENTOLIN HFA) 90 mcg/actuation inhaler INHALE 2 PUFFS INTO THE LUNGS EVERY 6 (SIX) HOURS AS NEEDED FOR WHEEZING. RESCUE    amLODIPine (NORVASC) 10 MG tablet TAKE 1 TABLET BY MOUTH EVERY DAY    aspirin (ECOTRIN) 81 MG EC tablet Take 81 mg by mouth nightly.    atorvastatin (LIPITOR) 20 MG tablet TAKE 1 TABLET BY MOUTH EVERY DAY    blood sugar diagnostic Strp Test 3 times daily    blood-glucose meter,continuous (DEXCOM G6 ) Misc Test glucose twice daily    blood-glucose transmitter (DEXCOM G5 TRANSMITTER) Lea Test glucose twice daily    candesartan-hydrochlorothiazid (ATACAND HCT) 32-12.5 mg per tablet Take 1 tablet by mouth once daily.    carvediloL (COREG) 12.5 MG tablet Take 1 tablet (12.5 mg total) by mouth 2 (two) times daily.    latanoprost 0.005 % ophthalmic solution Place 1 drop into both eyes every evening.    LEVEMIR FLEXTOUCH U-100 INSULN 100 unit/mL (3 mL) InPn pen INJECT 32 UNITS INTO THE SKIN EVERY EVENING. (Patient taking differently: Inject  "26 Units into the skin every evening.)    oxybutynin (DITROPAN XL) 15 MG TR24 TAKE 1 TABLET BY MOUTH EVERY DAY    timolol maleate 0.5% (TIMOPTIC) 0.5 % Drop Place 1 drop into both eyes every morning.    TRADJENTA 5 mg Tab tablet TAKE 1 TABLET BY MOUTH EVERY DAY    traZODone (DESYREL) 50 MG tablet TAKE 1 TABLET (50 MG TOTAL) BY MOUTH EVERY EVENING.    cilostazoL (PLETAL) 50 MG Tab TAKE 1 TABLET BY MOUTH TWICE A DAY    lancets Misc Test 3 times daily    lancets Misc To check BG 3 times daily, to use with insurance preferred meter    pen needle, diabetic (BD ULTRA-FINE BREE PEN NEEDLE) 32 gauge x 5/32" Ndle INJECT INSULIN THREE TIMES DAILY    [DISCONTINUED] blood sugar diagnostic Strp To check BG 3 times daily, to use with insurance preferred meter    [DISCONTINUED] blood-glucose meter kit To check BG 3 times daily, to use with insurance preferred meter    [DISCONTINUED] blood-glucose meter,continuous (DEXCOM G6 ) Misc Test glucose twice daily    [DISCONTINUED] blood-glucose sensor (DEXCOM G6 SENSOR) Lea Test glucose twice daily    [DISCONTINUED] blood-glucose transmitter (DEXCOM G6 TRANSMITTER) Lea Test glucose twice daily    [DISCONTINUED] ciclopirox (PENLAC) 8 % Soln Apply topically nightly.    [DISCONTINUED] esomeprazole (NEXIUM) 40 MG capsule TAKE 1 CAPSULE BY MOUTH DAILY BEFORE BREAKFAST.    [DISCONTINUED] furosemide (LASIX) 20 MG tablet Take 2 tablets (40 mg total) by mouth once daily. for 3 days    [DISCONTINUED] nystatin (MYCOSTATIN) powder Apply topically 3 (three) times daily.    [DISCONTINUED] orphenadrine-ASA-caffeine -60 mg Tab CRUSH 1 TABLET AND COMBINE WITH UP TO 4 GRAMS (8 PUMPS) OF LIDOCAINE 5%. APPLY TO AFFECTED AREAS 2 TIMES DAILY.    [DISCONTINUED] potassium citrate (UROCIT-K 15) 15 mEq TbSR Take 15 mEq by mouth once daily.    [DISCONTINUED] TRAVATAN Z 0.004 % ophthalmic solution 1 drop every evening.     [DISCONTINUED] triamcinolone acetonide 0.1% (KENALOG) 0.1 " % cream Apply topically 3 (three) times daily. for 10 days (Patient not taking: Reported on 6/18/2021)     Family History     Problem Relation (Age of Onset)    Cancer Sister    Diabetes Brother    Hypertension Father    Kidney failure Mother        Tobacco Use    Smoking status: Never Smoker    Smokeless tobacco: Never Used   Substance and Sexual Activity    Alcohol use: No    Drug use: No    Sexual activity: Not Currently     Partners: Male     Review of Systems   Constitutional: Negative for chills, fatigue and fever.   Eyes: Negative for photophobia and visual disturbance.   Respiratory: Positive for shortness of breath. Negative for cough.    Cardiovascular: Positive for chest pain and leg swelling. Negative for palpitations.   Gastrointestinal: Negative for abdominal pain, diarrhea, nausea and vomiting.   Genitourinary: Negative for dysuria, frequency and urgency.   Skin: Negative for pallor, rash and wound.   Neurological: Negative for light-headedness and headaches.   Psychiatric/Behavioral: Negative for confusion and decreased concentration.     Objective:     Vital Signs (Most Recent):  Temp: 98.2 °F (36.8 °C) (01/24/22 1019)  Pulse: 72 (01/24/22 1519)  Resp: 20 (01/24/22 1519)  BP: (!) 180/75 (01/24/22 1519)  SpO2: 95 % (01/24/22 1519) Vital Signs (24h Range):  Temp:  [98.2 °F (36.8 °C)] 98.2 °F (36.8 °C)  Pulse:  [72-92] 72  Resp:  [18-21] 20  SpO2:  [95 %-98 %] 95 %  BP: (146-190)/(75-92) 180/75     Weight: 104.3 kg (230 lb)  Body mass index is 42.07 kg/m².    Physical Exam  Vitals and nursing note reviewed.   Constitutional:       General: She is not in acute distress.     Appearance: She is well-developed and well-nourished. She is obese.   HENT:      Head: Normocephalic and atraumatic.      Right Ear: External ear normal.      Left Ear: External ear normal.      Nose: Nose normal.      Mouth/Throat:      Mouth: Oropharynx is clear and moist.   Eyes:      Extraocular Movements: EOM normal.       Conjunctiva/sclera: Conjunctivae normal.      Pupils: Pupils are equal, round, and reactive to light.   Cardiovascular:      Rate and Rhythm: Normal rate. Rhythm irregularly irregular.      Pulses: Intact distal pulses.   Pulmonary:      Effort: Pulmonary effort is normal. No respiratory distress.      Breath sounds: Examination of the right-lower field reveals decreased breath sounds. Examination of the left-lower field reveals decreased breath sounds. Decreased breath sounds present. No wheezing.   Abdominal:      General: Bowel sounds are normal. There is no distension.      Palpations: Abdomen is soft.      Tenderness: There is no abdominal tenderness.      Comments: No palpable hepatomegaly or splenomegaly    Musculoskeletal:         General: No tenderness. Normal range of motion.      Cervical back: Normal range of motion and neck supple.      Right lower leg: Edema present.      Left lower leg: Edema present.   Skin:     General: Skin is warm and dry.   Neurological:      Mental Status: She is alert and oriented to person, place, and time.   Psychiatric:         Mood and Affect: Mood and affect normal.         Thought Content: Thought content normal.           CRANIAL NERVES     CN III, IV, VI   Pupils are equal, round, and reactive to light.  Extraocular motions are normal.        Significant Labs: All pertinent labs within the past 24 hours have been reviewed.    Significant Imaging: I have reviewed all pertinent imaging results/findings within the past 24 hours.

## 2022-01-24 NOTE — ED NOTES
"PT REPORTS intermittent chest pain that started today, denies associated s/s, but reports pain 5/10 when it hurts "like a throbbing" also reports altered sensation or "feels funny" to LAnterior chest area.   "

## 2022-01-25 VITALS
DIASTOLIC BLOOD PRESSURE: 56 MMHG | HEART RATE: 69 BPM | BODY MASS INDEX: 42.33 KG/M2 | OXYGEN SATURATION: 98 % | HEIGHT: 62 IN | SYSTOLIC BLOOD PRESSURE: 113 MMHG | WEIGHT: 230 LBS | TEMPERATURE: 98 F | RESPIRATION RATE: 16 BRPM

## 2022-01-25 LAB
AORTIC ROOT ANNULUS: 3.04 CM
AORTIC VALVE CUSP SEPERATION: 1.6 CM
ASCENDING AORTA: 3.3 CM
AV INDEX (PROSTH): 0.61
AV MEAN GRADIENT: 6 MMHG
AV PEAK GRADIENT: 12 MMHG
AV VALVE AREA: 1.96 CM2
AV VELOCITY RATIO: 0.57
BSA FOR ECHO PROCEDURE: 2.14 M2
CV ECHO LV RWT: 0.63 CM
DOP CALC AO PEAK VEL: 1.7 M/S
DOP CALC AO VTI: 38.87 CM
DOP CALC LVOT AREA: 3.2 CM2
DOP CALC LVOT DIAMETER: 2.02 CM
DOP CALC LVOT PEAK VEL: 0.97 M/S
DOP CALC LVOT STROKE VOLUME: 76.23 CM3
DOP CALCLVOT PEAK VEL VTI: 23.8 CM
E WAVE DECELERATION TIME: 410.21 MSEC
E/A RATIO: 0.59
E/E' RATIO: 14.5 M/S
ECHO LV POSTERIOR WALL: 1.02 CM (ref 0.6–1.1)
EJECTION FRACTION: 70 %
FRACTIONAL SHORTENING: 28 % (ref 28–44)
INTERVENTRICULAR SEPTUM: 1.15 CM (ref 0.6–1.1)
LA MAJOR: 4.76 CM
LEFT ATRIUM SIZE: 3.27 CM
LEFT INTERNAL DIMENSION IN SYSTOLE: 2.35 CM (ref 2.1–4)
LEFT VENTRICLE DIASTOLIC VOLUME INDEX: 21.07 ML/M2
LEFT VENTRICLE DIASTOLIC VOLUME: 42.78 ML
LEFT VENTRICLE MASS INDEX: 52 G/M2
LEFT VENTRICLE SYSTOLIC VOLUME INDEX: 9.4 ML/M2
LEFT VENTRICLE SYSTOLIC VOLUME: 19.12 ML
LEFT VENTRICULAR INTERNAL DIMENSION IN DIASTOLE: 3.26 CM (ref 3.5–6)
LEFT VENTRICULAR MASS: 104.98 G
LV LATERAL E/E' RATIO: 12.43 M/S
LV SEPTAL E/E' RATIO: 17.4 M/S
MV PEAK A VEL: 1.47 M/S
MV PEAK E VEL: 0.87 M/S
MV STENOSIS PRESSURE HALF TIME: 118.96 MS
MV VALVE AREA P 1/2 METHOD: 1.85 CM2
POCT GLUCOSE: 119 MG/DL (ref 70–110)
POCT GLUCOSE: 90 MG/DL (ref 70–110)
PV PEAK VELOCITY: 0.98 CM/S
RA MAJOR: 4.51 CM
RA WIDTH: 3.32 CM
SINUS: 3.16 CM
STJ: 2.6 CM
TDI LATERAL: 0.07 M/S
TDI SEPTAL: 0.05 M/S
TDI: 0.06 M/S
TRICUSPID ANNULAR PLANE SYSTOLIC EXCURSION: 2.24 CM

## 2022-01-25 PROCEDURE — 93010 ELECTROCARDIOGRAM REPORT: CPT | Mod: ,,, | Performed by: INTERNAL MEDICINE

## 2022-01-25 PROCEDURE — 94761 N-INVAS EAR/PLS OXIMETRY MLT: CPT

## 2022-01-25 PROCEDURE — 25000003 PHARM REV CODE 250: Performed by: HOSPITALIST

## 2022-01-25 PROCEDURE — 99284 EMERGENCY DEPT VISIT MOD MDM: CPT | Mod: 25,,, | Performed by: INTERNAL MEDICINE

## 2022-01-25 PROCEDURE — 93010 EKG 12-LEAD: ICD-10-PCS | Mod: ,,, | Performed by: INTERNAL MEDICINE

## 2022-01-25 PROCEDURE — 25000003 PHARM REV CODE 250: Performed by: INTERNAL MEDICINE

## 2022-01-25 PROCEDURE — G0378 HOSPITAL OBSERVATION PER HR: HCPCS

## 2022-01-25 PROCEDURE — 93005 ELECTROCARDIOGRAM TRACING: CPT

## 2022-01-25 PROCEDURE — 99284 PR EMERGENCY DEPT VISIT,LEVEL IV: ICD-10-PCS | Mod: 25,,, | Performed by: INTERNAL MEDICINE

## 2022-01-25 RX ORDER — HYDRALAZINE HYDROCHLORIDE 25 MG/1
25 TABLET, FILM COATED ORAL EVERY 12 HOURS
Status: DISCONTINUED | OUTPATIENT
Start: 2022-01-25 | End: 2022-01-25 | Stop reason: HOSPADM

## 2022-01-25 RX ORDER — INSULIN DETEMIR 100 [IU]/ML
26 INJECTION, SOLUTION SUBCUTANEOUS NIGHTLY
Status: ON HOLD
Start: 2022-01-25 | End: 2022-02-14 | Stop reason: SDUPTHER

## 2022-01-25 RX ORDER — HYDRALAZINE HYDROCHLORIDE 25 MG/1
25 TABLET, FILM COATED ORAL EVERY 12 HOURS
Qty: 60 TABLET | Refills: 11 | Status: ON HOLD | OUTPATIENT
Start: 2022-01-25 | End: 2022-01-28 | Stop reason: HOSPADM

## 2022-01-25 RX ADMIN — VALSARTAN 320 MG: 80 TABLET, FILM COATED ORAL at 09:01

## 2022-01-25 RX ADMIN — HYDRALAZINE HYDROCHLORIDE 25 MG: 25 TABLET, FILM COATED ORAL at 09:01

## 2022-01-25 RX ADMIN — AMLODIPINE BESYLATE 10 MG: 5 TABLET ORAL at 09:01

## 2022-01-25 RX ADMIN — CILOSTAZOL 50 MG: 50 TABLET ORAL at 10:01

## 2022-01-25 RX ADMIN — OXYBUTYNIN CHLORIDE 15 MG: 5 TABLET, EXTENDED RELEASE ORAL at 10:01

## 2022-01-25 RX ADMIN — ASPIRIN 81 MG: 81 TABLET, COATED ORAL at 09:01

## 2022-01-25 RX ADMIN — ATORVASTATIN CALCIUM 20 MG: 10 TABLET, FILM COATED ORAL at 09:01

## 2022-01-25 RX ADMIN — CARVEDILOL 12.5 MG: 12.5 TABLET, FILM COATED ORAL at 09:01

## 2022-01-25 RX ADMIN — ACETAMINOPHEN 650 MG: 325 TABLET ORAL at 03:01

## 2022-01-25 RX ADMIN — HYDROCHLOROTHIAZIDE 12.5 MG: 12.5 TABLET ORAL at 09:01

## 2022-01-25 NOTE — PROVIDER PROGRESS NOTES - EMERGENCY DEPT.
Encounter Date: 1/24/2022    ED Physician Progress Notes           This patient is in a sinus rhythm with a first-degree AV block.  There is poor R-wave progression across precordium.  There are no significant ST segment or T-wave changes.  This EKG compares favorably to a study from yesterday.

## 2022-01-25 NOTE — PLAN OF CARE
01/25/22 0943   Discharge Planning   Assessment Type Discharge Planning Brief Assessment   Resource/Environmental Concerns none   Support Systems Friends/neighbors   Equipment Currently Used at Home bedside commode;glucometer;power chair;rollator;shower chair   Current Living Arrangements home/apartment/condo   Patient/Family Anticipates Transition to home   Patient/Family Anticipated Services at Transition none   DME Needed Upon Discharge  none   Discharge Plan A Home  (with follow up)     [facility]/pharmacy #[id number] - [place] - [address]  [address] [address]  Phone: [phone] Fax: [phone]    [facility] - [place], [place] - [address] AT [facility]  [address]  [facility] [address]  Phone: [phone] Fax: [phone]    [facility]/pharmacy #[id number] - [place] - [address]  [address] [place] [address]  Phone: [phone] Fax: [phone]'

## 2022-01-25 NOTE — PLAN OF CARE
01/25/22 1127   Final Note   Assessment Type Final Discharge Note   Anticipated Discharge Disposition Home   Hospital Resources/Appts/Education Provided Appointments scheduled and added to AVS;Provided education on problems/symptoms using teachback   Post-Acute Status   Post-Acute Authorization Other   Other Status No Post-Acute Service Needs   Pt clear for d/c from CM standpoint

## 2022-01-25 NOTE — CONSULTS
[facility]  [place]iology  Consult Note    Patient Name: Ana James  MRN: [id number]  Admission Date: 1/24/2022  Hospital Length of Stay: 0 days  Code Status: Full Code   Attending Provider: Ángel Browning MD   Consulting Provider: Alex Navarrete MD  Primary Care Physician: Viky Bean MD  Principal Problem:Chest pain    Patient information was obtained from patient and ER records.     Consults  Subjective:     Chief Complaint:  Irregular heart rhythm      HPI: 81 y.o. female with HTN, obesity, PVD, DM2, physical debility, HLD, chronic diastolic heart failure, and anemia of chronic disease presents with a complaint of chest pain.  Pain is acute onset yesterday evening, located left chest, radiates down left arm, has been intermittent, associated with orthopnea, exacerbated by laying flat.  Denies fever, chills, palpitation, dizziness, syncope, n/v/d, abdominal pain, or dysuria.  Her Cardiologist is Dr. Navarrete.  In the ED, she was found to be in a fib, presumed new onset.  EKG without evidence of acute ischemia, initial troponin negative, otherwise unremarkable for acute abnormality.  Placed in observation for Cardiology evaluation.    EKG reviewed appears to be NSR with PACS. Currently in NSR  Some HILLS and LE edema. Denies CP  Troponin negative  BNP 32  Cr 1.4  EKG NSR PACs PRWP    Known to me     HTN, PAD, DM, SOB, CRI - Cr 2.0     Referred by Dr. Farnsworth for abnormal JIGAR. She has bilateral leg pain mainly in her ankles. Follows with [facility] cardiology Dr. Martin. 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 Walks with walker for 2 blocks but also limited by sob. Wears diabetic shoes. Has some swelling occasionally but otherwise in her usual state of health.            LE arterial doppler 7/17/15  There is no definite areas of doubling of the velocity to suggest underlying greater than 50% stenosis.  There is decreased velocity seen within the distal left lower extremity arteries compared to the  right lower extremity arteries and there are more monophasic waveform configuration in the distal left lower extremity concerning for distal left lower   extremity significant atherosclerotic plaque and vessel hardening.     JIGAR 10/27/16  Right leg: The JIGAR is 2.0 which is abnormally elevated .  Left leg: The JIGAR is 2.1 which abnormally elevated.      Stress test 5/11/21    Normal myocardial perfusion scan. There is no evidence of myocardial ischemia or infarction.    There is a mild to moderate intensity defect in the anterior wall of the left ventricle, secondary to breast attenuation.    The gated perfusion images showed an ejection fraction of 80% post stress.    There is normal wall motion post stress.    The EKG portion of this study is negative for ischemia.    The patient reported no chest pain during the stress test.   Diaphragm attenuation noted     Echo 5/11/21  · Technically difficult study.  · The left ventricle is normal in size with concentric remodeling and normal systolic function.  · The estimated ejection fraction is 65%.  · Indeterminate left ventricular diastolic function.  · Normal right ventricular size with normal right ventricular systolic function.  · There is no pulmonary hypertension.     2/16/17 Pletal was started by Dr Schmidt for PAD  Activity is limited by HILLS and not claudication  EKG NSR - ok  Denies CP     Saw Dr Schmidt 7/11/18  Bryan James is a 77 y.o. female. Reports wheezing, SOB and feeling like she wants to pass out with walking x 1 month.  Has not been using inhalers.  Reports intermittent sharp chest pain lasting 30 seconds.    EKG 7/11/18 NSR old IMI, PRWP, NSSTT changes  -similar to previous EKG     7/13/18  Echo and lexiscan myoview for HILLS and CP  JIGAR for PAD  CMP, BNP - may need additional diuresis      Admitted 2/26/20  Mrs. Ana James is a 79 y.o. female with essential hypertension, type 2 diabetes mellitus (HbA1c 6.1% Jan 2020),  hyperlipidemia (LDL 55.8 Aug 2019), chronic diastolic heart failure (LVEF 65% Jan 2020), CKD stage 4, anemia of chronic disease, morbid obesity (BMI 48.4) who presents to [facility] ED with complaints of dyspnea for one week.  The dyspnea is mainly on exertion but is also sometimes at rest.  She has had some wheezing and a nonproductive cough as well as some orthopnea and PND.  She denies any chest pains, palpitations, diaphoresis, fevers, chills, nausea, vomiting, hemoptysis, nor any lower extremity pain or swelling. She denies any recent sick contacts or recent travel but did have some orthopnea.  She did not have any PND.  Her appetite has been good she has not had any weight loss.     79 year old AAF chronically ill, placed in observation for acute onset SOB and progressively worsening dyspnea on exertion. Patient has known heart failure and her most recent 2D echo 01/2020 did not mention DD and LVEF showed  Normal at 65%. She did receive IV lasix which helped resolve her LE edema along with LE elevation. Patient has what appears to be PVD which is thought to be contributory to her chronic LE edema. Her renal functions was noted as BUN/creatine/GFR 24/2.2/24 which improved to 24/2.0/26 improved without treatment. This level of CKDIII is chronic and consistent with baseline. HgA1c was noted to be 6.1 which is consistent with standards for her age. She tells me that she has had problems with transportation to the doctor which is why she has not been following up in the clinic outpatient. Discussed palliative care options and patient was amenable in early morning but appeared to change her mind. Requested CM to speak to patient regarding Palliative care at discharge.      4/23/21 Still with HILLS and episodic chest tightness  Also with claudication and chronic LE edema  EKG NSR with PACs NSSTT changes  Walks with a walker  Echo and lexiscan myoview for CP and HILLS  Refer to vascular surgery for PAD  Continue Rx for HTN,  HLD, PAD, CHF     6/2/21 HILLS about the same. Denies CP  BP elevated - usually controlled         Past Medical History:   Diagnosis Date    Arthritis lower extremities    Asthma     Blind left eye     Diabetes mellitus     Edema of both lower legs     CHRONIC    Gall stones     Hypertension     Kidney stones     Nephrolithiasis 1/22/2016    Obesity     PVD (peripheral vascular disease)     Renal disorder     Retinopathy     Sleep apnea     Vaginal delivery     x1    Weakness of both lower extremities     uses a cane, rollator & power chair       Past Surgical History:   Procedure Laterality Date    CARDIAC CATHETERIZATION      cataract      CHOLECYSTECTOMY      EYE SURGERY      Rt cataract surgery    HYSTERECTOMY      URETERAL STENT PLACEMENT         Review of patient's allergies indicates:   Allergen Reactions    Adhesive Rash     Paper tape       No current facility-administered medications on file prior to encounter.     Current Outpatient Medications on File Prior to Encounter   Medication Sig    albuterol (PROVENTIL/VENTOLIN HFA) 90 mcg/actuation inhaler INHALE 2 PUFFS INTO THE LUNGS EVERY 6 (SIX) HOURS AS NEEDED FOR WHEEZING. RESCUE    amLODIPine (NORVASC) 10 MG tablet TAKE 1 TABLET BY MOUTH EVERY DAY    aspirin (ECOTRIN) 81 MG EC tablet Take 81 mg by mouth nightly.    atorvastatin (LIPITOR) 20 MG tablet TAKE 1 TABLET BY MOUTH EVERY DAY    blood sugar diagnostic Strp Test 3 times daily    blood-glucose meter,continuous (DEXCOM G6 ) Misc Test glucose twice daily    blood-glucose transmitter (DEXCOM G5 TRANSMITTER) Lea Test glucose twice daily    candesartan-hydrochlorothiazid (ATACAND HCT) 32-12.5 mg per tablet Take 1 tablet by mouth once daily.    carvediloL (COREG) 12.5 MG tablet Take 1 tablet (12.5 mg total) by mouth 2 (two) times daily.    latanoprost 0.005 % ophthalmic solution Place 1 drop into both eyes every evening.    LEVEMIR FLEXTOUCH U-100 INSULN 100 unit/mL  "(3 mL) InPn pen INJECT 32 UNITS INTO THE SKIN EVERY EVENING. (Patient taking differently: Inject 26 Units into the skin every evening.)    oxybutynin (DITROPAN XL) 15 MG TR24 TAKE 1 TABLET BY MOUTH EVERY DAY    timolol maleate 0.5% (TIMOPTIC) 0.5 % Drop Place 1 drop into both eyes every morning.    TRADJENTA 5 mg Tab tablet TAKE 1 TABLET BY MOUTH EVERY DAY    traZODone (DESYREL) 50 MG tablet TAKE 1 TABLET (50 MG TOTAL) BY MOUTH EVERY EVENING.    cilostazoL (PLETAL) 50 MG Tab TAKE 1 TABLET BY MOUTH TWICE A DAY    lancets Misc Test 3 times daily    lancets Misc To check BG 3 times daily, to use with insurance preferred meter    pen needle, diabetic (BD ULTRA-FINE BREE PEN NEEDLE) 32 gauge x 5/32" Ndle INJECT INSULIN THREE TIMES DAILY     Family History     Problem Relation (Age of Onset)    Cancer Sister    Diabetes Brother    Hypertension Father    Kidney failure Mother        Tobacco Use    Smoking status: Never Smoker    Smokeless tobacco: Never Used   Substance and Sexual Activity    Alcohol use: No    Drug use: No    Sexual activity: Not Currently     Partners: Male     Review of Systems   Constitutional: Negative for decreased appetite.   HENT: Negative for ear discharge.    Eyes: Negative for blurred vision.   Respiratory: Negative for hemoptysis.    Endocrine: Negative for polyphagia.   Hematologic/Lymphatic: Negative for adenopathy.   Skin: Negative for color change.   Musculoskeletal: Negative for joint swelling.   Genitourinary: Negative for bladder incontinence.   Neurological: Negative for brief paralysis.   Psychiatric/Behavioral: Negative for hallucinations.   Allergic/Immunologic: Negative for hives.     Objective:     Vital Signs (Most Recent):  Temp: 98.5 °F (36.9 °C) (01/24/22 1820)  Pulse: 65 (01/25/22 0912)  Resp: 18 (01/25/22 0912)  BP: (!) 164/68 (01/25/22 0503)  SpO2: 98 % (01/25/22 0912) Vital Signs (24h Range):  Temp:  [98.2 °F (36.8 °C)-98.5 °F (36.9 °C)] 98.5 °F (36.9 " °C)  Pulse:  [57-92] 65  Resp:  [18-35] 18  SpO2:  [95 %-98 %] 98 %  BP: (135-190)/(61-92) 164/68     Weight: 104.3 kg (230 lb)  Body mass index is 42.07 kg/m².    SpO2: 98 %  O2 Device (Oxygen Therapy): room air    No intake or output data in the 24 hours ending 01/25/22 0913    Lines/Drains/Airways     None                 Physical Exam  Constitutional:       Appearance: She is well-developed and well-nourished.   HENT:      Head: Normocephalic and atraumatic.   Eyes:      Conjunctiva/sclera: Conjunctivae normal.      Pupils: Pupils are equal, round, and reactive to light.   Cardiovascular:      Rate and Rhythm: Normal rate and regular rhythm.      Pulses: Intact distal pulses.      Heart sounds: Normal heart sounds.   Pulmonary:      Effort: Pulmonary effort is normal.      Breath sounds: Normal breath sounds.   Abdominal:      General: Bowel sounds are normal.      Palpations: Abdomen is soft.   Musculoskeletal:         General: Normal range of motion.      Cervical back: Normal range of motion and neck supple.      Right lower leg: Edema present.      Left lower leg: Edema present.   Skin:     General: Skin is warm and dry.   Neurological:      Mental Status: She is alert and oriented to person, place, and time.         Significant Labs: All pertinent lab results from the last 24 hours have been reviewed.    Significant Imaging: Echocardiogram:   2D echo with color flow doppler:   Results for orders placed or performed during the hospital encounter of 10/27/16   2D Echo w/ Color Flow Doppler   Result Value Ref Range    EF + QEF 55 55 - 65    Diastolic Dysfunction Yes (A)     Est. PA Systolic Pressure 14.15     Pericardial Effusion NONE     Mitral Valve Mobility NORMAL     Narrative    Date of Procedure: 10/27/2016        TEST DESCRIPTION   Technical Quality: This is a technically adequate study.     Aorta: The aortic root is normal in size, measuring 2.5 cm at sinotubular junction and 2.8 cm at Sinuses of  Valsalva.     Left Atrium: The left atrial volume index is normal, measuring 26.98 cc/m2.     Left Ventricle: The left ventricle is normal in size, with an end-diastolic diameter of 4.8 cm, and an end-systolic diameter of 3.1 cm. LV wall thickness is normal, with the septum measuring 1.0 cm and the posterior wall measuring 0.9 cm across. Relative   wall thickness was normal at 0.38, and the LV mass index was 86.1 g/m2 consistent with normal left ventricular mass. Global left ventricular systolic function appears normal. Visually estimated ejection fraction is 55-60%. The LV Doppler derived stroke   volume equals 70.0 ccs.   The E/e'(lat) is 16, consistent with significant diastolic dysfunction.     Right Atrium: The right atrium is normal in size, measuring 4.7 cm in length and 3.6 cm in width in the apical view.     Right Ventricle: The right ventricle is normal in size measuring 3.2 cm at the base in the apical right ventricle-focused view. Global right ventricular systolic function appears normal. Tricuspid annular plane systolic excursion (TAPSE) is 2.3 cm.   Tissue Doppler-derived tricuspid annular peak systolic velocity (S prime) is .1 cm/s. The estimated PA systolic pressure is 14 mmHg.     Aortic Valve:  The aortic valve is mildly sclerotic with mildly restricted leaflet mobility. The aortic valve is tri-leaflet in structure. The mean gradient obtained across the aortic valve is 4.9 mmHg. Using a left ventricular outflow tract diameter of   2.1 cm, a left ventricular outflow tract velocity time integral of 21 cm, and a peak instantaneous transvalvular velocity time integral of 34 cm, the calculated aortic valve area is 2.05 cm2.     Mitral Valve:  The mitral valve is normal in structure with normal leaflet mobility. The pressure half time is 87 msec. The calculated mitral valve area is 2.53 cm2.     Tricuspid Valve:  The tricuspid valve is normal in structure with normal leaflet mobility.     Pulmonary  Valve:  The pulmonic valve is not well seen.     Pericardium: There is no evidence of pericardial effusion.      IVC: IVC is enlarged but collapses > 50% with a sniff, suggesting intermediate right atrial pressure of 8 mmHg.     Intracavitary: There is no evidence of intracavity mass, thrombi, or vegetation.         CONCLUSIONS     1 - Normal left ventricular systolic function (EF 55-60%).     2 - Left ventricular diastolic dysfunction.     3 - Normal appearing valves and Doppler exam.             This document has been electronically    SIGNED BY: Pancho Loza MD On: 10/27/2016 15:50     Assessment and Plan:     Atrial fibrillation  EKG reviewed. Appears to be NSR with PACS and not A-fib. NSR on telemetry. Ok for d/c today    Chronic diastolic heart failure  Diuresis and afterload reduction as tolerated    Controlled type 2 diabetes mellitus with stage 3 chronic kidney disease, with long-term current use of insulin  Per primary    Morbid obesity with BMI of 45.0-49.9, adult  counseled    Essential hypertension  Poorly controlled. Add hydralazine to home regimen        VTE Risk Mitigation (From admission, onward)         Ordered     Reason for No Pharmacological VTE Prophylaxis  Once        Question:  Reasons:  Answer:  Already adequately anticoagulated on oral Anticoagulants    01/24/22 1547     IP VTE HIGH RISK PATIENT  Once         01/24/22 1547     Place sequential compression device  Until discontinued         01/24/22 1547                Thank you for your consult. I will sign off. Please contact us if you have any additional questions.    Alex Navarrete MD  Cardiology   [facility] - Emergency Dept

## 2022-01-25 NOTE — SUBJECTIVE & OBJECTIVE
Past Medical History:   Diagnosis Date    Arthritis lower extremities    Asthma     Blind left eye     Diabetes mellitus     Edema of both lower legs     CHRONIC    Gall stones     Hypertension     Kidney stones     Nephrolithiasis 1/22/2016    Obesity     PVD (peripheral vascular disease)     Renal disorder     Retinopathy     Sleep apnea     Vaginal delivery     x1    Weakness of both lower extremities     uses a cane, rollator & power chair       Past Surgical History:   Procedure Laterality Date    CARDIAC CATHETERIZATION      cataract      CHOLECYSTECTOMY      EYE SURGERY      Rt cataract surgery    HYSTERECTOMY      URETERAL STENT PLACEMENT         Review of patient's allergies indicates:   Allergen Reactions    Adhesive Rash     Paper tape       No current facility-administered medications on file prior to encounter.     Current Outpatient Medications on File Prior to Encounter   Medication Sig    albuterol (PROVENTIL/VENTOLIN HFA) 90 mcg/actuation inhaler INHALE 2 PUFFS INTO THE LUNGS EVERY 6 (SIX) HOURS AS NEEDED FOR WHEEZING. RESCUE    amLODIPine (NORVASC) 10 MG tablet TAKE 1 TABLET BY MOUTH EVERY DAY    aspirin (ECOTRIN) 81 MG EC tablet Take 81 mg by mouth nightly.    atorvastatin (LIPITOR) 20 MG tablet TAKE 1 TABLET BY MOUTH EVERY DAY    blood sugar diagnostic Strp Test 3 times daily    blood-glucose meter,continuous (DEXCOM G6 ) Misc Test glucose twice daily    blood-glucose transmitter (DEXCOM G5 TRANSMITTER) Lea Test glucose twice daily    candesartan-hydrochlorothiazid (ATACAND HCT) 32-12.5 mg per tablet Take 1 tablet by mouth once daily.    carvediloL (COREG) 12.5 MG tablet Take 1 tablet (12.5 mg total) by mouth 2 (two) times daily.    latanoprost 0.005 % ophthalmic solution Place 1 drop into both eyes every evening.    LEVEMIR FLEXTOUCH U-100 INSULN 100 unit/mL (3 mL) InPn pen INJECT 32 UNITS INTO THE SKIN EVERY EVENING. (Patient taking differently: Inject  "26 Units into the skin every evening.)    oxybutynin (DITROPAN XL) 15 MG TR24 TAKE 1 TABLET BY MOUTH EVERY DAY    timolol maleate 0.5% (TIMOPTIC) 0.5 % Drop Place 1 drop into both eyes every morning.    TRADJENTA 5 mg Tab tablet TAKE 1 TABLET BY MOUTH EVERY DAY    traZODone (DESYREL) 50 MG tablet TAKE 1 TABLET (50 MG TOTAL) BY MOUTH EVERY EVENING.    cilostazoL (PLETAL) 50 MG Tab TAKE 1 TABLET BY MOUTH TWICE A DAY    lancets Misc Test 3 times daily    lancets Misc To check BG 3 times daily, to use with insurance preferred meter    pen needle, diabetic (BD ULTRA-FINE BREE PEN NEEDLE) 32 gauge x 5/32" Ndle INJECT INSULIN THREE TIMES DAILY     Family History     Problem Relation (Age of Onset)    Cancer Sister    Diabetes Brother    Hypertension Father    Kidney failure Mother        Tobacco Use    Smoking status: Never Smoker    Smokeless tobacco: Never Used   Substance and Sexual Activity    Alcohol use: No    Drug use: No    Sexual activity: Not Currently     Partners: Male     Review of Systems   Constitutional: Negative for decreased appetite.   HENT: Negative for ear discharge.    Eyes: Negative for blurred vision.   Respiratory: Negative for hemoptysis.    Endocrine: Negative for polyphagia.   Hematologic/Lymphatic: Negative for adenopathy.   Skin: Negative for color change.   Musculoskeletal: Negative for joint swelling.   Genitourinary: Negative for bladder incontinence.   Neurological: Negative for brief paralysis.   Psychiatric/Behavioral: Negative for hallucinations.   Allergic/Immunologic: Negative for hives.     Objective:     Vital Signs (Most Recent):  Temp: 98.5 °F (36.9 °C) (01/24/22 1820)  Pulse: 65 (01/25/22 0912)  Resp: 18 (01/25/22 0912)  BP: (!) 164/68 (01/25/22 0503)  SpO2: 98 % (01/25/22 0912) Vital Signs (24h Range):  Temp:  [98.2 °F (36.8 °C)-98.5 °F (36.9 °C)] 98.5 °F (36.9 °C)  Pulse:  [57-92] 65  Resp:  [18-35] 18  SpO2:  [95 %-98 %] 98 %  BP: (135-190)/(61-92) 164/68 "     Weight: 104.3 kg (230 lb)  Body mass index is 42.07 kg/m².    SpO2: 98 %  O2 Device (Oxygen Therapy): room air    No intake or output data in the 24 hours ending 01/25/22 0913    Lines/Drains/Airways     None                 Physical Exam  Constitutional:       Appearance: She is well-developed and well-nourished.   HENT:      Head: Normocephalic and atraumatic.   Eyes:      Conjunctiva/sclera: Conjunctivae normal.      Pupils: Pupils are equal, round, and reactive to light.   Cardiovascular:      Rate and Rhythm: Normal rate and regular rhythm.      Pulses: Intact distal pulses.      Heart sounds: Normal heart sounds.   Pulmonary:      Effort: Pulmonary effort is normal.      Breath sounds: Normal breath sounds.   Abdominal:      General: Bowel sounds are normal.      Palpations: Abdomen is soft.   Musculoskeletal:         General: Normal range of motion.      Cervical back: Normal range of motion and neck supple.      Right lower leg: Edema present.      Left lower leg: Edema present.   Skin:     General: Skin is warm and dry.   Neurological:      Mental Status: She is alert and oriented to person, place, and time.         Significant Labs: All pertinent lab results from the last 24 hours have been reviewed.    Significant Imaging: Echocardiogram:   2D echo with color flow doppler:   Results for orders placed or performed during the hospital encounter of 10/27/16   2D Echo w/ Color Flow Doppler   Result Value Ref Range    EF + QEF 55 55 - 65    Diastolic Dysfunction Yes (A)     Est. PA Systolic Pressure 14.15     Pericardial Effusion NONE     Mitral Valve Mobility NORMAL     Narrative    Date of Procedure: 10/27/2016        TEST DESCRIPTION   Technical Quality: This is a technically adequate study.     Aorta: The aortic root is normal in size, measuring 2.5 cm at sinotubular junction and 2.8 cm at Sinuses of Valsalva.     Left Atrium: The left atrial volume index is normal, measuring 26.98 cc/m2.     Left  Ventricle: The left ventricle is normal in size, with an end-diastolic diameter of 4.8 cm, and an end-systolic diameter of 3.1 cm. LV wall thickness is normal, with the septum measuring 1.0 cm and the posterior wall measuring 0.9 cm across. Relative   wall thickness was normal at 0.38, and the LV mass index was 86.1 g/m2 consistent with normal left ventricular mass. Global left ventricular systolic function appears normal. Visually estimated ejection fraction is 55-60%. The LV Doppler derived stroke   volume equals 70.0 ccs.   The E/e'(lat) is 16, consistent with significant diastolic dysfunction.     Right Atrium: The right atrium is normal in size, measuring 4.7 cm in length and 3.6 cm in width in the apical view.     Right Ventricle: The right ventricle is normal in size measuring 3.2 cm at the base in the apical right ventricle-focused view. Global right ventricular systolic function appears normal. Tricuspid annular plane systolic excursion (TAPSE) is 2.3 cm.   Tissue Doppler-derived tricuspid annular peak systolic velocity (S prime) is .1 cm/s. The estimated PA systolic pressure is 14 mmHg.     Aortic Valve:  The aortic valve is mildly sclerotic with mildly restricted leaflet mobility. The aortic valve is tri-leaflet in structure. The mean gradient obtained across the aortic valve is 4.9 mmHg. Using a left ventricular outflow tract diameter of   2.1 cm, a left ventricular outflow tract velocity time integral of 21 cm, and a peak instantaneous transvalvular velocity time integral of 34 cm, the calculated aortic valve area is 2.05 cm2.     Mitral Valve:  The mitral valve is normal in structure with normal leaflet mobility. The pressure half time is 87 msec. The calculated mitral valve area is 2.53 cm2.     Tricuspid Valve:  The tricuspid valve is normal in structure with normal leaflet mobility.     Pulmonary Valve:  The pulmonic valve is not well seen.     Pericardium: There is no evidence of pericardial  effusion.      IVC: IVC is enlarged but collapses > 50% with a sniff, suggesting intermediate right atrial pressure of 8 mmHg.     Intracavitary: There is no evidence of intracavity mass, thrombi, or vegetation.         CONCLUSIONS     1 - Normal left ventricular systolic function (EF 55-60%).     2 - Left ventricular diastolic dysfunction.     3 - Normal appearing valves and Doppler exam.             This document has been electronically    SIGNED BY: Pancho Loza MD On: 10/27/2016 15:50

## 2022-01-25 NOTE — HPI
HPI: 81 y.o. female with HTN, obesity, PVD, DM2, physical debility, HLD, chronic diastolic heart failure, and anemia of chronic disease presents with a complaint of chest pain.  Pain is acute onset yesterday evening, located left chest, radiates down left arm, has been intermittent, associated with orthopnea, exacerbated by laying flat.  Denies fever, chills, palpitation, dizziness, syncope, n/v/d, abdominal pain, or dysuria.  Her Cardiologist is Dr. Navarrete.  In the ED, she was found to be in a fib, presumed new onset.  EKG without evidence of acute ischemia, initial troponin negative, otherwise unremarkable for acute abnormality.  Placed in observation for Cardiology evaluation.    EKG reviewed appears to be NSR with PACS. Currently in NSR  Some HILLS and LE edema. Denies CP  Troponin negative  BNP 32  Cr 1.4  EKG NSR PACs PRWP    Known to me     HTN, PAD, DM, SOB, CRI - Cr 2.0     Referred by Dr. Farnsworth for abnormal JIGAR. She has bilateral leg pain mainly in her ankles. Follows with [facility] cardiology Dr. Martin. 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 Walks with walker for 2 blocks but also limited by sob. Wears diabetic shoes. Has some swelling occasionally but otherwise in her usual state of health.            LE arterial doppler 7/17/15  There is no definite areas of doubling of the velocity to suggest underlying greater than 50% stenosis.  There is decreased velocity seen within the distal left lower extremity arteries compared to the right lower extremity arteries and there are more monophasic waveform configuration in the distal left lower extremity concerning for distal left lower   extremity significant atherosclerotic plaque and vessel hardening.     JIGAR 10/27/16  Right leg: The JIGAR is 2.0 which is abnormally elevated .  Left leg: The JIGAR is 2.1 which abnormally elevated.      Stress test 5/11/21    Normal myocardial perfusion scan. There is no evidence of myocardial ischemia or infarction.    There is a mild to  moderate intensity defect in the anterior wall of the left ventricle, secondary to breast attenuation.    The gated perfusion images showed an ejection fraction of 80% post stress.    There is normal wall motion post stress.    The EKG portion of this study is negative for ischemia.    The patient reported no chest pain during the stress test.   Diaphragm attenuation noted     Echo 5/11/21  Technically difficult study.  The left ventricle is normal in size with concentric remodeling and normal systolic function.  The estimated ejection fraction is 65%.  Indeterminate left ventricular diastolic function.  Normal right ventricular size with normal right ventricular systolic function.  There is no pulmonary hypertension.     2/16/17 Pletal was started by Dr Schmidt for PAD  Activity is limited by HILLS and not claudication  EKG NSR - ok  Denies CP     Saw Dr Schmidt 7/11/18  Bryan James is a 77 y.o. female. Reports wheezing, SOB and feeling like she wants to pass out with walking x 1 month.  Has not been using inhalers.  Reports intermittent sharp chest pain lasting 30 seconds.    EKG 7/11/18 NSR old IMI, PRWP, NSSTT changes  -similar to previous EKG     7/13/18  Echo and lexiscan myoview for HILLS and CP  JIGAR for PAD  CMP, BNP - may need additional diuresis      Admitted 2/26/20  Mrs. Ana James is a 79 y.o. female with essential hypertension, type 2 diabetes mellitus (HbA1c 6.1% Jan 2020), hyperlipidemia (LDL 55.8 Aug 2019), chronic diastolic heart failure (LVEF 65% Jan 2020), CKD stage 4, anemia of chronic disease, morbid obesity (BMI 48.4) who presents to [facility] ED with complaints of dyspnea for one week.  The dyspnea is mainly on exertion but is also sometimes at rest.  She has had some wheezing and a nonproductive cough as well as some orthopnea and PND.  She denies any chest pains, palpitations, diaphoresis, fevers, chills, nausea, vomiting, hemoptysis, nor any lower extremity pain or  swelling. She denies any recent sick contacts or recent travel but did have some orthopnea.  She did not have any PND.  Her appetite has been good she has not had any weight loss.     79 year old AAF chronically ill, placed in observation for acute onset SOB and progressively worsening dyspnea on exertion. Patient has known heart failure and her most recent 2D echo 01/2020 did not mention DD and LVEF showed  Normal at 65%. She did receive IV lasix which helped resolve her LE edema along with LE elevation. Patient has what appears to be PVD which is thought to be contributory to her chronic LE edema. Her renal functions was noted as BUN/creatine/GFR 24/2.2/24 which improved to 24/2.0/26 improved without treatment. This level of CKDIII is chronic and consistent with baseline. HgA1c was noted to be 6.1 which is consistent with standards for her age. She tells me that she has had problems with transportation to the doctor which is why she has not been following up in the clinic outpatient. Discussed palliative care options and patient was amenable in early morning but appeared to change her mind. Requested CM to speak to patient regarding Palliative care at discharge.      4/23/21 Still with HILLS and episodic chest tightness  Also with claudication and chronic LE edema  EKG NSR with PACs NSSTT changes  Walks with a walker  Echo and lexiscan myoview for CP and HILLS  Refer to vascular surgery for PAD  Continue Rx for HTN, HLD, PAD, CHF     6/2/21 HILLS about the same. Denies CP  BP elevated - usually controlled

## 2022-01-25 NOTE — PROGRESS NOTES
WRITTEN HEALTHCARE DISCHARGE INFORMATION      Things that YOU are RESPONSIBLE for to Manage Your Care At Home:     1. Getting your prescriptions filled.  2. Taking you medications as directed. DO NOT MISS ANY DOSES!  3. Going to your follow-up doctor appointments. This is important because it allows the doctor to monitor your progress and to determine if any changes need to be made to your treatment plan.     If you are unable to make your follow up appointments, please call the number listed and reschedule this appointment.      ____________HELP AT HOME____________________     Experiencing any SIGNS or SYMPTOMS: YOU CAN     Schedule a same day appopintment with your Primary Care Doctor or  you can call [facility] On Call Nurse Care Line for 24/7 assistance at [phone]     If you are experience any signs or symptoms that have become severe, Call 911 and come to your nearest Emergency Room.     Thank you for choosing [facility] and allowing us to care for you.   From your care management team:      You should receive a call from [facility] Discharge Department within 48-72 hours to help manage your care after discharge. Please try to make sure that you answer your phone for this important phone call.       Follow-up Information     Alex Navarrete MD On 2/3/2022.    Specialty: Cardiology  Why: at 2:15pm  Contact information:  [address]  [address]  [facility] [address]  [phone]             Viky Bean MD On 2/10/2022.    Specialties: Family Medicine, Internal Medicine  Why: at 11am  Contact information:  [address] [place] [address]  [phone]

## 2022-01-26 ENCOUNTER — HOSPITAL ENCOUNTER (OUTPATIENT)
Facility: HOSPITAL | Age: 82
Discharge: HOME HEALTH | End: 2022-01-28
Attending: EMERGENCY MEDICINE | Admitting: EMERGENCY MEDICINE
Payer: MEDICARE

## 2022-01-26 DIAGNOSIS — R07.9 CHEST PAIN: ICD-10-CM

## 2022-01-26 DIAGNOSIS — M79.605 LOWER EXTREMITY PAIN, BILATERAL: ICD-10-CM

## 2022-01-26 DIAGNOSIS — R42 DIZZINESS: ICD-10-CM

## 2022-01-26 DIAGNOSIS — N30.01 ACUTE CYSTITIS WITH HEMATURIA: Primary | ICD-10-CM

## 2022-01-26 DIAGNOSIS — M79.604 LOWER EXTREMITY PAIN, BILATERAL: ICD-10-CM

## 2022-01-26 DIAGNOSIS — N17.9 ACUTE KIDNEY INJURY: ICD-10-CM

## 2022-01-26 DIAGNOSIS — M25.551 ACUTE RIGHT HIP PAIN: ICD-10-CM

## 2022-01-26 PROBLEM — R55 SYNCOPE: Status: ACTIVE | Noted: 2022-01-26

## 2022-01-26 PROBLEM — N18.9 ACUTE ON CHRONIC RENAL FAILURE: Status: ACTIVE | Noted: 2022-01-26

## 2022-01-26 PROBLEM — G93.40 ENCEPHALOPATHY: Status: ACTIVE | Noted: 2022-01-26

## 2022-01-26 LAB
ALBUMIN SERPL BCP-MCNC: 3.7 G/DL (ref 3.5–5.2)
ALP SERPL-CCNC: 99 U/L (ref 55–135)
ALT SERPL W/O P-5'-P-CCNC: 17 U/L (ref 10–44)
AMPHET+METHAMPHET UR QL: NEGATIVE
ANION GAP SERPL CALC-SCNC: 12 MMOL/L (ref 8–16)
AST SERPL-CCNC: 30 U/L (ref 10–40)
BACTERIA #/AREA URNS HPF: ABNORMAL /HPF
BARBITURATES UR QL SCN>200 NG/ML: NEGATIVE
BASOPHILS # BLD AUTO: 0.01 K/UL (ref 0–0.2)
BASOPHILS NFR BLD: 0.2 % (ref 0–1.9)
BENZODIAZ UR QL SCN>200 NG/ML: NEGATIVE
BILIRUB SERPL-MCNC: 0.7 MG/DL (ref 0.1–1)
BILIRUB UR QL STRIP: NEGATIVE
BNP SERPL-MCNC: <10 PG/ML (ref 0–99)
BUN SERPL-MCNC: 31 MG/DL (ref 8–23)
BZE UR QL SCN: NEGATIVE
CALCIUM SERPL-MCNC: 9.6 MG/DL (ref 8.7–10.5)
CANNABINOIDS UR QL SCN: NEGATIVE
CHLORIDE SERPL-SCNC: 106 MMOL/L (ref 95–110)
CLARITY UR: ABNORMAL
CO2 SERPL-SCNC: 21 MMOL/L (ref 23–29)
COLOR UR: ABNORMAL
CREAT SERPL-MCNC: 2.9 MG/DL (ref 0.5–1.4)
CREAT UR-MCNC: 207.2 MG/DL (ref 15–325)
CTP QC/QA: YES
DIFFERENTIAL METHOD: ABNORMAL
EOSINOPHIL # BLD AUTO: 0 K/UL (ref 0–0.5)
EOSINOPHIL NFR BLD: 0.2 % (ref 0–8)
ERYTHROCYTE [DISTWIDTH] IN BLOOD BY AUTOMATED COUNT: 14.5 % (ref 11.5–14.5)
EST. GFR  (AFRICAN AMERICAN): 17 ML/MIN/1.73 M^2
EST. GFR  (NON AFRICAN AMERICAN): 14 ML/MIN/1.73 M^2
GLUCOSE SERPL-MCNC: 106 MG/DL (ref 70–110)
GLUCOSE SERPL-MCNC: 138 MG/DL (ref 70–110)
GLUCOSE UR QL STRIP: NEGATIVE
HCT VFR BLD AUTO: 37.3 % (ref 37–48.5)
HGB BLD-MCNC: 11.8 G/DL (ref 12–16)
HGB UR QL STRIP: ABNORMAL
HYALINE CASTS #/AREA URNS LPF: 3 /LPF
IMM GRANULOCYTES # BLD AUTO: 0.01 K/UL (ref 0–0.04)
IMM GRANULOCYTES NFR BLD AUTO: 0.2 % (ref 0–0.5)
KETONES UR QL STRIP: NEGATIVE
LEUKOCYTE ESTERASE UR QL STRIP: ABNORMAL
LYMPHOCYTES # BLD AUTO: 0.9 K/UL (ref 1–4.8)
LYMPHOCYTES NFR BLD: 15.7 % (ref 18–48)
MCH RBC QN AUTO: 28.4 PG (ref 27–31)
MCHC RBC AUTO-ENTMCNC: 31.6 G/DL (ref 32–36)
MCV RBC AUTO: 90 FL (ref 82–98)
METHADONE UR QL SCN>300 NG/ML: NEGATIVE
MICROSCOPIC COMMENT: ABNORMAL
MONOCYTES # BLD AUTO: 0.6 K/UL (ref 0.3–1)
MONOCYTES NFR BLD: 10.6 % (ref 4–15)
NEUTROPHILS # BLD AUTO: 4 K/UL (ref 1.8–7.7)
NEUTROPHILS NFR BLD: 73.1 % (ref 38–73)
NITRITE UR QL STRIP: NEGATIVE
NRBC BLD-RTO: 0 /100 WBC
OPIATES UR QL SCN: NEGATIVE
PCP UR QL SCN>25 NG/ML: NEGATIVE
PH UR STRIP: 6 [PH] (ref 5–8)
PLATELET # BLD AUTO: 207 K/UL (ref 150–450)
PMV BLD AUTO: 10.1 FL (ref 9.2–12.9)
POCT GLUCOSE: 106 MG/DL (ref 70–110)
POCT GLUCOSE: 112 MG/DL (ref 70–110)
POTASSIUM SERPL-SCNC: 5.1 MMOL/L (ref 3.5–5.1)
PROT SERPL-MCNC: 9 G/DL (ref 6–8.4)
PROT UR QL STRIP: ABNORMAL
RBC # BLD AUTO: 4.16 M/UL (ref 4–5.4)
RBC #/AREA URNS HPF: 9 /HPF (ref 0–4)
SARS-COV-2 RDRP RESP QL NAA+PROBE: NEGATIVE
SODIUM SERPL-SCNC: 139 MMOL/L (ref 136–145)
SP GR UR STRIP: 1.01 (ref 1–1.03)
SQUAMOUS #/AREA URNS HPF: 9 /HPF
TOXICOLOGY INFORMATION: NORMAL
TROPONIN I SERPL DL<=0.01 NG/ML-MCNC: 0.01 NG/ML (ref 0–0.03)
TROPONIN I SERPL DL<=0.01 NG/ML-MCNC: 0.01 NG/ML (ref 0–0.03)
UNIDENT CRYS URNS QL MICRO: ABNORMAL
URN SPEC COLLECT METH UR: ABNORMAL
UROBILINOGEN UR STRIP-ACNC: ABNORMAL EU/DL
WBC # BLD AUTO: 5.4 K/UL (ref 3.9–12.7)
WBC #/AREA URNS HPF: >100 /HPF (ref 0–5)
WBC CLUMPS URNS QL MICRO: ABNORMAL
YEAST URNS QL MICRO: ABNORMAL

## 2022-01-26 PROCEDURE — 80053 COMPREHEN METABOLIC PANEL: CPT | Performed by: EMERGENCY MEDICINE

## 2022-01-26 PROCEDURE — 87186 SC STD MICRODIL/AGAR DIL: CPT | Performed by: EMERGENCY MEDICINE

## 2022-01-26 PROCEDURE — 25000003 PHARM REV CODE 250: Performed by: PHYSICIAN ASSISTANT

## 2022-01-26 PROCEDURE — 93010 ELECTROCARDIOGRAM REPORT: CPT | Mod: ,,, | Performed by: INTERNAL MEDICINE

## 2022-01-26 PROCEDURE — 83880 ASSAY OF NATRIURETIC PEPTIDE: CPT | Performed by: EMERGENCY MEDICINE

## 2022-01-26 PROCEDURE — 87088 URINE BACTERIA CULTURE: CPT | Performed by: EMERGENCY MEDICINE

## 2022-01-26 PROCEDURE — 93010 EKG 12-LEAD: ICD-10-PCS | Mod: ,,, | Performed by: INTERNAL MEDICINE

## 2022-01-26 PROCEDURE — 81000 URINALYSIS NONAUTO W/SCOPE: CPT | Mod: 59 | Performed by: EMERGENCY MEDICINE

## 2022-01-26 PROCEDURE — 84484 ASSAY OF TROPONIN QUANT: CPT | Mod: 91 | Performed by: PHYSICIAN ASSISTANT

## 2022-01-26 PROCEDURE — 96365 THER/PROPH/DIAG IV INF INIT: CPT

## 2022-01-26 PROCEDURE — 80307 DRUG TEST PRSMV CHEM ANLYZR: CPT | Performed by: EMERGENCY MEDICINE

## 2022-01-26 PROCEDURE — 93005 ELECTROCARDIOGRAM TRACING: CPT

## 2022-01-26 PROCEDURE — 87077 CULTURE AEROBIC IDENTIFY: CPT | Performed by: EMERGENCY MEDICINE

## 2022-01-26 PROCEDURE — 84484 ASSAY OF TROPONIN QUANT: CPT | Performed by: EMERGENCY MEDICINE

## 2022-01-26 PROCEDURE — U0002 COVID-19 LAB TEST NON-CDC: HCPCS | Performed by: EMERGENCY MEDICINE

## 2022-01-26 PROCEDURE — 96361 HYDRATE IV INFUSION ADD-ON: CPT

## 2022-01-26 PROCEDURE — 63600175 PHARM REV CODE 636 W HCPCS: Performed by: EMERGENCY MEDICINE

## 2022-01-26 PROCEDURE — G0378 HOSPITAL OBSERVATION PER HR: HCPCS

## 2022-01-26 PROCEDURE — 87086 URINE CULTURE/COLONY COUNT: CPT | Performed by: EMERGENCY MEDICINE

## 2022-01-26 PROCEDURE — 85025 COMPLETE CBC W/AUTO DIFF WBC: CPT | Performed by: EMERGENCY MEDICINE

## 2022-01-26 PROCEDURE — 82962 GLUCOSE BLOOD TEST: CPT

## 2022-01-26 PROCEDURE — 99285 EMERGENCY DEPT VISIT HI MDM: CPT | Mod: 25

## 2022-01-26 PROCEDURE — 25000003 PHARM REV CODE 250: Performed by: EMERGENCY MEDICINE

## 2022-01-26 RX ORDER — HYDRALAZINE HYDROCHLORIDE 25 MG/1
25 TABLET, FILM COATED ORAL EVERY 8 HOURS PRN
Status: DISCONTINUED | OUTPATIENT
Start: 2022-01-26 | End: 2022-01-27

## 2022-01-26 RX ORDER — SODIUM CHLORIDE 9 MG/ML
INJECTION, SOLUTION INTRAVENOUS
Status: COMPLETED | OUTPATIENT
Start: 2022-01-26 | End: 2022-01-27

## 2022-01-26 RX ORDER — IBUPROFEN 200 MG
24 TABLET ORAL
Status: DISCONTINUED | OUTPATIENT
Start: 2022-01-26 | End: 2022-01-26

## 2022-01-26 RX ORDER — CARVEDILOL 12.5 MG/1
12.5 TABLET ORAL 2 TIMES DAILY
Status: DISCONTINUED | OUTPATIENT
Start: 2022-01-26 | End: 2022-01-28 | Stop reason: HOSPADM

## 2022-01-26 RX ORDER — IBUPROFEN 200 MG
16 TABLET ORAL
Status: DISCONTINUED | OUTPATIENT
Start: 2022-01-26 | End: 2022-01-28 | Stop reason: HOSPADM

## 2022-01-26 RX ORDER — LATANOPROST 50 UG/ML
1 SOLUTION/ DROPS OPHTHALMIC NIGHTLY
Status: DISCONTINUED | OUTPATIENT
Start: 2022-01-26 | End: 2022-01-28 | Stop reason: HOSPADM

## 2022-01-26 RX ORDER — SODIUM CHLORIDE 0.9 % (FLUSH) 0.9 %
10 SYRINGE (ML) INJECTION EVERY 8 HOURS
Status: DISCONTINUED | OUTPATIENT
Start: 2022-01-26 | End: 2022-01-26

## 2022-01-26 RX ORDER — ACETAMINOPHEN 325 MG/1
650 TABLET ORAL EVERY 4 HOURS PRN
Status: DISCONTINUED | OUTPATIENT
Start: 2022-01-26 | End: 2022-01-28 | Stop reason: HOSPADM

## 2022-01-26 RX ORDER — AMLODIPINE BESYLATE 5 MG/1
10 TABLET ORAL DAILY
Status: DISCONTINUED | OUTPATIENT
Start: 2022-01-27 | End: 2022-01-27

## 2022-01-26 RX ORDER — GLUCAGON 1 MG
1 KIT INJECTION
Status: DISCONTINUED | OUTPATIENT
Start: 2022-01-26 | End: 2022-01-28 | Stop reason: HOSPADM

## 2022-01-26 RX ORDER — GLUCAGON 1 MG
1 KIT INJECTION
Status: DISCONTINUED | OUTPATIENT
Start: 2022-01-26 | End: 2022-01-26

## 2022-01-26 RX ORDER — TALC
6 POWDER (GRAM) TOPICAL NIGHTLY PRN
Status: DISCONTINUED | OUTPATIENT
Start: 2022-01-26 | End: 2022-01-28 | Stop reason: HOSPADM

## 2022-01-26 RX ORDER — TIMOLOL MALEATE 5 MG/ML
1 SOLUTION/ DROPS OPHTHALMIC EVERY MORNING
Status: DISCONTINUED | OUTPATIENT
Start: 2022-01-27 | End: 2022-01-28 | Stop reason: HOSPADM

## 2022-01-26 RX ORDER — IBUPROFEN 200 MG
16 TABLET ORAL
Status: DISCONTINUED | OUTPATIENT
Start: 2022-01-26 | End: 2022-01-26

## 2022-01-26 RX ORDER — ASPIRIN 81 MG/1
81 TABLET ORAL NIGHTLY
Status: DISCONTINUED | OUTPATIENT
Start: 2022-01-26 | End: 2022-01-28 | Stop reason: HOSPADM

## 2022-01-26 RX ORDER — ATORVASTATIN CALCIUM 10 MG/1
20 TABLET, FILM COATED ORAL DAILY
Status: DISCONTINUED | OUTPATIENT
Start: 2022-01-27 | End: 2022-01-28 | Stop reason: HOSPADM

## 2022-01-26 RX ORDER — AMOXICILLIN 250 MG
1 CAPSULE ORAL 2 TIMES DAILY
Status: DISCONTINUED | OUTPATIENT
Start: 2022-01-26 | End: 2022-01-26

## 2022-01-26 RX ORDER — LANOLIN ALCOHOL/MO/W.PET/CERES
800 CREAM (GRAM) TOPICAL
Status: DISCONTINUED | OUTPATIENT
Start: 2022-01-26 | End: 2022-01-28 | Stop reason: HOSPADM

## 2022-01-26 RX ORDER — SODIUM CHLORIDE 0.9 % (FLUSH) 0.9 %
10 SYRINGE (ML) INJECTION
Status: DISCONTINUED | OUTPATIENT
Start: 2022-01-26 | End: 2022-01-28 | Stop reason: HOSPADM

## 2022-01-26 RX ORDER — AMOXICILLIN 250 MG
1 CAPSULE ORAL DAILY PRN
Status: DISCONTINUED | OUTPATIENT
Start: 2022-01-26 | End: 2022-01-28 | Stop reason: HOSPADM

## 2022-01-26 RX ORDER — IBUPROFEN 200 MG
24 TABLET ORAL
Status: DISCONTINUED | OUTPATIENT
Start: 2022-01-26 | End: 2022-01-28 | Stop reason: HOSPADM

## 2022-01-26 RX ORDER — SODIUM CHLORIDE, SODIUM LACTATE, POTASSIUM CHLORIDE, CALCIUM CHLORIDE 600; 310; 30; 20 MG/100ML; MG/100ML; MG/100ML; MG/100ML
INJECTION, SOLUTION INTRAVENOUS CONTINUOUS
Status: DISCONTINUED | OUTPATIENT
Start: 2022-01-27 | End: 2022-01-27

## 2022-01-26 RX ORDER — HYDRALAZINE HYDROCHLORIDE 25 MG/1
25 TABLET, FILM COATED ORAL EVERY 12 HOURS
Status: DISCONTINUED | OUTPATIENT
Start: 2022-01-26 | End: 2022-01-27

## 2022-01-26 RX ORDER — INSULIN ASPART 100 [IU]/ML
0-5 INJECTION, SOLUTION INTRAVENOUS; SUBCUTANEOUS
Status: DISCONTINUED | OUTPATIENT
Start: 2022-01-26 | End: 2022-01-28 | Stop reason: HOSPADM

## 2022-01-26 RX ORDER — NALOXONE HCL 0.4 MG/ML
0.02 VIAL (ML) INJECTION
Status: DISCONTINUED | OUTPATIENT
Start: 2022-01-26 | End: 2022-01-28 | Stop reason: HOSPADM

## 2022-01-26 RX ADMIN — SODIUM CHLORIDE 500 ML: 0.9 INJECTION, SOLUTION INTRAVENOUS at 04:01

## 2022-01-26 RX ADMIN — HYDRALAZINE HYDROCHLORIDE 25 MG: 25 TABLET, FILM COATED ORAL at 11:01

## 2022-01-26 RX ADMIN — CARVEDILOL 12.5 MG: 12.5 TABLET, FILM COATED ORAL at 11:01

## 2022-01-26 RX ADMIN — ASPIRIN 81 MG: 81 TABLET, COATED ORAL at 11:01

## 2022-01-26 RX ADMIN — SODIUM CHLORIDE: 0.9 INJECTION, SOLUTION INTRAVENOUS at 06:01

## 2022-01-26 RX ADMIN — CEFTRIAXONE 2 G: 2 INJECTION, SOLUTION INTRAVENOUS at 05:01

## 2022-01-26 NOTE — DISCHARGE SUMMARY
[facility] Emergency Dept  Hospital Medicine  Discharge Summary      Patient Name: Ana James  MRN: [id number]  Patient Class: OP- Observation  Admission Date: 1/24/2022  Hospital Length of Stay: 0 days  Discharge Date and Time: 1/25/2022  Attending Physician: Tommie Browning MD  Discharging Provider: Mariya Collins NP  Primary Care Provider: Viky Bean MD      HPI:   81 y.o. female with HTN, obesity, PVD, DM2, physical debility, HLD, chronic diastolic heart failure, and anemia of chronic disease presents with a complaint of chest pain.  Pain is acute onset yesterday evening, located left chest, radiates down left arm, has been intermittent, associated with orthopnea, exacerbated by laying flat.  Denies fever, chills, palpitation, dizziness, syncope, n/v/d, abdominal pain, or dysuria.  Her Cardiologist is Dr. Navarrete.  In the ED, she was found to be in a fib, presumed new onset.  EKG without evidence of acute ischemia, initial troponin negative, otherwise unremarkable for acute abnormality.  Placed in observation for Cardiology evaluation.      * No surgery found *      Hospital Course:   Placed in observation for new onset a fib however Cardiology reviewed telemetry which showed NSR with PACs. Patient denied chest pain or shortness of breath. 2 D echo showed normal EF, indeterminate diastolic function and normal wall motion. Hydralazine started for better control of blood pressure. Patient stable for discharge home.        Goals of Care Treatment Preferences:  Code Status: Full Code      Consults:     No new Assessment & Plan notes have been filed under this hospital service since the last note was generated.  Service: Hospital Medicine    Final Active Diagnoses:    Diagnosis Date Noted POA    PRINCIPAL PROBLEM:  Chest pain [R07.9] 01/24/2022 Yes    Atrial fibrillation [I48.91] 01/24/2022 Yes    Hyperlipidemia [E78.5] 02/27/2020 Yes     Chronic    Chronic diastolic heart failure [I50.32]  02/27/2020 Yes     Chronic    Anemia of chronic disease [D63.8] 02/27/2020 Yes     Chronic    Physical debility [R53.81] 08/01/2019 Yes     Chronic    Controlled type 2 diabetes mellitus with stage 3 chronic kidney disease, with long-term current use of insulin [E11.22, N18.30, Z79.4] 07/11/2018 Not Applicable     Chronic    Peripheral vascular disease, unspecified [I73.9] 11/12/2014 Yes     Chronic    Essential hypertension [I10] 03/02/2014 Yes     Chronic    Morbid obesity with BMI of 45.0-49.9, adult [E66.01, Z68.42] 03/02/2014 Not Applicable     Chronic      Problems Resolved During this Admission:       Discharged Condition: stable    Disposition: Home or Self Care    Follow Up:   Follow-up Information     Alex Navarrete MD On 2/3/2022.    Specialty: Cardiology  Why: at 2:15pm  Contact information:  [address]  [address]  [facility] [address]  [phone]             Viky Bean MD On 2/10/2022.    Specialties: Family Medicine, Internal Medicine  Why: at 11am  Contact information:  [address] [place] [address]  [phone]                       Patient Instructions:      Diet Cardiac     Diet diabetic     Notify your health care provider if you experience any of the following:  temperature >100.4     Notify your health care provider if you experience any of the following:  difficulty breathing or increased cough     Notify your health care provider if you experience any of the following:  increased confusion or weakness     Activity as tolerated       Significant Diagnostic Studies: Labs: All labs within the past 24 hours have been reviewed    Pending Diagnostic Studies:     None         Medications:  Reconciled Home Medications:      Medication List      START taking these medications    hydrALAZINE 25 MG tablet  Commonly known as: APRESOLINE  Take 1 tablet (25 mg total) by mouth every 12 (twelve) hours.        CONTINUE taking these medications    albuterol 90 mcg/actuation  "inhaler  Commonly known as: PROVENTIL/VENTOLIN HFA  INHALE 2 PUFFS INTO THE LUNGS EVERY 6 (SIX) HOURS AS NEEDED FOR WHEEZING. RESCUE     amLODIPine 10 MG tablet  Commonly known as: NORVASC  TAKE 1 TABLET BY MOUTH EVERY DAY     aspirin 81 MG EC tablet  Commonly known as: ECOTRIN  Take 81 mg by mouth nightly.     atorvastatin 20 MG tablet  Commonly known as: LIPITOR  TAKE 1 TABLET BY MOUTH EVERY DAY     blood sugar diagnostic Strp  Test 3 times daily     blood-glucose transmitter Lea  Commonly known as: DEXCOM G5 TRANSMITTER  Test glucose twice daily     candesartan-hydrochlorothiazid 32-12.5 mg per tablet  Commonly known as: ATACAND HCT  Take 1 tablet by mouth once daily.     carvediloL 12.5 MG tablet  Commonly known as: COREG  Take 1 tablet (12.5 mg total) by mouth 2 (two) times daily.     cilostazoL 50 MG Tab  Commonly known as: PLETAL  TAKE 1 TABLET BY MOUTH TWICE A DAY     DEXCOM G6  Misc  Generic drug: blood-glucose meter,continuous  Test glucose twice daily     * lancets Misc  Test 3 times daily     * lancets Misc  To check BG 3 times daily, to use with insurance preferred meter     latanoprost 0.005 % ophthalmic solution  Place 1 drop into both eyes every evening.     LEVEMIR FLEXTOUCH U-100 INSULN 100 unit/mL (3 mL) Inpn pen  Generic drug: insulin detemir U-100  Inject 26 Units into the skin every evening.     oxybutynin 15 MG Tr24  Commonly known as: DITROPAN XL  TAKE 1 TABLET BY MOUTH EVERY DAY     pen needle, diabetic 32 gauge x 5/32" Ndle  Commonly known as: BD ULTRA-FINE BREE PEN NEEDLE  INJECT INSULIN THREE TIMES DAILY     timolol maleate 0.5% 0.5 % Drop  Commonly known as: TIMOPTIC  Place 1 drop into both eyes every morning.     TRADJENTA 5 mg Tab tablet  Generic drug: linaGLIPtin  TAKE 1 TABLET BY MOUTH EVERY DAY     traZODone 50 MG tablet  Commonly known as: DESYREL  TAKE 1 TABLET (50 MG TOTAL) BY MOUTH EVERY EVENING.         * This list has 2 medication(s) that are the same as other " medications prescribed for you. Read the directions carefully, and ask your doctor or other care provider to review them with you.                Indwelling Lines/Drains at time of discharge:   Lines/Drains/Airways     None                 Time spent on the discharge of patient: 30 minutes         Mariya Collins NP  Department of Hospital Medicine  [facility] [place] - Emergency Dept

## 2022-01-26 NOTE — ASSESSMENT & PLAN NOTE
Per brother has been intermittently confused since last known normal of last night. Patient intermittently confused to situation during exam. CT head without acute abnormality, without hypoglycemia. Suspect infectious in origin for possible UTI, BUN of 31, liver function within normal limits.   -will check chest x-ray, TSH, and UDS  -continue Rocephin for UTI  -neuro checks/fall precautions  -neurology consulted

## 2022-01-26 NOTE — HOSPITAL COURSE
Placed in observation for new onset a fib however Cardiology reviewed telemetry which showed NSR with PACs. Patient denied chest pain or shortness of breath. 2 D echo showed normal EF, indeterminate diastolic function and normal wall motion. Hydralazine started for better control of blood pressure. Patient stable for discharge home.

## 2022-01-26 NOTE — ASSESSMENT & PLAN NOTE
Body mass index is 42.07 kg/m². Morbid obesity complicates all aspects of disease management from diagnostic mod alities to treatment. Weight loss encouraged and health benefits explained to patient.

## 2022-01-26 NOTE — ASSESSMENT & PLAN NOTE
Per brother patient had a syncopal episode at home though patient denies this. Echo yesterday with normal EF and mild aortic valve sclerosis. Troponin negative. Seen yesterday and remained in NSR on telemetry  -troponin trend  -telemetry  -will check orthostatic BP  -neurology consulted

## 2022-01-26 NOTE — ASSESSMENT & PLAN NOTE
Lab Results   Component Value Date    HGBA1C 6.8 (H) 01/12/2022     Will start sliding scale insulin, diabetic/renal diet, goal -180

## 2022-01-26 NOTE — ED PROVIDER NOTES
"Encounter Date: 1/26/2022    SCRIBE #1 NOTE: I, Estefany Espana, am scribing for, and in the presence of,  Cheryl Shafer MD. I have scribed the following portions of the note - Other sections scribed: HPI, ROS, PE.       History     Chief Complaint   Patient presents with    Weakness     Pt seen here in ED yesterday for Chest Pain. Pt with hx of Afib. Return to ED for weakness and dizziness x 1 day.      Ms James is a 81 y.o. female, with a PMHx of DM II, HTN, and diastolic heart failure (EF 65%), PVD, Renal disorder, who presents to the ED with weakness and dizziness. Patient was just hospitalized at this facility under observation status for acute chest pain 2 days ago, and was seen for  A-fib that had resolved during her hospital stay, and was discharged yesterday. She states this AM, she was feeling persistent, severe dizziness and lightheadedness, and weakness in her bilateral legs, and was unable to stand and ambulate and "almost fell", prompting her return to the ED. She also reports increased, severe pain to her left lower extremity "for a couple of months", which exacerbates with movement. She states she usually ambulates with a walker. Further notes occasional fatigue and chronic shortness of breath. She is on Aspirin 81 mg, Coreg, Candesartan-Hydrochlorothiazide, Amlodipine, Atorvastatin, Lasix medications, but admits to noncompliance with her medications "every other day, and I don't know why I didn't take them this AM". No other exacerbating or alleviating factors. Denies any acute arm pain, abdominal pain, urinary symptoms, vomiting, diarrhea, dysuria, back pain, neck pain, blurred vision, confusion, chest pain, shortness of breath, or other associated symptoms.     The history is provided by the patient.     Review of patient's allergies indicates:   Allergen Reactions    Adhesive Rash     Paper tape     Past Medical History:   Diagnosis Date    Arthritis lower extremities    Asthma     Blind " left eye     Diabetes mellitus     Edema of both lower legs     CHRONIC    Gall stones     Hypertension     Kidney stones     Nephrolithiasis 1/22/2016    Obesity     PVD (peripheral vascular disease)     Renal disorder     Retinopathy     Sleep apnea     Vaginal delivery     x1    Weakness of both lower extremities     uses a cane, rollator & power chair     Past Surgical History:   Procedure Laterality Date    CARDIAC CATHETERIZATION      cataract      CHOLECYSTECTOMY      EYE SURGERY      Rt cataract surgery    HYSTERECTOMY      URETERAL STENT PLACEMENT       Family History   Problem Relation Age of Onset    Kidney failure Mother     Hypertension Father     Diabetes Brother     Cancer Sister      Social History     Tobacco Use    Smoking status: Never Smoker    Smokeless tobacco: Never Used   Substance Use Topics    Alcohol use: No    Drug use: No     Review of Systems   Constitutional: Positive for fatigue.   Eyes: Negative for visual disturbance.   Respiratory: Positive for shortness of breath (chronic).    Cardiovascular: Negative for chest pain.   Gastrointestinal: Negative for abdominal pain, diarrhea and vomiting.   Genitourinary: Negative.  Negative for dysuria and frequency.   Musculoskeletal: Positive for arthralgias. Negative for back pain and neck pain.        + left leg pain (chronic)  - arm pain.   Skin: Negative for rash.   Neurological: Positive for dizziness, weakness and light-headedness.   Psychiatric/Behavioral: Negative for confusion.   All other systems reviewed and are negative.      Physical Exam     Initial Vitals   BP Pulse Resp Temp SpO2   01/26/22 1157 01/26/22 1157 01/26/22 1157 01/26/22 1650 01/26/22 1157   (!) 203/89 83 16 97.9 °F (36.6 °C) 97 %      MAP       --                Physical Exam    Nursing note and vitals reviewed.  Constitutional: She appears well-developed and well-nourished. She is not diaphoretic. No distress.   HENT:   Head: Normocephalic  and atraumatic.   Eyes: EOM are normal.   Extraocular movements intact, but delayed. Left visual acuity not assessed due to confirmed blindness in eye by patient. No changes in right visual acuity.    Cardiovascular: Normal rate, regular rhythm and normal heart sounds. Exam reveals no friction rub.    No murmur heard.  Dorsalis pedis pulses are intact bilaterally.    Pulmonary/Chest: No respiratory distress. She has no wheezes. She has no rales.   Abdominal: Abdomen is soft. Bowel sounds are normal. There is no abdominal tenderness.   Musculoskeletal:         General: No edema. Normal range of motion.      Comments: Tenderness to bilateral tibia and calf regions. No appreciable size differentiated.      Neurological: She is alert and oriented to person, place, and time. No cranial nerve deficit.   Cranial nerves intact. Bilateral upper extremities strength intact, 3/5. Right lower Extremity strength intact, 4/5. Left lower extremity with weakness; strength not assessed due to pain. Good perfusion of all extremities. Mild confusion at times.    Skin: Skin is warm and dry. No rash noted. No erythema.   Psychiatric: She has a normal mood and affect. Her behavior is normal. Judgment and thought content normal.         ED Course   Procedures  Labs Reviewed   URINALYSIS, REFLEX TO URINE CULTURE - Abnormal; Notable for the following components:       Result Value    Color, UA Orange (*)     Appearance, UA Cloudy (*)     Protein, UA 1+ (*)     Occult Blood UA 1+ (*)     Urobilinogen, UA 4.0-6.0 (*)     Leukocytes, UA 3+ (*)     All other components within normal limits    Narrative:     Specimen Source->Urine   CBC W/ AUTO DIFFERENTIAL - Abnormal; Notable for the following components:    Hemoglobin 11.8 (*)     MCHC 31.6 (*)     Lymph # 0.9 (*)     Gran % 73.1 (*)     Lymph % 15.7 (*)     All other components within normal limits   COMPREHENSIVE METABOLIC PANEL - Abnormal; Notable for the following components:    CO2 21 (*)      Glucose 138 (*)     BUN 31 (*)     Creatinine 2.9 (*)     Total Protein 9.0 (*)     eGFR if  17 (*)     eGFR if non  14 (*)     All other components within normal limits   URINALYSIS MICROSCOPIC - Abnormal; Notable for the following components:    RBC, UA 9 (*)     WBC, UA >100 (*)     Bacteria Many (*)     Hyaline Casts, UA 3 (*)     All other components within normal limits    Narrative:     Specimen Source->Urine   BASIC METABOLIC PANEL - Abnormal; Notable for the following components:    Glucose 125 (*)     BUN 28 (*)     Creatinine 2.1 (*)     eGFR if  25 (*)     eGFR if non  21 (*)     All other components within normal limits   CK - Abnormal; Notable for the following components:     (*)     All other components within normal limits   POCT GLUCOSE - Abnormal; Notable for the following components:    POCT Glucose 112 (*)     All other components within normal limits   CULTURE, URINE   TROPONIN I   B-TYPE NATRIURETIC PEPTIDE   DRUG SCREEN PANEL, URINE EMERGENCY   TROPONIN I   DRUG SCREEN PANEL, URINE EMERGENCY    Narrative:     Specimen Source->Urine   TROPONIN I   TSH   SARS-COV-2 RDRP GENE   POCT GLUCOSE   POCT GLUCOSE MONITORING CONTINUOUS     EKG Readings: (Independently Interpreted)   Initial Reading: No STEMI. Rhythm: Atrial Fibrillation. Ectopy: No Ectopy.     ECG Results          EKG 12-lead (Final result)  Result time 01/26/22 18:20:43    Final result by Interface, Lab In [facility] (01/26/22 18:20:43)                 Narrative:    Test Reason : R42,    Vent. Rate : 069 BPM     Atrial Rate : 071 BPM     P-R Int : 216 ms          QRS Dur : 084 ms      QT Int : 408 ms       P-R-T Axes : 034 050 -69 degrees     QTc Int : 437 ms    Normal sinus rhythm  Inferior infarct (cited on or before 25-JAN-2022)  Cannot rule out Anterior infarct (cited on or before 24-JAN-2022)  Abnormal ECG  When compared with ECG of 25-JAN-2022  11:37,  Significant changes have occurred  Confirmed by Alex Navarrete MD ([id number]) on 1/26/2022 6:20:34 PM    Referred By: [email or web address]   SELF           Confirmed By:Alex Navarrete MD                            Imaging Results          X-Ray Chest 1 View (Final result)  Result time 01/26/22 17:43:24    Final result by Brenda Boyle MD (01/26/22 17:43:24)                 Impression:      No significant change.      Electronically signed by: Brenda Boyle MD  Date:    01/26/2022  Time:    17:43             Narrative:    EXAMINATION:  XR CHEST 1 VIEW    CLINICAL HISTORY:  altered mental status;    TECHNIQUE:  Single frontal view of the chest was performed.    COMPARISON:  01/24/2022.    FINDINGS:  Cardiac silhouette is stable in size.  Lungs are symmetrically expanded.  No evidence of new focal consolidative process, pneumothorax, or significant pleural effusion.  No acute osseous abnormality identified.                               US Lower Extremity Veins Bilateral (Final result)  Result time 01/26/22 16:18:28    Final result by Antoine De Paz MD (01/26/22 16:18:28)                 Impression:      No evidence of deep venous thrombosis in either lower extremity.      Electronically signed by: Antoine De Paz  Date:    01/26/2022  Time:    16:18             Narrative:    EXAMINATION:  US LOWER EXTREMITY VEINS BILATERAL    CLINICAL HISTORY:  Pain in right leg    TECHNIQUE:  Duplex and color flow Doppler and dynamic compression was performed of the bilateral lower extremity veins was performed.    COMPARISON:  None    FINDINGS:  Right thigh veins: The common femoral, femoral, popliteal, upper greater saphenous, and deep femoral veins are patent and free of thrombus. The veins are normally compressible and have normal phasic flow and augmentation response.    Right calf veins: The visualized calf veins are patent.    Left thigh veins: The common femoral, femoral, popliteal, upper greater saphenous, and deep femoral veins  are patent and free of thrombus. The veins are normally compressible and have normal phasic flow and augmentation response.    Left calf veins: The visualized calf veins are patent.    Miscellaneous: None                               CT Head Without Contrast (Final result)  Result time 01/26/22 14:38:04    Final result by Jayjay Romero MD (01/26/22 14:38:04)                 Impression:      No acute large vascular territory infarct or intracranial hemorrhage identified.  Further evaluation/follow-up as warranted.      Electronically signed by: Jayjay Romero MD  Date:    01/26/2022  Time:    14:38             Narrative:    EXAMINATION:  CT HEAD WITHOUT CONTRAST    CLINICAL HISTORY:  Dizziness, persistent/recurrent, cardiac or vascular cause suspected;    TECHNIQUE:  Low dose axial CT images obtained throughout the head without intravenous contrast. Sagittal and coronal reconstructions were performed.    COMPARISON:  Head CT most recent 01/30/2020 and MRI brain 11/26/2011    FINDINGS:  Patient is rotated and tilted within the scanner.    Intracranial compartment:    Generalized cerebral volume loss.  Ventricles are midline and stable in size and configuration without distortion by mass effect or acute hydrocephalus.  No extra-axial blood or fluid collections.    Grossly stable distribution of patchy hypoattenuation of the supratentorial white matter consistent with chronic microvascular ischemic change.  No definite new focal areas of abnormal parenchymal attenuation.  No parenchymal mass, hemorrhage, edema or major vascular distribution infarct.  Bilateral basal ganglia and skull base atherosclerotic vascular calcifications noted.    Skull/extracranial contents (limited evaluation): Hyperostosis frontalis interna.  No fracture. Mastoid air cells and paranasal sinuses are essentially clear.  Chronic left globe phthisis bulbi again noted.                                 Medications   melatonin tablet 6 mg (has no  administration in time range)   acetaminophen tablet 650 mg (has no administration in time range)   naloxone 0.4 mg/mL injection 0.02 mg (has no administration in time range)   magnesium oxide tablet 800 mg (has no administration in time range)   magnesium oxide tablet 800 mg (has no administration in time range)   senna-docusate 8.6-50 mg per tablet 1 tablet (has no administration in time range)   sodium chloride 0.9% flush 10 mL (has no administration in time range)   cefTRIAXone (ROCEPHIN) 1 g/50 mL D5W IVPB (has no administration in time range)   glucose chewable tablet 16 g (has no administration in time range)   glucose chewable tablet 24 g (has no administration in time range)   glucagon (human recombinant) injection 1 mg (has no administration in time range)   insulin aspart U-100 pen 0-5 Units (has no administration in time range)   dextrose 10% bolus 125 mL (has no administration in time range)   dextrose 10% bolus 250 mL (has no administration in time range)   aspirin EC tablet 81 mg (81 mg Oral Given 1/26/22 2309)   atorvastatin tablet 20 mg (has no administration in time range)   carvediloL tablet 12.5 mg (12.5 mg Oral Given 1/26/22 2309)   latanoprost 0.005 % ophthalmic solution 1 drop (1 drop Both Eyes Not Given 1/26/22 2100)   timolol maleate 0.5% ophthalmic solution 1 drop (has no administration in time range)   amLODIPine tablet 10 mg (10 mg Oral Given 1/27/22 0041)   ondansetron injection 4 mg (4 mg Intravenous Given 1/27/22 0322)   cloNIDine tablet 0.2 mg (0.2 mg Oral Given 1/27/22 0404)   hydrALAZINE tablet 50 mg (has no administration in time range)   HYDROcodone-acetaminophen 5-325 mg per tablet 1 tablet (1 tablet Oral Given 1/27/22 0424)   sodium chloride 0.9% bolus 500 mL (0 mLs Intravenous Stopped 1/26/22 1735)   cefTRIAXone (ROCEPHIN) 2 g/50 mL D5W IVPB (0 g Intravenous Stopped 1/26/22 1845)   0.9%  NaCl infusion ( Intravenous Stopped 1/27/22 0025)   labetaloL injection 10 mg (10 mg  Intravenous Given 1/27/22 7507)   morphine injection 1 mg (1 mg Intravenous Given 1/27/22 7795)     Medical Decision Making:   History:   Old Medical Records: I decided to obtain old medical records.  Independently Interpreted Test(s):   I have ordered and independently interpreted EKG Reading(s) - see prior notes  Clinical Tests:   Lab Tests: Ordered and Reviewed  Radiological Study: Ordered and Reviewed  Medical Tests: Ordered and Reviewed  ED Management:  Bed pan and pure wick attempted. No UOP. In/out cath will be attempted.   Prior records reviewed.   New AUTUMN noted.   Pt given 500cc NS bolus. Suspect little po intake over last 24 hours.   UA noted, Uti. abx ordered.   Pt will be placed in obs for hydration to treat AUTUMN.   No dvt in BLE.   Noted to have some mild confusion at times. ? Related to uti/AUTUMN. Head CT negative. Will defer to obs team. Brother at bedside. He has no additional concerns.              Scribe Attestation:   Scribe #1: I performed the above scribed service and the documentation accurately describes the services I performed. I attest to the accuracy of the note.                 Clinical Impression:   Final diagnoses:  [R42] Dizziness  [M79.604, M79.605] Lower extremity pain, bilateral  [N17.9] Acute kidney injury  [N30.01] Acute cystitis with hematuria (Primary)  [R07.9] Chest pain          ED Disposition Condition    Observation               Cheryl Shafer MD  01/27/22 5407

## 2022-01-26 NOTE — SUBJECTIVE & OBJECTIVE
Past Medical History:   Diagnosis Date    Arthritis lower extremities    Asthma     Blind left eye     Diabetes mellitus     Edema of both lower legs     CHRONIC    Gall stones     Hypertension     Kidney stones     Nephrolithiasis 1/22/2016    Obesity     PVD (peripheral vascular disease)     Renal disorder     Retinopathy     Sleep apnea     Vaginal delivery     x1    Weakness of both lower extremities     uses a cane, rollator & power chair       Past Surgical History:   Procedure Laterality Date    CARDIAC CATHETERIZATION      cataract      CHOLECYSTECTOMY      EYE SURGERY      Rt cataract surgery    HYSTERECTOMY      URETERAL STENT PLACEMENT         Review of patient's allergies indicates:   Allergen Reactions    Adhesive Rash     Paper tape       Current Facility-Administered Medications on File Prior to Encounter   Medication    [DISCONTINUED] acetaminophen tablet 650 mg    [DISCONTINUED] albuterol-ipratropium 2.5 mg-0.5 mg/3 mL nebulizer solution 3 mL    [DISCONTINUED] aluminum-magnesium hydroxide-simethicone 200-200-20 mg/5 mL suspension 30 mL    [DISCONTINUED] amLODIPine tablet 10 mg    [DISCONTINUED] aspirin EC tablet 81 mg    [DISCONTINUED] atorvastatin tablet 20 mg    [DISCONTINUED] carvediloL tablet 12.5 mg    [DISCONTINUED] cilostazoL tablet 50 mg    [DISCONTINUED] dextrose 50% injection 12.5 g    [DISCONTINUED] dextrose 50% injection 25 g    [DISCONTINUED] glucagon (human recombinant) injection 1 mg    [DISCONTINUED] glucose chewable tablet 16 g    [DISCONTINUED] glucose chewable tablet 24 g    [DISCONTINUED] hydrALAZINE tablet 25 mg    [DISCONTINUED] hydroCHLOROthiazide tablet 12.5 mg    [DISCONTINUED] melatonin tablet 6 mg    [DISCONTINUED] naloxone 0.4 mg/mL injection 0.02 mg    [DISCONTINUED] ondansetron injection 4 mg    [DISCONTINUED] oxybutynin 24 hr tablet 15 mg    [DISCONTINUED] polyethylene glycol packet 17 g    [DISCONTINUED] prochlorperazine  "injection Soln 5 mg    [DISCONTINUED] simethicone chewable tablet 80 mg    [DISCONTINUED] sodium chloride 0.9% flush 10 mL    [DISCONTINUED] traZODone tablet 50 mg    [DISCONTINUED] valsartan tablet 320 mg     Current Outpatient Medications on File Prior to Encounter   Medication Sig    albuterol (PROVENTIL/VENTOLIN HFA) 90 mcg/actuation inhaler INHALE 2 PUFFS INTO THE LUNGS EVERY 6 (SIX) HOURS AS NEEDED FOR WHEEZING. RESCUE    amLODIPine (NORVASC) 10 MG tablet TAKE 1 TABLET BY MOUTH EVERY DAY    aspirin (ECOTRIN) 81 MG EC tablet Take 81 mg by mouth nightly.    atorvastatin (LIPITOR) 20 MG tablet TAKE 1 TABLET BY MOUTH EVERY DAY    blood sugar diagnostic Strp Test 3 times daily    blood-glucose meter,continuous (DEXCOM G6 ) Misc Test glucose twice daily    blood-glucose transmitter (DEXCOM G5 TRANSMITTER) Lea Test glucose twice daily    candesartan-hydrochlorothiazid (ATACAND HCT) 32-12.5 mg per tablet Take 1 tablet by mouth once daily.    carvediloL (COREG) 12.5 MG tablet Take 1 tablet (12.5 mg total) by mouth 2 (two) times daily.    cilostazoL (PLETAL) 50 MG Tab TAKE 1 TABLET BY MOUTH TWICE A DAY    hydrALAZINE (APRESOLINE) 25 MG tablet Take 1 tablet (25 mg total) by mouth every 12 (twelve) hours.    insulin detemir U-100 (LEVEMIR FLEXTOUCH U-100 INSULN) 100 unit/mL (3 mL) InPn pen Inject 26 Units into the skin every evening.    lancets Misc Test 3 times daily    lancets Misc To check BG 3 times daily, to use with insurance preferred meter    latanoprost 0.005 % ophthalmic solution Place 1 drop into both eyes every evening.    oxybutynin (DITROPAN XL) 15 MG TR24 TAKE 1 TABLET BY MOUTH EVERY DAY    pen needle, diabetic (BD ULTRA-FINE BREE PEN NEEDLE) 32 gauge x 5/32" Ndle INJECT INSULIN THREE TIMES DAILY    timolol maleate 0.5% (TIMOPTIC) 0.5 % Drop Place 1 drop into both eyes every morning.    TRADJENTA 5 mg Tab tablet TAKE 1 TABLET BY MOUTH EVERY DAY    traZODone (DESYREL) 50 MG " tablet TAKE 1 TABLET (50 MG TOTAL) BY MOUTH EVERY EVENING.     Family History     Problem Relation (Age of Onset)    Cancer Sister    Diabetes Brother    Hypertension Father    Kidney failure Mother        Tobacco Use    Smoking status: Never Smoker    Smokeless tobacco: Never Used   Substance and Sexual Activity    Alcohol use: No    Drug use: No    Sexual activity: Not Currently     Partners: Male     Review of Systems   Constitutional: Positive for fatigue. Negative for chills and fever.   HENT: Negative for nosebleeds and tinnitus.    Eyes: Negative for photophobia and visual disturbance.   Respiratory: Negative for shortness of breath and wheezing.    Cardiovascular: Negative for chest pain, palpitations and leg swelling.   Gastrointestinal: Negative for abdominal distention, abdominal pain, nausea and vomiting.   Genitourinary: Negative for dysuria, flank pain and hematuria.   Musculoskeletal: Negative for gait problem and joint swelling.   Skin: Negative for rash and wound.   Neurological: Positive for weakness. Negative for seizures, syncope (syncope per brother) and speech difficulty.     Objective:     Vital Signs (Most Recent):  Temp: 97.9 °F (36.6 °C) (01/26/22 1650)  Pulse: 83 (01/26/22 1157)  Resp: 16 (01/26/22 1157)  BP: (!) 203/89 (01/26/22 1157)  SpO2: 97 % (01/26/22 1157) Vital Signs (24h Range):  Temp:  [97.9 °F (36.6 °C)] 97.9 °F (36.6 °C)  Pulse:  [83] 83  Resp:  [16] 16  SpO2:  [97 %] 97 %  BP: (203)/(89) 203/89     Weight: 104.3 kg (230 lb)  Body mass index is 42.07 kg/m².    Physical Exam  Vitals and nursing note reviewed.   Constitutional:       General: She is not in acute distress.     Appearance: She is well-developed and well-nourished.   HENT:      Head: Normocephalic and atraumatic.      Right Ear: External ear normal.      Left Ear: External ear normal.   Eyes:      General:         Right eye: No discharge.         Left eye: No discharge.      Extraocular Movements: EOM normal.       Conjunctiva/sclera: Conjunctivae normal.      Comments: Blind left eye   Neck:      Thyroid: No thyromegaly.   Cardiovascular:      Rate and Rhythm: Normal rate and regular rhythm.      Heart sounds: No murmur heard.      Pulmonary:      Effort: Pulmonary effort is normal. No respiratory distress.      Breath sounds: Normal breath sounds.   Abdominal:      General: Bowel sounds are normal. There is no distension.      Palpations: Abdomen is soft. There is no mass.      Tenderness: There is no abdominal tenderness.   Musculoskeletal:         General: No deformity or edema.      Cervical back: Normal range of motion and neck supple.   Skin:     General: Skin is warm and dry.   Neurological:      Mental Status: She is alert and oriented to person, place, and time.      Sensory: No sensory deficit.      Comments: No facial droop. Symmetric movement against gravity of BUE and BLE. No sensation deficit. Intermittently disoriented to time, though reorients herself. No slurred speech   Psychiatric:         Mood and Affect: Mood and affect and mood normal.         Behavior: Behavior normal.           CRANIAL NERVES     CN III, IV, VI   Extraocular motions are normal.        Significant Labs:   CBC:   Recent Labs   Lab 01/26/22  1357   WBC 5.40   HGB 11.8*   HCT 37.3        CMP:   Recent Labs   Lab 01/26/22  1357      K 5.1      CO2 21*   *   BUN 31*   CREATININE 2.9*   CALCIUM 9.6   PROT 9.0*   ALBUMIN 3.7   BILITOT 0.7   ALKPHOS 99   AST 30   ALT 17   ANIONGAP 12   EGFRNONAA 14*     Cardiac Markers:   Recent Labs   Lab 01/26/22  1357   BNP <10     Troponin:   Recent Labs   Lab 01/24/22  2130 01/26/22  1357   TROPONINI 0.016  0.016 0.007       Significant Imaging:   Imaging Results          X-Ray Chest 1 View (In process)                US Lower Extremity Veins Bilateral (Final result)  Result time 01/26/22 16:18:28    Final result by Antoine Colindres MD (01/26/22 16:18:28)                  Impression:      No evidence of deep venous thrombosis in either lower extremity.      Electronically signed by: Antoine De Paz  Date:    01/26/2022  Time:    16:18             Narrative:    EXAMINATION:  US LOWER EXTREMITY VEINS BILATERAL    CLINICAL HISTORY:  Pain in right leg    TECHNIQUE:  Duplex and color flow Doppler and dynamic compression was performed of the bilateral lower extremity veins was performed.    COMPARISON:  None    FINDINGS:  Right thigh veins: The common femoral, femoral, popliteal, upper greater saphenous, and deep femoral veins are patent and free of thrombus. The veins are normally compressible and have normal phasic flow and augmentation response.    Right calf veins: The visualized calf veins are patent.    Left thigh veins: The common femoral, femoral, popliteal, upper greater saphenous, and deep femoral veins are patent and free of thrombus. The veins are normally compressible and have normal phasic flow and augmentation response.    Left calf veins: The visualized calf veins are patent.    Miscellaneous: None                               CT Head Without Contrast (Final result)  Result time 01/26/22 14:38:04    Final result by Jayjay Romero MD (01/26/22 14:38:04)                 Impression:      No acute large vascular territory infarct or intracranial hemorrhage identified.  Further evaluation/follow-up as warranted.      Electronically signed by: Jayjay Romero MD  Date:    01/26/2022  Time:    14:38             Narrative:    EXAMINATION:  CT HEAD WITHOUT CONTRAST    CLINICAL HISTORY:  Dizziness, persistent/recurrent, cardiac or vascular cause suspected;    TECHNIQUE:  Low dose axial CT images obtained throughout the head without intravenous contrast. Sagittal and coronal reconstructions were performed.    COMPARISON:  Head CT most recent 01/30/2020 and MRI brain 11/26/2011    FINDINGS:  Patient is rotated and tilted within the scanner.    Intracranial compartment:    Generalized  cerebral volume loss.  Ventricles are midline and stable in size and configuration without distortion by mass effect or acute hydrocephalus.  No extra-axial blood or fluid collections.    Grossly stable distribution of patchy hypoattenuation of the supratentorial white matter consistent with chronic microvascular ischemic change.  No definite new focal areas of abnormal parenchymal attenuation.  No parenchymal mass, hemorrhage, edema or major vascular distribution infarct.  Bilateral basal ganglia and skull base atherosclerotic vascular calcifications noted.    Skull/extracranial contents (limited evaluation): Hyperostosis frontalis interna.  No fracture. Mastoid air cells and paranasal sinuses are essentially clear.  Chronic left globe phthisis bulbi again noted.

## 2022-01-26 NOTE — ASSESSMENT & PLAN NOTE
Poorly controlled. Resume home amlopdipine and coreg and hydralazine, holding home candesartan/hctz for AUTUMN. Prn hydralazine

## 2022-01-26 NOTE — ASSESSMENT & PLAN NOTE
Baseline sCr variable though typically between 1.7-2.0, Cr today of 2.9, evidence of infection on UA.   Started on Gentle IVF   Holding home candesartan/hctz  Fena unreliable to HCTZ use  Will check bladder scan  Renal dose medications avoid nephrotoxic drugs monitor intake and output

## 2022-01-26 NOTE — HOSPITAL COURSE
Placed in observation for weakness, confusion, possible syncope, UTI and AUTUMN on CKD . Spoke with Brother Prince 1/27/22 who stated Caregiver Asha found patient passed out on chair in kitchen table and patient not able to stand up.  Patient have a caregiver Mon -Fri 9-3pm and baseline forgetful, left leg weakness, ambulates short distance with walker. Mental status back to baseline and AUTUMN resolved with IVF. X ray right hip no fractures. PT/OT consult-recommended SNF vs HH as patient max assist. [place] discussed with family insurance not yet approve for SNF. Patient discharge home with HH PT/OT and family/PCA support.

## 2022-01-26 NOTE — H&P
[facility]  [facility]  History & Physical    Patient Name: Ana James  MRN: [id number]  Patient Class: OP- Observation  Admission Date: 1/26/2022  Attending Physician: Cheryl Shafer MD   Primary Care Provider: Viky Bean MD         Patient information was obtained from patient, past medical records and ER records.     Subjective:     Principal Problem:Acute on chronic renal failure    Chief Complaint:   Chief Complaint   Patient presents with    Weakness     Pt seen here in ED yesterday for Chest Pain. Pt with hx of Afib. Return to ED for weakness and dizziness x 1 day.         HPI: Ana James 81 y.o. female with HTN, HLD, DM, CKD, HFpEF presents to hospital with chief complaint of altered mental status.  Per the patient's brother she had a syncopal episode today with associated vomiting.  Since that time he has noted she has been increasingly weak and intermittently disoriented to situation.  He finds her mental status has improved since arrival to the emergency room.  The patient denies any loss of consciousness this morning, she states she was just feeling generally weak and tired compared to her baseline from when she was discharged from the hospital.  She normally ambulates with a walker but has a wheelchair at home as well.  She denies any NSAID use.  She was last seen normal by her brother last night.  She denies fever chest pain shortness of breath abdominal pain dysuria melena hematuria hematemesis. The brother denies seeing any slurred speech, facial droop, or seziure activity.     In the ED, UA with leukocytes white blood cells and bacteria, creatinine of 2.9, CT without acute abnormality, DVT ultrasound without evidence of DVT, COVID negative, troponin negative, BNP negative.      Past Medical History:   Diagnosis Date    Arthritis lower extremities    Asthma     Blind left eye     Diabetes mellitus     Edema of both lower legs     CHRONIC     Gall stones     Hypertension     Kidney stones     Nephrolithiasis 1/22/2016    Obesity     PVD (peripheral vascular disease)     Renal disorder     Retinopathy     Sleep apnea     Vaginal delivery     x1    Weakness of both lower extremities     uses a cane, rollator & power chair       Past Surgical History:   Procedure Laterality Date    CARDIAC CATHETERIZATION      cataract      CHOLECYSTECTOMY      EYE SURGERY      Rt cataract surgery    HYSTERECTOMY      URETERAL STENT PLACEMENT         Review of patient's allergies indicates:   Allergen Reactions    Adhesive Rash     Paper tape       Current Facility-Administered Medications on File Prior to Encounter   Medication    [DISCONTINUED] acetaminophen tablet 650 mg    [DISCONTINUED] albuterol-ipratropium 2.5 mg-0.5 mg/3 mL nebulizer solution 3 mL    [DISCONTINUED] aluminum-magnesium hydroxide-simethicone 200-200-20 mg/5 mL suspension 30 mL    [DISCONTINUED] amLODIPine tablet 10 mg    [DISCONTINUED] aspirin EC tablet 81 mg    [DISCONTINUED] atorvastatin tablet 20 mg    [DISCONTINUED] carvediloL tablet 12.5 mg    [DISCONTINUED] cilostazoL tablet 50 mg    [DISCONTINUED] dextrose 50% injection 12.5 g    [DISCONTINUED] dextrose 50% injection 25 g    [DISCONTINUED] glucagon (human recombinant) injection 1 mg    [DISCONTINUED] glucose chewable tablet 16 g    [DISCONTINUED] glucose chewable tablet 24 g    [DISCONTINUED] hydrALAZINE tablet 25 mg    [DISCONTINUED] hydroCHLOROthiazide tablet 12.5 mg    [DISCONTINUED] melatonin tablet 6 mg    [DISCONTINUED] naloxone 0.4 mg/mL injection 0.02 mg    [DISCONTINUED] ondansetron injection 4 mg    [DISCONTINUED] oxybutynin 24 hr tablet 15 mg    [DISCONTINUED] polyethylene glycol packet 17 g    [DISCONTINUED] prochlorperazine injection Soln 5 mg    [DISCONTINUED] simethicone chewable tablet 80 mg    [DISCONTINUED] sodium chloride 0.9% flush 10 mL    [DISCONTINUED] traZODone tablet 50 mg     "[DISCONTINUED] valsartan tablet 320 mg     Current Outpatient Medications on File Prior to Encounter   Medication Sig    albuterol (PROVENTIL/VENTOLIN HFA) 90 mcg/actuation inhaler INHALE 2 PUFFS INTO THE LUNGS EVERY 6 (SIX) HOURS AS NEEDED FOR WHEEZING. RESCUE    amLODIPine (NORVASC) 10 MG tablet TAKE 1 TABLET BY MOUTH EVERY DAY    aspirin (ECOTRIN) 81 MG EC tablet Take 81 mg by mouth nightly.    atorvastatin (LIPITOR) 20 MG tablet TAKE 1 TABLET BY MOUTH EVERY DAY    blood sugar diagnostic Strp Test 3 times daily    blood-glucose meter,continuous (DEXCOM G6 ) Misc Test glucose twice daily    blood-glucose transmitter (DEXCOM G5 TRANSMITTER) Lea Test glucose twice daily    candesartan-hydrochlorothiazid (ATACAND HCT) 32-12.5 mg per tablet Take 1 tablet by mouth once daily.    carvediloL (COREG) 12.5 MG tablet Take 1 tablet (12.5 mg total) by mouth 2 (two) times daily.    cilostazoL (PLETAL) 50 MG Tab TAKE 1 TABLET BY MOUTH TWICE A DAY    hydrALAZINE (APRESOLINE) 25 MG tablet Take 1 tablet (25 mg total) by mouth every 12 (twelve) hours.    insulin detemir U-100 (LEVEMIR FLEXTOUCH U-100 INSULN) 100 unit/mL (3 mL) InPn pen Inject 26 Units into the skin every evening.    lancets Misc Test 3 times daily    lancets Misc To check BG 3 times daily, to use with insurance preferred meter    latanoprost 0.005 % ophthalmic solution Place 1 drop into both eyes every evening.    oxybutynin (DITROPAN XL) 15 MG TR24 TAKE 1 TABLET BY MOUTH EVERY DAY    pen needle, diabetic (BD ULTRA-FINE BREE PEN NEEDLE) 32 gauge x 5/32" Ndle INJECT INSULIN THREE TIMES DAILY    timolol maleate 0.5% (TIMOPTIC) 0.5 % Drop Place 1 drop into both eyes every morning.    TRADJENTA 5 mg Tab tablet TAKE 1 TABLET BY MOUTH EVERY DAY    traZODone (DESYREL) 50 MG tablet TAKE 1 TABLET (50 MG TOTAL) BY MOUTH EVERY EVENING.     Family History     Problem Relation (Age of Onset)    Cancer Sister    Diabetes Brother    Hypertension " Father    Kidney failure Mother        Tobacco Use    Smoking status: Never Smoker    Smokeless tobacco: Never Used   Substance and Sexual Activity    Alcohol use: No    Drug use: No    Sexual activity: Not Currently     Partners: Male     Review of Systems   Constitutional: Positive for fatigue. Negative for chills and fever.   HENT: Negative for nosebleeds and tinnitus.    Eyes: Negative for photophobia and visual disturbance.   Respiratory: Negative for shortness of breath and wheezing.    Cardiovascular: Negative for chest pain, palpitations and leg swelling.   Gastrointestinal: Negative for abdominal distention, abdominal pain, nausea and vomiting.   Genitourinary: Negative for dysuria, flank pain and hematuria.   Musculoskeletal: Negative for gait problem and joint swelling.   Skin: Negative for rash and wound.   Neurological: Positive for weakness. Negative for seizures, syncope (syncope per brother) and speech difficulty.     Objective:     Vital Signs (Most Recent):  Temp: 97.9 °F (36.6 °C) (01/26/22 1650)  Pulse: 83 (01/26/22 1157)  Resp: 16 (01/26/22 1157)  BP: (!) 203/89 (01/26/22 1157)  SpO2: 97 % (01/26/22 1157) Vital Signs (24h Range):  Temp:  [97.9 °F (36.6 °C)] 97.9 °F (36.6 °C)  Pulse:  [83] 83  Resp:  [16] 16  SpO2:  [97 %] 97 %  BP: (203)/(89) 203/89     Weight: 104.3 kg (230 lb)  Body mass index is 42.07 kg/m².    Physical Exam  Vitals and nursing note reviewed.   Constitutional:       General: She is not in acute distress.     Appearance: She is well-developed and well-nourished.   HENT:      Head: Normocephalic and atraumatic.      Right Ear: External ear normal.      Left Ear: External ear normal.   Eyes:      General:         Right eye: No discharge.         Left eye: No discharge.      Extraocular Movements: EOM normal.      Conjunctiva/sclera: Conjunctivae normal.      Comments: Blind left eye   Neck:      Thyroid: No thyromegaly.   Cardiovascular:      Rate and Rhythm: Normal rate  and regular rhythm.      Heart sounds: No murmur heard.      Pulmonary:      Effort: Pulmonary effort is normal. No respiratory distress.      Breath sounds: Normal breath sounds.   Abdominal:      General: Bowel sounds are normal. There is no distension.      Palpations: Abdomen is soft. There is no mass.      Tenderness: There is no abdominal tenderness.   Musculoskeletal:         General: No deformity or edema.      Cervical back: Normal range of motion and neck supple.   Skin:     General: Skin is warm and dry.   Neurological:      Mental Status: She is alert and oriented to person, place, and time.      Sensory: No sensory deficit.      Comments: No facial droop. Symmetric movement against gravity of BUE and BLE. No sensation deficit. Intermittently disoriented to time, though reorients herself. No slurred speech   Psychiatric:         Mood and Affect: Mood and affect and mood normal.         Behavior: Behavior normal.           CRANIAL NERVES     CN III, IV, VI   Extraocular motions are normal.        Significant Labs:   CBC:   Recent Labs   Lab 01/26/22  1357   WBC 5.40   HGB 11.8*   HCT 37.3        CMP:   Recent Labs   Lab 01/26/22  1357      K 5.1      CO2 21*   *   BUN 31*   CREATININE 2.9*   CALCIUM 9.6   PROT 9.0*   ALBUMIN 3.7   BILITOT 0.7   ALKPHOS 99   AST 30   ALT 17   ANIONGAP 12   EGFRNONAA 14*     Cardiac Markers:   Recent Labs   Lab 01/26/22  1357   BNP <10     Troponin:   Recent Labs   Lab 01/24/22  2130 01/26/22  1357   TROPONINI 0.016  0.016 0.007       Significant Imaging:   Imaging Results          X-Ray Chest 1 View (In process)                US Lower Extremity Veins Bilateral (Final result)  Result time 01/26/22 16:18:28    Final result by Antoine Colindres MD (01/26/22 16:18:28)                 Impression:      No evidence of deep venous thrombosis in either lower extremity.      Electronically signed by: Antoine Colindres  Date:    01/26/2022  Time:    16:18              Narrative:    EXAMINATION:  US LOWER EXTREMITY VEINS BILATERAL    CLINICAL HISTORY:  Pain in right leg    TECHNIQUE:  Duplex and color flow Doppler and dynamic compression was performed of the bilateral lower extremity veins was performed.    COMPARISON:  None    FINDINGS:  Right thigh veins: The common femoral, femoral, popliteal, upper greater saphenous, and deep femoral veins are patent and free of thrombus. The veins are normally compressible and have normal phasic flow and augmentation response.    Right calf veins: The visualized calf veins are patent.    Left thigh veins: The common femoral, femoral, popliteal, upper greater saphenous, and deep femoral veins are patent and free of thrombus. The veins are normally compressible and have normal phasic flow and augmentation response.    Left calf veins: The visualized calf veins are patent.    Miscellaneous: None                               CT Head Without Contrast (Final result)  Result time 01/26/22 14:38:04    Final result by Jayjay Romero MD (01/26/22 14:38:04)                 Impression:      No acute large vascular territory infarct or intracranial hemorrhage identified.  Further evaluation/follow-up as warranted.      Electronically signed by: Jayjay Romero MD  Date:    01/26/2022  Time:    14:38             Narrative:    EXAMINATION:  CT HEAD WITHOUT CONTRAST    CLINICAL HISTORY:  Dizziness, persistent/recurrent, cardiac or vascular cause suspected;    TECHNIQUE:  Low dose axial CT images obtained throughout the head without intravenous contrast. Sagittal and coronal reconstructions were performed.    COMPARISON:  Head CT most recent 01/30/2020 and MRI brain 11/26/2011    FINDINGS:  Patient is rotated and tilted within the scanner.    Intracranial compartment:    Generalized cerebral volume loss.  Ventricles are midline and stable in size and configuration without distortion by mass effect or acute hydrocephalus.  No extra-axial blood or fluid  collections.    Grossly stable distribution of patchy hypoattenuation of the supratentorial white matter consistent with chronic microvascular ischemic change.  No definite new focal areas of abnormal parenchymal attenuation.  No parenchymal mass, hemorrhage, edema or major vascular distribution infarct.  Bilateral basal ganglia and skull base atherosclerotic vascular calcifications noted.    Skull/extracranial contents (limited evaluation): Hyperostosis frontalis interna.  No fracture. Mastoid air cells and paranasal sinuses are essentially clear.  Chronic left globe phthisis bulbi again noted.                                  Assessment/Plan:     * Acute on chronic renal failure  Baseline sCr variable though typically between 1.7-2.0, Cr today of 2.9, evidence of infection on UA.   Started on Gentle IVF   Holding home candesartan/hctz  Fena unreliable to HCTZ use  Will check bladder scan  Renal dose medications avoid nephrotoxic drugs monitor intake and output    Syncope  Per brother patient had a syncopal episode at home though patient denies this. Echo yesterday with normal EF and mild aortic valve sclerosis. Troponin negative. Seen yesterday and remained in NSR on telemetry  -troponin trend  -telemetry  -will check orthostatic BP  -neurology consulted    Encephalopathy  Per brother has been intermittently confused since last known normal of last night. Patient intermittently confused to situation during exam. CT head without acute abnormality, without hypoglycemia. Suspect infectious in origin for possible UTI, BUN of 31, liver function within normal limits.   -will check chest x-ray, TSH, and UDS  -continue Rocephin for UTI  -neuro checks/fall precautions  -neurology consulted    Chronic diastolic heart failure  Echo 1/25 with EF of 70% and indeterminate diastolic dysfunction. BNP negative today.   Holding home candesarta/HCTZ for AUTUMN  Continue home coreg  Daily weights/strict intake and  output/telemetry    Hyperlipidemia  Continue home statin    Controlled type 2 diabetes mellitus with stage 3 chronic kidney disease, with long-term current use of insulin  Lab Results   Component Value Date    HGBA1C 6.8 (H) 01/12/2022     Will start sliding scale insulin, diabetic/renal diet, goal -180    Morbid obesity with BMI of 45.0-49.9, adult  Body mass index is 42.07 kg/m². Morbid obesity complicates all aspects of disease management from diagnostic mod alities to treatment. Weight loss encouraged and health benefits explained to patient.     Essential hypertension  Poorly controlled. Resume home amlopdipine and coreg and hydralazine, holding home candesartan/hctz for AUTUMN. Prn hydralazine    UTI (urinary tract infection)  UA 1/2020 with E coli sensitive to Rocephin, bactrim, and macrobib. UA today with WBC, leukocytes, and bacteria on In and Out urine sample  Started on Rocephin  Urine culture pending      VTE Risk Mitigation (From admission, onward)         Ordered     IP VTE HIGH RISK PATIENT  Once         01/26/22 1650     Place sequential compression device  Until discontinued         01/26/22 1650     Place BAMBI hose  Until discontinued         01/26/22 1650              VTE: BAMBI/SCD  Code: Full  Diet: diabetic/renal  Dispo: pending improvement in renal function and neurology eval  As clarification, on 1/26/2022, patient should be admitted for hospital observation services under my care in collaboration with Ángel Browning MD. Phani Lazaro PA-C  Department of Hospital Medicine   [facility] - Emergency Dept

## 2022-01-26 NOTE — HPI
Ana James 81 y.o. female with HTN, HLD, DM, CKD, HFpEF presents to hospital with chief complaint of altered mental status.  Per the patient's brother she had a syncopal episode today with associated vomiting.  Since that time he has noted she has been increasingly weak and intermittently disoriented to situation.  He finds her mental status has improved since arrival to the emergency room.  The patient denies any loss of consciousness this morning, she states she was just feeling generally weak and tired compared to her baseline from when she was discharged from the hospital.  She normally ambulates with a walker but has a wheelchair at home as well.  She denies any NSAID use.  She was last seen normal by her brother last night.  She denies fever chest pain shortness of breath abdominal pain dysuria melena hematuria hematemesis. The brother denies seeing any slurred speech, facial droop, or seziure activity.     In the ED, UA with leukocytes white blood cells and bacteria, creatinine of 2.9, CT without acute abnormality, DVT ultrasound without evidence of DVT, COVID negative, troponin negative, BNP negative.

## 2022-01-26 NOTE — ASSESSMENT & PLAN NOTE
Echo 1/25 with EF of 70% and indeterminate diastolic dysfunction. BNP negative today.   Holding home candesarta/HCTZ for AUTUMN  Continue home coreg  Daily weights/strict intake and output/telemetry

## 2022-01-26 NOTE — ASSESSMENT & PLAN NOTE
UA 1/2020 with E coli sensitive to Rocephin, bactrim, and macrobib. UA today with WBC, leukocytes, and bacteria on In and Out urine sample  Started on Rocephin  Urine culture pending

## 2022-01-27 PROBLEM — M25.551 ACUTE RIGHT HIP PAIN: Status: ACTIVE | Noted: 2022-01-27

## 2022-01-27 LAB
ANION GAP SERPL CALC-SCNC: 10 MMOL/L (ref 8–16)
BUN SERPL-MCNC: 28 MG/DL (ref 8–23)
CALCIUM SERPL-MCNC: 9.1 MG/DL (ref 8.7–10.5)
CHLORIDE SERPL-SCNC: 108 MMOL/L (ref 95–110)
CK SERPL-CCNC: 234 U/L (ref 20–180)
CO2 SERPL-SCNC: 24 MMOL/L (ref 23–29)
CREAT SERPL-MCNC: 2.1 MG/DL (ref 0.5–1.4)
EST. GFR  (AFRICAN AMERICAN): 25 ML/MIN/1.73 M^2
EST. GFR  (NON AFRICAN AMERICAN): 21 ML/MIN/1.73 M^2
GLUCOSE SERPL-MCNC: 125 MG/DL (ref 70–110)
POCT GLUCOSE: 100 MG/DL (ref 70–110)
POCT GLUCOSE: 102 MG/DL (ref 70–110)
POTASSIUM SERPL-SCNC: 4.6 MMOL/L (ref 3.5–5.1)
SODIUM SERPL-SCNC: 142 MMOL/L (ref 136–145)
TROPONIN I SERPL DL<=0.01 NG/ML-MCNC: 0.01 NG/ML (ref 0–0.03)
TSH SERPL DL<=0.005 MIU/L-ACNC: 0.5 UIU/ML (ref 0.4–4)

## 2022-01-27 PROCEDURE — 25000003 PHARM REV CODE 250: Performed by: PHYSICIAN ASSISTANT

## 2022-01-27 PROCEDURE — 97161 PT EVAL LOW COMPLEX 20 MIN: CPT

## 2022-01-27 PROCEDURE — G0378 HOSPITAL OBSERVATION PER HR: HCPCS

## 2022-01-27 PROCEDURE — 63600175 PHARM REV CODE 636 W HCPCS: Performed by: PHYSICIAN ASSISTANT

## 2022-01-27 PROCEDURE — 84443 ASSAY THYROID STIM HORMONE: CPT | Performed by: PHYSICIAN ASSISTANT

## 2022-01-27 PROCEDURE — 99284 PR EMERGENCY DEPT VISIT,LEVEL IV: ICD-10-PCS | Mod: ,,, | Performed by: PSYCHIATRY & NEUROLOGY

## 2022-01-27 PROCEDURE — 63600175 PHARM REV CODE 636 W HCPCS: Performed by: NURSE PRACTITIONER

## 2022-01-27 PROCEDURE — 84484 ASSAY OF TROPONIN QUANT: CPT | Performed by: PHYSICIAN ASSISTANT

## 2022-01-27 PROCEDURE — 25000003 PHARM REV CODE 250: Performed by: NURSE PRACTITIONER

## 2022-01-27 PROCEDURE — 80048 BASIC METABOLIC PNL TOTAL CA: CPT | Performed by: PHYSICIAN ASSISTANT

## 2022-01-27 PROCEDURE — 96366 THER/PROPH/DIAG IV INF ADDON: CPT

## 2022-01-27 PROCEDURE — 82550 ASSAY OF CK (CPK): CPT | Performed by: PHYSICIAN ASSISTANT

## 2022-01-27 PROCEDURE — 96361 HYDRATE IV INFUSION ADD-ON: CPT

## 2022-01-27 PROCEDURE — 25000003 PHARM REV CODE 250: Performed by: INTERNAL MEDICINE

## 2022-01-27 PROCEDURE — 96375 TX/PRO/DX INJ NEW DRUG ADDON: CPT

## 2022-01-27 PROCEDURE — 97530 THERAPEUTIC ACTIVITIES: CPT

## 2022-01-27 PROCEDURE — 99284 EMERGENCY DEPT VISIT MOD MDM: CPT | Mod: ,,, | Performed by: PSYCHIATRY & NEUROLOGY

## 2022-01-27 RX ORDER — AMLODIPINE BESYLATE 5 MG/1
10 TABLET ORAL DAILY
Status: DISCONTINUED | OUTPATIENT
Start: 2022-01-27 | End: 2022-01-28 | Stop reason: HOSPADM

## 2022-01-27 RX ORDER — SODIUM CHLORIDE, SODIUM LACTATE, POTASSIUM CHLORIDE, CALCIUM CHLORIDE 600; 310; 30; 20 MG/100ML; MG/100ML; MG/100ML; MG/100ML
INJECTION, SOLUTION INTRAVENOUS CONTINUOUS
Status: DISCONTINUED | OUTPATIENT
Start: 2022-01-27 | End: 2022-01-28

## 2022-01-27 RX ORDER — MORPHINE SULFATE 4 MG/ML
1 INJECTION, SOLUTION INTRAMUSCULAR; INTRAVENOUS ONCE
Status: COMPLETED | OUTPATIENT
Start: 2022-01-27 | End: 2022-01-27

## 2022-01-27 RX ORDER — SODIUM CHLORIDE, SODIUM LACTATE, POTASSIUM CHLORIDE, CALCIUM CHLORIDE 600; 310; 30; 20 MG/100ML; MG/100ML; MG/100ML; MG/100ML
INJECTION, SOLUTION INTRAVENOUS CONTINUOUS
Status: DISCONTINUED | OUTPATIENT
Start: 2022-01-27 | End: 2022-01-27

## 2022-01-27 RX ORDER — HYDRALAZINE HYDROCHLORIDE 25 MG/1
50 TABLET, FILM COATED ORAL ONCE
Status: DISCONTINUED | OUTPATIENT
Start: 2022-01-27 | End: 2022-01-27

## 2022-01-27 RX ORDER — HYDRALAZINE HYDROCHLORIDE 25 MG/1
100 TABLET, FILM COATED ORAL EVERY 8 HOURS
Status: DISCONTINUED | OUTPATIENT
Start: 2022-01-27 | End: 2022-01-27

## 2022-01-27 RX ORDER — LIDOCAINE 50 MG/G
1 PATCH TOPICAL
Status: DISCONTINUED | OUTPATIENT
Start: 2022-01-27 | End: 2022-01-28 | Stop reason: HOSPADM

## 2022-01-27 RX ORDER — HYDROCODONE BITARTRATE AND ACETAMINOPHEN 5; 325 MG/1; MG/1
1 TABLET ORAL EVERY 6 HOURS PRN
Status: DISCONTINUED | OUTPATIENT
Start: 2022-01-27 | End: 2022-01-28 | Stop reason: HOSPADM

## 2022-01-27 RX ORDER — HYDRALAZINE HYDROCHLORIDE 25 MG/1
50 TABLET, FILM COATED ORAL EVERY 8 HOURS
Status: DISCONTINUED | OUTPATIENT
Start: 2022-01-27 | End: 2022-01-27

## 2022-01-27 RX ORDER — LABETALOL HYDROCHLORIDE 5 MG/ML
10 INJECTION, SOLUTION INTRAVENOUS ONCE
Status: COMPLETED | OUTPATIENT
Start: 2022-01-27 | End: 2022-01-27

## 2022-01-27 RX ORDER — ONDANSETRON 2 MG/ML
4 INJECTION INTRAMUSCULAR; INTRAVENOUS EVERY 8 HOURS PRN
Status: DISCONTINUED | OUTPATIENT
Start: 2022-01-27 | End: 2022-01-28 | Stop reason: HOSPADM

## 2022-01-27 RX ORDER — CLONIDINE HYDROCHLORIDE 0.1 MG/1
0.1 TABLET ORAL EVERY 6 HOURS PRN
Status: DISCONTINUED | OUTPATIENT
Start: 2022-01-27 | End: 2022-01-27

## 2022-01-27 RX ORDER — CLONIDINE HYDROCHLORIDE 0.1 MG/1
0.2 TABLET ORAL EVERY 6 HOURS PRN
Status: DISCONTINUED | OUTPATIENT
Start: 2022-01-27 | End: 2022-01-28 | Stop reason: HOSPADM

## 2022-01-27 RX ORDER — HYDRALAZINE HYDROCHLORIDE 25 MG/1
50 TABLET, FILM COATED ORAL EVERY 8 HOURS
Status: DISCONTINUED | OUTPATIENT
Start: 2022-01-27 | End: 2022-01-28 | Stop reason: HOSPADM

## 2022-01-27 RX ADMIN — SODIUM CHLORIDE, SODIUM LACTATE, POTASSIUM CHLORIDE, AND CALCIUM CHLORIDE: .6; .31; .03; .02 INJECTION, SOLUTION INTRAVENOUS at 12:01

## 2022-01-27 RX ADMIN — HYDROCODONE BITARTRATE AND ACETAMINOPHEN 1 TABLET: 5; 325 TABLET ORAL at 04:01

## 2022-01-27 RX ADMIN — SODIUM CHLORIDE, SODIUM LACTATE, POTASSIUM CHLORIDE, AND CALCIUM CHLORIDE: .6; .31; .03; .02 INJECTION, SOLUTION INTRAVENOUS at 02:01

## 2022-01-27 RX ADMIN — HYDRALAZINE HYDROCHLORIDE 50 MG: 25 TABLET, FILM COATED ORAL at 06:01

## 2022-01-27 RX ADMIN — CEFTRIAXONE 1 G: 1 INJECTION, SOLUTION INTRAVENOUS at 05:01

## 2022-01-27 RX ADMIN — MORPHINE SULFATE 1 MG: 4 INJECTION, SOLUTION INTRAMUSCULAR; INTRAVENOUS at 04:01

## 2022-01-27 RX ADMIN — SODIUM CHLORIDE, SODIUM LACTATE, POTASSIUM CHLORIDE, AND CALCIUM CHLORIDE: .6; .31; .03; .02 INJECTION, SOLUTION INTRAVENOUS at 05:01

## 2022-01-27 RX ADMIN — TIMOLOL MALEATE 1 DROP: 5 SOLUTION OPHTHALMIC at 10:01

## 2022-01-27 RX ADMIN — ONDANSETRON 4 MG: 2 INJECTION INTRAMUSCULAR; INTRAVENOUS at 03:01

## 2022-01-27 RX ADMIN — HYDRALAZINE HYDROCHLORIDE 50 MG: 25 TABLET ORAL at 02:01

## 2022-01-27 RX ADMIN — HYDROCODONE BITARTRATE AND ACETAMINOPHEN 1 TABLET: 5; 325 TABLET ORAL at 09:01

## 2022-01-27 RX ADMIN — ASPIRIN 81 MG: 81 TABLET, COATED ORAL at 08:01

## 2022-01-27 RX ADMIN — CARVEDILOL 12.5 MG: 12.5 TABLET, FILM COATED ORAL at 08:01

## 2022-01-27 RX ADMIN — CLONIDINE HYDROCHLORIDE 0.2 MG: 0.1 TABLET ORAL at 04:01

## 2022-01-27 RX ADMIN — SODIUM CHLORIDE, SODIUM LACTATE, POTASSIUM CHLORIDE, AND CALCIUM CHLORIDE: .6; .31; .03; .02 INJECTION, SOLUTION INTRAVENOUS at 06:01

## 2022-01-27 RX ADMIN — LABETALOL HYDROCHLORIDE 10 MG: 5 INJECTION, SOLUTION INTRAVENOUS at 03:01

## 2022-01-27 RX ADMIN — LATANOPROST 1 DROP: 50 SOLUTION OPHTHALMIC at 10:01

## 2022-01-27 RX ADMIN — HYDRALAZINE HYDROCHLORIDE 50 MG: 25 TABLET ORAL at 09:01

## 2022-01-27 RX ADMIN — HYDRALAZINE HYDROCHLORIDE 25 MG: 25 TABLET, FILM COATED ORAL at 12:01

## 2022-01-27 RX ADMIN — AMLODIPINE BESYLATE 10 MG: 5 TABLET ORAL at 09:01

## 2022-01-27 RX ADMIN — ATORVASTATIN CALCIUM 20 MG: 10 TABLET, FILM COATED ORAL at 08:01

## 2022-01-27 RX ADMIN — LIDOCAINE 1 PATCH: 50 PATCH TOPICAL at 10:01

## 2022-01-27 RX ADMIN — AMLODIPINE BESYLATE 10 MG: 5 TABLET ORAL at 12:01

## 2022-01-27 NOTE — PT/OT/SLP PROGRESS
Occupational Therapy      Patient Name:  Ana James   MRN:  [id number]    Patient not seen today x2 trials, secondary Patient unwilling to participate. Max lethargy/fatigue, Patient difficult to rouse to voice and touch. RN present endorsing much activity this date prior to OT arrival. Patient refusing intake as well.  Patient unwilling to participate. OT will follow-up as indicated.     1/27/2022

## 2022-01-27 NOTE — NURSING TRANSFER
Nursing Transfer Note      1/27/2022       Reason patient is being transferred: Encephalopathy      Transfer From: ED      Transfer via bed      Transfer with cardiac monitoring      Transported by Transport      Medicines sent: No      Any special needs or follow-up needed: None      Chart send with patient: Yes      Notified: daughter      Patient reassessed at: 1/27/2022,1630      Upon arrival to floor: cardiac monitor applied, patient oriented to room, call bell in reach and bed in lowest position

## 2022-01-27 NOTE — PROGRESS NOTES
[facility] Emergency Dept  [facility]  Progress Note    Patient Name: Ana James  MRN: [id number]  Patient Class: OP- Observation   Admission Date: 1/26/2022  Length of Stay: 0 days  Attending Physician: Ángel Browning MD  Primary Care Provider: Viky Bean MD        Subjective:     Principal Problem:Encephalopathy        HPI:  Ana James 81 y.o. female with HTN, HLD, DM, CKD, HFpEF presents to hospital with chief complaint of altered mental status.  Per the patient's brother she had a syncopal episode today with associated vomiting.  Since that time he has noted she has been increasingly weak and intermittently disoriented to situation.  He finds her mental status has improved since arrival to the emergency room.  The patient denies any loss of consciousness this morning, she states she was just feeling generally weak and tired compared to her baseline from when she was discharged from the hospital.  She normally ambulates with a walker but has a wheelchair at home as well.  She denies any NSAID use.  She was last seen normal by her brother last night.  She denies fever chest pain shortness of breath abdominal pain dysuria melena hematuria hematemesis. The brother denies seeing any slurred speech, facial droop, or seziure activity.     In the ED, UA with leukocytes white blood cells and bacteria, creatinine of 2.9, CT without acute abnormality, DVT ultrasound without evidence of DVT, COVID negative, troponin negative, BNP negative.      Overview/Hospital Course:  Placed in observation for weakness, confusion, possible syncope, UTI and AUTUMN on CKD . Spoke with Brother Prince 1/27/22 who stated Caregiver Asha found patient passed out on chair in kitchen table and patient not able to stand up.  Patient have a caregiver Mon -Fri 9-3pm and baseline forgetful, left leg weakness, ambulates short distance with walker. Mental status back to baseline after IVF. AUTUMN improving. X ray  right hip no fractures. Continue IVF, Rocephin for UTI. PT/OT consult. Continue to hold HCTZ.       Interval History: Feels 8% better. Right hip pain.     Review of Systems   Constitutional: Positive for fatigue. Negative for chills and fever.   HENT: Negative for nosebleeds and tinnitus.    Eyes: Negative for photophobia and visual disturbance.   Respiratory: Negative for shortness of breath and wheezing.    Cardiovascular: Negative for chest pain, palpitations and leg swelling.   Gastrointestinal: Negative for abdominal distention, abdominal pain, nausea and vomiting.   Genitourinary: Negative for dysuria, flank pain and hematuria.   Musculoskeletal: Negative for gait problem and joint swelling.   Skin: Negative for rash and wound.   Neurological: Positive for weakness. Negative for seizures, syncope (syncope per brother) and speech difficulty.     Objective:     Vital Signs (Most Recent):  Temp: 97.9 °F (36.6 °C) (01/26/22 1650)  Pulse: 66 (01/27/22 1051)  Resp: 20 (01/27/22 1051)  BP: 135/64 (01/27/22 1051)  SpO2: (!) 94 % (01/27/22 1051) Vital Signs (24h Range):  Temp:  [97.9 °F (36.6 °C)] 97.9 °F (36.6 °C)  Pulse:  [66-88] 66  Resp:  [16-46] 20  SpO2:  [94 %-98 %] 94 %  BP: (117-255)/() 135/64     Weight: 104.3 kg (230 lb)  Body mass index is 42.07 kg/m².    Intake/Output Summary (Last 24 hours) at 1/27/2022 1224  Last data filed at 1/27/2022 0348  Gross per 24 hour   Intake 931.56 ml   Output --   Net 931.56 ml      Physical Exam  Vitals and nursing note reviewed.   Constitutional:       Appearance: She is well-developed. She is obese. She is ill-appearing. She is not toxic-appearing.   HENT:      Head: Normocephalic and atraumatic.      Right Ear: External ear normal.      Left Ear: External ear normal.   Eyes:      General:         Right eye: No discharge.         Left eye: No discharge.      Conjunctiva/sclera: Conjunctivae normal.      Comments: Blind left eye   Neck:      Thyroid: No thyromegaly.    Cardiovascular:      Rate and Rhythm: Normal rate and regular rhythm.      Heart sounds: No murmur heard.      Pulmonary:      Effort: Pulmonary effort is normal. No respiratory distress.      Breath sounds: Normal breath sounds.   Abdominal:      General: Bowel sounds are normal. There is no distension.      Palpations: Abdomen is soft. There is no mass.      Tenderness: There is no abdominal tenderness.   Musculoskeletal:         General: Tenderness (right hip) present. No swelling or deformity.      Cervical back: Normal range of motion and neck supple.      Right lower leg: No edema.      Left lower leg: No edema.   Skin:     General: Skin is warm and dry.   Neurological:      Mental Status: She is alert and oriented to person, place, and time. Mental status is at baseline.      Sensory: No sensory deficit.      Motor: Weakness present.      Comments: No facial droop. Symmetric movement against gravity of BUE and BLE. No sensation deficit. Intermittently disoriented to time, though reorients herself. No slurred speech   Psychiatric:         Mood and Affect: Mood normal.         Behavior: Behavior normal.         Significant Labs: All pertinent labs within the past 24 hours have been reviewed.    Significant Imaging: I have reviewed all pertinent imaging results/findings within the past 24 hours.      Assessment/Plan:      * Encephalopathy  Per brother has been intermittently confused since last known normal of last night. Patient intermittently confused to situation during exam. CT head without acute abnormality, without hypoglycemia. Suspect infectious in origin for possible UTI, BUN of 31, liver function within normal limits.   -will check chest x-ray, TSH, and UDS  -continue Rocephin for UTI  -neuro checks/fall precautions  -neurology consulted  Resolved    Syncope  Per brother patient had a syncopal episode at home though patient denies this. Echo yesterday with normal EF and mild aortic valve sclerosis.  Troponin negative. Seen yesterday and remained in NSR on telemetry  Troponin trend negative  elemetry  No orthostatic BP  Neurology consulted  Possible from volume depletion, UTI   PT/OT consult    Acute right hip pain  X ray no fractures. Able to bend knees and lift leg.  Lidoderm patch  PT/OT consult       Acute on chronic renal failure  Baseline sCr variable though typically between 1.7-2.0, Cr today of 2.9, evidence of infection on UA.   Started on Gentle IVF   Holding home candesartan/hctz  Fena unreliable to HCTZ use  Will check bladder scan  Renal dose medications avoid nephrotoxic drugs monitor intake and output    1/27/22  Improving. Continue IVF , hold HCTZ, treat UTI    Chronic diastolic heart failure  Echo 1/25 with EF of 70% and indeterminate diastolic dysfunction. BNP negative today.   Holding home candesarta/HCTZ for AUTUMN  Continue home coreg  Daily weights/strict intake and output/telemetry      Hyperlipidemia  Continue home statin    Controlled type 2 diabetes mellitus with stage 3 chronic kidney disease, with long-term current use of insulin  Lab Results   Component Value Date    HGBA1C 6.8 (H) 01/12/2022     Will start sliding scale insulin, diabetic/renal diet, goal -180    Morbid obesity with BMI of 45.0-49.9, adult  Body mass index is 42.07 kg/m². Morbid obesity complicates all aspects of disease management from diagnostic mod alities to treatment. Weight loss encouraged and health benefits explained to patient.     Essential hypertension  Poorly controlled. Resume home amlopdipine and coreg and hydralazine, holding home candesartan/hctz for AUTUMN. Prn hydralazine  Improving    UTI (urinary tract infection)  UA 1/2020 with E coli sensitive to Rocephin, bactrim, and macrobib. UA today with WBC, leukocytes, and bacteria on In and Out urine sample  Started on Rocephin  Urine culture pending      VTE Risk Mitigation (From admission, onward)         Ordered     IP VTE HIGH RISK PATIENT  Once          01/26/22 1650     Place sequential compression device  Until discontinued         01/26/22 1650     Place BAMBI hose  Until discontinued         01/26/22 1650                Discharge Planning   FREDERICK:      Code Status: Full Code   Is the patient medically ready for discharge?:     Reason for patient still in hospital (select all that apply): Treatment  Discharge Plan A: Home (with follow up)            Mariya Collins NP  Department of Hospital Medicine   [facility] - Emergency Dept

## 2022-01-27 NOTE — SUBJECTIVE & OBJECTIVE
Interval History: Feels 8% better. Right hip pain.     Review of Systems   Constitutional: Positive for fatigue. Negative for chills and fever.   HENT: Negative for nosebleeds and tinnitus.    Eyes: Negative for photophobia and visual disturbance.   Respiratory: Negative for shortness of breath and wheezing.    Cardiovascular: Negative for chest pain, palpitations and leg swelling.   Gastrointestinal: Negative for abdominal distention, abdominal pain, nausea and vomiting.   Genitourinary: Negative for dysuria, flank pain and hematuria.   Musculoskeletal: Negative for gait problem and joint swelling.   Skin: Negative for rash and wound.   Neurological: Positive for weakness. Negative for seizures, syncope (syncope per brother) and speech difficulty.     Objective:     Vital Signs (Most Recent):  Temp: 97.9 °F (36.6 °C) (01/26/22 1650)  Pulse: 66 (01/27/22 1051)  Resp: 20 (01/27/22 1051)  BP: 135/64 (01/27/22 1051)  SpO2: (!) 94 % (01/27/22 1051) Vital Signs (24h Range):  Temp:  [97.9 °F (36.6 °C)] 97.9 °F (36.6 °C)  Pulse:  [66-88] 66  Resp:  [16-46] 20  SpO2:  [94 %-98 %] 94 %  BP: (117-255)/() 135/64     Weight: 104.3 kg (230 lb)  Body mass index is 42.07 kg/m².    Intake/Output Summary (Last 24 hours) at 1/27/2022 1224  Last data filed at 1/27/2022 0348  Gross per 24 hour   Intake 931.56 ml   Output --   Net 931.56 ml      Physical Exam  Vitals and nursing note reviewed.   Constitutional:       Appearance: She is well-developed. She is obese. She is ill-appearing. She is not toxic-appearing.   HENT:      Head: Normocephalic and atraumatic.      Right Ear: External ear normal.      Left Ear: External ear normal.   Eyes:      General:         Right eye: No discharge.         Left eye: No discharge.      Conjunctiva/sclera: Conjunctivae normal.      Comments: Blind left eye   Neck:      Thyroid: No thyromegaly.   Cardiovascular:      Rate and Rhythm: Normal rate and regular rhythm.      Heart sounds: No murmur  heard.      Pulmonary:      Effort: Pulmonary effort is normal. No respiratory distress.      Breath sounds: Normal breath sounds.   Abdominal:      General: Bowel sounds are normal. There is no distension.      Palpations: Abdomen is soft. There is no mass.      Tenderness: There is no abdominal tenderness.   Musculoskeletal:         General: Tenderness (right hip) present. No swelling or deformity.      Cervical back: Normal range of motion and neck supple.      Right lower leg: No edema.      Left lower leg: No edema.   Skin:     General: Skin is warm and dry.   Neurological:      Mental Status: She is alert and oriented to person, place, and time. Mental status is at baseline.      Sensory: No sensory deficit.      Motor: Weakness present.      Comments: No facial droop. Symmetric movement against gravity of BUE and BLE. No sensation deficit. Intermittently disoriented to time, though reorients herself. No slurred speech   Psychiatric:         Mood and Affect: Mood normal.         Behavior: Behavior normal.         Significant Labs: All pertinent labs within the past 24 hours have been reviewed.    Significant Imaging: I have reviewed all pertinent imaging results/findings within the past 24 hours.

## 2022-01-27 NOTE — ASSESSMENT & PLAN NOTE
Baseline sCr variable though typically between 1.7-2.0, Cr today of 2.9, evidence of infection on UA.   Started on Gentle IVF   Holding home candesartan/hctz  Fena unreliable to HCTZ use  Will check bladder scan  Renal dose medications avoid nephrotoxic drugs monitor intake and output    1/27/22  Improving. Continue IVF , hold HCTZ, treat UTI

## 2022-01-27 NOTE — ASSESSMENT & PLAN NOTE
Per brother has been intermittently confused since last known normal of last night. Patient intermittently confused to situation during exam. CT head without acute abnormality, without hypoglycemia. Suspect infectious in origin for possible UTI, BUN of 31, liver function within normal limits.   -will check chest x-ray, TSH, and UDS  -continue Rocephin for UTI  -neuro checks/fall precautions  -neurology consulted  Resolved

## 2022-01-27 NOTE — CONSULTS
[facility] Emergency Dept  Neurology  Consult Note    Patient Name: Ana James  MRN: [id number]  Admission Date: 1/26/2022  Hospital Length of Stay: 0 days  Code Status: Full Code   Attending Provider: Ángel Browning MD   Consulting Provider: Artemio Delgadillo MD  Primary Care Physician: Viky Bean MD  Principal Problem:Acute on chronic renal failure    Inpatient consult to Neurology  Consult performed by: Artemio Delgadillo MD  Consult ordered by: Phani Lazaro PA-C        Subjective:     Chief Complaint: AMS    HPI: 82 y/o female presents to hospital due to altered mental status. Family reported LOC apparently after vomiting. Since that time he has noted she has been increasingly weak and at times disoriented to situation.  Ms. James is now feeling much better after treatment in ED. No headache, speech disturbances, vertigo, unilateral weakness or numbness. No reports of seizures. Pt denies any aggravating or alleviating factors. Urine culture positive for E. Coli.    Past Medical History:   Diagnosis Date    Arthritis lower extremities    Asthma     Blind left eye     Diabetes mellitus     Edema of both lower legs     CHRONIC    Gall stones     Hypertension     Kidney stones     Nephrolithiasis 1/22/2016    Obesity     PVD (peripheral vascular disease)     Renal disorder     Retinopathy     Sleep apnea     Vaginal delivery     x1    Weakness of both lower extremities     uses a cane, rollator & power chair       Past Surgical History:   Procedure Laterality Date    CARDIAC CATHETERIZATION      cataract      CHOLECYSTECTOMY      EYE SURGERY      Rt cataract surgery    HYSTERECTOMY      URETERAL STENT PLACEMENT         Review of patient's allergies indicates:   Allergen Reactions    Adhesive Rash     Paper tape       Current Neurological Medications:     No current facility-administered medications on file prior to encounter.     Current Outpatient Medications on File Prior  "to Encounter   Medication Sig    albuterol (PROVENTIL/VENTOLIN HFA) 90 mcg/actuation inhaler INHALE 2 PUFFS INTO THE LUNGS EVERY 6 (SIX) HOURS AS NEEDED FOR WHEEZING. RESCUE    amLODIPine (NORVASC) 10 MG tablet TAKE 1 TABLET BY MOUTH EVERY DAY    aspirin (ECOTRIN) 81 MG EC tablet Take 81 mg by mouth nightly.    atorvastatin (LIPITOR) 20 MG tablet TAKE 1 TABLET BY MOUTH EVERY DAY    blood sugar diagnostic Strp Test 3 times daily    blood-glucose meter,continuous (DEXCOM G6 ) Misc Test glucose twice daily    blood-glucose transmitter (DEXCOM G5 TRANSMITTER) Lea Test glucose twice daily    candesartan-hydrochlorothiazid (ATACAND HCT) 32-12.5 mg per tablet Take 1 tablet by mouth once daily.    carvediloL (COREG) 12.5 MG tablet Take 1 tablet (12.5 mg total) by mouth 2 (two) times daily.    cilostazoL (PLETAL) 50 MG Tab TAKE 1 TABLET BY MOUTH TWICE A DAY    hydrALAZINE (APRESOLINE) 25 MG tablet Take 1 tablet (25 mg total) by mouth every 12 (twelve) hours.    insulin detemir U-100 (LEVEMIR FLEXTOUCH U-100 INSULN) 100 unit/mL (3 mL) InPn pen Inject 26 Units into the skin every evening.    lancets Misc Test 3 times daily    lancets Misc To check BG 3 times daily, to use with insurance preferred meter    latanoprost 0.005 % ophthalmic solution Place 1 drop into both eyes every evening.    oxybutynin (DITROPAN XL) 15 MG TR24 TAKE 1 TABLET BY MOUTH EVERY DAY    pen needle, diabetic (BD ULTRA-FINE BREE PEN NEEDLE) 32 gauge x 5/32" Ndle INJECT INSULIN THREE TIMES DAILY    timolol maleate 0.5% (TIMOPTIC) 0.5 % Drop Place 1 drop into both eyes every morning.    TRADJENTA 5 mg Tab tablet TAKE 1 TABLET BY MOUTH EVERY DAY    traZODone (DESYREL) 50 MG tablet TAKE 1 TABLET (50 MG TOTAL) BY MOUTH EVERY EVENING.      Family History     Problem Relation (Age of Onset)    Cancer Sister    Diabetes Brother    Hypertension Father    Kidney failure Mother        Tobacco Use    Smoking status: Never Smoker    " Smokeless tobacco: Never Used   Substance and Sexual Activity    Alcohol use: No    Drug use: No    Sexual activity: Not Currently     Partners: Male     Review of Systems   Constitutional: Negative for fever.   HENT: Negative for trouble swallowing.    Eyes: Negative for photophobia.   Respiratory: Negative for shortness of breath.    Cardiovascular: Negative for chest pain.   Gastrointestinal: Negative for abdominal pain and diarrhea.   Genitourinary: Negative for flank pain.   Musculoskeletal: Negative for back pain.   Neurological: Negative for headaches.     Objective:     Vital Signs (Most Recent):  Temp: 97.9 °F (36.6 °C) (01/26/22 1650)  Pulse: 66 (01/27/22 1051)  Resp: 20 (01/27/22 1051)  BP: 135/64 (01/27/22 1051)  SpO2: (!) 94 % (01/27/22 1051) Vital Signs (24h Range):  Temp:  [97.9 °F (36.6 °C)] 97.9 °F (36.6 °C)  Pulse:  [66-88] 66  Resp:  [16-46] 20  SpO2:  [94 %-98 %] 94 %  BP: (117-255)/() 135/64     Weight: 104.3 kg (230 lb)  Body mass index is 42.07 kg/m².    Physical Exam  Constitutional:       General: She is not in acute distress.  HENT:      Head: Normocephalic.   Eyes:      General:         Right eye: No discharge.         Left eye: No discharge.      Comments: Left eye blindness/chronic deformity   Cardiovascular:      Rate and Rhythm: Normal rate.   Pulmonary:      Breath sounds: Normal breath sounds.   Abdominal:      Palpations: Abdomen is soft.   Musculoskeletal:         General: No tenderness.      Cervical back: Neck supple.   Skin:     General: Skin is warm.         NEUROLOGICAL EXAMINATION:     MENTAL STATUS   Level of consciousness: alert      Significant Labs:   CBC:   Recent Labs   Lab 01/26/22  1357   WBC 5.40   HGB 11.8*   HCT 37.3        CMP:   Recent Labs   Lab 01/26/22  1357 01/27/22  0335   * 125*    142   K 5.1 4.6    108   CO2 21* 24   BUN 31* 28*   CREATININE 2.9* 2.1*   CALCIUM 9.6 9.1   PROT 9.0*  --    ALBUMIN 3.7  --    BILITOT 0.7   --    ALKPHOS 99  --    AST 30  --    ALT 17  --    ANIONGAP 12 10   EGFRNONAA 14* 21*       Significant Imaging: I have reviewed all pertinent imaging results/findings within the past 24 hours.     Head CT  FINDINGS:  Patient is rotated and tilted within the scanner.     Intracranial compartment:     Generalized cerebral volume loss.  Ventricles are midline and stable in size and configuration without distortion by mass effect or acute hydrocephalus.  No extra-axial blood or fluid collections.     Grossly stable distribution of patchy hypoattenuation of the supratentorial white matter consistent with chronic microvascular ischemic change.  No definite new focal areas of abnormal parenchymal attenuation.  No parenchymal mass, hemorrhage, edema or major vascular distribution infarct.  Bilateral basal ganglia and skull base atherosclerotic vascular calcifications noted.     Skull/extracranial contents (limited evaluation): Hyperostosis frontalis interna.  No fracture. Mastoid air cells and paranasal sinuses are essentially clear.  Chronic left globe phthisis bulbi again noted.     Impression:     No acute large vascular territory infarct or intracranial hemorrhage identified.  Further evaluation/follow-up as warranted.        Electronically signed by: Jayjay Romero MD  Date:                                            01/26/2022  Time:                                           14:38    Assessment and Plan:     82 y/o female consulted for AMS    1. Encephalopathy: possibly due to UTI. She was put on antibiotics in ED and is now improved.   Head CT showed no acute abnormalities. NO signs of major are stroke on exam.   -Abx's per primary team.   -For now no further recs    Active Diagnoses:    Diagnosis Date Noted POA    PRINCIPAL PROBLEM:  Acute on chronic renal failure [N17.9, N18.9] 01/26/2022 Unknown    Encephalopathy [G93.40] 01/26/2022 Unknown    Syncope [R55] 01/26/2022 Unknown    Hyperlipidemia [E78.5]  02/27/2020 Yes     Chronic    Chronic diastolic heart failure [I50.32] 02/27/2020 Yes     Chronic    Controlled type 2 diabetes mellitus with stage 3 chronic kidney disease, with long-term current use of insulin [E11.22, N18.30, Z79.4] 07/11/2018 Not Applicable     Chronic    Essential hypertension [I10] 03/02/2014 Yes     Chronic    Morbid obesity with BMI of 45.0-49.9, adult [E66.01, Z68.42] 03/02/2014 Not Applicable     Chronic    UTI (urinary tract infection) [N39.0] 03/02/2014 Unknown      Problems Resolved During this Admission:       VTE Risk Mitigation (From admission, onward)         Ordered     IP VTE HIGH RISK PATIENT  Once         01/26/22 1650     Place sequential compression device  Until discontinued         01/26/22 1650     Place BAMBI hose  Until discontinued         01/26/22 1650                Thank you for your consult. I will follow-up with patient. Please contact us if you have any additional questions.    Artemio Delgadillo MD  Neurology  [facility] [place] - Emergency Dept

## 2022-01-27 NOTE — PT/OT/SLP EVAL
Physical Therapy Evaluation    Patient Name:  Ana James   MRN:  [id number]    Recommendations:     Discharge Recommendations:   (SNF vs HHPT with 24hr sup/assist PRN)   Discharge Equipment Recommendations: none   Barriers to discharge: Decreased caregiver support and Pt is not functioning at baseline and is at increased risk for falls and readmission if she returns home at present time    Assessment:     Ana James is a 81 y.o. female admitted with a medical diagnosis of Encephalopathy.  She presents with the following impairments/functional limitations:  weakness,impaired functional mobilty,decreased safety awareness,impaired coordination,impaired cardiopulmonary response to activity,impaired endurance,gait instability,decreased coordination,pain,impaired balance,impaired self care skills,visual deficits .    Rehab Prognosis: Good; patient would benefit from acute skilled PT services to address these deficits and reach maximum level of function.    Recent Surgery: * No surgery found *      Plan:     During this hospitalization, patient to be seen  (3-5x/wk) to address the identified rehab impairments via gait training,therapeutic activities,therapeutic exercises and progress toward the following goals:    · Plan of Care Expires:  02/03/22    Subjective     Chief Complaint: weakness, SOB  Patient/Family Comments/goals: Pt agreeable to evaluation  Pain/Comfort:  Pain Rating 1: 8/10  Location 1:  (R thigh)  Pain Addressed 1: Reposition,Distraction,Cessation of Activity,Nurse notified (Nurse administered pain patch)    Patients cultural, spiritual, Mandaen conflicts given the current situation: no    Living Environment:  Pt lives alone in a [facility] with lift to enter.    Prior to admission, patients level of function was Mod I with short distance ambulation and transfers.  Equipment used at home: bedside commode,shower chair,glucometer,rollator,power chair (adjustable bed).  DME owned (not  currently used): none.  Upon discharge, patient will have assistance from [facility] 9-3 M-F.  States that she has a neighbor that brings her food, but that neighbor had a death in the family.    Objective:     Communicated with nsg prior to session.  Patient found low down in the stretcher with blood pressure cuff,PureWick,telemetry,pulse ox (continuous),peripheral IV  upon PT entry to room.    General Precautions: Standard, fall   Orthopedic Precautions:N/A   Braces: N/A  Respiratory Status: Room air    Exams:  · Cognitive Exam:  Patient is oriented to Person, Place and Time  · Gross Motor Coordination:  impaired 2/2 weakness, deconditioning, pain, and body habitus  · Postural Exam:  Patient presented with the following abnormalities:    · -       Rounded shoulders  · -       Forward head  · -       pendulous abdomen  · Sensation:    · -       Intact  light/touch B LE's  · Skin Integrity/Edema:      · -       Skin integrity: Visible skin intact  · RLE ROM: WFL  · RLE Strength: WFL  · LLE ROM: WFL  · LLE Strength: WFL    Functional Mobility:  · Bed Mobility:     · Scooting: contact guard assistance and with Vc/TC for reaching for BR's and Bridging 1st time to HOB in supine  · Bridging: stand by assistance 1st time and minimum assistance for LE stabilization 2nd time  · Supine to Sit: minimum assistance and with Vc/Tc for hand placement  · Sit to Supine: minimum assistance and for LE's  · Transfers:     · Sit to Stand:  contact guard assistance and with Vc/TC for hand placement with rolling walker  · Gait: Pt performed 4 sidesteps at EOB with CGA and RW with Vc/Tc for increased trunk ext and walker management/safety.  Pt ambulates with max decreased step length and seth  · Balance: Fair+ sit, fair stand    Therapeutic Activities and Exercises:  SPO2 at rest 96-97% SPO2 during activity 88% on RA.  Pt with SOB.  Educated pt throughout treatment session on Pursed lip breathing.  Needs reinforcement.  Educated  pt to perform B AP's, TKE's, and GS while in bed.  Pt verbalized/demonstrated understanding with VC/TC to perform correctly and to remain on task     AM-PAC 6 CLICK MOBILITY  Total Score:16     Patient left HOB elevated with all lines intact, call button in reach and nsg notified.    GOALS:   Multidisciplinary Problems     Physical Therapy Goals        Problem: Physical Therapy Goal    Goal Priority Disciplines Outcome Goal Variances Interventions   Physical Therapy Goal     PT, PT/OT Ongoing, Progressing     Description: Goals to be met by: 2/3/22     Patient will increase functional independence with mobility by performin. Supine to sit with Stand-by Assistance  2. Sit to stand transfer with Stand-by Assistance  3. Bed to chair transfer with Stand-by Assistance   4. Gait  x 15-25 feet with Stand-by Assistance using Rolling Walker.   5. Lower extremity exercise program x10 reps per handout, with supervision                     History:     Past Medical History:   Diagnosis Date    Arthritis lower extremities    Asthma     Blind left eye     Diabetes mellitus     Edema of both lower legs     CHRONIC    Gall stones     Hypertension     Kidney stones     Nephrolithiasis 2016    Obesity     PVD (peripheral vascular disease)     Renal disorder     Retinopathy     Sleep apnea     Vaginal delivery     x1    Weakness of both lower extremities     uses a cane, rollator & power chair       Past Surgical History:   Procedure Laterality Date    CARDIAC CATHETERIZATION      cataract      CHOLECYSTECTOMY      EYE SURGERY      Rt cataract surgery    HYSTERECTOMY      URETERAL STENT PLACEMENT         Time Tracking:     PT Received On: 22  PT Start Time: 1035     PT Stop Time: 1108  PT Total Time (min): 33 min     Billable Minutes: Evaluation 15 and Therapeutic Activity 18      2022

## 2022-01-27 NOTE — ASSESSMENT & PLAN NOTE
Poorly controlled. Resume home amlopdipine and coreg and hydralazine, holding home candesartan/hctz for AUTUMN. Prn hydralazine  Improving

## 2022-01-27 NOTE — ASSESSMENT & PLAN NOTE
Per brother patient had a syncopal episode at home though patient denies this. Echo yesterday with normal EF and mild aortic valve sclerosis. Troponin negative. Seen yesterday and remained in NSR on telemetry  Troponin trend negative  elemetry  No orthostatic BP  Neurology consulted  Possible from volume depletion, UTI   PT/OT consult

## 2022-01-27 NOTE — PLAN OF CARE
01/27/22 1146   Discharge Planning   Assessment Type Discharge Planning Brief Assessment   Resource/Environmental Concerns none   Support Systems Friends/neighbors   Equipment Currently Used at Home bedside commode;glucometer;rollator;power chair;shower chair   Current Living Arrangements home/apartment/condo   Patient/Family Anticipates Transition to home   Patient/Family Anticipated Services at Transition none   DME Needed Upon Discharge  none   Discharge Plan A Home  (with follow up)     [facility]/pharmacy #[id number] - [place] - [address]  [address] [address]  Phone: [phone] Fax: [phone]    [facility] - [place], [place] - [address] AT Portal to Registered [facility]  [address]  [facility] [address]  Phone: [phone] Fax: [phone]    [facility]/pharmacy #[id number] - [place] - [address]  [address] [place] [address]  Phone: [phone] Fax: [phone]

## 2022-01-27 NOTE — PLAN OF CARE
Problem: Physical Therapy Goal  Goal: Physical Therapy Goal  Description: Goals to be met by: 2/3/22     Patient will increase functional independence with mobility by performin. Supine to sit with Stand-by Assistance  2. Sit to stand transfer with Stand-by Assistance  3. Bed to chair transfer with Stand-by Assistance   4. Gait  x 15-25 feet with Stand-by Assistance using Rolling Walker.   5. Lower extremity exercise program x10 reps per handout, with supervision    Outcome: Ongoing, Progressing   Initial PT evaluation performed today.  Pt could benefit from skilled PT services 3-5x/wk in order to maximize function prior to D/C.  Pt is not functioning at baseline and is at increased risk for falls and readmission if she returns home at present time.  SNF vs HHPT with 24hr supervision/assist PRN recommended.

## 2022-01-27 NOTE — PROGRESS NOTES
WRITTEN HEALTHCARE DISCHARGE INFORMATION      Things that YOU are RESPONSIBLE for to Manage Your Care At Home:     1. Getting your prescriptions filled.  2. Taking you medications as directed. DO NOT MISS ANY DOSES!  3. Going to your follow-up doctor appointments. This is important because it allows the doctor to monitor your progress and to determine if any changes need to be made to your treatment plan.     If you are unable to make your follow up appointments, please call the number listed and reschedule this appointment.      ____________HELP AT HOME____________________     Experiencing any SIGNS or SYMPTOMS: YOU CAN     Schedule a same day appopintment with your Primary Care Doctor or  you can call [facility] On Call Nurse Care Line for 24/7 assistance at [phone]     If you are experience any signs or symptoms that have become severe, Call 911 and come to your nearest Emergency Room.     Thank you for choosing [facility] and allowing us to care for you.   From your care management team:      You should receive a call from [facility] Discharge Department within 48-72 hours to help manage your care after discharge. Please try to make sure that you answer your phone for this important phone call.       Follow-up Information     Viky Bean MD On 2/10/2022.    Specialties: Family Medicine, Internal Medicine  Why: at 11am  Contact information:  [address] [facility] [address]  [phone]             Alex Navarrete MD On 2/3/2022.    Specialty: Cardiology  Why: at 2:15pm  Contact information:  [address]  [address]  [facility] [address]  [phone]

## 2022-01-28 VITALS
DIASTOLIC BLOOD PRESSURE: 63 MMHG | TEMPERATURE: 100 F | HEART RATE: 75 BPM | BODY MASS INDEX: 42.33 KG/M2 | RESPIRATION RATE: 18 BRPM | HEIGHT: 62 IN | OXYGEN SATURATION: 95 % | WEIGHT: 230 LBS | SYSTOLIC BLOOD PRESSURE: 152 MMHG

## 2022-01-28 LAB
ALBUMIN SERPL BCP-MCNC: 2.9 G/DL (ref 3.5–5.2)
ANION GAP SERPL CALC-SCNC: 7 MMOL/L (ref 8–16)
BACTERIA UR CULT: ABNORMAL
BUN SERPL-MCNC: 28 MG/DL (ref 8–23)
CALCIUM SERPL-MCNC: 8.7 MG/DL (ref 8.7–10.5)
CHLORIDE SERPL-SCNC: 109 MMOL/L (ref 95–110)
CO2 SERPL-SCNC: 25 MMOL/L (ref 23–29)
CREAT SERPL-MCNC: 2 MG/DL (ref 0.5–1.4)
EST. GFR  (AFRICAN AMERICAN): 26 ML/MIN/1.73 M^2
EST. GFR  (NON AFRICAN AMERICAN): 23 ML/MIN/1.73 M^2
GLUCOSE SERPL-MCNC: 95 MG/DL (ref 70–110)
PHOSPHATE SERPL-MCNC: 3.1 MG/DL (ref 2.7–4.5)
POCT GLUCOSE: 141 MG/DL (ref 70–110)
POCT GLUCOSE: 85 MG/DL (ref 70–110)
POTASSIUM SERPL-SCNC: 4.5 MMOL/L (ref 3.5–5.1)
SODIUM SERPL-SCNC: 141 MMOL/L (ref 136–145)

## 2022-01-28 PROCEDURE — G0378 HOSPITAL OBSERVATION PER HR: HCPCS

## 2022-01-28 PROCEDURE — 97530 THERAPEUTIC ACTIVITIES: CPT

## 2022-01-28 PROCEDURE — 97530 THERAPEUTIC ACTIVITIES: CPT | Mod: CQ

## 2022-01-28 PROCEDURE — 80069 RENAL FUNCTION PANEL: CPT | Performed by: NURSE PRACTITIONER

## 2022-01-28 PROCEDURE — 25000003 PHARM REV CODE 250: Performed by: NURSE PRACTITIONER

## 2022-01-28 PROCEDURE — 97110 THERAPEUTIC EXERCISES: CPT

## 2022-01-28 PROCEDURE — 97110 THERAPEUTIC EXERCISES: CPT | Mod: CQ

## 2022-01-28 PROCEDURE — 36415 COLL VENOUS BLD VENIPUNCTURE: CPT | Performed by: NURSE PRACTITIONER

## 2022-01-28 PROCEDURE — 25000003 PHARM REV CODE 250: Performed by: PHYSICIAN ASSISTANT

## 2022-01-28 PROCEDURE — 97166 OT EVAL MOD COMPLEX 45 MIN: CPT

## 2022-01-28 RX ORDER — HYDRALAZINE HYDROCHLORIDE 50 MG/1
50 TABLET, FILM COATED ORAL EVERY 8 HOURS
Qty: 90 TABLET | Refills: 11 | Status: SHIPPED | OUTPATIENT
Start: 2022-01-28 | End: 2022-04-28

## 2022-01-28 RX ORDER — CEPHALEXIN 500 MG/1
500 CAPSULE ORAL 3 TIMES DAILY
Qty: 15 CAPSULE | Refills: 0 | Status: SHIPPED | OUTPATIENT
Start: 2022-01-28 | End: 2022-02-02

## 2022-01-28 RX ADMIN — LIDOCAINE 1 PATCH: 50 PATCH TOPICAL at 09:01

## 2022-01-28 RX ADMIN — TIMOLOL MALEATE 1 DROP: 5 SOLUTION OPHTHALMIC at 06:01

## 2022-01-28 RX ADMIN — HYDRALAZINE HYDROCHLORIDE 50 MG: 25 TABLET ORAL at 06:01

## 2022-01-28 RX ADMIN — AMLODIPINE BESYLATE 10 MG: 5 TABLET ORAL at 09:01

## 2022-01-28 RX ADMIN — CARVEDILOL 12.5 MG: 12.5 TABLET, FILM COATED ORAL at 09:01

## 2022-01-28 RX ADMIN — ATORVASTATIN CALCIUM 20 MG: 10 TABLET, FILM COATED ORAL at 09:01

## 2022-01-28 NOTE — SUBJECTIVE & OBJECTIVE
Interval History: wants to get stronger so she can go home.     Review of Systems   Constitutional: Positive for fatigue. Negative for chills and fever.   HENT: Negative for nosebleeds and tinnitus.    Eyes: Negative for photophobia and visual disturbance.   Respiratory: Negative for shortness of breath and wheezing.    Cardiovascular: Negative for chest pain, palpitations and leg swelling.   Gastrointestinal: Negative for abdominal distention, abdominal pain, nausea and vomiting.   Genitourinary: Negative for dysuria, flank pain and hematuria.   Musculoskeletal: Negative for gait problem and joint swelling.   Skin: Negative for rash and wound.   Neurological: Positive for weakness. Negative for seizures, syncope (syncope per brother) and speech difficulty.     Objective:     Vital Signs (Most Recent):  Temp: 99.4 °F (37.4 °C) (01/28/22 0759)  Pulse: 67 (01/28/22 0759)  Resp: 19 (01/28/22 0759)  BP: 137/89 (01/28/22 0759)  SpO2: (!) 91 % (01/28/22 0759) Vital Signs (24h Range):  Temp:  [97.5 °F (36.4 °C)-99.4 °F (37.4 °C)] 99.4 °F (37.4 °C)  Pulse:  [52-69] 67  Resp:  [14-19] 19  SpO2:  [91 %-98 %] 91 %  BP: (126-146)/(59-89) 137/89     Weight: 104.3 kg (230 lb)  Body mass index is 42.07 kg/m².  No intake or output data in the 24 hours ending 01/28/22 1108   Physical Exam  Vitals and nursing note reviewed.   Constitutional:       Appearance: She is well-developed. She is obese. She is ill-appearing. She is not toxic-appearing.   HENT:      Head: Normocephalic and atraumatic.      Right Ear: External ear normal.      Left Ear: External ear normal.   Eyes:      General:         Right eye: No discharge.         Left eye: No discharge.      Conjunctiva/sclera: Conjunctivae normal.      Comments: Blind left eye   Neck:      Thyroid: No thyromegaly.   Cardiovascular:      Rate and Rhythm: Normal rate and regular rhythm.      Heart sounds: No murmur heard.      Pulmonary:      Effort: Pulmonary effort is normal. No  respiratory distress.      Breath sounds: Normal breath sounds.   Abdominal:      General: Bowel sounds are normal. There is no distension.      Palpations: Abdomen is soft. There is no mass.      Tenderness: There is no abdominal tenderness.   Musculoskeletal:         General: Tenderness (right hip) present. No swelling or deformity.      Cervical back: Normal range of motion and neck supple.      Right lower leg: No edema.      Left lower leg: No edema.   Skin:     General: Skin is warm and dry.      Comments: Skin discoloration to BLE   Neurological:      Mental Status: She is alert and oriented to person, place, and time. Mental status is at baseline.      Sensory: No sensory deficit.      Motor: Weakness present.      Comments: No facial droop. Symmetric movement against gravity of BUE and BLE. No sensation deficit. Intermittently disoriented to time, though reorients herself. No slurred speech   Psychiatric:         Mood and Affect: Mood normal.         Behavior: Behavior normal.         Significant Labs: All pertinent labs within the past 24 hours have been reviewed.    Significant Imaging: I have reviewed all pertinent imaging results/findings within the past 24 hours.

## 2022-01-28 NOTE — DISCHARGE SUMMARY
[facility] Medicine  Discharge Summary      Patient Name: Ana James  MRN: [id number]  Patient Class: OP- Observation  Admission Date: 1/26/2022  Hospital Length of Stay: 0 days  Discharge Date and Time:  01/28/2022 4:34 PM  Attending Physician: No att. providers found   Discharging Provider: Mariya Collins NP  Primary Care Provider: Viky Bean MD      HPI:   Ana James 81 y.o. female with HTN, HLD, DM, CKD, HFpEF presents to hospital with chief complaint of altered mental status.  Per the patient's brother she had a syncopal episode today with associated vomiting.  Since that time he has noted she has been increasingly weak and intermittently disoriented to situation.  He finds her mental status has improved since arrival to the emergency room.  The patient denies any loss of consciousness this morning, she states she was just feeling generally weak and tired compared to her baseline from when she was discharged from the hospital.  She normally ambulates with a walker but has a wheelchair at home as well.  She denies any NSAID use.  She was last seen normal by her brother last night.  She denies fever chest pain shortness of breath abdominal pain dysuria melena hematuria hematemesis. The brother denies seeing any slurred speech, facial droop, or seziure activity.     In the ED, UA with leukocytes white blood cells and bacteria, creatinine of 2.9, CT without acute abnormality, DVT ultrasound without evidence of DVT, COVID negative, troponin negative, BNP negative.      * No surgery found *      Hospital Course:   Placed in observation for weakness, confusion, possible syncope, UTI and AUTUMN on CKD . Spoke with Brother Prince 1/27/22 who stated Caregiver Asha found patient passed out on chair in kitchen table and patient not able to stand up.  Patient have a caregiver Mon -Fri 9-3pm and baseline forgetful, left leg weakness, ambulates short distance with walker.  Mental status back to baseline and AUTUMN resolved with IVF. X ray right hip no fractures. PT/OT consult-recommended SNF vs HH as patient max assist. [place] discussed with family insurance not yet approve for SNF. Patient discharge home with HH PT/OT and family/PCA support. Rocephin to kelfex (total 7 days abx) on discharge.        Goals of Care Treatment Preferences:  Code Status: Full Code      Consults:   Consults (From admission, onward)        Status Ordering Provider     Case Management/  Once        Provider:  (Not yet assigned)    Completed JULIANA LIMA     Inpatient consult to Neurology  Once        Provider:  MD Baldo Appiah DANIEL          * Encephalopathy  Per brother has been intermittently confused since last known normal of last night. Patient intermittently confused to situation during exam. CT head without acute abnormality, without hypoglycemia. Suspect infectious in origin for possible UTI, BUN of 31, liver function within normal limits.   Check chest x-ray, TSH, and UDS  -continue Rocephin for UTI  -neuro checks/fall precautions  -neurology consulted  Resolved      Final Active Diagnoses:    Diagnosis Date Noted POA    PRINCIPAL PROBLEM:  Encephalopathy [G93.40] 01/26/2022 Yes    Syncope [R55] 01/26/2022 Yes    Acute right hip pain [M25.551] 01/27/2022 Yes    Acute on chronic renal failure [N17.9, N18.9] 01/26/2022 Yes    Hyperlipidemia [E78.5] 02/27/2020 Yes     Chronic    Chronic diastolic heart failure [I50.32] 02/27/2020 Yes     Chronic    Controlled type 2 diabetes mellitus with stage 3 chronic kidney disease, with long-term current use of insulin [E11.22, N18.30, Z79.4] 07/11/2018 Not Applicable     Chronic    Essential hypertension [I10] 03/02/2014 Yes     Chronic    Morbid obesity with BMI of 45.0-49.9, adult [E66.01, Z68.42] 03/02/2014 Not Applicable     Chronic    UTI (urinary tract infection) [N39.0] 03/02/2014 Yes      Problems Resolved  During this Admission:       Discharged Condition: good    Disposition: Home or Self Care    Follow Up:   Follow-up Information     Viky Bean MD On 2/10/2022.    Specialties: Family Medicine, Internal Medicine  Why: at 11am  Contact information:  [address]  [place] [address]  [phone]             Alex Navarrete MD On 2/3/2022.    Specialty: Cardiology  Why: at 2:15pm  Contact information:  [address]  [address]  [place] [address]  [phone]             [facility].    Why: you will be contacted with name and contact information of assigned home health agency  Contact information:  [address]  [facility] [address]  [phone]                     Patient Instructions:      Diet diabetic     Diet Cardiac     Activity as tolerated       Significant Diagnostic Studies: Labs: All labs within the past 24 hours have been reviewed    Pending Diagnostic Studies:     None         Medications:  Reconciled Home Medications:      Medication List      START taking these medications    cephALEXin 500 MG capsule  Commonly known as: KEFLEX  Take 1 capsule (500 mg total) by mouth 3 (three) times daily. for 5 days        CHANGE how you take these medications    hydrALAZINE 50 MG tablet  Commonly known as: APRESOLINE  Take 1 tablet (50 mg total) by mouth every 8 (eight) hours.  What changed:   · medication strength  · how much to take  · when to take this        CONTINUE taking these medications    albuterol 90 mcg/actuation inhaler  Commonly known as: PROVENTIL/VENTOLIN HFA  INHALE 2 PUFFS INTO THE LUNGS EVERY 6 (SIX) HOURS AS NEEDED FOR WHEEZING. RESCUE     amLODIPine 10 MG tablet  Commonly known as: NORVASC  TAKE 1 TABLET BY MOUTH EVERY DAY     aspirin 81 MG EC tablet  Commonly known as: ECOTRIN  Take 81 mg by mouth nightly.     atorvastatin 20 MG tablet  Commonly known as: LIPITOR  TAKE 1 TABLET BY MOUTH EVERY DAY     blood sugar diagnostic Strp  Test 3 times  "daily     blood-glucose transmitter Lea  Commonly known as: DEXCOM G5 TRANSMITTER  Test glucose twice daily     carvediloL 12.5 MG tablet  Commonly known as: COREG  Take 1 tablet (12.5 mg total) by mouth 2 (two) times daily.     DEXCOM G6  Misc  Generic drug: blood-glucose meter,continuous  Test glucose twice daily     * lancets Misc  Test 3 times daily     * lancets Misc  To check BG 3 times daily, to use with insurance preferred meter     latanoprost 0.005 % ophthalmic solution  Place 1 drop into both eyes every evening.     LEVEMIR FLEXTOUCH U-100 INSULN 100 unit/mL (3 mL) Inpn pen  Generic drug: insulin detemir U-100  Inject 26 Units into the skin every evening.     oxybutynin 15 MG Tr24  Commonly known as: DITROPAN XL  TAKE 1 TABLET BY MOUTH EVERY DAY     pen needle, diabetic 32 gauge x 5/32" Ndle  Commonly known as: BD ULTRA-FINE BREE PEN NEEDLE  INJECT INSULIN THREE TIMES DAILY     timolol maleate 0.5% 0.5 % Drop  Commonly known as: TIMOPTIC  Place 1 drop into both eyes every morning.     TRADJENTA 5 mg Tab tablet  Generic drug: linaGLIPtin  TAKE 1 TABLET BY MOUTH EVERY DAY     traZODone 50 MG tablet  Commonly known as: DESYREL  TAKE 1 TABLET (50 MG TOTAL) BY MOUTH EVERY EVENING.         * This list has 2 medication(s) that are the same as other medications prescribed for you. Read the directions carefully, and ask your doctor or other care provider to review them with you.            STOP taking these medications    candesartan-hydrochlorothiazid 32-12.5 mg per tablet  Commonly known as: ATACAND HCT     cilostazoL 50 MG Tab  Commonly known as: PLETAL            Indwelling Lines/Drains at time of discharge:   Lines/Drains/Airways     None                 Time spent on the discharge of patient: 30 minutes         Mariya Collins NP  Department of Hospital Medicine  [facility] - [place] - Telemetry  "

## 2022-01-28 NOTE — PLAN OF CARE
[facility]      HOME HEALTH ORDERS  FACE TO FACE ENCOUNTER    Patient Name: Ana James  YOB: 1940    PCP: Viky Bean MD   PCP Address: [address] / [place] [address]  PCP Phone Number: [phone]  PCP Fax: [phone]    Encounter Date: 1/26/22    Admit to Home Health    Diagnoses:  Active Hospital Problems    Diagnosis  POA    *Encephalopathy [G93.40]  Yes     Priority: 1 - High    Syncope [R55]  Yes     Priority: 2     Acute right hip pain [M25.551]  Yes    Acute on chronic renal failure [N17.9, N18.9]  Yes    Hyperlipidemia [E78.5]  Yes     Chronic    Chronic diastolic heart failure [I50.32]  Yes     Chronic    Controlled type 2 diabetes mellitus with stage 3 chronic kidney disease, with long-term current use of insulin [E11.22, N18.30, Z79.4]  Not Applicable     Chronic    Essential hypertension [I10]  Yes     Chronic    Morbid obesity with BMI of 45.0-49.9, adult [E66.01, Z68.42]  Not Applicable     Chronic    UTI (urinary tract infection) [N39.0]  Yes     Dx updated per 2019 IMO Load        Resolved Hospital Problems   No resolved problems to display.       Follow Up Appointments:  Future Appointments   Date Time Provider Department Center   2/3/2022  2:15 PM Alex Navarrete MD [facility] CARDIO [facility]   2/10/2022 11:00 AM MD CARROLL Salguero FAM MED [place]   3/17/2022 10:30 AM Janet Farnsworth DPM [facility] POD [place]   4/12/2022 11:00 AM MD FABIAN Salguero FAM MED [place]       Allergies:  Review of patient's allergies indicates:   Allergen Reactions    Adhesive Rash     Paper tape       Medications: Review discharge medications with patient and family and provide education.    Current Facility-Administered Medications   Medication Dose Route Frequency Provider Last Rate Last Admin    acetaminophen tablet 650 mg  650 mg Oral Q4H PRN Phani Lazaro PA-C        amLODIPine tablet 10 mg  10 mg Oral Daily Raisa Collins, ALEXIS   10  mg at 01/28/22 0926    aspirin EC tablet 81 mg  81 mg Oral Nightly Phani Showlucia, PA-C   81 mg at 01/27/22 2036    atorvastatin tablet 20 mg  20 mg Oral Daily Phani ShowCATHIE myers-C   20 mg at 01/28/22 0926    carvediloL tablet 12.5 mg  12.5 mg Oral BID CATHIE Muller-C   12.5 mg at 01/28/22 0926    cefTRIAXone (ROCEPHIN) 1 g/50 mL D5W IVPB  1 g Intravenous Q24H CATHIE Muller-C   Stopped at 01/27/22 1747    cloNIDine tablet 0.2 mg  0.2 mg Oral Q6H PRN Colt Galvan MD   0.2 mg at 01/27/22 0404    dextrose 10% bolus 125 mL  12.5 g Intravenous PRN Cheryl Shafer MD        dextrose 10% bolus 250 mL  25 g Intravenous PRN Cheryl Shafer MD        glucagon (human recombinant) injection 1 mg  1 mg Intramuscular PRN Phani Lazaro PA-C        glucose chewable tablet 16 g  16 g Oral PRN Phani Lazaro PA-C        glucose chewable tablet 24 g  24 g Oral PRN Phani Lazaro PA-C        hydrALAZINE tablet 50 mg  50 mg Oral Q8H [facility] Anjel Collins, NP   50 mg at 01/28/22 0612    HYDROcodone-acetaminophen 5-325 mg per tablet 1 tablet  1 tablet Oral Q6H PRN Raisa Collins NP   1 tablet at 01/27/22 2126    insulin aspart U-100 pen 0-5 Units  0-5 Units Subcutaneous QID (AC + HS) PRN Phani Lazaro PA-C        latanoprost 0.005 % ophthalmic solution 1 drop  1 drop Both Eyes QHS CATHIE Muller-C   1 drop at 01/27/22 2213    LIDOcaine 5 % patch 1 patch  1 patch Transdermal Q24H Mariya Anjel Collins NP   1 patch at 01/28/22 0931    magnesium oxide tablet 800 mg  800 mg Oral PRN Phani ShowCATHIE myers-ENRIQUE        magnesium oxide tablet 800 mg  800 mg Oral PRN Phani ShowHAL myers        melatonin tablet 6 mg  6 mg Oral Nightly PRN Phani Lazaro PA-C        naloxone 0.4 mg/mL injection 0.02 mg  0.02 mg Intravenous PRN Phani Lazaro PA-C        ondansetron injection 4 mg  4 mg Intravenous Q8H PRN Raisa Collins NP   4 mg at 01/27/22 0322    senna-docusate 8.6-50 mg per tablet 1 tablet  1 tablet  Oral Daily PRN Phani Lazaro PA-C        sodium chloride 0.9% flush 10 mL  10 mL Intravenous PRN Phani Lazaro PA-C        timolol maleate 0.5% ophthalmic solution 1 drop  1 drop Both Eyes QAM Phani Lazaro PA-C   1 drop at 01/28/22 0613     Current Discharge Medication List      START taking these medications    Details   cephALEXin (KEFLEX) 500 MG capsule Take 1 capsule (500 mg total) by mouth 3 (three) times daily. for 5 days  Qty: 15 capsule, Refills: 0         CONTINUE these medications which have CHANGED    Details   hydrALAZINE (APRESOLINE) 50 MG tablet Take 1 tablet (50 mg total) by mouth every 8 (eight) hours.  Qty: 90 tablet, Refills: 11    Comments: .         CONTINUE these medications which have NOT CHANGED    Details   albuterol (PROVENTIL/VENTOLIN HFA) 90 mcg/actuation inhaler INHALE 2 PUFFS INTO THE LUNGS EVERY 6 (SIX) HOURS AS NEEDED FOR WHEEZING. RESCUE  Qty: 8.5 g, Refills: 11    Associated Diagnoses: Wheezing      amLODIPine (NORVASC) 10 MG tablet TAKE 1 TABLET BY MOUTH EVERY DAY  Qty: 90 tablet, Refills: 0    Associated Diagnoses: Essential hypertension      aspirin (ECOTRIN) 81 MG EC tablet Take 81 mg by mouth nightly.      atorvastatin (LIPITOR) 20 MG tablet TAKE 1 TABLET BY MOUTH EVERY DAY  Qty: 90 tablet, Refills: 2    Associated Diagnoses: Controlled type 2 diabetes mellitus with stage 4 chronic kidney disease, with long-term current use of insulin      blood sugar diagnostic Strp Test 3 times daily  Qty: 100 each, Refills: 11    Comments: True Results  Associated Diagnoses: Type 2 diabetes, uncontrolled, with renal manifestation      blood-glucose meter,continuous (DEXCOM G6 ) Misc Test glucose twice daily  Qty: 1 each, Refills: 3    Associated Diagnoses: Type 2 diabetes, uncontrolled, with renal manifestation      blood-glucose transmitter (DEXCOM G5 TRANSMITTER) Lea Test glucose twice daily  Qty: 1 Device, Refills: 3      carvediloL (COREG) 12.5 MG tablet Take 1 tablet  "(12.5 mg total) by mouth 2 (two) times daily.  Qty: 180 tablet, Refills: 0    Comments: .  Associated Diagnoses: Essential hypertension      insulin detemir U-100 (LEVEMIR FLEXTOUCH U-100 INSULN) 100 unit/mL (3 mL) InPn pen Inject 26 Units into the skin every evening.    Associated Diagnoses: Uncontrolled type 2 diabetes mellitus with stage 4 chronic kidney disease      !! lancets Misc Test 3 times daily  Qty: 100 each, Refills: 11    Comments: True Results  Associated Diagnoses: Type 2 diabetes, uncontrolled, with renal manifestation      !! lancets Misc To check BG 3 times daily, to use with insurance preferred meter  Qty: 200 each, Refills: 3      latanoprost 0.005 % ophthalmic solution Place 1 drop into both eyes every evening.  Refills: 3      oxybutynin (DITROPAN XL) 15 MG TR24 TAKE 1 TABLET BY MOUTH EVERY DAY  Qty: 90 tablet, Refills: 1      pen needle, diabetic (BD ULTRA-FINE BREE PEN NEEDLE) 32 gauge x 5/32" Ndle INJECT INSULIN THREE TIMES DAILY  Qty: 100 each, Refills: 3      timolol maleate 0.5% (TIMOPTIC) 0.5 % Drop Place 1 drop into both eyes every morning.  Refills: 2      TRADJENTA 5 mg Tab tablet TAKE 1 TABLET BY MOUTH EVERY DAY  Qty: 90 tablet, Refills: 1    Associated Diagnoses: Type 2 diabetes, uncontrolled, with renal manifestation      traZODone (DESYREL) 50 MG tablet TAKE 1 TABLET (50 MG TOTAL) BY MOUTH EVERY EVENING.  Qty: 90 tablet, Refills: 3    Associated Diagnoses: Insomnia, unspecified type       !! - Potential duplicate medications found. Please discuss with provider.      STOP taking these medications       candesartan-hydrochlorothiazid (ATACAND HCT) 32-12.5 mg per tablet Comments:   Reason for Stopping:         cilostazoL (PLETAL) 50 MG Tab Comments:   Reason for Stopping:                 I have seen and examined this patient within the last 30 days. My clinical findings that support the need for the home health skilled services and home bound status are the " following:no   Weakness/numbness causing balance and gait disturbance due to Infection and Weakness/Debility making it taxing to leave home.  Requiring assistive device to leave home due to unsteady gait caused by  Infection and Weakness/Debility.     Diet:   cardiac diet and diabetic diet 1800 calorie    Labs:  NA    Referrals/ Consults  Physical Therapy to evaluate and treat. Evaluate for home safety and equipment needs; Establish/upgrade home exercise program. Perform / instruct on therapeutic exercises, gait training, transfer training, and Range of Motion.  Occupational Therapy to evaluate and treat. Evaluate home environment for safety and equipment needs. Perform/Instruct on transfers, ADL training, ROM, and therapeutic exercises.  Aide to provide assistance with personal care, ADLs, and vital signs.    Activities:   activity as tolerated    Nursing:   Agency to admit patient within 24 hours of hospital discharge unless specified on physician order or at patient request    SN to complete comprehensive assessment including routine vital signs. Instruct on disease process and s/s of complications to report to MD. Review/verify medication list sent home with the patient at time of discharge  and instruct patient/caregiver as needed. Frequency may be adjusted depending on start of care date.     Skilled nurse to perform up to 3 visits PRN for symptoms related to diagnosis    Notify MD if SBP > 160 or < 90; DBP > 90 or < 50; HR > 120 or < 50; Temp > 101; O2 < 88%; Other:       Ok to schedule additional visits based on staff availability and patient request on consecutive days within the home health episode.    When multiple disciplines ordered:    Start of Care occurs on Sunday - Wednesday schedule remaining discipline evaluations as ordered on separate consecutive days following the start of care.    Thursday SOC -schedule subsequent evaluations Friday and Monday the following week.     Friday - Saturday SOC -  schedule subsequent discipline evaluations on consecutive days starting Monday of the following week.    For all post-discharge communication and subsequent orders please contact patient's primary care physician.     Miscellaneous   N/A    Home Health Aide:  Physical Therapy Other: evaluate and treat and Occupational Therapy Other: evaluate and treat    Wound Care Orders  n/a    I certify that this patient is confined to her home and needs intermittent skilled nursing care, physical therapy and speech therapy.

## 2022-01-28 NOTE — PROGRESS NOTES
HH orders printed and faxed to N.  TN indicated on fax coversheet that the plan is for the pt to d/c to home on today and will need home health arranged. Requested that the pt be contact with name and number of agency once assigned.

## 2022-01-28 NOTE — PLAN OF CARE
01/28/22 1314   Final Note   Assessment Type Final Discharge Note   Anticipated Discharge Disposition Home-Health   Hospital Resources/Appts/Education Provided Appointments scheduled and added to AVS;Provided education on problems/symptoms using teachback   Post-Acute Status   Post-Acute Authorization Home Health   Home Health Status Set-up Complete/Auth obtained   Pts nurse Elizabeth notified via secure chat that the pt can d/c from CM standpoint.

## 2022-01-28 NOTE — NURSING
PER handoff received from ALEXANDRA Monroy    Pt resting in bed quietly. NAD noted. No c/o pain Fall and safety precautions maintained. Bed alarm activated and audible.. Bed locked in lowest position, with side rails up x2. Call bell and personal items within reach

## 2022-01-28 NOTE — PLAN OF CARE
Problem: Adult Inpatient Plan of Care  Goal: Plan of Care Review  Outcome: Met  Goal: Patient-Specific Goal (Individualized)  Outcome: Met  Goal: Absence of Hospital-Acquired Illness or Injury  Outcome: Met  Goal: Optimal Comfort and Wellbeing  Outcome: Met  Goal: Readiness for Transition of Care  Outcome: Met     Problem: Bariatric Environmental Safety  Goal: Safety Maintained with Care  Outcome: Met     Problem: Skin Injury Risk Increased  Goal: Skin Health and Integrity  Outcome: Met     Problem: Fall Injury Risk  Goal: Absence of Fall and Fall-Related Injury  Outcome: Met     Problem: Diabetes Comorbidity  Goal: Blood Glucose Level Within Targeted Range  Outcome: Met     Problem: Fluid and Electrolyte Imbalance (Acute Kidney Injury/Impairment)  Goal: Fluid and Electrolyte Balance  Outcome: Met     Problem: Oral Intake Inadequate (Acute Kidney Injury/Impairment)  Goal: Optimal Nutrition Intake  Outcome: Met     Problem: Renal Function Impairment (Acute Kidney Injury/Impairment)  Goal: Effective Renal Function  Outcome: Met

## 2022-01-28 NOTE — ASSESSMENT & PLAN NOTE
Per brother has been intermittently confused since last known normal of last night. Patient intermittently confused to situation during exam. CT head without acute abnormality, without hypoglycemia. Suspect infectious in origin for possible UTI, BUN of 31, liver function within normal limits.   Check chest x-ray, TSH, and UDS  -continue Rocephin for UTI  -neuro checks/fall precautions  -neurology consulted  Resolved

## 2022-01-28 NOTE — PT/OT/SLP PROGRESS
Physical Therapy Treatment    Patient Name:  Ana James   MRN:  [id number]    Recommendations:     Discharge Recommendations:   (SNF )  Discharge Equipment Recommendations: none   Barriers to discharge: Decreased caregiver support     Assessment:     Ana James is a 81 y.o. female admitted with a medical diagnosis of Encephalopathy.  She presents with the following impairments/functional limitations:  weakness,impaired endurance,impaired self care skills,gait instability,impaired balance,decreased upper extremity function,decreased lower extremity function,decreased ROM,edema,impaired functional mobilty,decreased safety awareness,decreased coordination,pain . Pt with max slow movements, required extra time to complete tasks. Pt required more assistance with all functional mobility today .   Pt required MAX A to perform sit<>stand from bed today, unable to take steps 2* to BLE weakness and LLE pain . Pt will benefit from SNF in order to return to OF (pt was able to ambulate household distance prior to this hospital admission).     Rehab Prognosis: Fair+; patient would benefit from acute skilled PT services to address these deficits and reach maximum level of function.    Recent Surgery: * No surgery found *      Plan:     During this hospitalization, patient to be seen  (3-5x/wk) to address the identified rehab impairments via gait training,therapeutic activities,therapeutic exercises and progress toward the following goals:    · Plan of Care Expires:  02/03/22    Subjective     Chief Complaint: BLE weakness , fear of falling.   Patient/Family Comments/goals: pt is agreeable to therapy   Pain/Comfort:  · Pain Rating 1: 8/10  · Location - Side 1: Left  · Location 1: leg  · Pain Addressed 1: Pre-medicate for activity,Reposition,Nurse notified,Cessation of Activity  · Pain Rating Post-Intervention 1: 8/10      Objective:     Communicated with nurse prior to session.  Patient found HOB elevated  with telemetry,peripheral IV,PureWick upon PT entry to room.     General Precautions: Standard, fall   Orthopedic Precautions:N/A   Braces: N/A  Respiratory Status: Room air   SpO2: 98% on RA. HR : 74 - 80 bpm  Functional Mobility:  · Bed Mobility:   Performed rolling/tunring in bed for bed lines changed  · Rolling Left:  contact guard assistance/ MIN A  · Rolling Right: contact guard assistance/MIN A   · Scooting: contact guard assistance  · Supine to Sit: minimum assistance, HOB elevated, bedside rail   · Sit to Supine: minimum assistance with BLE   · Transfers:     · Sit to Stand: x 4 trials from bed  maximal assistance with rolling walker. Pt required max V/T cues for safety , proper technique and walker management.   · Gait:  unable to perform 2* to BLE weakness and pain.    · Balance:   Good in sitting, poor in standing .       AM-PAC 6 CLICK MOBILITY  Turning over in bed (including adjusting bedclothes, sheets and blankets)?: 3  Sitting down on and standing up from a chair with arms (e.g., wheelchair, bedside commode, etc.): 2  Moving from lying on back to sitting on the side of the bed?: 3  Moving to and from a bed to a chair (including a wheelchair)?: 1  Need to walk in hospital room?: 1  Climbing 3-5 steps with a railing?: 1  Basic Mobility Total Score: 11       Therapeutic Activities and Exercises:   Lower Extremity Exercises.   Patient educated on the purpose of therapeutic exercise.     Patient verbalized acceptance/understanding of instructions, expectations, and limitations(for safety).   Pt performed BLE x 10 reps : AP, QS, GS , hip IR/ER while in bed .   Patient performed: 2 sets of 10 reps (each) of B LE There Ex: AP, LAQ, Hip abd/add, Hip flexion while sitting up on EOB.        Patient required  verbal cues/tactile cues to ensure correct sequence, to maintain proper form, and to allow for self-correction.   BLE ex's handout provided.       Patient left with bed in chair position with BLE  elevated , heels offloading on pillow  all lines intact, call button in reach and nurse notified..    GOALS:   Multidisciplinary Problems     Physical Therapy Goals        Problem: Physical Therapy Goal    Goal Priority Disciplines Outcome Goal Variances Interventions   Physical Therapy Goal     PT, PT/OT Ongoing, Progressing     Description: Goals to be met by: 2/3/22     Patient will increase functional independence with mobility by performin. Supine to sit with Stand-by Assistance  2. Sit to stand transfer with Stand-by Assistance  3. Bed to chair transfer with Stand-by Assistance   4. Gait  x 15-25 feet with Stand-by Assistance using Rolling Walker.   5. Lower extremity exercise program x10 reps per handout, with supervision                     Time Tracking:     PT Received On: 22  PT Start Time: 1005     PT Stop Time: 1100  PT Total Time (min): 55 min     Billable Minutes: Therapeutic Activity 30 and Therapeutic Exercise 25    Treatment Type: Treatment  PT/PTA: PTA     PTA Visit Number: 1     2022

## 2022-01-28 NOTE — PROGRESS NOTES
WRITTEN HEALTHCARE DISCHARGE INFORMATION      Things that YOU are responsible for to Manage Your Care At Home:  1. Getting your prescriptions filled.  2. Taking you medications as directed. DO NOT MISS ANY DOSES!  3. Going to your follow-up doctor appointments. This is important because it allows the doctor to monitor your progress and to determine if any changes need to be made to your treatment plan.     If you are unable to make your follow up appointments, please call the number listed and reschedule this appointment.      After discharge, if you need assistance, you can call [facility] On Call Nurse Care Line for 24/7 assistance at [phone]     If you are experience any signs or symptoms, Call your doctor or Call 911 and come to your nearest Emergency Room.     Thank you for choosing [facility] and allowing us to care for you.        You should receive a call from [facility] Discharge Department within 48-72 hours to help manage your care after discharge. Please try to make sure that you answer your phone for this important phone call.      Follow-up Information     Viky Bean MD On 2/10/2022.    Specialties: Family Medicine, Internal Medicine  Why: at 11am  Contact information:  [address] [place] [address]  [phone]             Alex Navarrete MD On 2/3/2022.    Specialty: Cardiology  Why: at 2:15pm  Contact information:  [address]  [address]  [facility] [address]  [phone]             [facility].    Why: you will be contacted with name and contact information of assigned home health agency  Contact information:  [address]  [facility] [address]  [phone]

## 2022-01-28 NOTE — PROGRESS NOTES
Met with pt at bedside to discuss d/c plan and recs from PT/OT.  At this time TN advised that the recs from PT/oT are for SNF level of care. The pt reports that she does not wish to go to SNF and would rather return home with ([facility]) PCA and HH.  TN to advise MD of this information.    1152- spoke to pts sister Nereida and brother Prince regarding d/c plan.  Which is for pt to return home with PCA and [facility]CS.  Both parties in agreement and will reach out to PCA. Brother Prince will be the person who will be picking her up.

## 2022-01-28 NOTE — PT/OT/SLP EVAL
Occupational Therapy   Evaluation    Name: Ana James  MRN: [id number]  Admitting Diagnosis:  Encephalopathy   Recent Surgery: * No surgery found *      Recommendations:     Discharge Recommendations:  (HH OT/PT vs SNF)  Discharge Equipment Recommendations:  none  Barriers to discharge:  weakness, resolving infection.     Assessment:     Ana James is a 81 y.o. female with a medical diagnosis of Encephalopathy. At this time, patient is functioning at their prior level of function and does not require further acute OT services.     Plan:     During this hospitalization, patient does not require further acute OT services.  Please re-consult if situation changes.    · Plan of Care Reviewed with: patient    Subjective     Chief Complaint: LB weakness  Patient/Family Comments/goals: Patient stating wish for home, resume Home aide x5/week and initiate HH follow up vs SNF. Patient expressing wish in absolute terms.     Occupational Profile:  Living Environment: Patient resides in  [facility] with lift to enter.    Patient's prior to admission level of function was Mod I in home ambulation with Rollator, and mod (I) transfers. Patient's Brother provides transportation. Brother and Sister nearby and supportive. Patient has home aide with her Mon _ Friday 9 am to 3 pm for dressing, baths and meal assist,exact amount ot assist provided difficult to accurately asses. Patient distressed regarding her understanding of current discharge process.   Equipment used at home: bedside commode,shower chair,glucometer,rollator,power chair (adjustable bed).     Upon discharge, patient will have assistance PCA services 9-3 M-F. Brother checks on her daily. Patient reports neighbors are very supportive as well. Neighbor provides meals frequently. Said neighbor however has recently had a loss in their family.      Pain/Comfort:  · Pain Rating 1: 0/10  · Pain Addressed 1: Pre-medicate for activity    Patients cultural,  spiritual, Mandaen conflicts given the current situation: no    Objective:     Communicated with: ALEXANDRA Johnson prior to session.  Patient found supine with telemetry,peripheral IV,PureWick upon OT entry to room.    General Precautions: Standard, fall   Orthopedic Precautions:N/A   Braces: N/A  Respiratory Status: Room air , high 90s    Occupational Performance:    Bed Mobility:    · Rolling R<> L contact guard assistance < SBA  · Scooting: SBA < contact guard assistance  · Supine to Sit: SBA HOB elevated (+) L bed rail use  · Sit to Supine: minimum assistance with BLE management beneficial.   ·     Functional Mobility/Transfers: Unable to achieve standing for testing x2 sessions (am/pm visits) this date. Anxiety related to wish for own Rollator vs standard walker available contributory.     Activities of Daily Living:  · Feeding:  Set up  · Grooming: Set up figure 4 sitting near bedside.   · Upper Body Dressing: stand by assistance < min a 2* medical lines  · Lower Body Dressing: maximal assistance non skid socks seated EOB  · Toileting: Pure wick    Cognitive/Visual Perceptual:  Cognitive/Psychosocial Skills:     -       Oriented to: Person, Place, Time and Situation   -       Follows Commands/attention:Follows multistep  commands  -       Communication: clear/fluent  -       Memory: No Deficits noted  -       Safety awareness/insight to disability: intact   -       Mood/Affect/Coping skills/emotional control: Cooperative and Pleasant    Physical Exam:   BUE AROM WNL  BUE MMT WFL  (B) gross/fine motor coordination WFL  Intact light touch w/ localization (B)UE  Posture: rounded shoulders, head foreword  Observable UB skin intact.       AMPAC 6 Click ADL:  AMPAC Total Score: 17    Treatment & Education:  *Patient education provided re: role of OT, and OT Treatment rationales / protocols. Patient verbalized understanding. Collaborative POC dev undertaken.   *Patient agreeable to therapy session. Lights on / shade open  for session .    SC:   *Basic self-care tasks and rudimentary functional mobility undertaken -- assist levels noted above.    TE:   *Education provided regarding the importance of early/consistent mobility to maximize recovery in conjunction w/ edu related to importance of performing daily     UB/LB AROM exercises.  *Education underway for intro deep relaxed/restorative breathing tech as needed for non Rx pain management/MM relaxation, and to support internal self reguation.     Fair (+) return demonstration, f/u recommended.   *Patient introduced to ex program for: AROM there ex to BUE1x 10 reps, x3/day, all joints/all planes in sit as indicated for recovery of effective respiration in functional tasks, and to bolster proper circulation while in acute setting.  SESSION'S END:  *General in room safety reviewed,  call button in hand.  *Questions/concerns within OT scope addressed.Education:    Patient left figure 4 sitting upright bed level. Family visitor present. Patient stating reafdiness for Brother's arrival for t/f to home. RN states t/f underway.  Patient left with all lines intact, bed alarm on, RN notified and  Family visitor  present    GOALS:   Multidisciplinary Problems     Occupational Therapy Goals     Not on file                History:     Past Medical History:   Diagnosis Date    Arthritis lower extremities    Asthma     Blind left eye     Diabetes mellitus     Edema of both lower legs     CHRONIC    Gall stones     Hypertension     Kidney stones     Nephrolithiasis 1/22/2016    Obesity     PVD (peripheral vascular disease)     Renal disorder     Retinopathy     Sleep apnea     Vaginal delivery     x1    Weakness of both lower extremities     uses a cane, rollator & power chair       Past Surgical History:   Procedure Laterality Date    CARDIAC CATHETERIZATION      cataract      CHOLECYSTECTOMY      EYE SURGERY      Rt cataract surgery    HYSTERECTOMY      URETERAL STENT  PLACEMENT         Time Tracking:     OT Date of Treatment: 01/28/22  OT Start Time: 1115  OT Stop Time: 1154  OT Total Time (min): 39 min    Billable Minutes:Evaluation 16  Therapeutic Activity 11  Therapeutic Exercise 12    1/28/2022

## 2022-01-28 NOTE — PLAN OF CARE
Problem: Physical Therapy Goal  Goal: Physical Therapy Goal  Description: Goals to be met by: 2/3/22     Patient will increase functional independence with mobility by performin. Supine to sit with Stand-by Assistance  2. Sit to stand transfer with Stand-by Assistance  3. Bed to chair transfer with Stand-by Assistance   4. Gait  x 15-25 feet with Stand-by Assistance using Rolling Walker.   5. Lower extremity exercise program x10 reps per handout, with supervision    Outcome: Ongoing, Progressing   Pt required MAX A to perform sit<>stand from bed today, unable to take steps 2* to BLE weakness and LLE pain . Pt will benefit from SNF in order to return to PLOF (pt was able to ambulate household distance prior to this hospital admission).

## 2022-02-03 ENCOUNTER — TELEPHONE (OUTPATIENT)
Dept: FAMILY MEDICINE | Facility: CLINIC | Age: 82
End: 2022-02-03
Payer: MEDICARE

## 2022-02-03 NOTE — TELEPHONE ENCOUNTER
----- Message from Jayda Sorenson sent at 2/3/2022  3:15 PM CST -----  Type: Patient Call Back    Who called: self     What is the request in detail: patient would like to know when should she take off the patch. Please call    Can the clinic reply by [facility]? no    Would the patient rather a call back or a response via [facility]? call    Best call back number:.[phone] (home)

## 2022-02-03 NOTE — TELEPHONE ENCOUNTER
Pt states she had a small red area on her buttock when she was in the hospital so they put a patch on it and she wants to know when to remove, it has been over a week; informed pt ok to remove and monitor area for worsening redness/breakdown; apply protective ointment as needed

## 2022-02-08 ENCOUNTER — HOSPITAL ENCOUNTER (INPATIENT)
Facility: HOSPITAL | Age: 82
LOS: 6 days | Discharge: NURSING, REHAB OR LONG-TERM CARE | DRG: 683 | End: 2022-02-14
Attending: EMERGENCY MEDICINE | Admitting: HOSPITALIST
Payer: MEDICARE
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
## 2022-02-08 DIAGNOSIS — R55 SYNCOPE: ICD-10-CM

## 2022-02-08 DIAGNOSIS — T68.XXXA HYPOTHERMIA, INITIAL ENCOUNTER: Primary | ICD-10-CM

## 2022-02-08 DIAGNOSIS — R07.9 CHEST PAIN: ICD-10-CM
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
## 2022-02-08 LAB
ALBUMIN SERPL BCP-MCNC: 3.2 G/DL (ref 3.5–5.2)
ALP SERPL-CCNC: 85 U/L (ref 55–135)
ALT SERPL W/O P-5'-P-CCNC: 23 U/L (ref 10–44)
ANION GAP SERPL CALC-SCNC: 11 MMOL/L (ref 8–16)
AST SERPL-CCNC: 22 U/L (ref 10–40)
BASOPHILS # BLD AUTO: 0.03 K/UL (ref 0–0.2)
BASOPHILS NFR BLD: 0.6 % (ref 0–1.9)
BILIRUB SERPL-MCNC: 0.5 MG/DL (ref 0.1–1)
BILIRUB UR QL STRIP: NEGATIVE
BNP SERPL-MCNC: 30 PG/ML (ref 0–99)
BUN SERPL-MCNC: 31 MG/DL (ref 8–23)
CALCIUM SERPL-MCNC: 9.1 MG/DL (ref 8.7–10.5)
CHLORIDE SERPL-SCNC: 105 MMOL/L (ref 95–110)
CLARITY UR: CLEAR
CO2 SERPL-SCNC: 21 MMOL/L (ref 23–29)
COLOR UR: YELLOW
CREAT SERPL-MCNC: 2.4 MG/DL (ref 0.5–1.4)
CTP QC/QA: YES
DIFFERENTIAL METHOD: ABNORMAL
EOSINOPHIL # BLD AUTO: 0.1 K/UL (ref 0–0.5)
EOSINOPHIL NFR BLD: 2.5 % (ref 0–8)
ERYTHROCYTE [DISTWIDTH] IN BLOOD BY AUTOMATED COUNT: 14 % (ref 11.5–14.5)
EST. GFR  (AFRICAN AMERICAN): 21 ML/MIN/1.73 M^2
EST. GFR  (NON AFRICAN AMERICAN): 18 ML/MIN/1.73 M^2
GLUCOSE SERPL-MCNC: 175 MG/DL (ref 70–110)
GLUCOSE UR QL STRIP: NEGATIVE
HCT VFR BLD AUTO: 32.9 % (ref 37–48.5)
HGB BLD-MCNC: 10.6 G/DL (ref 12–16)
HGB UR QL STRIP: NEGATIVE
IMM GRANULOCYTES # BLD AUTO: 0.03 K/UL (ref 0–0.04)
IMM GRANULOCYTES NFR BLD AUTO: 0.6 % (ref 0–0.5)
KETONES UR QL STRIP: NEGATIVE
LACTATE SERPL-SCNC: 0.7 MMOL/L (ref 0.5–2.2)
LEUKOCYTE ESTERASE UR QL STRIP: NEGATIVE
LIPASE SERPL-CCNC: 32 U/L (ref 4–60)
LYMPHOCYTES # BLD AUTO: 1.4 K/UL (ref 1–4.8)
LYMPHOCYTES NFR BLD: 27.2 % (ref 18–48)
MCH RBC QN AUTO: 28.3 PG (ref 27–31)
MCHC RBC AUTO-ENTMCNC: 32.2 G/DL (ref 32–36)
MCV RBC AUTO: 88 FL (ref 82–98)
MONOCYTES # BLD AUTO: 0.7 K/UL (ref 0.3–1)
MONOCYTES NFR BLD: 14.1 % (ref 4–15)
NEUTROPHILS # BLD AUTO: 2.9 K/UL (ref 1.8–7.7)
NEUTROPHILS NFR BLD: 55 % (ref 38–73)
NITRITE UR QL STRIP: NEGATIVE
NRBC BLD-RTO: 0 /100 WBC
PH UR STRIP: 5 [PH] (ref 5–8)
PLATELET # BLD AUTO: 233 K/UL (ref 150–450)
PMV BLD AUTO: 9.5 FL (ref 9.2–12.9)
POCT GLUCOSE: 116 MG/DL (ref 70–110)
POCT GLUCOSE: 171 MG/DL (ref 70–110)
POCT GLUCOSE: 71 MG/DL (ref 70–110)
POTASSIUM SERPL-SCNC: 4.8 MMOL/L (ref 3.5–5.1)
PROT SERPL-MCNC: 7.9 G/DL (ref 6–8.4)
PROT UR QL STRIP: ABNORMAL
RBC # BLD AUTO: 3.74 M/UL (ref 4–5.4)
SARS-COV-2 RDRP RESP QL NAA+PROBE: NEGATIVE
SODIUM SERPL-SCNC: 137 MMOL/L (ref 136–145)
SP GR UR STRIP: 1.01 (ref 1–1.03)
TROPONIN I SERPL DL<=0.01 NG/ML-MCNC: <0.006 NG/ML (ref 0–0.03)
TSH SERPL DL<=0.005 MIU/L-ACNC: 2.66 UIU/ML (ref 0.4–4)
URN SPEC COLLECT METH UR: ABNORMAL
UROBILINOGEN UR STRIP-ACNC: NEGATIVE EU/DL
WBC # BLD AUTO: 5.26 K/UL (ref 3.9–12.7)

## 2022-02-08 PROCEDURE — 83880 ASSAY OF NATRIURETIC PEPTIDE: CPT | Performed by: PHYSICIAN ASSISTANT

## 2022-02-08 PROCEDURE — 25000003 PHARM REV CODE 250: Performed by: HOSPITALIST

## 2022-02-08 PROCEDURE — 84484 ASSAY OF TROPONIN QUANT: CPT | Performed by: PHYSICIAN ASSISTANT

## 2022-02-08 PROCEDURE — 25000003 PHARM REV CODE 250: Performed by: PHYSICIAN ASSISTANT

## 2022-02-08 PROCEDURE — 96360 HYDRATION IV INFUSION INIT: CPT

## 2022-02-08 PROCEDURE — 83605 ASSAY OF LACTIC ACID: CPT | Performed by: PHYSICIAN ASSISTANT

## 2022-02-08 PROCEDURE — 80053 COMPREHEN METABOLIC PANEL: CPT | Performed by: PHYSICIAN ASSISTANT

## 2022-02-08 PROCEDURE — 93010 EKG 12-LEAD: ICD-10-PCS | Mod: ,,, | Performed by: INTERNAL MEDICINE

## 2022-02-08 PROCEDURE — 85025 COMPLETE CBC W/AUTO DIFF WBC: CPT | Performed by: PHYSICIAN ASSISTANT

## 2022-02-08 PROCEDURE — 63600175 PHARM REV CODE 636 W HCPCS: Performed by: HOSPITALIST

## 2022-02-08 PROCEDURE — 81003 URINALYSIS AUTO W/O SCOPE: CPT | Performed by: PHYSICIAN ASSISTANT

## 2022-02-08 PROCEDURE — 83690 ASSAY OF LIPASE: CPT | Performed by: PHYSICIAN ASSISTANT

## 2022-02-08 PROCEDURE — 93010 ELECTROCARDIOGRAM REPORT: CPT | Mod: ,,, | Performed by: INTERNAL MEDICINE

## 2022-02-08 PROCEDURE — 99285 EMERGENCY DEPT VISIT HI MDM: CPT | Mod: 25

## 2022-02-08 PROCEDURE — 87040 BLOOD CULTURE FOR BACTERIA: CPT | Performed by: PHYSICIAN ASSISTANT

## 2022-02-08 PROCEDURE — 82962 GLUCOSE BLOOD TEST: CPT

## 2022-02-08 PROCEDURE — 84443 ASSAY THYROID STIM HORMONE: CPT | Performed by: PHYSICIAN ASSISTANT

## 2022-02-08 PROCEDURE — 21400001 HC TELEMETRY ROOM

## 2022-02-08 PROCEDURE — U0002 COVID-19 LAB TEST NON-CDC: HCPCS | Performed by: PHYSICIAN ASSISTANT

## 2022-02-08 PROCEDURE — 93005 ELECTROCARDIOGRAM TRACING: CPT

## 2022-02-08 RX ORDER — AMLODIPINE BESYLATE 5 MG/1
10 TABLET ORAL DAILY
Status: DISCONTINUED | OUTPATIENT
Start: 2022-02-08 | End: 2022-02-08

## 2022-02-08 RX ORDER — SODIUM CHLORIDE 0.9 % (FLUSH) 0.9 %
10 SYRINGE (ML) INJECTION EVERY 12 HOURS PRN
Status: DISCONTINUED | OUTPATIENT
Start: 2022-02-08 | End: 2022-02-14 | Stop reason: HOSPADM

## 2022-02-08 RX ORDER — TIMOLOL MALEATE 5 MG/ML
1 SOLUTION/ DROPS OPHTHALMIC EVERY MORNING
Status: DISCONTINUED | OUTPATIENT
Start: 2022-02-08 | End: 2022-02-14 | Stop reason: HOSPADM

## 2022-02-08 RX ORDER — IBUPROFEN 200 MG
16 TABLET ORAL
Status: DISCONTINUED | OUTPATIENT
Start: 2022-02-08 | End: 2022-02-14 | Stop reason: HOSPADM

## 2022-02-08 RX ORDER — GLUCAGON 1 MG
1 KIT INJECTION
Status: DISCONTINUED | OUTPATIENT
Start: 2022-02-08 | End: 2022-02-14 | Stop reason: HOSPADM

## 2022-02-08 RX ORDER — CEFEPIME HYDROCHLORIDE 1 G/50ML
1 INJECTION, SOLUTION INTRAVENOUS
Status: DISCONTINUED | OUTPATIENT
Start: 2022-02-08 | End: 2022-02-10

## 2022-02-08 RX ORDER — ASPIRIN 81 MG/1
81 TABLET ORAL NIGHTLY
Status: DISCONTINUED | OUTPATIENT
Start: 2022-02-08 | End: 2022-02-14 | Stop reason: HOSPADM

## 2022-02-08 RX ORDER — NALOXONE HCL 0.4 MG/ML
0.02 VIAL (ML) INJECTION
Status: DISCONTINUED | OUTPATIENT
Start: 2022-02-08 | End: 2022-02-14 | Stop reason: HOSPADM

## 2022-02-08 RX ORDER — INSULIN ASPART 100 [IU]/ML
0-5 INJECTION, SOLUTION INTRAVENOUS; SUBCUTANEOUS
Status: DISCONTINUED | OUTPATIENT
Start: 2022-02-08 | End: 2022-02-14 | Stop reason: HOSPADM

## 2022-02-08 RX ORDER — CARVEDILOL 12.5 MG/1
12.5 TABLET ORAL 2 TIMES DAILY
Status: DISCONTINUED | OUTPATIENT
Start: 2022-02-08 | End: 2022-02-08

## 2022-02-08 RX ORDER — SODIUM CHLORIDE 9 MG/ML
INJECTION, SOLUTION INTRAVENOUS CONTINUOUS
Status: DISCONTINUED | OUTPATIENT
Start: 2022-02-08 | End: 2022-02-11

## 2022-02-08 RX ORDER — HYDRALAZINE HYDROCHLORIDE 25 MG/1
50 TABLET, FILM COATED ORAL EVERY 8 HOURS
Status: DISCONTINUED | OUTPATIENT
Start: 2022-02-08 | End: 2022-02-14 | Stop reason: HOSPADM

## 2022-02-08 RX ORDER — HEPARIN SODIUM 5000 [USP'U]/ML
5000 INJECTION, SOLUTION INTRAVENOUS; SUBCUTANEOUS EVERY 12 HOURS
Status: DISCONTINUED | OUTPATIENT
Start: 2022-02-08 | End: 2022-02-14 | Stop reason: HOSPADM

## 2022-02-08 RX ORDER — AMLODIPINE BESYLATE 5 MG/1
5 TABLET ORAL DAILY
Status: DISCONTINUED | OUTPATIENT
Start: 2022-02-08 | End: 2022-02-14 | Stop reason: HOSPADM

## 2022-02-08 RX ORDER — IBUPROFEN 200 MG
24 TABLET ORAL
Status: DISCONTINUED | OUTPATIENT
Start: 2022-02-08 | End: 2022-02-14 | Stop reason: HOSPADM

## 2022-02-08 RX ORDER — IPRATROPIUM BROMIDE AND ALBUTEROL SULFATE 2.5; .5 MG/3ML; MG/3ML
3 SOLUTION RESPIRATORY (INHALATION) EVERY 4 HOURS PRN
Status: DISCONTINUED | OUTPATIENT
Start: 2022-02-08 | End: 2022-02-14 | Stop reason: HOSPADM

## 2022-02-08 RX ORDER — LATANOPROST 50 UG/ML
1 SOLUTION/ DROPS OPHTHALMIC NIGHTLY
Status: DISCONTINUED | OUTPATIENT
Start: 2022-02-08 | End: 2022-02-14 | Stop reason: HOSPADM

## 2022-02-08 RX ORDER — ATORVASTATIN CALCIUM 10 MG/1
20 TABLET, FILM COATED ORAL DAILY
Status: DISCONTINUED | OUTPATIENT
Start: 2022-02-08 | End: 2022-02-14 | Stop reason: HOSPADM

## 2022-02-08 RX ADMIN — SODIUM CHLORIDE: 0.9 INJECTION, SOLUTION INTRAVENOUS at 02:02

## 2022-02-08 RX ADMIN — CEFEPIME HYDROCHLORIDE 1 G: 1 INJECTION, SOLUTION INTRAVENOUS at 02:02

## 2022-02-08 RX ADMIN — ASPIRIN 81 MG: 81 TABLET, DELAYED RELEASE ORAL at 09:02

## 2022-02-08 RX ADMIN — TIMOLOL MALEATE 1 DROP: 5 SOLUTION OPHTHALMIC at 02:02

## 2022-02-08 RX ADMIN — ATORVASTATIN CALCIUM 20 MG: 10 TABLET, FILM COATED ORAL at 02:02

## 2022-02-08 RX ADMIN — SODIUM CHLORIDE 1000 ML: 0.9 INJECTION, SOLUTION INTRAVENOUS at 08:02

## 2022-02-08 RX ADMIN — HEPARIN SODIUM 5000 UNITS: 5000 INJECTION INTRAVENOUS; SUBCUTANEOUS at 09:02

## 2022-02-08 NOTE — H&P
[facility] Medicine  History & Physical    Patient Name: Ana James  MRN: [id number]  Patient Class: IP- Inpatient  Admission Date: 2/8/2022  Attending Physician: Baldemar Love MD   Primary Care Provider: Viky Bean MD         Patient information was obtained from patient, past medical records and ER records.     Subjective:     Principal Problem:Hypothermia    Chief Complaint:   Chief Complaint   Patient presents with    Fall     Pt presents to ED via EMS c/o fall.  Per EMS pt was on the fall upon arrival.  Denies head injury or loc.  Per EMS pt is unable to be in upright sitting position without c/o of pain.          HPI: Ana James is an 81 y.o. female with a PMHx of Asthma, HFpEF, Diabetes Mellitus Type 2, hypertension, CKD, PVD, HLD, Sleep apnea, Chronic edema of both legs, and Hx of weakness of both lower extremities who presented to the ED on 2/8/22 via EMS transportation for emergent evaluation of fall. Pt states that she does not know what happened to her. She states that she does not remember falling, and she does not know why she is here. Reports she is unsure if she struck her head. Pt lives alone and was found on the floor by her neighbor who called EMS. She normally ambulates with a walker but has a wheelchair at home as well. She denies any NSAID use. She was recently admitted to this facility on 1/26 for altered mental status and syncopal episodes with fall. She was found to have a UTI and treated with IVFs and ABX prior to discharge her mental status improved. She was discharged home with orders for abx, HH, PT, and OT. Pt denies myalgias, fever, shortness of breath, headache, back pain, abdominal pain, or chest pain.     ED Course:  Initial rectal temperature 90.3. Bear Hugger warmer was applied and temperature increased to 96.6. Pt states her heater is broken at home and she has been cold. CT of head impression no acute intracranial  abnormality. Chest xray impression no acute abnormality. Labs mostly unremarkable, WBC 5.26, lactate 0.7. UA unremarkable. COVID-19 negative. Blood cultures pending.           Past Medical History:   Diagnosis Date    Arthritis lower extremities    Asthma     Blind left eye     Diabetes mellitus     Edema of both lower legs     CHRONIC    Fall 02/08/2022    Gall stones     Hypertension     Kidney stones     Lives alone with help available     HOME HEALTH    Nephrolithiasis 1/22/2016    Obesity     PVD (peripheral vascular disease)     Renal disorder     Retinopathy     Sleep apnea     Vaginal delivery     x1    Weakness of both lower extremities     uses a cane, rollator & power chair       Past Surgical History:   Procedure Laterality Date    CARDIAC CATHETERIZATION      cataract      CHOLECYSTECTOMY      EYE SURGERY      Rt cataract surgery    HYSTERECTOMY      URETERAL STENT PLACEMENT         Review of patient's allergies indicates:   Allergen Reactions    Adhesive Rash     Paper tape       No current facility-administered medications on file prior to encounter.     Current Outpatient Medications on File Prior to Encounter   Medication Sig    amLODIPine (NORVASC) 10 MG tablet TAKE 1 TABLET BY MOUTH EVERY DAY    aspirin (ECOTRIN) 81 MG EC tablet Take 81 mg by mouth nightly.    atorvastatin (LIPITOR) 20 MG tablet TAKE 1 TABLET BY MOUTH EVERY DAY    blood sugar diagnostic Strp Test 3 times daily    blood-glucose meter,continuous (DEXCOM G6 ) Misc Test glucose twice daily    blood-glucose transmitter (DEXCOM G5 TRANSMITTER) Lea Test glucose twice daily    carvediloL (COREG) 12.5 MG tablet Take 1 tablet (12.5 mg total) by mouth 2 (two) times daily.    hydrALAZINE (APRESOLINE) 50 MG tablet Take 1 tablet (50 mg total) by mouth every 8 (eight) hours.    insulin detemir U-100 (LEVEMIR FLEXTOUCH U-100 INSULN) 100 unit/mL (3 mL) InPn pen Inject 26 Units into the skin every evening.  "   lancets Misc Test 3 times daily    lancets Misc To check BG 3 times daily, to use with insurance preferred meter    latanoprost 0.005 % ophthalmic solution Place 1 drop into both eyes every evening.    oxybutynin (DITROPAN XL) 15 MG TR24 TAKE 1 TABLET BY MOUTH EVERY DAY    pen needle, diabetic (BD ULTRA-FINE BREE PEN NEEDLE) 32 gauge x 5/32" Ndle INJECT INSULIN THREE TIMES DAILY    timolol maleate 0.5% (TIMOPTIC) 0.5 % Drop Place 1 drop into both eyes every morning.    TRADJENTA 5 mg Tab tablet TAKE 1 TABLET BY MOUTH EVERY DAY    traZODone (DESYREL) 50 MG tablet TAKE 1 TABLET (50 MG TOTAL) BY MOUTH EVERY EVENING.    albuterol (PROVENTIL/VENTOLIN HFA) 90 mcg/actuation inhaler INHALE 2 PUFFS INTO THE LUNGS EVERY 6 (SIX) HOURS AS NEEDED FOR WHEEZING. RESCUE     Family History     Problem Relation (Age of Onset)    Cancer Sister    Diabetes Brother    Hypertension Father    Kidney failure Mother        Tobacco Use    Smoking status: Never Smoker    Smokeless tobacco: Never Used   Substance and Sexual Activity    Alcohol use: No    Drug use: Never    Sexual activity: Not Currently     Partners: Male     Review of Systems   Constitutional: Negative for chills, fever and unexpected weight change.   HENT: Negative for congestion, ear pain, hearing loss, rhinorrhea, sore throat and trouble swallowing.    Eyes: Negative for discharge, redness and visual disturbance.   Respiratory: Negative for cough, chest tightness, shortness of breath and wheezing.    Cardiovascular: Positive for leg swelling (bilateral lower legs). Negative for chest pain and palpitations.   Gastrointestinal: Negative for abdominal pain, constipation, diarrhea, nausea and vomiting.   Endocrine: Negative for polydipsia, polyphagia and polyuria.   Genitourinary: Negative for decreased urine volume, dysuria and hematuria.   Musculoskeletal: Positive for gait problem (utilizes walker, recent fall). Negative for arthralgias, joint swelling and " myalgias.   Skin: Negative for color change and rash.   Neurological: Positive for weakness (generalized). Negative for dizziness, light-headedness and headaches.   Psychiatric/Behavioral: Negative for decreased concentration, dysphoric mood, sleep disturbance and suicidal ideas.     Objective:     Vital Signs (Most Recent):  Temp: 96.6 °F (35.9 °C) (22 1540)  Pulse: 62 (22 1540)  Resp: (!) 24 (22 1540)  BP: 135/63 (22 1534)  SpO2: 98 % (22 1540) Vital Signs (24h Range):  Temp:  [90.3 °F (32.4 °C)-96.6 °F (35.9 °C)] 96.6 °F (35.9 °C)  Pulse:  [48-94] 62  Resp:  [17-30] 24  SpO2:  [96 %-98 %] 98 %  BP: (119-152)/(60-90) 135/63     Weight: 90.7 kg (200 lb)  Body mass index is 36.58 kg/m².    Physical Exam  Constitutional:       General: She is not in acute distress.     Appearance: She is well-developed and well-nourished.      Comments: hypothermic   HENT:      Head: Normocephalic and atraumatic.   Eyes:      General: Lids are normal. No scleral icterus.     Extraocular Movements: EOM normal.      Conjunctiva/sclera: Conjunctivae normal.      Pupils: Pupils are equal, round, and reactive to light.   Neck:      Thyroid: No thyromegaly.      Vascular: No carotid bruit or JVD.   Cardiovascular:      Rate and Rhythm: Normal rate and regular rhythm.      Pulses: Normal pulses and intact distal pulses.      Heart sounds: Normal heart sounds. No murmur heard.  No friction rub. No S3 or S4 sounds.    Pulmonary:      Effort: Pulmonary effort is normal. Tachypnea present.      Breath sounds: Normal breath sounds. No wheezing, rhonchi or rales.   Abdominal:      General: Bowel sounds are normal.      Palpations: Abdomen is soft.      Tenderness: There is no abdominal tenderness.   Musculoskeletal:         General: No tenderness.      Cervical back: Full passive range of motion without pain and neck supple.      Right lower le+ Edema present.      Left lower le+ Edema present.   Skin:      General: Skin is warm and dry.      Findings: No rash.   Neurological:      Mental Status: She is alert and oriented to person, place, and time.      Comments: Motor grossly intact.  Sensory grossly intact.  Symmetric facial movements palate elevated symmetrically tongue midline   Psychiatric:         Mood and Affect: Mood and affect normal.         Speech: Speech normal.         Behavior: Behavior normal.         Thought Content: Thought content normal.           CRANIAL NERVES     CN III, IV, VI   Pupils are equal, round, and reactive to light.  Extraocular motions are normal.        Significant Labs:   All pertinent labs within the past 24 hours have been reviewed.  Recent Lab Results       02/08/22  1516   02/08/22  1319   02/08/22  1108   02/08/22  1012   02/08/22  0710        Albumin               Alkaline Phosphatase               ALT               Anion Gap               Appearance, UA       Clear         AST               Baso #               Basophil %               Bilirubin (UA)       Negative         BILIRUBIN TOTAL               BNP               BUN               Calcium               Chloride               CO2               Color, UA       Yellow         Creatinine               Differential Method               eGFR if                eGFR if non                Eos #               Eosinophil %               Glucose               Glucose, UA       Negative         Gran # (ANC)               Gran %               Hematocrit               Hemoglobin               Immature Grans (Abs)               Immature Granulocytes               Ketones, UA       Negative         Lactate, Vincent     0.7  Comment: Falsely low lactic acid results can be found in samples   containing >=13.0 mg/dL total bilirubin and/or >=3.5 mg/dL   direct bilirubin.             Leukocytes, UA       Negative         Lipase               Lymph #               Lymph %               MCH               MCHC                MCV               Mono #               Mono %               MPV               NITRITE UA       Negative         nRBC               Occult Blood UA       Negative         pH, UA       5.0         Platelets               POCT Glucose 116         171       Potassium               PROTEIN TOTAL               Protein, UA       Trace  Comment: Recommend a 24 hour urine protein or a urine   protein/creatinine ratio if globulin induced proteinuria is  clinically suspected.            Acceptable   Yes             RBC               RDW               SARS-CoV-2 RNA, Amplification, Qual   Negative             Sodium               Specific [place], UA       1.015         Specimen UA       Urine, Catheterized         Troponin I               TSH               UROBILINOGEN UA       Negative         WBC                                02/08/22  0650        Albumin 3.2       Alkaline Phosphatase 85       ALT 23       Anion Gap 11       Appearance, UA       AST 22       Baso # 0.03       Basophil % 0.6       Bilirubin (UA)       BILIRUBIN TOTAL 0.5  Comment: For infants and newborns, interpretation of results should be based  on gestational age, weight and in agreement with clinical  observations.    Premature Infant recommended reference ranges:  Up to 24 hours.............<8.0 mg/dL  Up to 48 hours............<12.0 mg/dL  3-5 days..................<15.0 mg/dL  6-29 days.................<15.0 mg/dL         BNP 30  Comment: Values of less than 100 pg/ml are consistent with non-CHF populations.       BUN 31       Calcium 9.1       Chloride 105       CO2 21       Color, UA       Creatinine 2.4       Differential Method Automated       eGFR if  21       eGFR if non  18  Comment: Calculation used to obtain the estimated glomerular filtration  rate (eGFR) is the CKD-EPI equation.          Eos # 0.1       Eosinophil % 2.5       Glucose 175       Glucose, UA       Gran # (ANC) 2.9        Gran % 55.0       Hematocrit 32.9       Hemoglobin 10.6       Immature Grans (Abs) 0.03  Comment: Mild elevation in immature granulocytes is non specific and   can be seen in a variety of conditions including stress response,   acute inflammation, trauma and pregnancy. Correlation with other   laboratory and clinical findings is essential.         Immature Granulocytes 0.6       Ketones, UA       Lactate, Vincent       Leukocytes, UA       Lipase 32       Lymph # 1.4       Lymph % 27.2       MCH 28.3       MCHC 32.2       MCV 88       Mono # 0.7       Mono % 14.1       MPV 9.5       NITRITE UA       nRBC 0       Occult Blood UA       pH, UA       Platelets 233       POCT Glucose       Potassium 4.8       PROTEIN TOTAL 7.9       Protein, UA        Acceptable       RBC 3.74       RDW 14.0       SARS-CoV-2 RNA, Amplification, Qual       Sodium 137       Specific Gravity, UA       Specimen UA       Troponin I <0.006  Comment: The reference interval for Troponin I represents the 99th percentile   cutoff   for our facility and is consistent with 3rd generation assay   performance.         TSH 2.663       UROBILINOGEN UA       WBC 5.26             Significant Imaging: I have reviewed all pertinent imaging results/findings within the past 24 hours.     X-Ray Chest AP Portable  Order: [id number]   Status: Final result     Visible to patient: No (inaccessible in Patient Portal)     Next appt: 02/09/2022 at 10:30 AM in Cardiology (Alex Navarrete MD)     Dx: Syncope     0 Result Notes    Details    Reading Physician Reading Date Result Priority   Michael Oneil,   [phone] 2/8/2022 STAT     Narrative & Impression  EXAMINATION:  XR CHEST AP PORTABLE     CLINICAL HISTORY:  Syncope and collapse     TECHNIQUE:  Single frontal view of the chest was performed.     COMPARISON:  01/26/2022.     FINDINGS:  The lungs are well expanded and clear. No focal opacities are seen. The pleural spaces are clear.     The  cardiac silhouette is unremarkable.     The visualized osseous structures demonstrate degenerative changes.     Impression:     No acute abnormality.        Electronically signed by: Michael Oneil  Date:                                            02/08/2022  Time:                                           07:08             Exam Ended: 02/08/22 07:03 Last Resulted: 02/08/22 07:08             CT Head Without Contrast  Order: [id number]   Status: Final result     Visible to patient: No (inaccessible in Patient Portal)     Next appt: 02/09/2022 at 10:30 AM in Cardiology (Alex Navarrete MD)     0 Result Notes    Details    Reading Physician Reading Date Result Priority   Mihcael Oneil,   [phone] 2/8/2022 STAT     Narrative & Impression  EXAMINATION:  CT HEAD WITHOUT CONTRAST     CLINICAL HISTORY:  Syncope, recurrent;Memory loss;     TECHNIQUE:  Low dose axial CT images obtained throughout the head without intravenous contrast. Sagittal and coronal reconstructions were performed.     COMPARISON:  01/26/2022.     FINDINGS:  There is age-related brain parenchymal volume loss and prominence of the CSF spaces, similar to prior. no extra-axial blood or fluid collections.     There is hypoattenuation in the periventricular and deep subcortical white matter compatible with chronic microvascular ischemic change.  No parenchymal mass, hemorrhage, edema or major vascular distribution infarct.     No calvarial fracture.  There is hyperostosis frontalis interna.  The scalp is unremarkable.  Bilateral paranasal sinuses and mastoid air cells are clear.  Left-sided phthisis bulbi.     Impression:     No acute intracranial abnormality.     Stable chronic findings as above.        Electronically signed by: Michael Oneil  Date:                                            02/08/2022  Time:                                           08:05             Exam Ended: 02/08/22 07:55 Last Resulted: 02/08/22 08:05                  Assessment/Plan:     * Hypothermia  - Initial rectal temperature 90.3. Bear Hugger warmer was applied and temperature increased to 96.6  - UA unremarkable  - Blood cultures pending   - Cefepime q12  - NS infusion 100 ml/hr  - Monitor CBC daily  - vital signs q4  - admit to Inpatient    Fall at home, initial encounter  - Advised pt to use assistive devices at home  - Fall precautions       Essential hypertension  - Patient's blood pressure is well-controlled, continue to monitor  - amlodipine 5 mg qd  - hydralazine 50 mg tid    Controlled type 2 diabetes mellitus with stage 3 chronic kidney disease, with long-term current use of insulin  - Insulin Detemir 10 units qhs  - Hold oral antihyperglycemics while inpatient  - PRN sliding scale insulin  - ACHS glucose monitoring   - ADA diet       Physical debility  - Advised pt to use assistive devices at home  - Fall precautions       Hyperlipidemia  - atorvastatin 20 mg qd        VTE Risk Mitigation (From admission, onward)         Ordered     heparin (porcine) injection 5,000 Units  Every 12 hours         02/08/22 1243     IP VTE HIGH RISK PATIENT  Once         02/08/22 1243     Place sequential compression device  Until discontinued         02/08/22 1243                   Param Lovett NP  Department of Hospital Medicine   [facility] - Emergency Dept

## 2022-02-08 NOTE — ED PROVIDER NOTES
Encounter Date: 2/8/2022    SCRIBE #1 NOTE: I, Celso Riley, am scribing for, and in the presence of,  Mica Peralta PA-C. I have scribed the following portions of the note - Other sections scribed: HPI, ROS.       History     Chief Complaint   Patient presents with    Fall     Pt presents to ED via EMS c/o fall.  Per EMS pt was on the fall upon arrival.  Denies head injury or loc.  Per EMS pt is unable to be in upright sitting position without c/o of pain.       CC: Fall    HPI: This is an 81 y.o. F who has Asthma, DM, Chronic edema of both legs, HTN, PVD, Renal disorder, Sleep apnea, and Hx of weakness of both lower extremities who presents to the ED via EMS transportation for emergent evaluation of fall PTA. Pt states that she does not know what happen to her. She states that she does not remember falling, and she does not know why she is here. Pt lives alone. When asked where she is currently, the pt states that she is in the hospital. She also notes that she arrived by ambulance. When asked what month is, the pt states February. Pt denies myalgias, headache, back pain, abdominal pain, or CP.    The history is provided by the patient. No  was used.     Review of patient's allergies indicates:   Allergen Reactions    Adhesive Rash     Paper tape     Past Medical History:   Diagnosis Date    Arthritis lower extremities    Asthma     Blind left eye     Diabetes mellitus     Edema of both lower legs     CHRONIC    Gall stones     Hypertension     Kidney stones     Nephrolithiasis 1/22/2016    Obesity     PVD (peripheral vascular disease)     Renal disorder     Retinopathy     Sleep apnea     Vaginal delivery     x1    Weakness of both lower extremities     uses a cane, rollator & power chair     Past Surgical History:   Procedure Laterality Date    CARDIAC CATHETERIZATION      cataract      CHOLECYSTECTOMY      EYE SURGERY      Rt cataract surgery    HYSTERECTOMY       URETERAL STENT PLACEMENT       Family History   Problem Relation Age of Onset    Kidney failure Mother     Hypertension Father     Diabetes Brother     Cancer Sister      Social History     Tobacco Use    Smoking status: Never Smoker    Smokeless tobacco: Never Used   Substance Use Topics    Alcohol use: No    Drug use: No     Review of Systems   Constitutional: Negative for fever.   HENT: Negative for sore throat.    Respiratory: Negative for cough and shortness of breath.    Cardiovascular: Negative for chest pain.   Gastrointestinal: Negative for abdominal pain, diarrhea, nausea and vomiting.   Genitourinary: Negative for dysuria.   Musculoskeletal: Negative for back pain and myalgias.   Skin: Negative for rash.   Neurological: Negative for headaches.   Hematological: Does not bruise/bleed easily.       Physical Exam     Initial Vitals   BP Pulse Resp Temp SpO2   02/08/22 0619 02/08/22 0619 02/08/22 0619 02/08/22 0720 02/08/22 0619   (!) 152/90 94 18 (!) 90.3 °F (32.4 °C) 97 %      MAP       --                Physical Exam    Nursing note and vitals reviewed.  Constitutional: She appears well-developed and well-nourished. She is not diaphoretic. No distress.   This is an obese, black female in no distress.  She is sitting on the stretcher, alert.  She is able to move and roll without difficulty.   HENT:   Head: Normocephalic and atraumatic.   Mouth/Throat: No oropharyngeal exudate.   No evidence of trauma to the head.   Eyes: EOM are normal. Pupils are equal, round, and reactive to light.   Neck: Neck supple.   Normal range of motion.  Cardiovascular: Normal rate and regular rhythm.   Abdominal: Abdomen is soft. Bowel sounds are normal. She exhibits no distension. There is no abdominal tenderness. There is no rebound and no guarding.   Musculoskeletal:      Cervical back: Normal range of motion and neck supple.      Comments: There is no tenderness to palpation of the cervical, thoracic or lumbar spine.   The only tenderness that can be elicited is on palpation of the bilateral lower extremities when evaluating 2+ pitting pedal edema.  She has good ROM of the bilateral hips and knees.     Neurological: She is alert. No sensory deficit.   She seems slightly confused and does not remember what happened.  However, she is oriented to place and time.   Skin: Skin is warm. Capillary refill takes less than 2 seconds.         ED Course   Procedures  Labs Reviewed   CBC W/ AUTO DIFFERENTIAL - Abnormal; Notable for the following components:       Result Value    RBC 3.74 (*)     Hemoglobin 10.6 (*)     Hematocrit 32.9 (*)     Immature Granulocytes 0.6 (*)     All other components within normal limits   COMPREHENSIVE METABOLIC PANEL - Abnormal; Notable for the following components:    CO2 21 (*)     Glucose 175 (*)     BUN 31 (*)     Creatinine 2.4 (*)     Albumin 3.2 (*)     eGFR if  21 (*)     eGFR if non  18 (*)     All other components within normal limits   URINALYSIS - Abnormal; Notable for the following components:    Protein, UA Trace (*)     All other components within normal limits   POCT GLUCOSE - Abnormal; Notable for the following components:    POCT Glucose 171 (*)     All other components within normal limits   CULTURE, BLOOD   CULTURE, BLOOD   LIPASE   TROPONIN I   B-TYPE NATRIURETIC PEPTIDE   LACTIC ACID, PLASMA   TSH   TSH   SARS-COV-2 RDRP GENE    Narrative:     This test utilizes isothermal nucleic acid amplification   technology to detect the SARS-CoV-2 RdRp nucleic acid segment.   The analytical sensitivity (limit of detection) is 125 genome   equivalents/mL.   A POSITIVE result implies infection with the SARS-CoV-2 virus;   the patient is presumed to be contagious.     A NEGATIVE result means that SARS-CoV-2 nucleic acids are not   present above the limit of detection. A NEGATIVE result should be   treated as presumptive. It does not rule out the possibility of   COVID-19  and should not be the sole basis for treatment decisions.   If COVID-19 is strongly suspected based on clinical and exposure   history, re-testing using an alternate molecular assay should be   considered.   This test is only for use under the Food and Drug   Administration s Emergency Use Authorization (EUA).   Commercial kits are provided by [employer].   Performance characteristics of the EUA have been independently   verified by [facility] Department of   Pathology and Laboratory Medicine.   _________________________________________________________________   The authorized Fact Sheet for Healthcare Providers and the authorized Fact   Sheet for Patients of the ID NOW COVID-19 are available on the FDA   website:     https://www.fda.gov/media/068015/download  https://www.fda.gov/media/013789/download       POCT GLUCOSE MONITORING CONTINUOUS   POCT GLUCOSE MONITORING CONTINUOUS          Imaging Results          CT Head Without Contrast (Final result)  Result time 02/08/22 08:05:45    Final result by Michael Oneil DO (02/08/22 08:05:45)                 Impression:      No acute intracranial abnormality.    Stable chronic findings as above.      Electronically signed by: Michael Oneil  Date:    02/08/2022  Time:    08:05             Narrative:    EXAMINATION:  CT HEAD WITHOUT CONTRAST    CLINICAL HISTORY:  Syncope, recurrent;Memory loss;    TECHNIQUE:  Low dose axial CT images obtained throughout the head without intravenous contrast. Sagittal and coronal reconstructions were performed.    COMPARISON:  01/26/2022.    FINDINGS:  There is age-related brain parenchymal volume loss and prominence of the CSF spaces, similar to prior. no extra-axial blood or fluid collections.    There is hypoattenuation in the periventricular and deep subcortical white matter compatible with chronic microvascular ischemic change.  No parenchymal mass, hemorrhage, edema or major vascular distribution infarct.    No  calvarial fracture.  There is hyperostosis frontalis interna.  The scalp is unremarkable.  Bilateral paranasal sinuses and mastoid air cells are clear.  Left-sided phthisis bulbi.                               X-Ray Chest AP Portable (Final result)  Result time 02/08/22 07:08:46    Final result by Michael Oneil DO (02/08/22 07:08:46)                 Impression:      No acute abnormality.      Electronically signed by: Michael Oneil  Date:    02/08/2022  Time:    07:08             Narrative:    EXAMINATION:  XR CHEST AP PORTABLE    CLINICAL HISTORY:  Syncope and collapse    TECHNIQUE:  Single frontal view of the chest was performed.    COMPARISON:  01/26/2022.    FINDINGS:  The lungs are well expanded and clear. No focal opacities are seen. The pleural spaces are clear.    The cardiac silhouette is unremarkable.    The visualized osseous structures demonstrate degenerative changes.                              X-Rays:   Independently Interpreted Readings:   Other Readings:  Chest X-ray reveals no acute cardiopulmonary processes.    CT of the head reveals no acute intracranial abnormality.    Medications   sodium chloride 0.9% flush 10 mL (has no administration in time range)   naloxone 0.4 mg/mL injection 0.02 mg (has no administration in time range)   0.9%  NaCl infusion ( Intravenous New Bag 2/8/22 1407)   heparin (porcine) injection 5,000 Units (has no administration in time range)   albuterol-ipratropium 2.5 mg-0.5 mg/3 mL nebulizer solution 3 mL (has no administration in time range)   aspirin EC tablet 81 mg (has no administration in time range)   atorvastatin tablet 20 mg (20 mg Oral Given 2/8/22 1403)   hydrALAZINE tablet 50 mg (0 mg Oral Hold 2/8/22 1400)   insulin detemir U-100 pen 10 Units (has no administration in time range)   latanoprost 0.005 % ophthalmic solution 1 drop (has no administration in time range)   timolol maleate 0.5% ophthalmic solution 1 drop (1 drop Both Eyes Given 2/8/22 1402)    glucose chewable tablet 16 g (has no administration in time range)   glucose chewable tablet 24 g (has no administration in time range)   dextrose 50% injection 12.5 g (has no administration in time range)   dextrose 50% injection 25 g (has no administration in time range)   glucagon (human recombinant) injection 1 mg (has no administration in time range)   insulin aspart U-100 pen 0-5 Units (has no administration in time range)   amLODIPine tablet 5 mg (0 mg Oral Hold 2/8/22 1345)   cefepime in dextrose 5 % 1 gram/50 mL IVPB 1 g (1 g Intravenous New Bag 2/8/22 1411)   sodium chloride 0.9% bolus 1,000 mL (0 mLs Intravenous Stopped 2/8/22 5643)           APC / Resident Notes:   This is an emergent evaluation of an 81-year-old female presents to the emergency department via ambulance after fall that occurred at home.  Per EMS, she was found on the floor.  The patient denies any memory of what happened.  She lives alone and has a caretaker during the day.  The patient adamantly denies any pain anywhere.    The patient is currently afebrile.  She was hypothermic at 90° F initially.  Physical exam revealed a mildly confused black female in no apparent distress.  She was oriented to person place and year.  A thorough exam was done which revealed no tenderness or step-offs to the cervical, thoracic or lumbar spine.  There is no evidence of trauma noted to the head.  The rest of the exam was unremarkable with the exception of 2+ pedal pitting edema.    An IV was started and fluids were given.  Blood was drawn urine was collected.  Chest x-ray revealed no acute cardiopulmonary processes.  CT of the head reveals no acute intracranial abnormalities.  Point of care COVID was negative.  Urinalysis catheterized was without any evidence of an infection.  Blood cultures x2 were obtained and pending.  A CBC is remarkable for anemia with 10.6 and 32.9.  This is stable from her previous CBCs done in the past.  CMP reveals a BUN of  31 and creatinine of 2.4.  This is slightly elevated from her last CMP that was performed after her last admission.  Please see further notes about that admission.  Lipase is normal.  Troponin, BMP and TSH all within normal limits.    As noted before, the patient was recently admitted in the hospital for confusion, possible encephalitis and urinary tract infection.  She was discharged with antibiotics.  According to documentation she was sent home after a skilled nursing unit was unavailable due to the insurance request pending.  According to her caretaker Asha, she is at her baseline mental status at this time and does not appear confused to her.    Due to the patient's hypothermia she will be admitted.  Hospital Medicine was contacted.  Dr. Salvador will be the admitting provider.  This case was also discussed with Dr. Love and he is in agreement plan.     Scribe Attestation:   Scribe #1: I performed the above scribed service and the documentation accurately describes the services I performed. I attest to the accuracy of the note.               I, Mica Peralta PA-C, personally performed the services described in this documentation. All medical record entries made by the scribe were at my direction and in my presence. I have reviewed the chart and agree that the record reflects my personal performance and is accurate and complete.  Clinical Impression:   Final diagnoses:  [W19.XXXA] Fall  [R55] Syncope  [T68.XXXA] Hypothermia, initial encounter (Primary)  [W19.XXXA] Fall, initial encounter          ED Disposition Condition    Admit               Mica Peralta PA-C  02/08/22 8417

## 2022-02-08 NOTE — ASSESSMENT & PLAN NOTE
- Patient's blood pressure is well-controlled, continue to monitor  - amlodipine 5 mg qd  - hydralazine 50 mg tid

## 2022-02-08 NOTE — ED TRIAGE NOTES
Pt to the ED via EMS with complaints of a fall. Per EMS, pt's caretaker went to pt's house and pt was found on the ground. Pt is oriented to person, place and time but does not remember falling and states she does not know why she is here. Unknown if pt hit head or lost consciousness. Pt denies being on blood thinners. Pt denies pain but while turning patient and while moving right leg, pt groans and winces.

## 2022-02-08 NOTE — SUBJECTIVE & OBJECTIVE
Past Medical History:   Diagnosis Date    Arthritis lower extremities    Asthma     Blind left eye     Diabetes mellitus     Edema of both lower legs     CHRONIC    Fall 02/08/2022    Gall stones     Hypertension     Kidney stones     Lives alone with help available     HOME HEALTH    Nephrolithiasis 1/22/2016    Obesity     PVD (peripheral vascular disease)     Renal disorder     Retinopathy     Sleep apnea     Vaginal delivery     x1    Weakness of both lower extremities     uses a cane, rollator & power chair       Past Surgical History:   Procedure Laterality Date    CARDIAC CATHETERIZATION      cataract      CHOLECYSTECTOMY      EYE SURGERY      Rt cataract surgery    HYSTERECTOMY      URETERAL STENT PLACEMENT         Review of patient's allergies indicates:   Allergen Reactions    Adhesive Rash     Paper tape       No current facility-administered medications on file prior to encounter.     Current Outpatient Medications on File Prior to Encounter   Medication Sig    amLODIPine (NORVASC) 10 MG tablet TAKE 1 TABLET BY MOUTH EVERY DAY    aspirin (ECOTRIN) 81 MG EC tablet Take 81 mg by mouth nightly.    atorvastatin (LIPITOR) 20 MG tablet TAKE 1 TABLET BY MOUTH EVERY DAY    blood sugar diagnostic Strp Test 3 times daily    blood-glucose meter,continuous (DEXCOM G6 ) Misc Test glucose twice daily    blood-glucose transmitter (DEXCOM G5 TRANSMITTER) Lea Test glucose twice daily    carvediloL (COREG) 12.5 MG tablet Take 1 tablet (12.5 mg total) by mouth 2 (two) times daily.    hydrALAZINE (APRESOLINE) 50 MG tablet Take 1 tablet (50 mg total) by mouth every 8 (eight) hours.    insulin detemir U-100 (LEVEMIR FLEXTOUCH U-100 INSULN) 100 unit/mL (3 mL) InPn pen Inject 26 Units into the skin every evening.    lancets Misc Test 3 times daily    lancets Misc To check BG 3 times daily, to use with insurance preferred meter    latanoprost 0.005 % ophthalmic solution Place 1 drop  "into both eyes every evening.    oxybutynin (DITROPAN XL) 15 MG TR24 TAKE 1 TABLET BY MOUTH EVERY DAY    pen needle, diabetic (BD ULTRA-FINE BREE PEN NEEDLE) 32 gauge x 5/32" Ndle INJECT INSULIN THREE TIMES DAILY    timolol maleate 0.5% (TIMOPTIC) 0.5 % Drop Place 1 drop into both eyes every morning.    TRADJENTA 5 mg Tab tablet TAKE 1 TABLET BY MOUTH EVERY DAY    traZODone (DESYREL) 50 MG tablet TAKE 1 TABLET (50 MG TOTAL) BY MOUTH EVERY EVENING.    albuterol (PROVENTIL/VENTOLIN HFA) 90 mcg/actuation inhaler INHALE 2 PUFFS INTO THE LUNGS EVERY 6 (SIX) HOURS AS NEEDED FOR WHEEZING. RESCUE     Family History     Problem Relation (Age of Onset)    Cancer Sister    Diabetes Brother    Hypertension Father    Kidney failure Mother        Tobacco Use    Smoking status: Never Smoker    Smokeless tobacco: Never Used   Substance and Sexual Activity    Alcohol use: No    Drug use: Never    Sexual activity: Not Currently     Partners: Male     Review of Systems   Constitutional: Negative for chills, fever and unexpected weight change.   HENT: Negative for congestion, ear pain, hearing loss, rhinorrhea, sore throat and trouble swallowing.    Eyes: Negative for discharge, redness and visual disturbance.   Respiratory: Negative for cough, chest tightness, shortness of breath and wheezing.    Cardiovascular: Positive for leg swelling (bilateral lower legs). Negative for chest pain and palpitations.   Gastrointestinal: Negative for abdominal pain, constipation, diarrhea, nausea and vomiting.   Endocrine: Negative for polydipsia, polyphagia and polyuria.   Genitourinary: Negative for decreased urine volume, dysuria and hematuria.   Musculoskeletal: Positive for gait problem (utilizes walker, recent fall). Negative for arthralgias, joint swelling and myalgias.   Skin: Negative for color change and rash.   Neurological: Positive for weakness (generalized). Negative for dizziness, light-headedness and headaches. "   Psychiatric/Behavioral: Negative for decreased concentration, dysphoric mood, sleep disturbance and suicidal ideas.     Objective:     Vital Signs (Most Recent):  Temp: 96.6 °F (35.9 °C) (22 1540)  Pulse: 62 (22 1540)  Resp: (!) 24 (22 1540)  BP: 135/63 (22 1534)  SpO2: 98 % (22 1540) Vital Signs (24h Range):  Temp:  [90.3 °F (32.4 °C)-96.6 °F (35.9 °C)] 96.6 °F (35.9 °C)  Pulse:  [48-94] 62  Resp:  [17-30] 24  SpO2:  [96 %-98 %] 98 %  BP: (119-152)/(60-90) 135/63     Weight: 90.7 kg (200 lb)  Body mass index is 36.58 kg/m².    Physical Exam  Constitutional:       General: She is not in acute distress.     Appearance: She is well-developed and well-nourished.      Comments: hypothermic   HENT:      Head: Normocephalic and atraumatic.   Eyes:      General: Lids are normal. No scleral icterus.     Extraocular Movements: EOM normal.      Conjunctiva/sclera: Conjunctivae normal.      Pupils: Pupils are equal, round, and reactive to light.   Neck:      Thyroid: No thyromegaly.      Vascular: No carotid bruit or JVD.   Cardiovascular:      Rate and Rhythm: Normal rate and regular rhythm.      Pulses: Normal pulses and intact distal pulses.      Heart sounds: Normal heart sounds. No murmur heard.  No friction rub. No S3 or S4 sounds.    Pulmonary:      Effort: Pulmonary effort is normal. Tachypnea present.      Breath sounds: Normal breath sounds. No wheezing, rhonchi or rales.   Abdominal:      General: Bowel sounds are normal.      Palpations: Abdomen is soft.      Tenderness: There is no abdominal tenderness.   Musculoskeletal:         General: No tenderness.      Cervical back: Full passive range of motion without pain and neck supple.      Right lower le+ Edema present.      Left lower le+ Edema present.   Skin:     General: Skin is warm and dry.      Findings: No rash.   Neurological:      Mental Status: She is alert and oriented to person, place, and time.      Comments: Motor  grossly intact.  Sensory grossly intact.  Symmetric facial movements palate elevated symmetrically tongue midline   Psychiatric:         Mood and Affect: Mood and affect normal.         Speech: Speech normal.         Behavior: Behavior normal.         Thought Content: Thought content normal.           CRANIAL NERVES     CN III, IV, VI   Pupils are equal, round, and reactive to light.  Extraocular motions are normal.        Significant Labs:   All pertinent labs within the past 24 hours have been reviewed.  Recent Lab Results       02/08/22  1516   02/08/22  1319   02/08/22  1108   02/08/22  1012   02/08/22  0710        Albumin               Alkaline Phosphatase               ALT               Anion Gap               Appearance, UA       Clear         AST               Baso #               Basophil %               Bilirubin (UA)       Negative         BILIRUBIN TOTAL               BNP               BUN               Calcium               Chloride               CO2               Color, UA       Yellow         Creatinine               Differential Method               eGFR if                eGFR if non                Eos #               Eosinophil %               Glucose               Glucose, UA       Negative         Gran # (ANC)               Gran %               Hematocrit               Hemoglobin               Immature Grans (Abs)               Immature Granulocytes               Ketones, UA       Negative         Lactate, Vincent     0.7  Comment: Falsely low lactic acid results can be found in samples   containing >=13.0 mg/dL total bilirubin and/or >=3.5 mg/dL   direct bilirubin.             Leukocytes, UA       Negative         Lipase               Lymph #               Lymph %               MCH               MCHC               MCV               Mono #               Mono %               MPV               NITRITE UA       Negative         nRBC               Occult Blood UA        Negative         pH, UA       5.0         Platelets               POCT Glucose 116         171       Potassium               PROTEIN TOTAL               Protein, UA       Trace  Comment: Recommend a 24 hour urine protein or a urine   protein/creatinine ratio if globulin induced proteinuria is  clinically suspected.            Acceptable   Yes             RBC               RDW               SARS-CoV-2 RNA, Amplification, Qual   Negative             Sodium               Specific [place], UA       1.015         Specimen UA       Urine, Catheterized         Troponin I               TSH               UROBILINOGEN UA       Negative         WBC                                02/08/22  0650        Albumin 3.2       Alkaline Phosphatase 85       ALT 23       Anion Gap 11       Appearance, UA       AST 22       Baso # 0.03       Basophil % 0.6       Bilirubin (UA)       BILIRUBIN TOTAL 0.5  Comment: For infants and newborns, interpretation of results should be based  on gestational age, weight and in agreement with clinical  observations.    Premature Infant recommended reference ranges:  Up to 24 hours.............<8.0 mg/dL  Up to 48 hours............<12.0 mg/dL  3-5 days..................<15.0 mg/dL  6-29 days.................<15.0 mg/dL         BNP 30  Comment: Values of less than 100 pg/ml are consistent with non-CHF populations.       BUN 31       Calcium 9.1       Chloride 105       CO2 21       Color, UA       Creatinine 2.4       Differential Method Automated       eGFR if  21       eGFR if non  18  Comment: Calculation used to obtain the estimated glomerular filtration  rate (eGFR) is the CKD-EPI equation.          Eos # 0.1       Eosinophil % 2.5       Glucose 175       Glucose, UA       Gran # (ANC) 2.9       Gran % 55.0       Hematocrit 32.9       Hemoglobin 10.6       Immature Grans (Abs) 0.03  Comment: Mild elevation in immature granulocytes is non specific and    can be seen in a variety of conditions including stress response,   acute inflammation, trauma and pregnancy. Correlation with other   laboratory and clinical findings is essential.         Immature Granulocytes 0.6       Ketones, UA       Lactate, Vincent       Leukocytes, UA       Lipase 32       Lymph # 1.4       Lymph % 27.2       MCH 28.3       MCHC 32.2       MCV 88       Mono # 0.7       Mono % 14.1       MPV 9.5       NITRITE UA       nRBC 0       Occult Blood UA       pH, UA       Platelets 233       POCT Glucose       Potassium 4.8       PROTEIN TOTAL 7.9       Protein, UA        Acceptable       RBC 3.74       RDW 14.0       SARS-CoV-2 RNA, Amplification, Qual       Sodium 137       Specific Gravity, UA       Specimen UA       Troponin I <0.006  Comment: The reference interval for Troponin I represents the 99th percentile   cutoff   for our facility and is consistent with 3rd generation assay   performance.         TSH 2.663       UROBILINOGEN UA       WBC 5.26             Significant Imaging: I have reviewed all pertinent imaging results/findings within the past 24 hours.     X-Ray Chest AP Portable  Order: [id number]   Status: Final result     Visible to patient: No (inaccessible in Patient Portal)     Next appt: 02/09/2022 at 10:30 AM in Cardiology (Alex Navarrete MD)     Dx: Syncope     0 Result Notes    Details    Reading Physician Reading Date Result Priority   Michael Oneil, DO  [phone] 2/8/2022 STAT     Narrative & Impression  EXAMINATION:  XR CHEST AP PORTABLE     CLINICAL HISTORY:  Syncope and collapse     TECHNIQUE:  Single frontal view of the chest was performed.     COMPARISON:  01/26/2022.     FINDINGS:  The lungs are well expanded and clear. No focal opacities are seen. The pleural spaces are clear.     The cardiac silhouette is unremarkable.     The visualized osseous structures demonstrate degenerative changes.     Impression:     No acute  abnormality.        Electronically signed by: Michael Oneil  Date:                                            02/08/2022  Time:                                           07:08             Exam Ended: 02/08/22 07:03 Last Resulted: 02/08/22 07:08             CT Head Without Contrast  Order: [id number]   Status: Final result     Visible to patient: No (inaccessible in Patient Portal)     Next appt: 02/09/2022 at 10:30 AM in Cardiology (Alex Navarrete MD)     0 Result Notes    Details    Reading Physician Reading Date Result Priority   Michael Oneil,   [phone] 2/8/2022 STAT     Narrative & Impression  EXAMINATION:  CT HEAD WITHOUT CONTRAST     CLINICAL HISTORY:  Syncope, recurrent;Memory loss;     TECHNIQUE:  Low dose axial CT images obtained throughout the head without intravenous contrast. Sagittal and coronal reconstructions were performed.     COMPARISON:  01/26/2022.     FINDINGS:  There is age-related brain parenchymal volume loss and prominence of the CSF spaces, similar to prior. no extra-axial blood or fluid collections.     There is hypoattenuation in the periventricular and deep subcortical white matter compatible with chronic microvascular ischemic change.  No parenchymal mass, hemorrhage, edema or major vascular distribution infarct.     No calvarial fracture.  There is hyperostosis frontalis interna.  The scalp is unremarkable.  Bilateral paranasal sinuses and mastoid air cells are clear.  Left-sided phthisis bulbi.     Impression:     No acute intracranial abnormality.     Stable chronic findings as above.        Electronically signed by: Michael Oneil  Date:                                            02/08/2022  Time:                                           08:05             Exam Ended: 02/08/22 07:55 Last Resulted: 02/08/22 08:05

## 2022-02-08 NOTE — ASSESSMENT & PLAN NOTE
- Initial rectal temperature 90.3. Bear Hugger warmer was applied and temperature increased to 96.6  - UA unremarkable  - Blood cultures pending   - Cefepime q12  - NS infusion 100 ml/hr  - Monitor CBC daily  - vital signs q4  - admit to Inpatient

## 2022-02-08 NOTE — HPI
Ana Jmaes is an 81 y.o. female with a PMHx of Asthma, HFpEF, Diabetes Mellitus Type 2, hypertension, CKD, PVD, HLD, Sleep apnea, Chronic edema of both legs, and Hx of weakness of both lower extremities who presented to the ED on 2/8/22 via EMS transportation for emergent evaluation of fall. Pt states that she does not know what happened to her. She states that she does not remember falling, and she does not know why she is here. Reports she is unsure if she struck her head. Pt lives alone and was found on the floor by her neighbor who called EMS. She normally ambulates with a walker but has a wheelchair at home as well. She denies any NSAID use. She was recently admitted to this facility on 1/26 for altered mental status and syncopal episodes with fall. She was found to have a UTI and treated with IVFs and ABX prior to discharge her mental status improved. She was discharged home with orders for abx, HH, PT, and OT. Pt denies myalgias, fever, shortness of breath, headache, back pain, abdominal pain, or chest pain.     ED Course:  Initial rectal temperature 90.3. Bear Hugger warmer was applied and temperature increased to 96.6. Pt states her heater is broken at home and she has been cold. CT of head impression no acute intracranial abnormality. Chest xray impression no acute abnormality. Labs mostly unremarkable, WBC 5.26, lactate 0.7. UA unremarkable. COVID-19 negative. Blood cultures pending.

## 2022-02-08 NOTE — ED NOTES
Report received from ALEXANDRA Harden. Pt lying calmly awake in bed. Pt connected to cardiac monitoring, pulse ox and BP cuff. Bed locked in lowest position, pt in view of nurse's station. Pt denies pain, concerns or questions.

## 2022-02-08 NOTE — ASSESSMENT & PLAN NOTE
- Insulin Detemir 10 units qhs  - Hold oral antihyperglycemics while inpatient  - PRN sliding scale insulin  - ACHS glucose monitoring   - ADA diet

## 2022-02-09 LAB
ALBUMIN SERPL BCP-MCNC: 2.5 G/DL (ref 3.5–5.2)
ALP SERPL-CCNC: 77 U/L (ref 55–135)
ALT SERPL W/O P-5'-P-CCNC: 21 U/L (ref 10–44)
ANION GAP SERPL CALC-SCNC: 7 MMOL/L (ref 8–16)
AST SERPL-CCNC: 26 U/L (ref 10–40)
BASOPHILS # BLD AUTO: 0.01 K/UL (ref 0–0.2)
BASOPHILS NFR BLD: 0.1 % (ref 0–1.9)
BILIRUB SERPL-MCNC: 0.4 MG/DL (ref 0.1–1)
BUN SERPL-MCNC: 27 MG/DL (ref 8–23)
CALCIUM SERPL-MCNC: 8.1 MG/DL (ref 8.7–10.5)
CHLORIDE SERPL-SCNC: 110 MMOL/L (ref 95–110)
CO2 SERPL-SCNC: 21 MMOL/L (ref 23–29)
CREAT SERPL-MCNC: 1.9 MG/DL (ref 0.5–1.4)
DIFFERENTIAL METHOD: ABNORMAL
EOSINOPHIL # BLD AUTO: 0.3 K/UL (ref 0–0.5)
EOSINOPHIL NFR BLD: 4.3 % (ref 0–8)
ERYTHROCYTE [DISTWIDTH] IN BLOOD BY AUTOMATED COUNT: 14.1 % (ref 11.5–14.5)
EST. GFR  (AFRICAN AMERICAN): 28 ML/MIN/1.73 M^2
EST. GFR  (NON AFRICAN AMERICAN): 24 ML/MIN/1.73 M^2
GLUCOSE SERPL-MCNC: 92 MG/DL (ref 70–110)
HCT VFR BLD AUTO: 27.9 % (ref 37–48.5)
HGB BLD-MCNC: 9 G/DL (ref 12–16)
IMM GRANULOCYTES # BLD AUTO: 0.03 K/UL (ref 0–0.04)
IMM GRANULOCYTES NFR BLD AUTO: 0.4 % (ref 0–0.5)
LYMPHOCYTES # BLD AUTO: 0.4 K/UL (ref 1–4.8)
LYMPHOCYTES NFR BLD: 5.6 % (ref 18–48)
MCH RBC QN AUTO: 28.7 PG (ref 27–31)
MCHC RBC AUTO-ENTMCNC: 32.3 G/DL (ref 32–36)
MCV RBC AUTO: 89 FL (ref 82–98)
MONOCYTES # BLD AUTO: 0.7 K/UL (ref 0.3–1)
MONOCYTES NFR BLD: 9.2 % (ref 4–15)
NEUTROPHILS # BLD AUTO: 6.2 K/UL (ref 1.8–7.7)
NEUTROPHILS NFR BLD: 80.4 % (ref 38–73)
NRBC BLD-RTO: 0 /100 WBC
PLATELET # BLD AUTO: 215 K/UL (ref 150–450)
PMV BLD AUTO: 9.7 FL (ref 9.2–12.9)
POCT GLUCOSE: 105 MG/DL (ref 70–110)
POCT GLUCOSE: 77 MG/DL (ref 70–110)
POCT GLUCOSE: 84 MG/DL (ref 70–110)
POCT GLUCOSE: 97 MG/DL (ref 70–110)
POTASSIUM SERPL-SCNC: 4.8 MMOL/L (ref 3.5–5.1)
PROT SERPL-MCNC: 6.2 G/DL (ref 6–8.4)
RBC # BLD AUTO: 3.14 M/UL (ref 4–5.4)
SODIUM SERPL-SCNC: 138 MMOL/L (ref 136–145)
WBC # BLD AUTO: 7.68 K/UL (ref 3.9–12.7)

## 2022-02-09 PROCEDURE — 80053 COMPREHEN METABOLIC PANEL: CPT | Performed by: HOSPITALIST

## 2022-02-09 PROCEDURE — 21400001 HC TELEMETRY ROOM

## 2022-02-09 PROCEDURE — 36415 COLL VENOUS BLD VENIPUNCTURE: CPT | Performed by: HOSPITALIST

## 2022-02-09 PROCEDURE — 63600175 PHARM REV CODE 636 W HCPCS: Performed by: HOSPITALIST

## 2022-02-09 PROCEDURE — 85025 COMPLETE CBC W/AUTO DIFF WBC: CPT | Performed by: HOSPITALIST

## 2022-02-09 PROCEDURE — 25000003 PHARM REV CODE 250: Performed by: NURSE PRACTITIONER

## 2022-02-09 PROCEDURE — 25000003 PHARM REV CODE 250: Performed by: HOSPITALIST

## 2022-02-09 RX ORDER — ACETAMINOPHEN 325 MG/1
650 TABLET ORAL EVERY 6 HOURS PRN
Status: DISCONTINUED | OUTPATIENT
Start: 2022-02-09 | End: 2022-02-14 | Stop reason: HOSPADM

## 2022-02-09 RX ADMIN — CEFEPIME HYDROCHLORIDE 1 G: 1 INJECTION, SOLUTION INTRAVENOUS at 02:02

## 2022-02-09 RX ADMIN — ACETAMINOPHEN 650 MG: 325 TABLET ORAL at 05:02

## 2022-02-09 RX ADMIN — ACETAMINOPHEN 650 MG: 325 TABLET ORAL at 03:02

## 2022-02-09 RX ADMIN — HEPARIN SODIUM 5000 UNITS: 5000 INJECTION INTRAVENOUS; SUBCUTANEOUS at 09:02

## 2022-02-09 RX ADMIN — LATANOPROST 1 DROP: 50 SOLUTION OPHTHALMIC at 08:02

## 2022-02-09 RX ADMIN — SODIUM CHLORIDE: 0.9 INJECTION, SOLUTION INTRAVENOUS at 02:02

## 2022-02-09 RX ADMIN — HYDRALAZINE HYDROCHLORIDE 50 MG: 25 TABLET ORAL at 10:02

## 2022-02-09 RX ADMIN — ATORVASTATIN CALCIUM 20 MG: 10 TABLET, FILM COATED ORAL at 09:02

## 2022-02-09 RX ADMIN — HEPARIN SODIUM 5000 UNITS: 5000 INJECTION INTRAVENOUS; SUBCUTANEOUS at 08:02

## 2022-02-09 RX ADMIN — ASPIRIN 81 MG: 81 TABLET, DELAYED RELEASE ORAL at 08:02

## 2022-02-09 NOTE — NURSING
Pt c/o abdominal pain, also had a temp of 100.3, and 15 beats of v-tach. HCP notified ekg done and new order for tylenol obtained and administered. Will continue to monitor

## 2022-02-09 NOTE — PLAN OF CARE
H&P reviewed. The patient was examined and there are no changes to the H&P.           Problem: Adult Inpatient Plan of Care  Goal: Plan of Care Review  Outcome: Ongoing, Progressing  Goal: Patient-Specific Goal (Individualized)  Outcome: Ongoing, Progressing  Goal: Absence of Hospital-Acquired Illness or Injury  Outcome: Ongoing, Progressing  Goal: Optimal Comfort and Wellbeing  Outcome: Ongoing, Progressing  Goal: Readiness for Transition of Care  Outcome: Ongoing, Progressing     Problem: Skin Injury Risk Increased  Goal: Skin Health and Integrity  Outcome: Ongoing, Progressing     Problem: Fall Injury Risk  Goal: Absence of Fall and Fall-Related Injury  Outcome: Ongoing, Progressing     Problem: Diabetes Comorbidity  Goal: Blood Glucose Level Within Targeted Range  Outcome: Ongoing, Progressing     Problem: Fluid and Electrolyte Imbalance (Acute Kidney Injury/Impairment)  Goal: Fluid and Electrolyte Balance  Outcome: Ongoing, Progressing     Problem: Oral Intake Inadequate (Acute Kidney Injury/Impairment)  Goal: Optimal Nutrition Intake  Outcome: Ongoing, Progressing     Problem: Renal Function Impairment (Acute Kidney Injury/Impairment)  Goal: Effective Renal Function  Outcome: Ongoing, Progressing

## 2022-02-09 NOTE — PROGRESS NOTES
[facility]  Progress Note    Patient Name: Ana James  MRN: [id number]  Patient Class: IP- Inpatient   Admission Date: 2/8/2022  Length of Stay: 1 days  Attending Physician: Cisco Salvador MD  Primary Care Provider: Viky Bean MD        Subjective:     Principal Problem:Hypothermia        HPI:  Ana James is an 81 y.o. female with a PMHx of Asthma, HFpEF, Diabetes Mellitus Type 2, hypertension, CKD, PVD, HLD, Sleep apnea, Chronic edema of both legs, and Hx of weakness of both lower extremities who presented to the ED on 2/8/22 via EMS transportation for emergent evaluation of fall. Pt states that she does not know what happened to her. She states that she does not remember falling, and she does not know why she is here. Reports she is unsure if she struck her head. Pt lives alone and was found on the floor by her neighbor who called EMS. She normally ambulates with a walker but has a wheelchair at home as well. She denies any NSAID use. She was recently admitted to this facility on 1/26 for altered mental status and syncopal episodes with fall. She was found to have a UTI and treated with IVFs and ABX prior to discharge her mental status improved. She was discharged home with orders for abx, HH, PT, and OT. Pt denies myalgias, fever, shortness of breath, headache, back pain, abdominal pain, or chest pain.     ED Course:  Initial rectal temperature 90.3. Bear Hugger warmer was applied and temperature increased to 96.6. Pt states her heater is broken at home and she has been cold. CT of head impression no acute intracranial abnormality. Chest xray impression no acute abnormality. Labs mostly unremarkable, WBC 5.26, lactate 0.7. UA unremarkable. COVID-19 negative. Blood cultures pending.           Overview/Hospital Course:  82 y/o female presented to ER after being found on ground by caretaker.  Patient noted to be hypothermic.  No obvious evidence of  infection or etiology of hypothermia.      Interval History: Feeling better.    Review of Systems   HENT: Negative for ear discharge and ear pain.    Eyes: Negative for discharge and itching.   Endocrine: Negative for cold intolerance and heat intolerance.   Neurological: Negative for seizures and syncope.     Objective:     Vital Signs (Most Recent):  Temp: 99.7 °F (37.6 °C) (02/09/22 1117)  Pulse: 88 (02/09/22 1117)  Resp: 18 (02/09/22 1117)  BP: (!) 123/58 (02/09/22 1117)  SpO2: 96 % (02/09/22 1117) Vital Signs (24h Range):  Temp:  [95.7 °F (35.4 °C)-100.3 °F (37.9 °C)] 99.7 °F (37.6 °C)  Pulse:  [60-88] 88  Resp:  [18-30] 18  SpO2:  [96 %-100 %] 96 %  BP: (115-141)/(58-67) 123/58     Weight: 90.7 kg (200 lb)  Body mass index is 36.58 kg/m².    Intake/Output Summary (Last 24 hours) at 2/9/2022 1445  Last data filed at 2/9/2022 0900  Gross per 24 hour   Intake 50 ml   Output 600 ml   Net -550 ml      Physical Exam  Constitutional:       General: She is not in acute distress.     Appearance: She is well-developed.   HENT:      Head: Normocephalic and atraumatic.   Eyes:      General: Lids are normal. No scleral icterus.     Conjunctiva/sclera: Conjunctivae normal.      Pupils: Pupils are equal, round, and reactive to light.   Neck:      Thyroid: No thyromegaly.      Vascular: No carotid bruit or JVD.   Cardiovascular:      Rate and Rhythm: Normal rate and regular rhythm.      Pulses: Normal pulses.      Heart sounds: Normal heart sounds. No murmur heard.  No friction rub. No S3 or S4 sounds.    Pulmonary:      Effort: Pulmonary effort is normal. Tachypnea present.      Breath sounds: Normal breath sounds. No wheezing, rhonchi or rales.   Abdominal:      General: Bowel sounds are normal.      Palpations: Abdomen is soft.      Tenderness: There is no abdominal tenderness.   Musculoskeletal:         General: No tenderness.      Cervical back: Full passive range of motion without pain and neck supple.      Right lower  le+ Edema present.      Left lower le+ Edema present.   Skin:     General: Skin is warm and dry.      Findings: No rash.   Neurological:      Mental Status: She is alert and oriented to person, place, and time.      Comments: Motor grossly intact.  Sensory grossly intact.  Symmetric facial movements palate elevated symmetrically tongue midline   Psychiatric:         Speech: Speech normal.         Behavior: Behavior normal.         Thought Content: Thought content normal.         Significant Labs:   All pertinent labs within the past 24 hours have been reviewed.  BMP:   Recent Labs   Lab 22  0324   GLU 92      K 4.8      CO2 21*   BUN 27*   CREATININE 1.9*   CALCIUM 8.1*     CBC:   Recent Labs   Lab 22  0650 22  0324   WBC 5.26 7.68   HGB 10.6* 9.0*   HCT 32.9* 27.9*    215       Significant Imaging: I have reviewed all pertinent imaging results/findings within the past 24 hours.      Assessment/Plan:      * Hypothermia  - Initial rectal temperature 90.3. Bear Hugger warmer was applied and temperature increased.  - UA unremarkable  - Blood cultures negative so far.  - Cefepime q12  emprically  - NS infusion 100 ml/hr  No obvious source of infection or etiology for hypothermia.  Glucose in the 70's today.  Unsure if patient may be having episodes of hypoglycemia that can cause hypothermia (was not hypothermic on presentation).  Hold scheduled Levemir.  Probably stop ABx's tomorrow if no source of infection and home.    Fall at home, initial encounter  - Advised pt to use assistive devices at home  - Fall precautions       Hyperlipidemia  - atorvastatin 20 mg qd      Physical debility  - Advised pt to use assistive devices at home  - Fall precautions   PT/OT evaluation.    Controlled type 2 diabetes mellitus with stage 3 chronic kidney disease, with long-term current use of insulin   Hold oral antihyperglycemics while inpatient  - PRN sliding scale insulin  - ACHS glucose  monitoring   - ADA diet   Will hold scheduled insulin to avoid any hypoglycemic episodes (unsure if caused hypothermia).      Essential hypertension  - Patient's blood pressure is well-controlled, continue to monitor  - amlodipine 5 mg qd  - hydralazine 50 mg tid      VTE Risk Mitigation (From admission, onward)         Ordered     heparin (porcine) injection 5,000 Units  Every 12 hours         02/08/22 1243     IP VTE HIGH RISK PATIENT  Once         02/08/22 1243     Place sequential compression device  Until discontinued         02/08/22 1243                Discharge Planning   FREDERICK:      Code Status: Full Code   Is the patient medically ready for discharge?:     Reason for patient still in hospital (select all that apply): Patient trending condition  Discharge Plan A: Home Health                  Cisco Salvador MD  Department of Hospital Medicine   [facility] - Telemetry

## 2022-02-09 NOTE — ASSESSMENT & PLAN NOTE
Hold oral antihyperglycemics while inpatient  - PRN sliding scale insulin  - ACHS glucose monitoring   - ADA diet   Will hold scheduled insulin to avoid any hypoglycemic episodes (unsure if caused hypothermia).

## 2022-02-09 NOTE — HOSPITAL COURSE
82 y/o female presented to ER after being found on ground by caretaker.  Patient noted to be hypothermic.  No obvious evidence of infection or etiology of hypothermia.  Initially started on ABx's.  These have been stopped since no evidence of infection.  PT/OT consulted and recommended SNF.  SW consulted. Patient discharged to SNF in stable condition.

## 2022-02-09 NOTE — ED NOTES
Pt awake alert. Reports she lives alone but has a sitter Mon-Fri 9a-3p.  Purwick inplace drainage chuck urine.

## 2022-02-09 NOTE — NURSING
PER handoff received from  ALEXANDRA Galloway     Pt resting in bed quietly. NAD noted. No c/o pain. 2L oxygen via nasal canula and NS infusing at 100mL/h  Fall and safety precautions maintained. Bed alarm activated and audible.. Bed locked in lowest position, with side rails up x2. Call bell and personal items within reach

## 2022-02-09 NOTE — SUBJECTIVE & OBJECTIVE
Interval History: Feeling better.    Review of Systems   HENT: Negative for ear discharge and ear pain.    Eyes: Negative for discharge and itching.   Endocrine: Negative for cold intolerance and heat intolerance.   Neurological: Negative for seizures and syncope.     Objective:     Vital Signs (Most Recent):  Temp: 99.7 °F (37.6 °C) (22 1117)  Pulse: 88 (22 1117)  Resp: 18 (22 111)  BP: (!) 123/58 (22 111)  SpO2: 96 % (22 111) Vital Signs (24h Range):  Temp:  [95.7 °F (35.4 °C)-100.3 °F (37.9 °C)] 99.7 °F (37.6 °C)  Pulse:  [60-88] 88  Resp:  [18-30] 18  SpO2:  [96 %-100 %] 96 %  BP: (115-141)/(58-67) 123/58     Weight: 90.7 kg (200 lb)  Body mass index is 36.58 kg/m².    Intake/Output Summary (Last 24 hours) at 2022 1445  Last data filed at 2022 0900  Gross per 24 hour   Intake 50 ml   Output 600 ml   Net -550 ml      Physical Exam  Constitutional:       General: She is not in acute distress.     Appearance: She is well-developed.   HENT:      Head: Normocephalic and atraumatic.   Eyes:      General: Lids are normal. No scleral icterus.     Conjunctiva/sclera: Conjunctivae normal.      Pupils: Pupils are equal, round, and reactive to light.   Neck:      Thyroid: No thyromegaly.      Vascular: No carotid bruit or JVD.   Cardiovascular:      Rate and Rhythm: Normal rate and regular rhythm.      Pulses: Normal pulses.      Heart sounds: Normal heart sounds. No murmur heard.  No friction rub. No S3 or S4 sounds.    Pulmonary:      Effort: Pulmonary effort is normal. Tachypnea present.      Breath sounds: Normal breath sounds. No wheezing, rhonchi or rales.   Abdominal:      General: Bowel sounds are normal.      Palpations: Abdomen is soft.      Tenderness: There is no abdominal tenderness.   Musculoskeletal:         General: No tenderness.      Cervical back: Full passive range of motion without pain and neck supple.      Right lower le+ Edema present.      Left lower leg:  2+ Edema present.   Skin:     General: Skin is warm and dry.      Findings: No rash.   Neurological:      Mental Status: She is alert and oriented to person, place, and time.      Comments: Motor grossly intact.  Sensory grossly intact.  Symmetric facial movements palate elevated symmetrically tongue midline   Psychiatric:         Speech: Speech normal.         Behavior: Behavior normal.         Thought Content: Thought content normal.         Significant Labs:   All pertinent labs within the past 24 hours have been reviewed.  BMP:   Recent Labs   Lab 02/09/22  0324   GLU 92      K 4.8      CO2 21*   BUN 27*   CREATININE 1.9*   CALCIUM 8.1*     CBC:   Recent Labs   Lab 02/08/22  0650 02/09/22  0324   WBC 5.26 7.68   HGB 10.6* 9.0*   HCT 32.9* 27.9*    215       Significant Imaging: I have reviewed all pertinent imaging results/findings within the past 24 hours.

## 2022-02-09 NOTE — PLAN OF CARE
02/09/22 1244   Readmission   Why were you hospitalized in the last 30 days? Patient stated she had fluid on her lungs   Why were you readmitted? New medical problem   When you left the hospital where did you go? Home with Home Health   Did patient/caregiver refused recommended DC plan? No   Tell me about what happened between when you left the hospital and the day you returned. Patient stated she fell.   Did you try to manage your symptoms your self? No   Did you call anyone? No   Did you try to see or did see a doctor or nurse before you came? Yes   Were you seen? Yes   Did you have  a follow-up appointment on discharge? Yes   Did you go? Yes   Was this a planned readmission? No     SW met with patient to complete IA. Patient explained to SW that her previous admit she had fluid on her lungs and for this admit she fall. Patient stated she did not go to her follow up appt.

## 2022-02-09 NOTE — ASSESSMENT & PLAN NOTE
- Initial rectal temperature 90.3. Bear Hugger warmer was applied and temperature increased.  - UA unremarkable  - Blood cultures negative so far.  - Cefepime q12  emprically  - NS infusion 100 ml/hr  No obvious source of infection or etiology for hypothermia.  Glucose in the 70's today.  Unsure if patient may be having episodes of hypoglycemia that can cause hypothermia (was not hypothermic on presentation).  Hold scheduled Levemir.  Probably stop ABx's tomorrow if no source of infection and home.

## 2022-02-09 NOTE — PLAN OF CARE
[facility] - Telemetry  Initial Discharge Assessment       Primary Care Provider: Viky Bean MD    Admission Diagnosis: Syncope [R55]  Fall [W19.XXXA]  Chest pain [R07.9]  Hypothermia, initial encounter [T68.XXXA]  Fall, initial encounter [W19.XXXA]    Admission Date: 2/8/2022  Expected Discharge Date:      Discharge Barriers Identified: None    Payor: [employer]ARE / Plan: [employer] / Product Type: Medicare Advantage /     Extended Emergency Contact Information  Primary Emergency Contact: TicoPoonam   [facility]  Home Phone: [phone]  Mobile Phone: [phone]  Relation: Friend  Secondary Emergency Contact: Prince Elder   [facility]  Home Phone: [phone]  Mobile Phone: [phone]  Relation: Brother    Discharge Plan A: Home Health  Discharge Plan B: Home Health      [id number] - [place], [place]  [address] [facility]  [address] [facility] [address]  Phone: [phone] Fax: [phone]    [facility] - [place], [place] - [address] AT Portal to Registered [facility] Sites  [address]  [facility] [address]  Phone: [phone] Fax: [phone]    [id number] [place] [place] [facility] [address]  [address] [facility] [address]  Phone: [phone] Fax: [phone]      Initial Assessment (most recent)       Adult Discharge Assessment - 02/09/22 1248          Discharge Assessment    Assessment Type Discharge Planning Assessment     Confirmed/corrected address, phone number and insurance Yes     Confirmed Demographics Correct on Facesheet     Source of Information patient     If unable to respond/provide information was family/caregiver contacted? Yes     Contact Name/Number Poonam Doshi (Friend)   [phone]     When was your last doctors appointment? --   Patient stated she had a PCP appt last month.    Communicated FREDERICK with patient/caregiver Date not  available/Unable to determine     Reason For Admission Fall     Lives With alone     Facility Arrived From: Home     Do you expect to return to your current living situation? Yes     Do you have help at home or someone to help you manage your care at home? Yes     Who are your caregiver(s) and their phone number(s)? Poonam Doshi (Friend)   [phone]   Patient stated she has a sitter Mon.-Fri. 9-3:00 pm.    Prior to hospitilization cognitive status: Alert/Oriented     Current cognitive status: Alert/Oriented     Equipment Currently Used at Home bedside commode;cane, straight;wheelchair;walker, rolling;other (see comments)   Scooter    Readmission within 30 days? Yes     Patient currently being followed by outpatient case management? Yes     Do you currently have service(s) that help you manage your care at home? Yes     Name and Contact number of agency [facility] [phone]     Is the pt/caregiver preference to resume services with current agency Yes     Do you take prescription medications? Yes     Do you have prescription coverage? Yes     Coverage PHN     Do you have any problems affording any of your prescribed medications? No     Is the patient taking medications as prescribed? yes     Who is going to help you get home at discharge? Poonam Doshi (Friend)   [phone]     How do you get to doctors appointments? family or friend will provide     Are you on dialysis? No     Do you take coumadin? No     Discharge Plan A Home Health     Discharge Plan B Home Health     Discharge Plan discussed with: Patient     Discharge Barriers Identified None                     Readmission Assessment (most recent)       Readmission Assessment - 02/09/22 1244          Readmission    Why were you hospitalized in the last 30 days? Patient stated she had fluid on her lungs     Why were you readmitted? New medical problem     When you left the hospital where did you go? Home with Home Health     Did  patient/caregiver refused recommended DC plan? No     Tell me about what happened between when you left the hospital and the day you returned. Patient stated she fell.     Did you try to manage your symptoms your self? No     Did you call anyone? No     Did you try to see or did see a doctor or nurse before you came? Yes     Were you seen? Yes     Did you have  a follow-up appointment on discharge? Yes     Did you go? Yes     Was this a planned readmission? No                      SW met with patient to complete IA. Patient confirmed information on face sheet. She stated she lives alone but has a sitter Monday - Friday from 9:00 am - 3:00 pm. Patient stated she does have HH services but does not know the name of HH agency. SW explained he will review patient's chart for HH agency name. Patient voiced understanding. Patient stated she has a wc, rolling walker, bsc, cane and scooter. She stated she prefers morning doctor's appts. Patient stated she receives her medications from [facility] on [address] [address] and phone # [phone]. Patient stated her brother will provide transportation at discharge.     SW reviewed patient's chart for HH agency name and did not see HH agency name.   VINNY spoke with Monica (CHERELLE) who stated pending authorization with [facility].   SW contact [facility]. VINNY spoke with Ana M ([facility] ) who informed VINNY that patient is currently active with [facility], and patient is approved until 3/3/22. VINNY stated thank you for the information/update.

## 2022-02-10 LAB
ALBUMIN SERPL BCP-MCNC: 2.4 G/DL (ref 3.5–5.2)
ALP SERPL-CCNC: 71 U/L (ref 55–135)
ALT SERPL W/O P-5'-P-CCNC: 17 U/L (ref 10–44)
ANION GAP SERPL CALC-SCNC: 7 MMOL/L (ref 8–16)
AST SERPL-CCNC: 24 U/L (ref 10–40)
BASOPHILS # BLD AUTO: 0.02 K/UL (ref 0–0.2)
BASOPHILS NFR BLD: 0.3 % (ref 0–1.9)
BILIRUB SERPL-MCNC: 0.4 MG/DL (ref 0.1–1)
BUN SERPL-MCNC: 24 MG/DL (ref 8–23)
CALCIUM SERPL-MCNC: 8.1 MG/DL (ref 8.7–10.5)
CHLORIDE SERPL-SCNC: 110 MMOL/L (ref 95–110)
CO2 SERPL-SCNC: 22 MMOL/L (ref 23–29)
CREAT SERPL-MCNC: 1.7 MG/DL (ref 0.5–1.4)
DIFFERENTIAL METHOD: ABNORMAL
EOSINOPHIL # BLD AUTO: 0.6 K/UL (ref 0–0.5)
EOSINOPHIL NFR BLD: 9.1 % (ref 0–8)
ERYTHROCYTE [DISTWIDTH] IN BLOOD BY AUTOMATED COUNT: 14.6 % (ref 11.5–14.5)
EST. GFR  (AFRICAN AMERICAN): 32 ML/MIN/1.73 M^2
EST. GFR  (NON AFRICAN AMERICAN): 27 ML/MIN/1.73 M^2
GLUCOSE SERPL-MCNC: 81 MG/DL (ref 70–110)
HCT VFR BLD AUTO: 29.9 % (ref 37–48.5)
HGB BLD-MCNC: 9.2 G/DL (ref 12–16)
IMM GRANULOCYTES # BLD AUTO: 0.02 K/UL (ref 0–0.04)
IMM GRANULOCYTES NFR BLD AUTO: 0.3 % (ref 0–0.5)
LYMPHOCYTES # BLD AUTO: 0.9 K/UL (ref 1–4.8)
LYMPHOCYTES NFR BLD: 13.9 % (ref 18–48)
MCH RBC QN AUTO: 27.7 PG (ref 27–31)
MCHC RBC AUTO-ENTMCNC: 30.8 G/DL (ref 32–36)
MCV RBC AUTO: 90 FL (ref 82–98)
MONOCYTES # BLD AUTO: 0.8 K/UL (ref 0.3–1)
MONOCYTES NFR BLD: 12.3 % (ref 4–15)
NEUTROPHILS # BLD AUTO: 4.2 K/UL (ref 1.8–7.7)
NEUTROPHILS NFR BLD: 64.1 % (ref 38–73)
NRBC BLD-RTO: 0 /100 WBC
PLATELET # BLD AUTO: 207 K/UL (ref 150–450)
PMV BLD AUTO: 9.9 FL (ref 9.2–12.9)
POCT GLUCOSE: 111 MG/DL (ref 70–110)
POCT GLUCOSE: 79 MG/DL (ref 70–110)
POCT GLUCOSE: 97 MG/DL (ref 70–110)
POTASSIUM SERPL-SCNC: 4.3 MMOL/L (ref 3.5–5.1)
PROT SERPL-MCNC: 6.2 G/DL (ref 6–8.4)
RBC # BLD AUTO: 3.32 M/UL (ref 4–5.4)
SODIUM SERPL-SCNC: 139 MMOL/L (ref 136–145)
WBC # BLD AUTO: 6.57 K/UL (ref 3.9–12.7)

## 2022-02-10 PROCEDURE — 25000003 PHARM REV CODE 250: Performed by: NURSE PRACTITIONER

## 2022-02-10 PROCEDURE — 36415 COLL VENOUS BLD VENIPUNCTURE: CPT | Performed by: HOSPITALIST

## 2022-02-10 PROCEDURE — 21400001 HC TELEMETRY ROOM

## 2022-02-10 PROCEDURE — 97530 THERAPEUTIC ACTIVITIES: CPT

## 2022-02-10 PROCEDURE — 97162 PT EVAL MOD COMPLEX 30 MIN: CPT

## 2022-02-10 PROCEDURE — 25000003 PHARM REV CODE 250: Performed by: HOSPITALIST

## 2022-02-10 PROCEDURE — 85025 COMPLETE CBC W/AUTO DIFF WBC: CPT | Performed by: HOSPITALIST

## 2022-02-10 PROCEDURE — 99900035 HC TECH TIME PER 15 MIN (STAT)

## 2022-02-10 PROCEDURE — 80053 COMPREHEN METABOLIC PANEL: CPT | Performed by: HOSPITALIST

## 2022-02-10 PROCEDURE — 94761 N-INVAS EAR/PLS OXIMETRY MLT: CPT

## 2022-02-10 PROCEDURE — 97165 OT EVAL LOW COMPLEX 30 MIN: CPT

## 2022-02-10 PROCEDURE — 63600175 PHARM REV CODE 636 W HCPCS: Performed by: HOSPITALIST

## 2022-02-10 RX ADMIN — AMLODIPINE BESYLATE 5 MG: 5 TABLET ORAL at 09:02

## 2022-02-10 RX ADMIN — HEPARIN SODIUM 5000 UNITS: 5000 INJECTION INTRAVENOUS; SUBCUTANEOUS at 08:02

## 2022-02-10 RX ADMIN — CEFEPIME HYDROCHLORIDE 1 G: 1 INJECTION, SOLUTION INTRAVENOUS at 02:02

## 2022-02-10 RX ADMIN — HYDRALAZINE HYDROCHLORIDE 50 MG: 25 TABLET ORAL at 09:02

## 2022-02-10 RX ADMIN — ACETAMINOPHEN 650 MG: 325 TABLET ORAL at 10:02

## 2022-02-10 RX ADMIN — ATORVASTATIN CALCIUM 20 MG: 10 TABLET, FILM COATED ORAL at 09:02

## 2022-02-10 RX ADMIN — LATANOPROST 1 DROP: 50 SOLUTION OPHTHALMIC at 08:02

## 2022-02-10 RX ADMIN — ASPIRIN 81 MG: 81 TABLET, DELAYED RELEASE ORAL at 08:02

## 2022-02-10 RX ADMIN — HYDRALAZINE HYDROCHLORIDE 50 MG: 25 TABLET ORAL at 06:02

## 2022-02-10 RX ADMIN — HEPARIN SODIUM 5000 UNITS: 5000 INJECTION INTRAVENOUS; SUBCUTANEOUS at 09:02

## 2022-02-10 RX ADMIN — ACETAMINOPHEN 650 MG: 325 TABLET ORAL at 09:02

## 2022-02-10 NOTE — PROGRESS NOTES
[facility] Medicine  Progress Note    Patient Name: Ana James  MRN: [id number]  Patient Class: IP- Inpatient   Admission Date: 2/8/2022  Length of Stay: 2 days  Attending Physician: Cisco Salvador MD  Primary Care Provider: Viky Bean MD        Subjective:     Principal Problem:Hypothermia        HPI:  Ana James is an 81 y.o. female with a PMHx of Asthma, HFpEF, Diabetes Mellitus Type 2, hypertension, CKD, PVD, HLD, Sleep apnea, Chronic edema of both legs, and Hx of weakness of both lower extremities who presented to the ED on 2/8/22 via EMS transportation for emergent evaluation of fall. Pt states that she does not know what happened to her. She states that she does not remember falling, and she does not know why she is here. Reports she is unsure if she struck her head. Pt lives alone and was found on the floor by her neighbor who called EMS. She normally ambulates with a walker but has a wheelchair at home as well. She denies any NSAID use. She was recently admitted to this facility on 1/26 for altered mental status and syncopal episodes with fall. She was found to have a UTI and treated with IVFs and ABX prior to discharge her mental status improved. She was discharged home with orders for abx, HH, PT, and OT. Pt denies myalgias, fever, shortness of breath, headache, back pain, abdominal pain, or chest pain.     ED Course:  Initial rectal temperature 90.3. Bear Hugger warmer was applied and temperature increased to 96.6. Pt states her heater is broken at home and she has been cold. CT of head impression no acute intracranial abnormality. Chest xray impression no acute abnormality. Labs mostly unremarkable, WBC 5.26, lactate 0.7. UA unremarkable. COVID-19 negative. Blood cultures pending.           Overview/Hospital Course:  80 y/o female presented to ER after being found on ground by caretaker.  Patient noted to be hypothermic.  No obvious evidence of  infection or etiology of hypothermia.  Initially started on ABx's.  These have been stopped since no evidence of infection.  PT/OT consulted and recommended SNF.  SW consulted.      Interval History: No new complaints.    Review of Systems   HENT: Negative for ear discharge and ear pain.    Eyes: Negative for discharge and itching.   Endocrine: Negative for cold intolerance and heat intolerance.   Neurological: Negative for seizures and syncope.     Objective:     Vital Signs (Most Recent):  Temp: 98.3 °F (36.8 °C) (02/10/22 1040)  Pulse: 61 (02/10/22 1040)  Resp: 18 (02/10/22 1040)  BP: (!) 122/56 (02/10/22 1040)  SpO2: 97 % (02/10/22 1040) Vital Signs (24h Range):  Temp:  [97.7 °F (36.5 °C)-100.2 °F (37.9 °C)] 98.3 °F (36.8 °C)  Pulse:  [61-89] 61  Resp:  [17-18] 18  SpO2:  [95 %-99 %] 97 %  BP: (122-162)/(56-82) 122/56     Weight: 90.7 kg (200 lb)  Body mass index is 36.58 kg/m².    Intake/Output Summary (Last 24 hours) at 2/10/2022 1559  Last data filed at 2/9/2022 1700  Gross per 24 hour   Intake --   Output 600 ml   Net -600 ml      Physical Exam  Constitutional:       General: She is not in acute distress.     Appearance: She is well-developed.   HENT:      Head: Normocephalic and atraumatic.   Eyes:      General: Lids are normal. No scleral icterus.     Conjunctiva/sclera: Conjunctivae normal.      Pupils: Pupils are equal, round, and reactive to light.   Neck:      Thyroid: No thyromegaly.      Vascular: No carotid bruit or JVD.   Cardiovascular:      Rate and Rhythm: Normal rate and regular rhythm.      Pulses: Normal pulses.      Heart sounds: Normal heart sounds. No murmur heard.  No friction rub. No S3 or S4 sounds.    Pulmonary:      Effort: Pulmonary effort is normal.      Breath sounds: Normal breath sounds. No wheezing, rhonchi or rales.   Abdominal:      General: Bowel sounds are normal.      Palpations: Abdomen is soft.      Tenderness: There is no abdominal tenderness.   Musculoskeletal:          General: No tenderness.      Cervical back: Full passive range of motion without pain and neck supple.      Right lower leg: Edema present.      Left lower leg: Edema present.   Skin:     General: Skin is warm and dry.      Findings: No rash.   Neurological:      Mental Status: She is alert and oriented to person, place, and time.      Comments: Motor grossly intact.  Sensory grossly intact.  Symmetric facial movements palate elevated symmetrically tongue midline   Psychiatric:         Speech: Speech normal.         Behavior: Behavior normal.         Thought Content: Thought content normal.         Significant Labs:   All pertinent labs within the past 24 hours have been reviewed.  BMP:   Recent Labs   Lab 02/10/22  0259   GLU 81      K 4.3      CO2 22*   BUN 24*   CREATININE 1.7*   CALCIUM 8.1*     CBC:   Recent Labs   Lab 02/09/22 0324 02/10/22  0259   WBC 7.68 6.57   HGB 9.0* 9.2*   HCT 27.9* 29.9*    207       Significant Imaging: I have reviewed all pertinent imaging results/findings within the past 24 hours.      Assessment/Plan:      * Hypothermia  - Initial rectal temperature 90.3. Bear Hugger warmer was applied and temperature increased.  - UA unremarkable  - Blood cultures negative so far.  - Cefepime q12  emprically  - NS infusion 100 ml/hr  No obvious source of infection or etiology for hypothermia.  Stop ABx's and monitor.    Fall at home, initial encounter  - Advised pt to use assistive devices at home  - Fall precautions       Hyperlipidemia  - atorvastatin 20 mg qd      Physical debility  - Advised pt to use assistive devices at home  - Fall precautions   PT/OT evaluation.  Recommending SNF.  SW consulted.    Controlled type 2 diabetes mellitus with stage 3 chronic kidney disease, with long-term current use of insulin   Hold oral antihyperglycemics while inpatient  - PRN sliding scale insulin  - ACHS glucose monitoring   - ADA diet   Will hold scheduled insulin to avoid any  hypoglycemic episodes (unsure if caused hypothermia).      Essential hypertension  - Patient's blood pressure is well-controlled, continue to monitor  - amlodipine 5 mg qd  - hydralazine 50 mg tid      VTE Risk Mitigation (From admission, onward)         Ordered     heparin (porcine) injection 5,000 Units  Every 12 hours         02/08/22 1243     IP VTE HIGH RISK PATIENT  Once         02/08/22 1243     Place sequential compression device  Until discontinued         02/08/22 1243                Discharge Planning   FREDERICK: 2/10/2022     Code Status: Full Code   Is the patient medically ready for discharge?:     Reason for patient still in hospital (select all that apply): Pending disposition  Discharge Plan A: Home Health                  Cisco Salvador MD  Department of Hospital Medicine   [facility]

## 2022-02-10 NOTE — PT/OT/SLP EVAL
Occupational Therapy   Evaluation    Name: Ana James  MRN: [id number]  Admitting Diagnosis:  Hypothermia  Recent Surgery: * No surgery found *      Recommendations:     Discharge Recommendations: nursing facility, skilled  Discharge Equipment Recommendations:  none  Barriers to discharge:   (not at PLOF; at risk of further falls)    Assessment:     Ana James is a 81 y.o. female with a medical diagnosis of Hypothermia. Performance deficits affecting function: weakness,visual deficits,impaired endurance,decreased ROM,decreased coordination,decreased upper extremity function,impaired self care skills,decreased lower extremity function,impaired functional mobilty,gait instability,decreased safety awareness,edema,pain,impaired balance,impaired cardiopulmonary response to activity.      OT rec SNF at d/c in order to increase safety and independence with ADLs and all aspects of functional mobility and reduce risk of further falls    Rehab Prognosis: Fair +; patient would benefit from acute skilled OT services to address these deficits and reach maximum level of function.       Plan:     Patient to be seen 5 x/week to address the above listed problems via self-care/home management,therapeutic activities,therapeutic exercises  · Plan of Care Expires: 02/24/22  · Plan of Care Reviewed with: patient    Subjective     Chief Complaint: she doesn't feel like she will have support at home   Patient/Family Comments/goals: agreeable to session with encouragement; feels weak     Occupational Profile:  Living Environment: Pt lives alone in a [facility] with no concerns, has a lift device into home.  Previous level of function: mod I with household ambulation using rollator. Caretaker assists with bathing, meals, and ADLs as needed, but no assist when she isn't there.   Roles and Routines: home-dweller   Equipment Used at Home:  rollator,power chair,wheelchair,bedside commode,cane, straight (lift device into  home)  Assistance upon Discharge: PCA service M-F 9am-3 pm; brother and PCA assist with rides; some assist from neighbors     Pain/Comfort:  · Pain Rating 1:  (c/o minimal pain to BLE (R>L))  · Pain Addressed 1: Pre-medicate for activity,Reposition,Distraction,Cessation of Activity    Patients cultural, spiritual, Faith conflicts given the current situation: no    Objective:     Patient found HOB elevated with peripheral IV,oxygen,telemetry,PureWick,pressure relief boots upon OT entry to room. PCT exiting upon therapy arrival.     General Precautions: Standard, fall,respiratory,vision impaired (L eye blind)   Orthopedic Precautions:N/A   Braces: N/A  Respiratory Status: Nasal cannula, flow 1 L/min- 98%     Occupational Performance:    Bed Mobility:    · Patient completed Scooting anteriorly with maximal assistance  · Patient completed Supine to Sit with minimum assistance, with side rail and HOB elevated  · Patient completed Sit to Supine with minimum assistance  · Patient completed Scooting with bed in trendelenburg position with moderate assistance     Functional Mobility/Transfers:  · Patient completed Sit <> Stand Transfer with minimum assistance and of 2 persons  with  rolling walker   · Functional Mobility: pt took a few sidesteps at EOB using RW on 1L O2 with minimal assistance. Pt required a quick seated rest break d/t fatigue and weakness.     Activities of Daily Living:  · Lower Body Dressing: total assistance for socks    Cognitive/Visual Perceptual:  Cognitive/Psychosocial Skills:     -       Follows Commands/attention:follows most simple commands  -       Communication: clear/fluent  -       Memory: no issues noted  -       Safety awareness/insight to disability: impaired   -       Mood/Affect/Coping skills/emotional control: Appropriate to situation  Visual/Perceptual:      -blind L eye      Physical Exam:  Balance:    -       seated: SBA; standing: MIN A with RW  Postural examination/scapula  "alignment:    -       Rounded shoulders  -       Forward head  Skin integrity: dark discoloration of BLE (~mid calf)  Edema:  Mild BUE  Sensation:    -       Intact  light/touch BUE; pt c/o hands feel "rough like grits"  Dominant hand:    -       Right  Upper Extremity Range of Motion:     -       Right Upper Extremity: WFL  -       Left Upper Extremity: WFL  Upper Extremity Strength:    -       Right Upper Extremity: WFL  -       Left Upper Extremity: WFL   Strength:    -       Right Upper Extremity: WFL  -       Left Upper Extremity: WFL  Fine Motor Coordination:    -       Intact  Left hand, manipulation of objects and Right hand, manipulation of objects  Gross motor coordination:   WFL    AMPAC 6 Click ADL:  AMPAC Total Score: 16    Treatment & Education:  · Pt educated on OT role/POC.   · Encouragement and education on importance of therapy services; pt receptive of education and participatory  · Importance of OOB activity with staff assistance.  · Safety during functional t/f and mobility; edu with demo and practice on pursed lip breathing   · White board updated   · Handout provided and reviewed on BUE AROM HEP to complete 2-3x/day, 5-10 reps each; OT demo each: digits/wrist/elbow/shoulder flex/ext, shoulder elevation/depression, forward punches, shoulder horizontal ab/dduction, forearm supination/pronation   · Handout provided on home safety tips   · Edu on importance of turning in the bed and use of pressure relief boots to protect skin integrity   · Multiple self-care tasks/functional mobility completed- assistance level noted above   · All questions/concerns answered within OT scope of practice     Education:    Patient left HOB elevated L sidelying with B/L pressure relief boots in place with all lines intact, call button in reach, bed alarm on and all needs met/within reach    GOALS:   Multidisciplinary Problems     Occupational Therapy Goals        Problem: Occupational Therapy Goal    Goal " Priority Disciplines Outcome Interventions   Occupational Therapy Goal     OT, PT/OT Ongoing, Progressing    Description: Goals to be met by: 02/24/22     Patient will increase functional independence with ADLs by performing:    UE Dressing with Modified [place].  LE Dressing with Contact Guard Assistance.  Grooming while standing at sink with Contact Guard Assistance.  Toileting from bedside commode with Stand-by Assistance for hygiene and clothing management.   Supine to sit with Stand-by Assistance.  Step transfer with Stand-by Assistance  Toilet transfer to bedside commode with Stand-by Assistance.  Upper extremity exercise program x15 reps per handout, with independence.                     History:     Past Medical History:   Diagnosis Date    Arthritis lower extremities    Asthma     Blind left eye     Diabetes mellitus     Edema of both lower legs     CHRONIC    Fall 02/08/2022    Gall stones     Hypertension     Kidney stones     Lives alone with help available     HOME HEALTH    Nephrolithiasis 1/22/2016    Obesity     PVD (peripheral vascular disease)     Renal disorder     Retinopathy     Sleep apnea     Vaginal delivery     x1    Weakness of both lower extremities     uses a cane, rollator & power chair       Past Surgical History:   Procedure Laterality Date    CARDIAC CATHETERIZATION      cataract      CHOLECYSTECTOMY      EYE SURGERY      Rt cataract surgery    HYSTERECTOMY      URETERAL STENT PLACEMENT         Time Tracking:     OT Date of Treatment: 02/10/22  OT Start Time: 1050  OT Stop Time: 1113  OT Total Time (min): 23 min    Billable Minutes:Evaluation 15 min  Therapeutic Activity 8 min  Total Time 23 min (co-eval with PT)    2/10/2022

## 2022-02-10 NOTE — PT/OT/SLP EVAL
Physical Therapy Evaluation    Patient Name:  Ana James   MRN:  [id number]    Recommendations:     Discharge Recommendations:  nursing facility, skilled (Pt was recently D/C'ed home and readmitted with a fall at home.)  Discharge Equipment Recommendations: none   Barriers to discharge home: Pt with decreased functional mobility and ambulation at this time.     Assessment:     Ana James is a 81 y.o. female admitted with a medical diagnosis of Hypothermia.  She presents with the following impairments/functional limitations:  weakness,impaired endurance,impaired functional mobilty,gait instability,impaired balance,decreased upper extremity function,decreased lower extremity function,decreased coordination,decreased safety awareness,pain,decreased ROM,impaired cardiopulmonary response to activity.    Rehab Prognosis: Fair+; patient would benefit from acute skilled PT services to address these deficits and reach maximum level of function.    Recent Surgery: * No surgery found *      Plan:     During this hospitalization, patient to be seen 6 x/week to address the identified rehab impairments via gait training,therapeutic activities,therapeutic exercises,wheelchair management/training and progress toward the following goals:    · Plan of Care Expires:  02/24/22    Subjective     Chief Complaint: Pt reported SOB and LE pain.  Patient/Family Comments/goals: Pt agreeable to therapy with encouragement.   Pain/Comfort:  · Pain Rating 1:  (Pt c/o minimal pain to BLE (R>L).)  · Pain Addressed 1: Pre-medicate for activity,Reposition,Distraction,Cessation of Activity      Living Environment:  Pt lives alone in a [facility] with no concerns, has a lift device into home.   Prior to admission, patients level of function was mod I with household ambulation using rollator.  Equipment at home: rollator,wheelchair,power chair,bedside commode,cane, straight (lift device into home), shower chair, and adjustable bed.  Upon  discharge, patient will have assistance from PCA services M-F 9am-3pm.  Brother and PCA provide transportation.  Pt also has some assistance from neighbors.     Objective:     Patient found HOB elevated with oxygen,PureWick,peripheral IV,telemetry,pressure relief boots upon PT entry to room.    General Precautions: Standard,DM, fall,respiratory,vision impaired (L eye blind)   Orthopedic Precautions:N/A   Braces: N/A  Respiratory Status: Nasal cannula, flow 1 L/min 98%    Exams:  · Cognitive Exam:  Patient was able to follow multiple commands.   · Gross Motor Coordination:  WFL  · Postural Exam:  Patient presented with the following abnormalities:    · -       Rounded shoulders  · Sensation:    · -       Intact  light/touch BLE  · Skin Integrity/Edema:      · -       Skin integrity: Visible skin intact; dark discoloration to BLE  · -       Edema: Mild BLE  · BLE ROM: WFL  · BLE Strength: WFL    Functional Mobility:  Pt reported lack of support at home, feeling SOB and weak with activities.    · Bed Mobility:     · Rolling Left:  contact guard assistance and minimum assistance  · Scooting: maximal assistance for anterior scooting  · Bridging: moderate assistance to reposition HOB with bed in trendelenburg  · Supine to Sit: minimum assistance with HOB elevated  · Sit to Supine: minimum assistance with LE  · Transfers:     · Sit to Stand:  minimum assistance and of 2 persons with rolling walker  · Gait: Pt ambulated ~6 small sidesteps along bedside with min A using RW and 1L O2 NC.  Pt with generalized weakness and decreased endurance, easily fatigued and needed to sit down.  Pt with decreased weight shifting, foot clearance, step length, and seth.    · Balance: Pt with fair static sit/stand balance.     Therapeutic Activities and Exercises:  Pt educated on acute skilled PT services and goals.  Pt verbalized understanding.     AM-PAC 6 CLICK MOBILITY  Total Score:11     Patient left left sidelying and HOB elevated  with all lines intact and call button in reach. B pressure relief boots reapplied.  Tray table close by.     GOALS:   Multidisciplinary Problems     Physical Therapy Goals        Problem: Physical Therapy Goal    Goal Priority Disciplines Outcome Goal Variances Interventions   Physical Therapy Goal     PT, PT/OT Ongoing, Progressing     Description: Goals to be met by: 22    Patient will increase functional independence with mobility by performin. Supine to sit with Modified [place]  2. Rolling to Left and Right with Modified [place]  3. Sit to stand transfer with Modified [place] using RW  4. Bed to chair transfer with Modified [place] using Rolling Walker  5. Gait x50 feet with Modified [place] using Rolling Walker  6. Lower extremity exercise program 2 sets x10 reps per handout, with independence                     History:     Past Medical History:   Diagnosis Date    Arthritis lower extremities    Asthma     Blind left eye     Diabetes mellitus     Edema of both lower legs     CHRONIC    Fall 2022    Gall stones     Hypertension     Kidney stones     Lives alone with help available     HOME HEALTH    Nephrolithiasis 2016    Obesity     PVD (peripheral vascular disease)     Renal disorder     Retinopathy     Sleep apnea     Vaginal delivery     x1    Weakness of both lower extremities     uses a cane, rollator & power chair       Past Surgical History:   Procedure Laterality Date    CARDIAC CATHETERIZATION      cataract      CHOLECYSTECTOMY      EYE SURGERY      Rt cataract surgery    HYSTERECTOMY      URETERAL STENT PLACEMENT         Time Tracking:     PT Received On: 02/10/22  PT Start Time: 1049     PT Stop Time: 1112  PT Total Time (min): 23 min     Billable Minutes: Evaluation 15 min co-eval with OT       02/10/2022

## 2022-02-10 NOTE — NURSING
PER handoff received from ALEXANDRA Galloway     Pt resting in bed quietly. NAD noted. No c/o pain. 2L oxygen via nasal canula in useFall and safety precautions maintained. Bed alarm activated and audible.. Bed locked in lowest position, with side rails up x2. Call bell and personal items within reach

## 2022-02-10 NOTE — ASSESSMENT & PLAN NOTE
- Initial rectal temperature 90.3. Bear Hugger warmer was applied and temperature increased.  - UA unremarkable  - Blood cultures negative so far.  - Cefepime q12  emprically  - NS infusion 100 ml/hr  No obvious source of infection or etiology for hypothermia.  Stop ABx's and monitor.

## 2022-02-10 NOTE — ASSESSMENT & PLAN NOTE
- Advised pt to use assistive devices at home  - Fall precautions   PT/OT evaluation.  Recommending SNF.  SW consulted.

## 2022-02-10 NOTE — PLAN OF CARE
Problem: Physical Therapy Goal  Goal: Physical Therapy Goal  Description: Goals to be met by: 22    Patient will increase functional independence with mobility by performin. Supine to sit with Modified [place]  2. Rolling to Left and Right with Modified [place]  3. Sit to stand transfer with Modified [place] using RW  4. Bed to chair transfer with Modified [place] using Rolling Walker  5. Gait x50 feet with Modified [place] using Rolling Walker  6. Lower extremity exercise program 2 sets x10 reps per handout, with independence    Outcome: Ongoing, Progressing     Pt ambulated ~6 small sidesteps along bedside with min A using RW and 1L O2 NC.

## 2022-02-10 NOTE — SUBJECTIVE & OBJECTIVE
Interval History: No new complaints.    Review of Systems   HENT: Negative for ear discharge and ear pain.    Eyes: Negative for discharge and itching.   Endocrine: Negative for cold intolerance and heat intolerance.   Neurological: Negative for seizures and syncope.     Objective:     Vital Signs (Most Recent):  Temp: 98.3 °F (36.8 °C) (02/10/22 1040)  Pulse: 61 (02/10/22 1040)  Resp: 18 (02/10/22 1040)  BP: (!) 122/56 (02/10/22 1040)  SpO2: 97 % (02/10/22 1040) Vital Signs (24h Range):  Temp:  [97.7 °F (36.5 °C)-100.2 °F (37.9 °C)] 98.3 °F (36.8 °C)  Pulse:  [61-89] 61  Resp:  [17-18] 18  SpO2:  [95 %-99 %] 97 %  BP: (122-162)/(56-82) 122/56     Weight: 90.7 kg (200 lb)  Body mass index is 36.58 kg/m².    Intake/Output Summary (Last 24 hours) at 2/10/2022 1559  Last data filed at 2/9/2022 1700  Gross per 24 hour   Intake --   Output 600 ml   Net -600 ml      Physical Exam  Constitutional:       General: She is not in acute distress.     Appearance: She is well-developed.   HENT:      Head: Normocephalic and atraumatic.   Eyes:      General: Lids are normal. No scleral icterus.     Conjunctiva/sclera: Conjunctivae normal.      Pupils: Pupils are equal, round, and reactive to light.   Neck:      Thyroid: No thyromegaly.      Vascular: No carotid bruit or JVD.   Cardiovascular:      Rate and Rhythm: Normal rate and regular rhythm.      Pulses: Normal pulses.      Heart sounds: Normal heart sounds. No murmur heard.  No friction rub. No S3 or S4 sounds.    Pulmonary:      Effort: Pulmonary effort is normal.      Breath sounds: Normal breath sounds. No wheezing, rhonchi or rales.   Abdominal:      General: Bowel sounds are normal.      Palpations: Abdomen is soft.      Tenderness: There is no abdominal tenderness.   Musculoskeletal:         General: No tenderness.      Cervical back: Full passive range of motion without pain and neck supple.      Right lower leg: Edema present.      Left lower leg: Edema present.    Skin:     General: Skin is warm and dry.      Findings: No rash.   Neurological:      Mental Status: She is alert and oriented to person, place, and time.      Comments: Motor grossly intact.  Sensory grossly intact.  Symmetric facial movements palate elevated symmetrically tongue midline   Psychiatric:         Speech: Speech normal.         Behavior: Behavior normal.         Thought Content: Thought content normal.         Significant Labs:   All pertinent labs within the past 24 hours have been reviewed.  BMP:   Recent Labs   Lab 02/10/22  0259   GLU 81      K 4.3      CO2 22*   BUN 24*   CREATININE 1.7*   CALCIUM 8.1*     CBC:   Recent Labs   Lab 02/09/22  0324 02/10/22  0259   WBC 7.68 6.57   HGB 9.0* 9.2*   HCT 27.9* 29.9*    207       Significant Imaging: I have reviewed all pertinent imaging results/findings within the past 24 hours.

## 2022-02-10 NOTE — PLAN OF CARE
Problem: Adult Inpatient Plan of Care  Goal: Plan of Care Review  Outcome: Ongoing, Progressing  Goal: Patient-Specific Goal (Individualized)  Outcome: Ongoing, Progressing  Goal: Absence of Hospital-Acquired Illness or Injury  Outcome: Ongoing, Progressing  Goal: Optimal Comfort and Wellbeing  Outcome: Ongoing, Progressing  Goal: Readiness for Transition of Care  Outcome: Ongoing, Progressing     Problem: Skin Injury Risk Increased  Goal: Skin Health and Integrity  Outcome: Ongoing, Progressing     Problem: Fall Injury Risk  Goal: Absence of Fall and Fall-Related Injury  Outcome: Ongoing, Progressing     Problem: Diabetes Comorbidity  Goal: Blood Glucose Level Within Targeted Range  Outcome: Ongoing, Progressing     Problem: Fluid and Electrolyte Imbalance (Acute Kidney Injury/Impairment)  Goal: Fluid and Electrolyte Balance  Outcome: Ongoing, Progressing     Problem: Oral Intake Inadequate (Acute Kidney Injury/Impairment)  Goal: Optimal Nutrition Intake  Outcome: Ongoing, Progressing     Problem: Renal Function Impairment (Acute Kidney Injury/Impairment)  Goal: Effective Renal Function  Outcome: Ongoing, Progressing

## 2022-02-10 NOTE — PLAN OF CARE
Problem: Occupational Therapy Goal  Goal: Occupational Therapy Goal  Description: Goals to be met by: 02/24/22     Patient will increase functional independence with ADLs by performing:    UE Dressing with Modified [place].  LE Dressing with Contact Guard Assistance.  Grooming while standing at sink with Contact Guard Assistance.  Toileting from bedside commode with Stand-by Assistance for hygiene and clothing management.   Supine to sit with Stand-by Assistance.  Step transfer with Stand-by Assistance  Toilet transfer to bedside commode with Stand-by Assistance.  Upper extremity exercise program x15 reps per handout, with independence.    Outcome: Ongoing, Progressing     OT rec SNF at d/c in order to increase safety and independence with ADLs and all aspects of functional mobility and reduce risk of further falls.

## 2022-02-10 NOTE — PLAN OF CARE
SW met with patient to discuss SNF. SW explained that PT/OT is recommending SNF placement for patient. Patient asked SW who would be responsible for paying SNF placement? SW explained to patient that her insurance would cover 20 days at 100 % and after patient would be responsible for 20% copayment. SW also explained to patient that she may not be at SNF placement for 20 days. Patient voiced understanding. SW provided patient with a [facility] SNF list for choices. Patient stated she is will talk with her brother and sister, and she will get back with SW with her final decision about going to a SNF placement and SNF choice if she decides to go. SW asked for patient to have a decision by noon tomorrow. Patient voiced understanding.     SW spoke with Mathew (LOCET). Mathew informed SW that a LOCET did not need to be completed and to follow proper protocol.     SW faxed patient's PASRR to [facility] for 142.     SW awaiting final decision from patient re: SNF placement and choices. SW is also waiting for 142 from [facility] [place] [facility].   SW will follow up with patient for SNF decision on tomorrow.

## 2022-02-11 LAB
ALBUMIN SERPL BCP-MCNC: 2.3 G/DL (ref 3.5–5.2)
ALP SERPL-CCNC: 68 U/L (ref 55–135)
ALT SERPL W/O P-5'-P-CCNC: 17 U/L (ref 10–44)
ANION GAP SERPL CALC-SCNC: 7 MMOL/L (ref 8–16)
AST SERPL-CCNC: 23 U/L (ref 10–40)
BASOPHILS # BLD AUTO: 0.02 K/UL (ref 0–0.2)
BASOPHILS NFR BLD: 0.3 % (ref 0–1.9)
BILIRUB SERPL-MCNC: 0.4 MG/DL (ref 0.1–1)
BUN SERPL-MCNC: 20 MG/DL (ref 8–23)
CALCIUM SERPL-MCNC: 8.3 MG/DL (ref 8.7–10.5)
CHLORIDE SERPL-SCNC: 112 MMOL/L (ref 95–110)
CO2 SERPL-SCNC: 21 MMOL/L (ref 23–29)
CREAT SERPL-MCNC: 1.5 MG/DL (ref 0.5–1.4)
DIFFERENTIAL METHOD: ABNORMAL
EOSINOPHIL # BLD AUTO: 0.8 K/UL (ref 0–0.5)
EOSINOPHIL NFR BLD: 13.8 % (ref 0–8)
ERYTHROCYTE [DISTWIDTH] IN BLOOD BY AUTOMATED COUNT: 14.6 % (ref 11.5–14.5)
EST. GFR  (AFRICAN AMERICAN): 37 ML/MIN/1.73 M^2
EST. GFR  (NON AFRICAN AMERICAN): 32 ML/MIN/1.73 M^2
GLUCOSE SERPL-MCNC: 72 MG/DL (ref 70–110)
HCT VFR BLD AUTO: 29.1 % (ref 37–48.5)
HGB BLD-MCNC: 9.1 G/DL (ref 12–16)
IMM GRANULOCYTES # BLD AUTO: 0.02 K/UL (ref 0–0.04)
IMM GRANULOCYTES NFR BLD AUTO: 0.3 % (ref 0–0.5)
LYMPHOCYTES # BLD AUTO: 1.2 K/UL (ref 1–4.8)
LYMPHOCYTES NFR BLD: 20.4 % (ref 18–48)
MCH RBC QN AUTO: 28.3 PG (ref 27–31)
MCHC RBC AUTO-ENTMCNC: 31.3 G/DL (ref 32–36)
MCV RBC AUTO: 90 FL (ref 82–98)
MONOCYTES # BLD AUTO: 0.7 K/UL (ref 0.3–1)
MONOCYTES NFR BLD: 12 % (ref 4–15)
NEUTROPHILS # BLD AUTO: 3.2 K/UL (ref 1.8–7.7)
NEUTROPHILS NFR BLD: 53.2 % (ref 38–73)
NRBC BLD-RTO: 0 /100 WBC
PLATELET # BLD AUTO: 196 K/UL (ref 150–450)
PMV BLD AUTO: 10.1 FL (ref 9.2–12.9)
POCT GLUCOSE: 109 MG/DL (ref 70–110)
POCT GLUCOSE: 72 MG/DL (ref 70–110)
POCT GLUCOSE: 92 MG/DL (ref 70–110)
POCT GLUCOSE: 97 MG/DL (ref 70–110)
POTASSIUM SERPL-SCNC: 4.5 MMOL/L (ref 3.5–5.1)
PROT SERPL-MCNC: 6.1 G/DL (ref 6–8.4)
RBC # BLD AUTO: 3.22 M/UL (ref 4–5.4)
SODIUM SERPL-SCNC: 140 MMOL/L (ref 136–145)
WBC # BLD AUTO: 6.08 K/UL (ref 3.9–12.7)

## 2022-02-11 PROCEDURE — 97535 SELF CARE MNGMENT TRAINING: CPT

## 2022-02-11 PROCEDURE — 36415 COLL VENOUS BLD VENIPUNCTURE: CPT | Performed by: HOSPITALIST

## 2022-02-11 PROCEDURE — 25000003 PHARM REV CODE 250: Performed by: HOSPITALIST

## 2022-02-11 PROCEDURE — 86580 TB INTRADERMAL TEST: CPT | Performed by: HOSPITALIST

## 2022-02-11 PROCEDURE — 63600175 PHARM REV CODE 636 W HCPCS: Performed by: HOSPITALIST

## 2022-02-11 PROCEDURE — 97116 GAIT TRAINING THERAPY: CPT

## 2022-02-11 PROCEDURE — 85025 COMPLETE CBC W/AUTO DIFF WBC: CPT | Performed by: HOSPITALIST

## 2022-02-11 PROCEDURE — 94761 N-INVAS EAR/PLS OXIMETRY MLT: CPT

## 2022-02-11 PROCEDURE — 30200315 PPD INTRADERMAL TEST REV CODE 302: Performed by: HOSPITALIST

## 2022-02-11 PROCEDURE — 80053 COMPREHEN METABOLIC PANEL: CPT | Performed by: HOSPITALIST

## 2022-02-11 PROCEDURE — 99900035 HC TECH TIME PER 15 MIN (STAT)

## 2022-02-11 PROCEDURE — 21400001 HC TELEMETRY ROOM

## 2022-02-11 RX ADMIN — TUBERCULIN PURIFIED PROTEIN DERIVATIVE 5 UNITS: 5 INJECTION, SOLUTION INTRADERMAL at 05:02

## 2022-02-11 RX ADMIN — ATORVASTATIN CALCIUM 20 MG: 10 TABLET, FILM COATED ORAL at 09:02

## 2022-02-11 RX ADMIN — HYDRALAZINE HYDROCHLORIDE 50 MG: 25 TABLET ORAL at 11:02

## 2022-02-11 RX ADMIN — TIMOLOL MALEATE 1 DROP: 5 SOLUTION OPHTHALMIC at 09:02

## 2022-02-11 RX ADMIN — ASPIRIN 81 MG: 81 TABLET, DELAYED RELEASE ORAL at 08:02

## 2022-02-11 RX ADMIN — HEPARIN SODIUM 5000 UNITS: 5000 INJECTION INTRAVENOUS; SUBCUTANEOUS at 08:02

## 2022-02-11 RX ADMIN — HYDRALAZINE HYDROCHLORIDE 50 MG: 25 TABLET ORAL at 05:02

## 2022-02-11 RX ADMIN — HEPARIN SODIUM 5000 UNITS: 5000 INJECTION INTRAVENOUS; SUBCUTANEOUS at 09:02

## 2022-02-11 RX ADMIN — LATANOPROST 1 DROP: 50 SOLUTION OPHTHALMIC at 08:02

## 2022-02-11 RX ADMIN — AMLODIPINE BESYLATE 5 MG: 5 TABLET ORAL at 09:02

## 2022-02-11 NOTE — PT/OT/SLP PROGRESS
"Physical Therapy Treatment    Patient Name:  Ana James   MRN:  [id number]    Recommendations:     Discharge Recommendations:  nursing facility, skilled   Discharge Equipment Recommendations: none   Barriers to discharge home: Pt with decreased functional mobility and ambulation at this time.    Assessment:     Ana James is a 81 y.o. female admitted with a medical diagnosis of Hypothermia.  She presents with the following impairments/functional limitations:  weakness,impaired endurance,impaired functional mobilty,gait instability,impaired balance,decreased upper extremity function,decreased lower extremity function,decreased coordination,decreased safety awareness,pain,decreased ROM,impaired cardiopulmonary response to activity.    Rehab Prognosis: Fair+; patient would benefit from acute skilled PT services to address these deficits and reach maximum level of function.    Recent Surgery: * No surgery found *      Plan:     During this hospitalization, patient to be seen 6 x/week to address the identified rehab impairments via gait training,therapeutic activities,therapeutic exercises,wheelchair management/training and progress toward the following goals:    · Plan of Care Expires:  02/24/22    Subjective     Chief Complaint: lack of sleep  Patient/Family Comments/goals: Pt agreeable to therapy.   Pain/Comfort:  · Pain Rating 1:  (Pt with pain to L lower calf with touch.)  · Pain Addressed 1: Reposition,Distraction,Cessation of Activity      Objective:     Patient found HOB elevated with PureWick,peripheral IV,telemetry,pressure relief boots upon PT entry to room.     General Precautions: Standard, fall,respiratory,vision impaired (L eye blind)   Orthopedic Precautions:N/A   Braces: N/A  Respiratory Status: Room air     Functional Mobility:  Pt feeling much better this afternoon.  Pt in good spirit, able to participate in therapy and joke around a little bit.  Pt stated, "It's good to be able to " "laugh."  Pt with generalized weakness and decreased endurance, mostly with poor tolerance for standing activities.    · Bed Mobility:     · Scooting: minimum assistance  · Supine to Sit: minimum assistance with HOB elevated   · Sit to Supine: minimum assistance with LE  · Transfers:     · Sit to Stand:  minimum assistance with rolling walker x4 trials; Pt with decreased endurance, easily fatigued and wanted to sit back down.     · Gait: Pt ambulated ~8 ft and ~4 ft along bedside with min A using RW.  Pt with decreased weight shifting, foot clearance, step length, and seth.    · Balance: Pt with fair static sit/stand balance.       AM-PAC 6 CLICK MOBILITY  Turning over in bed (including adjusting bedclothes, sheets and blankets)?: 3  Sitting down on and standing up from a chair with arms (e.g., wheelchair, bedside commode, etc.): 3  Moving from lying on back to sitting on the side of the bed?: 3  Moving to and from a bed to a chair (including a wheelchair)?: 3  Need to walk in hospital room?: 2  Climbing 3-5 steps with a railing?: 1  Basic Mobility Total Score: 15       Therapeutic Activities and Exercises:  Balance Training  Static Standing:  Patient performed static standing on level surface  using rolling walker with Minimal Assistance and minimal verbal cues for upright trunk and standing tolerance x4 trials.  Pt feeling SOB, educated on pursed lip breathing while standing.  V/S stable on RA.       Patient left HOB elevated with all lines intact and call button in reach.  B pressure relief boots and new Purewick applied.  Tray table close by.     GOALS:   Multidisciplinary Problems     Physical Therapy Goals        Problem: Physical Therapy Goal    Goal Priority Disciplines Outcome Goal Variances Interventions   Physical Therapy Goal     PT, PT/OT Ongoing, Progressing     Description: Goals to be met by: 22    Patient will increase functional independence with mobility by performin. Supine to sit " with Modified [place]  2. Rolling to Left and Right with Modified [place]  3. Sit to stand transfer with Modified [place] using RW  4. Bed to chair transfer with Modified [place] using Rolling Walker  5. Gait x50 feet with Modified [place] using Rolling Walker  6. Lower extremity exercise program 2 sets x10 reps per handout, with independence                     Time Tracking:     PT Received On: 02/11/22  PT Start Time: 1332     PT Stop Time: 1359  PT Total Time (min): 27 min     Billable Minutes: Gait Training 13 min co-tx with OT    Treatment Type: Treatment              02/11/2022

## 2022-02-11 NOTE — PT/OT/SLP PROGRESS
Occupational Therapy      Patient Name:  Ana James   MRN:  [id number]    Patient not seen today secondary to Patient unwilling to participate. Pt requesting to be seen by therapy after lunch, reported lack of sleep. OT recommended OOB>chair in preparation for lunch, however pt was not interested. Will follow-up later this afternoon.    2/11/2022

## 2022-02-11 NOTE — PLAN OF CARE
VINNY uploaded patient's SNF referral to [facility]. SNF referral sent to [facility], [facility], [facility], and [facility]. SW will continue to follow up with SNF placements decisions.     10:55  VINNY spoke with Karolyn ([facility]) informing her of SNF request for patient. Karolyn ([facility]) stated thank you.  VINNY will continue to follow up with Karolyn ([facility]) for SNF authorization.     11:51  VINNY left voice message for Inez ([facility]) requesting a return call.    VINNY left voice message for Kar ([facility]) requesting a return call.     VINNY spoke with Indira ([facility] [place]). Indira stated she will review patient's SNF referral and contact SW back.     VINNY spoke with Gay ([facility]). Gay confirmed that patient's SNF referral was received.     VINNY will continue to follow up with SNF placements.

## 2022-02-11 NOTE — PROGRESS NOTES
[facility] Medicine  Progress Note    Patient Name: Ana James  MRN: [id number]  Patient Class: IP- Inpatient   Admission Date: 2/8/2022  Length of Stay: 3 days  Attending Physician: Cisco Salvador MD  Primary Care Provider: Viky Bean MD        Subjective:     Principal Problem:Hypothermia        HPI:  Ana James is an 81 y.o. female with a PMHx of Asthma, HFpEF, Diabetes Mellitus Type 2, hypertension, CKD, PVD, HLD, Sleep apnea, Chronic edema of both legs, and Hx of weakness of both lower extremities who presented to the ED on 2/8/22 via EMS transportation for emergent evaluation of fall. Pt states that she does not know what happened to her. She states that she does not remember falling, and she does not know why she is here. Reports she is unsure if she struck her head. Pt lives alone and was found on the floor by her neighbor who called EMS. She normally ambulates with a walker but has a wheelchair at home as well. She denies any NSAID use. She was recently admitted to this facility on 1/26 for altered mental status and syncopal episodes with fall. She was found to have a UTI and treated with IVFs and ABX prior to discharge her mental status improved. She was discharged home with orders for abx, HH, PT, and OT. Pt denies myalgias, fever, shortness of breath, headache, back pain, abdominal pain, or chest pain.     ED Course:  Initial rectal temperature 90.3. Bear Hugger warmer was applied and temperature increased to 96.6. Pt states her heater is broken at home and she has been cold. CT of head impression no acute intracranial abnormality. Chest xray impression no acute abnormality. Labs mostly unremarkable, WBC 5.26, lactate 0.7. UA unremarkable. COVID-19 negative. Blood cultures pending.       Chief complaint: Loss of consciousness    Overview/Hospital Course:  82 y/o female presented to ER after being found on ground by caretaker.  Patient noted to be  hypothermic.  No obvious evidence of infection or etiology of hypothermia.  Initially started on ABx's.  These have been stopped since no evidence of infection.  PT/OT consulted and recommended SNF.  SW consulted.  Interval History: No new complaints.     Review of Systems   HENT: Negative for ear discharge and ear pain.    Eyes: Negative for discharge and itching.   Endocrine: Negative for cold intolerance and heat intolerance.   Neurological: Negative for seizures and syncope.      Objective:      Vital Signs (Most Recent):  Temp: 98.3 °F (36.8 °C) (02/10/22 1040)  Pulse: 61 (02/10/22 1040)  Resp: 18 (02/10/22 1040)  BP: (!) 122/56 (02/10/22 1040)  SpO2: 97 % (02/10/22 1040) Vital Signs (24h Range):  Temp:  [97.7 °F (36.5 °C)-100.2 °F (37.9 °C)] 98.3 °F (36.8 °C)  Pulse:  [61-89] 61  Resp:  [17-18] 18  SpO2:  [95 %-99 %] 97 %  BP: (122-162)/(56-82) 122/56      Weight: 90.7 kg (200 lb)  Body mass index is 36.58 kg/m².     Intake/Output Summary (Last 24 hours) at 2/10/2022 1559  Last data filed at 2/9/2022 1700      Gross per 24 hour   Intake --   Output 600 ml   Net -600 ml      Physical Exam  Constitutional:       General: She is not in acute distress.     Appearance: She is well-developed.   HENT:      Head: Normocephalic and atraumatic.   Eyes:      General: Lids are normal. No scleral icterus.     Conjunctiva/sclera: Conjunctivae normal.      Pupils: Pupils are equal, round, and reactive to light.   Neck:      Thyroid: No thyromegaly.      Vascular: No carotid bruit or JVD.   Cardiovascular:      Rate and Rhythm: Normal rate and regular rhythm.      Pulses: Normal pulses.      Heart sounds: Normal heart sounds. No murmur heard.  No friction rub. No S3 or S4 sounds.    Pulmonary:      Effort: Pulmonary effort is normal.      Breath sounds: Normal breath sounds. No wheezing, rhonchi or rales.   Abdominal:      General: Bowel sounds are normal.      Palpations: Abdomen is soft.      Tenderness: There is no  abdominal tenderness.   Musculoskeletal:         General: No tenderness.      Cervical back: Full passive range of motion without pain and neck supple.      Right lower leg: Edema present.      Left lower leg: Edema present.   Skin:     General: Skin is warm and dry.      Findings: No rash.   Neurological:      Mental Status: She is alert and oriented to person, place, and time.      Comments: Motor grossly intact.  Sensory grossly intact.  Symmetric facial movements palate elevated symmetrically tongue midline   Psychiatric:         Speech: Speech normal.         Behavior: Behavior normal.         Thought Content: Thought content normal.            Significant Labs:   All pertinent labs within the past 24 hours have been reviewed.  BMP:       Recent Labs   Lab 02/10/22  0259   GLU 81      K 4.3      CO2 22*   BUN 24*   CREATININE 1.7*   CALCIUM 8.1*      CBC:        Recent Labs   Lab 02/09/22 0324 02/10/22  0259   WBC 7.68 6.57   HGB 9.0* 9.2*   HCT 27.9* 29.9*    207         Significant Imaging: I have reviewed all pertinent imaging results/findings within the past 24 hours.            Assessment/Plan:      * Hypothermia  - Initial rectal temperature 90.3. Bear Hugger warmer was applied and temperature increased.  - UA unremarkable  - Blood cultures negative so far.  - Cefepime q12  emprically  - NS infusion 100 ml/hr  No obvious source of infection or etiology for hypothermia.  Stop ABx's and monitor.    Fall at home, initial encounter  - Advised pt to use assistive devices at home  - Fall precautions       Acute on chronic renal failure  CKD stage 3 with baseline Creat around 1.7  Presents with Creat of 2.4  Probable pre renal ARF.  Improving with IVF's.      Hyperlipidemia  - atorvastatin 20 mg qd      Physical debility  - Advised pt to use assistive devices at home  - Fall precautions   PT/OT evaluation.  Recommending SNF.  SW consulted.    Controlled type 2 diabetes mellitus with stage 3  chronic kidney disease, with long-term current use of insulin   Hold oral antihyperglycemics while inpatient  - PRN sliding scale insulin  - ACHS glucose monitoring   - ADA diet   Will hold scheduled insulin to avoid any hypoglycemic episodes (unsure if caused hypothermia).      Essential hypertension  - Patient's blood pressure is well-controlled, continue to monitor  - amlodipine 5 mg qd  - hydralazine 50 mg tid    VTE Risk Mitigation (From admission, onward)         Ordered     heparin (porcine) injection 5,000 Units  Every 12 hours         02/08/22 1243     IP VTE HIGH RISK PATIENT  Once         02/08/22 1243     Place sequential compression device  Until discontinued         02/08/22 1243                Discharge Planning   FREDERICK: 2/10/2022     Code Status: Full Code   Is the patient medically ready for discharge?:     Reason for patient still in hospital (select all that apply): Pending disposition  Discharge Plan A: Skilled Nursing Facility   Discharge Delays: (!) Post-Acute Set-up (Patient's information has to be reviewed by SNF choices.)              Cisco Salvador MD  Department of Hospital Medicine   [facility]

## 2022-02-11 NOTE — PT/OT/SLP PROGRESS
Physical Therapy      Patient Name:  Ana James   MRN:  [id number]    Patient not seen at this time for PT tx secondary to Patient unwilling to participate. Pt requesting to be seen by therapy after lunch, reported lack of sleep. PT recommended OOB>chair in preparation for lunch, however pt was not interested. Will follow-up later this afternoon.

## 2022-02-11 NOTE — PT/OT/SLP PROGRESS
"Occupational Therapy   Treatment    Name: Ana James  MRN: [id number]  Admitting Diagnosis:  Hypothermia       Recommendations:     Discharge Recommendations: nursing facility, skilled  Discharge Equipment Recommendations:  none  Barriers to discharge:   (not at PLOF; at risk of further falls)    Assessment:     Ana James is a 81 y.o. female with a medical diagnosis of Hypothermia. Performance deficits affecting function are weakness,impaired endurance,decreased coordination,impaired self care skills,decreased upper extremity function,impaired functional mobilty,decreased lower extremity function,gait instability,decreased ROM,decreased safety awareness,pain,impaired balance,edema.     MIN A for bed mobility, standing transfers, and sidesteps along the side of the bed using RW on room air. spO2 WFL    Rehab Prognosis:  Fair +; patient would benefit from acute skilled OT services to address these deficits and reach maximum level of function.       Plan:     Patient to be seen 5 x/week to address the above listed problems via self-care/home management,therapeutic activities,therapeutic exercises  · Plan of Care Expires: 02/24/22  · Plan of Care Reviewed with: patient    Subjective     Chief complaint: can't get comfortable in the bed   Patient/family comments/ goals: wants to go to SNF to get stronger so she can do her morning routine at home and remain independent; "laughing makes me feel just a little bit better"     Pain/Comfort:  · Pain Rating 1:  (pain to L lower calf)  · Pain Addressed 1: Cessation of Activity    Objective:     Patient found HOB elevated with peripheral IV,PureWick,pressure relief boots upon OT entry to room.    General Precautions: Standard, fall,respiratory,vision impaired (L eye blind)   Orthopedic Precautions:N/A   Braces: N/A  Respiratory Status: Room air     Occupational Performance:     Bed Mobility:    · Patient completed Scooting with minimum assistance  · Patient " completed Supine to Sit with minimum assistance and HOB elevated  · Patient completed Sit to Supine with minimum assistance     Functional Mobility/Transfers:  · Patient completed Sit <> Stand Transfer with minimum assistance  with  rolling walker x4  · Functional Mobility: pt took sidesteps along the EOB using RW with MIN A. Verbal cueing for body mechanics and upright head within RW for safety.     Activities of Daily Living:  · Grooming: stand by assistance seated EOB to clean her hands and face  · Toileting: contact guard assistance seated EOB for anterior pericare d/t found soiled in the bed      [facility] 6 Click ADL: 18    Treatment & Education:  · Pt re-educated on OT role/POC.   · Importance of OOB activity with staff assistance.  · Safety during functional t/f and mobility   · White board updated   · Edu with demo and practice of pursed lip breathing  · Seated unsupported at EOB, pt completed the following BUE AROM:   · X10: elbow flex/ext, shoulder flex/ext, shoulder horizontal ab/dduction, and forward punches (x5 per arm)   · Rest breaks taken in-between sets   · Multiple self-care tasks/functional mobility completed- assistance level noted above   · All questions/concerns answered within OT scope of practice       Patient left HOB elevated with B/L pressure relief boots on with all lines intact, call button in reach and new purewick in place with bedside table in front of pt; blinds opened, tv on Education:      GOALS:   Multidisciplinary Problems     Occupational Therapy Goals        Problem: Occupational Therapy Goal    Goal Priority Disciplines Outcome Interventions   Occupational Therapy Goal     OT, PT/OT Ongoing, Progressing    Description: Goals to be met by: 02/24/22     Patient will increase functional independence with ADLs by performing:    UE Dressing with Modified [place].  LE Dressing with Contact Guard Assistance.  Grooming while standing at sink with Contact Guard Assistance.  Toileting  from bedside commode with Stand-by Assistance for hygiene and clothing management.   Supine to sit with Stand-by Assistance.  Step transfer with Stand-by Assistance  Toilet transfer to bedside commode with Stand-by Assistance.  Upper extremity exercise program x15 reps per handout, with independence.                     Time Tracking:     OT Date of Treatment: 02/11/22  OT Start Time: 1332  OT Stop Time: 1359  OT Total Time (min): 27 min    Billable Minutes:Self Care/Home Management 13 min  Total Time 27 min (co-treat with PT)    OT/MARCELLUS: OT          2/11/2022

## 2022-02-11 NOTE — PLAN OF CARE
[facility] - Telemetry  Discharge Reassessment    Primary Care Provider: Viky Bean MD    Expected Discharge Date: 2/10/2022    Reassessment (most recent)       Discharge Reassessment - 02/11/22 0839          Discharge Reassessment    Assessment Type Discharge Planning Reassessment     Did the patient's condition or plan change since previous assessment? No     Discharge Plan discussed with: Patient     Communicated FREDERICK with patient/caregiver Date not available/Unable to determine     Discharge Plan A Skilled Nursing Facility     Discharge Plan B Home Health     DME Needed Upon Discharge  other (see comments)        Post-Acute Status    Post-Acute Authorization Placement     Post-Acute Placement Status Pending post-acute provider review/more information requested     Coverage [facility]     Hospital Resources/Appts/Education Provided Provided patient/caregiver with written discharge plan information;Appointments scheduled and added to AVS;Appointments scheduled by Navigator/Coordinator     Discharge Delays Post-Acute Set-up   Patient's information has to be reviewed by SNF choices.                  SW received call from patient today. Patient provided SW with her SNF choices: [facility], [facility] and [facility].

## 2022-02-11 NOTE — SUBJECTIVE & OBJECTIVE
Interval History: Feeling well.    Review of Systems   HENT: Negative for ear discharge and ear pain.    Eyes: Negative for discharge and itching.   Endocrine: Negative for cold intolerance and heat intolerance.   Neurological: Negative for seizures and syncope.     Objective:     Vital Signs (Most Recent):  Temp: 97.1 °F (36.2 °C) (02/11/22 1128)  Pulse: 62 (02/11/22 1128)  Resp: 19 (02/11/22 1128)  BP: (!) 159/71 (02/11/22 1128)  SpO2: 96 % (02/11/22 1218) Vital Signs (24h Range):  Temp:  [97.1 °F (36.2 °C)-99.8 °F (37.7 °C)] 97.1 °F (36.2 °C)  Pulse:  [62-85] 62  Resp:  [17-19] 19  SpO2:  [95 %-100 %] 96 %  BP: (136-163)/(57-76) 159/71     Weight: 90.7 kg (200 lb)  Body mass index is 36.58 kg/m².    Intake/Output Summary (Last 24 hours) at 2/11/2022 1400  Last data filed at 2/11/2022 1010  Gross per 24 hour   Intake 120 ml   Output 1100 ml   Net -980 ml      Physical Exam  Constitutional:       General: She is not in acute distress.     Appearance: She is well-developed.   HENT:      Head: Normocephalic and atraumatic.   Eyes:      General: Lids are normal. No scleral icterus.     Conjunctiva/sclera: Conjunctivae normal.      Pupils: Pupils are equal, round, and reactive to light.   Neck:      Thyroid: No thyromegaly.      Vascular: No carotid bruit or JVD.   Cardiovascular:      Rate and Rhythm: Normal rate and regular rhythm.      Pulses: Normal pulses.      Heart sounds: Normal heart sounds. No murmur heard.  No friction rub. No S3 or S4 sounds.    Pulmonary:      Effort: Pulmonary effort is normal.      Breath sounds: Normal breath sounds. No wheezing, rhonchi or rales.   Abdominal:      General: Bowel sounds are normal.      Palpations: Abdomen is soft.      Tenderness: There is no abdominal tenderness.   Musculoskeletal:         General: No tenderness.      Cervical back: Full passive range of motion without pain and neck supple.      Right lower leg: Edema present.      Left lower leg: Edema present.    Skin:     General: Skin is warm and dry.      Findings: No rash.   Neurological:      Mental Status: She is alert and oriented to person, place, and time.      Comments: Motor grossly intact.  Sensory grossly intact.  Symmetric facial movements palate elevated symmetrically tongue midline   Psychiatric:         Speech: Speech normal.         Behavior: Behavior normal.         Thought Content: Thought content normal.         Significant Labs:   All pertinent labs within the past 24 hours have been reviewed.  BMP:   Recent Labs   Lab 02/11/22 0429   GLU 72      K 4.5   *   CO2 21*   BUN 20   CREATININE 1.5*   CALCIUM 8.3*     CBC:   Recent Labs   Lab 02/10/22  0259 02/11/22 0429   WBC 6.57 6.08   HGB 9.2* 9.1*   HCT 29.9* 29.1*    196       Significant Imaging: I have reviewed all pertinent imaging results/findings within the past 24 hours.

## 2022-02-11 NOTE — PLAN OF CARE
Problem: Occupational Therapy Goal  Goal: Occupational Therapy Goal  Description: Goals to be met by: 02/24/22     Patient will increase functional independence with ADLs by performing:    UE Dressing with Modified [place].  LE Dressing with Contact Guard Assistance.  Grooming while standing at sink with Contact Guard Assistance.  Toileting from bedside commode with Stand-by Assistance for hygiene and clothing management.   Supine to sit with Stand-by Assistance.  Step transfer with Stand-by Assistance  Toilet transfer to bedside commode with Stand-by Assistance.  Upper extremity exercise program x15 reps per handout, with independence.    Outcome: Ongoing, Progressing     MIN A for bed mobility, standing transfers, and sidesteps along the side of the bed using RW on room air. spO2 WFL.

## 2022-02-11 NOTE — ASSESSMENT & PLAN NOTE
CKD stage 3 with baseline Creat around 1.7  Presents with Creat of 2.4  Probable pre renal ARF.  Improving with IVF's.

## 2022-02-11 NOTE — PLAN OF CARE
Problem: Physical Therapy Goal  Goal: Physical Therapy Goal  Description: Goals to be met by: 22    Patient will increase functional independence with mobility by performin. Supine to sit with Modified [place]  2. Rolling to Left and Right with Modified [place]  3. Sit to stand transfer with Modified [place] using RW  4. Bed to chair transfer with Modified [place] using Rolling Walker  5. Gait x50 feet with Modified [place] using Rolling Walker  6. Lower extremity exercise program 2 sets x10 reps per handout, with independence    Outcome: Ongoing, Progressing     Pt ambulated ~8 ft and ~4 ft with min A using RW.

## 2022-02-11 NOTE — PLAN OF CARE
VINNY received call from Kar ([facility]) informing SW that she is not able to accept patient for SNF because patient does not have a skill able dx. Patient's dx for hospital admit is hypothermia.     VINNY will continue to follow up with SNF placements.     VINNY secure MD with update re: SNF dx.

## 2022-02-12 LAB
BACTERIA BLD CULT: NORMAL
BACTERIA BLD CULT: NORMAL
POCT GLUCOSE: 107 MG/DL (ref 70–110)
POCT GLUCOSE: 109 MG/DL (ref 70–110)
POCT GLUCOSE: 80 MG/DL (ref 70–110)
POCT GLUCOSE: 94 MG/DL (ref 70–110)

## 2022-02-12 PROCEDURE — 25000003 PHARM REV CODE 250: Performed by: STUDENT IN AN ORGANIZED HEALTH CARE EDUCATION/TRAINING PROGRAM

## 2022-02-12 PROCEDURE — 63600175 PHARM REV CODE 636 W HCPCS: Performed by: HOSPITALIST

## 2022-02-12 PROCEDURE — 25000003 PHARM REV CODE 250: Performed by: HOSPITALIST

## 2022-02-12 PROCEDURE — 94760 N-INVAS EAR/PLS OXIMETRY 1: CPT

## 2022-02-12 PROCEDURE — 21400001 HC TELEMETRY ROOM

## 2022-02-12 RX ORDER — POLYETHYLENE GLYCOL 3350 17 G/17G
17 POWDER, FOR SOLUTION ORAL DAILY
Status: DISCONTINUED | OUTPATIENT
Start: 2022-02-12 | End: 2022-02-14 | Stop reason: HOSPADM

## 2022-02-12 RX ADMIN — HYDRALAZINE HYDROCHLORIDE 50 MG: 25 TABLET ORAL at 06:02

## 2022-02-12 RX ADMIN — HEPARIN SODIUM 5000 UNITS: 5000 INJECTION INTRAVENOUS; SUBCUTANEOUS at 08:02

## 2022-02-12 RX ADMIN — POLYETHYLENE GLYCOL 3350 17 G: 17 POWDER, FOR SOLUTION ORAL at 12:02

## 2022-02-12 RX ADMIN — LATANOPROST 1 DROP: 50 SOLUTION OPHTHALMIC at 08:02

## 2022-02-12 RX ADMIN — HYDRALAZINE HYDROCHLORIDE 50 MG: 25 TABLET ORAL at 10:02

## 2022-02-12 RX ADMIN — ASPIRIN 81 MG: 81 TABLET, DELAYED RELEASE ORAL at 08:02

## 2022-02-12 RX ADMIN — HYDRALAZINE HYDROCHLORIDE 50 MG: 25 TABLET ORAL at 01:02

## 2022-02-12 RX ADMIN — ATORVASTATIN CALCIUM 20 MG: 10 TABLET, FILM COATED ORAL at 08:02

## 2022-02-12 RX ADMIN — TIMOLOL MALEATE 1 DROP: 5 SOLUTION OPHTHALMIC at 08:02

## 2022-02-12 RX ADMIN — AMLODIPINE BESYLATE 5 MG: 5 TABLET ORAL at 08:02

## 2022-02-12 NOTE — NURSING
Received report from ALEXANDRA Ruelas. Patient lying in bed resting, NAD noted. Safety Precautions maintained, Will Monitor.

## 2022-02-12 NOTE — CONSULTS
Thank you for your consult to [facility]. We have reviewed the patient chart. This patient does meet criteria for [facility] service at this time. Will assume care on 02/12/22 at 6AM     Herve Chua MD  [facility]

## 2022-02-12 NOTE — PLAN OF CARE
Problem: Adult Inpatient Plan of Care  Goal: Plan of Care Review  Outcome: Ongoing, Progressing  Goal: Patient-Specific Goal (Individualized)  Outcome: Ongoing, Progressing  Goal: Absence of Hospital-Acquired Illness or Injury  Outcome: Ongoing, Progressing  Goal: Optimal Comfort and Wellbeing  Outcome: Ongoing, Progressing  Goal: Readiness for Transition of Care  Outcome: Ongoing, Progressing     Problem: Skin Injury Risk Increased  Goal: Skin Health and Integrity  Outcome: Ongoing, Progressing     Problem: Skin Injury Risk Increased  Goal: Skin Health and Integrity  Outcome: Ongoing, Progressing     Problem: Skin Injury Risk Increased  Goal: Skin Health and Integrity  Outcome: Ongoing, Progressing     Problem: Fall Injury Risk  Goal: Absence of Fall and Fall-Related Injury  Outcome: Ongoing, Progressing

## 2022-02-12 NOTE — PROGRESS NOTES
[facility] Medicine  Telemedicine Progress Note    Patient Name: Ana James  MRN: [id number]  Patient Class: IP- Inpatient   Admission Date: 2/8/2022  Length of Stay: 4 days  Attending Physician: Herve Chua MD  Primary Care Provider: Viky Bean MD          Subjective:     Principal Problem:Fall at home, initial encounter        HPI:  Ana James is an 81 y.o. female with a PMHx of Asthma, HFpEF, Diabetes Mellitus Type 2, hypertension, CKD, PVD, HLD, Sleep apnea, Chronic edema of both legs, and Hx of weakness of both lower extremities who presented to the ED on 2/8/22 via EMS transportation for emergent evaluation of fall. Pt states that she does not know what happened to her. She states that she does not remember falling, and she does not know why she is here. Reports she is unsure if she struck her head. Pt lives alone and was found on the floor by her neighbor who called EMS. She normally ambulates with a walker but has a wheelchair at home as well. She denies any NSAID use. She was recently admitted to this facility on 1/26 for altered mental status and syncopal episodes with fall. She was found to have a UTI and treated with IVFs and ABX prior to discharge her mental status improved. She was discharged home with orders for abx, HH, PT, and OT. Pt denies myalgias, fever, shortness of breath, headache, back pain, abdominal pain, or chest pain.     ED Course:  Initial rectal temperature 90.3. Bear Hugger warmer was applied and temperature increased to 96.6. Pt states her heater is broken at home and she has been cold. CT of head impression no acute intracranial abnormality. Chest xray impression no acute abnormality. Labs mostly unremarkable, WBC 5.26, lactate 0.7. UA unremarkable. COVID-19 negative. Blood cultures pending.           Overview/Hospital Course:  80 y/o female presented to ER after being found on ground by caretaker.  Patient noted to be  hypothermic.  No obvious evidence of infection or etiology of hypothermia.  Initially started on ABx's.  These have been stopped since no evidence of infection.  PT/OT consulted and recommended SNF.  SW consulted.      Interval History: Feeling well. Awaiting SNF placement, no complaints today however patient upset that she is weak when she was very functional at home prior to admission. Discussed that SNF is temporary and she will go home from there.    Review of Systems   HENT: Negative for ear discharge and ear pain.    Eyes: Negative for discharge and itching.   Endocrine: Negative for cold intolerance and heat intolerance.   Neurological: Negative for seizures and syncope.     Objective:     Vital Signs (Most Recent):  Temp: 97.8 °F (36.6 °C) (02/12/22 1604)  Pulse: (!) 59 (02/12/22 1604)  Resp: 20 (02/12/22 1604)  BP: (!) 150/65 (02/12/22 1604)  SpO2: 97 % (02/12/22 1604) Vital Signs (24h Range):  Temp:  [97.7 °F (36.5 °C)-99.6 °F (37.6 °C)] 97.8 °F (36.6 °C)  Pulse:  [59-79] 59  Resp:  [17-20] 20  SpO2:  [92 %-98 %] 97 %  BP: (133-189)/(61-81) 150/65     Weight: 90.7 kg (200 lb)  Body mass index is 36.58 kg/m².    Intake/Output Summary (Last 24 hours) at 2/12/2022 1646  Last data filed at 2/12/2022 1524  Gross per 24 hour   Intake 390 ml   Output 1200 ml   Net -810 ml      Physical Exam  Constitutional:       General: She is not in acute distress.     Appearance: She is well-developed.   HENT:      Head: Normocephalic and atraumatic.   Eyes:      General: Lids are normal.   Neck:      Thyroid: No thyromegaly.      Vascular: No carotid bruit or JVD.   Cardiovascular:      Heart sounds: No S3 or S4 sounds.    Pulmonary:      Effort: Pulmonary effort is normal. No respiratory distress.   Musculoskeletal:      Cervical back: Full passive range of motion without pain and neck supple.   Skin:     Findings: No rash.   Neurological:      Mental Status: She is alert and oriented to person, place, and time.       Comments: Motor grossly intact.  Sensory grossly intact.  Symmetric facial movements palate elevated symmetrically tongue midline   Psychiatric:         Speech: Speech normal.         Behavior: Behavior normal.         Thought Content: Thought content normal.         Significant Labs:   All pertinent labs within the past 24 hours have been reviewed.  BMP:   Recent Labs   Lab 02/11/22 0429   GLU 72      K 4.5   *   CO2 21*   BUN 20   CREATININE 1.5*   CALCIUM 8.3*     CBC:   Recent Labs   Lab 02/11/22 0429   WBC 6.08   HGB 9.1*   HCT 29.1*          Significant Imaging: I have reviewed all pertinent imaging results/findings within the past 24 hours.      Assessment/Plan:      * Fall at home, initial encounter  - Advised pt to use assistive devices at home  - Fall precautions       Hypothermia  - Initial rectal temperature 90.3. Bear Hugger warmer was applied and temperature increased.  - UA unremarkable  - Blood cultures negative so far.  - Cefepime q12  emprically  - NS infusion 100 ml/hr  No obvious source of infection or etiology for hypothermia.  Stop ABx's and monitor.    Acute on chronic renal failure  CKD stage 3 with baseline Creat around 1.7  Presents with Creat of 2.4  Probable pre renal ARF.  Improving with IVF's.      Hyperlipidemia  - atorvastatin 20 mg qd      Physical debility  - Advised pt to use assistive devices at home  - Fall precautions   PT/OT evaluation.  Recommending SNF.  SW consulted.    Controlled type 2 diabetes mellitus with stage 3 chronic kidney disease, with long-term current use of insulin   Hold oral antihyperglycemics while inpatient  - PRN sliding scale insulin  - ACHS glucose monitoring   - ADA diet   Will hold scheduled insulin to avoid any hypoglycemic episodes (unsure if caused hypothermia).      Essential hypertension  - Patient's blood pressure is well-controlled, continue to monitor  - amlodipine 5 mg qd  - hydralazine 50 mg tid      VTE Risk Mitigation  (From admission, onward)         Ordered     heparin (porcine) injection 5,000 Units  Every 12 hours         02/08/22 1243     IP VTE HIGH RISK PATIENT  Once         02/08/22 1243     Place sequential compression device  Until discontinued         02/08/22 1243                      I have assessed these finding virtually using telemed platform and with assistance of bedside nurse                 The attending portion of this evaluation, treatment, and documentation was performed per Herve Chua MD via Telemedicine AudioVisual using the secure [employer] software platform with 2 way audio/video. The provider was located off-site and the patient is located in the hospital. The aforementioned video software was utilized to document the relevant history and physical exam    Herve Chua MD  Department of Hospital Medicine   [facility] [place] [facility]

## 2022-02-13 LAB
POCT GLUCOSE: 107 MG/DL (ref 70–110)
POCT GLUCOSE: 122 MG/DL (ref 70–110)
POCT GLUCOSE: 141 MG/DL (ref 70–110)
POCT GLUCOSE: 85 MG/DL (ref 70–110)

## 2022-02-13 PROCEDURE — 25000003 PHARM REV CODE 250: Performed by: STUDENT IN AN ORGANIZED HEALTH CARE EDUCATION/TRAINING PROGRAM

## 2022-02-13 PROCEDURE — 94760 N-INVAS EAR/PLS OXIMETRY 1: CPT

## 2022-02-13 PROCEDURE — 21400001 HC TELEMETRY ROOM

## 2022-02-13 PROCEDURE — 97530 THERAPEUTIC ACTIVITIES: CPT

## 2022-02-13 PROCEDURE — 97530 THERAPEUTIC ACTIVITIES: CPT | Mod: CQ

## 2022-02-13 PROCEDURE — 63600175 PHARM REV CODE 636 W HCPCS: Performed by: HOSPITALIST

## 2022-02-13 PROCEDURE — 25000003 PHARM REV CODE 250: Performed by: HOSPITALIST

## 2022-02-13 PROCEDURE — 97110 THERAPEUTIC EXERCISES: CPT

## 2022-02-13 RX ORDER — TALC
9 POWDER (GRAM) TOPICAL NIGHTLY
Status: DISCONTINUED | OUTPATIENT
Start: 2022-02-13 | End: 2022-02-14 | Stop reason: HOSPADM

## 2022-02-13 RX ADMIN — TIMOLOL MALEATE 1 DROP: 5 SOLUTION OPHTHALMIC at 09:02

## 2022-02-13 RX ADMIN — HYDRALAZINE HYDROCHLORIDE 50 MG: 25 TABLET ORAL at 09:02

## 2022-02-13 RX ADMIN — HYDRALAZINE HYDROCHLORIDE 50 MG: 25 TABLET ORAL at 03:02

## 2022-02-13 RX ADMIN — LATANOPROST 1 DROP: 50 SOLUTION OPHTHALMIC at 08:02

## 2022-02-13 RX ADMIN — ATORVASTATIN CALCIUM 20 MG: 10 TABLET, FILM COATED ORAL at 09:02

## 2022-02-13 RX ADMIN — HEPARIN SODIUM 5000 UNITS: 5000 INJECTION INTRAVENOUS; SUBCUTANEOUS at 09:02

## 2022-02-13 RX ADMIN — HEPARIN SODIUM 5000 UNITS: 5000 INJECTION INTRAVENOUS; SUBCUTANEOUS at 08:02

## 2022-02-13 RX ADMIN — POLYETHYLENE GLYCOL 3350 17 G: 17 POWDER, FOR SOLUTION ORAL at 09:02

## 2022-02-13 RX ADMIN — ASPIRIN 81 MG: 81 TABLET, DELAYED RELEASE ORAL at 08:02

## 2022-02-13 RX ADMIN — Medication 9 MG: at 08:02

## 2022-02-13 RX ADMIN — HYDRALAZINE HYDROCHLORIDE 50 MG: 25 TABLET ORAL at 06:02

## 2022-02-13 RX ADMIN — AMLODIPINE BESYLATE 5 MG: 5 TABLET ORAL at 09:02

## 2022-02-13 NOTE — PLAN OF CARE
Problem: Occupational Therapy Goal  Goal: Occupational Therapy Goal  Description: Goals to be met by: 02/24/22     Patient will increase functional independence with ADLs by performing:    UE Dressing with Modified [place].  LE Dressing with Contact Guard Assistance.  Grooming while standing at sink with Contact Guard Assistance.  Toileting from bedside commode with Stand-by Assistance for hygiene and clothing management.   Supine to sit with Stand-by Assistance.  Step transfer with Stand-by Assistance  Toilet transfer to bedside commode with Stand-by Assistance.  Upper extremity exercise program x15 reps per handout, with independence.    Outcome: Ongoing, Progressing     Fearful to fall with sidesteps, but motivated to keep trying. MIN A using RW for ~4 sidesteps at EOB. Pt participatory with BUE AROM seated unsupported at EOB.

## 2022-02-13 NOTE — NURSING
Reported off to oncoming nurse, patient resting in bed, aaox2. Patient can make needs known to staff at times, max assist required for adls and transfers, no acute distress noted, safety precautions maintained.      Chart check completed.

## 2022-02-13 NOTE — PT/OT/SLP PROGRESS
Physical Therapy Treatment    Patient Name:  Ana James   MRN:  [id number]    Recommendations:     Discharge Recommendations:  nursing facility, skilled   Discharge Equipment Recommendations: none   Barriers to discharge: decreased functional mobility.     Assessment:     Ana James is a 81 y.o. female admitted with a medical diagnosis of Fall at home, initial encounter.  She presents with the following impairments/functional limitations:  weakness,impaired endurance,decreased coordination,impaired self care skills,decreased upper extremity function,decreased lower extremity function,impaired functional mobilty,gait instability,decreased safety awareness,impaired balance.    Rehab Prognosis: Good; patient would benefit from acute skilled PT services to address these deficits and reach maximum level of function.    Recent Surgery: * No surgery found *      Plan:     During this hospitalization, patient to be seen 6 x/week to address the identified rehab impairments via gait training,therapeutic activities,therapeutic exercises,wheelchair management/training and progress toward the following goals:    · Plan of Care Expires:  02/24/22    Subjective     Chief Complaint: Pt with no complaints at this time.   Patient/Family Comments/goals:   Pain/Comfort:  · Pain Rating 1: 0/10      Objective:     Patient found HOB elevated with PureWick,peripheral IV upon PT entry to room.     General Precautions: Standard, fall,respiratory,vision impaired   Orthopedic Precautions:N/A   Braces: N/A  Respiratory Status: Room air     Functional Mobility:  · Bed Mobility:     · Supine to Sit: minimum assistance  · Sit to Supine: minimum assistance  · Transfers:     · Sit to Stand:  Pt performed sit to stand x 3 trials seated at EOB requiring verbal cueing on proper hand placement and movement sequencing to perform transfer safely with rolling walker  · Gait: Pt able to take 5-6 lateral steps to reach HOB with RW, CGA  requiring verbal cueing on proper step and AD sequencing to perform task safely.   · Balance: Pt with good sitting and fair standing balance.       AM-PAC 6 CLICK MOBILITY  Turning over in bed (including adjusting bedclothes, sheets and blankets)?: 3  Sitting down on and standing up from a chair with arms (e.g., wheelchair, bedside commode, etc.): 3  Moving from lying on back to sitting on the side of the bed?: 3  Moving to and from a bed to a chair (including a wheelchair)?: 3  Need to walk in hospital room?: 2  Climbing 3-5 steps with a railing?: 1  Basic Mobility Total Score: 15       Therapeutic Activities and Exercises:       Patient left HOB elevated with all lines intact and call button in reach..    GOALS:   Multidisciplinary Problems     Physical Therapy Goals        Problem: Physical Therapy Goal    Goal Priority Disciplines Outcome Goal Variances Interventions   Physical Therapy Goal     PT, PT/OT Ongoing, Progressing     Description: Goals to be met by: 22    Patient will increase functional independence with mobility by performin. Supine to sit with Modified [place]  2. Rolling to Left and Right with Modified [place]  3. Sit to stand transfer with Modified [place] using RW  4. Bed to chair transfer with Modified [place] using Rolling Walker  5. Gait x50 feet with Modified [place] using Rolling Walker  6. Lower extremity exercise program 2 sets x10 reps per handout, with independence                     Time Tracking:     PT Received On: 22  PT Start Time: 0900     PT Stop Time: 918  PT Total Time (min): 18 min     Billable Minutes: Therapeutic Activity 18    Treatment Type: Treatment  PT/PTA: PTA     PTA Visit Number: 1     2022

## 2022-02-13 NOTE — PLAN OF CARE
Problem: Adult Inpatient Plan of Care  Goal: Plan of Care Review  Outcome: Ongoing, Progressing  Goal: Patient-Specific Goal (Individualized)  Outcome: Ongoing, Progressing  Goal: Absence of Hospital-Acquired Illness or Injury  Outcome: Ongoing, Progressing  Goal: Optimal Comfort and Wellbeing  Outcome: Ongoing, Progressing  Goal: Readiness for Transition of Care  Outcome: Ongoing, Progressing     Problem: Skin Injury Risk Increased  Goal: Skin Health and Integrity  Outcome: Ongoing, Progressing     Problem: Fall Injury Risk  Goal: Absence of Fall and Fall-Related Injury  Outcome: Ongoing, Progressing     Problem: Diabetes Comorbidity  Goal: Blood Glucose Level Within Targeted Range  Outcome: Ongoing, Progressing     Problem: Fluid and Electrolyte Imbalance (Acute Kidney Injury/Impairment)  Goal: Fluid and Electrolyte Balance  Outcome: Ongoing, Progressing

## 2022-02-13 NOTE — PT/OT/SLP PROGRESS
"Occupational Therapy   Treatment    Name: Ana James  MRN: [id number]  Admitting Diagnosis:  Fall at home, initial encounter       Recommendations:     Discharge Recommendations: nursing facility, skilled  Discharge Equipment Recommendations:  none  Barriers to discharge:   (not at PLOF; at risk of further falls)    Assessment:     Ana James is a 81 y.o. female with a medical diagnosis of Fall at home, initial encounter. Performance deficits affecting function are weakness,visual deficits,impaired endurance,decreased ROM,decreased coordination,decreased upper extremity function,impaired self care skills,impaired functional mobilty,decreased lower extremity function,decreased safety awareness,edema,gait instability,impaired balance.     Fearful to fall with sidesteps, but motivated to keep trying. MIN A using RW for ~4 sidesteps at EOB. Pt participatory with BUE AROM seated unsupported at EOB    Rehab Prognosis:  Good; patient would benefit from acute skilled OT services to address these deficits and reach maximum level of function.       Plan:     Patient to be seen  (5-6x/week) to address the above listed problems via self-care/home management,therapeutic activities,therapeutic exercises  · Plan of Care Expires: 02/24/22  · Plan of Care Reviewed with: patient    Subjective     Chief complaint: fearful to fall   Patient/family comments/ goals: "we make a good team"; agreed to sit up in the chair tomorrow     Pain/Comfort:  · Pain Rating 1: 0/10    Objective:     Communicated with: nurseKatty, prior to session.  Patient found HOB elevated with PureWick,peripheral IV,telemetry,pressure relief boots upon OT entry to room.    General Precautions: Standard, fall,vision impaired (blind L eye)   Orthopedic Precautions:N/A   Braces: N/A  Respiratory Status: Room air - 98%      Occupational Performance:     Bed Mobility:    · Patient completed Rolling/Turning to Left with  stand by assistance and " with side rail  · Patient completed Rolling/Turning to Right with stand by assistance and with side rail  · Patient completed Scooting anteriorly with contact guard assistance  · Patient completed Supine to Sit with minimum assistance, with side rail and HOB elevated  · Patient completed Sit to Supine with minimum assistance   · Patient completed Scooting in supine to HOB with minimum assistance     Functional Mobility/Transfers:  · Patient completed Sit <> Stand Transfer with minimum assistance  with  rolling walker x2 trials   · Functional Mobility: MIN A using RW for ~4 sidesteps at EOB. Verbal cueing for body mechanics within RW for safety.     Activities of Daily Living:  · Upper Body Dressing: minimum assistance to change soiled gown   · Lower Body Dressing: total assistance to doff soiled brief and don clean one; pt assisted by rolling L<>R with SBA      AMPAC 6 Click ADL: 18    Treatment & Education:  · Pt re-educated on OT role/POC.   · Importance of OOB activity with staff assistance.  · Safety during functional t/f and mobility   · Seated unsupported at EOB, pt completed the following BUE AROM x12:   · Elbow flex/ext, shoulder flex/ext, shoulder horizontal ab/dduction, forward punches   · Multiple self-care tasks/functional mobility completed- assistance level noted above   · Verbal cueing with demo and rest breaks for pursed lip breathing with EOB/OOB activities.   · All questions/concerns answered within OT scope of practice       Patient left HOB elevated with purewick and B/L pressure relief boots in place with all lines intact, call button in reach and all needs met/within reach Education:      GOALS:   Multidisciplinary Problems     Occupational Therapy Goals        Problem: Occupational Therapy Goal    Goal Priority Disciplines Outcome Interventions   Occupational Therapy Goal     OT, PT/OT Ongoing, Progressing    Description: Goals to be met by: 02/24/22     Patient will increase functional  independence with ADLs by performing:    UE Dressing with Modified [place].  LE Dressing with Contact Guard Assistance.  Grooming while standing at sink with Contact Guard Assistance.  Toileting from bedside commode with Stand-by Assistance for hygiene and clothing management.   Supine to sit with Stand-by Assistance.  Step transfer with Stand-by Assistance  Toilet transfer to bedside commode with Stand-by Assistance.  Upper extremity exercise program x15 reps per handout, with independence.                     Time Tracking:     OT Date of Treatment: 02/13/22  OT Start Time: 1343  OT Stop Time: 1408  OT Total Time (min): 25 min    Billable Minutes:Therapeutic Activity 13 min  Therapeutic Exercise 12 min  Total Time 25 min    OT/MARCELLUS: OT          2/13/2022

## 2022-02-14 VITALS
RESPIRATION RATE: 18 BRPM | OXYGEN SATURATION: 97 % | SYSTOLIC BLOOD PRESSURE: 136 MMHG | DIASTOLIC BLOOD PRESSURE: 62 MMHG | HEART RATE: 59 BPM | TEMPERATURE: 98 F | WEIGHT: 200 LBS | BODY MASS INDEX: 36.8 KG/M2 | HEIGHT: 62 IN

## 2022-02-14 LAB
ALBUMIN SERPL BCP-MCNC: 2.7 G/DL (ref 3.5–5.2)
ANION GAP SERPL CALC-SCNC: 8 MMOL/L (ref 8–16)
BASOPHILS # BLD AUTO: 0.02 K/UL (ref 0–0.2)
BASOPHILS NFR BLD: 0.4 % (ref 0–1.9)
BUN SERPL-MCNC: 14 MG/DL (ref 8–23)
CALCIUM SERPL-MCNC: 8.8 MG/DL (ref 8.7–10.5)
CHLORIDE SERPL-SCNC: 105 MMOL/L (ref 95–110)
CO2 SERPL-SCNC: 22 MMOL/L (ref 23–29)
CREAT SERPL-MCNC: 1.3 MG/DL (ref 0.5–1.4)
DIFFERENTIAL METHOD: ABNORMAL
EOSINOPHIL # BLD AUTO: 0.5 K/UL (ref 0–0.5)
EOSINOPHIL NFR BLD: 9.2 % (ref 0–8)
ERYTHROCYTE [DISTWIDTH] IN BLOOD BY AUTOMATED COUNT: 14.1 % (ref 11.5–14.5)
EST. GFR  (AFRICAN AMERICAN): 44 ML/MIN/1.73 M^2
EST. GFR  (NON AFRICAN AMERICAN): 38 ML/MIN/1.73 M^2
GLUCOSE SERPL-MCNC: 88 MG/DL (ref 70–110)
HCT VFR BLD AUTO: 31.6 % (ref 37–48.5)
HGB BLD-MCNC: 10 G/DL (ref 12–16)
IMM GRANULOCYTES # BLD AUTO: 0.01 K/UL (ref 0–0.04)
IMM GRANULOCYTES NFR BLD AUTO: 0.2 % (ref 0–0.5)
LYMPHOCYTES # BLD AUTO: 1.8 K/UL (ref 1–4.8)
LYMPHOCYTES NFR BLD: 36.8 % (ref 18–48)
MCH RBC QN AUTO: 28 PG (ref 27–31)
MCHC RBC AUTO-ENTMCNC: 31.6 G/DL (ref 32–36)
MCV RBC AUTO: 89 FL (ref 82–98)
MONOCYTES # BLD AUTO: 0.7 K/UL (ref 0.3–1)
MONOCYTES NFR BLD: 14.6 % (ref 4–15)
NEUTROPHILS # BLD AUTO: 1.9 K/UL (ref 1.8–7.7)
NEUTROPHILS NFR BLD: 38.8 % (ref 38–73)
NRBC BLD-RTO: 0 /100 WBC
PHOSPHATE SERPL-MCNC: 2.9 MG/DL (ref 2.7–4.5)
PLATELET # BLD AUTO: 231 K/UL (ref 150–450)
PMV BLD AUTO: 9.8 FL (ref 9.2–12.9)
POCT GLUCOSE: 123 MG/DL (ref 70–110)
POCT GLUCOSE: 77 MG/DL (ref 70–110)
POTASSIUM SERPL-SCNC: 4.8 MMOL/L (ref 3.5–5.1)
RBC # BLD AUTO: 3.57 M/UL (ref 4–5.4)
SODIUM SERPL-SCNC: 135 MMOL/L (ref 136–145)
WBC # BLD AUTO: 4.87 K/UL (ref 3.9–12.7)

## 2022-02-14 PROCEDURE — 36415 COLL VENOUS BLD VENIPUNCTURE: CPT | Performed by: STUDENT IN AN ORGANIZED HEALTH CARE EDUCATION/TRAINING PROGRAM

## 2022-02-14 PROCEDURE — 80069 RENAL FUNCTION PANEL: CPT | Performed by: STUDENT IN AN ORGANIZED HEALTH CARE EDUCATION/TRAINING PROGRAM

## 2022-02-14 PROCEDURE — 63600175 PHARM REV CODE 636 W HCPCS: Performed by: HOSPITALIST

## 2022-02-14 PROCEDURE — 97530 THERAPEUTIC ACTIVITIES: CPT

## 2022-02-14 PROCEDURE — 97535 SELF CARE MNGMENT TRAINING: CPT

## 2022-02-14 PROCEDURE — 25000003 PHARM REV CODE 250: Performed by: STUDENT IN AN ORGANIZED HEALTH CARE EDUCATION/TRAINING PROGRAM

## 2022-02-14 PROCEDURE — 25000003 PHARM REV CODE 250: Performed by: HOSPITALIST

## 2022-02-14 PROCEDURE — 85025 COMPLETE CBC W/AUTO DIFF WBC: CPT | Performed by: STUDENT IN AN ORGANIZED HEALTH CARE EDUCATION/TRAINING PROGRAM

## 2022-02-14 PROCEDURE — 25000003 PHARM REV CODE 250: Performed by: NURSE PRACTITIONER

## 2022-02-14 RX ORDER — TALC
9 POWDER (GRAM) TOPICAL NIGHTLY
Refills: 0 | Status: ON HOLD
Start: 2022-02-14 | End: 2022-07-18

## 2022-02-14 RX ORDER — POLYETHYLENE GLYCOL 3350 17 G/17G
17 POWDER, FOR SOLUTION ORAL DAILY
Refills: 0 | Status: ON HOLD
Start: 2022-02-15 | End: 2022-07-18

## 2022-02-14 RX ORDER — LIDOCAINE 50 MG/G
1 PATCH TOPICAL
Status: DISCONTINUED | OUTPATIENT
Start: 2022-02-14 | End: 2022-02-14 | Stop reason: HOSPADM

## 2022-02-14 RX ORDER — INSULIN DETEMIR 100 [IU]/ML
26 INJECTION, SOLUTION SUBCUTANEOUS NIGHTLY
Refills: 0
Start: 2022-02-14 | End: 2022-04-28

## 2022-02-14 RX ADMIN — POLYETHYLENE GLYCOL 3350 17 G: 17 POWDER, FOR SOLUTION ORAL at 08:02

## 2022-02-14 RX ADMIN — AMLODIPINE BESYLATE 5 MG: 5 TABLET ORAL at 08:02

## 2022-02-14 RX ADMIN — TIMOLOL MALEATE 1 DROP: 5 SOLUTION OPHTHALMIC at 08:02

## 2022-02-14 RX ADMIN — HEPARIN SODIUM 5000 UNITS: 5000 INJECTION INTRAVENOUS; SUBCUTANEOUS at 08:02

## 2022-02-14 RX ADMIN — ATORVASTATIN CALCIUM 20 MG: 10 TABLET, FILM COATED ORAL at 08:02

## 2022-02-14 RX ADMIN — ACETAMINOPHEN 650 MG: 325 TABLET ORAL at 08:02

## 2022-02-14 RX ADMIN — HYDRALAZINE HYDROCHLORIDE 50 MG: 25 TABLET ORAL at 05:02

## 2022-02-14 RX ADMIN — LIDOCAINE 1 PATCH: 50 PATCH TOPICAL at 12:02

## 2022-02-14 NOTE — PLAN OF CARE
Problem: Occupational Therapy Goal  Goal: Occupational Therapy Goal  Description: Goals to be met by: 02/24/22     Patient will increase functional independence with ADLs by performing:    UE Dressing with Modified [place].  LE Dressing with Contact Guard Assistance.  Grooming while standing at sink with Contact Guard Assistance.  Toileting from bedside commode with Stand-by Assistance for hygiene and clothing management.   Supine to sit with Stand-by Assistance.  Step transfer with Stand-by Assistance  Toilet transfer to bedside commode with Stand-by Assistance.  Upper extremity exercise program x15 reps per handout, with independence.    Outcome: Ongoing, Progressing     MIN A for few sidesteps bed>chair using RW. Pt completed grooming and feeding up in the chair.

## 2022-02-14 NOTE — PT/OT/SLP PROGRESS
Occupational Therapy   Treatment    Name: Ana James  MRN: [id number]  Admitting Diagnosis:  Fall at home, initial encounter       Recommendations:     Discharge Recommendations: nursing facility, skilled  Discharge Equipment Recommendations:  none  Barriers to discharge:   (not at PLOF; at risk of further falls)    Assessment:     Ana James is a 81 y.o. female with a medical diagnosis of Fall at home, initial encounter. Performance deficits affecting function are weakness,visual deficits,impaired endurance,decreased ROM,decreased coordination,decreased upper extremity function,impaired self care skills,impaired fine motor,decreased lower extremity function,impaired functional mobilty,gait instability,decreased safety awareness,edema,impaired balance.     MIN A for few sidesteps bed>chair using RW. Pt completed grooming and feeding up in the chair    Rehab Prognosis:  Good; patient would benefit from acute skilled OT services to address these deficits and reach maximum level of function.       Plan:     Patient to be seen  (5-6x/week) to address the above listed problems via self-care/home management,therapeutic activities,therapeutic exercises  · Plan of Care Expires: 02/24/22  · Plan of Care Reviewed with: patient,son    Subjective     Chief complaint: fearful to fall   Patient/family comments/ goals: gave OT high five and happy that she made it to the chair      Pain/Comfort:  · Pain Rating 1: 0/10    Objective:     Patient found HOB elevated with peripheral IV,PureWick,telemetry,pressure relief boots upon OT entry to room.    General Precautions: Standard, fall,vision impaired   Orthopedic Precautions:N/A   Braces: N/A  Respiratory Status: Room air     Occupational Performance:     Bed Mobility:    · Patient completed Scooting anteriorly with minimum assistance  · Patient completed Supine to Sit with minimum assistance, with side rail and HOB elevated     Functional  Mobility/Transfers:  · Patient completed Sit <> Stand Transfer with minimum assistance  with  rolling walker   · Patient completed Bed <> Chair Transfer using Step Transfer technique with minimum assistance with rolling walker  · Functional Mobility: pt took a few sidesteps bed>chair using RW with MIN A with frequent verbal cueing for body mechanics within RW.     Activities of Daily Living:  · Feeding:  total assist to open cracker wrappers, straw wrapper, and jello then pt able to feed self     · Grooming: despite multiple efforts, pt unable to open initial tight seal of toothpaste requiring MIN A from OT to start opening it then pt able to finish and brush her teeth with supervision seated. assist to open mouthwash     · Upper Body Dressing: minimum assistance to don back gown seated EOB   · Lower Body Dressing: total assistance to don socks       AMPAC 6 Click ADL: 16    Treatment & Education:  · Pt re-educated on OT role/POC.   · Importance of OOB activity with staff assistance.  · Safety during functional t/f and mobility   · Multiple self-care tasks/functional mobility completed- assistance level noted above   · All questions/concerns answered within OT scope of practice       Patient left seated on green air cushion reclined in the chair with B/L pressure relief boots and bedside table in front of pt with all lines intact, call button in reach, chair alarm on, nurseElizabeth, notified and all needs met/within reach Education:      GOALS:   Multidisciplinary Problems     Occupational Therapy Goals        Problem: Occupational Therapy Goal    Goal Priority Disciplines Outcome Interventions   Occupational Therapy Goal     OT, PT/OT Ongoing, Progressing    Description: Goals to be met by: 02/24/22     Patient will increase functional independence with ADLs by performing:    UE Dressing with Modified [place].  LE Dressing with Contact Guard Assistance.  Grooming while standing at sink with Contact Guard  Assistance.  Toileting from bedside commode with Stand-by Assistance for hygiene and clothing management.   Supine to sit with Stand-by Assistance.  Step transfer with Stand-by Assistance  Toilet transfer to bedside commode with Stand-by Assistance.  Upper extremity exercise program x15 reps per handout, with independence.                     Time Tracking:     OT Date of Treatment: 02/14/22  OT Start Time: 1137  OT Stop Time: 1206  OT Total Time (min): 29 min    Billable Minutes:Self Care/Home Management 20 min  Therapeutic Activity 9 min  Total Time 29 min    OT/MARCELLUS: OT          2/14/2022

## 2022-02-14 NOTE — PLAN OF CARE
02/14/22 1150   Medicare Message   Important Message from Medicare regarding Discharge Appeal Rights Explained to patient/caregiver;Other (comments)   Date IMM was signed 02/14/22   Time IMM was signed 0950   CM spoke with patient via video chat who verbalized understanding of appeal rights.  Copy will be sent via certified mail:  [phone].

## 2022-02-14 NOTE — PLAN OF CARE
Problem: Physical Therapy Goal  Goal: Physical Therapy Goal  Description: Goals to be met by: 22    Patient will increase functional independence with mobility by performin. Supine to sit with Modified [place]  2. Rolling to Left and Right with Modified [place]  3. Sit to stand transfer with Modified [place] using RW  4. Bed to chair transfer with Modified [place] using Rolling Walker  5. Gait x50 feet with Modified [place] using Rolling Walker  6. Lower extremity exercise program 2 sets x10 reps per handout, with independence    Outcome: Ongoing, Progressing     Pt tolerated OOB>chair today, still with limited ambulation.

## 2022-02-14 NOTE — PROGRESS NOTES
YOLIS called [facility] at [phone] and spoke with Monica in admissions.  Patient accepted and will complete her own paperwork on admit.  TN notified Karolyn at [facility] and requested auth.

## 2022-02-14 NOTE — SUBJECTIVE & OBJECTIVE
Interval History: Feeling well. Awaiting SNF placement, no complaints today - pending SNF placement.      Review of Systems   HENT: Negative for ear discharge and ear pain.    Eyes: Negative for discharge and itching.   Endocrine: Negative for cold intolerance and heat intolerance.   Neurological: Negative for seizures and syncope.     Objective:     Vital Signs (Most Recent):  Temp: 98.5 °F (36.9 °C) (02/13/22 2016)  Pulse: 68 (02/13/22 2016)  Resp: 17 (02/13/22 2016)  BP: (!) 188/77 (02/13/22 2016)  SpO2: 96 % (02/13/22 2016) Vital Signs (24h Range):  Temp:  [97.9 °F (36.6 °C)-99.3 °F (37.4 °C)] 98.5 °F (36.9 °C)  Pulse:  [58-72] 68  Resp:  [17-19] 17  SpO2:  [92 %-99 %] 96 %  BP: (131-188)/(61-77) 188/77     Weight: 90.7 kg (200 lb)  Body mass index is 36.58 kg/m².    Intake/Output Summary (Last 24 hours) at 2/13/2022 2150  Last data filed at 2/13/2022 1740  Gross per 24 hour   Intake 520 ml   Output 700 ml   Net -180 ml      Physical Exam  Constitutional:       General: She is not in acute distress.     Appearance: She is well-developed.   HENT:      Head: Normocephalic and atraumatic.   Eyes:      General: Lids are normal.   Neck:      Thyroid: No thyromegaly.      Vascular: No carotid bruit or JVD.   Cardiovascular:      Heart sounds: No S3 or S4 sounds.    Pulmonary:      Effort: Pulmonary effort is normal. No respiratory distress.   Musculoskeletal:      Cervical back: Full passive range of motion without pain and neck supple.   Skin:     Findings: No rash.   Neurological:      Mental Status: She is alert and oriented to person, place, and time.      Comments: Motor grossly intact.  Sensory grossly intact.  Symmetric facial movements palate elevated symmetrically tongue midline   Psychiatric:         Speech: Speech normal.         Behavior: Behavior normal.         Thought Content: Thought content normal.         Significant Labs:   All pertinent labs within the past 24 hours have been reviewed.  BMP:   No  results for input(s): GLU, NA, K, CL, CO2, BUN, CREATININE, CALCIUM, MG in the last 48 hours.  CBC:   No results for input(s): WBC, HGB, HCT, PLT in the last 48 hours.    Significant Imaging: I have reviewed all pertinent imaging results/findings within the past 24 hours.

## 2022-02-14 NOTE — PROGRESS NOTES
ADT 30 order placed for Van Transportation with O2.  Requested  time:  1500  If transportation does not arrive at ETA time nurse will be instructed to follow protocol for transportation below:   How can I get in touch directly with dispatch, if needed?                 Non-emergent dispatch: [phone]      +++NURSING:  If Van does not arrive at requested time please call the above Non Emergent Dispatcher.  If issue not resolved please escalate to your charge nurse for further instructions.

## 2022-02-14 NOTE — NURSING
Reported off to oncoming nurse, patient resting in bed, aaox2. Patient can make needs known to staff, max assist required for adls and transfers, no acute distress noted, safety precautions maintained.       Chart check completed.

## 2022-02-14 NOTE — PROGRESS NOTES
[facility] Medicine  Telemedicine Progress Note    Patient Name: Aan James  MRN: [id number]  Patient Class: IP- Inpatient   Admission Date: 2/8/2022  Length of Stay: 5 days  Attending Physician: Hevre Chua MD  Primary Care Provider: Viky Bean MD          Subjective:     Principal Problem:Fall at home, initial encounter        HPI:  Ana James is an 81 y.o. female with a PMHx of Asthma, HFpEF, Diabetes Mellitus Type 2, hypertension, CKD, PVD, HLD, Sleep apnea, Chronic edema of both legs, and Hx of weakness of both lower extremities who presented to the ED on 2/8/22 via EMS transportation for emergent evaluation of fall. Pt states that she does not know what happened to her. She states that she does not remember falling, and she does not know why she is here. Reports she is unsure if she struck her head. Pt lives alone and was found on the floor by her neighbor who called EMS. She normally ambulates with a walker but has a wheelchair at home as well. She denies any NSAID use. She was recently admitted to this facility on 1/26 for altered mental status and syncopal episodes with fall. She was found to have a UTI and treated with IVFs and ABX prior to discharge her mental status improved. She was discharged home with orders for abx, HH, PT, and OT. Pt denies myalgias, fever, shortness of breath, headache, back pain, abdominal pain, or chest pain.     ED Course:  Initial rectal temperature 90.3. Bear Hugger warmer was applied and temperature increased to 96.6. Pt states her heater is broken at home and she has been cold. CT of head impression no acute intracranial abnormality. Chest xray impression no acute abnormality. Labs mostly unremarkable, WBC 5.26, lactate 0.7. UA unremarkable. COVID-19 negative. Blood cultures pending.           Overview/Hospital Course:  82 y/o female presented to ER after being found on ground by caretaker.  Patient noted to be  hypothermic.  No obvious evidence of infection or etiology of hypothermia.  Initially started on ABx's.  These have been stopped since no evidence of infection.  PT/OT consulted and recommended SNF.  SW consulted.      Interval History: Feeling well. Awaiting SNF placement, no complaints today - pending SNF placement.      Review of Systems   HENT: Negative for ear discharge and ear pain.    Eyes: Negative for discharge and itching.   Endocrine: Negative for cold intolerance and heat intolerance.   Neurological: Negative for seizures and syncope.     Objective:     Vital Signs (Most Recent):  Temp: 98.5 °F (36.9 °C) (02/13/22 2016)  Pulse: 68 (02/13/22 2016)  Resp: 17 (02/13/22 2016)  BP: (!) 188/77 (02/13/22 2016)  SpO2: 96 % (02/13/22 2016) Vital Signs (24h Range):  Temp:  [97.9 °F (36.6 °C)-99.3 °F (37.4 °C)] 98.5 °F (36.9 °C)  Pulse:  [58-72] 68  Resp:  [17-19] 17  SpO2:  [92 %-99 %] 96 %  BP: (131-188)/(61-77) 188/77     Weight: 90.7 kg (200 lb)  Body mass index is 36.58 kg/m².    Intake/Output Summary (Last 24 hours) at 2/13/2022 2150  Last data filed at 2/13/2022 1740  Gross per 24 hour   Intake 520 ml   Output 700 ml   Net -180 ml      Physical Exam  Constitutional:       General: She is not in acute distress.     Appearance: She is well-developed.   HENT:      Head: Normocephalic and atraumatic.   Eyes:      General: Lids are normal.   Neck:      Thyroid: No thyromegaly.      Vascular: No carotid bruit or JVD.   Cardiovascular:      Heart sounds: No S3 or S4 sounds.    Pulmonary:      Effort: Pulmonary effort is normal. No respiratory distress.   Musculoskeletal:      Cervical back: Full passive range of motion without pain and neck supple.   Skin:     Findings: No rash.   Neurological:      Mental Status: She is alert and oriented to person, place, and time.      Comments: Motor grossly intact.  Sensory grossly intact.  Symmetric facial movements palate elevated symmetrically tongue midline   Psychiatric:          Speech: Speech normal.         Behavior: Behavior normal.         Thought Content: Thought content normal.         Significant Labs:   All pertinent labs within the past 24 hours have been reviewed.  BMP:   No results for input(s): GLU, NA, K, CL, CO2, BUN, CREATININE, CALCIUM, MG in the last 48 hours.  CBC:   No results for input(s): WBC, HGB, HCT, PLT in the last 48 hours.    Significant Imaging: I have reviewed all pertinent imaging results/findings within the past 24 hours.      Assessment/Plan:      * Fall at home, initial encounter  - Advised pt to use assistive devices at home  - Fall precautions       Hypothermia  - Initial rectal temperature 90.3. Bear Hugger warmer was applied and temperature increased.  - UA unremarkable  - Blood cultures negative so far.  - Cefepime q12  emprically  - NS infusion 100 ml/hr  No obvious source of infection or etiology for hypothermia.  Stop ABx's and monitor.    Acute on chronic renal failure  CKD stage 3 with baseline Creat around 1.7  Presents with Creat of 2.4  Probable pre renal ARF.  Improving with IVF's.      Hyperlipidemia  - atorvastatin 20 mg qd      Physical debility  - Advised pt to use assistive devices at home  - Fall precautions   PT/OT evaluation.  Recommending SNF.  SW consulted.    Controlled type 2 diabetes mellitus with stage 3 chronic kidney disease, with long-term current use of insulin   Hold oral antihyperglycemics while inpatient  - PRN sliding scale insulin  - ACHS glucose monitoring   - ADA diet   Will hold scheduled insulin to avoid any hypoglycemic episodes (unsure if caused hypothermia).      Essential hypertension  - Patient's blood pressure is well-controlled, continue to monitor  - amlodipine 5 mg qd  - hydralazine 50 mg tid      VTE Risk Mitigation (From admission, onward)         Ordered     heparin (porcine) injection 5,000 Units  Every 12 hours         02/08/22 1243     IP VTE HIGH RISK PATIENT  Once         02/08/22 1243      Place sequential compression device  Until discontinued         02/08/22 1243                      I have assessed these finding virtually using telemed platform and with assistance of bedside nurse                 The attending portion of this evaluation, treatment, and documentation was performed per Herve Chua MD via Telemedicine AudioVisual using the secure [employer] software platform with 2 way audio/video. The provider was located off-site and the patient is located in the hospital. The aforementioned video software was utilized to document the relevant history and physical exam    Herve Chua MD  Department of Hospital Medicine   [facility] [place] [facility] [place]

## 2022-02-14 NOTE — NURSING
PER handoff received from ALEXANDRA Ruelas      Pt resting in bed quietly. NAD noted. No c/o pain.  Fall and safety precautions maintained. Bed alarm activated and audible.. Bed locked in lowest position, with side rails up x2. Call bell and personal items within reach

## 2022-02-14 NOTE — PLAN OF CARE
02/14/22 1359   Final Note   Assessment Type Final Discharge Note   Anticipated Discharge Disposition SNF   Post-Acute Status   Post-Acute Placement Status Set-up Complete/Auth obtained   Discharge Delays (!) PFC Arranged Transportation   Call report received and sent via secure message to Elizabeth tamayo.

## 2022-02-14 NOTE — PLAN OF CARE
02/14/22 1226   Post-Acute Status   Post-Acute Authorization Placement   Post-Acute Placement Status Pending payor review/awaiting authorization (if required)   Discharge Plan   Discharge Plan A Skilled Nursing Facility   [facility] [place] [facility]  Awaiting auth and call report info.

## 2022-02-14 NOTE — PLAN OF CARE
NURSING HOME ORDERS    02/14/2022  [facility] [place] - TELEMETRY  [address] [address]  Dept: [phone]  Loc: [phone]     Admit to Nursing Home:      Diagnoses:  Active Hospital Problems    Diagnosis  POA    *Fall at home, initial encounter [W19.XXXA, Y92.009]  Not Applicable    Hypothermia [T68.XXXA]  Yes    Acute on chronic renal failure [N17.9, N18.9]  Yes    Hyperlipidemia [E78.5]  Yes     Chronic    Physical debility [R53.81]  Yes     Chronic    Controlled type 2 diabetes mellitus with stage 3 chronic kidney disease, with long-term current use of insulin [E11.22, N18.30, Z79.4]  Not Applicable     Chronic    Essential hypertension [I10]  Yes     Chronic      Resolved Hospital Problems    Diagnosis Date Resolved POA    Syncope [R55] 02/08/2022 Yes       Code Status: Full    Patient is homebound due to:  Fall at home, initial encounter    Allergies:  Review of patient's allergies indicates:   Allergen Reactions    Adhesive Rash     Paper tape       Vitals:  Routine    Diet: diabetic diet: 2000 calorie    Activities:   Activity as tolerated    Labs:  Per facility protocol    Nursing Precautions:  Fall and Pressure ulcer prevention    Consults:   PT to evaluate and treat- 5 times a week, OT to evaluate and treat- 5 times a week and Nutrition to evaluate and recommend diet     Miscellaneous Care: Diabetes Care: Diabetes: Check blood sugar. Fingerstick blood sugar AC and HS  Sliding Scale/Hypoglycemia Protocol: For FSG<80, give 1 amp D50 or 1 glucose tablet. For FSG , do nothing. For -200, give 1 unit of novolog in addition to pre-meal insulin. For -250, give 2 units of novolog in addition to pre-meal insulin. For -300, give 3 units of novolog in addition to pre-meal insulin. For 301-350, give 4 units of novolog in addition to pre-meal insulin. For 351-400, give 5 units of novolog in addition to pre-meal insulin. For FSG >400, give 5  units of novolog in addition to pre-meal insulin and please call MD                   Diabetes Care:  SN to perform and educate Diabetic management with blood glucose monitoring:, Fingerstick blood sugar AC and HS and Report CBG < 60 or > 350 to physician.      Medications: Discontinue all previous medication orders, if any. See new list below.     Medication List      START taking these medications    melatonin 3 mg tablet  Commonly known as: MELATIN  Take 3 tablets (9 mg total) by mouth nightly.     polyethylene glycol 17 gram Pwpk  Commonly known as: GLYCOLAX  Take 17 g by mouth once daily.  Start taking on: February 15, 2022        CHANGE how you take these medications    LEVEMIR FLEXTOUCH U-100 INSULN 100 unit/mL (3 mL) Inpn pen  Generic drug: insulin detemir U-100  Inject 26 Units into the skin every evening. HOLD UNTIL YOU SEE YOUR PCP  What changed: additional instructions        CONTINUE taking these medications    albuterol 90 mcg/actuation inhaler  Commonly known as: PROVENTIL/VENTOLIN HFA  INHALE 2 PUFFS INTO THE LUNGS EVERY 6 (SIX) HOURS AS NEEDED FOR WHEEZING. RESCUE     amLODIPine 10 MG tablet  Commonly known as: NORVASC  TAKE 1 TABLET BY MOUTH EVERY DAY     aspirin 81 MG EC tablet  Commonly known as: ECOTRIN  Take 81 mg by mouth nightly.     atorvastatin 20 MG tablet  Commonly known as: LIPITOR  TAKE 1 TABLET BY MOUTH EVERY DAY     blood sugar diagnostic Strp  Test 3 times daily     blood-glucose transmitter Lea  Commonly known as: DEXCOM G5 TRANSMITTER  Test glucose twice daily     carvediloL 12.5 MG tablet  Commonly known as: COREG  Take 1 tablet (12.5 mg total) by mouth 2 (two) times daily.     DEXCOM G6  Misc  Generic drug: blood-glucose meter,continuous  Test glucose twice daily     hydrALAZINE 50 MG tablet  Commonly known as: APRESOLINE  Take 1 tablet (50 mg total) by mouth every 8 (eight) hours.     * lancets Misc  Test 3 times daily     * lancets Misc  To check BG 3 times daily, to  "use with insurance preferred meter     latanoprost 0.005 % ophthalmic solution  Place 1 drop into both eyes every evening.     oxybutynin 15 MG Tr24  Commonly known as: DITROPAN XL  TAKE 1 TABLET BY MOUTH EVERY DAY     pen needle, diabetic 32 gauge x 5/32" Ndle  Commonly known as: BD ULTRA-FINE BREE PEN NEEDLE  INJECT INSULIN THREE TIMES DAILY     timolol maleate 0.5% 0.5 % Drop  Commonly known as: TIMOPTIC  Place 1 drop into both eyes every morning.     TRADJENTA 5 mg Tab tablet  Generic drug: linaGLIPtin  TAKE 1 TABLET BY MOUTH EVERY DAY     traZODone 50 MG tablet  Commonly known as: DESYREL  TAKE 1 TABLET (50 MG TOTAL) BY MOUTH EVERY EVENING.         * This list has 2 medication(s) that are the same as other medications prescribed for you. Read the directions carefully, and ask your doctor or other care provider to review them with you.                  Immunizations Administered as of 2/14/2022  Reviewed on 2/12/2022    Name Date Dose VIS Date Route Exp Date    COVID-19, MRNA, LN-S, PF (Moderna) 11/18/2021 0.25 mL -- Intramuscular --    Site: Right deltoid     Lot: [id number]     COVID-19, MRNA, LN-S, PF (Pfizer) (Purple Cap) 2/24/2021 -- -- -- --    : Pfizer Inc     Lot: [id number]     COVID-19, MRNA, LN-S, PF (Pfizer) (Purple Cap) 2/4/2021 -- -- -- --    External: Patient reported           This patient has had both covid vaccinations    Some patients may experience side effects after vaccination.  These may include fever, headache, muscle or joint aches.  Most symptoms resolve with 24-48 hours and do not require urgent medical evaluation unless they persist for more than 72 hours or symptoms are concerning for an unrelated medical condition.          _________________________________  Herve Chua MD  02/14/2022    "

## 2022-02-14 NOTE — PT/OT/SLP PROGRESS
Physical Therapy Treatment    Patient Name:  Ana James   MRN:  [id number]    Recommendations:     Discharge Recommendations:  nursing facility, skilled   Discharge Equipment Recommendations: none   Barriers to discharge home: Pt with decreased functional mobility and ambulation at this time.    Assessment:     Ana James is a 81 y.o. female admitted with a medical diagnosis of Fall at home, initial encounter.  She presents with the following impairments/functional limitations:  weakness,impaired endurance,impaired functional mobilty,gait instability,impaired balance,decreased upper extremity function,decreased lower extremity function,decreased coordination,decreased safety awareness,pain,decreased ROM,impaired cardiopulmonary response to activity.    Rehab Prognosis: Fair; patient would benefit from acute skilled PT services to address these deficits and reach maximum level of function.    Recent Surgery: * No surgery found *      Plan:     During this hospitalization, patient to be seen 6 x/week to address the identified rehab impairments via gait training,therapeutic activities,therapeutic exercises,wheelchair management/training and progress toward the following goals:    · Plan of Care Expires:  02/24/22    Subjective     Chief Complaint: N/A  Patient/Family Comments/goals: Pt reported being D/C'ed to SNF today.   Pain/Comfort:  · Pain Rating 1: 0/10      Objective:     Patient found up in chair reclined with chair alarm with PureWick,peripheral IV,telemetry,pressure relief boots upon PT entry to room.     General Precautions: Standard, fall,respiratory,vision impaired   Orthopedic Precautions:N/A   Braces: N/A  Respiratory Status: Room air     Functional Mobility:  · Bed Mobility:     · Scooting: contact guard assistance  · Bridging: minimum assistance  · Sit to Supine: minimum assistance with LE   · Transfers:     · Sit to Stand:  minimum assistance with rolling walker  · Chair to bed:  minimum assistance with  rolling walker  using  Step Transfer  · Gait: Pt ambulated ~6 ft with min A using RW.  Pt with decreased weight shifting, foot clearance, step length, and seth.  Pt declined further standing trials and gait training.    · Balance: Pt with fair static sit/stand balance.       AM-PAC 6 CLICK MOBILITY  Turning over in bed (including adjusting bedclothes, sheets and blankets)?: 3  Sitting down on and standing up from a chair with arms (e.g., wheelchair, bedside commode, etc.): 3  Moving from lying on back to sitting on the side of the bed?: 3  Moving to and from a bed to a chair (including a wheelchair)?: 3  Need to walk in hospital room?: 2  Climbing 3-5 steps with a railing?: 1  Basic Mobility Total Score: 15       Therapeutic Activities and Exercises:  BLE seated therex 2 sets x10 reps: AP, LAQ, and hip flex    Patient left HOB elevated with all lines intact, call button in reach and nurse Elizabeth notified pt not has a HA and would like some pain meds.  B pressure relief boots reapplied.      GOALS:   Multidisciplinary Problems     Physical Therapy Goals        Problem: Physical Therapy Goal    Goal Priority Disciplines Outcome Goal Variances Interventions   Physical Therapy Goal     PT, PT/OT Ongoing, Progressing     Description: Goals to be met by: 22    Patient will increase functional independence with mobility by performin. Supine to sit with Modified [place]  2. Rolling to Left and Right with Modified [place]  3. Sit to stand transfer with Modified [place] using RW  4. Bed to chair transfer with Modified [place] using Rolling Walker  5. Gait x50 feet with Modified [place] using Rolling Walker  6. Lower extremity exercise program 2 sets x10 reps per handout, with independence                     Time Tracking:     PT Received On: 22  PT Start Time: 1458     PT Stop Time: 1513  PT Total Time (min): 15 min     Billable Minutes: Therapeutic  Activity 15 min    Treatment Type: Treatment  PT/PTA: PT     PTA Visit Number: 0     02/14/2022

## 2022-02-14 NOTE — CONSULTS
Karolyn from [facility] provided [facility] auth for [facility]:  [id number].

## 2022-02-14 NOTE — PROGRESS NOTES
Follow up on transport:  Per [facility] patient will travel by [employer] van.   scheduled for 2174

## 2022-03-02 NOTE — DISCHARGE SUMMARY
[facility] Medicine  Discharge Summary      Patient Name: Ana James  MRN: [id number]  Patient Class: IP- Inpatient  Admission Date: 2/8/2022  Hospital Length of Stay: 6 days  Discharge Date and Time: 2/14/2022  6:32 PM  Attending Physician: No att. providers found   Discharging Provider: Herve Chua MD  Primary Care Provider: Viky Bean MD      HPI:   Ana James is an 81 y.o. female with a PMHx of Asthma, HFpEF, Diabetes Mellitus Type 2, hypertension, CKD, PVD, HLD, Sleep apnea, Chronic edema of both legs, and Hx of weakness of both lower extremities who presented to the ED on 2/8/22 via EMS transportation for emergent evaluation of fall. Pt states that she does not know what happened to her. She states that she does not remember falling, and she does not know why she is here. Reports she is unsure if she struck her head. Pt lives alone and was found on the floor by her neighbor who called EMS. She normally ambulates with a walker but has a wheelchair at home as well. She denies any NSAID use. She was recently admitted to this facility on 1/26 for altered mental status and syncopal episodes with fall. She was found to have a UTI and treated with IVFs and ABX prior to discharge her mental status improved. She was discharged home with orders for abx, HH, PT, and OT. Pt denies myalgias, fever, shortness of breath, headache, back pain, abdominal pain, or chest pain.     ED Course:  Initial rectal temperature 90.3. Bear Hugger warmer was applied and temperature increased to 96.6. Pt states her heater is broken at home and she has been cold. CT of head impression no acute intracranial abnormality. Chest xray impression no acute abnormality. Labs mostly unremarkable, WBC 5.26, lactate 0.7. UA unremarkable. COVID-19 negative. Blood cultures pending.           * No surgery found *      Hospital Course:   82 y/o female presented to ER after being found on ground by  caretaker.  Patient noted to be hypothermic.  No obvious evidence of infection or etiology of hypothermia.  Initially started on ABx's.  These have been stopped since no evidence of infection.  PT/OT consulted and recommended SNF.  SW consulted. Patient discharged to SNF in stable condition.        Goals of Care Treatment Preferences:  Code Status: Full Code      Consults:   Consults (From admission, onward)        Status Ordering Provider     Inpatient virtual consult to Hospital Medicine  Once        Provider:  (Not yet assigned)    Completed SILVA CONTE     Inpatient consult to Social Work  Once        Provider:  (Not yet assigned)    Completed SILVA CONTE     IP consult to case management  Once        Provider:  (Not yet assigned)    Completed HUONG YU          No new Assessment & Plan notes have been filed under this hospital service since the last note was generated.  Service: Hospital Medicine    Final Active Diagnoses:    Diagnosis Date Noted POA    PRINCIPAL PROBLEM:  Fall at home, initial encounter [W19.XXXA, Y92.009] 02/08/2022 Not Applicable    Hypothermia [T68.XXXA] 02/08/2022 Yes    Acute on chronic renal failure [N17.9, N18.9] 01/26/2022 Yes    Hyperlipidemia [E78.5] 02/27/2020 Yes     Chronic    Physical debility [R53.81] 08/01/2019 Yes     Chronic    Controlled type 2 diabetes mellitus with stage 3 chronic kidney disease, with long-term current use of insulin [E11.22, N18.30, Z79.4] 07/11/2018 Not Applicable     Chronic    Essential hypertension [I10] 03/02/2014 Yes     Chronic      Problems Resolved During this Admission:    Diagnosis Date Noted Date Resolved POA    Syncope [R55] 01/26/2022 02/08/2022 Yes       Discharged Condition: stable    Disposition: Skilled Nursing Facility    Follow Up:   Follow-up Information     Viky Bean MD In 1 week.    Specialties: Family Medicine, Internal Medicine  Why: hospital follow up   Contact information:  [address]  [facility] [address]  [phone]             [facility] .    Specialties: Nursing Home Agency, SNF Agency, LTAC  Contact information:  [address] [place] [address]  [facility] [address] [phone]                       Patient Instructions:   No discharge procedures on file.    Significant Diagnostic Studies: Labs: All labs within the past 24 hours have been reviewed    Pending Diagnostic Studies:     None         Medications:  Reconciled Home Medications:      Medication List      START taking these medications    melatonin 3 mg tablet  Commonly known as: MELATIN  Take 3 tablets (9 mg total) by mouth nightly.     polyethylene glycol 17 gram Pwpk  Commonly known as: GLYCOLAX  Take 17 g by mouth once daily.        CHANGE how you take these medications    LEVEMIR FLEXTOUCH U-100 INSULN 100 unit/mL (3 mL) Inpn pen  Generic drug: insulin detemir U-100  Inject 26 Units into the skin every evening. HOLD UNTIL YOU SEE YOUR PCP  What changed: additional instructions        CONTINUE taking these medications    albuterol 90 mcg/actuation inhaler  Commonly known as: PROVENTIL/VENTOLIN HFA  INHALE 2 PUFFS INTO THE LUNGS EVERY 6 (SIX) HOURS AS NEEDED FOR WHEEZING. RESCUE     amLODIPine 10 MG tablet  Commonly known as: NORVASC  TAKE 1 TABLET BY MOUTH EVERY DAY     aspirin 81 MG EC tablet  Commonly known as: ECOTRIN  Take 81 mg by mouth nightly.     atorvastatin 20 MG tablet  Commonly known as: LIPITOR  TAKE 1 TABLET BY MOUTH EVERY DAY     blood sugar diagnostic Strp  Test 3 times daily     blood-glucose transmitter Lea  Commonly known as: DEXCOM G5 TRANSMITTER  Test glucose twice daily     carvediloL 12.5 MG tablet  Commonly known as: COREG  Take 1 tablet (12.5 mg total) by mouth 2 (two) times daily.     DEXCOM G6  Misc  Generic drug: blood-glucose meter,continuous  Test glucose twice daily     hydrALAZINE 50 MG tablet  Commonly known as: APRESOLINE  Take 1 tablet (50 mg total) by mouth every 8  "(eight) hours.     * lancets Misc  Test 3 times daily     * lancets Misc  To check BG 3 times daily, to use with insurance preferred meter     latanoprost 0.005 % ophthalmic solution  Place 1 drop into both eyes every evening.     oxybutynin 15 MG Tr24  Commonly known as: DITROPAN XL  TAKE 1 TABLET BY MOUTH EVERY DAY     pen needle, diabetic 32 gauge x 5/32" Ndle  Commonly known as: BD ULTRA-FINE BREE PEN NEEDLE  INJECT INSULIN THREE TIMES DAILY     timolol maleate 0.5% 0.5 % Drop  Commonly known as: TIMOPTIC  Place 1 drop into both eyes every morning.     TRADJENTA 5 mg Tab tablet  Generic drug: linaGLIPtin  TAKE 1 TABLET BY MOUTH EVERY DAY     traZODone 50 MG tablet  Commonly known as: DESYREL  TAKE 1 TABLET (50 MG TOTAL) BY MOUTH EVERY EVENING.         * This list has 2 medication(s) that are the same as other medications prescribed for you. Read the directions carefully, and ask your doctor or other care provider to review them with you.                Indwelling Lines/Drains at time of discharge:   Lines/Drains/Airways     None                 Time spent on the discharge of patient: > 35 minutes         The attending portion of this evaluation, treatment, and documentation was performed per Herve Chua MD via Telemedicine AudioVisual using the secure [employer] software platform with 2 way audio/video. The provider was located off-site and the patient is located in the hospital. The aforementioned video software was utilized to document the relevant history and physical exam    Herve Chua MD  Department of Hospital Medicine  [facility] [place] - Telemetry  "

## 2022-03-16 ENCOUNTER — TELEPHONE (OUTPATIENT)
Dept: FAMILY MEDICINE | Facility: CLINIC | Age: 82
End: 2022-03-16
Payer: MEDICARE

## 2022-03-16 NOTE — TELEPHONE ENCOUNTER
Patient sister states that the patient will need someone to stay with her over night. Advised that they will need to reach out PHN to see what services and/or resources she may qualify for. At the moment there are no d/c orders in place. Patient may be d/c on 03/14/2022.

## 2022-03-16 NOTE — TELEPHONE ENCOUNTER
----- Message from Sanjuana Lovett sent at 3/16/2022 12:39 PM CDT -----  Type: Patient Call Back    Who called: Sister - Nereida Mcmahon     What is the request in detail: patient is in  [facility] and will be discharged soon and she will need assistance at home. Her sister is trying to assist with get her help. Please call     Can the clinic reply by [facility]? No     Would the patient rather a call back or a response via [facility]?  Call     Best call back number:.[phone]

## 2022-04-24 ENCOUNTER — HOSPITAL ENCOUNTER (INPATIENT)
Facility: HOSPITAL | Age: 82
LOS: 4 days | Discharge: NURSING, REHAB OR LONG-TERM CARE | DRG: 682 | End: 2022-04-28
Attending: EMERGENCY MEDICINE | Admitting: STUDENT IN AN ORGANIZED HEALTH CARE EDUCATION/TRAINING PROGRAM
Payer: MEDICARE

## 2022-04-24 DIAGNOSIS — R00.1 BRADYCARDIA: ICD-10-CM

## 2022-04-24 DIAGNOSIS — T68.XXXA HYPOTHERMIA, INITIAL ENCOUNTER: ICD-10-CM

## 2022-04-24 DIAGNOSIS — G93.40 ENCEPHALOPATHY: Primary | ICD-10-CM

## 2022-04-24 DIAGNOSIS — R41.82 ALTERED MENTAL STATUS: ICD-10-CM

## 2022-04-24 DIAGNOSIS — E87.5 HYPERKALEMIA: ICD-10-CM

## 2022-04-24 PROBLEM — I49.9 ARRHYTHMIA: Status: ACTIVE | Noted: 2022-04-24

## 2022-04-24 LAB
ALBUMIN SERPL BCP-MCNC: 3 G/DL (ref 3.5–5.2)
ALBUMIN SERPL BCP-MCNC: 3.3 G/DL (ref 3.5–5.2)
ALP SERPL-CCNC: 107 U/L (ref 55–135)
ALP SERPL-CCNC: 89 U/L (ref 55–135)
ALT SERPL W/O P-5'-P-CCNC: 71 U/L (ref 10–44)
ALT SERPL W/O P-5'-P-CCNC: 75 U/L (ref 10–44)
AMPHET+METHAMPHET UR QL: NEGATIVE
ANION GAP SERPL CALC-SCNC: 4 MMOL/L (ref 8–16)
ANION GAP SERPL CALC-SCNC: 5 MMOL/L (ref 8–16)
ANION GAP SERPL CALC-SCNC: 8 MMOL/L (ref 8–16)
AST SERPL-CCNC: 57 U/L (ref 10–40)
AST SERPL-CCNC: 62 U/L (ref 10–40)
BARBITURATES UR QL SCN>200 NG/ML: NEGATIVE
BASOPHILS # BLD AUTO: 0.01 K/UL (ref 0–0.2)
BASOPHILS NFR BLD: 0.3 % (ref 0–1.9)
BENZODIAZ UR QL SCN>200 NG/ML: NEGATIVE
BILIRUB SERPL-MCNC: 0.3 MG/DL (ref 0.1–1)
BILIRUB SERPL-MCNC: 0.3 MG/DL (ref 0.1–1)
BILIRUB UR QL STRIP: NEGATIVE
BUN SERPL-MCNC: 32 MG/DL (ref 8–23)
BUN SERPL-MCNC: 33 MG/DL (ref 8–23)
BUN SERPL-MCNC: 34 MG/DL (ref 8–23)
BZE UR QL SCN: NEGATIVE
CALCIUM SERPL-MCNC: 9.4 MG/DL (ref 8.7–10.5)
CALCIUM SERPL-MCNC: 9.7 MG/DL (ref 8.7–10.5)
CALCIUM SERPL-MCNC: 9.8 MG/DL (ref 8.7–10.5)
CANNABINOIDS UR QL SCN: NEGATIVE
CHLORIDE SERPL-SCNC: 112 MMOL/L (ref 95–110)
CHLORIDE SERPL-SCNC: 113 MMOL/L (ref 95–110)
CHLORIDE SERPL-SCNC: 113 MMOL/L (ref 95–110)
CLARITY UR: CLEAR
CO2 SERPL-SCNC: 16 MMOL/L (ref 23–29)
CO2 SERPL-SCNC: 19 MMOL/L (ref 23–29)
CO2 SERPL-SCNC: 19 MMOL/L (ref 23–29)
COLOR UR: YELLOW
CREAT SERPL-MCNC: 1.8 MG/DL (ref 0.5–1.4)
CREAT SERPL-MCNC: 1.9 MG/DL (ref 0.5–1.4)
CREAT SERPL-MCNC: 2 MG/DL (ref 0.5–1.4)
CREAT UR-MCNC: 70 MG/DL (ref 15–325)
CTP QC/QA: YES
DIFFERENTIAL METHOD: ABNORMAL
EOSINOPHIL # BLD AUTO: 0.6 K/UL (ref 0–0.5)
EOSINOPHIL NFR BLD: 17.5 % (ref 0–8)
ERYTHROCYTE [DISTWIDTH] IN BLOOD BY AUTOMATED COUNT: 15.9 % (ref 11.5–14.5)
EST. GFR  (AFRICAN AMERICAN): 26 ML/MIN/1.73 M^2
EST. GFR  (AFRICAN AMERICAN): 28 ML/MIN/1.73 M^2
EST. GFR  (AFRICAN AMERICAN): 30 ML/MIN/1.73 M^2
EST. GFR  (NON AFRICAN AMERICAN): 23 ML/MIN/1.73 M^2
EST. GFR  (NON AFRICAN AMERICAN): 24 ML/MIN/1.73 M^2
EST. GFR  (NON AFRICAN AMERICAN): 26 ML/MIN/1.73 M^2
GLUCOSE SERPL-MCNC: 125 MG/DL (ref 70–110)
GLUCOSE SERPL-MCNC: 16 MG/DL (ref 70–110)
GLUCOSE SERPL-MCNC: 176 MG/DL (ref 70–110)
GLUCOSE UR QL STRIP: NEGATIVE
HCT VFR BLD AUTO: 34.4 % (ref 37–48.5)
HGB BLD-MCNC: 11 G/DL (ref 12–16)
HGB UR QL STRIP: NEGATIVE
IMM GRANULOCYTES # BLD AUTO: 0.02 K/UL (ref 0–0.04)
IMM GRANULOCYTES NFR BLD AUTO: 0.5 % (ref 0–0.5)
KETONES UR QL STRIP: NEGATIVE
LACTATE SERPL-SCNC: 0.4 MMOL/L (ref 0.5–2.2)
LEUKOCYTE ESTERASE UR QL STRIP: NEGATIVE
LYMPHOCYTES # BLD AUTO: 1 K/UL (ref 1–4.8)
LYMPHOCYTES NFR BLD: 28.1 % (ref 18–48)
MAGNESIUM SERPL-MCNC: 2.4 MG/DL (ref 1.6–2.6)
MCH RBC QN AUTO: 28.3 PG (ref 27–31)
MCHC RBC AUTO-ENTMCNC: 32 G/DL (ref 32–36)
MCV RBC AUTO: 88 FL (ref 82–98)
METHADONE UR QL SCN>300 NG/ML: NEGATIVE
MONOCYTES # BLD AUTO: 0.3 K/UL (ref 0.3–1)
MONOCYTES NFR BLD: 8.2 % (ref 4–15)
NEUTROPHILS # BLD AUTO: 1.7 K/UL (ref 1.8–7.7)
NEUTROPHILS NFR BLD: 45.4 % (ref 38–73)
NITRITE UR QL STRIP: NEGATIVE
NRBC BLD-RTO: 0 /100 WBC
OPIATES UR QL SCN: NEGATIVE
PCP UR QL SCN>25 NG/ML: NEGATIVE
PH UR STRIP: 5 [PH] (ref 5–8)
PLATELET # BLD AUTO: 130 K/UL (ref 150–450)
PMV BLD AUTO: 9.7 FL (ref 9.2–12.9)
POCT GLUCOSE: 114 MG/DL (ref 70–110)
POCT GLUCOSE: 144 MG/DL (ref 70–110)
POCT GLUCOSE: 238 MG/DL (ref 70–110)
POCT GLUCOSE: <20 MG/DL (ref 70–110)
POTASSIUM SERPL-SCNC: 4.5 MMOL/L (ref 3.5–5.1)
POTASSIUM SERPL-SCNC: 6.3 MMOL/L (ref 3.5–5.1)
POTASSIUM SERPL-SCNC: 6.6 MMOL/L (ref 3.5–5.1)
PROCALCITONIN SERPL IA-MCNC: 0.09 NG/ML
PROT SERPL-MCNC: 7.2 G/DL (ref 6–8.4)
PROT SERPL-MCNC: 7.9 G/DL (ref 6–8.4)
PROT UR QL STRIP: ABNORMAL
RBC # BLD AUTO: 3.89 M/UL (ref 4–5.4)
SARS-COV-2 RDRP RESP QL NAA+PROBE: NEGATIVE
SODIUM SERPL-SCNC: 133 MMOL/L (ref 136–145)
SODIUM SERPL-SCNC: 136 MMOL/L (ref 136–145)
SODIUM SERPL-SCNC: 140 MMOL/L (ref 136–145)
SP GR UR STRIP: 1.01 (ref 1–1.03)
TOXICOLOGY INFORMATION: NORMAL
TROPONIN I SERPL DL<=0.01 NG/ML-MCNC: <0.006 NG/ML (ref 0–0.03)
TSH SERPL DL<=0.005 MIU/L-ACNC: 1.41 UIU/ML (ref 0.4–4)
URN SPEC COLLECT METH UR: ABNORMAL
UROBILINOGEN UR STRIP-ACNC: NEGATIVE EU/DL
WBC # BLD AUTO: 3.66 K/UL (ref 3.9–12.7)

## 2022-04-24 PROCEDURE — 25000003 PHARM REV CODE 250: Performed by: EMERGENCY MEDICINE

## 2022-04-24 PROCEDURE — 99285 EMERGENCY DEPT VISIT HI MDM: CPT | Mod: 25

## 2022-04-24 PROCEDURE — 85025 COMPLETE CBC W/AUTO DIFF WBC: CPT | Performed by: EMERGENCY MEDICINE

## 2022-04-24 PROCEDURE — 80053 COMPREHEN METABOLIC PANEL: CPT | Mod: 91 | Performed by: INTERNAL MEDICINE

## 2022-04-24 PROCEDURE — 96365 THER/PROPH/DIAG IV INF INIT: CPT

## 2022-04-24 PROCEDURE — 80307 DRUG TEST PRSMV CHEM ANLYZR: CPT | Performed by: EMERGENCY MEDICINE

## 2022-04-24 PROCEDURE — 96367 TX/PROPH/DG ADDL SEQ IV INF: CPT

## 2022-04-24 PROCEDURE — U0002 COVID-19 LAB TEST NON-CDC: HCPCS | Performed by: EMERGENCY MEDICINE

## 2022-04-24 PROCEDURE — 63600175 PHARM REV CODE 636 W HCPCS: Performed by: INTERNAL MEDICINE

## 2022-04-24 PROCEDURE — 93010 ELECTROCARDIOGRAM REPORT: CPT | Mod: ,,, | Performed by: INTERNAL MEDICINE

## 2022-04-24 PROCEDURE — 93010 EKG 12-LEAD: ICD-10-PCS | Mod: ,,, | Performed by: INTERNAL MEDICINE

## 2022-04-24 PROCEDURE — 84443 ASSAY THYROID STIM HORMONE: CPT | Performed by: EMERGENCY MEDICINE

## 2022-04-24 PROCEDURE — 21400001 HC TELEMETRY ROOM

## 2022-04-24 PROCEDURE — 83735 ASSAY OF MAGNESIUM: CPT | Performed by: EMERGENCY MEDICINE

## 2022-04-24 PROCEDURE — 84484 ASSAY OF TROPONIN QUANT: CPT | Performed by: EMERGENCY MEDICINE

## 2022-04-24 PROCEDURE — 63600175 PHARM REV CODE 636 W HCPCS: Performed by: EMERGENCY MEDICINE

## 2022-04-24 PROCEDURE — 83605 ASSAY OF LACTIC ACID: CPT | Performed by: EMERGENCY MEDICINE

## 2022-04-24 PROCEDURE — 80053 COMPREHEN METABOLIC PANEL: CPT | Performed by: EMERGENCY MEDICINE

## 2022-04-24 PROCEDURE — 84145 PROCALCITONIN (PCT): CPT | Performed by: EMERGENCY MEDICINE

## 2022-04-24 PROCEDURE — 25000003 PHARM REV CODE 250: Performed by: NURSE PRACTITIONER

## 2022-04-24 PROCEDURE — 36415 COLL VENOUS BLD VENIPUNCTURE: CPT | Performed by: INTERNAL MEDICINE

## 2022-04-24 PROCEDURE — 93005 ELECTROCARDIOGRAM TRACING: CPT

## 2022-04-24 PROCEDURE — 25000003 PHARM REV CODE 250: Performed by: INTERNAL MEDICINE

## 2022-04-24 PROCEDURE — P9612 CATHETERIZE FOR URINE SPEC: HCPCS

## 2022-04-24 PROCEDURE — 87040 BLOOD CULTURE FOR BACTERIA: CPT | Performed by: EMERGENCY MEDICINE

## 2022-04-24 PROCEDURE — 82962 GLUCOSE BLOOD TEST: CPT

## 2022-04-24 PROCEDURE — 81003 URINALYSIS AUTO W/O SCOPE: CPT | Performed by: EMERGENCY MEDICINE

## 2022-04-24 PROCEDURE — 80048 BASIC METABOLIC PNL TOTAL CA: CPT | Mod: XB | Performed by: EMERGENCY MEDICINE

## 2022-04-24 RX ORDER — INDOMETHACIN 25 MG/1
50 CAPSULE ORAL
Status: COMPLETED | OUTPATIENT
Start: 2022-04-24 | End: 2022-04-24

## 2022-04-24 RX ORDER — HYDRALAZINE HYDROCHLORIDE 25 MG/1
25 TABLET, FILM COATED ORAL
Status: DISCONTINUED | OUTPATIENT
Start: 2022-04-24 | End: 2022-04-24

## 2022-04-24 RX ORDER — ACETAMINOPHEN 325 MG/1
650 TABLET ORAL EVERY 6 HOURS PRN
Status: DISCONTINUED | OUTPATIENT
Start: 2022-04-24 | End: 2022-04-28 | Stop reason: HOSPADM

## 2022-04-24 RX ORDER — GLUCAGON 1 MG
1 KIT INJECTION
Status: DISCONTINUED | OUTPATIENT
Start: 2022-04-24 | End: 2022-04-28 | Stop reason: HOSPADM

## 2022-04-24 RX ORDER — GLUCAGON 1 MG
1 KIT INJECTION
Status: DISCONTINUED | OUTPATIENT
Start: 2022-04-24 | End: 2022-04-24

## 2022-04-24 RX ORDER — ONDANSETRON 4 MG/1
4 TABLET, ORALLY DISINTEGRATING ORAL EVERY 6 HOURS PRN
Status: DISCONTINUED | OUTPATIENT
Start: 2022-04-24 | End: 2022-04-28 | Stop reason: HOSPADM

## 2022-04-24 RX ORDER — SODIUM CHLORIDE 0.9 % (FLUSH) 0.9 %
10 SYRINGE (ML) INJECTION
Status: DISCONTINUED | OUTPATIENT
Start: 2022-04-24 | End: 2022-04-28 | Stop reason: HOSPADM

## 2022-04-24 RX ORDER — TALC
6 POWDER (GRAM) TOPICAL NIGHTLY PRN
Status: DISCONTINUED | OUTPATIENT
Start: 2022-04-24 | End: 2022-04-28 | Stop reason: HOSPADM

## 2022-04-24 RX ORDER — SODIUM CHLORIDE 9 MG/ML
INJECTION, SOLUTION INTRAVENOUS CONTINUOUS
Status: DISCONTINUED | OUTPATIENT
Start: 2022-04-24 | End: 2022-04-25

## 2022-04-24 RX ORDER — INSULIN ASPART 100 [IU]/ML
0-5 INJECTION, SOLUTION INTRAVENOUS; SUBCUTANEOUS
Status: DISCONTINUED | OUTPATIENT
Start: 2022-04-24 | End: 2022-04-24

## 2022-04-24 RX ORDER — ASPIRIN 81 MG/1
81 TABLET ORAL NIGHTLY
Status: DISCONTINUED | OUTPATIENT
Start: 2022-04-24 | End: 2022-04-28 | Stop reason: HOSPADM

## 2022-04-24 RX ORDER — HYDRALAZINE HYDROCHLORIDE 20 MG/ML
20 INJECTION INTRAMUSCULAR; INTRAVENOUS
Status: DISCONTINUED | OUTPATIENT
Start: 2022-04-24 | End: 2022-04-24

## 2022-04-24 RX ORDER — INSULIN ASPART 100 [IU]/ML
0-5 INJECTION, SOLUTION INTRAVENOUS; SUBCUTANEOUS EVERY 6 HOURS PRN
Status: DISCONTINUED | OUTPATIENT
Start: 2022-04-24 | End: 2022-04-27

## 2022-04-24 RX ORDER — CARVEDILOL 12.5 MG/1
12.5 TABLET ORAL
Status: DISCONTINUED | OUTPATIENT
Start: 2022-04-24 | End: 2022-04-24

## 2022-04-24 RX ORDER — IBUPROFEN 200 MG
16 TABLET ORAL
Status: DISCONTINUED | OUTPATIENT
Start: 2022-04-24 | End: 2022-04-24

## 2022-04-24 RX ORDER — ATORVASTATIN CALCIUM 10 MG/1
20 TABLET, FILM COATED ORAL DAILY
Status: DISCONTINUED | OUTPATIENT
Start: 2022-04-25 | End: 2022-04-28 | Stop reason: HOSPADM

## 2022-04-24 RX ORDER — SODIUM CHLORIDE 450 MG/100ML
INJECTION, SOLUTION INTRAVENOUS CONTINUOUS
Status: DISCONTINUED | OUTPATIENT
Start: 2022-04-24 | End: 2022-04-24

## 2022-04-24 RX ORDER — IBUPROFEN 200 MG
24 TABLET ORAL
Status: DISCONTINUED | OUTPATIENT
Start: 2022-04-24 | End: 2022-04-24

## 2022-04-24 RX ORDER — AMLODIPINE BESYLATE 5 MG/1
10 TABLET ORAL
Status: DISCONTINUED | OUTPATIENT
Start: 2022-04-24 | End: 2022-04-24

## 2022-04-24 RX ORDER — CALCIUM GLUCONATE 20 MG/ML
1 INJECTION, SOLUTION INTRAVENOUS
Status: COMPLETED | OUTPATIENT
Start: 2022-04-24 | End: 2022-04-24

## 2022-04-24 RX ORDER — HALOPERIDOL 5 MG/ML
2.5 INJECTION INTRAMUSCULAR EVERY 6 HOURS PRN
Status: DISCONTINUED | OUTPATIENT
Start: 2022-04-25 | End: 2022-04-27

## 2022-04-24 RX ADMIN — CALCIUM GLUCONATE 1 G: 20 INJECTION, SOLUTION INTRAVENOUS at 05:04

## 2022-04-24 RX ADMIN — DEXTROSE 250 ML: 10 SOLUTION INTRAVENOUS at 10:04

## 2022-04-24 RX ADMIN — SODIUM CHLORIDE: 0.45 INJECTION, SOLUTION INTRAVENOUS at 06:04

## 2022-04-24 RX ADMIN — VANCOMYCIN HYDROCHLORIDE 1750 MG: 10 INJECTION, POWDER, LYOPHILIZED, FOR SOLUTION INTRAVENOUS at 03:04

## 2022-04-24 RX ADMIN — SODIUM ZIRCONIUM CYCLOSILICATE 10 G: 10 POWDER, FOR SUSPENSION ORAL at 05:04

## 2022-04-24 RX ADMIN — DEXTROSE MONOHYDRATE 25 G: 25 INJECTION, SOLUTION INTRAVENOUS at 05:04

## 2022-04-24 RX ADMIN — SODIUM BICARBONATE 50 MEQ: 84 INJECTION, SOLUTION INTRAVENOUS at 05:04

## 2022-04-24 RX ADMIN — PIPERACILLIN AND TAZOBACTAM 4.5 G: 4; .5 INJECTION, POWDER, LYOPHILIZED, FOR SOLUTION INTRAVENOUS; PARENTERAL at 03:04

## 2022-04-24 RX ADMIN — PIPERACILLIN AND TAZOBACTAM 4.5 G: 4; .5 INJECTION, POWDER, LYOPHILIZED, FOR SOLUTION INTRAVENOUS; PARENTERAL at 11:04

## 2022-04-24 RX ADMIN — SODIUM CHLORIDE: 0.9 INJECTION, SOLUTION INTRAVENOUS at 10:04

## 2022-04-24 RX ADMIN — INSULIN HUMAN 10 UNITS: 100 INJECTION, SOLUTION PARENTERAL at 05:04

## 2022-04-24 NOTE — ED PROVIDER NOTES
"Encounter Date: 4/24/2022       History     Chief Complaint   Patient presents with    Altered Mental Status     Pt sent over to ED from [place] due to "AMS" and slurred speech since last night. Pt is normally AAOX4 per EMS.      HPI   This 81-year-old black female presents to the emergency room with an altered mental status according to caretakers at her home.  The patient was up all night.  They report that she was agitated.  They report that her behavior this morning was not normal.  EMS was called to the scene.  No new neurologic deficits of actually been noted.  The patient reports that she feels fine.  She is oriented to hospital but not the month.   Review of patient's allergies indicates:   Allergen Reactions    Adhesive Rash     Paper tape     Past Medical History:   Diagnosis Date    Arthritis lower extremities    Asthma     Blind left eye     Diabetes mellitus     Edema of both lower legs     CHRONIC    Fall 02/08/2022    Gall stones     Hypertension     Kidney stones     Lives alone with help available     HOME HEALTH    Nephrolithiasis 1/22/2016    Obesity     PVD (peripheral vascular disease)     Renal disorder     Retinopathy     Sleep apnea     Vaginal delivery     x1    Weakness of both lower extremities     uses a cane, rollator & power chair     Past Surgical History:   Procedure Laterality Date    CARDIAC CATHETERIZATION      cataract      CHOLECYSTECTOMY      EYE SURGERY      Rt cataract surgery    HYSTERECTOMY      URETERAL STENT PLACEMENT       Family History   Problem Relation Age of Onset    Kidney failure Mother     Hypertension Father     Diabetes Brother     Cancer Sister      Social History     Tobacco Use    Smoking status: Never Smoker    Smokeless tobacco: Never Used   Substance Use Topics    Alcohol use: No    Drug use: Never     Review of Systems  The patient was questioned specifically with regard to the following.  General: Fever, chills, " sweats. Neuro: Headache. Eyes: eye problems. ENT: Ear pain, sore throat. Cardiovascular: Chest pain. Respiratory: Cough, shortness of breath. Gastrointestinal: Abdominal pain, vomiting, diarrhea. Genitourinary: Painful urination.  Musculoskeletal: Arm and leg problems. Skin: Rash.  The review of systems was negative except for the following:  Altered level of consciousness, up all night, agitated.  Physical Exam     Initial Vitals   BP Pulse Resp Temp SpO2   04/24/22 1142 04/24/22 1142 04/24/22 1142 04/24/22 1210 04/24/22 1142   (!) 168/71 (!) 46 20 (!) 88.9 °F (31.6 °C) 100 %      MAP       --                Physical Exam  The patient was examined specifically for the following:   General:No significant distress, Good color, Warm and dry. Head and neck:Scalp atraumatic, Neck supple. Neurological:Appropriate conversation, Gross motor deficits. Eyes:Conjugate gaze, Clear corneas. ENT: No epistaxis. Cardiac: Regular rate and rhythm, Grossly normal heart tones. Pulmonary: Wheezing, Rales. Gastrointestinal: Abdominal tenderness, Abdominal distention. Musculoskeletal: Extremity deformity, Apparent pain with range of motion of the joints. Skin: Rash.   The findings on examination were normal except for the following:  Patient has an elevated BMI.  She has opacification of the left cornea.  The patient has a symmetrical face.  She has normal speech.  She has good strength in all 4 extremities.  She has good sensation in all 4 extremities.  Extraocular movements are normal.  The face is symmetrical.  ED Course   Critical Care    Date/Time: 5/9/2022 3:53 PM  Performed by: Yosi Lerma MD  Authorized by: Yosi Bejarano MD   Direct patient critical care time: 22 minutes  Additional history critical care time: 11 minutes  Ordering / reviewing critical care time: 11 minutes  Documentation critical care time: 11 minutes  Consulting other physicians critical care time: 11 minutes  Total critical care time (exclusive of  procedural time) : 66 minutes  Critical care time was exclusive of separately billable procedures and treating other patients and teaching time.  Critical care was necessary to treat or prevent imminent or life-threatening deterioration of the following conditions: sepsis and metabolic crisis.  Critical care was time spent personally by me on the following activities: development of treatment plan with patient or surrogate, discussions with primary provider, examination of patient, ordering and performing treatments and interventions, re-evaluation of patient's condition, ordering and review of radiographic studies, pulse oximetry, ordering and review of laboratory studies, obtaining history from patient or surrogate, evaluation of patient's response to treatment and review of old charts.        Labs Reviewed   COMPREHENSIVE METABOLIC PANEL - Abnormal; Notable for the following components:       Result Value    Potassium 6.3 (*)     Chloride 112 (*)     CO2 19 (*)     Glucose 125 (*)     BUN 33 (*)     Creatinine 1.9 (*)     Albumin 3.3 (*)     AST 62 (*)     ALT 75 (*)     Anion Gap 5 (*)     eGFR if  28 (*)     eGFR if non  24 (*)     All other components within normal limits    Narrative:      Potassium  critical result(s) called and verbal readback obtained   from Karen Gama by JCT3 04/24/2022 12:33   CBC W/ AUTO DIFFERENTIAL - Abnormal; Notable for the following components:    WBC 3.66 (*)     RBC 3.89 (*)     Hemoglobin 11.0 (*)     Hematocrit 34.4 (*)     RDW 15.9 (*)     Platelets 130 (*)     Gran # (ANC) 1.7 (*)     Eos # 0.6 (*)     Eosinophil % 17.5 (*)     All other components within normal limits   URINALYSIS, REFLEX TO URINE CULTURE - Abnormal; Notable for the following components:    Protein, UA Trace (*)     All other components within normal limits    Narrative:     Specimen Source->Urine   LACTIC ACID, PLASMA - Abnormal; Notable for the following components:     Lactate (Lactic Acid) 0.4 (*)     All other components within normal limits   BASIC METABOLIC PANEL - Abnormal; Notable for the following components:    Sodium 133 (*)     Potassium 6.6 (*)     Chloride 113 (*)     CO2 16 (*)     Glucose 176 (*)     BUN 34 (*)     Creatinine 1.8 (*)     Anion Gap 4 (*)     eGFR if  30 (*)     eGFR if non  26 (*)     All other components within normal limits    Narrative:     Fresh blood draw     POTASSIUM critical result(s) called and verbal readback obtained   from YENNI CARROLL by 6 04/24/2022 16:37   POCT GLUCOSE - Abnormal; Notable for the following components:    POCT Glucose 114 (*)     All other components within normal limits   TROPONIN I   MAGNESIUM   TSH   DRUG SCREEN PANEL, URINE EMERGENCY    Narrative:     Specimen Source->Urine   PROCALCITONIN   SARS-COV-2 RDRP GENE     EKG Readings: (Independently Interpreted)   This patient has slow neuro complexes at a rate of 45.  There are low voltage QRS complexes.  There are no tall peaked T-waves.  There are no significant ST segment or T-wave changes.  There is no definite evidence of acute myocardial infarction or malignant arrhythmia.  This could be atrial fibrillation with slow ventricular response.  Sinus is also considered.     ECG Results          EKG 12-lead (Final result)  Result time 04/26/22 14:05:22    Final result by Interface, Lab [facility] (04/26/22 14:05:22)                 Narrative:    Test Reason : R41.82,    Vent. Rate : 045 BPM     Atrial Rate : 040 BPM     P-R Int : 000 ms          QRS Dur : 086 ms      QT Int : 494 ms       P-R-T Axes : 000 043 033 degrees     QTc Int : 427 ms    SB with 1st deg AVB  Low voltage QRS  Inferior infarct (cited on or before 25-JAN-2022)  Cannot rule out Anterior infarct ,age undetermined  Abnormal ECG  When compared with ECG of 09-FEB-2022 06:51,  No significant change was found    Confirmed by Pancho Loza MD ([id number]) on 4/26/2022  2:05:15 PM    Referred By: COLLEEN   SELF           Confirmed By:Pancho Loza MD                             EKG 12-LEAD (Final result)  Result time 04/25/22 11:40:13    Final result by Unknown User (04/25/22 11:40:13)                                Imaging Results          CT Head Without Contrast (Final result)  Result time 04/24/22 15:12:02    Final result by Antoine Colindres MD (04/24/22 15:12:02)                 Impression:      1. No acute intracranial process.  2. Involutional changes with chronic microvascular ischemic changes and small probable remote lacunar infarct of the right lateral basal ganglia/periventricular white matter.      Electronically signed by: Antoine Colindres  Date:    04/24/2022  Time:    15:12             Narrative:    EXAMINATION:  CT HEAD WITHOUT CONTRAST    CLINICAL HISTORY:  Mental status change, unknown cause; Altered mental status, unspecified    TECHNIQUE:  Low dose axial CT images obtained throughout the head without intravenous contrast. Sagittal and coronal reconstructions were performed.    COMPARISON:  02/08/2022    FINDINGS:  Intracranial compartment:    Ventricles and sulci are normal in size for age without evidence of hydrocephalus. No extra-axial blood or fluid collections.    Moderate involutional changes and chronic microvascular ischemic changes in the periventricular white matter.  Significant motion artifact which may diminish the sensitivity.    Remote lacunar infarct of the right lateral basal ganglia/periventricular white matter.  Senescent bilateral basal ganglia calcifications.    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    No parenchymal mass, hemorrhage, edema or major vascular distribution infarct.    Skull/extracranial contents (limited evaluation): No fracture. Mastoid air cells and paranasal sinuses are essentially clear.    No significant change.                               X-Ray Chest AP Portable (Final result)  Result time 04/24/22 12:17:01    Final result by  Jayjay Romero MD (04/24/22 12:17:01)                 Impression:      No radiographic acute intrathoracic process seen on this single view.      Electronically signed by: Jayjay Romero MD  Date:    04/24/2022  Time:    12:17             Narrative:    EXAMINATION:  XR CHEST AP PORTABLE    CLINICAL HISTORY:  chest pain;    TECHNIQUE:  Single frontal view of the chest was performed.    COMPARISON:  Chest radiograph 02/08/2022, CT renal stone study 01/02/2016    FINDINGS:  Monitoring leads overlie the chest.  Patient is slightly rotated.  Resolution is somewhat limited by body habitus with underpenetration.    Cardiomediastinal silhouette is midline and stable without evidence of failure.  Bibasilar few scattered linear opacities consistent with minimal platelike scarring versus atelectasis.  The lungs are otherwise well expanded without large consolidation, pleural effusion or pneumothorax.  No acute osseous process seen.  PA and lateral views can be obtained.                              Medical decision making:  Given the above this patient presents to the emergency room with reports of altered mental status.  The patient tells me that she feels fine.  She is oriented to the hospital.  She does not know the month of the year.  There are no focal neurologic deficits.  Laboratory evaluation reveals a potassium 6.6.  The patient will be admitted for hyperkalemia.  Oddly the patient has a temperature of 88.6.  Reason for this is not clear.  Her procalcitonin is negative I find no focus of infection.  Medicine orders have been placed for a potassium shift.  Consultation was obtained with hospital medicine.  The patient will be admitted to telemetry.  The patient is bradycardic with a narrow complex rhythm without obvious P-waves.       Medications   vancomycin (VANCOCIN) 1,750 mg in dextrose 5 % 500 mL IVPB (0 mg/kg × 90.7 kg Intravenous Stopped 4/24/22 7237)   piperacillin-tazobactam 4.5 g in dextrose 5 % 100 mL IVPB  (ready to mix system) (0 g Intravenous Stopped 4/24/22 1642)   calcium gluconat 1 g in NS IVPB (premixed) (0 g Intravenous Stopped 4/24/22 1816)   insulin regular injection 10 Units (10 Units Intravenous Given 4/24/22 1721)   sodium zirconium cyclosilicate packet 10 g (10 g Oral Given 4/24/22 1716)   sodium bicarbonate solution 50 mEq (50 mEq Intravenous Given 4/24/22 1716)   vancomycin 500 mg in dextrose 5 % 100 mL IVPB (ready to mix system) (0 mg Intravenous Stopped 4/25/22 1144)   cosyntropin injection 0.25 mg (0.25 mg Intravenous Given 4/27/22 0821)   polyethylene glycol packet 17 g (17 g Oral Given 4/28/22 1413)                          Clinical Impression:   Final diagnoses:  [R41.82] Altered mental status  [R00.1] Bradycardia  [T68.XXXA] Hypothermia, initial encounter  [E87.5] Hyperkalemia          ED Disposition Condition    Admit               Yosi Lerma MD  04/24/22 2072       Yosi Lerma MD  05/09/22 [id number]

## 2022-04-24 NOTE — ED TRIAGE NOTES
Patient arrived to the ER via EMS transport from nursing home w CC: altered mental status. Once placing patient in the ER room patient is rousable, answering simple questions appropriately. She is cool to the touch, skin intact as we turned her for rectal temp. In NAD.

## 2022-04-24 NOTE — ED NOTES
Patient arrived in diaper SOAKED in urine, rectal temp 88.9 degress rectal. Cathed her for UA sample, pure-wic placed as well as bear hugger warming device.

## 2022-04-25 LAB
AMMONIA PLAS-SCNC: 29 UMOL/L (ref 10–50)
ANION GAP SERPL CALC-SCNC: 10 MMOL/L (ref 8–16)
BASOPHILS # BLD AUTO: 0.01 K/UL (ref 0–0.2)
BASOPHILS NFR BLD: 0.2 % (ref 0–1.9)
BUN SERPL-MCNC: 31 MG/DL (ref 8–23)
CALCIUM SERPL-MCNC: 9.4 MG/DL (ref 8.7–10.5)
CHLORIDE SERPL-SCNC: 110 MMOL/L (ref 95–110)
CO2 SERPL-SCNC: 17 MMOL/L (ref 23–29)
CREAT SERPL-MCNC: 2.1 MG/DL (ref 0.5–1.4)
DIFFERENTIAL METHOD: ABNORMAL
EOSINOPHIL # BLD AUTO: 0.5 K/UL (ref 0–0.5)
EOSINOPHIL NFR BLD: 12.8 % (ref 0–8)
ERYTHROCYTE [DISTWIDTH] IN BLOOD BY AUTOMATED COUNT: 15.7 % (ref 11.5–14.5)
EST. GFR  (AFRICAN AMERICAN): 25 ML/MIN/1.73 M^2
EST. GFR  (NON AFRICAN AMERICAN): 21 ML/MIN/1.73 M^2
ESTIMATED AVG GLUCOSE: 143 MG/DL (ref 68–131)
FOLATE SERPL-MCNC: 4.6 NG/ML (ref 4–24)
GLUCOSE SERPL-MCNC: 83 MG/DL (ref 70–110)
HBA1C MFR BLD: 6.6 % (ref 4–5.6)
HCT VFR BLD AUTO: 31.2 % (ref 37–48.5)
HGB BLD-MCNC: 10.2 G/DL (ref 12–16)
IMM GRANULOCYTES # BLD AUTO: 0.03 K/UL (ref 0–0.04)
IMM GRANULOCYTES NFR BLD AUTO: 0.7 % (ref 0–0.5)
IRON SERPL-MCNC: 123 UG/DL (ref 30–160)
LYMPHOCYTES # BLD AUTO: 0.6 K/UL (ref 1–4.8)
LYMPHOCYTES NFR BLD: 15 % (ref 18–48)
MCH RBC QN AUTO: 28.2 PG (ref 27–31)
MCHC RBC AUTO-ENTMCNC: 32.7 G/DL (ref 32–36)
MCV RBC AUTO: 86 FL (ref 82–98)
MONOCYTES # BLD AUTO: 0.3 K/UL (ref 0.3–1)
MONOCYTES NFR BLD: 7.4 % (ref 4–15)
NEUTROPHILS # BLD AUTO: 2.7 K/UL (ref 1.8–7.7)
NEUTROPHILS NFR BLD: 63.9 % (ref 38–73)
NRBC BLD-RTO: 1 /100 WBC
PLATELET # BLD AUTO: 124 K/UL (ref 150–450)
PMV BLD AUTO: 10.2 FL (ref 9.2–12.9)
POCT GLUCOSE: 123 MG/DL (ref 70–110)
POCT GLUCOSE: 146 MG/DL (ref 70–110)
POCT GLUCOSE: 157 MG/DL (ref 70–110)
POCT GLUCOSE: 192 MG/DL (ref 70–110)
POCT GLUCOSE: 30 MG/DL (ref 70–110)
POCT GLUCOSE: 55 MG/DL (ref 70–110)
POCT GLUCOSE: 72 MG/DL (ref 70–110)
POCT GLUCOSE: 83 MG/DL (ref 70–110)
POCT GLUCOSE: 86 MG/DL (ref 70–110)
POCT GLUCOSE: 94 MG/DL (ref 70–110)
POTASSIUM SERPL-SCNC: 5.3 MMOL/L (ref 3.5–5.1)
RBC # BLD AUTO: 3.62 M/UL (ref 4–5.4)
SATURATED IRON: 53 % (ref 20–50)
SODIUM SERPL-SCNC: 137 MMOL/L (ref 136–145)
TOTAL IRON BINDING CAPACITY: 234 UG/DL (ref 250–450)
TRANSFERRIN SERPL-MCNC: 158 MG/DL (ref 200–375)
VANCOMYCIN SERPL-MCNC: 14.4 UG/ML
VIT B12 SERPL-MCNC: 657 PG/ML (ref 210–950)
WBC # BLD AUTO: 4.21 K/UL (ref 3.9–12.7)

## 2022-04-25 PROCEDURE — 63600175 PHARM REV CODE 636 W HCPCS: Performed by: INTERNAL MEDICINE

## 2022-04-25 PROCEDURE — 93010 ELECTROCARDIOGRAM REPORT: CPT | Mod: ,,, | Performed by: INTERNAL MEDICINE

## 2022-04-25 PROCEDURE — 93005 ELECTROCARDIOGRAM TRACING: CPT

## 2022-04-25 PROCEDURE — 25000003 PHARM REV CODE 250: Performed by: INTERNAL MEDICINE

## 2022-04-25 PROCEDURE — 99223 PR INITIAL HOSPITAL CARE,LEVL III: ICD-10-PCS | Mod: 25,,, | Performed by: INTERNAL MEDICINE

## 2022-04-25 PROCEDURE — 36415 COLL VENOUS BLD VENIPUNCTURE: CPT | Performed by: STUDENT IN AN ORGANIZED HEALTH CARE EDUCATION/TRAINING PROGRAM

## 2022-04-25 PROCEDURE — 83036 HEMOGLOBIN GLYCOSYLATED A1C: CPT | Performed by: INTERNAL MEDICINE

## 2022-04-25 PROCEDURE — 84466 ASSAY OF TRANSFERRIN: CPT | Performed by: INTERNAL MEDICINE

## 2022-04-25 PROCEDURE — 93010 EKG 12-LEAD: ICD-10-PCS | Mod: ,,, | Performed by: INTERNAL MEDICINE

## 2022-04-25 PROCEDURE — 25000003 PHARM REV CODE 250: Performed by: STUDENT IN AN ORGANIZED HEALTH CARE EDUCATION/TRAINING PROGRAM

## 2022-04-25 PROCEDURE — 92610 EVALUATE SWALLOWING FUNCTION: CPT

## 2022-04-25 PROCEDURE — 63600175 PHARM REV CODE 636 W HCPCS: Performed by: STUDENT IN AN ORGANIZED HEALTH CARE EDUCATION/TRAINING PROGRAM

## 2022-04-25 PROCEDURE — 85025 COMPLETE CBC W/AUTO DIFF WBC: CPT | Performed by: NURSE PRACTITIONER

## 2022-04-25 PROCEDURE — 82607 VITAMIN B-12: CPT | Performed by: STUDENT IN AN ORGANIZED HEALTH CARE EDUCATION/TRAINING PROGRAM

## 2022-04-25 PROCEDURE — 80202 ASSAY OF VANCOMYCIN: CPT | Performed by: STUDENT IN AN ORGANIZED HEALTH CARE EDUCATION/TRAINING PROGRAM

## 2022-04-25 PROCEDURE — 93010 ELECTROCARDIOGRAM REPORT: CPT | Mod: 76,,, | Performed by: INTERNAL MEDICINE

## 2022-04-25 PROCEDURE — 80048 BASIC METABOLIC PNL TOTAL CA: CPT | Performed by: INTERNAL MEDICINE

## 2022-04-25 PROCEDURE — 21400001 HC TELEMETRY ROOM

## 2022-04-25 PROCEDURE — 82746 ASSAY OF FOLIC ACID SERUM: CPT | Performed by: STUDENT IN AN ORGANIZED HEALTH CARE EDUCATION/TRAINING PROGRAM

## 2022-04-25 PROCEDURE — 82140 ASSAY OF AMMONIA: CPT | Performed by: STUDENT IN AN ORGANIZED HEALTH CARE EDUCATION/TRAINING PROGRAM

## 2022-04-25 PROCEDURE — 99223 1ST HOSP IP/OBS HIGH 75: CPT | Mod: 25,,, | Performed by: INTERNAL MEDICINE

## 2022-04-25 PROCEDURE — 86592 SYPHILIS TEST NON-TREP QUAL: CPT | Performed by: STUDENT IN AN ORGANIZED HEALTH CARE EDUCATION/TRAINING PROGRAM

## 2022-04-25 RX ADMIN — PIPERACILLIN AND TAZOBACTAM 4.5 G: 4; .5 INJECTION, POWDER, LYOPHILIZED, FOR SOLUTION INTRAVENOUS; PARENTERAL at 10:04

## 2022-04-25 RX ADMIN — PIPERACILLIN AND TAZOBACTAM 4.5 G: 4; .5 INJECTION, POWDER, LYOPHILIZED, FOR SOLUTION INTRAVENOUS; PARENTERAL at 02:04

## 2022-04-25 RX ADMIN — DEXTROSE 125 ML: 10 SOLUTION INTRAVENOUS at 12:04

## 2022-04-25 RX ADMIN — SODIUM BICARBONATE: 84 INJECTION, SOLUTION INTRAVENOUS at 10:04

## 2022-04-25 RX ADMIN — VANCOMYCIN HYDROCHLORIDE 500 MG: 500 INJECTION, POWDER, LYOPHILIZED, FOR SOLUTION INTRAVENOUS at 10:04

## 2022-04-25 RX ADMIN — HALOPERIDOL LACTATE 2.5 MG: 5 INJECTION, SOLUTION INTRAMUSCULAR at 10:04

## 2022-04-25 RX ADMIN — PIPERACILLIN AND TAZOBACTAM 4.5 G: 4; .5 INJECTION, POWDER, LYOPHILIZED, FOR SOLUTION INTRAVENOUS; PARENTERAL at 08:04

## 2022-04-25 RX ADMIN — DEXTROSE 250 ML: 10 SOLUTION INTRAVENOUS at 07:04

## 2022-04-25 RX ADMIN — SODIUM BICARBONATE: 84 INJECTION, SOLUTION INTRAVENOUS at 08:04

## 2022-04-25 RX ADMIN — HALOPERIDOL LACTATE 2.5 MG: 5 INJECTION, SOLUTION INTRAMUSCULAR at 04:04

## 2022-04-25 NOTE — PLAN OF CARE
[facility] - [place] - Telemetry  Initial Discharge Assessment       Primary Care Provider: Viky Bean MD    Admission Diagnosis: Hyperkalemia [E87.5]  Bradycardia [R00.1]  Altered mental status [R41.82]  Hypothermia, initial encounter [T68.XXXA]    Admission Date: 4/24/2022  Expected Discharge Date:     Discharge Barriers Identified: None    Payor: [employer]ICARE / Plan: [email or web address]URE HEALTH / Product Type: Medicare Advantage /     Extended Emergency Contact Information  Primary Emergency Contact: Poonam Doshi   [facility]  Home Phone: [phone]  Mobile Phone: [phone]  Relation: Friend  Secondary Emergency Contact: Prince Elder   [facility]  Home Phone: [phone]  Mobile Phone: [phone]  Relation: Brother    Discharge Plan A: Return to nursing home  Discharge Plan B: Return to Nursing Home      [facility]/pharmacy #[id number] - [place] - [address]  [address] [address]  Phone: [phone] Fax: [phone]    [facility] - [place], [place] - [address] AT Portal to Registered [facility] Sites  [address]  [facility] [address]  Phone: [phone] Fax: [phone]    [facility]/pharmacy #[id number] - [place], [place] - [address]  [address] [address]  Phone: [phone] Fax: [phone]      Initial Assessment (most recent)       Adult Discharge Assessment - 04/25/22 1424          Discharge Assessment    Assessment Type Discharge Planning Assessment     Confirmed/corrected address, phone number and insurance No   [place]    Reason Other (see comment)   Patient is currently prison placement at [place].    Source of Information family     If unable to respond/provide information was family/caregiver contacted? Yes     Contact Name/Number Prince Elder (Brother) [phone]     When was your last doctors appointment? --   unknown at this time    Communicated FREDERICK  with patient/caregiver Date not available/Unable to determine     Reason For Admission Hyperkalemia     Lives With sibling(s)   Prince Elder (Brother) [phone]    Facility Arrived From: [facility]     Do you expect to return to your current living situation? Yes     Do you have help at home or someone to help you manage your care at home? Yes     Who are your caregiver(s) and their phone number(s)? [facility]     Equipment Currently Used at Home wheelchair     Readmission within 30 days? No     Patient currently being followed by outpatient case management? No     Do you currently have service(s) that help you manage your care at home? No     Do you take prescription medications? Yes     Do you have prescription coverage? Yes     Coverage [employer]     Do you have any problems affording any of your prescribed medications? No     Is the patient taking medications as prescribed? yes     Who is going to help you get home at discharge? [facility]     How do you get to doctors appointments? other (see comments)   [facility]    Are you on dialysis? No     Do you take coumadin? No     Discharge Plan A Return to nursing home     Discharge Plan B Return to Nursing Home     DME Needed Upon Discharge  other (see comments)   TBD    Discharge Plan discussed with: Sibling     Name(s) and Number(s) Prince Elder (Brother) [phone]     Discharge Barriers Identified None        Relationship/Environment    Name(s) of Who Lives With Patient Prince Elder (Brother) [phone]                      VINNY spoke with patient's brother via phone who informed VINNY that patient is currently retirement at [facility].     VINNY spoke with Liz ([facility]) who confirmed patient is retirement at nursing home and is able to return at discharge.     VINNY will continue to follow up with patient's plan of care.

## 2022-04-25 NOTE — PROGRESS NOTES
Pharmacokinetic Initial Assessment: IV Vancomycin    Assessment/Plan:    Initiate intravenous vancomycin with loading dose of 1750 mg once with subsequent doses when random concentrations are less than 20 mcg/mL  Desired empiric serum trough concentration is 10 to 20 mcg/mL  Draw vancomycin random level on 4/25/22 at 04:00.  Pharmacy will continue to follow and monitor vancomycin.      Please contact pharmacy at extension [phone] with any questions regarding this assessment.     Thank you for the consult,   Elizabeth Dozier       Patient brief summary:  Ana James is a 81 y.o. female initiated on antimicrobial therapy with IV Vancomycin for treatment of suspected bacteremia    Drug Allergies:   Review of patient's allergies indicates:   Allergen Reactions    Adhesive Rash     Paper tape       Actual Body Weight:   90.7 kg    Renal Function:   Estimated Creatinine Clearance: 24.8 mL/min (A) (based on SCr of 1.8 mg/dL (H)).,     Dialysis Method (if applicable):  N/A    CBC (last 72 hours):  Recent Labs   Lab Result Units 04/24/22  1159   WBC K/uL 3.66*   Hemoglobin g/dL 11.0*   Hematocrit % 34.4*   Platelets K/uL 130*   Gran % % 45.4   Lymph % % 28.1   Mono % % 8.2   Eosinophil % % 17.5*   Basophil % % 0.3   Differential Method  Automated       Metabolic Panel (last 72 hours):  Recent Labs   Lab Result Units 04/24/22  1159 04/24/22  1210 04/24/22  1603   Sodium mmol/L 136  --  133*   Potassium mmol/L 6.3*  --  6.6*   Chloride mmol/L 112*  --  113*   CO2 mmol/L 19*  --  16*   Glucose mg/dL 125*  --  176*   Glucose, UA   --  Negative  --    BUN mg/dL 33*  --  34*   Creatinine mg/dL 1.9*  --  1.8*   Creatinine, Urine mg/dL  --  70.0  --    Albumin g/dL 3.3*  --   --    Total Bilirubin mg/dL 0.3  --   --    Alkaline Phosphatase U/L 107  --   --    AST U/L 62*  --   --    ALT U/L 75*  --   --    Magnesium mg/dL 2.4  --   --        Drug levels (last 3 results):  No results for input(s): VANCOMYCINRA, VANCOMYCINPE,  VANCOMYCINTR in the last 72 hours.    Microbiologic Results:  Microbiology Results (last 7 days)       Procedure Component Value Units Date/Time    Stool culture [[id number]]     Order Status: No result Specimen: Stool     Blood Culture #2 **CANNOT BE ORDERED STAT** [[id number]] Collected: 04/24/22 1303    Order Status: Completed Specimen: Blood from Peripheral, Hand, Left Updated: 04/24/22 2112     Blood Culture, Routine No Growth to date    Blood Culture #1 **CANNOT BE ORDERED STAT** [[id number]] Collected: 04/24/22 1242    Order Status: Completed Specimen: Blood from Peripheral, Hand, Left Updated: 04/24/22 1912     Blood Culture, Routine No Growth to date

## 2022-04-25 NOTE — ASSESSMENT & PLAN NOTE
- ECG showing likely Afib with slow VR response on admit. No history of arrythmias. Could be due to hyperK  - Correct potassium  - Telemetry  - S/p CaGluc.  - Hold on AC for now  - Currently in NSR on exam  - Will consult cardiology

## 2022-04-25 NOTE — ASSESSMENT & PLAN NOTE
Patient with acute kidney injury likely d/t IVVD/Dehydration and Pre-renal azotemia Which is currently stable. Labs reviewed- Renal function/electrolytes with Estimated Creatinine Clearance: 24.8 mL/min (A) (based on SCr of 1.8 mg/dL (H)). according to latest data. Monitor urine output and serial BMP and adjust therapy as needed. Avoid nephrotoxins and renally dose meds for GFR listed above.

## 2022-04-25 NOTE — ASSESSMENT & PLAN NOTE
- CTH negative for acute pathology  - 2/2 to hyperkalemia (although uncommon) vs sepsis vs vascular dementia? given CTH findings  - Treat AUTUMN and hyper K as above  - Vanc/Zosyn for possible infection  - U/A normal  - BCx x 2 pending  - Stool cultures to be obtained if possible given hx of incontinence which is new

## 2022-04-25 NOTE — NURSING
Report received from off going nurse, ALEXANDRA Weeks. Patient AA and nonverbal at this time. No signs of distress noted. Call light in reach. Bed low and locked. Will continue plan of care.

## 2022-04-25 NOTE — PROGRESS NOTES
[facility] Medicine  Progress Note    Patient Name: Ana James  MRN: [id number]  Patient Class: IP- Inpatient   Admission Date: 4/24/2022  Length of Stay: 1 days  Attending Physician: Agusto Escobedo DO  Primary Care Provider: Viky Bean MD        Subjective:     Principal Problem:Hyperkalemia        HPI:  82 yo female with a PMHx of HTN, HLD, DM2 (A1c 6.8% in Jan) who presented from [facility] for AMS found to have hyperkalemic.    History was obtained via ED, EMS, and chart review, plus family. Patient answers yes and no to simple questions currently. Apparently, she was in her normal state of health until the last 24 hours becoming agitated and altered. Per family and NH, this is unusual for her. No other noticeable changes per staff. She did arrive with urinary and stool incontinence. In the ED, labs showing hyperkalemia of 6.6 with sCr 1.9 from baselin of 1.3. Also notable for mild leukopenia with eosinophilia and vitals signs of hypothermia to 88F.    ECG showing likely AF with slow VR (QRS is irregular making junctional unlikely). No known history. Given insulin and bicarb for K shift plus CaGluc for ECG changes. Also received Vanc/Zosyn for concern of infection. CTH negative.       Overview/Hospital Course:  82 yo female admitted from [facility] for altered mental status. Patient was found to be hyperkalemic and hypothermic on admission. Required warming blanket with improvement. Hyperkalemia improved with shifting in ED but patient became hypoglycemic.     CT head with no acute process. CXR with no acute intrathoracic process. UA does not appear grossly infected.  Blood culture with no growth to date.  Cortisol a.m. pending.  Contacted the nursing home and per nursing home, patient is alert and oriented at baseline.   No obvious evidence of infection or etiology of hypothermia at this time. Continue IV fluids and blood glucose monitoring. Of note, patient was  recently hospitalized from 02/08-02/14 for hypothermia as well.       Interval History: Patient continues to be altered this AM. Unable to converse. Ordered for POC glucose and found to be <20. Orders given. Patient started on bicarb and dextrose gtt. No family at bedside.     Review of Systems   Unable to perform ROS: Mental status change   Objective:     Vital Signs (Most Recent):  Temp: 99.2 °F (37.3 °C) (04/25/22 1112)  Pulse: 73 (04/25/22 1112)  Resp: 18 (04/25/22 1112)  BP: (!) 123/55 (04/25/22 1112)  SpO2: 95 % (04/25/22 1112)   Vital Signs (24h Range):  Temp:  [88.9 °F (31.6 °C)-99.2 °F (37.3 °C)] 99.2 °F (37.3 °C)  Pulse:  [46-73] 73  Resp:  [14-26] 18  SpO2:  [95 %-100 %] 95 %  BP: (123-169)/(52-74) 123/55     Weight: 90.7 kg (200 lb)  Body mass index is 36.58 kg/m².    Intake/Output Summary (Last 24 hours) at 4/25/2022 1137  Last data filed at 4/25/2022 0600  Gross per 24 hour   Intake 350 ml   Output 200 ml   Net 150 ml      Physical Exam  Vitals and nursing note reviewed.   Constitutional:       General: She is not in acute distress.     Appearance: She is obese. She is not toxic-appearing.   HENT:      Head: Normocephalic and atraumatic.      Mouth/Throat:      Mouth: Mucous membranes are moist.   Eyes:      Comments: Left eye with cataract   Cardiovascular:      Rate and Rhythm: Normal rate and regular rhythm.      Pulses: Normal pulses.      Heart sounds: No murmur heard.  Pulmonary:      Effort: Pulmonary effort is normal.      Breath sounds: Normal breath sounds. No rales.   Abdominal:      General: Bowel sounds are normal. There is no distension.      Palpations: Abdomen is soft.      Tenderness: There is no abdominal tenderness.   Musculoskeletal:      Right lower leg: No edema.      Left lower leg: No edema.   Skin:     General: Skin is warm and dry.      Comments: Warm to the touch. Bilateral lower extremities with hyperpigmentation, consistent with venous stasis.    Neurological:      Mental  Status: She is disoriented.       Significant Labs: All pertinent labs within the past 24 hours have been reviewed.    Significant Imaging: I have reviewed all pertinent imaging results/findings within the past 24 hours.      Assessment/Plan:      * Hyperkalemia  - K of 6.6 on admit, likely due to AUTUMN; ECG with AF which could be possible sequela  - S/p temporizing agents  - Continue IVF  - S/p sodium zirconium  - Repeat BMP with K 5.3  - Pt not safe to swallow at this time, SLP consulted.       Altered mental status  - CTH negative for acute pathology  - 2/2 to hyperkalemia (although uncommon) vs sepsis vs vascular dementia? given CTH findings  - Treat AUTUMN and hyper K as above  - Vanc/Zosyn for possible infection  - U/A normal  - BCx x 2 pending  - Stool cultures to be obtained if possible given hx of incontinence which is new      AUTUMN (acute kidney injury)  Patient with acute kidney injury likely d/t IVVD/Dehydration and Pre-renal azotemia Which is currently stable. Labs reviewed- Renal function/electrolytes with Estimated Creatinine Clearance: 21.2 mL/min (A) (based on SCr of 2.1 mg/dL (H)). according to latest data.     Monitor urine output and serial BMP and adjust therapy as needed.   Avoid nephrotoxins and renally dose meds for GFR listed above.   Continue IVF      Essential hypertension  - Holding coreg and norvasc given possible infection  - Will restart if needed      Arrhythmia  - ECG showing likely Afib with slow VR response on admit. No history of arrythmias. Could be due to hyperK  - Correct potassium  - Telemetry  - S/p CaGluc.  - Hold on AC for now  - Currently in NSR on exam  - Will consult cardiology       Hyperlipidemia  - Lipitor daily once able to take PO        VTE Risk Mitigation (From admission, onward)    None          Discharge Planning   FREDERICK:      Code Status: Full Code   Is the patient medically ready for discharge?:     Reason for patient still in hospital (select all that apply): Patient  trending condition, Treatment and Consult recommendations                     Agusto Escobedo DO  Department of Hospital Medicine   [facility] - [place] - Telemetry

## 2022-04-25 NOTE — SUBJECTIVE & OBJECTIVE
Past Medical History:   Diagnosis Date    Arthritis lower extremities    Asthma     Blind left eye     Diabetes mellitus     Edema of both lower legs     CHRONIC    Fall 02/08/2022    Gall stones     Hypertension     Kidney stones     Lives alone with help available     HOME HEALTH    Nephrolithiasis 1/22/2016    Obesity     PVD (peripheral vascular disease)     Renal disorder     Retinopathy     Sleep apnea     Vaginal delivery     x1    Weakness of both lower extremities     uses a cane, rollator & power chair       Past Surgical History:   Procedure Laterality Date    CARDIAC CATHETERIZATION      cataract      CHOLECYSTECTOMY      EYE SURGERY      Rt cataract surgery    HYSTERECTOMY      URETERAL STENT PLACEMENT         Review of patient's allergies indicates:   Allergen Reactions    Adhesive Rash     Paper tape       No current facility-administered medications on file prior to encounter.     Current Outpatient Medications on File Prior to Encounter   Medication Sig    albuterol (PROVENTIL/VENTOLIN HFA) 90 mcg/actuation inhaler INHALE 2 PUFFS INTO THE LUNGS EVERY 6 (SIX) HOURS AS NEEDED FOR WHEEZING. RESCUE    amLODIPine (NORVASC) 10 MG tablet TAKE 1 TABLET BY MOUTH EVERY DAY    aspirin (ECOTRIN) 81 MG EC tablet Take 81 mg by mouth nightly.    atorvastatin (LIPITOR) 20 MG tablet TAKE 1 TABLET BY MOUTH EVERY DAY    blood sugar diagnostic Strp Test 3 times daily    blood-glucose meter,continuous (DEXCOM G6 ) Misc Test glucose twice daily    blood-glucose transmitter (DEXCOM G5 TRANSMITTER) Lea Test glucose twice daily    carvediloL (COREG) 12.5 MG tablet Take 1 tablet (12.5 mg total) by mouth 2 (two) times daily.    hydrALAZINE (APRESOLINE) 50 MG tablet Take 1 tablet (50 mg total) by mouth every 8 (eight) hours.    insulin detemir U-100 (LEVEMIR FLEXTOUCH U-100 INSULN) 100 unit/mL (3 mL) InPn pen Inject 26 Units into the skin every evening. HOLD UNTIL YOU SEE YOUR PCP    lancets Misc Test 3 times daily     "lancets Misc To check BG 3 times daily, to use with insurance preferred meter    latanoprost 0.005 % ophthalmic solution Place 1 drop into both eyes every evening.    melatonin (MELATIN) 3 mg tablet Take 3 tablets (9 mg total) by mouth nightly.    oxybutynin (DITROPAN XL) 15 MG TR24 TAKE 1 TABLET BY MOUTH EVERY DAY    pen needle, diabetic (BD ULTRA-FINE BREE PEN NEEDLE) 32 gauge x 5/32" Ndle INJECT INSULIN THREE TIMES DAILY    polyethylene glycol (GLYCOLAX) 17 gram PwPk Take 17 g by mouth once daily.    timolol maleate 0.5% (TIMOPTIC) 0.5 % Drop Place 1 drop into both eyes every morning.    TRADJENTA 5 mg Tab tablet TAKE 1 TABLET BY MOUTH EVERY DAY    traZODone (DESYREL) 50 MG tablet TAKE 1 TABLET (50 MG TOTAL) BY MOUTH EVERY EVENING.     Family History       Problem Relation (Age of Onset)    Cancer Sister    Diabetes Brother    Hypertension Father    Kidney failure Mother          Tobacco Use    Smoking status: Never Smoker    Smokeless tobacco: Never Used   Substance and Sexual Activity    Alcohol use: No    Drug use: Never    Sexual activity: Not Currently     Partners: Male     Review of Systems   Unable to perform ROS: Dementia   Objective:     Vital Signs (Most Recent):  Temp: 99.2 °F (37.3 °C) (04/25/22 1112)  Pulse: 73 (04/25/22 1112)  Resp: 18 (04/25/22 1112)  BP: (!) 123/55 (04/25/22 1112)  SpO2: 95 % (04/25/22 1112)   Vital Signs (24h Range):  Temp:  [90.8 °F (32.7 °C)-99.2 °F (37.3 °C)] 99.2 °F (37.3 °C)  Pulse:  [50-73] 73  Resp:  [14-20] 18  SpO2:  [95 %-99 %] 95 %  BP: (123-169)/(55-74) 123/55     Weight: 90.7 kg (200 lb)  Body mass index is 36.58 kg/m².    SpO2: 95 %  O2 Device (Oxygen Therapy): room air      Intake/Output Summary (Last 24 hours) at 4/25/2022 1416  Last data filed at 4/25/2022 0600  Gross per 24 hour   Intake 350 ml   Output 200 ml   Net 150 ml       Lines/Drains/Airways       Peripheral Intravenous Line  Duration                  Peripheral IV - Single Lumen 04/24/22 1147 20 G " Right Hand 1 day                    Physical Exam  Constitutional:       General: She is not in acute distress.     Appearance: She is well-developed. She is obese. She is not ill-appearing, toxic-appearing or diaphoretic.   HENT:      Head: Normocephalic and atraumatic.   Eyes:      General: No scleral icterus.     Extraocular Movements: Extraocular movements intact.      Conjunctiva/sclera: Conjunctivae normal.      Pupils: Pupils are equal, round, and reactive to light.   Neck:      Vascular: No JVD.      Trachea: No tracheal deviation.   Cardiovascular:      Rate and Rhythm: Normal rate and regular rhythm.      Heart sounds: S1 normal and S2 normal. No murmur heard.    No friction rub. No gallop.   Pulmonary:      Effort: Pulmonary effort is normal. No respiratory distress.      Breath sounds: Normal breath sounds. No stridor. No wheezing, rhonchi or rales.   Chest:      Chest wall: No tenderness.   Abdominal:      General: There is no distension.      Palpations: Abdomen is soft.   Musculoskeletal:         General: No swelling or tenderness. Normal range of motion.      Cervical back: Normal range of motion and neck supple. No rigidity.      Right lower leg: No edema.      Left lower leg: No edema.   Skin:     General: Skin is warm and dry.      Coloration: Skin is not jaundiced.   Neurological:      Mental Status: She is alert.      Cranial Nerves: No cranial nerve deficit.      Comments: UTO   Psychiatric:      Comments: UTO       Current Medications:   aspirin  81 mg Oral Nightly    atorvastatin  20 mg Oral Daily    piperacillin-tazobactam (ZOSYN) IVPB  4.5 g Intravenous Q8H      sodium bicarbonate drip 100 mL/hr at 04/25/22 0851     acetaminophen, dextrose 10%, dextrose 10%, glucagon (human recombinant), haloperidol lactate, insulin aspart U-100, melatonin, ondansetron, sodium chloride 0.9%, Pharmacy to dose Vancomycin consult **AND** vancomycin - pharmacy to dose    Laboratory (all labs  reviewed):  CBC:  Recent Labs   Lab 02/10/22  0259 02/11/22  0429 02/14/22  0353 04/24/22  1159 04/25/22  0512   WBC 6.57 6.08 4.87 3.66 L 4.21   Hemoglobin 9.2 L 9.1 L 10.0 L 11.0 L 10.2 L   Hematocrit 29.9 L 29.1 L 31.6 L 34.4 L 31.2 L   Platelets 207 196 231 130 L 124 L       CHEMISTRIES:  Recent Labs   Lab 11/11/19  0957 01/30/20  1326 02/14/22  0353 04/24/22  1159 04/24/22  1603 04/24/22  2207 04/25/22  0512   Glucose 105   < > 88 125 H 176 H 16 LL 83   Sodium 144   < > 135 L 136 133 L 140 137   Potassium 4.4   < > 4.8 6.3 HH 6.6 HH 4.5 5.3 H   BUN 26 H   < > 14 33 H 34 H 32 H 31 H   Creatinine 2.1 H   < > 1.3 1.9 H 1.8 H 2.0 H 2.1 H   eGFR if  24.9 A   < > 44 A 28 A 30 A 26 A 25 A   eGFR if non  21.6 A   < > 38 A 24 A 26 A 23 A 21 A   Calcium 8.8   < > 8.8 9.8 9.4 9.7 9.4   Magnesium 1.5 L  --   --  2.4  --   --   --     < > = values in this interval not displayed.       CARDIAC BIOMARKERS:  Recent Labs   Lab 06/18/21  1114 12/20/21  1334 01/26/22  1357 01/26/22  2109 01/27/22  0335 02/08/22  0650 04/24/22  1159     --   --   --  234 H  --   --    Troponin I  --    < > 0.007 0.014 0.008 <0.006 <0.006    < > = values in this interval not displayed.       COAGS:  Recent Labs   Lab 02/26/20  1913   INR 1.1       LIPIDS/LFTS:  Recent Labs   Lab 08/01/19  1110 01/30/20  1326 01/27/21  1130 06/18/21  1114 02/09/22  0324 02/10/22  0259 02/11/22  0429 04/24/22  1159 04/24/22  8047   Cholesterol 105 L  --  126  --   --   --   --   --   --    Triglycerides 81  --  90  --   --   --   --   --   --    HDL 33 L  --  36 L  --   --   --   --   --   --    LDL Cholesterol 55.8 L  --  72.0  --   --   --   --   --   --    Non-HDL Cholesterol 72  --  90  --   --   --   --   --   --    AST 16   < >  --    < > 26 24 23 62 H 57 H   ALT 8 L   < >  --    < > 21 17 17 75 H 71 H    < > = values in this interval not displayed.       BNP:  Recent Labs   Lab 12/20/21  1334 01/12/22  1206 01/24/22  1112  01/26/22  1357 02/08/22  0650   BNP 42 44 32 <10 30       TSH:  Recent Labs   Lab 01/27/22  0335 02/08/22  0650 04/24/22  1159   TSH 0.503 2.663 1.406       Free T4:        Diagnostic Results:  ECG (personally reviewed and interpreted tracing(s)):  4/25/22 1323 SR 1st deg AVB 74, low volt, PRWP, NSSTTW changes    Chest X-Ray (personally reviewed and interpreted image(s)): 4/24/22 NAD    Echo: 1/25/22  The left ventricle is normal in size with concentric hypertrophy and normal systolic function.  The estimated ejection fraction is 70%.  Indeterminate left ventricular diastolic function.  Normal right ventricular size with normal right ventricular systolic function.  TDS    Stress Test: L MPI 5/11/21    Normal myocardial perfusion scan. There is no evidence of myocardial ischemia or infarction.    There is a mild to moderate intensity defect in the anterior wall of the left ventricle, secondary to breast attenuation.    The gated perfusion images showed an ejection fraction of 80% post stress.    There is normal wall motion post stress.    The EKG portion of this study is negative for ischemia.    The patient reported no chest pain during the stress test.

## 2022-04-25 NOTE — NURSING
Nurse swallow assessment performed at bedside. Pt has difficulties tolerating water. Dr. Galvan made aware. NPO orders put in.

## 2022-04-25 NOTE — PLAN OF CARE
Problem: Diabetes Comorbidity  Goal: Blood Glucose Level Within Targeted Range  Intervention: Monitor and Manage Glycemia  Flowsheets (Taken 4/25/2022 1822)  Glycemic Management: blood glucose monitored     Problem: Oral Intake Inadequate (Acute Kidney Injury/Impairment)  Goal: Optimal Nutrition Intake  Intervention: Promote and Optimize Nutrition  Flowsheets (Taken 4/25/2022 1822)  Oral Nutrition Promotion: rest periods promoted     Problem: Renal Function Impairment (Acute Kidney Injury/Impairment)  Goal: Effective Renal Function  Intervention: Monitor and Support Renal Function  Flowsheets (Taken 4/25/2022 1822)  Medication Review/Management: medications reviewed

## 2022-04-25 NOTE — ASSESSMENT & PLAN NOTE
- ECG showing likely Afib with slow VR response. No history of arrythmias. Could be due to hyperK  - Correct potassium  - Telemetry  - S/p CaGluc. Will repeat pending new K  - Hold on AC for now

## 2022-04-25 NOTE — HOSPITAL COURSE
80 yo female admitted from [facility] for altered mental status. Patient was found to be hyperkalemic and hypothermic on admission. Required warming blanket with improvement. Hyperkalemia improved with shifting in ED but patient became hypoglycemic.     CT head with no acute process. CXR with no acute intrathoracic process. UA does not appear grossly infected.  Blood culture with no growth to date.  Cortisol a.m. pending.  Contacted the nursing home and per nursing home, patient is alert and oriented at baseline.   No obvious evidence of infection or etiology of hypothermia at this time. Continue IV fluids and blood glucose monitoring. Of note, patient was recently hospitalized from 02/08-02/14 for hypothermia as well.

## 2022-04-25 NOTE — CARE UPDATE
Spoke with the patient's brother (Mr. Morrison) at bedside.  Mr. Morrison stated that the patient has been in her current state for approximately 1 week now and has decline significantly yesterday which prompted her to come to the ED for evaluation.  Updated the patient's brother on her clinical status.  Answered all his questions.  Discussed who is her healthcare power of .  He stated that her  and her son has .  She only has him and his sister.  Discuss code status of the patient with Mr. Morrison.  He stated that she has expressed possibly DNR/DNI but would like to further discuss this with his other sister before changing the patient's code status.     Patient still altered but now yelling out loud. Not making sense. Does not recognize her brother. Blood glucose has improved. Patient is currently afib on the monitor but rate controlled.

## 2022-04-25 NOTE — SUBJECTIVE & OBJECTIVE
Interval History: Patient continues to be altered this AM. Unable to converse. Ordered for POC glucose and found to be <20. Orders given. Patient started on bicarb and dextrose gtt. No family at bedside.     Review of Systems   Unable to perform ROS: Mental status change   Objective:     Vital Signs (Most Recent):  Temp: 99.2 °F (37.3 °C) (04/25/22 1112)  Pulse: 73 (04/25/22 1112)  Resp: 18 (04/25/22 1112)  BP: (!) 123/55 (04/25/22 1112)  SpO2: 95 % (04/25/22 1112)   Vital Signs (24h Range):  Temp:  [88.9 °F (31.6 °C)-99.2 °F (37.3 °C)] 99.2 °F (37.3 °C)  Pulse:  [46-73] 73  Resp:  [14-26] 18  SpO2:  [95 %-100 %] 95 %  BP: (123-169)/(52-74) 123/55     Weight: 90.7 kg (200 lb)  Body mass index is 36.58 kg/m².    Intake/Output Summary (Last 24 hours) at 4/25/2022 1137  Last data filed at 4/25/2022 0600  Gross per 24 hour   Intake 350 ml   Output 200 ml   Net 150 ml      Physical Exam  Vitals and nursing note reviewed.   Constitutional:       General: She is not in acute distress.     Appearance: She is obese. She is not toxic-appearing.   HENT:      Head: Normocephalic and atraumatic.      Mouth/Throat:      Mouth: Mucous membranes are moist.   Eyes:      Comments: Left eye with cataract   Cardiovascular:      Rate and Rhythm: Normal rate and regular rhythm.      Pulses: Normal pulses.      Heart sounds: No murmur heard.  Pulmonary:      Effort: Pulmonary effort is normal.      Breath sounds: Normal breath sounds. No rales.   Abdominal:      General: Bowel sounds are normal. There is no distension.      Palpations: Abdomen is soft.      Tenderness: There is no abdominal tenderness.   Musculoskeletal:      Right lower leg: No edema.      Left lower leg: No edema.   Skin:     General: Skin is warm and dry.      Comments: Warm to the touch. Bilateral lower extremities with hyperpigmentation, consistent with venous stasis.    Neurological:      Mental Status: She is disoriented.       Significant Labs: All pertinent labs  within the past 24 hours have been reviewed.    Significant Imaging: I have reviewed all pertinent imaging results/findings within the past 24 hours.

## 2022-04-25 NOTE — SUBJECTIVE & OBJECTIVE
Past Medical History:   Diagnosis Date    Arthritis lower extremities    Asthma     Blind left eye     Diabetes mellitus     Edema of both lower legs     CHRONIC    Fall 02/08/2022    Gall stones     Hypertension     Kidney stones     Lives alone with help available     HOME HEALTH    Nephrolithiasis 1/22/2016    Obesity     PVD (peripheral vascular disease)     Renal disorder     Retinopathy     Sleep apnea     Vaginal delivery     x1    Weakness of both lower extremities     uses a cane, rollator & power chair       Past Surgical History:   Procedure Laterality Date    CARDIAC CATHETERIZATION      cataract      CHOLECYSTECTOMY      EYE SURGERY      Rt cataract surgery    HYSTERECTOMY      URETERAL STENT PLACEMENT         Review of patient's allergies indicates:   Allergen Reactions    Adhesive Rash     Paper tape       No current facility-administered medications on file prior to encounter.     Current Outpatient Medications on File Prior to Encounter   Medication Sig    albuterol (PROVENTIL/VENTOLIN HFA) 90 mcg/actuation inhaler INHALE 2 PUFFS INTO THE LUNGS EVERY 6 (SIX) HOURS AS NEEDED FOR WHEEZING. RESCUE    amLODIPine (NORVASC) 10 MG tablet TAKE 1 TABLET BY MOUTH EVERY DAY    aspirin (ECOTRIN) 81 MG EC tablet Take 81 mg by mouth nightly.    atorvastatin (LIPITOR) 20 MG tablet TAKE 1 TABLET BY MOUTH EVERY DAY    blood sugar diagnostic Strp Test 3 times daily    blood-glucose meter,continuous (DEXCOM G6 ) Misc Test glucose twice daily    blood-glucose transmitter (DEXCOM G5 TRANSMITTER) Lea Test glucose twice daily    carvediloL (COREG) 12.5 MG tablet Take 1 tablet (12.5 mg total) by mouth 2 (two) times daily.    hydrALAZINE (APRESOLINE) 50 MG tablet Take 1 tablet (50 mg total) by mouth every 8 (eight) hours.    insulin detemir U-100 (LEVEMIR FLEXTOUCH U-100 INSULN) 100 unit/mL (3 mL) InPn pen Inject 26 Units into the skin every evening. HOLD UNTIL YOU SEE YOUR PCP    lancets Misc Test 3 times daily     "lancets Misc To check BG 3 times daily, to use with insurance preferred meter    latanoprost 0.005 % ophthalmic solution Place 1 drop into both eyes every evening.    melatonin (MELATIN) 3 mg tablet Take 3 tablets (9 mg total) by mouth nightly.    oxybutynin (DITROPAN XL) 15 MG TR24 TAKE 1 TABLET BY MOUTH EVERY DAY    pen needle, diabetic (BD ULTRA-FINE BREE PEN NEEDLE) 32 gauge x 5/32" Ndle INJECT INSULIN THREE TIMES DAILY    polyethylene glycol (GLYCOLAX) 17 gram PwPk Take 17 g by mouth once daily.    timolol maleate 0.5% (TIMOPTIC) 0.5 % Drop Place 1 drop into both eyes every morning.    TRADJENTA 5 mg Tab tablet TAKE 1 TABLET BY MOUTH EVERY DAY    traZODone (DESYREL) 50 MG tablet TAKE 1 TABLET (50 MG TOTAL) BY MOUTH EVERY EVENING.     Family History       Problem Relation (Age of Onset)    Cancer Sister    Diabetes Brother    Hypertension Father    Kidney failure Mother          Tobacco Use    Smoking status: Never Smoker    Smokeless tobacco: Never Used   Substance and Sexual Activity    Alcohol use: No    Drug use: Never    Sexual activity: Not Currently     Partners: Male     Review of Systems   Unable to perform ROS: Mental status change   Objective:     Vital Signs (Most Recent):  Temp: (!) 90.8 °F (32.7 °C) (bear hugger blanket in place) (04/24/22 2154)  Pulse: (!) 51 (04/24/22 1940)  Resp: 17 (04/24/22 1940)  BP: 138/64 (04/24/22 1940)  SpO2: 99 % (04/24/22 1940)   Vital Signs (24h Range):  Temp:  [88.9 °F (31.6 °C)-98.9 °F (37.2 °C)] 90.8 °F (32.7 °C)  Pulse:  [46-53] 51  Resp:  [17-26] 17  SpO2:  [99 %-100 %] 99 %  BP: (126-168)/(52-71) 138/64     Weight: 90.7 kg (200 lb)  Body mass index is 36.58 kg/m².    Physical Exam  Vitals and nursing note reviewed.   Constitutional:       General: She is not in acute distress.     Appearance: She is obese. She is not toxic-appearing.   HENT:      Head: Normocephalic and atraumatic.      Mouth/Throat:      Mouth: Mucous membranes are moist.   Eyes:      " Conjunctiva/sclera: Conjunctivae normal.      Pupils: Pupils are equal, round, and reactive to light.   Cardiovascular:      Rate and Rhythm: Bradycardia present. Rhythm irregular.      Pulses: Normal pulses.      Heart sounds: No murmur heard.  Pulmonary:      Effort: Pulmonary effort is normal.      Breath sounds: Normal breath sounds. No rales.   Abdominal:      General: Abdomen is flat. Bowel sounds are normal.      Palpations: Abdomen is soft.   Musculoskeletal:      Right lower leg: No edema.      Left lower leg: No edema.   Skin:     Findings: No erythema.      Comments: Cold to touch   Neurological:      General: No focal deficit present.      Mental Status: She is disoriented.         CRANIAL NERVES     CN III, IV, VI   Pupils are equal, round, and reactive to light.     Significant Labs: All pertinent labs within the past 24 hours have been reviewed.    Significant Imaging: I have reviewed all pertinent imaging results/findings within the past 24 hours.

## 2022-04-25 NOTE — HPI
80 yo female with a PMHx of HTN, HLD, DM2 (A1c 6.8% in Jan) who presented from [facility] for AMS found to have hyperkalemic.    History was obtained via ED, EMS, and chart review, plus family. Patient answers yes and no to simple questions currently. Apparently, she was in her normal state of health until the last 24 hours becoming agitated and altered. Per family and NH, this is unusual for her. No other noticeable changes per staff. She did arrive with urinary and stool incontinence. In the ED, labs showing hyperkalemia of 6.6 with sCr 1.9 from baselin of 1.3. Also notable for mild leukopenia with eosinophilia and vitals signs of hypothermia to 88F.    ECG showing likely AF with slow VR (QRS is irregular making junctional unlikely). No known history. Given insulin and bicarb for K shift plus CaGluc for ECG changes. Also received Vanc/Zosyn for concern of infection. CTH negative.

## 2022-04-25 NOTE — ASSESSMENT & PLAN NOTE
- K of 6.6, likely due to AUTUMN; ECG with AF which could be possible sequela  - S/p temporizing agents  - Start IVF  - S/p sodium zirconium  - Repeat BMP now and in AM

## 2022-04-25 NOTE — ASSESSMENT & PLAN NOTE
- K of 6.6 on admit, likely due to AUTUMN; ECG with AF which could be possible sequela  - S/p temporizing agents  - Continue IVF  - S/p sodium zirconium  - Repeat BMP with K 5.3  - Pt not safe to swallow at this time, SLP consulted.

## 2022-04-25 NOTE — NURSING
22:50 RN made aware of critical lab value for blood glucose;16. RN did a POCT <20. MD made aware. MD arrived at bedside for assessment. See MAR for RN actions per protocol. RN will continue to monitor.    00:10 blood glucose 55. MD made aware. See MAR for RN actions per protocol. RN will continue to monitor.

## 2022-04-25 NOTE — H&P
"[facility] Medicine  History & Physical    Patient Name: Ana James  MRN: [id number]  Patient Class: IP- Inpatient  Admission Date: 4/24/2022  Attending Physician: Agusto Escobedo DO   Primary Care Provider: Viky Bean MD         Patient information was obtained from caregiver / friend, past medical records and ER records.     Subjective:     Principal Problem:Hyperkalemia    Chief Complaint:   Chief Complaint   Patient presents with    Altered Mental Status     Pt sent over to ED from [place] due to "AMS" and slurred speech since last night. Pt is normally AAOX4 per EMS.         HPI: 82 yo female with a PMHx of HTN, HLD, DM2 (A1c 6.8% in Jan) who [facility] for AMS found to have hyperkalemic.    History was obtained via ED, EMS, and chart review, plus family. Patient answers yes and no to simple questions currently. Apparently, she was in her normal state of health until the last 24 hours becoming agitated and altered. Per family and NH, this is unusual for her. No other noticeable changes per staff. She did arrive with urinary and stool incontinence. In the ED, labs showing hyperkalemia of 6.6 with sCr 1.9 from baselin of 1.3. Also notable for mild leukopenia with eosinophilia and vitals signs of hypothermia to 88F.    ECG showing likely AF with slow VR (QRS is irregular making junctional unlikely). No known history. Given insulin and bicarb for K shift plus CaGluc for ECG changes. Also received Vanc/Zosyn for concern of infection. CTH negative.       Past Medical History:   Diagnosis Date    Arthritis lower extremities    Asthma     Blind left eye     Diabetes mellitus     Edema of both lower legs     CHRONIC    Fall 02/08/2022    Gall stones     Hypertension     Kidney stones     Lives alone with help available     HOME HEALTH    Nephrolithiasis 1/22/2016    Obesity     PVD (peripheral vascular disease)     Renal disorder     Retinopathy  "    Sleep apnea     Vaginal delivery     x1    Weakness of both lower extremities     uses a cane, rollator & power chair       Past Surgical History:   Procedure Laterality Date    CARDIAC CATHETERIZATION      cataract      CHOLECYSTECTOMY      EYE SURGERY      Rt cataract surgery    HYSTERECTOMY      URETERAL STENT PLACEMENT         Review of patient's allergies indicates:   Allergen Reactions    Adhesive Rash     Paper tape       No current facility-administered medications on file prior to encounter.     Current Outpatient Medications on File Prior to Encounter   Medication Sig    albuterol (PROVENTIL/VENTOLIN HFA) 90 mcg/actuation inhaler INHALE 2 PUFFS INTO THE LUNGS EVERY 6 (SIX) HOURS AS NEEDED FOR WHEEZING. RESCUE    amLODIPine (NORVASC) 10 MG tablet TAKE 1 TABLET BY MOUTH EVERY DAY    aspirin (ECOTRIN) 81 MG EC tablet Take 81 mg by mouth nightly.    atorvastatin (LIPITOR) 20 MG tablet TAKE 1 TABLET BY MOUTH EVERY DAY    blood sugar diagnostic Strp Test 3 times daily    blood-glucose meter,continuous (DEXCOM G6 ) Misc Test glucose twice daily    blood-glucose transmitter (DEXCOM G5 TRANSMITTER) Lea Test glucose twice daily    carvediloL (COREG) 12.5 MG tablet Take 1 tablet (12.5 mg total) by mouth 2 (two) times daily.    hydrALAZINE (APRESOLINE) 50 MG tablet Take 1 tablet (50 mg total) by mouth every 8 (eight) hours.    insulin detemir U-100 (LEVEMIR FLEXTOUCH U-100 INSULN) 100 unit/mL (3 mL) InPn pen Inject 26 Units into the skin every evening. HOLD UNTIL YOU SEE YOUR PCP    lancets Misc Test 3 times daily    lancets Misc To check BG 3 times daily, to use with insurance preferred meter    latanoprost 0.005 % ophthalmic solution Place 1 drop into both eyes every evening.    melatonin (MELATIN) 3 mg tablet Take 3 tablets (9 mg total) by mouth nightly.    oxybutynin (DITROPAN XL) 15 MG TR24 TAKE 1 TABLET BY MOUTH EVERY DAY    pen needle, diabetic (BD ULTRA-FINE BREE PEN  "NEEDLE) 32 gauge x 5/32" Ndle INJECT INSULIN THREE TIMES DAILY    polyethylene glycol (GLYCOLAX) 17 gram PwPk Take 17 g by mouth once daily.    timolol maleate 0.5% (TIMOPTIC) 0.5 % Drop Place 1 drop into both eyes every morning.    TRADJENTA 5 mg Tab tablet TAKE 1 TABLET BY MOUTH EVERY DAY    traZODone (DESYREL) 50 MG tablet TAKE 1 TABLET (50 MG TOTAL) BY MOUTH EVERY EVENING.     Family History       Problem Relation (Age of Onset)    Cancer Sister    Diabetes Brother    Hypertension Father    Kidney failure Mother          Tobacco Use    Smoking status: Never Smoker    Smokeless tobacco: Never Used   Substance and Sexual Activity    Alcohol use: No    Drug use: Never    Sexual activity: Not Currently     Partners: Male     Review of Systems   Unable to perform ROS: Mental status change   Objective:     Vital Signs (Most Recent):  Temp: (!) 90.8 °F (32.7 °C) (bear hugger blanket in place) (04/24/22 2154)  Pulse: (!) 51 (04/24/22 1940)  Resp: 17 (04/24/22 1940)  BP: 138/64 (04/24/22 1940)  SpO2: 99 % (04/24/22 1940)   Vital Signs (24h Range):  Temp:  [88.9 °F (31.6 °C)-98.9 °F (37.2 °C)] 90.8 °F (32.7 °C)  Pulse:  [46-53] 51  Resp:  [17-26] 17  SpO2:  [99 %-100 %] 99 %  BP: (126-168)/(52-71) 138/64     Weight: 90.7 kg (200 lb)  Body mass index is 36.58 kg/m².    Physical Exam  Vitals and nursing note reviewed.   Constitutional:       General: She is not in acute distress.     Appearance: She is obese. She is not toxic-appearing.   HENT:      Head: Normocephalic and atraumatic.      Mouth/Throat:      Mouth: Mucous membranes are moist.   Eyes:      Conjunctiva/sclera: Conjunctivae normal.      Pupils: Pupils are equal, round, and reactive to light.   Cardiovascular:      Rate and Rhythm: Bradycardia present. Rhythm irregular.      Pulses: Normal pulses.      Heart sounds: No murmur heard.  Pulmonary:      Effort: Pulmonary effort is normal.      Breath sounds: Normal breath sounds. No rales.   Abdominal:     "  General: Abdomen is flat. Bowel sounds are normal.      Palpations: Abdomen is soft.   Musculoskeletal:      Right lower leg: No edema.      Left lower leg: No edema.   Skin:     Findings: No erythema.      Comments: Cold to touch   Neurological:      General: No focal deficit present.      Mental Status: She is disoriented.         CRANIAL NERVES     CN III, IV, VI   Pupils are equal, round, and reactive to light.     Significant Labs: All pertinent labs within the past 24 hours have been reviewed.    Significant Imaging: I have reviewed all pertinent imaging results/findings within the past 24 hours.    Assessment/Plan:     * Hyperkalemia  - K of 6.6, likely due to AUTUMN; ECG with AF which could be possible sequela  - S/p temporizing agents  - Start IVF  - S/p sodium zirconium  - Repeat BMP now and in AM      AUTUMN (acute kidney injury)  Patient with acute kidney injury likely d/t IVVD/Dehydration and Pre-renal azotemia Which is currently stable. Labs reviewed- Renal function/electrolytes with Estimated Creatinine Clearance: 24.8 mL/min (A) (based on SCr of 1.8 mg/dL (H)). according to latest data. Monitor urine output and serial BMP and adjust therapy as needed. Avoid nephrotoxins and renally dose meds for GFR listed above.       Altered mental status  - CTH negative for acute pathology  - 2/2 to hyperkalemia (although uncommon) vs sepsis vs vascular dementia? given CTH findings  - Treat AUTUMN and hyper K as above  - Vanc/Zosyn for possible infection  - U/A normal  - BCx x 2 pending  - Stool cultures to be obtained if possible given hx of incontinence which is new      Arrhythmia  - ECG showing likely Afib with slow VR response. No history of arrythmias. Could be due to hyperK  - Correct potassium  - Telemetry  - S/p CaGluc. Will repeat pending new K  - Hold on AC for now      Hyperlipidemia  - Lipitor daily      Essential hypertension  - Holding coreg and norvasc given possible infection  - Will restart if  needed        VTE Risk Mitigation (From admission, onward)    None             Colt Galvan MD  Department of Hospital Medicine   [facility] [place] - [facility], later [facility]

## 2022-04-25 NOTE — HPI
82 yo female admitted from [facility] for altered mental status. Patient was found to be hyperkalemic and hypothermic on admission. Required warming blanket with improvement. Hyperkalemia improved with shifting in ED but patient became hypoglycemic.   CT head with no acute process. CXR with no acute intrathoracic process. UA does not appear grossly infected.  Blood culture with no growth to date.  Cortisol a.m. pending.  Contacted the nursing home and per nursing home, patient is alert and oriented at baseline.   No obvious evidence of infection or etiology of hypothermia at this time. Continue IV fluids and blood glucose monitoring. Of note, patient was recently hospitalized from 02/08-02/14 for hypothermia as well.   Interval History: Patient continues to be altered this AM. Unable to converse. Ordered for POC glucose and found to be <20. Orders given. Patient started on bicarb and dextrose gtt. No family at bedside.     Cardiology consulted for AF.    Pt seen by Dr. Navarrete in 1/2022 for similar issue.  Concern for AF at that time and pt in SR.  EKG today with apparent p waves and 1st deg AVB.  EKG 4/24 with poor baseline, but similar findings.  The patient is unable to write any useful history and is a nursing home resident.  She does not appear to be in any distress.

## 2022-04-25 NOTE — NURSING
Patient CBG currently <20, PRN dextrose 10% bolus infusing. Will follow up    8:20a CBG rechecked after bolus, CBG now 192, will continue to follow up

## 2022-04-25 NOTE — PT/OT/SLP EVAL
Speech Language Pathology Evaluation  Bedside Swallow    Patient Name:  Ana James   MRN:  [id number]  Admitting Diagnosis: Hyperkalemia    Recommendations:                 General Recommendations:  Dysphagia therapy  Diet recommendations:   , Full liquids , non oral meds  Aspiration Precautions: Feed only when awake/alert/cooperative (please note it is unlikely Pt will cooperative with substantial nutritional intake or oral meds in her current mental state.)  General Precautions: Standard,    Communication strategies:  calm tone    History:     Past Medical History:   Diagnosis Date    Arthritis lower extremities    Asthma     Blind left eye     Diabetes mellitus     Edema of both lower legs     CHRONIC    Fall 02/08/2022    Gall stones     Hypertension     Kidney stones     Lives alone with help available     HOME HEALTH    Nephrolithiasis 1/22/2016    Obesity     PVD (peripheral vascular disease)     Renal disorder     Retinopathy     Sleep apnea     Vaginal delivery     x1    Weakness of both lower extremities     uses a cane, rollator & power chair       Past Surgical History:   Procedure Laterality Date    CARDIAC CATHETERIZATION      cataract      CHOLECYSTECTOMY      EYE SURGERY      Rt cataract surgery    HYSTERECTOMY      URETERAL STENT PLACEMENT         Social History: Patient lives at [facility]    Chest X-Rays: 4/24/22  The lungs are otherwise well expanded without large consolidation, pleural effusion or pneumothorax.    Prior diet: per [facility] Pt fed herself regular with thin liquids    Subjective   Alternating sleeping with aggitated yelling and crying out. [facility] staff reporting Pt self fed regular diet with thin liquids, her performance today marks a dramatic mental status change from her baseline.   Patient goals: unable to report    Pain/Comfort:  ·  0    Respiratory Status: Room air    Objective:     Oral Musculature Evaluation  · Oral Musculature: unable  to assess due to poor participation/comprehension  · Dentition: edentulous  · Secretion Management: other (see comments) (dry)  · Mucosal Quality: dry  · Oral Labial Strength and Mobility: other (see comments) (open mouth posture)  · Lingual Strength and Mobility: impaired strength  · Voice Prior to PO Intake: wfl  · Oral Musculature Comments: grossly symmetrical, open mouth posture with anterior lingual positioning    Bedside Swallow Eval:   Consistencies Assessed:  Ice X2  · Thin liquids X3 via straw  · Puree X1     Oral Phase:   · Gross lack of attention to bolus presentation  · X3 Pt was able to attend and draw form straw  · Anterior loss of ice bolus with crying out.   · Tongue thrust    Pharyngeal Phase:   · no overt clinical signs/symptoms of aspiration    Compensatory Strategies  · None    Treatment: please note silent aspiration cannot be r/o at bedside.     Assessment:     Ana James is a 81 y.o. female with dx of Hyperkalemia she presents with mental status which negatively impacts her ability to meet nutritional needs solely orally at this time, she accepted very little secondary to confusion however showed no overt s/s of aspiration.     Goals:   Multidisciplinary Problems     SLP Goals        Problem: SLP    Goal Priority Disciplines Outcome   SLP Goal    Low SLP Ongoing, Progressing   Description: Pt will participate in ongoing assessment of swallow.                    Plan:     · Patient to be seen:  2 x/week   · Plan of Care expires:  05/05/22  · Plan of Care reviewed with:  patient   · SLP Follow-Up:  Yes       Discharge recommendations:  other (see comments) (TBD)   Barriers to Discharge:  Level of Skilled Assistance Needed .    Time Tracking:     SLP Treatment Date:   04/25/22  Speech Start Time:  1722  Speech Stop Time:  1730     Speech Total Time (min):  8 min    Billable Minutes: Eval Swallow and Oral Function 8    04/25/2022

## 2022-04-25 NOTE — CONSULTS
[facility] - Telemetry  Cardiology  Consult Note    Patient Name: Ana James  MRN: [id number]  Admission Date: 4/24/2022  Hospital Length of Stay: 1 days  Code Status: Full Code   Attending Provider: Agusto Escobedo DO   Consulting Provider: Pancho Loza MD  Primary Care Physician: Viky Bean MD  Principal Problem:Hyperkalemia    Patient information was obtained from ER records.     Inpatient consult to Cardiology  Consult performed by: Pancho Loza MD  Consult ordered by: Agusto Escobedo DO  Reason for consult: ?AFib        Subjective:     Chief Complaint:  AMS     HPI:   82 yo female admitted from [facility] for altered mental status. Patient was found to be hyperkalemic and hypothermic on admission. Required warming blanket with improvement. Hyperkalemia improved with shifting in ED but patient became hypoglycemic.   CT head with no acute process. CXR with no acute intrathoracic process. UA does not appear grossly infected.  Blood culture with no growth to date.  Cortisol a.m. pending.  Contacted the nursing home and per nursing home, patient is alert and oriented at baseline.   No obvious evidence of infection or etiology of hypothermia at this time. Continue IV fluids and blood glucose monitoring. Of note, patient was recently hospitalized from 02/08-02/14 for hypothermia as well.   Interval History: Patient continues to be altered this AM. Unable to converse. Ordered for POC glucose and found to be <20. Orders given. Patient started on bicarb and dextrose gtt. No family at bedside.     Cardiology consulted for AF.    Pt seen by Dr. Navarrete in 1/2022 for similar issue.  Concern for AF at that time and pt in SR.  EKG today with apparent p waves and 1st deg AVB.  EKG 4/24 with poor baseline, but similar findings.  The patient is unable to write any useful history and is a nursing home resident.  She does not appear to be in any distress.      Past Medical History:   Diagnosis Date     Arthritis lower extremities    Asthma     Blind left eye     Diabetes mellitus     Edema of both lower legs     CHRONIC    Fall 02/08/2022    Gall stones     Hypertension     Kidney stones     Lives alone with help available     HOME HEALTH    Nephrolithiasis 1/22/2016    Obesity     PVD (peripheral vascular disease)     Renal disorder     Retinopathy     Sleep apnea     Vaginal delivery     x1    Weakness of both lower extremities     uses a cane, rollator & power chair       Past Surgical History:   Procedure Laterality Date    CARDIAC CATHETERIZATION      cataract      CHOLECYSTECTOMY      EYE SURGERY      Rt cataract surgery    HYSTERECTOMY      URETERAL STENT PLACEMENT         Review of patient's allergies indicates:   Allergen Reactions    Adhesive Rash     Paper tape       No current facility-administered medications on file prior to encounter.     Current Outpatient Medications on File Prior to Encounter   Medication Sig    albuterol (PROVENTIL/VENTOLIN HFA) 90 mcg/actuation inhaler INHALE 2 PUFFS INTO THE LUNGS EVERY 6 (SIX) HOURS AS NEEDED FOR WHEEZING. RESCUE    amLODIPine (NORVASC) 10 MG tablet TAKE 1 TABLET BY MOUTH EVERY DAY    aspirin (ECOTRIN) 81 MG EC tablet Take 81 mg by mouth nightly.    atorvastatin (LIPITOR) 20 MG tablet TAKE 1 TABLET BY MOUTH EVERY DAY    blood sugar diagnostic Strp Test 3 times daily    blood-glucose meter,continuous (DEXCOM G6 ) Misc Test glucose twice daily    blood-glucose transmitter (DEXCOM G5 TRANSMITTER) Lea Test glucose twice daily    carvediloL (COREG) 12.5 MG tablet Take 1 tablet (12.5 mg total) by mouth 2 (two) times daily.    hydrALAZINE (APRESOLINE) 50 MG tablet Take 1 tablet (50 mg total) by mouth every 8 (eight) hours.    insulin detemir U-100 (LEVEMIR FLEXTOUCH U-100 INSULN) 100 unit/mL (3 mL) InPn pen Inject 26 Units into the skin every evening. HOLD UNTIL YOU SEE YOUR PCP    lancets Misc Test 3 times daily     "lancets Misc To check BG 3 times daily, to use with insurance preferred meter    latanoprost 0.005 % ophthalmic solution Place 1 drop into both eyes every evening.    melatonin (MELATIN) 3 mg tablet Take 3 tablets (9 mg total) by mouth nightly.    oxybutynin (DITROPAN XL) 15 MG TR24 TAKE 1 TABLET BY MOUTH EVERY DAY    pen needle, diabetic (BD ULTRA-FINE BREE PEN NEEDLE) 32 gauge x 5/32" Ndle INJECT INSULIN THREE TIMES DAILY    polyethylene glycol (GLYCOLAX) 17 gram PwPk Take 17 g by mouth once daily.    timolol maleate 0.5% (TIMOPTIC) 0.5 % Drop Place 1 drop into both eyes every morning.    TRADJENTA 5 mg Tab tablet TAKE 1 TABLET BY MOUTH EVERY DAY    traZODone (DESYREL) 50 MG tablet TAKE 1 TABLET (50 MG TOTAL) BY MOUTH EVERY EVENING.     Family History       Problem Relation (Age of Onset)    Cancer Sister    Diabetes Brother    Hypertension Father    Kidney failure Mother          Tobacco Use    Smoking status: Never Smoker    Smokeless tobacco: Never Used   Substance and Sexual Activity    Alcohol use: No    Drug use: Never    Sexual activity: Not Currently     Partners: Male     Review of Systems   Unable to perform ROS: Dementia   Objective:     Vital Signs (Most Recent):  Temp: 99.2 °F (37.3 °C) (04/25/22 1112)  Pulse: 73 (04/25/22 1112)  Resp: 18 (04/25/22 1112)  BP: (!) 123/55 (04/25/22 1112)  SpO2: 95 % (04/25/22 1112)   Vital Signs (24h Range):  Temp:  [90.8 °F (32.7 °C)-99.2 °F (37.3 °C)] 99.2 °F (37.3 °C)  Pulse:  [50-73] 73  Resp:  [14-20] 18  SpO2:  [95 %-99 %] 95 %  BP: (123-169)/(55-74) 123/55     Weight: 90.7 kg (200 lb)  Body mass index is 36.58 kg/m².    SpO2: 95 %  O2 Device (Oxygen Therapy): room air      Intake/Output Summary (Last 24 hours) at 4/25/2022 1416  Last data filed at 4/25/2022 0600  Gross per 24 hour   Intake 350 ml   Output 200 ml   Net 150 ml       Lines/Drains/Airways       Peripheral Intravenous Line  Duration                  Peripheral IV - Single Lumen 04/24/22 " 1147 20 G Right Hand 1 day                    Physical Exam  Constitutional:       General: She is not in acute distress.     Appearance: She is well-developed. She is obese. She is not ill-appearing, toxic-appearing or diaphoretic.   HENT:      Head: Normocephalic and atraumatic.   Eyes:      General: No scleral icterus.     Extraocular Movements: Extraocular movements intact.      Conjunctiva/sclera: Conjunctivae normal.      Pupils: Pupils are equal, round, and reactive to light.   Neck:      Vascular: No JVD.      Trachea: No tracheal deviation.   Cardiovascular:      Rate and Rhythm: Normal rate and regular rhythm.      Heart sounds: S1 normal and S2 normal. No murmur heard.    No friction rub. No gallop.   Pulmonary:      Effort: Pulmonary effort is normal. No respiratory distress.      Breath sounds: Normal breath sounds. No stridor. No wheezing, rhonchi or rales.   Chest:      Chest wall: No tenderness.   Abdominal:      General: There is no distension.      Palpations: Abdomen is soft.   Musculoskeletal:         General: No swelling or tenderness. Normal range of motion.      Cervical back: Normal range of motion and neck supple. No rigidity.      Right lower leg: No edema.      Left lower leg: No edema.   Skin:     General: Skin is warm and dry.      Coloration: Skin is not jaundiced.   Neurological:      Mental Status: She is alert.      Cranial Nerves: No cranial nerve deficit.      Comments: UTO   Psychiatric:      Comments: UTO       Current Medications:   aspirin  81 mg Oral Nightly    atorvastatin  20 mg Oral Daily    piperacillin-tazobactam (ZOSYN) IVPB  4.5 g Intravenous Q8H      sodium bicarbonate drip 100 mL/hr at 04/25/22 0851     acetaminophen, dextrose 10%, dextrose 10%, glucagon (human recombinant), haloperidol lactate, insulin aspart U-100, melatonin, ondansetron, sodium chloride 0.9%, Pharmacy to dose Vancomycin consult **AND** vancomycin - pharmacy to dose    Laboratory (all labs  reviewed):  CBC:  Recent Labs   Lab 02/10/22  0259 02/11/22  0429 02/14/22  0353 04/24/22  1159 04/25/22  0512   WBC 6.57 6.08 4.87 3.66 L 4.21   Hemoglobin 9.2 L 9.1 L 10.0 L 11.0 L 10.2 L   Hematocrit 29.9 L 29.1 L 31.6 L 34.4 L 31.2 L   Platelets 207 196 231 130 L 124 L       CHEMISTRIES:  Recent Labs   Lab 11/11/19  0957 01/30/20  1326 02/14/22  0353 04/24/22  1159 04/24/22  1603 04/24/22  2207 04/25/22  0512   Glucose 105   < > 88 125 H 176 H 16 LL 83   Sodium 144   < > 135 L 136 133 L 140 137   Potassium 4.4   < > 4.8 6.3 HH 6.6 HH 4.5 5.3 H   BUN 26 H   < > 14 33 H 34 H 32 H 31 H   Creatinine 2.1 H   < > 1.3 1.9 H 1.8 H 2.0 H 2.1 H   eGFR if  24.9 A   < > 44 A 28 A 30 A 26 A 25 A   eGFR if non  21.6 A   < > 38 A 24 A 26 A 23 A 21 A   Calcium 8.8   < > 8.8 9.8 9.4 9.7 9.4   Magnesium 1.5 L  --   --  2.4  --   --   --     < > = values in this interval not displayed.       CARDIAC BIOMARKERS:  Recent Labs   Lab 06/18/21  1114 12/20/21  1334 01/26/22  1357 01/26/22  2109 01/27/22  0335 02/08/22  0650 04/24/22  1159     --   --   --  234 H  --   --    Troponin I  --    < > 0.007 0.014 0.008 <0.006 <0.006    < > = values in this interval not displayed.       COAGS:  Recent Labs   Lab 02/26/20  1913   INR 1.1       LIPIDS/LFTS:  Recent Labs   Lab 08/01/19  1110 01/30/20  1326 01/27/21  1130 06/18/21  1114 02/09/22  0324 02/10/22  0259 02/11/22  0429 04/24/22  1159 04/24/22  8277   Cholesterol 105 L  --  126  --   --   --   --   --   --    Triglycerides 81  --  90  --   --   --   --   --   --    HDL 33 L  --  36 L  --   --   --   --   --   --    LDL Cholesterol 55.8 L  --  72.0  --   --   --   --   --   --    Non-HDL Cholesterol 72  --  90  --   --   --   --   --   --    AST 16   < >  --    < > 26 24 23 62 H 57 H   ALT 8 L   < >  --    < > 21 17 17 75 H 71 H    < > = values in this interval not displayed.       BNP:  Recent Labs   Lab 12/20/21  1334 01/12/22  1206 01/24/22  1112  01/26/22  1357 02/08/22  0650   BNP 42 44 32 <10 30       TSH:  Recent Labs   Lab 01/27/22  0335 02/08/22  0650 04/24/22  1159   TSH 0.503 2.663 1.406       Free T4:        Diagnostic Results:  ECG (personally reviewed and interpreted tracing(s)):  4/25/22 1323 SR 1st deg AVB 74, low volt, PRWP, NSSTTW changes    Chest X-Ray (personally reviewed and interpreted image(s)): 4/24/22 NAD    Echo: 1/25/22  · The left ventricle is normal in size with concentric hypertrophy and normal systolic function.  · The estimated ejection fraction is 70%.  · Indeterminate left ventricular diastolic function.  · Normal right ventricular size with normal right ventricular systolic function.  · TDS    Stress Test: L MPI 5/11/21    Normal myocardial perfusion scan. There is no evidence of myocardial ischemia or infarction.    There is a mild to moderate intensity defect in the anterior wall of the left ventricle, secondary to breast attenuation.    The gated perfusion images showed an ejection fraction of 80% post stress.    There is normal wall motion post stress.    The EKG portion of this study is negative for ischemia.    The patient reported no chest pain during the stress test.        Assessment and Plan:     * Hyperkalemia  Mgmt per IM    Essential hypertension  Contm ed rx    Hyperlipidemia  Cont med rx    AUTUMN (acute kidney injury)  Per IM    Arrhythmia  No evidence of AF on available EKG  Currently in SR on tele    Altered mental status  Per IM        VTE Risk Mitigation (From admission, onward)    None          Thank you for your consult. I will sign off. Please contact us if you have any additional questions.    Pancho Loza MD  Cardiology   [facility] - [facility]

## 2022-04-25 NOTE — ASSESSMENT & PLAN NOTE
Patient with acute kidney injury likely d/t IVVD/Dehydration and Pre-renal azotemia Which is currently stable. Labs reviewed- Renal function/electrolytes with Estimated Creatinine Clearance: 21.2 mL/min (A) (based on SCr of 2.1 mg/dL (H)). according to latest data.     Monitor urine output and serial BMP and adjust therapy as needed.   Avoid nephrotoxins and renally dose meds for GFR listed above.   Continue IVF

## 2022-04-25 NOTE — NURSING
Patient remain nonverbal and remains altered. Resting throughout the day with periods of yelling as if she is hallucinating. MD aware. Will continue to monitor

## 2022-04-26 PROBLEM — N18.30 ACUTE RENAL FAILURE SUPERIMPOSED ON STAGE 3 CHRONIC KIDNEY DISEASE: Status: ACTIVE | Noted: 2017-05-27

## 2022-04-26 PROBLEM — Z71.89 GOALS OF CARE, COUNSELING/DISCUSSION: Status: ACTIVE | Noted: 2022-04-26

## 2022-04-26 LAB
ALLENS TEST: ABNORMAL
AMPHET+METHAMPHET UR QL: NEGATIVE
ANION GAP SERPL CALC-SCNC: 8 MMOL/L (ref 8–16)
BARBITURATES UR QL SCN>200 NG/ML: NEGATIVE
BASOPHILS # BLD AUTO: 0.02 K/UL (ref 0–0.2)
BASOPHILS NFR BLD: 0.3 % (ref 0–1.9)
BENZODIAZ UR QL SCN>200 NG/ML: NEGATIVE
BNP SERPL-MCNC: 21 PG/ML (ref 0–99)
BUN SERPL-MCNC: 28 MG/DL (ref 8–23)
BZE UR QL SCN: NEGATIVE
CALCIUM SERPL-MCNC: 9.4 MG/DL (ref 8.7–10.5)
CANNABINOIDS UR QL SCN: NEGATIVE
CHLORIDE SERPL-SCNC: 107 MMOL/L (ref 95–110)
CO2 SERPL-SCNC: 22 MMOL/L (ref 23–29)
CORTIS SERPL-MCNC: 9 UG/DL
CREAT SERPL-MCNC: 2.3 MG/DL (ref 0.5–1.4)
CREAT UR-MCNC: 27.8 MG/DL (ref 15–325)
CREAT UR-MCNC: 33.4 MG/DL (ref 15–325)
DELSYS: ABNORMAL
DIFFERENTIAL METHOD: ABNORMAL
EOSINOPHIL # BLD AUTO: 0.7 K/UL (ref 0–0.5)
EOSINOPHIL NFR BLD: 11.9 % (ref 0–8)
EOSINOPHIL URNS QL WRIGHT STN: NORMAL
ERYTHROCYTE [DISTWIDTH] IN BLOOD BY AUTOMATED COUNT: 16 % (ref 11.5–14.5)
EST. GFR  (AFRICAN AMERICAN): 22 ML/MIN/1.73 M^2
EST. GFR  (NON AFRICAN AMERICAN): 19 ML/MIN/1.73 M^2
ETHANOL SERPL-MCNC: <10 MG/DL
FERRITIN SERPL-MCNC: 496 NG/ML (ref 20–300)
GLUCOSE SERPL-MCNC: 120 MG/DL (ref 70–110)
HCO3 UR-SCNC: 24.4 MMOL/L (ref 24–28)
HCT VFR BLD AUTO: 31.6 % (ref 37–48.5)
HGB BLD-MCNC: 10.1 G/DL (ref 12–16)
IMM GRANULOCYTES # BLD AUTO: 0.02 K/UL (ref 0–0.04)
IMM GRANULOCYTES NFR BLD AUTO: 0.3 % (ref 0–0.5)
LYMPHOCYTES # BLD AUTO: 1.9 K/UL (ref 1–4.8)
LYMPHOCYTES NFR BLD: 30.3 % (ref 18–48)
MCH RBC QN AUTO: 27.4 PG (ref 27–31)
MCHC RBC AUTO-ENTMCNC: 32 G/DL (ref 32–36)
MCV RBC AUTO: 86 FL (ref 82–98)
METHADONE UR QL SCN>300 NG/ML: NEGATIVE
MONOCYTES # BLD AUTO: 0.8 K/UL (ref 0.3–1)
MONOCYTES NFR BLD: 12.3 % (ref 4–15)
NEUTROPHILS # BLD AUTO: 2.8 K/UL (ref 1.8–7.7)
NEUTROPHILS NFR BLD: 44.9 % (ref 38–73)
NRBC BLD-RTO: 1 /100 WBC
OPIATES UR QL SCN: NEGATIVE
PCO2 BLDA: 46.7 MMHG (ref 35–45)
PCP UR QL SCN>25 NG/ML: NEGATIVE
PH SMN: 7.33 [PH] (ref 7.35–7.45)
PLATELET # BLD AUTO: 130 K/UL (ref 150–450)
PMV BLD AUTO: 10.8 FL (ref 9.2–12.9)
PO2 BLDA: 61 MMHG (ref 80–100)
POC BE: -2 MMOL/L
POC SATURATED O2: 89 % (ref 95–100)
POC TCO2: 26 MMOL/L (ref 23–27)
POCT GLUCOSE: 114 MG/DL (ref 70–110)
POCT GLUCOSE: 117 MG/DL (ref 70–110)
POCT GLUCOSE: 130 MG/DL (ref 70–110)
POCT GLUCOSE: 133 MG/DL (ref 70–110)
POTASSIUM SERPL-SCNC: 4.8 MMOL/L (ref 3.5–5.1)
PROCALCITONIN SERPL IA-MCNC: 0.11 NG/ML
RBC # BLD AUTO: 3.69 M/UL (ref 4–5.4)
RPR SER QL: NORMAL
SAMPLE: ABNORMAL
SITE: ABNORMAL
SODIUM SERPL-SCNC: 137 MMOL/L (ref 136–145)
SODIUM UR-SCNC: 56 MMOL/L (ref 20–250)
TOXICOLOGY INFORMATION: NORMAL
VANCOMYCIN SERPL-MCNC: 12.2 UG/ML
WBC # BLD AUTO: 6.2 K/UL (ref 3.9–12.7)

## 2022-04-26 PROCEDURE — 82803 BLOOD GASES ANY COMBINATION: CPT

## 2022-04-26 PROCEDURE — 25000003 PHARM REV CODE 250: Performed by: HOSPITALIST

## 2022-04-26 PROCEDURE — 94760 N-INVAS EAR/PLS OXIMETRY 1: CPT

## 2022-04-26 PROCEDURE — 82077 ASSAY SPEC XCP UR&BREATH IA: CPT | Performed by: HOSPITALIST

## 2022-04-26 PROCEDURE — 21400001 HC TELEMETRY ROOM

## 2022-04-26 PROCEDURE — 36415 COLL VENOUS BLD VENIPUNCTURE: CPT | Performed by: STUDENT IN AN ORGANIZED HEALTH CARE EDUCATION/TRAINING PROGRAM

## 2022-04-26 PROCEDURE — 82533 TOTAL CORTISOL: CPT | Performed by: STUDENT IN AN ORGANIZED HEALTH CARE EDUCATION/TRAINING PROGRAM

## 2022-04-26 PROCEDURE — 82728 ASSAY OF FERRITIN: CPT | Performed by: STUDENT IN AN ORGANIZED HEALTH CARE EDUCATION/TRAINING PROGRAM

## 2022-04-26 PROCEDURE — 36415 COLL VENOUS BLD VENIPUNCTURE: CPT | Performed by: HOSPITALIST

## 2022-04-26 PROCEDURE — 36600 WITHDRAWAL OF ARTERIAL BLOOD: CPT

## 2022-04-26 PROCEDURE — 27000190 HC CPAP FULL FACE MASK W/VALVE

## 2022-04-26 PROCEDURE — 83880 ASSAY OF NATRIURETIC PEPTIDE: CPT | Performed by: HOSPITALIST

## 2022-04-26 PROCEDURE — 84300 ASSAY OF URINE SODIUM: CPT | Performed by: HOSPITALIST

## 2022-04-26 PROCEDURE — 63600175 PHARM REV CODE 636 W HCPCS: Performed by: INTERNAL MEDICINE

## 2022-04-26 PROCEDURE — 80048 BASIC METABOLIC PNL TOTAL CA: CPT | Performed by: STUDENT IN AN ORGANIZED HEALTH CARE EDUCATION/TRAINING PROGRAM

## 2022-04-26 PROCEDURE — 82570 ASSAY OF URINE CREATININE: CPT | Performed by: HOSPITALIST

## 2022-04-26 PROCEDURE — 84540 ASSAY OF URINE/UREA-N: CPT | Performed by: HOSPITALIST

## 2022-04-26 PROCEDURE — 94660 CPAP INITIATION&MGMT: CPT

## 2022-04-26 PROCEDURE — 25000003 PHARM REV CODE 250: Performed by: INTERNAL MEDICINE

## 2022-04-26 PROCEDURE — 85025 COMPLETE CBC W/AUTO DIFF WBC: CPT | Performed by: STUDENT IN AN ORGANIZED HEALTH CARE EDUCATION/TRAINING PROGRAM

## 2022-04-26 PROCEDURE — 80202 ASSAY OF VANCOMYCIN: CPT | Performed by: STUDENT IN AN ORGANIZED HEALTH CARE EDUCATION/TRAINING PROGRAM

## 2022-04-26 PROCEDURE — 84145 PROCALCITONIN (PCT): CPT | Performed by: HOSPITALIST

## 2022-04-26 PROCEDURE — 99900035 HC TECH TIME PER 15 MIN (STAT)

## 2022-04-26 PROCEDURE — 80307 DRUG TEST PRSMV CHEM ANLYZR: CPT | Performed by: HOSPITALIST

## 2022-04-26 PROCEDURE — 87205 SMEAR GRAM STAIN: CPT | Performed by: HOSPITALIST

## 2022-04-26 RX ORDER — COSYNTROPIN 0.25 MG/ML
0.25 INJECTION, POWDER, FOR SOLUTION INTRAMUSCULAR; INTRAVENOUS ONCE
Status: DISCONTINUED | OUTPATIENT
Start: 2022-04-27 | End: 2022-04-28 | Stop reason: HOSPADM

## 2022-04-26 RX ADMIN — FOLIC ACID 1 MG: 5 INJECTION, SOLUTION INTRAMUSCULAR; INTRAVENOUS; SUBCUTANEOUS at 12:04

## 2022-04-26 RX ADMIN — PIPERACILLIN AND TAZOBACTAM 4.5 G: 4; .5 INJECTION, POWDER, LYOPHILIZED, FOR SOLUTION INTRAVENOUS; PARENTERAL at 08:04

## 2022-04-26 RX ADMIN — HALOPERIDOL LACTATE 2.5 MG: 5 INJECTION, SOLUTION INTRAMUSCULAR at 07:04

## 2022-04-26 RX ADMIN — ASPIRIN 81 MG: 81 TABLET, COATED ORAL at 08:04

## 2022-04-26 NOTE — NURSING
Per handoff received from Lexi COSBY RN. Patient care assumed. Patients overall condition assessed and patient appears to be in NAD with no complaints of pain. 20g RW PIV is clean, dry, and intact. Call light in reach and patient instructed to inform the nurse if anything is needed. Patient stable and will continue to be monitored.

## 2022-04-26 NOTE — PROGRESS NOTES
Pharmacokinetic Assessment Follow Up: IV Vancomycin    Vancomycin serum concentration assessment(s):    The random level was drawn correctly and can be used to guide therapy at this time. The measurement is within the desired definitive target range of 10 to 20 mcg/mL.    Vancomycin Regimen Plan:    Give 750 mg today.  Re-dose when the random level is less than 20 mcg/mL, next level to be drawn at 0400 on 4/27/2022    Drug levels (last 3 results):  Recent Labs   Lab Result Units 04/25/22  0512 04/26/22  0455   Vancomycin, Random ug/mL 14.4 12.2       Pharmacy will continue to follow and monitor vancomycin.    Please contact pharmacy at extension [phone] for questions regarding this assessment.    Thank you for the consult,   Vincent Solares Jr       Patient brief summary:  Ana James is a 81 y.o. female initiated on antimicrobial therapy with IV Vancomycin for treatment of bacteremia    Drug Allergies:   Review of patient's allergies indicates:   Allergen Reactions    Adhesive Rash     Paper tape       Actual Body Weight:   90.7 kg    Renal Function:   Estimated Creatinine Clearance: 19.4 mL/min (A) (based on SCr of 2.3 mg/dL (H)).,     Dialysis Method (if applicable):  N/A    CBC (last 72 hours):  Recent Labs   Lab Result Units 04/24/22  1159 04/25/22  0512 04/26/22  0455   WBC K/uL 3.66* 4.21 6.20   Hemoglobin g/dL 11.0* 10.2* 10.1*   Hemoglobin A1C %  --  6.6*  --    Hematocrit % 34.4* 31.2* 31.6*   Platelets K/uL 130* 124* 130*   Gran % % 45.4 63.9 44.9   Lymph % % 28.1 15.0* 30.3   Mono % % 8.2 7.4 12.3   Eosinophil % % 17.5* 12.8* 11.9*   Basophil % % 0.3 0.2 0.3   Differential Method  Automated Automated Automated       Metabolic Panel (last 72 hours):  Recent Labs   Lab Result Units 04/24/22  1159 04/24/22  1210 04/24/22  1603 04/24/22  2207 04/25/22  0512 04/26/22  0455   Sodium mmol/L 136  --  133* 140 137 137   Potassium mmol/L 6.3*  --  6.6* 4.5 5.3* 4.8   Chloride mmol/L 112*  --  113* 113*  110 107   CO2 mmol/L 19*  --  16* 19* 17* 22*   Glucose mg/dL 125*  --  176* 16* 83 120*   Glucose, UA   --  Negative  --   --   --   --    BUN mg/dL 33*  --  34* 32* 31* 28*   Creatinine mg/dL 1.9*  --  1.8* 2.0* 2.1* 2.3*   Creatinine, Urine mg/dL  --  70.0  --   --   --   --    Albumin g/dL 3.3*  --   --  3.0*  --   --    Total Bilirubin mg/dL 0.3  --   --  0.3  --   --    Alkaline Phosphatase U/L 107  --   --  89  --   --    AST U/L 62*  --   --  57*  --   --    ALT U/L 75*  --   --  71*  --   --    Magnesium mg/dL 2.4  --   --   --   --   --        Vancomycin Administrations:  vancomycin given in the last 96 hours                     vancomycin 500 mg in dextrose 5 % 100 mL IVPB (ready to mix system) (mg) 500 mg New Bag 04/25/22 1044    vancomycin (VANCOCIN) 1,750 mg in dextrose 5 % 500 mL IVPB (mg) 1,750 mg New Bag 04/24/22 1508                    Microbiologic Results:  Microbiology Results (last 7 days)       Procedure Component Value Units Date/Time    Blood Culture #2 **CANNOT BE ORDERED STAT** [[id number]] Collected: 04/24/22 1303    Order Status: Completed Specimen: Blood from Peripheral, Hand, Left Updated: 04/25/22 1503     Blood Culture, Routine No Growth to date      No Growth to date    Blood Culture #1 **CANNOT BE ORDERED STAT** [[id number]] Collected: 04/24/22 1242    Order Status: Completed Specimen: Blood from Peripheral, Hand, Left Updated: 04/25/22 1303     Blood Culture, Routine No Growth to date      No Growth to date    Stool culture [[id number]]     Order Status: No result Specimen: Stool

## 2022-04-26 NOTE — PLAN OF CARE
Problem: Adult Inpatient Plan of Care  Goal: Plan of Care Review  Outcome: Ongoing, Progressing  Goal: Patient-Specific Goal (Individualized)  Outcome: Ongoing, Progressing  Goal: Absence of Hospital-Acquired Illness or Injury  Outcome: Ongoing, Progressing  Goal: Optimal Comfort and Wellbeing  Outcome: Ongoing, Progressing  Goal: Readiness for Transition of Care  Outcome: Ongoing, Progressing     Problem: Diabetes Comorbidity  Goal: Blood Glucose Level Within Targeted Range  Outcome: Ongoing, Progressing     Problem: Fluid and Electrolyte Imbalance (Acute Kidney Injury/Impairment)  Goal: Fluid and Electrolyte Balance  Outcome: Ongoing, Progressing     Problem: Oral Intake Inadequate (Acute Kidney Injury/Impairment)  Goal: Optimal Nutrition Intake  Outcome: Ongoing, Progressing     Problem: Renal Function Impairment (Acute Kidney Injury/Impairment)  Goal: Effective Renal Function  Outcome: Ongoing, Progressing     Problem: Skin Injury Risk Increased  Goal: Skin Health and Integrity  Outcome: Ongoing, Progressing     Problem: Coping Ineffective  Goal: Effective Coping  Outcome: Ongoing, Progressing

## 2022-04-26 NOTE — ASSESSMENT & PLAN NOTE
-BP generally well controlled to slighlty elevated  -Holding home coreg and norvasc for now  -Monitor closely

## 2022-04-26 NOTE — NURSING
Patient to scheduled procedure as ordered. Tele monitoring in progress. Patient appears to be sleeping No apparent distress noted.

## 2022-04-26 NOTE — SUBJECTIVE & OBJECTIVE
Interval History: No acute events overnight.  Patient remains obtunded.  Not able to wake up or respond to voice or tactile stimulation today.  Sitter reported that she pulls at iv lines at times.      Review of Systems   Unable to perform ROS: Mental status change   Objective:     Vital Signs (Most Recent):  Temp: 97.5 °F (36.4 °C) (04/26/22 1600)  Pulse: (!) 55 (04/26/22 1600)  Resp: 18 (04/26/22 1600)  BP: 134/65 (04/26/22 1600)  SpO2: 98 % (04/26/22 1600)   Vital Signs (24h Range):  Temp:  [97.4 °F (36.3 °C)-98.7 °F (37.1 °C)] 97.5 °F (36.4 °C)  Pulse:  [55-73] 55  Resp:  [18-20] 18  SpO2:  [93 %-100 %] 98 %  BP: (134-159)/(63-92) 134/65     Weight: 90.7 kg (200 lb)  Body mass index is 36.58 kg/m².    Intake/Output Summary (Last 24 hours) at 4/26/2022 1825  Last data filed at 4/26/2022 1330  Gross per 24 hour   Intake 0 ml   Output 900 ml   Net -900 ml      Physical Exam  Vitals and nursing note reviewed.   Constitutional:       General: She is not in acute distress.     Appearance: She is obese. She is not toxic-appearing.      Comments: Obtunded, no distress   HENT:      Head: Normocephalic and atraumatic.      Right Ear: External ear normal.      Left Ear: External ear normal.      Nose: Nose normal.      Mouth/Throat:      Mouth: Mucous membranes are moist.   Eyes:      Comments: Left eye with cataract   Cardiovascular:      Rate and Rhythm: Normal rate and regular rhythm.      Pulses: Normal pulses.      Heart sounds: No murmur heard.  Pulmonary:      Effort: Pulmonary effort is normal.      Breath sounds: No rales.      Comments: Snoring, difficult to hear lungs clearly due to snores and body habitus.  No wheezing  Abdominal:      General: Bowel sounds are normal. There is no distension.      Palpations: Abdomen is soft.      Tenderness: There is no abdominal tenderness.   Musculoskeletal:      Cervical back: Normal range of motion.      Right lower leg: No edema.      Left lower leg: No edema.   Skin:      General: Skin is warm and dry.      Comments: Warm to the touch. Bilateral lower extremities with hyperpigmentation, consistent with venous stasis.    Neurological:      Comments: Obtunded.  Does not open eyes or respond to voice or tactile stimulation other than slight grimace to noxious stimuli       Significant Labs: All pertinent labs within the past 24 hours have been reviewed.    Significant Imaging: I have reviewed all pertinent imaging results/findings within the past 24 hours.

## 2022-04-26 NOTE — NURSING
Patient returned to unit from scheduled testing. Patient without c/o discomfort. Tele monitoring in progress. No apparent distress noted.

## 2022-04-26 NOTE — PROGRESS NOTES
[facility] Medicine  Progress Note    Patient Name: Ana James  MRN: [id number]  Patient Class: IP- Inpatient   Admission Date: 4/24/2022  Length of Stay: 2 days  Attending Physician: Yosi Bejarano MD  Primary Care Provider: Viky Bean MD        Subjective:     Principal Problem:Altered mental status        HPI:  80 yo female with a PMHx of HTN, HLD, DM2 (A1c 6.8% in Jan) who presented from group home for AMS found to have hyperkalemic.    History was obtained via ED, EMS, and chart review, plus family. Patient answers yes and no to simple questions currently. Apparently, she was in her normal state of health until the last 24 hours becoming agitated and altered. Per family and NH, this is unusual for her. No other noticeable changes per staff. She did arrive with urinary and stool incontinence. In the ED, labs showing hyperkalemia of 6.6 with sCr 1.9 from baselin of 1.3. Also notable for mild leukopenia with eosinophilia and vitals signs of hypothermia to 88F.    ECG showing likely AF with slow VR (QRS is irregular making junctional unlikely). No known history. Given insulin and bicarb for K shift plus CaGluc for ECG changes. Also received Vanc/Zosyn for concern of infection. CTH negative.       Overview/Hospital Course:  80 yo female admitted from [facility] for altered mental status. Patient was found to be hyperkalemic and hypothermic on admission. Required warming blanket with improvement. Hyperkalemia improved with shifting in ED but patient became hypoglycemic.     CT head with no acute process. CXR with no acute intrathoracic process. UA does not appear grossly infected.  Blood culture with no growth to date.  Cortisol a.m. pending.  Contacted the nursing home and per nursing home, patient is alert and oriented at baseline.   No obvious evidence of infection or etiology of hypothermia at this time. Continue IV fluids and blood glucose monitoring. Of note, patient  was recently hospitalized from 02/08-02/14 for hypothermia as well.       Interval History: No acute events overnight.  Patient remains obtunded.  Not able to wake up or respond to voice or tactile stimulation today.  Sitter reported that she pulls at iv lines at times.      Review of Systems   Unable to perform ROS: Mental status change   Objective:     Vital Signs (Most Recent):  Temp: 97.5 °F (36.4 °C) (04/26/22 1600)  Pulse: (!) 55 (04/26/22 1600)  Resp: 18 (04/26/22 1600)  BP: 134/65 (04/26/22 1600)  SpO2: 98 % (04/26/22 1600)   Vital Signs (24h Range):  Temp:  [97.4 °F (36.3 °C)-98.7 °F (37.1 °C)] 97.5 °F (36.4 °C)  Pulse:  [55-73] 55  Resp:  [18-20] 18  SpO2:  [93 %-100 %] 98 %  BP: (134-159)/(63-92) 134/65     Weight: 90.7 kg (200 lb)  Body mass index is 36.58 kg/m².    Intake/Output Summary (Last 24 hours) at 4/26/2022 1825  Last data filed at 4/26/2022 1330  Gross per 24 hour   Intake 0 ml   Output 900 ml   Net -900 ml      Physical Exam  Vitals and nursing note reviewed.   Constitutional:       General: She is not in acute distress.     Appearance: She is obese. She is not toxic-appearing.      Comments: Obtunded, no distress   HENT:      Head: Normocephalic and atraumatic.      Right Ear: External ear normal.      Left Ear: External ear normal.      Nose: Nose normal.      Mouth/Throat:      Mouth: Mucous membranes are moist.   Eyes:      Comments: Left eye with cataract   Cardiovascular:      Rate and Rhythm: Normal rate and regular rhythm.      Pulses: Normal pulses.      Heart sounds: No murmur heard.  Pulmonary:      Effort: Pulmonary effort is normal.      Breath sounds: No rales.      Comments: Snoring, difficult to hear lungs clearly due to snores and body habitus.  No wheezing  Abdominal:      General: Bowel sounds are normal. There is no distension.      Palpations: Abdomen is soft.      Tenderness: There is no abdominal tenderness.   Musculoskeletal:      Cervical back: Normal range of motion.  "     Right lower leg: No edema.      Left lower leg: No edema.   Skin:     General: Skin is warm and dry.      Comments: Warm to the touch. Bilateral lower extremities with hyperpigmentation, consistent with venous stasis.    Neurological:      Comments: Obtunded.  Does not open eyes or respond to voice or tactile stimulation other than slight grimace to noxious stimuli       Significant Labs: All pertinent labs within the past 24 hours have been reviewed.    Significant Imaging: I have reviewed all pertinent imaging results/findings within the past 24 hours.      Assessment/Plan:      * Altered mental status  -Admitted to inpatient status  -History per sister and brother - they noted some increasing episodes of "acting out" but more recently she has been lethargic.  Not on CPAP at home.  -CTH negative for acute pathology - shows old lacunar infarcts and chronic microvascular changes  -Hypothermic on admit, but no convincing evidence of infection.  NO UTI.  NO pneumonia.  Blood cultures negative  -TSH, procalcitonin, ammonia, B12 and RPR all normal.  -ABG without significant hypercarbia  -Etiology is unclear.  Differential includes uremic encephalopathy, toxic ingestion, adrenal insufficiency, folate deficiency  -Check urine drug screen, EtOH, cosyntropin stimulation  -Stop antibiotics as no convincing evidence of infection and worsening renal function.  -Provide CPAP qhs to ensure no hypercarbia  -Ensure no sedating medications.    -Start folic acid replacement  -Consider MRI if not improving    Acute renal failure superimposed on stage 3 chronic kidney disease  -Baseline Cr 1.3  -On admit Cr 1.8 with hyperkalemia and NAGMA  -Suspect pre-renal due to dehydration.  -BNP only 21.  FENA 2.8%.  No urine eosinophils.  -KATHY without evidence of hydronephrosis.  -Avoid nephrotoxic agents and renally dose meds  -Stop vancomycin  -K normal and NAGMA improving  -Continue bicarb drip  -Repeat BMP in AM - if not improving " consider nephrology consult.    Hyperkalemia  -K of 6.6 on admit, likely due to AUTUMN;   -ECG with AF which could be possible sequela  -Shifted in ER and received Na zirc  -K now normal  -Continue bicarb drip and close monitoring.    Hypothermia  -Noted on admission with concurrent bradycardia  -No evidence of sepsis - blood cultures negative, UA normal, no leukocytosis, procalcitonin negative  -Warming blanket used and now euthermic.  -AM cortisol borderline.  Check cosyntropin stimulation test.    Arrhythmia  -ECG noted bradycardia with 1st degree AVB - had p waves so not afib.  -Bradycardia has resolved  -Cardiology consulted and input appreciated  -Continue to monitor closely.    Goals of care, counseling/discussion  Advance Care Planning Code Status  -Patient unable to participate due to obtunded mental status.  I discussed with both her brother and sister and they feel she would wish for a peaceful death and would not want intubation or chest compressions in the event of cardiac or respiratory arrest.  In keeping with those goals will place DNR order.    -Consulted palliative care as well, but patient unfortunately still unable to participate in discussions  25 minutes time spent in GOC/ACP discussions today.    Chronic diastolic heart failure  -Echo 1/2022 showed hyperdymanic EF 70% with indeterminate diastolic function  -Volume status difficult to clearly ascertain on exam due to body habitus - but not floridly overloaded  -BNP is normal.    -Strict ins/outs and daily weights    Hyperlipidemia  -Lipitor daily once able to take PO    Controlled type 2 diabetes mellitus with stage 3 chronic kidney disease, with long-term current use of insulin  -A1c 6.6 4/25/22  -Home med list includes detemir 26 units qhs and tradjenta 5mg daily - holding these for now  -Continue SSI q6h for now  -OK for full liquid diet per SLP evaluation.    Class 2 obesity in adult  Body mass index is 36.58 kg/m².   -Morbid obesity  complicates all aspects of disease management from diagnostic modalities to treatment.   -Weight loss encouraged and health benefits explained to patient.     Essential hypertension  -BP generally well controlled to slighlty elevated  -Holding home coreg and norvasc for now  -Monitor closely      VTE Risk Mitigation (From admission, onward)    None          Discharge Planning   FREDERICK:      Code Status: DNR   Is the patient medically ready for discharge?:     Reason for patient still in hospital (select all that apply): Treatment  Discharge Plan A: Return to nursing home                  Yosi Bejarano MD  Department of Hospital Medicine   [facility] - Telemetry

## 2022-04-26 NOTE — ASSESSMENT & PLAN NOTE
Body mass index is 36.58 kg/m².   -Morbid obesity complicates all aspects of disease management from diagnostic modalities to treatment.   -Weight loss encouraged and health benefits explained to patient.

## 2022-04-26 NOTE — CONSULTS
Palliative consult received. Discussed with Dr bejarano. Patient is obtunded and unable to arouse her. She will be p[laced on bipap tonight.   She is a DNR after Dr Bejarano had discussion with family.  Will f/u tomorrow to see if she is awake and able to   participate in conversation. Will also need to see how she does with ST as this could change the course of the GOC conversation

## 2022-04-26 NOTE — ASSESSMENT & PLAN NOTE
"-Admitted to inpatient status  -History per sister and brother - they noted some increasing episodes of "acting out" but more recently she has been lethargic.  Not on CPAP at home.  -CTH negative for acute pathology - shows old lacunar infarcts and chronic microvascular changes  -Hypothermic on admit, but no convincing evidence of infection.  NO UTI.  NO pneumonia.  Blood cultures negative  -TSH, procalcitonin, ammonia, B12 and RPR all normal.  -ABG without significant hypercarbia  -Etiology is unclear.  Differential includes uremic encephalopathy, toxic ingestion, adrenal insufficiency, folate deficiency  -Check urine drug screen, EtOH, cosyntropin stimulation  -Stop antibiotics as no convincing evidence of infection and worsening renal function.  -Provide CPAP qhs to ensure no hypercarbia  -Ensure no sedating medications.    -Start folic acid replacement  -Consider MRI if not improving  "

## 2022-04-26 NOTE — ASSESSMENT & PLAN NOTE
-Echo 1/2022 showed hyperdymanic EF 70% with indeterminate diastolic function  -Volume status difficult to clearly ascertain on exam due to body habitus - but not floridly overloaded  -BNP is normal.    -Strict ins/outs and daily weights

## 2022-04-26 NOTE — NURSING
Patient noted to snap pigtail from IV. IV pigtail changed with blood return noted and PIV intact. Sitter instructed to inform the nurse if anything is needed.

## 2022-04-26 NOTE — NURSING
PER handoff received from ALEXANDRA Harper      Pt resting in bed quietly. NAD noted. No c/o pain. Fall and safety precautions maintained. Bed alarm activated and audible.. Bed locked in lowest position, with side rails up x2. Call bell and personal items within reach

## 2022-04-26 NOTE — ASSESSMENT & PLAN NOTE
-A1c 6.6 4/25/22  -Home med list includes detemir 26 units qhs and tradjenta 5mg daily - holding these for now  -Continue SSI q6h for now  -OK for full liquid diet per SLP evaluation.

## 2022-04-26 NOTE — ASSESSMENT & PLAN NOTE
Advance Care Planning Code Status  -Patient unable to participate due to obtunded mental status.  I discussed with both her brother and sister and they feel she would wish for a peaceful death and would not want intubation or chest compressions in the event of cardiac or respiratory arrest.  In keeping with those goals will place DNR order.    -Consulted palliative care as well, but patient unfortunately still unable to participate in discussions  25 minutes time spent in GOC/ACP discussions today.

## 2022-04-26 NOTE — ASSESSMENT & PLAN NOTE
-K of 6.6 on admit, likely due to AUTUMN;   -ECG with AF which could be possible sequela  -Shifted in ER and received Na zirc  -K now normal  -Continue bicarb drip and close monitoring.

## 2022-04-26 NOTE — ASSESSMENT & PLAN NOTE
-Noted on admission with concurrent bradycardia  -No evidence of sepsis - blood cultures negative, UA normal, no leukocytosis, procalcitonin negative  -Warming blanket used and now euthermic.  -AM cortisol borderline.  Check cosyntropin stimulation test.

## 2022-04-26 NOTE — PT/OT/SLP PROGRESS
Speech Language Pathology      Ana Roosevelt James  MRN: [id number]    Patient not seen today secondary to Other (Comment) (Pt unavailable 2/2 ultrasound). Will follow-up tomorrow.

## 2022-04-26 NOTE — ASSESSMENT & PLAN NOTE
-ECG noted bradycardia with 1st degree AVB - had p waves so not afib.  -Bradycardia has resolved  -Cardiology consulted and input appreciated  -Continue to monitor closely.

## 2022-04-26 NOTE — NURSING
Nurse called to patients room by sitter with complaints that patient had pulled her PIV out. New PIV placed and Sodium Bicarb gtt restarted.

## 2022-04-26 NOTE — PLAN OF CARE
Problem: Adult Inpatient Plan of Care  Goal: Plan of Care Review  Flowsheets (Taken 4/26/2022 0057)  Plan of Care Reviewed With: patient  Goal: Absence of Hospital-Acquired Illness or Injury  Intervention: Identify and Manage Fall Risk  Flowsheets (Taken 4/26/2022 0057)  Safety Promotion/Fall Prevention:   bed alarm set   Fall Risk reviewed with patient/family  Intervention: Prevent Skin Injury  Flowsheets (Taken 4/26/2022 0057)  Body Position: position changed independently  Intervention: Prevent and Manage VTE (Venous Thromboembolism) Risk  Flowsheets (Taken 4/26/2022 0057)  Activity Management: Rolling - L1  Intervention: Prevent Infection  Flowsheets (Taken 4/26/2022 0057)  Infection Prevention:   rest/sleep promoted   single patient room provided  Goal: Optimal Comfort and Wellbeing  Intervention: Monitor Pain and Promote Comfort  Flowsheets (Taken 4/26/2022 0057)  Pain Management Interventions: pain management plan reviewed with patient/caregiver  Intervention: Provide Person-Centered Care  Flowsheets (Taken 4/26/2022 0057)  Trust Relationship/Rapport:   care explained   reassurance provided     Problem: Diabetes Comorbidity  Goal: Blood Glucose Level Within Targeted Range  Intervention: Monitor and Manage Glycemia  Flowsheets (Taken 4/26/2022 0057)  Glycemic Management: blood glucose monitored     Problem: Renal Function Impairment (Acute Kidney Injury/Impairment)  Goal: Effective Renal Function  Intervention: Monitor and Support Renal Function  Flowsheets (Taken 4/26/2022 0057)  Medication Review/Management: medications reviewed     Problem: Skin Injury Risk Increased  Goal: Skin Health and Integrity  Intervention: Optimize Skin Protection  Flowsheets (Taken 4/26/2022 0057)  Head of Bed (HOB) Positioning: HOB at 30-45 degrees

## 2022-04-26 NOTE — ASSESSMENT & PLAN NOTE
-Baseline Cr 1.3  -On admit Cr 1.8 with hyperkalemia and NAGMA  -Suspect pre-renal due to dehydration.  -BNP only 21.  FENA 2.8%.  No urine eosinophils.  -KATHY without evidence of hydronephrosis.  -Avoid nephrotoxic agents and renally dose meds  -Stop vancomycin  -K normal and NAGMA improving  -Continue bicarb drip  -Repeat BMP in AM - if not improving consider nephrology consult.

## 2022-04-27 PROBLEM — Z66 DNR (DO NOT RESUSCITATE): Status: ACTIVE | Noted: 2022-04-26

## 2022-04-27 PROBLEM — R53.81 DEBILITY: Status: ACTIVE | Noted: 2022-04-27

## 2022-04-27 PROBLEM — Z51.5 PALLIATIVE CARE ENCOUNTER: Status: ACTIVE | Noted: 2022-04-26

## 2022-04-27 LAB
ANION GAP SERPL CALC-SCNC: 9 MMOL/L (ref 8–16)
BASOPHILS # BLD AUTO: 0.01 K/UL (ref 0–0.2)
BASOPHILS NFR BLD: 0.2 % (ref 0–1.9)
BUN SERPL-MCNC: 20 MG/DL (ref 8–23)
CALCIUM SERPL-MCNC: 9.5 MG/DL (ref 8.7–10.5)
CHLORIDE SERPL-SCNC: 106 MMOL/L (ref 95–110)
CO2 SERPL-SCNC: 22 MMOL/L (ref 23–29)
CORTIS SERPL-MCNC: 10.8 UG/DL
CORTIS SERPL-MCNC: 16.9 UG/DL
CORTIS SERPL-MCNC: 18.2 UG/DL
CREAT SERPL-MCNC: 1.9 MG/DL (ref 0.5–1.4)
DIFFERENTIAL METHOD: ABNORMAL
EOSINOPHIL # BLD AUTO: 0.7 K/UL (ref 0–0.5)
EOSINOPHIL NFR BLD: 12.7 % (ref 0–8)
ERYTHROCYTE [DISTWIDTH] IN BLOOD BY AUTOMATED COUNT: 16.1 % (ref 11.5–14.5)
EST. GFR  (AFRICAN AMERICAN): 28 ML/MIN/1.73 M^2
EST. GFR  (NON AFRICAN AMERICAN): 24 ML/MIN/1.73 M^2
GLUCOSE SERPL-MCNC: 130 MG/DL (ref 70–110)
HCT VFR BLD AUTO: 32.9 % (ref 37–48.5)
HGB BLD-MCNC: 10.4 G/DL (ref 12–16)
IMM GRANULOCYTES # BLD AUTO: 0.02 K/UL (ref 0–0.04)
IMM GRANULOCYTES NFR BLD AUTO: 0.3 % (ref 0–0.5)
LYMPHOCYTES # BLD AUTO: 1.3 K/UL (ref 1–4.8)
LYMPHOCYTES NFR BLD: 23.1 % (ref 18–48)
MCH RBC QN AUTO: 27.2 PG (ref 27–31)
MCHC RBC AUTO-ENTMCNC: 31.6 G/DL (ref 32–36)
MCV RBC AUTO: 86 FL (ref 82–98)
MONOCYTES # BLD AUTO: 0.6 K/UL (ref 0.3–1)
MONOCYTES NFR BLD: 9.7 % (ref 4–15)
NEUTROPHILS # BLD AUTO: 3.1 K/UL (ref 1.8–7.7)
NEUTROPHILS NFR BLD: 54 % (ref 38–73)
NRBC BLD-RTO: 1 /100 WBC
PLATELET # BLD AUTO: 122 K/UL (ref 150–450)
PMV BLD AUTO: 10.1 FL (ref 9.2–12.9)
POCT GLUCOSE: 132 MG/DL (ref 70–110)
POCT GLUCOSE: 144 MG/DL (ref 70–110)
POCT GLUCOSE: 205 MG/DL (ref 70–110)
POTASSIUM SERPL-SCNC: 4.8 MMOL/L (ref 3.5–5.1)
RBC # BLD AUTO: 3.82 M/UL (ref 4–5.4)
SODIUM SERPL-SCNC: 137 MMOL/L (ref 136–145)
UUN UR-MCNC: 212 MG/DL (ref 140–1050)
WBC # BLD AUTO: 5.75 K/UL (ref 3.9–12.7)

## 2022-04-27 PROCEDURE — 99222 PR INITIAL HOSPITAL CARE,LEVL II: ICD-10-PCS | Mod: ,,, | Performed by: PSYCHIATRY & NEUROLOGY

## 2022-04-27 PROCEDURE — 21400001 HC TELEMETRY ROOM

## 2022-04-27 PROCEDURE — 99900035 HC TECH TIME PER 15 MIN (STAT)

## 2022-04-27 PROCEDURE — 25000003 PHARM REV CODE 250: Performed by: STUDENT IN AN ORGANIZED HEALTH CARE EDUCATION/TRAINING PROGRAM

## 2022-04-27 PROCEDURE — 80048 BASIC METABOLIC PNL TOTAL CA: CPT | Performed by: STUDENT IN AN ORGANIZED HEALTH CARE EDUCATION/TRAINING PROGRAM

## 2022-04-27 PROCEDURE — 82024 ASSAY OF ACTH: CPT | Performed by: HOSPITALIST

## 2022-04-27 PROCEDURE — 94760 N-INVAS EAR/PLS OXIMETRY 1: CPT

## 2022-04-27 PROCEDURE — 36415 COLL VENOUS BLD VENIPUNCTURE: CPT | Performed by: HOSPITALIST

## 2022-04-27 PROCEDURE — 82533 TOTAL CORTISOL: CPT | Mod: 91 | Performed by: HOSPITALIST

## 2022-04-27 PROCEDURE — 36415 COLL VENOUS BLD VENIPUNCTURE: CPT | Performed by: STUDENT IN AN ORGANIZED HEALTH CARE EDUCATION/TRAINING PROGRAM

## 2022-04-27 PROCEDURE — 25000003 PHARM REV CODE 250: Performed by: INTERNAL MEDICINE

## 2022-04-27 PROCEDURE — 99222 1ST HOSP IP/OBS MODERATE 55: CPT | Mod: ,,, | Performed by: NURSE PRACTITIONER

## 2022-04-27 PROCEDURE — 99222 1ST HOSP IP/OBS MODERATE 55: CPT | Mod: ,,, | Performed by: PSYCHIATRY & NEUROLOGY

## 2022-04-27 PROCEDURE — 63600175 PHARM REV CODE 636 W HCPCS: Performed by: HOSPITALIST

## 2022-04-27 PROCEDURE — 85025 COMPLETE CBC W/AUTO DIFF WBC: CPT | Performed by: STUDENT IN AN ORGANIZED HEALTH CARE EDUCATION/TRAINING PROGRAM

## 2022-04-27 PROCEDURE — 94761 N-INVAS EAR/PLS OXIMETRY MLT: CPT

## 2022-04-27 PROCEDURE — 99222 PR INITIAL HOSPITAL CARE,LEVL II: ICD-10-PCS | Mod: ,,, | Performed by: NURSE PRACTITIONER

## 2022-04-27 PROCEDURE — 25000003 PHARM REV CODE 250: Performed by: HOSPITALIST

## 2022-04-27 PROCEDURE — 92526 ORAL FUNCTION THERAPY: CPT

## 2022-04-27 RX ORDER — HEPARIN SODIUM 5000 [USP'U]/ML
5000 INJECTION, SOLUTION INTRAVENOUS; SUBCUTANEOUS EVERY 8 HOURS
Status: DISCONTINUED | OUTPATIENT
Start: 2022-04-27 | End: 2022-04-28 | Stop reason: HOSPADM

## 2022-04-27 RX ORDER — INSULIN ASPART 100 [IU]/ML
0-5 INJECTION, SOLUTION INTRAVENOUS; SUBCUTANEOUS
Status: DISCONTINUED | OUTPATIENT
Start: 2022-04-27 | End: 2022-04-28 | Stop reason: HOSPADM

## 2022-04-27 RX ORDER — COSYNTROPIN 0.25 MG/ML
0.25 INJECTION, POWDER, FOR SOLUTION INTRAMUSCULAR; INTRAVENOUS ONCE
Status: COMPLETED | OUTPATIENT
Start: 2022-04-27 | End: 2022-04-27

## 2022-04-27 RX ADMIN — FOLIC ACID 1 MG: 5 INJECTION, SOLUTION INTRAMUSCULAR; INTRAVENOUS; SUBCUTANEOUS at 09:04

## 2022-04-27 RX ADMIN — SODIUM BICARBONATE: 84 INJECTION, SOLUTION INTRAVENOUS at 02:04

## 2022-04-27 RX ADMIN — HEPARIN SODIUM 5000 UNITS: 5000 INJECTION INTRAVENOUS; SUBCUTANEOUS at 02:04

## 2022-04-27 RX ADMIN — INSULIN ASPART 2 UNITS: 100 INJECTION, SOLUTION INTRAVENOUS; SUBCUTANEOUS at 04:04

## 2022-04-27 RX ADMIN — COSYNTROPIN 0.25 MG: 0.25 INJECTION, POWDER, LYOPHILIZED, FOR SOLUTION INTRAVENOUS at 08:04

## 2022-04-27 RX ADMIN — ATORVASTATIN CALCIUM 20 MG: 10 TABLET, FILM COATED ORAL at 09:04

## 2022-04-27 RX ADMIN — ASPIRIN 81 MG: 81 TABLET, COATED ORAL at 09:04

## 2022-04-27 RX ADMIN — SODIUM BICARBONATE: 84 INJECTION, SOLUTION INTRAVENOUS at 12:04

## 2022-04-27 RX ADMIN — HEPARIN SODIUM 5000 UNITS: 5000 INJECTION INTRAVENOUS; SUBCUTANEOUS at 09:04

## 2022-04-27 NOTE — ASSESSMENT & PLAN NOTE
-ECG noted bradycardia with 1st degree AVB - had p waves so not afib.  -Bradycardia has resolved  -Cardiology consulted and input appreciated  -Continue to monitor closely.  -Avoid beta-blockers.

## 2022-04-27 NOTE — ASSESSMENT & PLAN NOTE
Advance Care Planning Code Status  -Patient unable to participate due to obtunded mental status.  I discussed with both her brother and sister and they feel she would wish for a peaceful death and would not want intubation or chest compressions in the event of cardiac or respiratory arrest.  In keeping with those goals will place DNR order.    -Consulted palliative care as well, but patient unfortunately still unable to participate in discussions  25 minutes time spent in GOC/ACP discussions on 4/26.

## 2022-04-27 NOTE — ASSESSMENT & PLAN NOTE
-A1c 6.6 4/25/22  -Hypoglycemic on admit  -Home med list includes detemir 26 units qhs and tradjenta 5mg daily - holding these for now  -Continue SSI   -OK for full liquid diet per SLP evaluation.  Hopeful for upgrade in diet today.

## 2022-04-27 NOTE — PROGRESS NOTES
[facility] Medicine  Progress Note    Patient Name: Ana James  MRN: [id number]  Patient Class: IP- Inpatient   Admission Date: 4/24/2022  Length of Stay: 3 days  Attending Physician: Yosi Bejarano MD  Primary Care Provider: Viky Bean MD        Subjective:     Principal Problem:Altered mental status        HPI:  82 yo female with a PMHx of HTN, HLD, DM2 (A1c 6.8% in Jan) [facility] found to have hyperkalemic.    History was obtained via ED, EMS, and chart review, plus family. Patient answers yes and no to simple questions currently. Apparently, she was in her normal state of health until the last 24 hours becoming agitated and altered. Per family and NH, this is unusual for her. No other noticeable changes per staff. She did arrive with urinary and stool incontinence. In the ED, labs showing hyperkalemia of 6.6 with sCr 1.9 from baselin of 1.3. Also notable for mild leukopenia with eosinophilia and vitals signs of hypothermia to 88F.    ECG showing likely AF with slow VR (QRS is irregular making junctional unlikely). No known history. Given insulin and bicarb for K shift plus CaGluc for ECG changes. Also received Vanc/Zosyn for concern of infection. CTH negative.       Overview/Hospital Course:  82 yo female admitted from [facility] for altered mental status. Patient was found to be hyperkalemic and hypothermic on admission. Required warming blanket with improvement. Hyperkalemia improved with shifting in ED but patient became hypoglycemic.     CT head with no acute process. CXR with no acute intrathoracic process. UA does not appear grossly infected.  Blood culture with no growth to date.  Cortisol a.m. pending.  Contacted the nursing home and per nursing home, patient is alert and oriented at baseline.   No obvious evidence of infection or etiology of hypothermia at this time. Continue IV fluids and blood glucose monitoring. Of note, patient  was recently hospitalized from 02/08-02/14 for hypothermia as well.       Interval History: No acute events overnight.  Today alert and oriented x 4.  Denies pain and sob.  Asking for discharge soon.      Review of Systems   Constitutional:  Negative for activity change, chills, diaphoresis and fatigue.   HENT:  Negative for congestion, drooling and hearing loss.    Eyes:  Negative for discharge.   Respiratory:  Negative for apnea, chest tightness, shortness of breath and wheezing.    Cardiovascular:  Negative for palpitations and leg swelling.   Gastrointestinal:  Negative for abdominal distention, abdominal pain, constipation and diarrhea.   Musculoskeletal:  Negative for arthralgias and gait problem.   Skin:  Negative for rash.   Neurological:  Positive for weakness. Negative for seizures, light-headedness and numbness.   Hematological:  Negative for adenopathy.   Psychiatric/Behavioral:  Positive for confusion. Negative for agitation and behavioral problems.    Objective:     Vital Signs (Most Recent):  Temp: 97.5 °F (36.4 °C) (04/27/22 0802)  Pulse: 60 (04/27/22 0852)  Resp: 19 (04/27/22 0802)  BP: 138/67 (04/27/22 0802)  SpO2: 98 % (04/27/22 0852)   Vital Signs (24h Range):  Temp:  [97.5 °F (36.4 °C)-98.2 °F (36.8 °C)] 97.5 °F (36.4 °C)  Pulse:  [] 60  Resp:  [11-38] 19  SpO2:  [93 %-100 %] 98 %  BP: (134-159)/(65-75) 138/67     Weight: 90.7 kg (200 lb)  Body mass index is 36.58 kg/m².    Intake/Output Summary (Last 24 hours) at 4/27/2022 1223  Last data filed at 4/27/2022 0700  Gross per 24 hour   Intake 0 ml   Output 1150 ml   Net -1150 ml        Physical Exam  Vitals and nursing note reviewed.   Constitutional:       General: She is not in acute distress.     Appearance: She is obese. She is not ill-appearing, toxic-appearing or diaphoretic.   HENT:      Head: Normocephalic and atraumatic.      Right Ear: External ear normal.      Left Ear: External ear normal.      Nose: Nose normal.       "Mouth/Throat:      Mouth: Mucous membranes are moist.   Eyes:      Extraocular Movements: Extraocular movements intact.      Comments: Left eye with cataract   Cardiovascular:      Rate and Rhythm: Normal rate and regular rhythm.      Pulses: Normal pulses.      Heart sounds: No murmur heard.  Pulmonary:      Effort: Pulmonary effort is normal.      Breath sounds: No rales.      Comments: No wheezing, breathing comfortably on room air  Abdominal:      General: Bowel sounds are normal. There is no distension.      Palpations: Abdomen is soft.      Tenderness: There is no abdominal tenderness.   Musculoskeletal:      Cervical back: Normal range of motion.      Right lower leg: No edema.      Left lower leg: No edema.   Skin:     General: Skin is warm and dry.      Comments: Warm to the touch. Bilateral lower extremities with hyperpigmentation, consistent with venous stasis.    Neurological:      Mental Status: She is alert.      Comments: Alert and oriented to self, year, city and hospital.  Still mild confusion with no insight as to why in hospital.  Diffusely weak       Significant Labs: All pertinent labs within the past 24 hours have been reviewed.    Significant Imaging: I have reviewed all pertinent imaging results/findings within the past 24 hours.      Assessment/Plan:      * Altered mental status  -Admitted to inpatient status  -History per sister and brother - they noted some increasing episodes of "acting out" but more recently she has been lethargic.  Not on CPAP at home.  -CTH negative for acute pathology - shows old lacunar infarcts and chronic microvascular changes  -Hypothermic on admit, but no convincing evidence of infection.  NO UTI.  NO pneumonia.  Blood cultures negative  -TSH, procalcitonin, ammonia, B12 and RPR all normal.  Urine drug screen and alcohol level were negative  -ABG without significant hypercarbia  -Cosyntropin stimulation results not consistent with adrenal insufficiency  -Was " hypoglycemic on admit but encephalopathy lasted much longer than hypoglycemia.  -Etiology is unclear.  Differential includes uremic encephalopathy, folate deficiency, prolonged encephalopathy after hypoglycemic event and occult toxic ingestion.  -Ensure no sedating medications.    -Stopped antibiotics 4/27 and added folic acid replacement and cpap qhs.  Today much better, but still a bit confused.  -As no great explanation, will check MRI brain and ask neurology to evaluate patient.  -Consult PT/OT.    -Hopefully able to discharge home tomorrow.    Acute renal failure superimposed on stage 3 chronic kidney disease  -Baseline Cr 1.3  -On admit Cr 1.8 with hyperkalemia and NAGMA  -Suspect pre-renal due to dehydration.  -BNP only 21.  FENA 2.8%.  No urine eosinophils.  -KATHY without evidence of hydronephrosis.  -Avoid nephrotoxic agents and renally dose meds  -Stopped vancomycin 4/26  -K normal and NAGMA improving.  Cr now 1.9  -Continue bicarb drip  -Repeat BMP in AM     Hyperkalemia  -K of 6.6 on admit, likely due to AUTUMN;   -ECG with AF which could be possible sequela  -Shifted in ER and received Na zirc  -K now normal  -Continue bicarb drip and close monitoring.    Hypothermia  -Noted on admission with concurrent bradycardia  -No evidence of sepsis - blood cultures negative, UA normal, no leukocytosis, procalcitonin negative  -Warming blanket used and now euthermic.  -AM cortisol borderline low but cosyntropin stim test not consistent with adrenal insufficiency.  -Source unclear - but has resolved.    Arrhythmia  -ECG noted bradycardia with 1st degree AVB - had p waves so not afib.  -Bradycardia has resolved  -Cardiology consulted and input appreciated  -Continue to monitor closely.  -Avoid beta-blockers.    Debility  -Consult PT/OT.    Goals of care, counseling/discussion  Advance Care Planning Code Status  -Patient unable to participate due to obtunded mental status.  I discussed with both her brother and sister  and they feel she would wish for a peaceful death and would not want intubation or chest compressions in the event of cardiac or respiratory arrest.  In keeping with those goals will place DNR order.    -Consulted palliative care as well, but patient unfortunately still unable to participate in discussions  25 minutes time spent in GOC/ACP discussions on 4/26.    Chronic diastolic heart failure  -Echo 1/2022 showed hyperdymanic EF 70% with indeterminate diastolic function  -Volume status difficult to clearly ascertain on exam due to body habitus - but not floridly overloaded  -BNP is normal.    -Strict ins/outs and daily weights  -Gentle bicarb drip for now.    Hyperlipidemia  -Lipitor daily once able to take PO    Controlled type 2 diabetes mellitus with stage 3 chronic kidney disease, with long-term current use of insulin  -A1c 6.6 4/25/22  -Hypoglycemic on admit  -Home med list includes detemir 26 units qhs and tradjenta 5mg daily - holding these for now  -Continue SSI   -OK for full liquid diet per SLP evaluation.  Hopeful for upgrade in diet today.    Class 2 obesity in adult  Body mass index is 36.58 kg/m².   -Morbid obesity complicates all aspects of disease management from diagnostic modalities to treatment.   -Weight loss encouraged and health benefits explained to patient.     Essential hypertension  -BP generally well controlled to slighlty elevated  -Holding home coreg and norvasc for now  -Monitor closely    VTE Risk Mitigation (From admission, onward)         Ordered     heparin (porcine) injection 5,000 Units  Every 8 hours         04/27/22 1236                Discharge Planning   FREDERICK:      Code Status: DNR   Is the patient medically ready for discharge?:     Reason for patient still in hospital (select all that apply): Treatment  Discharge Plan A: Return to nursing home                  Yosi Bejarano MD  Department of Hospital Medicine   [facility] - Telemetry

## 2022-04-27 NOTE — SUBJECTIVE & OBJECTIVE
Interval History: patient alert and oriented today. She is feeding herself and states she is ready to go    Past Medical History:   Diagnosis Date    Arthritis lower extremities    Asthma     Blind left eye     Diabetes mellitus     Edema of both lower legs     CHRONIC    Fall 02/08/2022    Gall stones     Hypertension     Kidney stones     Lives alone with help available     HOME HEALTH    Nephrolithiasis 1/22/2016    Obesity     PVD (peripheral vascular disease)     Renal disorder     Retinopathy     Sleep apnea     Vaginal delivery     x1    Weakness of both lower extremities     uses a cane, rollator & power chair       Past Surgical History:   Procedure Laterality Date    CARDIAC CATHETERIZATION      cataract      CHOLECYSTECTOMY      EYE SURGERY      Rt cataract surgery    HYSTERECTOMY      URETERAL STENT PLACEMENT         Review of patient's allergies indicates:   Allergen Reactions    Adhesive Rash     Paper tape       Medications:  Continuous Infusions:   sodium bicarbonate drip 100 mL/hr at 04/27/22 0048     Scheduled Meds:   aspirin  81 mg Oral Nightly    atorvastatin  20 mg Oral Daily    folic acid (FOLVITE) IVPB 1 mg  1 mg Intravenous Daily    heparin (porcine)  5,000 Units Subcutaneous Q8H    insulin aspart U-100  0-5 Units Subcutaneous QID (WM & HS)     PRN Meds:acetaminophen, dextrose 10%, dextrose 10%, glucagon (human recombinant), melatonin, ondansetron, sodium chloride 0.9%    Family History       Problem Relation (Age of Onset)    Cancer Sister    Diabetes Brother    Hypertension Father    Kidney failure Mother          Tobacco Use    Smoking status: Never Smoker    Smokeless tobacco: Never Used   Substance and Sexual Activity    Alcohol use: No    Drug use: Never    Sexual activity: Not Currently     Partners: Male       Review of Systems   Constitutional:  Positive for activity change.   HENT:  Negative for trouble swallowing.    Eyes:  Positive for visual disturbance (cataract L eye).    Respiratory:  Positive for shortness of breath.    Cardiovascular:  Negative for chest pain and leg swelling.   Gastrointestinal:  Negative for abdominal pain.   Neurological:  Negative for dizziness, weakness, light-headedness and headaches.   Objective:     Vital Signs (Most Recent):  Temp: 98.2 °F (36.8 °C) (04/27/22 1303)  Pulse: (!) 58 (04/27/22 1303)  Resp: 18 (04/27/22 1303)  BP: 132/60 (04/27/22 1303)  SpO2: 99 % (04/27/22 1303)   Vital Signs (24h Range):  Temp:  [97.5 °F (36.4 °C)-98.2 °F (36.8 °C)] 98.2 °F (36.8 °C)  Pulse:  [] 58  Resp:  [11-38] 18  SpO2:  [93 %-100 %] 99 %  BP: (132-159)/(60-75) 132/60     Weight: 90.7 kg (200 lb)  Body mass index is 36.58 kg/m².    Physical Exam  Vitals and nursing note reviewed.   Constitutional:       General: She is not in acute distress.     Appearance: She is obese. She is ill-appearing. She is not toxic-appearing.   HENT:      Head: Normocephalic and atraumatic.      Right Ear: External ear normal.      Left Ear: External ear normal.      Nose: Nose normal.   Eyes:      General:         Right eye: No discharge.         Left eye: No discharge.   Cardiovascular:      Rate and Rhythm: Normal rate and regular rhythm.   Pulmonary:      Effort: Pulmonary effort is normal.      Comments: Oxygen on  Abdominal:      General: There is no distension.      Palpations: Abdomen is soft.      Tenderness: There is no abdominal tenderness.   Musculoskeletal:      Right lower leg: No edema.      Left lower leg: No edema.   Skin:     General: Skin is warm and dry.   Neurological:      Mental Status: She is alert and oriented to person, place, and time. Mental status is at baseline.       Palliative Exam    Advance Care Planning   Advance Care Planning       Significant Labs: All pertinent labs within the past 24 hours have been reviewed.  CBC:   Recent Labs   Lab 04/27/22  0340   WBC 5.75   HGB 10.4*   HCT 32.9*   MCV 86   *     BMP:  Recent Labs   Lab 04/27/22  0340    *      K 4.8      CO2 22*   BUN 20   CREATININE 1.9*   CALCIUM 9.5     LFT:  Lab Results   Component Value Date    AST 57 (H) 04/24/2022    ALKPHOS 89 04/24/2022    BILITOT 0.3 04/24/2022     Albumin:   Albumin   Date Value Ref Range Status   04/24/2022 3.0 (L) 3.5 - 5.2 g/dL Final     Protein:   Total Protein   Date Value Ref Range Status   04/24/2022 7.2 6.0 - 8.4 g/dL Final     Lactic acid:   Lab Results   Component Value Date    LACTATE 0.4 (L) 04/24/2022    LACTATE 0.7 02/08/2022       Significant Imaging: MRI: I have reviewed all pertinent results/findings within the past 24 hours:  brain

## 2022-04-27 NOTE — PLAN OF CARE
04/27/22 1615   Medicare Message   Important Message from Medicare regarding Discharge Appeal Rights Given to patient/caregiver;Explained to patient/caregiver;Other (comments)  ([place] spoke with patient's brother, Prince Elder via phone [phone] verbalized understanding copy sent certified to address listed in chart. [id number] [phone])   Date IMM was signed 04/27/22   Time IMM was signed 1612

## 2022-04-27 NOTE — SUBJECTIVE & OBJECTIVE
Interval History: No acute events overnight.  Today alert and oriented x 4.  Denies pain and sob.  Asking for discharge soon.      Review of Systems   Constitutional:  Negative for activity change, chills, diaphoresis and fatigue.   HENT:  Negative for congestion, drooling and hearing loss.    Eyes:  Negative for discharge.   Respiratory:  Negative for apnea, chest tightness, shortness of breath and wheezing.    Cardiovascular:  Negative for palpitations and leg swelling.   Gastrointestinal:  Negative for abdominal distention, abdominal pain, constipation and diarrhea.   Musculoskeletal:  Negative for arthralgias and gait problem.   Skin:  Negative for rash.   Neurological:  Positive for weakness. Negative for seizures, light-headedness and numbness.   Hematological:  Negative for adenopathy.   Psychiatric/Behavioral:  Positive for confusion. Negative for agitation and behavioral problems.    Objective:     Vital Signs (Most Recent):  Temp: 97.5 °F (36.4 °C) (04/27/22 0802)  Pulse: 60 (04/27/22 0852)  Resp: 19 (04/27/22 0802)  BP: 138/67 (04/27/22 0802)  SpO2: 98 % (04/27/22 0852)   Vital Signs (24h Range):  Temp:  [97.5 °F (36.4 °C)-98.2 °F (36.8 °C)] 97.5 °F (36.4 °C)  Pulse:  [] 60  Resp:  [11-38] 19  SpO2:  [93 %-100 %] 98 %  BP: (134-159)/(65-75) 138/67     Weight: 90.7 kg (200 lb)  Body mass index is 36.58 kg/m².    Intake/Output Summary (Last 24 hours) at 4/27/2022 1223  Last data filed at 4/27/2022 0700  Gross per 24 hour   Intake 0 ml   Output 1150 ml   Net -1150 ml        Physical Exam  Vitals and nursing note reviewed.   Constitutional:       General: She is not in acute distress.     Appearance: She is obese. She is not ill-appearing, toxic-appearing or diaphoretic.   HENT:      Head: Normocephalic and atraumatic.      Right Ear: External ear normal.      Left Ear: External ear normal.      Nose: Nose normal.      Mouth/Throat:      Mouth: Mucous membranes are moist.   Eyes:      Extraocular  Movements: Extraocular movements intact.      Comments: Left eye with cataract   Cardiovascular:      Rate and Rhythm: Normal rate and regular rhythm.      Pulses: Normal pulses.      Heart sounds: No murmur heard.  Pulmonary:      Effort: Pulmonary effort is normal.      Breath sounds: No rales.      Comments: No wheezing, breathing comfortably on room air  Abdominal:      General: Bowel sounds are normal. There is no distension.      Palpations: Abdomen is soft.      Tenderness: There is no abdominal tenderness.   Musculoskeletal:      Cervical back: Normal range of motion.      Right lower leg: No edema.      Left lower leg: No edema.   Skin:     General: Skin is warm and dry.      Comments: Warm to the touch. Bilateral lower extremities with hyperpigmentation, consistent with venous stasis.    Neurological:      Mental Status: She is alert.      Comments: Alert and oriented to self, year, city and hospital.  Still mild confusion with no insight as to why in hospital.  Diffusely weak       Significant Labs: All pertinent labs within the past 24 hours have been reviewed.    Significant Imaging: I have reviewed all pertinent imaging results/findings within the past 24 hours.

## 2022-04-27 NOTE — ASSESSMENT & PLAN NOTE
"-Admitted to inpatient status  -History per sister and brother - they noted some increasing episodes of "acting out" but more recently she has been lethargic.  Not on CPAP at home.  -CTH negative for acute pathology - shows old lacunar infarcts and chronic microvascular changes  -Hypothermic on admit, but no convincing evidence of infection.  NO UTI.  NO pneumonia.  Blood cultures negative  -TSH, procalcitonin, ammonia, B12 and RPR all normal.  Urine drug screen and alcohol level were negative  -ABG without significant hypercarbia  -Cosyntropin stimulation results not consistent with adrenal insufficiency  -Was hypoglycemic on admit but encephalopathy lasted much longer than hypoglycemia.  -Etiology is unclear.  Differential includes uremic encephalopathy, folate deficiency, prolonged encephalopathy after hypoglycemic event and occult toxic ingestion.  -Ensure no sedating medications.    -Stopped antibiotics 4/27 and added folic acid replacement and cpap qhs.  Today much better, but still a bit confused.  -As no great explanation, will check MRI brain and ask neurology to evaluate patient.  -Consult PT/OT.    -Hopefully able to discharge home tomorrow.  "

## 2022-04-27 NOTE — NURSING
PER handoff given to ALEXANDRA Vasques     Pt resting in bed quietly. NAD noted. No c/o pain.  Fall and safety precautions maintained. Bed alarm activated and audible.. Bed locked in lowest position, with side rails up x2. Call bell and personal items within reach

## 2022-04-27 NOTE — NURSING
[facility] MEWS TRIGGER FOLLOW UP       MEWS Monitoring, Score is: 3  Indication for review: RR    Charted at 38, believed to be entered in error as patient resting comfortably on Bipap with RR 20-23. Will f/u as needed.

## 2022-04-27 NOTE — CONSULTS
[facility] - Telemetry  Neurology  Consult Note    Patient Name: Ana James  MRN: [id number]  Admission Date: 4/24/2022  Hospital Length of Stay: 3 days  Code Status: DNR   Attending Provider: Yosi Bejarano MD   Consulting Provider: Artemio Delgadillo MD  Primary Care Physician: Viky Bean MD  Principal Problem:Altered mental status    Inpatient consult to Neurology  Consult performed by: Artemio Delgadillo MD  Consult ordered by: Yosi Bejarano MD        Subjective:     Chief Complaint: AMS    HPI: 80 yo female with a PMHx of HTN, hyperlipidemia, DM who presented from nursing home for AMS. In ED found to be hypothermic, AUTUMN, hyperkalemic (6.6). She was confused, agitated. After correction of her imbalances pt is now back to her baseline. No seizures reported. No unilateral weakness. Pt currently states no headaches, visual or speech disturbances.    Past Medical History:   Diagnosis Date    Arthritis lower extremities    Asthma     Blind left eye     Diabetes mellitus     Edema of both lower legs     CHRONIC    Fall 02/08/2022    Gall stones     Hypertension     Kidney stones     Lives alone with help available     HOME HEALTH    Nephrolithiasis 1/22/2016    Obesity     PVD (peripheral vascular disease)     Renal disorder     Retinopathy     Sleep apnea     Vaginal delivery     x1    Weakness of both lower extremities     uses a cane, rollator & power chair       Past Surgical History:   Procedure Laterality Date    CARDIAC CATHETERIZATION      cataract      CHOLECYSTECTOMY      EYE SURGERY      Rt cataract surgery    HYSTERECTOMY      URETERAL STENT PLACEMENT         Review of patient's allergies indicates:   Allergen Reactions    Adhesive Rash     Paper tape       Current Neurological Medications:     No current facility-administered medications on file prior to encounter.     Current Outpatient Medications on File Prior to Encounter   Medication Sig    albuterol  "(PROVENTIL/VENTOLIN HFA) 90 mcg/actuation inhaler INHALE 2 PUFFS INTO THE LUNGS EVERY 6 (SIX) HOURS AS NEEDED FOR WHEEZING. RESCUE    amLODIPine (NORVASC) 10 MG tablet TAKE 1 TABLET BY MOUTH EVERY DAY    aspirin (ECOTRIN) 81 MG EC tablet Take 81 mg by mouth nightly.    atorvastatin (LIPITOR) 20 MG tablet TAKE 1 TABLET BY MOUTH EVERY DAY    blood sugar diagnostic Strp Test 3 times daily    blood-glucose meter,continuous (DEXCOM G6 ) Misc Test glucose twice daily    blood-glucose transmitter (DEXCOM G5 TRANSMITTER) Lea Test glucose twice daily    carvediloL (COREG) 12.5 MG tablet Take 1 tablet (12.5 mg total) by mouth 2 (two) times daily.    hydrALAZINE (APRESOLINE) 50 MG tablet Take 1 tablet (50 mg total) by mouth every 8 (eight) hours.    insulin detemir U-100 (LEVEMIR FLEXTOUCH U-100 INSULN) 100 unit/mL (3 mL) InPn pen Inject 26 Units into the skin every evening. HOLD UNTIL YOU SEE YOUR PCP    lancets Misc Test 3 times daily    lancets Misc To check BG 3 times daily, to use with insurance preferred meter    latanoprost 0.005 % ophthalmic solution Place 1 drop into both eyes every evening.    melatonin (MELATIN) 3 mg tablet Take 3 tablets (9 mg total) by mouth nightly.    oxybutynin (DITROPAN XL) 15 MG TR24 TAKE 1 TABLET BY MOUTH EVERY DAY    pen needle, diabetic (BD ULTRA-FINE BREE PEN NEEDLE) 32 gauge x 5/32" Ndle INJECT INSULIN THREE TIMES DAILY    polyethylene glycol (GLYCOLAX) 17 gram PwPk Take 17 g by mouth once daily.    timolol maleate 0.5% (TIMOPTIC) 0.5 % Drop Place 1 drop into both eyes every morning.    TRADJENTA 5 mg Tab tablet TAKE 1 TABLET BY MOUTH EVERY DAY    traZODone (DESYREL) 50 MG tablet TAKE 1 TABLET (50 MG TOTAL) BY MOUTH EVERY EVENING.      Family History     Problem Relation (Age of Onset)    Cancer Sister    Diabetes Brother    Hypertension Father    Kidney failure Mother        Tobacco Use    Smoking status: Never Smoker    Smokeless tobacco: Never Used "   Substance and Sexual Activity    Alcohol use: No    Drug use: Never    Sexual activity: Not Currently     Partners: Male     Review of Systems   Constitutional: Negative for fever.   HENT: Negative for trouble swallowing.    Eyes: Negative for photophobia.   Respiratory: Negative for shortness of breath.    Cardiovascular: Negative for chest pain.   Gastrointestinal: Negative for abdominal pain.   Genitourinary: Negative for dysuria.   Musculoskeletal: Negative for back pain.   Neurological: Negative for headaches.   Psychiatric/Behavioral: Negative for hallucinations.     Objective:     Vital Signs (Most Recent):  Temp: 98.2 °F (36.8 °C) (04/27/22 1303)  Pulse: (!) 58 (04/27/22 1303)  Resp: 18 (04/27/22 1303)  BP: 132/60 (04/27/22 1303)  SpO2: 99 % (04/27/22 1303) Vital Signs (24h Range):  Temp:  [97.5 °F (36.4 °C)-98.2 °F (36.8 °C)] 98.2 °F (36.8 °C)  Pulse:  [] 58  Resp:  [11-38] 18  SpO2:  [93 %-100 %] 99 %  BP: (132-159)/(60-75) 132/60     Weight: 90.7 kg (200 lb)  Body mass index is 36.58 kg/m².    Physical Exam  Constitutional:       General: She is not in acute distress.  HENT:      Head: Normocephalic.      Right Ear: External ear normal.      Left Ear: External ear normal.   Eyes:      General:         Right eye: No discharge.         Left eye: No discharge.   Cardiovascular:      Rate and Rhythm: Normal rate.   Pulmonary:      Breath sounds: Normal breath sounds.   Abdominal:      Palpations: Abdomen is soft.   Musculoskeletal:         General: No tenderness.      Cervical back: Neck supple.   Skin:     General: Skin is warm.   Neurological:      Mental Status: She is oriented to person, place, and time.   Psychiatric:         Speech: Speech normal.         NEUROLOGICAL EXAMINATION:     MENTAL STATUS   Oriented to person, place, and time.   Speech: speech is normal   Level of consciousness: alert    CRANIAL NERVES     CN III, IV, VI   Right pupil: Size: 2 mm.   Left pupil: Left pupil size in  mm: unable to assess.   Nystagmus: none     CN V   Right facial sensation deficit: none  Left facial sensation deficit: none    CN VII   Right facial weakness: none  Left facial weakness: none    CN XII   Tongue deviation: none    MOTOR EXAM        AROM of UE's against gravoity       Significant Labs:   CBC:   Recent Labs   Lab 04/26/22  0455 04/27/22  0340   WBC 6.20 5.75   HGB 10.1* 10.4*   HCT 31.6* 32.9*   * 122*     CMP:   Recent Labs   Lab 04/26/22 0455 04/27/22  0340   * 130*    137   K 4.8 4.8    106   CO2 22* 22*   BUN 28* 20   CREATININE 2.3* 1.9*   CALCIUM 9.4 9.5   ANIONGAP 8 9   EGFRNONAA 19* 24*       Significant Imaging: I have reviewed all pertinent imaging results/findings within the past 24 hours.     MRI brain  Impression:     No acute intracranial abnormality.     Generalized cerebral atrophy and chronic microangiopathic change with remote lacunar infarct of the right centrum semiovale.        Electronically signed by: David Alberto  Date:                                            04/27/2022  Time:                                           13:24    Assessment and Plan:     82 y/o female consulted for AMS    1. Encephalopathy: multifactorial given AUTUMN, hypothermia, hypoglycemia. She was delirious. Resolution of her symptoms could had taken longer but this is expected.   MRI brain showed no acute abnormalities.   -Melatonin 6 mg nightly.   -Avoid benzo's, opiates.    Active Diagnoses:    Diagnosis Date Noted POA    PRINCIPAL PROBLEM:  Altered mental status [R41.82] 04/24/2022 Yes    Debility [R53.81] 04/27/2022 Yes    Palliative care encounter [Z51.5] 04/26/2022 Not Applicable    DNR (do not resuscitate) [Z66] 04/26/2022 No    Arrhythmia [I49.9] 04/24/2022 Yes    Hypothermia [T68.XXXA] 02/08/2022 Yes    Hyperkalemia [E87.5] 02/27/2020 Yes    Hyperlipidemia [E78.5] 02/27/2020 Yes     Chronic    Chronic diastolic heart failure [I50.32] 02/27/2020 Yes      Chronic    Controlled type 2 diabetes mellitus with stage 3 chronic kidney disease, with long-term current use of insulin [E11.22, N18.30, Z79.4] 07/11/2018 Not Applicable     Chronic    Acute renal failure superimposed on stage 3 chronic kidney disease [N17.9, N18.30] 05/27/2017 Yes    Essential hypertension [I10] 03/02/2014 Yes     Chronic    Class 2 obesity in adult [E66.9] 03/02/2014 Yes      Problems Resolved During this Admission:       VTE Risk Mitigation (From admission, onward)         Ordered     heparin (porcine) injection 5,000 Units  Every 8 hours         04/27/22 1236                Thank you for your consult. I will follow-up with patient. Please contact us if you have any additional questions.    Artemio Delgadillo MD  Neurology  [facility] - Telemetry

## 2022-04-27 NOTE — PT/OT/SLP PROGRESS
Speech Language Pathology Treatment    Patient Name:  Ana James   MRN:  [id number]  Admitting Diagnosis: Altered mental status    Recommendations:                 General Recommendations:  Dysphagia therapy  Diet recommendations:  mech soft with minced meats, extra gravy  Aspiration Precautions: 1 bite/sip at a time and HOB to 90 degrees   General Precautions: Standard,    Communication strategies:  calm tone    Subjective   Pt with significantly improved mental status as compared to initial eval and reports from yesterday.   Patient goals: continue po    Pain/Comfort:  · Pain Rating Post-Intervention 2: 0/10    Respiratory Status: Room air    Objective:     Has the patient been evaluated by SLP for swallowing?   Yes  Keep patient NPO? No     Pt seen upright in bed with lunch tray of liquids. Per sitter, Pt tolerating current meal prior to ST's entrance without overt s/s of aspiration. Trails of solids revealed significantly increased oral prep time negatively impacted by edentulous state however no overt s/s of aspiration. Self presented puree trials revealed tongue thrust as did straw sips of thin liquids, neither revealed overt s/s of aspiration. Given Pt's likely premorbid tongue thrust, edentulous state, and decreased ability to efficiently feed herself, mechanical soft with thin liquids is considered least restrictive diet.     Assessment:     Ana James is a 81 y.o. female with dx of altered mental status she presents with mild oral dysphagia c/b tongue thrsut and increased oral prep time, which has resolved to her baseline level of function with mental status improvement. .     Goals:   Multidisciplinary Problems     SLP Goals        Problem: SLP    Goal Priority Disciplines Outcome   SLP Goal    Low SLP Ongoing, Progressing   Description: Pt will participate in ongoing assessment of swallow. (Goal met 4/27)  Pt will tolerate mech soft with thin liquids without overt s/s of aspiration.                     Plan:     · Plan of Care expires:  05/05/22  · Plan of Care reviewed with:  patient   · SLP Follow-Up:  Yes       Discharge recommendations:  other (see comments) (TBD)   Barriers to Discharge:  None    Time Tracking:     SLP Treatment Date:   04/27/22  Speech Start Time:  1257  Speech Stop Time:  1307     Speech Total Time (min):  10 min    Billable Minutes: Treatment Swallowing Dysfunction 10    04/27/2022

## 2022-04-27 NOTE — NURSING
PER handoff given to ALEXANDRA Barton     Pt resting in bed quietly. NAD noted. No c/o pain.   Fall and safety precautions maintained. Bed alarm activated and audible.. Bed locked in lowest position, with side rails up x2. Call bell and personal items within reach

## 2022-04-27 NOTE — ASSESSMENT & PLAN NOTE
-Baseline Cr 1.3  -On admit Cr 1.8 with hyperkalemia and NAGMA  -Suspect pre-renal due to dehydration.  -BNP only 21.  FENA 2.8%.  No urine eosinophils.  -KATHY without evidence of hydronephrosis.  -Avoid nephrotoxic agents and renally dose meds  -Stopped vancomycin 4/26  -K normal and NAGMA improving.  Cr now 1.9  -Continue bicarb drip  -Repeat BMP in AM

## 2022-04-27 NOTE — CONSULTS
"[facility] - Telemetry  Palliative Medicine  Consult Note    Patient Name: Ana James  MRN: [id number]  Admission Date: 4/24/2022  Hospital Length of Stay: 3 days  Code Status: DNR   Attending Provider: Yosi Bejarano MD  Consulting Provider: Francine Mukherjee NP  Primary Care Physician: Viky Bean MD  Principal Problem:Altered mental status    Patient information was obtained from patient, past medical records, ER records and primary team.      Consults  Assessment/Plan:     Palliative encounter      -Palliative consult received yesterday  -Interval chart reviewed  -Patient unable to be seen yesterday due to obtunded  -MD Dr Bejarano had discussion with family when patient obtunded  -About code status and patient made DNR  -Today patient is alert and oriented and able to have discussion    -I engaged the patient in a conversation about the patient's preferences for care  at the very end of life. The patient wishes to have a natural, peaceful death.  Along those lines, the patient does not wish to have CPR or other invasive treatments performed when her heart and/or breathing stops. I communicated to the patient that a DNR order is in the medical record to reflect this preference.   -Addressed all questions   -Updated Dr Bejarano  Thank you for your consult.   Subjective:     HPI:   Patient is a 80 yo female with a PMHx of HTN, HLD, DM2 (A1c 6.8% in Jan) who presented from long-term for AMS found to have hyperkalemic.  Per chart review in ED "History was obtained via ED, EMS, and chart review, plus family. Patient answers yes and no to simple questions currently. Apparently, she was in her normal state of health until the last 24 hours becoming agitated and altered. Per family and NH, this is unusual for her. No other noticeable changes per staff. She did arrive with urinary and stool incontinence. In the ED, labs showing hyperkalemia of 6.6 with sCr 1.9 from baselin of 1.3. Also notable for mild " "leukopenia with eosinophilia and vitals signs of hypothermia to 88F."     ECG showing likely AF with slow VR (QRS is irregular making junctional unlikely). No known history. Given insulin and bicarb for K shift plus CaGluc for ECG changes. Also received Vanc/Zosyn for concern of infection. CTH negative.               Patient admitted to inpatient status for altered mental status and found to be hyperkalemic and hypothermic, and became hypoglycemic while in ED.      CT head with no acute process. CXR with no acute intrathoracic process. UA does not appear grossly infected.  Blood culture with no growth to date.  Cortisol a.m. pending.  Per nursing home, patient is alert and oriented at baseline.  No obvious evidence of infection or etiology for hypothermia at this time. Of note, patient was recently hospitalized from 02/08-02/14 for hypothermia as well.       Advance care planning  Discussion with patient regarding EOL wishes; she does not wish for CPR. She states " When God says its time to go you can't say no". she does not want chest compressions or intubation "pumping on her chest" or on a life support machine to breath".     Altered mental status  Back to baseline; alert and oriented      Acute renal failure superimposed on stage 3 chronic kidney disease  Noted  Mgmt per primary       Hyperkalemia  Mgmt per primary  Resolved; now 4.8     Hypothermia  Mgmt per primary  Initially required warming blanket   Resolved now normothermic     Arrhythmia       Mgmt per primary  Cardiology consulted and input appreciated       Chronic diastolic heart failure  Echo 1/2022 showed hyperdymanic EF 70% with indeterminate diastolic function     Hyperlipidemia   noted and on meds    Controlled type 2 diabetes mellitus with stage 3 chronic kidney disease, with long-term current use of insulinn.   noted; controlled with meds  Primary to manage    Class 2 obesity in adult    Essential hypertension  Noted  Controlled on current home " meds        Hospital Course:  No notes on file    Interval History: patient alert and oriented today. She is feeding herself and states she is ready to go    Past Medical History:   Diagnosis Date    Arthritis lower extremities    Asthma     Blind left eye     Diabetes mellitus     Edema of both lower legs     CHRONIC    Fall 02/08/2022    Gall stones     Hypertension     Kidney stones     Lives alone with help available     HOME HEALTH    Nephrolithiasis 1/22/2016    Obesity     PVD (peripheral vascular disease)     Renal disorder     Retinopathy     Sleep apnea     Vaginal delivery     x1    Weakness of both lower extremities     uses a cane, rollator & power chair       Past Surgical History:   Procedure Laterality Date    CARDIAC CATHETERIZATION      cataract      CHOLECYSTECTOMY      EYE SURGERY      Rt cataract surgery    HYSTERECTOMY      URETERAL STENT PLACEMENT         Review of patient's allergies indicates:   Allergen Reactions    Adhesive Rash     Paper tape       Medications:  Continuous Infusions:   sodium bicarbonate drip 100 mL/hr at 04/27/22 0048     Scheduled Meds:   aspirin  81 mg Oral Nightly    atorvastatin  20 mg Oral Daily    folic acid (FOLVITE) IVPB 1 mg  1 mg Intravenous Daily    heparin (porcine)  5,000 Units Subcutaneous Q8H    insulin aspart U-100  0-5 Units Subcutaneous QID (WM & HS)     PRN Meds:acetaminophen, dextrose 10%, dextrose 10%, glucagon (human recombinant), melatonin, ondansetron, sodium chloride 0.9%    Family History       Problem Relation (Age of Onset)    Cancer Sister    Diabetes Brother    Hypertension Father    Kidney failure Mother          Tobacco Use    Smoking status: Never Smoker    Smokeless tobacco: Never Used   Substance and Sexual Activity    Alcohol use: No    Drug use: Never    Sexual activity: Not Currently     Partners: Male       Review of Systems   Constitutional:  Positive for activity change.   HENT:  Negative for  trouble swallowing.    Eyes:  Positive for visual disturbance (cataract L eye).   Respiratory:  Positive for shortness of breath.    Cardiovascular:  Negative for chest pain and leg swelling.   Gastrointestinal:  Negative for abdominal pain.   Neurological:  Negative for dizziness, weakness, light-headedness and headaches.   Objective:     Vital Signs (Most Recent):  Temp: 98.2 °F (36.8 °C) (04/27/22 1303)  Pulse: (!) 58 (04/27/22 1303)  Resp: 18 (04/27/22 1303)  BP: 132/60 (04/27/22 1303)  SpO2: 99 % (04/27/22 1303)   Vital Signs (24h Range):  Temp:  [97.5 °F (36.4 °C)-98.2 °F (36.8 °C)] 98.2 °F (36.8 °C)  Pulse:  [] 58  Resp:  [11-38] 18  SpO2:  [93 %-100 %] 99 %  BP: (132-159)/(60-75) 132/60     Weight: 90.7 kg (200 lb)  Body mass index is 36.58 kg/m².    Physical Exam  Vitals and nursing note reviewed.   Constitutional:       General: She is not in acute distress.     Appearance: She is obese. She is ill-appearing. She is not toxic-appearing.   HENT:      Head: Normocephalic and atraumatic.      Right Ear: External ear normal.      Left Ear: External ear normal.      Nose: Nose normal.   Eyes:      General:         Right eye: No discharge.         Left eye: No discharge.   Cardiovascular:      Rate and Rhythm: Normal rate and regular rhythm.   Pulmonary:      Effort: Pulmonary effort is normal.      Comments: Oxygen on  Abdominal:      General: There is no distension.      Palpations: Abdomen is soft.      Tenderness: There is no abdominal tenderness.   Musculoskeletal:      Right lower leg: No edema.      Left lower leg: No edema.   Skin:     General: Skin is warm and dry.   Neurological:      Mental Status: She is alert and oriented to person, place, and time. Mental status is at baseline.       Palliative Exam    Advance Care Planning   Advance Care Planning       Significant Labs: All pertinent labs within the past 24 hours have been reviewed.  CBC:   Recent Labs   Lab 04/27/22  0340   WBC 5.75   HGB  10.4*   HCT 32.9*   MCV 86   *     BMP:  Recent Labs   Lab 04/27/22  0340   *      K 4.8      CO2 22*   BUN 20   CREATININE 1.9*   CALCIUM 9.5     LFT:  Lab Results   Component Value Date    AST 57 (H) 04/24/2022    ALKPHOS 89 04/24/2022    BILITOT 0.3 04/24/2022     Albumin:   Albumin   Date Value Ref Range Status   04/24/2022 3.0 (L) 3.5 - 5.2 g/dL Final     Protein:   Total Protein   Date Value Ref Range Status   04/24/2022 7.2 6.0 - 8.4 g/dL Final     Lactic acid:   Lab Results   Component Value Date    LACTATE 0.4 (L) 04/24/2022    LACTATE 0.7 02/08/2022       Significant Imaging: MRI: I have reviewed all pertinent results/findings within the past 24 hours:  brain         > 50% of 50 min visit spent in chart review, face to face discussion of goals of care,  symptom assessment, coordination of care and emotional support.    Francine Mukherjee, NP  Palliative Medicine  [facility] - Telemetry

## 2022-04-27 NOTE — ASSESSMENT & PLAN NOTE
-Echo 1/2022 showed hyperdymanic EF 70% with indeterminate diastolic function  -Volume status difficult to clearly ascertain on exam due to body habitus - but not floridly overloaded  -BNP is normal.    -Strict ins/outs and daily weights  -Gentle bicarb drip for now.

## 2022-04-27 NOTE — NURSING
PER handoff received from ALEXANDRA Vasques      Pt resting in bed quietly. NAD noted. No c/o pain. Fall and safety precautions maintained. Bed alarm activated and audible.. Bed locked in lowest position, with side rails up x2. Call bell and personal items within reach

## 2022-04-27 NOTE — ASSESSMENT & PLAN NOTE
-Noted on admission with concurrent bradycardia  -No evidence of sepsis - blood cultures negative, UA normal, no leukocytosis, procalcitonin negative  -Warming blanket used and now euthermic.  -AM cortisol borderline low but cosyntropin stim test not consistent with adrenal insufficiency.  -Source unclear - but has resolved.

## 2022-04-27 NOTE — HPI
"Patient is a 80 yo female with a PMHx of HTN, HLD, DM2 (A1c 6.8% in Jan) who presented from alf for AMS found to have hyperkalemic.  Per chart review in ED "History was obtained via ED, EMS, and chart review, plus family. Patient answers yes and no to simple questions currently. Apparently, she was in her normal state of health until the last 24 hours becoming agitated and altered. Per family and NH, this is unusual for her. No other noticeable changes per staff. She did arrive with urinary and stool incontinence. In the ED, labs showing hyperkalemia of 6.6 with sCr 1.9 from baselin of 1.3. Also notable for mild leukopenia with eosinophilia and vitals signs of hypothermia to 88F."     ECG showing likely AF with slow VR (QRS is irregular making junctional unlikely). No known history. Given insulin and bicarb for K shift plus CaGluc for ECG changes. Also received Vanc/Zosyn for concern of infection. CTH negative.               Patient admitted to inpatient status for altered mental status and found to be hyperkalemic and hypothermic, and became hypoglycemic while in ED.      CT head with no acute process. CXR with no acute intrathoracic process. UA does not appear grossly infected.  Blood culture with no growth to date.  Cortisol a.m. pending.  Per nursing home, patient is alert and oriented at baseline.  No obvious evidence of infection or etiology for hypothermia at this time. Of note, patient was recently hospitalized from 02/08-02/14 for hypothermia as well.       Advance care planning  Discussion with patient regarding EOL wishes; she does not wish for CPR. She states " When God says its time to go you can't say no". she does not want chest compressions or intubation "pumping on her chest" or on a life support machine to breath".     Altered mental status  Back to baseline; alert and oriented      Acute renal failure superimposed on stage 3 chronic kidney disease  Noted  Mgmt per primary     "   Hyperkalemia  Mgmt per primary  Resolved; now 4.8     Hypothermia  Mgmt per primary  Initially required warming blanket   Resolved now normothermic     Arrhythmia       Mgmt per primary  Cardiology consulted and input appreciated       Chronic diastolic heart failure  Echo 1/2022 showed hyperdymanic EF 70% with indeterminate diastolic function     Hyperlipidemia   noted and on meds    Controlled type 2 diabetes mellitus with stage 3 chronic kidney disease, with long-term current use of insulinn.   noted; controlled with meds  Primary to manage    Class 2 obesity in adult    Essential hypertension  Noted  Controlled on current home meds

## 2022-04-28 VITALS
OXYGEN SATURATION: 99 % | TEMPERATURE: 98 F | HEART RATE: 59 BPM | BODY MASS INDEX: 36.8 KG/M2 | RESPIRATION RATE: 18 BRPM | SYSTOLIC BLOOD PRESSURE: 153 MMHG | DIASTOLIC BLOOD PRESSURE: 66 MMHG | HEIGHT: 62 IN | WEIGHT: 200 LBS

## 2022-04-28 LAB
ANION GAP SERPL CALC-SCNC: 9 MMOL/L (ref 8–16)
BACTERIA BLD CULT: NORMAL
BACTERIA BLD CULT: NORMAL
BASOPHILS # BLD AUTO: 0.01 K/UL (ref 0–0.2)
BASOPHILS NFR BLD: 0.2 % (ref 0–1.9)
BUN SERPL-MCNC: 19 MG/DL (ref 8–23)
CALCIUM SERPL-MCNC: 9.6 MG/DL (ref 8.7–10.5)
CHLORIDE SERPL-SCNC: 104 MMOL/L (ref 95–110)
CO2 SERPL-SCNC: 24 MMOL/L (ref 23–29)
CREAT SERPL-MCNC: 1.6 MG/DL (ref 0.5–1.4)
DIFFERENTIAL METHOD: ABNORMAL
EOSINOPHIL # BLD AUTO: 0.5 K/UL (ref 0–0.5)
EOSINOPHIL NFR BLD: 9.6 % (ref 0–8)
ERYTHROCYTE [DISTWIDTH] IN BLOOD BY AUTOMATED COUNT: 15.6 % (ref 11.5–14.5)
EST. GFR  (AFRICAN AMERICAN): 34 ML/MIN/1.73 M^2
EST. GFR  (NON AFRICAN AMERICAN): 30 ML/MIN/1.73 M^2
GLUCOSE SERPL-MCNC: 100 MG/DL (ref 70–110)
HCT VFR BLD AUTO: 33.3 % (ref 37–48.5)
HGB BLD-MCNC: 10.6 G/DL (ref 12–16)
IMM GRANULOCYTES # BLD AUTO: 0.03 K/UL (ref 0–0.04)
IMM GRANULOCYTES NFR BLD AUTO: 0.5 % (ref 0–0.5)
LYMPHOCYTES # BLD AUTO: 1.4 K/UL (ref 1–4.8)
LYMPHOCYTES NFR BLD: 25.5 % (ref 18–48)
MCH RBC QN AUTO: 27.1 PG (ref 27–31)
MCHC RBC AUTO-ENTMCNC: 31.8 G/DL (ref 32–36)
MCV RBC AUTO: 85 FL (ref 82–98)
MONOCYTES # BLD AUTO: 0.8 K/UL (ref 0.3–1)
MONOCYTES NFR BLD: 13.9 % (ref 4–15)
NEUTROPHILS # BLD AUTO: 2.8 K/UL (ref 1.8–7.7)
NEUTROPHILS NFR BLD: 50.3 % (ref 38–73)
NRBC BLD-RTO: 1 /100 WBC
PLATELET # BLD AUTO: 120 K/UL (ref 150–450)
PMV BLD AUTO: 10.1 FL (ref 9.2–12.9)
POCT GLUCOSE: 163 MG/DL (ref 70–110)
POCT GLUCOSE: 204 MG/DL (ref 70–110)
POTASSIUM SERPL-SCNC: 4.6 MMOL/L (ref 3.5–5.1)
RBC # BLD AUTO: 3.91 M/UL (ref 4–5.4)
SODIUM SERPL-SCNC: 137 MMOL/L (ref 136–145)
WBC # BLD AUTO: 5.54 K/UL (ref 3.9–12.7)

## 2022-04-28 PROCEDURE — 63600175 PHARM REV CODE 636 W HCPCS: Performed by: HOSPITALIST

## 2022-04-28 PROCEDURE — 94660 CPAP INITIATION&MGMT: CPT

## 2022-04-28 PROCEDURE — 85025 COMPLETE CBC W/AUTO DIFF WBC: CPT | Performed by: STUDENT IN AN ORGANIZED HEALTH CARE EDUCATION/TRAINING PROGRAM

## 2022-04-28 PROCEDURE — 36415 COLL VENOUS BLD VENIPUNCTURE: CPT | Performed by: STUDENT IN AN ORGANIZED HEALTH CARE EDUCATION/TRAINING PROGRAM

## 2022-04-28 PROCEDURE — 93010 EKG 12-LEAD: ICD-10-PCS | Mod: ,,, | Performed by: INTERNAL MEDICINE

## 2022-04-28 PROCEDURE — 80048 BASIC METABOLIC PNL TOTAL CA: CPT | Performed by: STUDENT IN AN ORGANIZED HEALTH CARE EDUCATION/TRAINING PROGRAM

## 2022-04-28 PROCEDURE — 25000003 PHARM REV CODE 250: Performed by: HOSPITALIST

## 2022-04-28 PROCEDURE — 97161 PT EVAL LOW COMPLEX 20 MIN: CPT

## 2022-04-28 PROCEDURE — 99900035 HC TECH TIME PER 15 MIN (STAT)

## 2022-04-28 PROCEDURE — 97530 THERAPEUTIC ACTIVITIES: CPT

## 2022-04-28 PROCEDURE — 93005 ELECTROCARDIOGRAM TRACING: CPT

## 2022-04-28 PROCEDURE — 93010 ELECTROCARDIOGRAM REPORT: CPT | Mod: ,,, | Performed by: INTERNAL MEDICINE

## 2022-04-28 PROCEDURE — 25000003 PHARM REV CODE 250: Performed by: INTERNAL MEDICINE

## 2022-04-28 PROCEDURE — 97165 OT EVAL LOW COMPLEX 30 MIN: CPT

## 2022-04-28 RX ORDER — INSULIN ASPART 100 [IU]/ML
INJECTION, SOLUTION INTRAVENOUS; SUBCUTANEOUS
Refills: 0 | Status: ON HOLD
Start: 2022-04-28 | End: 2022-05-25

## 2022-04-28 RX ORDER — FOLIC ACID 1 MG/1
1 TABLET ORAL DAILY
Refills: 0 | Status: ON HOLD
Start: 2022-04-28 | End: 2022-07-18

## 2022-04-28 RX ORDER — POLYETHYLENE GLYCOL 3350 17 G/17G
17 POWDER, FOR SOLUTION ORAL ONCE
Status: COMPLETED | OUTPATIENT
Start: 2022-04-28 | End: 2022-04-28

## 2022-04-28 RX ADMIN — POLYETHYLENE GLYCOL 3350 17 G: 17 POWDER, FOR SOLUTION ORAL at 02:04

## 2022-04-28 RX ADMIN — FOLIC ACID 1 MG: 5 INJECTION, SOLUTION INTRAMUSCULAR; INTRAVENOUS; SUBCUTANEOUS at 10:04

## 2022-04-28 RX ADMIN — HEPARIN SODIUM 5000 UNITS: 5000 INJECTION INTRAVENOUS; SUBCUTANEOUS at 05:04

## 2022-04-28 RX ADMIN — ATORVASTATIN CALCIUM 20 MG: 10 TABLET, FILM COATED ORAL at 10:04

## 2022-04-28 NOTE — PT/OT/SLP EVAL
"Occupational Therapy   Evaluation    Name: Ana James  MRN: [id number]  Admitting Diagnosis:  Altered mental status  Recent Surgery: * No surgery found *      Recommendations:     Discharge Recommendations: nursing facility, skilled  Discharge Equipment Recommendations:   (TBD per SNF; would benefit from BSC and RW)  Barriers to discharge:  None    Assessment:     Ana James is a 81 y.o. female with a medical diagnosis of Altered mental status. Performance deficits affecting function: weakness, impaired endurance, impaired self care skills, impaired functional mobilty, gait instability, impaired balance, decreased upper extremity function, decreased lower extremity function, decreased coordination, decreased safety awareness, pain, decreased ROM.      Pt pleasant and willing to participate in tx session this date; pt was able to perform sit>stand x 2 trials w/ min A and use of RW for ambulating to HOB w/ mod A and use of RW. Pt w/ general weakness and decreased endurance but tolerated OOB activities well. Pt will continue to benefit from skilled acute OT services to maximize functional capacity for safe performance w/ ADLs and functional mobility.     Rehab Prognosis: Good; patient would benefit from acute skilled OT services to address these deficits and reach maximum level of function.       Plan:     Patient to be seen 5 x/week to address the above listed problems via self-care/home management, therapeutic activities, therapeutic exercises  · Plan of Care Expires: 05/12/22  · Plan of Care Reviewed with: patient    Subjective     "I used to be able to get up on my own."    Chief Complaint: lower back and BLE pain   Patient/Family Comments/goals: Pt ready to d/c back to NH w/ skilled services.     Occupational Profile:  Living Environment: Pt reports she has been a resident of [facility] for "about a month."   Previous level of function: Pt reports she would remain in her W/C majority of the day " and sit at the dining room table to fellowship w/ friends. Pt requited some assist for ADLs but was able to ambulate very short distances and perform functional transfers w/ rollator.   Roles and Routines: Pt reports she enjoys doing therapy.   Equipment Used at Home:  rollator, wheelchair  Assistance upon Discharge: NH staff; pt has sibling, nephews and nieces who live locally     Pain/Comfort:  · Pain Rating 1:  (Pt did not rate.)  · Location - Orientation 1: generalized  · Location 1:  (lower back; BLEs)    Patients cultural, spiritual, Mormon conflicts given the current situation: no    Objective:     Communicated with: NurseCarol, prior to session.  Patient found HOB elevated with PureWick, oxygen, telemetry upon OT entry to room.    General Precautions: Standard, fall, vision impaired   Orthopedic Precautions:N/A   Braces: N/A  Respiratory Status: Room air    Occupational Performance:    Bed Mobility:    · Patient completed Scooting hips to EOB with moderate assistance; max A for scooting to HOB in supine w/ use of draw sheet and bed in Trendelenburg   · Patient completed Supine to Sit with moderate assistance and HOB elevated   · Patient completed Sit to Supine with moderate assistance    Functional Mobility/Transfers:  · Patient completed Sit <> Stand Transfer with minimum assistance  with  rolling walker   · Functional Mobility: Pt was able to laterally ambulate to HOB w/ mod A and use of RW; pt required x 1 seated rest break in between activity.     Activities of Daily Living:  · Upper Body Dressing: minimum assistance for donning gown over bacl while sitting EOB, unsupported   · Lower Body Dressing: dependence for donning socks for BLEs     Cognitive/Visual Perceptual:  Cognitive/Psychosocial Skills:     -       Oriented to: Person, Place, Time and Situation   -       Follows Commands/attention:Follows multistep  commands  -       Communication: clear/fluent  -       Memory: Poor immediate  recall  -       Safety awareness/insight to disability: mildly impaired     Physical Exam:  Balance:    -       good sitting balance; fair - standing balance  Postural examination/scapula alignment:    -       Rounded shoulders  -       Forward head  Skin integrity: Visible skin intact  Edema:  Mild BLEs   Sensation:    -       Intact  Upper Extremity Range of Motion:     -       Right Upper Extremity: WFL  -       Left Upper Extremity: WFL  Upper Extremity Strength:    -       Right Upper Extremity: grossly 3+/5  -       Left Upper Extremity: grossly 3+/5   Strength:    -       Right Upper Extremity: WFL  -       Left Upper Extremity: WFL    AMPAC 6 Click ADL:  AMPAC Total Score: 17    Treatment & Education:  -Initial eval complete.   -Pt educated on role of OT and POC.   -Pt educated on safe functional mobility and ADL performance.   -Questions and concerns addressed within OT scope.   Education:    Patient left HOB elevated with all lines intact, call button in reach and bed alarm on    GOALS:   Multidisciplinary Problems     Occupational Therapy Goals        Problem: Occupational Therapy    Goal Priority Disciplines Outcome Interventions   Occupational Therapy Goal     OT, PT/OT Ongoing, Progressing    Description: Goals to be met by: 5/12/22     Patient will increase functional independence with ADLs by performing:    LE Dressing with Stand-by Assistance.  Grooming while standing at sink with Stand-by Assistance.  Toileting from bedside commode with Stand-by Assistance for hygiene and clothing management.   Supine to sit with Stand-by Assistance.  Step transfer with Stand-by Assistance  Toilet transfer to bedside commode with Stand-by Assistance.  Upper extremity exercise program x15 reps per handout, with independence.                     History:     Past Medical History:   Diagnosis Date    Arthritis lower extremities    Asthma     Blind left eye     Diabetes mellitus     Edema of both lower legs      CHRONIC    Fall 02/08/2022    Gall stones     Hypertension     Kidney stones     Lives alone with help available     HOME HEALTH    Nephrolithiasis 1/22/2016    Obesity     PVD (peripheral vascular disease)     Renal disorder     Retinopathy     Sleep apnea     Vaginal delivery     x1    Weakness of both lower extremities     uses a cane, rollator & power chair       Past Surgical History:   Procedure Laterality Date    CARDIAC CATHETERIZATION      cataract      CHOLECYSTECTOMY      EYE SURGERY      Rt cataract surgery    HYSTERECTOMY      URETERAL STENT PLACEMENT         Time Tracking:     OT Date of Treatment: 04/28/22  OT Start Time: 1148  OT Stop Time: 1212  OT Total Time (min): 24 min    Billable Minutes:Evaluation 15  Therapeutic Activity 9  Total Time 24    4/28/2022

## 2022-04-28 NOTE — PLAN OF CARE
[facility] - Telemetry  Discharge Final Note    Primary Care Provider: Viky Bean MD    Expected Discharge Date: 4/28/2022    Final Discharge Note (most recent)       Final Note - 04/28/22 1111          Final Note    Assessment Type Final Discharge Note     Anticipated Discharge Disposition assisted Nursing Home   [facility]    What phone number can be called within the next 1-3 days to see how you are doing after discharge? [phone]     Hospital Resources/Appts/Education Provided Appointments scheduled by Navigator/Coordinator;Appointments scheduled and added to AVS        Post-Acute Status    Post-Acute Authorization --   [facility]    Coverage PHN     Discharge Delays None known at this time                     Important Message from Medicare  Important Message from Medicare regarding Discharge Appeal Rights: Given to patient/caregiver, Explained to patient/caregiver, Other (comments) (TN spoke with patient's brother, Prince Elder via phone [phone] verbalized understanding copy sent certified to address listed in chart. [phone])     Date IMM was signed: 04/27/22  Time IMM was signed: 1615    SW spoke with patient's nurse Carol that patient is cleared from case management standpoint.     ADT 30 order placed for Van Transportation.  Requested  time: 1:00 pm  If transportation does not arrive at ETA time nurse will be instructed to follow protocol for transportation below:    How can I get in touch directly with dispatch, if needed?                 Non-emergent dispatch: [phone]      +++NURSING:  If Van does not arrive at requested time please call the above Non Emergent Dispatcher.  If issue not resolved please escalate to your charge nurse for further instructions.

## 2022-04-28 NOTE — PLAN OF CARE
[facility]     Department of Hospital Medicine     [address] [place] [address]     [phone] after hours  [phone] fax       NURSING HOME ORDERS    04/28/2022    Admit to Nursing Home:  Skilled Bed                                                 Diagnoses:  Active Hospital Problems    Diagnosis  POA    *Altered mental status [R41.82]  Yes     Priority: 1 - High    Acute renal failure superimposed on stage 3 chronic kidney disease [N17.9, N18.30]  Yes     Priority: 2     Hyperkalemia [E87.5]  Yes     Priority: 3     Hypothermia [T68.XXXA]  Yes     Priority: 4     Arrhythmia [I49.9]  Yes     Priority: 5     Debility [R53.81]  Yes    Palliative care encounter [Z51.5]  Not Applicable    DNR (do not resuscitate) [Z66]  No     MD discussion with family 4/26  Confirmed with patient 4/27      Hyperlipidemia [E78.5]  Yes     Chronic    Chronic diastolic heart failure [I50.32]  Yes     Chronic    Controlled type 2 diabetes mellitus with stage 3 chronic kidney disease, with long-term current use of insulin [E11.22, N18.30, Z79.4]  Not Applicable     Chronic    Essential hypertension [I10]  Yes     Chronic    Class 2 obesity in adult [E66.9]  Yes      Resolved Hospital Problems   No resolved problems to display.       Patient is homebound due to:  Altered mental status    Allergies:  Review of patient's allergies indicates:   Allergen Reactions    Adhesive Rash     Paper tape       Vitals:    Every shift (Skilled Nursing patients)    Diet: cardiac, renal, diabetic - mechanical soft   Supplement:  1 can every three times a day with meals                         Type:  Glucerna       Acitivities:      - Ambulate with assistance to bathroom   - Scheduled walks once each shift (every 8 hours)   - May use walker, cane, or self-propelled wheelchair    LABS:  BMP weekly on Monday x 2 weeks    Nursing Precautions:     - Aspiration precautions:             -  Total assistance with meals            -  Upright 90 degrees befor during and after meals             -  Suction at bedside          - Fall precautions per nursing home protocol   - Seizure precaution per [facility] protocol   - Decubitus precautions:        -  for positioning   - Pressure reducing foam mattress   - Turn patient every two hours. Use wedge pillows to anchor patient    CONSULTS:      Physical Therapy to evaluate and treat   Occupational Therapy to evaluate and treat   Speech Therapy  to evaluate and treat   Nutrition to evaluate and recommend diet    MISCELLANEOUS CARE:     Routine Skin for Bedridden Patients:  Apply moisture barrier cream to all    skin folds and wet areas in perineal area daily and after baths and                           all bowel movements.     Code Status:  DNR             DIABETES CARE:      Check blood sugar:   Fingerstick blood sugar AC and HS      Report CBG < 60 or > 400 to physician.                                          Insulin Sliding Scale          Glucose  Novolog Insulin Subcutaneous        0 - 60   Orange juice or glucose tablet, hold insulin      No insulin   201-250  2 units   251-300  4 units   301-350  6 units   351-400  8 units   >400   10 units then call physician      Medications:      Medication List      START taking these medications    folic acid 1 MG tablet  Commonly known as: FOLVITE  Take 1 tablet (1 mg total) by mouth once daily.     insulin Novolog  Commonly known as: NovoLOG  Use sliding scale as needed for hyperglycemia per sliding scale above.        CONTINUE taking these medications    albuterol 90 mcg/actuation inhaler  Commonly known as: PROVENTIL/VENTOLIN HFA  INHALE 2 PUFFS INTO THE LUNGS EVERY 6 (SIX) HOURS AS NEEDED FOR WHEEZING. RESCUE     aspirin 81 MG EC tablet  Commonly known as: ECOTRIN  Take 81 mg by mouth nightly.     atorvastatin 20 MG tablet  Commonly known as: LIPITOR  TAKE 1 TABLET BY MOUTH EVERY DAY     blood  "sugar diagnostic Strp  Test 3 times daily     blood-glucose transmitter Lea  Commonly known as: DEXCOM G5 TRANSMITTER  Test glucose twice daily     DEXCOM G6  Misc  Generic drug: blood-glucose meter,continuous  Test glucose twice daily     * lancets Misc  Test 3 times daily     * lancets Misc  To check BG 3 times daily, to use with insurance preferred meter     latanoprost 0.005 % ophthalmic solution  Place 1 drop into both eyes every evening.     melatonin 3 mg tablet  Commonly known as: MELATIN  Take 3 tablets (9 mg total) by mouth nightly.     oxybutynin 15 MG Tr24  Commonly known as: DITROPAN XL  TAKE 1 TABLET BY MOUTH EVERY DAY     pen needle, diabetic 32 gauge x 5/32" Ndle  Commonly known as: BD ULTRA-FINE BREE PEN NEEDLE  INJECT INSULIN THREE TIMES DAILY     polyethylene glycol 17 gram Pwpk  Commonly known as: GLYCOLAX  Take 17 g by mouth once daily.     timolol maleate 0.5% 0.5 % Drop  Commonly known as: TIMOPTIC  Place 1 drop into both eyes every morning.         * This list has 2 medication(s) that are the same as other medications prescribed for you. Read the directions carefully, and ask your doctor or other care provider to review them with you.            STOP taking these medications    amLODIPine 10 MG tablet  Commonly known as: NORVASC     carvediloL 12.5 MG tablet  Commonly known as: COREG     hydrALAZINE 50 MG tablet  Commonly known as: APRESOLINE     LEVEMIR FLEXTOUCH U-100 INSULN 100 unit/mL (3 mL) Inpn pen  Generic drug: insulin detemir U-100     TRADJENTA 5 mg Tab tablet  Generic drug: linaGLIPtin     traZODone 50 MG tablet  Commonly known as: DESYREL                  _________________________________  Yosi Bejarano MD  04/28/2022    "

## 2022-04-28 NOTE — PLAN OF CARE
SW submitted patient's information to [facility] for review.     SW received message in [facility] re: patient's covid test and patient returning a SNF.     VINNY spoke with Trish ([facility]) re: patient being able to be approved for SNF at [facility]. Trish ([facility]) informed SW that patient will not be approved for SNF because she was SNF for 46 days, and [facility].     VINNY spoke with Gay ([facility]) informing her that [facility] will not approve patient for SNF and also informed her about patient's covid test being completed 3 days ago. SW was informed that covid test 3 days ago is good.   SW informed to review [facility] for report information.     SW received report information from [facility].   [facility] nurse to call report to [phone] asks for 400 nurse and patient is going to room 410B.     SW provided report information and discuss transportation  time with patient's nurse Carol.  time for 1:00 pm.

## 2022-04-28 NOTE — DISCHARGE INSTRUCTIONS
Take all medications as prescribed.  Eat a strict low salt cardiac, renal and diabetic diet.  Follow up with your physicians as scheduled - pcp within 1 week.  Thank you for trusting [facility] and Dr. Bejarano with your care.  We are honored that you entrusted us with your healthcare needs. Your satisfaction is very important to us and we hope you have been very pleased with your experience at [facility]. After your discharge you may receive a survey asking you to rate your hospital experience. We encourage you to take the time to complete the survey as your feedback allows us to identify areas for improvement as well as recognize our staff.   We hope that you have received the very best care possible during your hospitalization at [facility], as your satisfaction is our top priority.

## 2022-04-28 NOTE — PLAN OF CARE
Problem: Occupational Therapy  Goal: Occupational Therapy Goal  Description: Goals to be met by: 5/12/22     Patient will increase functional independence with ADLs by performing:    LE Dressing with Stand-by Assistance.  Grooming while standing at sink with Stand-by Assistance.  Toileting from bedside commode with Stand-by Assistance for hygiene and clothing management.   Supine to sit with Stand-by Assistance.  Step transfer with Stand-by Assistance  Toilet transfer to bedside commode with Stand-by Assistance.  Upper extremity exercise program x15 reps per handout, with independence.    Outcome: Ongoing, Progressing    Pt pleasant and willing to participate in tx session this date; pt was able to perform sit>stand x 2 trials w/ min A and use of RW for ambulating to HOB w/ mod A and use of RW. Pt w/ general weakness and decreased endurance but tolerated OOB activities well. Pt will continue to benefit from skilled acute OT services to maximize functional capacity for safe performance w/ ADLs and functional mobility.

## 2022-04-28 NOTE — PT/OT/SLP EVAL
"Physical Therapy Evaluation and Discharge Note    Patient Name:  Ana James   MRN:  [id number]    Recommendations:     Discharge Recommendations:  nursing facility, skilled   Discharge Equipment Recommendations: none   Barriers to discharge: None    Assessment:     Ana James is a 81 y.o. female admitted with a medical diagnosis of Altered mental status. .  At this time, patient is functioning at their prior level of function and does not require further acute PT services.     Recent Surgery: * No surgery found *      Plan:     Pt scheduled for discharge to NH today, skilled.  Please re-consult if situation changes.      Subjective     Chief Complaint: Pt reports she ready to eat her breakfast, " you'll have to warm it"  Patient/Family Comments/goals: "I'm going home today..."  Pain/Comfort:  · Pain Rating 1: 0/10    Patients cultural, spiritual, Christianity conflicts given the current situation: no    Living Environment:  PTA pt lived in a NH.  Prior to admission, patients level of function was ambulatory with rollator per pt only a short distance.  Equipment used at home: rollator.  DME owned (not currently used): none.  Upon discharge, patient will have assistance from NH staff.    Objective:     Communicated with Carol tamayo prior to session.  Patient found asleep in bed with CPAP on; removed by nurse. with PureWick, oxygen, telemetry upon PT entry to room.    General Precautions: Standard,     Orthopedic Precautions:N/A   Braces: N/A   Respiratory Status: Room air    Exams:  · Cognition intact: Oriented to person, place, month, and year  · Sensation: LT intact BLEs  · ROM/strength WFL BLEs    Functional Mobility:  · Bed Mobility:     · Supine to Sit: moderate assistance  · Sit to Supine: moderate assistance  · Transfers:     · Sit to Stand:  moderate assistance with rolling walker  · Gait:  A few sidesteps to HOB with mod A    AM-PAC 6 CLICK MOBILITY  Total Score:12       Therapeutic " Activities and Exercises:   Pt's breakfast tray heated and set up /p eval.    AM-PAC 6 CLICK MOBILITY  Total Score:12     Patient left HOB elevated with call button in reach.    GOALS:   Multidisciplinary Problems     Physical Therapy Goals        Problem: Physical Therapy    Goal Priority Disciplines Outcome Goal Variances Interventions   Physical Therapy Goal     PT, PT/OT Adequate for Care Transition                     History:     Past Medical History:   Diagnosis Date    Arthritis lower extremities    Asthma     Blind left eye     Diabetes mellitus     Edema of both lower legs     CHRONIC    Fall 02/08/2022    Gall stones     Hypertension     Kidney stones     Lives alone with help available     HOME HEALTH    Nephrolithiasis 1/22/2016    Obesity     PVD (peripheral vascular disease)     Renal disorder     Retinopathy     Sleep apnea     Vaginal delivery     x1    Weakness of both lower extremities     uses a cane, rollator & power chair       Past Surgical History:   Procedure Laterality Date    CARDIAC CATHETERIZATION      cataract      CHOLECYSTECTOMY      EYE SURGERY      Rt cataract surgery    HYSTERECTOMY      URETERAL STENT PLACEMENT         Time Tracking:     PT Received On: 04/28/22  PT Start Time: 0944     PT Stop Time: 1008  PT Total Time (min): 24 min     Billable Minutes: Evaluation 15 and Therapeutic Activity 9      04/28/2022

## 2022-04-28 NOTE — PLAN OF CARE
Problem: Physical Therapy  Goal: Physical Therapy Goal  Outcome: Adequate for Care Transition   Initial eval completed.  Pt safe for discharge to NH skilled.

## 2022-04-29 LAB — ACTH PLAS-MCNC: 30 PG/ML (ref 0–46)

## 2022-04-29 NOTE — NURSING
Report called to [facility] ([phone]) Spoke with ZULEMA Elder. Patient brother at bedside and aware of discharge. All personal belongings with patient at the time of discharge. Transported off unit via wheelchair transport services.

## 2022-04-29 NOTE — PHYSICIAN QUERY
"PT Name: Ana James  MR #: [id number]    DOCUMENTATION CLARIFICATION     CDS/: Beatriz Anguiano RN              Contact information: [email or web address]  This form is a permanent document in the medical record.     Query Date: April 29, 2022    By submitting this query, we are merely seeking further clarification of documentation. Please utilize your independent clinical judgment when addressing the question(s) below.    The Medical Record contains the following:   Indicators   Supporting Clinical Findings Location in Medical Record   x AMS, Confusion,  LOC, etc.  CC:  Altered mental status    presented from intermediate for AMS found to have hyperkalemic.  she was in her normal state of health until the last 24 hours becoming agitated and altered.   Per family and NH, this is unusual for her  Disoriented    Obtunded.  Does not open eyes or respond to voice or tactile stimulation other than slight grimace to noxious stimuli    -Mental status has normalized.   4/24 ED MD PN    4/24 HP        4/26 Hosp Med MD PN      4/23 DC summary       x Acute/Chronic Illness Was hypoglycemic on admit but encephalopathy lasted much longer than hypoglycemia    Etiology is unclear.   DDx include:  uremic encephalopathy, folate deficiency, prolonged encephalopathy after hypoglycemic event and occult toxic ingestion    Altered mental status  Acute renal failure  Hyperkalemia  Hypothermia  Arrhythmia   4/28 DC summary             x Radiology Findings CTH negative for acute pathology 4/24 HP     x Electrolyte Imbalance  Bun/cr= 32/ 2.0  Bun/cr= 31/ 2.1  K= 6.6, 5.3  Na= 133  Glucose= 16   4/24-25 Lab   x Medication Vancomycin IVPB 4/24-26  Piperacillin IVPB 4/24-26  Haldol 2.5mg IVP x 3 doses  D 10% 125mL bolus 4/25  D 50% 25G IVP 4/24  Sodium bicarbonate in 50meq D5% 999% bolus  4/25 4/24-28 MAR               x Treatment         -History per sister and brother - they noted some increasing episodes of "acting " "out" but more recently she has been lethargic.    -CTH negative for acute pathology - shows old lacunar infarcts and chronic microvascular changes  -Hypothermic on admit, but no convincing evidence of infection.  NO UTI.  NO pneumonia.  Blood cultures negative  -TSH, procalcitonin, ammonia, B12 and RPR all normal.  Urine drug screen and alcohol level were negative  -ABG without significant hypercarbia  -Cosyntropin stimulation results not consistent with adrenal insufficiency  -Was hypoglycemic on admit but encephalopathy lasted much longer than hypoglycemia.  -MRI brain without acute stroke  -Neurology consulted and input appreicated  -Etiology is unclear.  Differential includes uremic encephalopathy, folate deficiency, prolonged encephalopathy after hypoglycemic event and occult toxic ingestion.  -Ensure no sedating medications.    -Stopped antibiotics 4/27 and added folic acid replacement and cpap qhs.    -Mental status has normalized.  Will not resume her scheduled insulin at discharge. 4/28 DC summary    Other       The noted clinical guidelines are only system guidelines and do not replace the providers clinical judgment.    The National [place] of Neurologic Disorders and Stroke (NINDS) of the NIH describes encephalopathy as any diffuse disease of the brain that alters brain function or structure.    Provider- if possible, please further specify type of Encephalopathy:     [  x ] Metabolic Encephalopathy -   Due to electrolyte imbalance, metabolic derangements, or infectious processes, includes Septic Encephalopathy, Uremic Encephalopathy     [   ] Encephalopathy, unspecified        [   ] Other Encephalopathy (please specify): ____________________     [   ] Other  (please specify condition): __________     [   ]  Clinically Undetermined         Please document in your progress notes daily for the duration of treatment until resolved, and include in your discharge summary.    References:  NATASHA Gordon RN, " CCDS. (2018, June 9). Notes from the Instructor: Encephalopathy tips. Retrieved October 22, 2020, from [email or web address]://acdis.org/articles/note-instructor-encephalopathy-tips    ICD-9-CM Coding Clinic First Quarter 2013, Effective with discharges: October 21, 2013 [facility] § Seizure with encephalopathy due to postictal state (2013).    ICD-10-CM/[employer] Integrated Codebook (Version V.20.8.10.0) [Computer software]. (2020). Retrieved October 21, 2020.    National [place] of Neurological Disorders and Stroke. (2019, March 27). Retrieved October 22, 2020, from https://www.ninds.nih.gov/Disorders/All-Disorders/Xxrjtscenpmwci-Dukbpzzcvfl-Irur    Form No. 29731

## 2022-04-29 NOTE — DISCHARGE SUMMARY
[facility] Medicine  Discharge Summary      Patient Name: Ana James  MRN: [id number]  Patient Class: IP- Inpatient  Admission Date: 4/24/2022  Hospital Length of Stay: 4 days  Discharge Date and Time: 4/28/2022  6:07 PM  Attending Physician: No att. providers found   Discharging Provider: Issa Bejarano MD  Primary Care Provider: Viky Bean MD      HPI:   82 yo female with a PMHx of HTN, HLD, DM2 (A1c 6.8% in Jan) [facility] found to have hyperkalemic.    History was obtained via ED, EMS, and chart review, plus family. Patient answers yes and no to simple questions currently. Apparently, she was in her normal state of health until the last 24 hours becoming agitated and altered. Per family and NH, this is unusual for her. No other noticeable changes per staff. She did arrive with urinary and stool incontinence. In the ED, labs showing hyperkalemia of 6.6 with sCr 1.9 from baselin of 1.3. Also notable for mild leukopenia with eosinophilia and vitals signs of hypothermia to 88F.    ECG showing likely AF with slow VR (QRS is irregular making junctional unlikely). No known history. Given insulin and bicarb for K shift plus CaGluc for ECG changes. Also received Vanc/Zosyn for concern of infection. CTH negative.       Goals of Care Treatment Preferences:  Code Status: DNR      Consults:   Consults (From admission, onward)        Status Ordering Provider     Inpatient consult to Social Work  Once        Provider:  (Not yet assigned)    Completed ISSA BEJARANO     Inpatient consult to Neurology  Once        Provider:  Artemio Delgadillo MD    Completed ISSA BEJARANO     Inpatient consult to Palliative Care  Once        Provider:  Francine Mukherjee NP    Completed ISSA BEJARANO     Inpatient consult to Cardiology  Once        Provider:  Pancho Loza MD    Completed ALEC MANZANARES-MY T.        Hospital Course By Problem:   * Altered mental  "status  -Admitted to inpatient status  -History per sister and brother - they noted some increasing episodes of "acting out" but more recently she has been lethargic.  Not on CPAP at home.  -CTH negative for acute pathology - shows old lacunar infarcts and chronic microvascular changes  -Hypothermic on admit, but no convincing evidence of infection.  NO UTI.  NO pneumonia.  Blood cultures negative  -TSH, procalcitonin, ammonia, B12 and RPR all normal.  Urine drug screen and alcohol level were negative  -ABG without significant hypercarbia  -Cosyntropin stimulation results not consistent with adrenal insufficiency  -Was hypoglycemic on admit but encephalopathy lasted much longer than hypoglycemia.  -MRI brain without acute stroke  -Neurology consulted and input appreicated  -Etiology is unclear.  Differential includes uremic encephalopathy, folate deficiency, prolonged encephalopathy after hypoglycemic event and occult toxic ingestion.  -Ensure no sedating medications.    -Stopped antibiotics 4/27 and added folic acid replacement and cpap qhs.    -Mental status has normalized.  Will not resume her scheduled insulin at discharge.  Will only use SSI at SNF for now.    -Stable for discharge to SNF today    Acute renal failure superimposed on stage 3 chronic kidney disease  -Baseline Cr 1.3  -On admit Cr 1.8 with hyperkalemia and NAGMA  -Suspect pre-renal due to dehydration.  -BNP only 21.  FENA 2.8%.  No urine eosinophils.  -KATHY without evidence of hydronephrosis.  -Avoid nephrotoxic agents and renally dose meds  -Stopped vancomycin 4/26  -K normal and NAGMA improving.  Cr now 1.6  -Ensure good oral hydration    Hyperkalemia  -K of 6.6 on admit, likely due to AUTUMN;   -ECG with AF which could be possible sequela  -Shifted in ER and received Na zirc  -K now normal    Hypothermia  -Noted on admission with concurrent bradycardia  -No evidence of sepsis - blood cultures negative, UA normal, no leukocytosis, procalcitonin " negative  -Warming blanket used and now euthermic.  -AM cortisol borderline low but cosyntropin stim test not consistent with adrenal insufficiency.  -Source unclear - but has resolved.  ?Due to hypoglycemia?    Arrhythmia  -ECG noted bradycardia with 1st degree AVB - had p waves so not afib.  -Bradycardia has resolved  -Cardiology consulted and input appreciated  -Continue to monitor closely.  -Avoid beta-blockers due to periodic bradycardia.    DNR (do not resuscitate)    Debility  -Consulted PT/OT.    Palliative care encounter  Advance Care Planning Code Status  -Patient unable to participate due to obtunded mental status.  I discussed with both her brother and sister and they feel she would wish for a peaceful death and would not want intubation or chest compressions in the event of cardiac or respiratory arrest.  In keeping with those goals will place DNR order.    -Consulted palliative care as well, but patient unfortunately still unable to participate in discussions  25 minutes time spent in GOC/ACP discussions on 4/26.    Chronic diastolic heart failure  -Echo 1/2022 showed hyperdymanic EF 70% with indeterminate diastolic function  -Volume status difficult to clearly ascertain on exam due to body habitus - but not floridly overloaded  -BNP is normal.    -Strict ins/outs and daily weights  -Treated with gentle bicarb drip during her stay    Hyperlipidemia  -Lipitor daily once able to take PO    Controlled type 2 diabetes mellitus with stage 3 chronic kidney disease, with long-term current use of insulin  -A1c 6.6 4/25/22  -Hypoglycemic on admit  -Home med list includes detemir 26 units qhs and tradjenta 5mg daily - holding these for now  -Continue SSI only at discharge    Class 2 obesity in adult  Body mass index is 36.58 kg/m².   -Morbid obesity complicates all aspects of disease management from diagnostic modalities to treatment.   -Weight loss encouraged and health benefits explained to  patient.     Essential hypertension  -BP generally well controlled to slighlty elevated  -Held home coreg and norvasc during her stay and not resuming at time of discharge      Final Active Diagnoses:    Diagnosis Date Noted POA    PRINCIPAL PROBLEM:  Altered mental status [R41.82] 04/24/2022 Yes    Acute renal failure superimposed on stage 3 chronic kidney disease [N17.9, N18.30] 05/27/2017 Yes    Hyperkalemia [E87.5] 02/27/2020 Yes    Hypothermia [T68.XXXA] 02/08/2022 Yes    Arrhythmia [I49.9] 04/24/2022 Yes    Debility [R53.81] 04/27/2022 Yes    Palliative care encounter [Z51.5] 04/26/2022 Not Applicable    DNR (do not resuscitate) [Z66] 04/26/2022 No    Hyperlipidemia [E78.5] 02/27/2020 Yes     Chronic    Chronic diastolic heart failure [I50.32] 02/27/2020 Yes     Chronic    Controlled type 2 diabetes mellitus with stage 3 chronic kidney disease, with long-term current use of insulin [E11.22, N18.30, Z79.4] 07/11/2018 Not Applicable     Chronic    Essential hypertension [I10] 03/02/2014 Yes     Chronic    Class 2 obesity in adult [E66.9] 03/02/2014 Yes      Problems Resolved During this Admission:       Discharged Condition: stable    Disposition: Skilled Nursing Facility    Follow Up:    Patient Instructions:      Diet diabetic     Diet Cardiac     Diet renal     Notify your health care provider if you experience any of the following:  increased confusion or weakness     Notify your health care provider if you experience any of the following:  persistent dizziness, light-headedness, or visual disturbances     Notify your health care provider if you experience any of the following:  worsening rash     Notify your health care provider if you experience any of the following:  severe persistent headache     Notify your health care provider if you experience any of the following:  difficulty breathing or increased cough     Notify your health care provider if you experience any of the following:  severe  uncontrolled pain     Notify your health care provider if you experience any of the following:  persistent nausea and vomiting or diarrhea     Notify your health care provider if you experience any of the following:  temperature >100.4     Activity as tolerated       Significant Diagnostic Studies: Labs:   BMP:   Recent Labs   Lab 04/27/22  0340 04/28/22  0450   * 100    137   K 4.8 4.6    104   CO2 22* 24   BUN 20 19   CREATININE 1.9* 1.6*   CALCIUM 9.5 9.6   , CMP   Recent Labs   Lab 04/27/22  0340 04/28/22  0450    137   K 4.8 4.6    104   CO2 22* 24   * 100   BUN 20 19   CREATININE 1.9* 1.6*   CALCIUM 9.5 9.6   ANIONGAP 9 9   ESTGFRAFRICA 28* 34*   EGFRNONAA 24* 30*   , CBC   Recent Labs   Lab 04/27/22  0340 04/28/22  0450   WBC 5.75 5.54   HGB 10.4* 10.6*   HCT 32.9* 33.3*   * 120*   , INR   Lab Results   Component Value Date    INR 1.1 02/26/2020    INR 1.1 06/03/2016    INR 1.0 03/01/2014   , Lipid Panel   Lab Results   Component Value Date    CHOL 126 01/27/2021    HDL 36 (L) 01/27/2021    LDLCALC 72.0 01/27/2021    TRIG 90 01/27/2021    CHOLHDL 28.6 01/27/2021   , Troponin   Recent Labs   Lab 04/24/22  1159   TROPONINI <0.006    and A1C:   Recent Labs   Lab 01/12/22  1206 04/25/22  0512   HGBA1C 6.8* 6.6*       Pending Diagnostic Studies:     Procedure Component Value Units Date/Time    ACTH [[id number]] Collected: 04/27/22 0825    Order Status: Sent Lab Status: In process Updated: 04/27/22 1122    Specimen: Blood     EKG 12-lead [[id number]]     Order Status: Sent Lab Status: No result          Medications:  Reconciled Home Medications:      Medication List      START taking these medications    folic acid 1 MG tablet  Commonly known as: FOLVITE  Take 1 tablet (1 mg total) by mouth once daily.     insulin aspart U-100 100 unit/mL (3 mL) Inpn pen  Commonly known as: NovoLOG  Use sliding scale as needed for hyperglycemia per sliding scale above.        CONTINUE  "taking these medications    albuterol 90 mcg/actuation inhaler  Commonly known as: PROVENTIL/VENTOLIN HFA  INHALE 2 PUFFS INTO THE LUNGS EVERY 6 (SIX) HOURS AS NEEDED FOR WHEEZING. RESCUE     aspirin 81 MG EC tablet  Commonly known as: ECOTRIN  Take 81 mg by mouth nightly.     atorvastatin 20 MG tablet  Commonly known as: LIPITOR  TAKE 1 TABLET BY MOUTH EVERY DAY     blood sugar diagnostic Strp  Test 3 times daily     blood-glucose transmitter Lea  Commonly known as: DEXCOM G5 TRANSMITTER  Test glucose twice daily     DEXCOM G6  Misc  Generic drug: blood-glucose meter,continuous  Test glucose twice daily     * lancets Misc  Test 3 times daily     * lancets Misc  To check BG 3 times daily, to use with insurance preferred meter     latanoprost 0.005 % ophthalmic solution  Place 1 drop into both eyes every evening.     melatonin 3 mg tablet  Commonly known as: MELATIN  Take 3 tablets (9 mg total) by mouth nightly.     oxybutynin 15 MG Tr24  Commonly known as: DITROPAN XL  TAKE 1 TABLET BY MOUTH EVERY DAY     pen needle, diabetic 32 gauge x 5/32" Ndle  Commonly known as: BD ULTRA-FINE BREE PEN NEEDLE  INJECT INSULIN THREE TIMES DAILY     polyethylene glycol 17 gram Pwpk  Commonly known as: GLYCOLAX  Take 17 g by mouth once daily.     timolol maleate 0.5% 0.5 % Drop  Commonly known as: TIMOPTIC  Place 1 drop into both eyes every morning.         * This list has 2 medication(s) that are the same as other medications prescribed for you. Read the directions carefully, and ask your doctor or other care provider to review them with you.            STOP taking these medications    amLODIPine 10 MG tablet  Commonly known as: NORVASC     carvediloL 12.5 MG tablet  Commonly known as: COREG     hydrALAZINE 50 MG tablet  Commonly known as: APRESOLINE     LEVEMIR FLEXTOUCH U-100 INSULN 100 unit/mL (3 mL) Inpn pen  Generic drug: insulin detemir U-100     TRADJENTA 5 mg Tab tablet  Generic drug: linaGLIPtin     traZODone 50 " MG tablet  Commonly known as: DESYREL            Indwelling Lines/Drains at time of discharge:   Lines/Drains/Airways     Drain  Duration           Female External Urinary Catheter 04/25/22 1900 3 days                Time spent on the discharge of patient: 35 minutes         Yosi Bejarano MD  Department of Hospital Medicine  [facility] [place] - Telemetry

## 2022-05-24 ENCOUNTER — HOSPITAL ENCOUNTER (INPATIENT)
Facility: HOSPITAL | Age: 82
LOS: 9 days | Discharge: HOME | DRG: 981 | End: 2022-06-02
Attending: EMERGENCY MEDICINE | Admitting: INTERNAL MEDICINE
Payer: MEDICARE

## 2022-05-24 DIAGNOSIS — R53.81 PHYSICAL DEBILITY: Primary | ICD-10-CM

## 2022-05-24 DIAGNOSIS — Z01.810 PREOP CARDIOVASCULAR EXAM: ICD-10-CM

## 2022-05-24 DIAGNOSIS — L89.154 SACRAL DECUBITUS ULCER, STAGE IV: ICD-10-CM

## 2022-05-24 DIAGNOSIS — N18.9 ACUTE ON CHRONIC RENAL FAILURE: ICD-10-CM

## 2022-05-24 DIAGNOSIS — N17.9 ACUTE ON CHRONIC RENAL FAILURE: ICD-10-CM

## 2022-05-24 DIAGNOSIS — R05.9 COUGH: ICD-10-CM

## 2022-05-24 PROBLEM — U07.1 COVID-19: Status: ACTIVE | Noted: 2022-05-24

## 2022-05-24 LAB
ALBUMIN SERPL BCP-MCNC: 2.1 G/DL (ref 3.5–5.2)
ALP SERPL-CCNC: 80 U/L (ref 55–135)
ALT SERPL W/O P-5'-P-CCNC: 87 U/L (ref 10–44)
ANION GAP SERPL CALC-SCNC: 11 MMOL/L (ref 8–16)
AST SERPL-CCNC: 99 U/L (ref 10–40)
BASOPHILS # BLD AUTO: 0.03 K/UL (ref 0–0.2)
BASOPHILS NFR BLD: 0.2 % (ref 0–1.9)
BILIRUB SERPL-MCNC: 0.3 MG/DL (ref 0.1–1)
BUN SERPL-MCNC: 88 MG/DL (ref 8–23)
CALCIUM SERPL-MCNC: 10.1 MG/DL (ref 8.7–10.5)
CHLORIDE SERPL-SCNC: 106 MMOL/L (ref 95–110)
CO2 SERPL-SCNC: 27 MMOL/L (ref 23–29)
CREAT SERPL-MCNC: 4 MG/DL (ref 0.5–1.4)
DIFFERENTIAL METHOD: ABNORMAL
EOSINOPHIL # BLD AUTO: 0.3 K/UL (ref 0–0.5)
EOSINOPHIL NFR BLD: 1.7 % (ref 0–8)
ERYTHROCYTE [DISTWIDTH] IN BLOOD BY AUTOMATED COUNT: 15.8 % (ref 11.5–14.5)
EST. GFR  (AFRICAN AMERICAN): 11 ML/MIN/1.73 M^2
EST. GFR  (NON AFRICAN AMERICAN): 10 ML/MIN/1.73 M^2
GLUCOSE SERPL-MCNC: 134 MG/DL (ref 70–110)
HCT VFR BLD AUTO: 29.4 % (ref 37–48.5)
HGB BLD-MCNC: 9 G/DL (ref 12–16)
IMM GRANULOCYTES # BLD AUTO: 0.16 K/UL (ref 0–0.04)
IMM GRANULOCYTES NFR BLD AUTO: 1 % (ref 0–0.5)
LYMPHOCYTES # BLD AUTO: 1.4 K/UL (ref 1–4.8)
LYMPHOCYTES NFR BLD: 8.8 % (ref 18–48)
MCH RBC QN AUTO: 27.2 PG (ref 27–31)
MCHC RBC AUTO-ENTMCNC: 30.6 G/DL (ref 32–36)
MCV RBC AUTO: 89 FL (ref 82–98)
MONOCYTES # BLD AUTO: 1 K/UL (ref 0.3–1)
MONOCYTES NFR BLD: 6.5 % (ref 4–15)
NEUTROPHILS # BLD AUTO: 12.6 K/UL (ref 1.8–7.7)
NEUTROPHILS NFR BLD: 81.8 % (ref 38–73)
NRBC BLD-RTO: 0 /100 WBC
PLATELET # BLD AUTO: 315 K/UL (ref 150–450)
PMV BLD AUTO: 10.1 FL (ref 9.2–12.9)
POCT GLUCOSE: 112 MG/DL (ref 70–110)
POTASSIUM SERPL-SCNC: 5.9 MMOL/L (ref 3.5–5.1)
PROCALCITONIN SERPL IA-MCNC: 0.6 NG/ML
PROT SERPL-MCNC: 8 G/DL (ref 6–8.4)
RBC # BLD AUTO: 3.31 M/UL (ref 4–5.4)
SODIUM SERPL-SCNC: 144 MMOL/L (ref 136–145)
WBC # BLD AUTO: 15.36 K/UL (ref 3.9–12.7)

## 2022-05-24 PROCEDURE — 80053 COMPREHEN METABOLIC PANEL: CPT | Performed by: EMERGENCY MEDICINE

## 2022-05-24 PROCEDURE — 36415 COLL VENOUS BLD VENIPUNCTURE: CPT | Performed by: INTERNAL MEDICINE

## 2022-05-24 PROCEDURE — 27000207 HC ISOLATION

## 2022-05-24 PROCEDURE — 85025 COMPLETE CBC W/AUTO DIFF WBC: CPT | Performed by: EMERGENCY MEDICINE

## 2022-05-24 PROCEDURE — 25000003 PHARM REV CODE 250: Performed by: INTERNAL MEDICINE

## 2022-05-24 PROCEDURE — 84145 PROCALCITONIN (PCT): CPT | Performed by: INTERNAL MEDICINE

## 2022-05-24 PROCEDURE — 83036 HEMOGLOBIN GLYCOSYLATED A1C: CPT | Performed by: INTERNAL MEDICINE

## 2022-05-24 PROCEDURE — 63600175 PHARM REV CODE 636 W HCPCS: Performed by: INTERNAL MEDICINE

## 2022-05-24 PROCEDURE — 21400001 HC TELEMETRY ROOM

## 2022-05-24 PROCEDURE — 99285 EMERGENCY DEPT VISIT HI MDM: CPT | Mod: 25

## 2022-05-24 RX ORDER — ASPIRIN 81 MG/1
81 TABLET ORAL NIGHTLY
Status: DISCONTINUED | OUTPATIENT
Start: 2022-05-24 | End: 2022-06-02 | Stop reason: HOSPADM

## 2022-05-24 RX ORDER — IBUPROFEN 200 MG
16 TABLET ORAL
Status: DISCONTINUED | OUTPATIENT
Start: 2022-05-24 | End: 2022-06-02 | Stop reason: HOSPADM

## 2022-05-24 RX ORDER — ENOXAPARIN SODIUM 100 MG/ML
1 INJECTION SUBCUTANEOUS
Status: DISCONTINUED | OUTPATIENT
Start: 2022-05-24 | End: 2022-06-02 | Stop reason: HOSPADM

## 2022-05-24 RX ORDER — ALBUTEROL SULFATE 90 UG/1
2 AEROSOL, METERED RESPIRATORY (INHALATION) EVERY 6 HOURS PRN
Status: DISCONTINUED | OUTPATIENT
Start: 2022-05-24 | End: 2022-06-02 | Stop reason: HOSPADM

## 2022-05-24 RX ORDER — TALC
9 POWDER (GRAM) TOPICAL NIGHTLY
Status: DISCONTINUED | OUTPATIENT
Start: 2022-05-24 | End: 2022-06-02 | Stop reason: HOSPADM

## 2022-05-24 RX ORDER — TIMOLOL MALEATE 5 MG/ML
1 SOLUTION/ DROPS OPHTHALMIC EVERY MORNING
Status: DISCONTINUED | OUTPATIENT
Start: 2022-05-24 | End: 2022-06-02 | Stop reason: HOSPADM

## 2022-05-24 RX ORDER — IBUPROFEN 200 MG
24 TABLET ORAL
Status: DISCONTINUED | OUTPATIENT
Start: 2022-05-24 | End: 2022-06-02 | Stop reason: HOSPADM

## 2022-05-24 RX ORDER — SODIUM CHLORIDE 9 MG/ML
INJECTION, SOLUTION INTRAVENOUS CONTINUOUS
Status: DISCONTINUED | OUTPATIENT
Start: 2022-05-24 | End: 2022-05-29

## 2022-05-24 RX ORDER — ATORVASTATIN CALCIUM 10 MG/1
20 TABLET, FILM COATED ORAL DAILY
Status: DISCONTINUED | OUTPATIENT
Start: 2022-05-24 | End: 2022-06-02 | Stop reason: HOSPADM

## 2022-05-24 RX ORDER — INSULIN ASPART 100 [IU]/ML
1-10 INJECTION, SOLUTION INTRAVENOUS; SUBCUTANEOUS
Status: DISCONTINUED | OUTPATIENT
Start: 2022-05-24 | End: 2022-06-02 | Stop reason: HOSPADM

## 2022-05-24 RX ORDER — LATANOPROST 50 UG/ML
1 SOLUTION/ DROPS OPHTHALMIC NIGHTLY
Status: DISCONTINUED | OUTPATIENT
Start: 2022-05-24 | End: 2022-06-02 | Stop reason: HOSPADM

## 2022-05-24 RX ORDER — FOLIC ACID 1 MG/1
1 TABLET ORAL DAILY
Status: DISCONTINUED | OUTPATIENT
Start: 2022-05-24 | End: 2022-06-02 | Stop reason: HOSPADM

## 2022-05-24 RX ORDER — GLUCAGON 1 MG
1 KIT INJECTION
Status: DISCONTINUED | OUTPATIENT
Start: 2022-05-24 | End: 2022-06-02 | Stop reason: HOSPADM

## 2022-05-24 RX ORDER — HEPARIN SODIUM 5000 [USP'U]/ML
5000 INJECTION, SOLUTION INTRAVENOUS; SUBCUTANEOUS EVERY 12 HOURS
Status: DISCONTINUED | OUTPATIENT
Start: 2022-05-24 | End: 2022-05-24

## 2022-05-24 RX ORDER — OXYBUTYNIN CHLORIDE 5 MG/1
15 TABLET, EXTENDED RELEASE ORAL DAILY
Status: DISCONTINUED | OUTPATIENT
Start: 2022-05-24 | End: 2022-06-02 | Stop reason: HOSPADM

## 2022-05-24 RX ADMIN — FOLIC ACID 1 MG: 1 TABLET ORAL at 03:05

## 2022-05-24 RX ADMIN — ATORVASTATIN CALCIUM 20 MG: 10 TABLET, FILM COATED ORAL at 03:05

## 2022-05-24 RX ADMIN — MELATONIN TAB 3 MG 9 MG: 3 TAB at 09:05

## 2022-05-24 RX ADMIN — ENOXAPARIN SODIUM 90 MG: 100 INJECTION SUBCUTANEOUS at 03:05

## 2022-05-24 RX ADMIN — LATANOPROST 1 DROP: 50 SOLUTION OPHTHALMIC at 09:05

## 2022-05-24 RX ADMIN — OXYBUTYNIN CHLORIDE 15 MG: 5 TABLET, EXTENDED RELEASE ORAL at 03:05

## 2022-05-24 RX ADMIN — SODIUM CHLORIDE: 0.9 INJECTION, SOLUTION INTRAVENOUS at 09:05

## 2022-05-24 RX ADMIN — SODIUM CHLORIDE: 0.9 INJECTION, SOLUTION INTRAVENOUS at 03:05

## 2022-05-24 RX ADMIN — ASPIRIN 81 MG: 81 TABLET, DELAYED RELEASE ORAL at 09:05

## 2022-05-24 NOTE — HPI
Mrs. James is a 82 yo F who presents from [facility] with a CC of hypotension.  The patient was diagnosed with Covid yesterday. 02 sats in the ED were 98% with a clear CXR. Patient not able to give accurate history. Likely decreased po intake in the previous days. She presents with a BUN/CRT of 88/4.0.  Last month her CRT was 1.6.  She has known CKD 3.  She is bed bound and has an unstageable sacral wound.  Otherwise no further sig. History obtained. She is non-ill appearing     - Darin Garcia MD

## 2022-05-24 NOTE — PLAN OF CARE
[facility] - Telemetry  Initial Discharge Assessment       Primary Care Provider: Viky Bean MD    Admission Diagnosis: Cough [R05.9]    Admission Date: 5/24/2022  Expected Discharge Date:     Discharge Barriers Identified: None    Payor: [employer]ICARE / Plan: [employer] / Product Type: Medicare Advantage /     Extended Emergency Contact Information  Primary Emergency Contact: Poonam Doshi   [facility]  Home Phone: [phone]  Mobile Phone: [phone]  Relation: Friend  Secondary Emergency Contact: Prince Elder   [facility]  Home Phone: [phone]  Mobile Phone: [phone]  Relation: Brother    Discharge Plan A: Return to nursing home  Discharge Plan B: Return to Nursing Home      [id number] [place] - [address]  [address] [address]  Phone: [phone] Fax: [phone]    [facility] [place] - [address] AT Portal to Registered [facility] Sites  [address]  [facility] [address]  Phone: [phone] Fax: [phone]    [id number] - [place] - [address]  [address] [place] [address]  Phone: [phone] Fax: [phone]      Initial Assessment (most recent)       Adult Discharge Assessment - 05/24/22 1539          Discharge Assessment    Assessment Type Discharge Planning Assessment     Confirmed/corrected address, phone number and insurance Yes     Confirmed Demographics Correct on Facesheet     Source of Information health record;facility verbal report (P)      If unable to respond/provide information was family/caregiver contacted? Yes     Contact Name/Number [facility]     Communicated FREDERICK with patient/caregiver Date not available/Unable to determine     Reason For Admission Cough     Lives With facility resident     Facility Arrived From: [facility]     Do you expect to return to your current living  situation? Yes     Do you have help at home or someone to help you manage your care at home? Yes     Who are your caregiver(s) and their phone number(s)? [facility]     Equipment Currently Used at Home wheelchair;rollator     Readmission within 30 days? No     Patient currently being followed by outpatient case management? No     Do you currently have service(s) that help you manage your care at home? Yes     Name and Contact number of agency [facility]     Is the pt/caregiver preference to resume services with current agency Yes     Do you take prescription medications? Yes     Do you have prescription coverage? Yes     Coverage PHN     Do you have any problems affording any of your prescribed medications? No     Is the patient taking medications as prescribed? yes     Who is going to help you get home at discharge? Poonam Doshi (Friend)   [phone]     How do you get to doctors appointments? agency     Are you on dialysis? No     Do you take coumadin? No     Discharge Plan A Return to nursing home     Discharge Plan B Return to Nursing Home     DME Needed Upon Discharge  other (see comments)   TBD    Discharge Barriers Identified None        Relationship/Environment    Name(s) of Who Lives With Patient [facility].                        VINNY spoke with Liz ([facility]) that patient is half-way at nursing home and is able to return at discharge.

## 2022-05-24 NOTE — ASSESSMENT & PLAN NOTE
Body mass index is 36.58 kg/m². Morbid obesity complicates all aspects of disease management from diagnostic modalities to treatment. Weight loss encouraged and health benefits explained to patient.

## 2022-05-24 NOTE — ED PROVIDER NOTES
Encounter Date: 5/24/2022    SCRIBE #1 NOTE: I, Luz Beckwith, am scribing for, and in the presence of,  Jennifer Saunders MD. I have scribed the following portions of the note - Other sections scribed: HPI, ROS.       History     Chief Complaint   Patient presents with    Cough     [facility] called EMS for hypotension. The staff stated the patient is COVID+ since yesterday and critical lab of albumin 6. Patient complaining of cough and pain to her sacral wound, taking antibiotics for it. Patient is a full code.       Ana James is a 81 y.o female coming from [facility] with a PMHx of DM, HTN, PVD, and renal disorder presenting to the ED with a chief complaint of cough. Per nursing home staff, EMS was called due to concerns of hypotension with several readings around 100/40. Nursing home notes that the patient tested positive for COVID-19 and was moved to the COVID unit yesterday, where the patient was noted to be more confused from baseline, with a reported baseline of cooperative and alert and oriented x2. Patient was also reported to be gagging and coughing up any liquids given to her by nursing home staff. Patient was admitted to [facility] 2 months ago, and staff notes that the patient's sacral wound looks worse today than it did when she was admitted. Nursing home staff additionally expresses concern over the patient's critical albumin level which was 6. Upon arrival in the ED, the patient expresses pain surrounding her sacral wound, cough, and headache. HPI limited due to baseline mental status of the patient and utter lack of information from the nursing home.     The history is provided by the patient and the nursing home. The history is limited by the condition of the patient.     Review of patient's allergies indicates:   Allergen Reactions    Adhesive Rash     Paper tape     Past Medical History:   Diagnosis Date    Arthritis lower extremities    Asthma     Blind left eye     Diabetes mellitus      Edema of both lower legs     CHRONIC    Fall 02/08/2022    Gall stones     Hypertension     Kidney stones     Lives alone with help available     HOME HEALTH    Nephrolithiasis 1/22/2016    Obesity     PVD (peripheral vascular disease)     Renal disorder     Retinopathy     Sleep apnea     Vaginal delivery     x1    Weakness of both lower extremities     uses a cane, rollator & power chair     Past Surgical History:   Procedure Laterality Date    CARDIAC CATHETERIZATION      cataract      CHOLECYSTECTOMY      DEBRIDEMENT OF SACRAL WOUND N/A 5/31/2022    Procedure: DEBRIDEMENT, WOUND, SACRUM;  Surgeon: Timmy Allison MD;  Location: [facility] OR;  Service: General;  Laterality: N/A;    EYE SURGERY      Rt cataract surgery    HYSTERECTOMY      URETERAL STENT PLACEMENT       Family History   Problem Relation Age of Onset    Kidney failure Mother     Hypertension Father     Diabetes Brother     Cancer Sister      Social History     Tobacco Use    Smoking status: Never Smoker    Smokeless tobacco: Never Used   Substance Use Topics    Alcohol use: No    Drug use: Never     Review of Systems   Unable to perform ROS: Mental status change   Respiratory: Positive for cough.    Gastrointestinal:        (+) gagging, spitting up liquids   Skin: Positive for wound (worsening sacral wound with pain).   Neurological: Positive for headaches.   Psychiatric/Behavioral: Positive for confusion.       Physical Exam     Initial Vitals [05/24/22 1039]   BP Pulse Resp Temp SpO2   (!) 159/72 68 18 98.1 °F (36.7 °C) 98 %      MAP       --         Physical Exam     Constitutional: Well-developed, obese, No acute distressed, Alert  HENT: Normocephalic, Atraumatic, slightly dry mucous membranes  Eyes: Conjunctiva normal  Neck: Supple, ROM normal  Cardiac: Regular rate  Pulmonary/Chest wall: No respiratory distress, CTAB  Abdomen: Soft, nontender, nondistended, no rebound, no guarding  Musc: Normal ROM, No obvious joint  swelling  Neuro: oriented x 1, no focal neurologic deficit  Skin: Pink, warm, dry.  No rashes, large sacral decub  Psych: Behavior cooperative    Previous medical record and nursing documentation reviewed where available.          ED Course   Procedures  Labs Reviewed   CBC W/ AUTO DIFFERENTIAL - Abnormal; Notable for the following components:       Result Value    WBC 15.36 (*)     RBC 3.31 (*)     Hemoglobin 9.0 (*)     Hematocrit 29.4 (*)     MCHC 30.6 (*)     RDW 15.8 (*)     Immature Granulocytes 1.0 (*)     Gran # (ANC) 12.6 (*)     Immature Grans (Abs) 0.16 (*)     Gran % 81.8 (*)     Lymph % 8.8 (*)     All other components within normal limits   COMPREHENSIVE METABOLIC PANEL - Abnormal; Notable for the following components:    Potassium 5.9 (*)     Glucose 134 (*)     BUN 88 (*)     Creatinine 4.0 (*)     Albumin 2.1 (*)     AST 99 (*)     ALT 87 (*)     eGFR if  11 (*)     eGFR if non  10 (*)     All other components within normal limits        ECG Results    None       Imaging Results          X-Ray Chest AP Portable (Final result)  Result time 05/24/22 12:47:20    Final result by Yosi Herrera MD (05/24/22 12:47:20)                 Impression:      See above      Electronically signed by: Yosi Herrera MD  Date:    05/24/2022  Time:    12:47             Narrative:    EXAMINATION:  XR CHEST AP PORTABLE    CLINICAL HISTORY:  Cough, unspecified    TECHNIQUE:  Single frontal view of the chest was performed.    COMPARISON:  04/24/2022 none    FINDINGS:  Heart size normal.  The lungs are clear.  No pleural effusion                                 Medications   remdesivir 200 mg in sodium chloride 0.9% 250 mL infusion (0 mg Intravenous Stopped 5/25/22 1538)     Followed by   remdesivir 100 mg in sodium chloride 0.9% 250 mL infusion (0 mg Intravenous Stopped 5/27/22 1919)   acetaminophen 1,000 mg/100 mL (10 mg/mL) injection 1,000 mg (0 mg Intravenous Stopped 5/31/22 1312)      Medical Decision Making:   Initial Assessment:   Patient is a 81 year old female resident of [facility] with history of CKD3, DM, HTN, debility sent over for low blood pressures and gagging.  She was recently diagnosed with covid.  Here in the ED, she is never hypotensive or tachycardic.  No overt signs of sepsis.  She is chronically ill appearing but appears to be in no acute distress.  No hypoxia or signs of respiratory insufficiency secondary to covid.  CXR is clear - no consolidations.  She does have a large sacral decubitus ulcer.  Labs are significant for acute worsening of renal insufficiency.  Today BUN 88, Cr 4.0 from baseline of 1.6.  K slightly elevated.  I have started gentle fluid resuscitation.  She will be admitted to hospitalist for renal failure in setting of covid and multiple chronic medical issues.      Critical care time spent on the evaluation and treatment of severe organ dysfunction, review of pertinent labs and imaging studies, discussions with consulting providers and discussions with patient/family: 40 minutes.  Clinical Tests:   Lab Tests: Ordered and Reviewed  Radiological Study: Ordered and Reviewed          Scribe Attestation:   Scribe #1: I performed the above scribed service and the documentation accurately describes the services I performed. I attest to the accuracy of the note.                 Clinical Impression:   Final diagnoses:  [R05.9] Cough        I, Jennifer Saunders, personally performed the services described in this documentation. All medical record entries made by the scribe were at my direction and in my presence. I have reviewed the chart and agree that the record reflects my personal performance and is accurate and complete.       ED Disposition Condition    Admit               Jennifer Saunders MD  07/15/22 [id number]

## 2022-05-24 NOTE — ASSESSMENT & PLAN NOTE
Likely pre-renal from decreased po intake due to covid. IV fluids. BMP in am.  Has baseline CKD 3.

## 2022-05-24 NOTE — PLAN OF CARE
Problem: Skin Injury Risk Increased  Goal: Skin Health and Integrity  Outcome: Ongoing, Progressing     Problem: Adult Inpatient Plan of Care  Goal: Plan of Care Review  Outcome: Ongoing, Progressing  Goal: Patient-Specific Goal (Individualized)  Outcome: Ongoing, Progressing  Goal: Absence of Hospital-Acquired Illness or Injury  Outcome: Ongoing, Progressing  Goal: Optimal Comfort and Wellbeing  Outcome: Ongoing, Progressing  Goal: Readiness for Transition of Care  Outcome: Ongoing, Progressing     Problem: Diabetes Comorbidity  Goal: Blood Glucose Level Within Targeted Range  Outcome: Ongoing, Progressing     Problem: Fluid and Electrolyte Imbalance (Acute Kidney Injury/Impairment)  Goal: Fluid and Electrolyte Balance  Outcome: Ongoing, Progressing     Problem: Oral Intake Inadequate (Acute Kidney Injury/Impairment)  Goal: Optimal Nutrition Intake  Outcome: Ongoing, Progressing     Problem: Renal Function Impairment (Acute Kidney Injury/Impairment)  Goal: Effective Renal Function  Outcome: Ongoing, Progressing     Problem: Impaired Wound Healing  Goal: Optimal Wound Healing  Outcome: Ongoing, Progressing

## 2022-05-24 NOTE — H&P
[facility] [place] Emergency Dept  [facility] Medicine  History & Physical    Patient Name: Ana James  MRN: [id number]  Patient Class: IP- Inpatient  Admission Date: 5/24/2022  Attending Physician: Jennifer Saunders MD   Primary Care Provider: Viky Bean MD         Patient information was obtained from EMS personnel and ER records.     Subjective:     Principal Problem:Acute on chronic renal failure    Chief Complaint:   Chief Complaint   Patient presents with    Cough     [facility] called EMS for hypotension. The staff stated the patient is COVID+ since yesterday and critical lab of albumin 6. Patient complaining of cough and pain to her sacral wound, taking antibiotics for it. Patient is a full code.          HPI: Mrs. James is a 82 yo F who presents from [facility] with a CC of hypotension.  The patient was diagnosed with Covid yesterday. 02 sats in the ED were 98% with a clear CXR. Patient not able to give accurate history. Likely decreased po intake in the previous days. She presents with a BUN/CRT of 88/4.0.  Last month her CRT was 1.6.  She has known CKD 3.  She is bed bound and has an unstageable sacral wound.  Otherwise no further sig. History obtained. She is non-ill appearing       Past Medical History:   Diagnosis Date    Arthritis lower extremities    Asthma     Blind left eye     Diabetes mellitus     Edema of both lower legs     CHRONIC    Fall 02/08/2022    Gall stones     Hypertension     Kidney stones     Lives alone with help available     HOME HEALTH    Nephrolithiasis 1/22/2016    Obesity     PVD (peripheral vascular disease)     Renal disorder     Retinopathy     Sleep apnea     Vaginal delivery     x1    Weakness of both lower extremities     uses a cane, rollator & power chair       Past Surgical History:   Procedure Laterality Date    CARDIAC CATHETERIZATION      cataract      CHOLECYSTECTOMY      EYE SURGERY      Rt cataract surgery    HYSTERECTOMY      URETERAL  "STENT PLACEMENT         Review of patient's allergies indicates:   Allergen Reactions    Adhesive Rash     Paper tape       No current facility-administered medications on file prior to encounter.     Current Outpatient Medications on File Prior to Encounter   Medication Sig    albuterol (PROVENTIL/VENTOLIN HFA) 90 mcg/actuation inhaler INHALE 2 PUFFS INTO THE LUNGS EVERY 6 (SIX) HOURS AS NEEDED FOR WHEEZING. RESCUE    aspirin (ECOTRIN) 81 MG EC tablet Take 81 mg by mouth nightly.    atorvastatin (LIPITOR) 20 MG tablet TAKE 1 TABLET BY MOUTH EVERY DAY    blood sugar diagnostic Strp Test 3 times daily    blood-glucose meter,continuous (DEXCOM G6 ) Misc Test glucose twice daily    blood-glucose transmitter (DEXCOM G5 TRANSMITTER) Lea Test glucose twice daily    folic acid (FOLVITE) 1 MG tablet Take 1 tablet (1 mg total) by mouth once daily.    insulin aspart U-100 (NOVOLOG) 100 unit/mL (3 mL) InPn pen Use sliding scale as needed for hyperglycemia per sliding scale above.    lancets Misc Test 3 times daily    lancets Misc To check BG 3 times daily, to use with insurance preferred meter    latanoprost 0.005 % ophthalmic solution Place 1 drop into both eyes every evening.    melatonin (MELATIN) 3 mg tablet Take 3 tablets (9 mg total) by mouth nightly.    oxybutynin (DITROPAN XL) 15 MG TR24 TAKE 1 TABLET BY MOUTH EVERY DAY    pen needle, diabetic (BD ULTRA-FINE BREE PEN NEEDLE) 32 gauge x 5/32" Ndle INJECT INSULIN THREE TIMES DAILY    polyethylene glycol (GLYCOLAX) 17 gram PwPk Take 17 g by mouth once daily.    timolol maleate 0.5% (TIMOPTIC) 0.5 % Drop Place 1 drop into both eyes every morning.    [DISCONTINUED] amLODIPine (NORVASC) 10 MG tablet TAKE 1 TABLET BY MOUTH EVERY DAY    [DISCONTINUED] carvediloL (COREG) 12.5 MG tablet Take 1 tablet (12.5 mg total) by mouth 2 (two) times daily.    [DISCONTINUED] hydrALAZINE (APRESOLINE) 50 MG tablet Take 1 tablet (50 mg total) by mouth every 8 " (eight) hours.    [DISCONTINUED] insulin detemir U-100 (LEVEMIR FLEXTOUCH U-100 INSULN) 100 unit/mL (3 mL) InPn pen Inject 26 Units into the skin every evening. HOLD UNTIL YOU SEE YOUR PCP    [DISCONTINUED] TRADJENTA 5 mg Tab tablet TAKE 1 TABLET BY MOUTH EVERY DAY    [DISCONTINUED] traZODone (DESYREL) 50 MG tablet TAKE 1 TABLET (50 MG TOTAL) BY MOUTH EVERY EVENING.     Family History       Problem Relation (Age of Onset)    Cancer Sister    Diabetes Brother    Hypertension Father    Kidney failure Mother          Tobacco Use    Smoking status: Never Smoker    Smokeless tobacco: Never Used   Substance and Sexual Activity    Alcohol use: No    Drug use: Never    Sexual activity: Not Currently     Partners: Male     Review of Systems   Unable to perform ROS: Dementia   Objective:     Vital Signs (Most Recent):  Temp: 98.1 °F (36.7 °C) (05/24/22 1039)  Pulse: 72 (05/24/22 1304)  Resp: 18 (05/24/22 1248)  BP: (!) 159/76 (05/24/22 1304)  SpO2: 99 % (05/24/22 1304)   Vital Signs (24h Range):  Temp:  [98.1 °F (36.7 °C)] 98.1 °F (36.7 °C)  Pulse:  [68-74] 72  Resp:  [17-20] 18  SpO2:  [96 %-99 %] 99 %  BP: (150-160)/(66-76) 159/76     Weight: 90.7 kg (200 lb)  Body mass index is 36.58 kg/m².    Physical Exam  Vitals and nursing note reviewed.   Constitutional:       General: She is not in acute distress.     Appearance: Normal appearance. She is obese. She is not ill-appearing, toxic-appearing or diaphoretic.   HENT:      Head: Normocephalic and atraumatic.   Eyes:      Conjunctiva/sclera: Conjunctivae normal.   Cardiovascular:      Rate and Rhythm: Normal rate and regular rhythm.   Pulmonary:      Effort: Pulmonary effort is normal. No respiratory distress.      Breath sounds: No wheezing.   Abdominal:      General: Abdomen is flat. There is no distension.      Tenderness: There is no abdominal tenderness.   Skin:     General: Skin is warm and dry.   Neurological:      Mental Status: She is alert and oriented to  person, place, and time.   Psychiatric:         Mood and Affect: Mood normal.         Behavior: Behavior normal.         Thought Content: Thought content normal.           Significant Labs: All pertinent labs within the past 24 hours have been reviewed.  CBC:   Recent Labs   Lab 05/24/22  1114   WBC 15.36*   HGB 9.0*   HCT 29.4*        CMP:   Recent Labs   Lab 05/24/22  1114      K 5.9*      CO2 27   *   BUN 88*   CREATININE 4.0*   CALCIUM 10.1   PROT 8.0   ALBUMIN 2.1*   BILITOT 0.3   ALKPHOS 80   AST 99*   ALT 87*   ANIONGAP 11   EGFRNONAA 10*       Significant Imaging: CXR negative       Assessment/Plan:     * Acute on chronic renal failure  Likely pre-renal from decreased po intake due to covid. IV fluids. BMP in am.  Has baseline CKD 3.      COVID-19  98% on RA. Supportive management       Debility  Bed bound       Atrial fibrillation        Anemia of chronic disease  No acute issues       Chronic diastolic heart failure  No acute issues       Hyperlipidemia    Resume any home meds     Hyperkalemia  Should improve with fluids       Controlled type 2 diabetes mellitus with stage 3 chronic kidney disease, with long-term current use of insulin  A1c. Well controlled. Sliding scale/long acting insulin       Class 2 obesity in adult  Body mass index is 36.58 kg/m². Morbid obesity complicates all aspects of disease management from diagnostic modalities to treatment. Weight loss encouraged and health benefits explained to patient.         Essential hypertension  Holding any BP meds for hypotension. Now in the ED she is sys. 140.      Unstageble sacral decubitus ulcer.  POA.  Wound care consult     VTE Risk Mitigation (From admission, onward)         Ordered     enoxaparin injection 90 mg  Every 24 hours (non-standard times)         05/24/22 1415                   Darin Qiu MD  Department of Hospital Medicine   [facility] [place] - Emergency Dept

## 2022-05-24 NOTE — SUBJECTIVE & OBJECTIVE
Past Medical History:   Diagnosis Date    Arthritis lower extremities    Asthma     Blind left eye     Diabetes mellitus     Edema of both lower legs     CHRONIC    Fall 02/08/2022    Gall stones     Hypertension     Kidney stones     Lives alone with help available     HOME HEALTH    Nephrolithiasis 1/22/2016    Obesity     PVD (peripheral vascular disease)     Renal disorder     Retinopathy     Sleep apnea     Vaginal delivery     x1    Weakness of both lower extremities     uses a cane, rollator & power chair       Past Surgical History:   Procedure Laterality Date    CARDIAC CATHETERIZATION      cataract      CHOLECYSTECTOMY      EYE SURGERY      Rt cataract surgery    HYSTERECTOMY      URETERAL STENT PLACEMENT         Review of patient's allergies indicates:   Allergen Reactions    Adhesive Rash     Paper tape       No current facility-administered medications on file prior to encounter.     Current Outpatient Medications on File Prior to Encounter   Medication Sig    albuterol (PROVENTIL/VENTOLIN HFA) 90 mcg/actuation inhaler INHALE 2 PUFFS INTO THE LUNGS EVERY 6 (SIX) HOURS AS NEEDED FOR WHEEZING. RESCUE    aspirin (ECOTRIN) 81 MG EC tablet Take 81 mg by mouth nightly.    atorvastatin (LIPITOR) 20 MG tablet TAKE 1 TABLET BY MOUTH EVERY DAY    blood sugar diagnostic Strp Test 3 times daily    blood-glucose meter,continuous (DEXCOM G6 ) Misc Test glucose twice daily    blood-glucose transmitter (DEXCOM G5 TRANSMITTER) Lea Test glucose twice daily    folic acid (FOLVITE) 1 MG tablet Take 1 tablet (1 mg total) by mouth once daily.    insulin aspart U-100 (NOVOLOG) 100 unit/mL (3 mL) InPn pen Use sliding scale as needed for hyperglycemia per sliding scale above.    lancets Misc Test 3 times daily    lancets Misc To check BG 3 times daily, to use with insurance preferred meter    latanoprost 0.005 % ophthalmic solution Place 1 drop into both eyes every evening.    melatonin (MELATIN) 3 mg tablet Take 3  "tablets (9 mg total) by mouth nightly.    oxybutynin (DITROPAN XL) 15 MG TR24 TAKE 1 TABLET BY MOUTH EVERY DAY    pen needle, diabetic (BD ULTRA-FINE BREE PEN NEEDLE) 32 gauge x 5/32" Ndle INJECT INSULIN THREE TIMES DAILY    polyethylene glycol (GLYCOLAX) 17 gram PwPk Take 17 g by mouth once daily.    timolol maleate 0.5% (TIMOPTIC) 0.5 % Drop Place 1 drop into both eyes every morning.    [DISCONTINUED] amLODIPine (NORVASC) 10 MG tablet TAKE 1 TABLET BY MOUTH EVERY DAY    [DISCONTINUED] carvediloL (COREG) 12.5 MG tablet Take 1 tablet (12.5 mg total) by mouth 2 (two) times daily.    [DISCONTINUED] hydrALAZINE (APRESOLINE) 50 MG tablet Take 1 tablet (50 mg total) by mouth every 8 (eight) hours.    [DISCONTINUED] insulin detemir U-100 (LEVEMIR FLEXTOUCH U-100 INSULN) 100 unit/mL (3 mL) InPn pen Inject 26 Units into the skin every evening. HOLD UNTIL YOU SEE YOUR PCP    [DISCONTINUED] TRADJENTA 5 mg Tab tablet TAKE 1 TABLET BY MOUTH EVERY DAY    [DISCONTINUED] traZODone (DESYREL) 50 MG tablet TAKE 1 TABLET (50 MG TOTAL) BY MOUTH EVERY EVENING.     Family History       Problem Relation (Age of Onset)    Cancer Sister    Diabetes Brother    Hypertension Father    Kidney failure Mother          Tobacco Use    Smoking status: Never Smoker    Smokeless tobacco: Never Used   Substance and Sexual Activity    Alcohol use: No    Drug use: Never    Sexual activity: Not Currently     Partners: Male     Review of Systems   Unable to perform ROS: Dementia   Objective:     Vital Signs (Most Recent):  Temp: 98.1 °F (36.7 °C) (05/24/22 1039)  Pulse: 72 (05/24/22 1304)  Resp: 18 (05/24/22 1248)  BP: (!) 159/76 (05/24/22 1304)  SpO2: 99 % (05/24/22 1304)   Vital Signs (24h Range):  Temp:  [98.1 °F (36.7 °C)] 98.1 °F (36.7 °C)  Pulse:  [68-74] 72  Resp:  [17-20] 18  SpO2:  [96 %-99 %] 99 %  BP: (150-160)/(66-76) 159/76     Weight: 90.7 kg (200 lb)  Body mass index is 36.58 kg/m².    Physical Exam  Vitals and nursing note reviewed. "   Constitutional:       General: She is not in acute distress.     Appearance: Normal appearance. She is obese. She is not ill-appearing, toxic-appearing or diaphoretic.   HENT:      Head: Normocephalic and atraumatic.   Eyes:      Conjunctiva/sclera: Conjunctivae normal.   Cardiovascular:      Rate and Rhythm: Normal rate and regular rhythm.   Pulmonary:      Effort: Pulmonary effort is normal. No respiratory distress.      Breath sounds: No wheezing.   Abdominal:      General: Abdomen is flat. There is no distension.      Tenderness: There is no abdominal tenderness.   Skin:     General: Skin is warm and dry.   Neurological:      Mental Status: She is alert and oriented to person, place, and time.   Psychiatric:         Mood and Affect: Mood normal.         Behavior: Behavior normal.         Thought Content: Thought content normal.           Significant Labs: All pertinent labs within the past 24 hours have been reviewed.  CBC:   Recent Labs   Lab 05/24/22  1114   WBC 15.36*   HGB 9.0*   HCT 29.4*        CMP:   Recent Labs   Lab 05/24/22  1114      K 5.9*      CO2 27   *   BUN 88*   CREATININE 4.0*   CALCIUM 10.1   PROT 8.0   ALBUMIN 2.1*   BILITOT 0.3   ALKPHOS 80   AST 99*   ALT 87*   ANIONGAP 11   EGFRNONAA 10*       Significant Imaging: CXR negative

## 2022-05-24 NOTE — ED TRIAGE NOTES
EMS reports call from [facility] for hypotension SBP 80s & abnormal albumin level. Pt is currently on antibiotics for sacral wound. COVID + yesterday. Frequent coughing noted. 98% on RA.

## 2022-05-25 LAB
ANION GAP SERPL CALC-SCNC: 8 MMOL/L (ref 8–16)
BUN SERPL-MCNC: 66 MG/DL (ref 8–23)
CALCIUM SERPL-MCNC: 9.4 MG/DL (ref 8.7–10.5)
CHLORIDE SERPL-SCNC: 114 MMOL/L (ref 95–110)
CO2 SERPL-SCNC: 23 MMOL/L (ref 23–29)
CREAT SERPL-MCNC: 2.7 MG/DL (ref 0.5–1.4)
EST. GFR  (AFRICAN AMERICAN): 18 ML/MIN/1.73 M^2
EST. GFR  (NON AFRICAN AMERICAN): 16 ML/MIN/1.73 M^2
ESTIMATED AVG GLUCOSE: 134 MG/DL (ref 68–131)
GLUCOSE SERPL-MCNC: 73 MG/DL (ref 70–110)
HBA1C MFR BLD: 6.3 % (ref 4–5.6)
POCT GLUCOSE: 75 MG/DL (ref 70–110)
POCT GLUCOSE: 83 MG/DL (ref 70–110)
POCT GLUCOSE: 85 MG/DL (ref 70–110)
POCT GLUCOSE: 88 MG/DL (ref 70–110)
POTASSIUM SERPL-SCNC: 5.6 MMOL/L (ref 3.5–5.1)
SODIUM SERPL-SCNC: 145 MMOL/L (ref 136–145)

## 2022-05-25 PROCEDURE — 80048 BASIC METABOLIC PNL TOTAL CA: CPT | Performed by: INTERNAL MEDICINE

## 2022-05-25 PROCEDURE — 36415 COLL VENOUS BLD VENIPUNCTURE: CPT | Performed by: INTERNAL MEDICINE

## 2022-05-25 PROCEDURE — 63600175 PHARM REV CODE 636 W HCPCS: Mod: TB | Performed by: HOSPITALIST

## 2022-05-25 PROCEDURE — 25000003 PHARM REV CODE 250: Performed by: HOSPITALIST

## 2022-05-25 PROCEDURE — 21400001 HC TELEMETRY ROOM

## 2022-05-25 PROCEDURE — 25000003 PHARM REV CODE 250: Performed by: INTERNAL MEDICINE

## 2022-05-25 PROCEDURE — 27000207 HC ISOLATION

## 2022-05-25 PROCEDURE — 63600175 PHARM REV CODE 636 W HCPCS: Performed by: INTERNAL MEDICINE

## 2022-05-25 RX ORDER — NUTRITIONAL SUPPLEMENT
250 BAR ORAL 3 TIMES DAILY
Status: ON HOLD | COMMUNITY
End: 2022-07-18

## 2022-05-25 RX ORDER — SULFAMETHOXAZOLE AND TRIMETHOPRIM 800; 160 MG/1; MG/1
1 TABLET ORAL 2 TIMES DAILY
Status: ON HOLD | COMMUNITY
Start: 2022-05-16 | End: 2022-06-18 | Stop reason: HOSPADM

## 2022-05-25 RX ORDER — INSULIN ADMIN. SUPPLIES
INSULIN PEN (EA) SUBCUTANEOUS
Status: ON HOLD | COMMUNITY
Start: 2022-05-24 | End: 2022-07-18

## 2022-05-25 RX ORDER — ARGININE/ASCORBATE SOD/VITE AC 4.5 G/9.2G
1 POWDER IN PACKET (EA) ORAL 2 TIMES DAILY
Status: ON HOLD | COMMUNITY
End: 2022-07-18

## 2022-05-25 RX ORDER — ZINC GLUCONATE 50 MG
50 TABLET ORAL DAILY
Status: ON HOLD | COMMUNITY
End: 2022-07-18

## 2022-05-25 RX ORDER — INSULIN LISPRO 100 [IU]/ML
INJECTION, SOLUTION INTRAVENOUS; SUBCUTANEOUS
Status: ON HOLD | COMMUNITY
Start: 2022-04-28 | End: 2022-07-18

## 2022-05-25 RX ORDER — ASCORBIC ACID 500 MG
500 TABLET ORAL 2 TIMES DAILY
Status: ON HOLD | COMMUNITY
End: 2022-07-18

## 2022-05-25 RX ADMIN — ASPIRIN 81 MG: 81 TABLET, DELAYED RELEASE ORAL at 08:05

## 2022-05-25 RX ADMIN — ENOXAPARIN SODIUM 90 MG: 100 INJECTION SUBCUTANEOUS at 03:05

## 2022-05-25 RX ADMIN — REMDESIVIR 200 MG: 100 INJECTION, POWDER, LYOPHILIZED, FOR SOLUTION INTRAVENOUS at 03:05

## 2022-05-25 RX ADMIN — SODIUM CHLORIDE: 0.9 INJECTION, SOLUTION INTRAVENOUS at 04:05

## 2022-05-25 RX ADMIN — TIMOLOL MALEATE 1 DROP: 5 SOLUTION OPHTHALMIC at 06:05

## 2022-05-25 RX ADMIN — FOLIC ACID 1 MG: 1 TABLET ORAL at 10:05

## 2022-05-25 RX ADMIN — ATORVASTATIN CALCIUM 20 MG: 10 TABLET, FILM COATED ORAL at 10:05

## 2022-05-25 RX ADMIN — OXYBUTYNIN CHLORIDE 15 MG: 5 TABLET, EXTENDED RELEASE ORAL at 10:05

## 2022-05-25 RX ADMIN — LATANOPROST 1 DROP: 50 SOLUTION OPHTHALMIC at 08:05

## 2022-05-25 RX ADMIN — SODIUM CHLORIDE: 0.9 INJECTION, SOLUTION INTRAVENOUS at 10:05

## 2022-05-25 RX ADMIN — MELATONIN TAB 3 MG 9 MG: 3 TAB at 08:05

## 2022-05-25 NOTE — PROGRESS NOTES
[facility] Medicine  Progress Note    Patient Name: Ana James  MRN: [id number]  Patient Class: IP- Inpatient   Admission Date: 5/24/2022  Length of Stay: 1 days  Attending Physician: Darin Garcia MD  Primary Care Provider: Viky Bean MD        Subjective:     Principal Problem:Acute on chronic renal failure        HPI:  Mrs. James is a 80 yo F who presents from [facility] with a CC of hypotension.  The patient was diagnosed with Covid yesterday. 02 sats in the ED were 98% with a clear CXR. Patient not able to give accurate history. Likely decreased po intake in the previous days. She presents with a BUN/CRT of 88/4.0.  Last month her CRT was 1.6.  She has known CKD 3.  She is bed bound and has an unstageable sacral wound.  Otherwise no further sig. History obtained. She is non-ill appearing       Overview/Hospital Course:  Patient admitted to the hospital with acute renal failure- secondary to poor input from Covid 19.      Interval History: No new issues     Review of Systems   Unable to perform ROS: Dementia   Objective:     Vital Signs (Most Recent):  Temp: 97.6 °F (36.4 °C) (05/25/22 0815)  Pulse: 63 (05/25/22 0815)  Resp: 18 (05/25/22 0815)  BP: 132/60 (05/25/22 0815)  SpO2: 99 % (05/25/22 0815) Vital Signs (24h Range):  Temp:  [97.6 °F (36.4 °C)-98.9 °F (37.2 °C)] 97.6 °F (36.4 °C)  Pulse:  [63-96] 63  Resp:  [16-23] 18  SpO2:  [95 %-99 %] 99 %  BP: (132-176)/(60-80) 132/60     Weight: 90 kg (198 lb 6.6 oz)  Body mass index is 36.29 kg/m².    Intake/Output Summary (Last 24 hours) at 5/25/2022 0923  Last data filed at 5/25/2022 0630  Gross per 24 hour   Intake --   Output 800 ml   Net -800 ml      Physical Exam  Vitals and nursing note reviewed.   Constitutional:       General: She is not in acute distress.     Appearance: Normal appearance. She is obese. She is not ill-appearing, toxic-appearing or diaphoretic.   HENT:      Head: Normocephalic and atraumatic.   Eyes:       Conjunctiva/sclera: Conjunctivae normal.   Cardiovascular:      Rate and Rhythm: Normal rate and regular rhythm.   Pulmonary:      Effort: Pulmonary effort is normal. No respiratory distress.      Breath sounds: No wheezing.   Abdominal:      General: Abdomen is flat. There is no distension.      Tenderness: There is no abdominal tenderness.   Skin:     General: Skin is warm and dry.   Neurological:      Mental Status: She is alert and oriented to person, place, and time.   Psychiatric:         Mood and Affect: Mood normal.         Behavior: Behavior normal.         Thought Content: Thought content normal.       Significant Labs: All pertinent labs within the past 24 hours have been reviewed.  BMP:   Recent Labs   Lab 05/24/22  1114   *      K 5.9*      CO2 27   BUN 88*   CREATININE 4.0*   CALCIUM 10.1     CBC:   Recent Labs   Lab 05/24/22  1114   WBC 15.36*   HGB 9.0*   HCT 29.4*          Significant Imaging:       Assessment/Plan:      * Acute on chronic renal failure  Likely pre-renal from decreased po intake due to covid. IV fluids. BMP in am.  Has baseline CKD 3.      COVID-19  98% on RA. Supportive management       Debility  Bed bound       Atrial fibrillation        Anemia of chronic disease  No acute issues       Chronic diastolic heart failure  No acute issues       Hyperlipidemia    Resume any home meds     Hyperkalemia  Should improve with fluids       Controlled type 2 diabetes mellitus with stage 3 chronic kidney disease, with long-term current use of insulin  A1c. Well controlled. Sliding scale/long acting insulin       Class 2 obesity in adult  Body mass index is 36.58 kg/m². Morbid obesity complicates all aspects of disease management from diagnostic modalities to treatment. Weight loss encouraged and health benefits explained to patient.         Essential hypertension  Holding any BP meds for hypotension. Now in the ED she is sys. 140.        VTE Risk Mitigation (From  admission, onward)         Ordered     enoxaparin injection 90 mg  Every 24 hours (non-standard times)         05/24/22 1415                Discharge Planning   FREDERICK:      Code Status: DNR   Is the patient medically ready for discharge?:     Reason for patient still in hospital (select all that apply): Patient unstable  Discharge Plan A: Return to nursing home                  Darin Qiu MD  Department of [facility] Medicine   [facility]

## 2022-05-25 NOTE — PHARMACY MED REC
"  Admission Medication History     The home medication history was taken by Velia Lott CPhT.      You may go to "Admission" then "Reconcile Home Medications" tabs to review and/or act upon these items.      The home medication list has been updated by the Pharmacy department.    Please read ALL comments highlighted in yellow.    Please address this information as you see fit.     Feel free to contact us if you have any questions or require assistance.        Medications listed below were obtained from: [employer] [facility] Nursing home  PTA Medications   Medication Sig    albuterol (PROVENTIL/VENTOLIN HFA) 90 mcg/actuation inhaler INHALE 2 PUFFS INTO THE LUNGS EVERY 6 (SIX) HOURS AS NEEDED FOR WHEEZING. RESCUE    arginine-vitamin C-vitamin E (ARGINAID) 4.5 gram-156 mg/9.2 gram PwPk Take 1 packet by mouth 2 (two) times a day. Add to H2O to promot wound healing    ascorbic acid, vitamin C, (VITAMIN C) 500 MG tablet Take 500 mg by mouth 2 (two) times daily.    aspirin (ECOTRIN) 81 MG EC tablet Take 81 mg by mouth once daily.    atorvastatin (LIPITOR) 20 MG tablet TAKE 1 TABLET BY MOUTH EVERY DAY    folic acid (FOLVITE) 1 MG tablet Take 1 tablet (1 mg total) by mouth once daily.    HUMALOG KWIKPEN INSULIN 100 unit/mL pen Inject into the skin. Per [facility] sliding scale; 0-200= DO NOT ADMINISTER; 201-249= 2 Units; 250-299= 4 Units; 300-349= 6 Units; 350-399= 8 Units; 400 OR GREATER = 10 Units    latanoprost 0.005 % ophthalmic solution Place 1 drop into both eyes every evening.    melatonin (MELATIN) 3 mg tablet Take 3 tablets (9 mg total) by mouth nightly.    NOVOFINE AUTOCOVER 30 gauge x 1/3" Ndle     nut.tx.gluc.intol,lac-free,soy (GLUCERNA) Liqd Take 250 mLs by mouth 3 (three) times daily.    oxybutynin (DITROPAN XL) 15 MG TR24 TAKE 1 TABLET BY MOUTH EVERY DAY    polyethylene glycol (GLYCOLAX) 17 gram PwPk Take 17 g by mouth once daily.    sulfamethoxazole-trimethoprim 800-160mg (BACTRIM " "DS) 800-160 mg Tab Take 1 tablet by mouth 2 (two) times daily.    timolol maleate 0.5% (TIMOPTIC) 0.5 % Drop Place 1 drop into both eyes every morning.    zinc gluconate 50 mg tablet Take 50 mg by mouth once daily. To promote wound healing x 45 days    blood sugar diagnostic Strp Test 3 times daily    lancets Misc Test 3 times daily    lancets Misc To check BG 3 times daily, to use with insurance preferred meter    pen needle, diabetic (BD ULTRA-FINE BREE PEN NEEDLE) 32 gauge x 5/32" Ndle INJECT INSULIN THREE TIMES DAILY       Potential issues to be addressed PRIOR TO DISCHARGE  Patient reported not taking the following medications: (amlodipine (Norvasc) 10 mg tab; carvedilol ( Coreg ) 12.5 mg tab; hydralazine (Apresoline) 50 mg tab; insulin determir (Levemir) ; Tradjenta 5 mg; trazadone ( Desyrel) 50 mg tab). These medications remain on the home medication list. Please address accordingly.         Velia Lott [facility].  [phone]              .          "

## 2022-05-25 NOTE — HOSPITAL COURSE
Patient admitted to the hospital with acute renal failure- secondary to poor input from Covid 19.   Patient has unstageable sacral decub- wound care was consulted and rec: surgery consult for possible debridement.  Patient's renal function improved daily with IV fluids. AUTUMN eventually resolved. Surgery planned for debridement on 5/30. Tolerated procedure well. No indication for abx. Wound care. Speech also evaluated patient for dysphagia. Recommended pureed with with thin liquids. Tolerated this well. Discharged back to nursing home in stable condition. Patient had also requested discharge. Discussed with son (Prince) over the phone.

## 2022-05-25 NOTE — SUBJECTIVE & OBJECTIVE
Interval History: No new issues     Review of Systems   Unable to perform ROS: Dementia   Objective:     Vital Signs (Most Recent):  Temp: 97.6 °F (36.4 °C) (05/25/22 0815)  Pulse: 63 (05/25/22 0815)  Resp: 18 (05/25/22 0815)  BP: 132/60 (05/25/22 0815)  SpO2: 99 % (05/25/22 0815) Vital Signs (24h Range):  Temp:  [97.6 °F (36.4 °C)-98.9 °F (37.2 °C)] 97.6 °F (36.4 °C)  Pulse:  [63-96] 63  Resp:  [16-23] 18  SpO2:  [95 %-99 %] 99 %  BP: (132-176)/(60-80) 132/60     Weight: 90 kg (198 lb 6.6 oz)  Body mass index is 36.29 kg/m².    Intake/Output Summary (Last 24 hours) at 5/25/2022 0923  Last data filed at 5/25/2022 0630  Gross per 24 hour   Intake --   Output 800 ml   Net -800 ml      Physical Exam  Vitals and nursing note reviewed.   Constitutional:       General: She is not in acute distress.     Appearance: Normal appearance. She is obese. She is not ill-appearing, toxic-appearing or diaphoretic.   HENT:      Head: Normocephalic and atraumatic.   Eyes:      Conjunctiva/sclera: Conjunctivae normal.   Cardiovascular:      Rate and Rhythm: Normal rate and regular rhythm.   Pulmonary:      Effort: Pulmonary effort is normal. No respiratory distress.      Breath sounds: No wheezing.   Abdominal:      General: Abdomen is flat. There is no distension.      Tenderness: There is no abdominal tenderness.   Skin:     General: Skin is warm and dry.   Neurological:      Mental Status: She is alert and oriented to person, place, and time.   Psychiatric:         Mood and Affect: Mood normal.         Behavior: Behavior normal.         Thought Content: Thought content normal.       Significant Labs: All pertinent labs within the past 24 hours have been reviewed.  BMP:   Recent Labs   Lab 05/24/22  1114   *      K 5.9*      CO2 27   BUN 88*   CREATININE 4.0*   CALCIUM 10.1     CBC:   Recent Labs   Lab 05/24/22  1114   WBC 15.36*   HGB 9.0*   HCT 29.4*          Significant Imaging:

## 2022-05-25 NOTE — CONSULTS
[facility] - Telemetry  Wound Care    Patient Name:  Ana James   MRN:  [id number]  Date: 5/25/2022  Diagnosis: Acute on chronic renal failure    History:     Past Medical History:   Diagnosis Date    Arthritis lower extremities    Asthma     Blind left eye     Diabetes mellitus     Edema of both lower legs     CHRONIC    Fall 02/08/2022    Gall stones     Hypertension     Kidney stones     Lives alone with help available     HOME HEALTH    Nephrolithiasis 1/22/2016    Obesity     PVD (peripheral vascular disease)     Renal disorder     Retinopathy     Sleep apnea     Vaginal delivery     x1    Weakness of both lower extremities     uses a cane, rollator & power chair       Social History     Socioeconomic History    Marital status:    Occupational History    Occupation: retired   Tobacco Use    Smoking status: Never Smoker    Smokeless tobacco: Never Used   Substance and Sexual Activity    Alcohol use: No    Drug use: Never    Sexual activity: Not Currently     Partners: Male       Precautions:     Allergies as of 05/24/2022 - Reviewed 05/24/2022   Allergen Reaction Noted    Adhesive Rash 01/15/2015       WOC Assessment Details/Treatment   Consulted for sacral wound  An 81 year old female admitted 5/24/22 from [facility] with acute on chronic renal failure; COVID 19; debility; Afib; anemia of chronic disease; chronic diastolic heart failure; HLD; controlled DM with Stage 3 CKD; obesity; essential hypertension; unstageable sacral pressure injury POA  5/24 WBC 15.36 Hgb 9.0 Hct 29.4 Alb 2.1 Weight 198 lb  On Isoflex mattress; Natan score 13; external female catheter  Assessment:  Attempted to assess patient's sacral wound. Patient refused.   Plan: Assess sacrum 5/26. Nursing to continue managing wound per WOGs.    05/25/2022

## 2022-05-26 LAB
ANION GAP SERPL CALC-SCNC: 8 MMOL/L (ref 8–16)
BUN SERPL-MCNC: 45 MG/DL (ref 8–23)
CALCIUM SERPL-MCNC: 8.9 MG/DL (ref 8.7–10.5)
CHLORIDE SERPL-SCNC: 116 MMOL/L (ref 95–110)
CO2 SERPL-SCNC: 22 MMOL/L (ref 23–29)
CREAT SERPL-MCNC: 2 MG/DL (ref 0.5–1.4)
EST. GFR  (AFRICAN AMERICAN): 26 ML/MIN/1.73 M^2
EST. GFR  (NON AFRICAN AMERICAN): 23 ML/MIN/1.73 M^2
GLUCOSE SERPL-MCNC: 66 MG/DL (ref 70–110)
POCT GLUCOSE: 125 MG/DL (ref 70–110)
POCT GLUCOSE: 144 MG/DL (ref 70–110)
POCT GLUCOSE: 62 MG/DL (ref 70–110)
POCT GLUCOSE: 71 MG/DL (ref 70–110)
POTASSIUM SERPL-SCNC: 4.6 MMOL/L (ref 3.5–5.1)
SODIUM SERPL-SCNC: 146 MMOL/L (ref 136–145)

## 2022-05-26 PROCEDURE — 27000207 HC ISOLATION

## 2022-05-26 PROCEDURE — 36415 COLL VENOUS BLD VENIPUNCTURE: CPT | Performed by: INTERNAL MEDICINE

## 2022-05-26 PROCEDURE — 25000003 PHARM REV CODE 250: Performed by: INTERNAL MEDICINE

## 2022-05-26 PROCEDURE — 80048 BASIC METABOLIC PNL TOTAL CA: CPT | Performed by: INTERNAL MEDICINE

## 2022-05-26 PROCEDURE — 25000003 PHARM REV CODE 250: Performed by: HOSPITALIST

## 2022-05-26 PROCEDURE — 21400001 HC TELEMETRY ROOM

## 2022-05-26 PROCEDURE — 63600175 PHARM REV CODE 636 W HCPCS: Performed by: INTERNAL MEDICINE

## 2022-05-26 PROCEDURE — 63600175 PHARM REV CODE 636 W HCPCS: Mod: TB | Performed by: HOSPITALIST

## 2022-05-26 RX ADMIN — ENOXAPARIN SODIUM 90 MG: 100 INJECTION SUBCUTANEOUS at 03:05

## 2022-05-26 RX ADMIN — MELATONIN TAB 3 MG 9 MG: 3 TAB at 09:05

## 2022-05-26 RX ADMIN — FOLIC ACID 1 MG: 1 TABLET ORAL at 10:05

## 2022-05-26 RX ADMIN — LATANOPROST 1 DROP: 50 SOLUTION OPHTHALMIC at 09:05

## 2022-05-26 RX ADMIN — REMDESIVIR 100 MG: 100 INJECTION, POWDER, LYOPHILIZED, FOR SOLUTION INTRAVENOUS at 10:05

## 2022-05-26 RX ADMIN — SODIUM CHLORIDE: 0.9 INJECTION, SOLUTION INTRAVENOUS at 06:05

## 2022-05-26 RX ADMIN — OXYBUTYNIN CHLORIDE 15 MG: 5 TABLET, EXTENDED RELEASE ORAL at 10:05

## 2022-05-26 RX ADMIN — ASPIRIN 81 MG: 81 TABLET, DELAYED RELEASE ORAL at 09:05

## 2022-05-26 RX ADMIN — ATORVASTATIN CALCIUM 20 MG: 10 TABLET, FILM COATED ORAL at 10:05

## 2022-05-26 RX ADMIN — TIMOLOL MALEATE 1 DROP: 5 SOLUTION OPHTHALMIC at 06:05

## 2022-05-26 NOTE — CONSULTS
With assistance of nursing, was able to reposition patient and assess sacral pressure injury.   Photodocumentation    Left buttock/sacral/right buttock pressure injury- Necrotic tissue on left buttock now  and wound draining a large amount brown moderately foul drainage. Wound Stage 4 when  explored area under slough. Wound involving area 10 cm(L) 14 cm(W) 5 cm(D).   Treatment:  Cleansed wound with Vashe and packed area of separation with Vashe moistened gauze. Covered with Mepilex sacral dressing.   Positioned with foam wedge.   Attempted to feed lunch- refusing food when PCT offered.  MD may want to consider surgical consult for debridement of necrotic wound.   Discussed status of wound with nursing.

## 2022-05-26 NOTE — SUBJECTIVE & OBJECTIVE
Interval History:No new issues     Review of Systems   Unable to perform ROS: Dementia   Objective:     Vital Signs (Most Recent):  Temp: 98.3 °F (36.8 °C) (05/26/22 0800)  Pulse: 72 (05/26/22 0800)  Resp: 18 (05/26/22 0800)  BP: (!) 147/62 (05/26/22 0800)  SpO2: 100 % (05/26/22 0800)   Vital Signs (24h Range):  Temp:  [97.6 °F (36.4 °C)-98.7 °F (37.1 °C)] 98.3 °F (36.8 °C)  Pulse:  [67-83] 72  Resp:  [16-18] 18  SpO2:  [97 %-100 %] 100 %  BP: (128-147)/(59-64) 147/62     Weight: 90 kg (198 lb 6.6 oz)  Body mass index is 36.29 kg/m².    Intake/Output Summary (Last 24 hours) at 5/26/2022 1013  Last data filed at 5/26/2022 0800  Gross per 24 hour   Intake 480 ml   Output 1450 ml   Net -970 ml      Physical Exam  Vitals and nursing note reviewed.   Constitutional:       General: She is not in acute distress.     Appearance: Normal appearance. She is obese. She is not ill-appearing, toxic-appearing or diaphoretic.   HENT:      Head: Normocephalic and atraumatic.   Eyes:      Conjunctiva/sclera: Conjunctivae normal.   Cardiovascular:      Rate and Rhythm: Normal rate and regular rhythm.   Pulmonary:      Effort: Pulmonary effort is normal. No respiratory distress.      Breath sounds: No wheezing.   Abdominal:      General: Abdomen is flat. There is no distension.      Tenderness: There is no abdominal tenderness.   Skin:     General: Skin is warm and dry.   Neurological:      Mental Status: She is alert and oriented to person, place, and time.   Psychiatric:         Mood and Affect: Mood normal.         Behavior: Behavior normal.         Thought Content: Thought content normal.       Significant Labs: All pertinent labs within the past 24 hours have been reviewed.  BMP:   Recent Labs   Lab 05/25/22  0946   GLU 73      K 5.6*   *   CO2 23   BUN 66*   CREATININE 2.7*   CALCIUM 9.4     CBC:   Recent Labs   Lab 05/24/22  1114   WBC 15.36*   HGB 9.0*   HCT 29.4*          Significant Imaging:

## 2022-05-26 NOTE — PROGRESS NOTES
[facility] Medicine  Progress Note    Patient Name: Ana James  MRN: [id number]  Patient Class: IP- Inpatient   Admission Date: 5/24/2022  Length of Stay: 2 days  Attending Physician: Darin Garcia MD  Primary Care Provider: Viky Bean MD        Subjective:     Principal Problem:Acute on chronic renal failure        HPI:  Mrs. James is a 82 yo F who presents from [facility] with a CC of hypotension.  The patient was diagnosed with Covid yesterday. 02 sats in the ED were 98% with a clear CXR. Patient not able to give accurate history. Likely decreased po intake in the previous days. She presents with a BUN/CRT of 88/4.0.  Last month her CRT was 1.6.  She has known CKD 3.  She is bed bound and has an unstageable sacral wound.  Otherwise no further sig. History obtained. She is non-ill appearing       Overview/Hospital Course:  Patient admitted to the hospital with acute renal failure- secondary to poor input from Covid 19.      Interval History:No new issues     Review of Systems   Unable to perform ROS: Dementia   Objective:     Vital Signs (Most Recent):  Temp: 98.3 °F (36.8 °C) (05/26/22 0800)  Pulse: 72 (05/26/22 0800)  Resp: 18 (05/26/22 0800)  BP: (!) 147/62 (05/26/22 0800)  SpO2: 100 % (05/26/22 0800)   Vital Signs (24h Range):  Temp:  [97.6 °F (36.4 °C)-98.7 °F (37.1 °C)] 98.3 °F (36.8 °C)  Pulse:  [67-83] 72  Resp:  [16-18] 18  SpO2:  [97 %-100 %] 100 %  BP: (128-147)/(59-64) 147/62     Weight: 90 kg (198 lb 6.6 oz)  Body mass index is 36.29 kg/m².    Intake/Output Summary (Last 24 hours) at 5/26/2022 1013  Last data filed at 5/26/2022 0800  Gross per 24 hour   Intake 480 ml   Output 1450 ml   Net -970 ml      Physical Exam  Vitals and nursing note reviewed.   Constitutional:       General: She is not in acute distress.     Appearance: Normal appearance. She is obese. She is not ill-appearing, toxic-appearing or diaphoretic.   HENT:      Head: Normocephalic and  atraumatic.   Eyes:      Conjunctiva/sclera: Conjunctivae normal.   Cardiovascular:      Rate and Rhythm: Normal rate and regular rhythm.   Pulmonary:      Effort: Pulmonary effort is normal. No respiratory distress.      Breath sounds: No wheezing.   Abdominal:      General: Abdomen is flat. There is no distension.      Tenderness: There is no abdominal tenderness.   Skin:     General: Skin is warm and dry.   Neurological:      Mental Status: She is alert and oriented to person, place, and time.   Psychiatric:         Mood and Affect: Mood normal.         Behavior: Behavior normal.         Thought Content: Thought content normal.       Significant Labs: All pertinent labs within the past 24 hours have been reviewed.  BMP:   Recent Labs   Lab 05/25/22  0946   GLU 73      K 5.6*   *   CO2 23   BUN 66*   CREATININE 2.7*   CALCIUM 9.4     CBC:   Recent Labs   Lab 05/24/22  1114   WBC 15.36*   HGB 9.0*   HCT 29.4*          Significant Imaging:       Assessment/Plan:      * Acute on chronic renal failure  Likely pre-renal from decreased po intake due to covid. IV fluids. BMP in am.  Has baseline CKD 3.      COVID-19  98% on RA. Supportive management       Debility  Bed bound       Atrial fibrillation        Anemia of chronic disease  No acute issues       Chronic diastolic heart failure  No acute issues       Hyperlipidemia    Resume any home meds     Hyperkalemia  Should improve with fluids       Controlled type 2 diabetes mellitus with stage 3 chronic kidney disease, with long-term current use of insulin  A1c. Well controlled. Sliding scale/long acting insulin       Class 2 obesity in adult  Body mass index is 36.58 kg/m². Morbid obesity complicates all aspects of disease management from diagnostic modalities to treatment. Weight loss encouraged and health benefits explained to patient.         Essential hypertension  Holding any BP meds for hypotension. Now in the ED she is sys. 140.        VTE Risk  Mitigation (From admission, onward)         Ordered     enoxaparin injection 90 mg  Every 24 hours (non-standard times)         05/24/22 1415                Discharge Planning   FREDERICK:      Code Status: DNR   Is the patient medically ready for discharge?:     Reason for patient still in hospital (select all that apply): Patient unstable  Discharge Plan A: Return to nursing home          Back to [facility] possibly tomorrow. BMP pending         Darin Qiu MD  Department of [facility] Medicine   [facility]

## 2022-05-27 LAB
ANION GAP SERPL CALC-SCNC: 9 MMOL/L (ref 8–16)
BUN SERPL-MCNC: 38 MG/DL (ref 8–23)
CALCIUM SERPL-MCNC: 8.9 MG/DL (ref 8.7–10.5)
CHLORIDE SERPL-SCNC: 118 MMOL/L (ref 95–110)
CO2 SERPL-SCNC: 19 MMOL/L (ref 23–29)
CREAT SERPL-MCNC: 1.7 MG/DL (ref 0.5–1.4)
EST. GFR  (AFRICAN AMERICAN): 32 ML/MIN/1.73 M^2
EST. GFR  (NON AFRICAN AMERICAN): 27 ML/MIN/1.73 M^2
GLUCOSE SERPL-MCNC: 134 MG/DL (ref 70–110)
POCT GLUCOSE: 128 MG/DL (ref 70–110)
POCT GLUCOSE: 135 MG/DL (ref 70–110)
POCT GLUCOSE: 151 MG/DL (ref 70–110)
POCT GLUCOSE: 153 MG/DL (ref 70–110)
POTASSIUM SERPL-SCNC: 5 MMOL/L (ref 3.5–5.1)
SODIUM SERPL-SCNC: 146 MMOL/L (ref 136–145)

## 2022-05-27 PROCEDURE — 21400001 HC TELEMETRY ROOM

## 2022-05-27 PROCEDURE — C1751 CATH, INF, PER/CENT/MIDLINE: HCPCS

## 2022-05-27 PROCEDURE — 27000207 HC ISOLATION

## 2022-05-27 PROCEDURE — 25000003 PHARM REV CODE 250: Performed by: INTERNAL MEDICINE

## 2022-05-27 PROCEDURE — 80048 BASIC METABOLIC PNL TOTAL CA: CPT | Performed by: INTERNAL MEDICINE

## 2022-05-27 PROCEDURE — C9399 UNCLASSIFIED DRUGS OR BIOLOG: HCPCS | Performed by: INTERNAL MEDICINE

## 2022-05-27 PROCEDURE — 25000003 PHARM REV CODE 250: Performed by: HOSPITALIST

## 2022-05-27 PROCEDURE — 63600175 PHARM REV CODE 636 W HCPCS: Performed by: INTERNAL MEDICINE

## 2022-05-27 PROCEDURE — 63600175 PHARM REV CODE 636 W HCPCS: Mod: TB | Performed by: HOSPITALIST

## 2022-05-27 PROCEDURE — 36415 COLL VENOUS BLD VENIPUNCTURE: CPT | Performed by: INTERNAL MEDICINE

## 2022-05-27 PROCEDURE — 36410 VNPNXR 3YR/> PHY/QHP DX/THER: CPT

## 2022-05-27 RX ADMIN — SODIUM CHLORIDE: 0.9 INJECTION, SOLUTION INTRAVENOUS at 10:05

## 2022-05-27 RX ADMIN — ASPIRIN 81 MG: 81 TABLET, DELAYED RELEASE ORAL at 10:05

## 2022-05-27 RX ADMIN — INSULIN DETEMIR 26 UNITS: 100 INJECTION, SOLUTION SUBCUTANEOUS at 10:05

## 2022-05-27 RX ADMIN — REMDESIVIR 100 MG: 100 INJECTION, POWDER, LYOPHILIZED, FOR SOLUTION INTRAVENOUS at 06:05

## 2022-05-27 RX ADMIN — OXYBUTYNIN CHLORIDE 15 MG: 5 TABLET, EXTENDED RELEASE ORAL at 10:05

## 2022-05-27 RX ADMIN — ENOXAPARIN SODIUM 90 MG: 100 INJECTION SUBCUTANEOUS at 06:05

## 2022-05-27 RX ADMIN — LATANOPROST 1 DROP: 50 SOLUTION OPHTHALMIC at 10:05

## 2022-05-27 RX ADMIN — TIMOLOL MALEATE 1 DROP: 5 SOLUTION OPHTHALMIC at 06:05

## 2022-05-27 RX ADMIN — FOLIC ACID 1 MG: 1 TABLET ORAL at 10:05

## 2022-05-27 RX ADMIN — ATORVASTATIN CALCIUM 20 MG: 10 TABLET, FILM COATED ORAL at 10:05

## 2022-05-27 RX ADMIN — MELATONIN TAB 3 MG 9 MG: 3 TAB at 10:05

## 2022-05-27 RX ADMIN — INSULIN ASPART 2 UNITS: 100 INJECTION, SOLUTION INTRAVENOUS; SUBCUTANEOUS at 06:05

## 2022-05-27 NOTE — ASSESSMENT & PLAN NOTE
Likely pre-renal from decreased po intake due to covid. IV fluids. BMP in am.  Has baseline CKD 3.    Improving

## 2022-05-27 NOTE — PROGRESS NOTES
[facility] Medicine  Progress Note    Patient Name: Ana James  MRN: [id number]  Patient Class: IP- Inpatient   Admission Date: 5/24/2022  Length of Stay: 3 days  Attending Physician: Darin Garcia MD  Primary Care Provider: Viky Bean MD        Subjective:     Principal Problem:Acute on chronic renal failure        HPI:  Mrs. James is a 82 yo F who presents from [facility] with a CC of hypotension.  The patient was diagnosed with Covid yesterday. 02 sats in the ED were 98% with a clear CXR. Patient not able to give accurate history. Likely decreased po intake in the previous days. She presents with a BUN/CRT of 88/4.0.  Last month her CRT was 1.6.  She has known CKD 3.  She is bed bound and has an unstageable sacral wound.  Otherwise no further sig. History obtained. She is non-ill appearing       Overview/Hospital Course:  Patient admitted to the hospital with acute renal failure- secondary to poor input from Covid 19.   Patient has unstageable sacral decub- wound care was consulted and rec: surgery consult for possible debridement.  Patient's renal function improved daily with IV fluids         Interval History:  No new issues     Review of Systems   Unable to perform ROS: Dementia   Objective:     Vital Signs (Most Recent):  Temp: 98.4 °F (36.9 °C) (05/27/22 0721)  Pulse: 64 (05/27/22 0721)  Resp: 17 (05/27/22 0721)  BP: (!) 129/54 (05/27/22 0721)  SpO2: 98 % (05/27/22 0721)   Vital Signs (24h Range):  Temp:  [97.7 °F (36.5 °C)-98.4 °F (36.9 °C)] 98.4 °F (36.9 °C)  Pulse:  [64-77] 64  Resp:  [17-18] 17  SpO2:  [95 %-100 %] 98 %  BP: (129-165)/(54-74) 129/54     Weight: 90 kg (198 lb 6.6 oz)  Body mass index is 36.29 kg/m².    Intake/Output Summary (Last 24 hours) at 5/27/2022 1029  Last data filed at 5/27/2022 0900  Gross per 24 hour   Intake 720 ml   Output 1450 ml   Net -730 ml      Physical Exam  Vitals and nursing note reviewed.   Constitutional:       General: She  is not in acute distress.     Appearance: Normal appearance. She is obese. She is not ill-appearing, toxic-appearing or diaphoretic.   HENT:      Head: Normocephalic and atraumatic.   Eyes:      Conjunctiva/sclera: Conjunctivae normal.   Cardiovascular:      Rate and Rhythm: Normal rate and regular rhythm.   Pulmonary:      Effort: Pulmonary effort is normal. No respiratory distress.      Breath sounds: No wheezing.   Abdominal:      General: Abdomen is flat. There is no distension.      Tenderness: There is no abdominal tenderness.   Skin:     General: Skin is warm and dry.   Neurological:      Mental Status: She is alert and oriented to person, place, and time.   Psychiatric:         Mood and Affect: Mood normal.         Behavior: Behavior normal.         Thought Content: Thought content normal.       Significant Labs: All pertinent labs within the past 24 hours have been reviewed.  BMP:   Recent Labs   Lab 05/26/22  1027   GLU 66*   *   K 4.6   *   CO2 22*   BUN 45*   CREATININE 2.0*   CALCIUM 8.9     CBC: No results for input(s): WBC, HGB, HCT, PLT in the last 48 hours.    Significant Imaging:       Assessment/Plan:      * Acute on chronic renal failure  Likely pre-renal from decreased po intake due to covid. IV fluids. BMP in am.  Has baseline CKD 3.    Improving       COVID-19  98% on RA. Supportive management       Debility  Bed bound       Atrial fibrillation        Anemia of chronic disease  No acute issues       Chronic diastolic heart failure  No acute issues       Hyperlipidemia    Resume any home meds     Hyperkalemia  Should improve with fluids       Controlled type 2 diabetes mellitus with stage 3 chronic kidney disease, with long-term current use of insulin  A1c. Well controlled. Sliding scale/long acting insulin       Class 2 obesity in adult  Body mass index is 36.58 kg/m². Morbid obesity complicates all aspects of disease management from diagnostic modalities to treatment. Weight loss  encouraged and health benefits explained to patient.         Essential hypertension  Holding any BP meds for hypotension. Now in the ED she is sys. 140.      Unstageable sacral decub, POA- wound care consulted. Recommend GI consult for debridement     VTE Risk Mitigation (From admission, onward)         Ordered     enoxaparin injection 90 mg  Every 24 hours (non-standard times)         05/24/22 1415                Discharge Planning   FREDERICK: 5/27/2022     Code Status: DNR   Is the patient medically ready for discharge?:     Reason for patient still in hospital (select all that apply): Patient unstable  Discharge Plan A: Return to nursing home                  Darin Qiu MD  Department of Hospital Medicine   [facility] [place] [facility]

## 2022-05-27 NOTE — SUBJECTIVE & OBJECTIVE
Interval History:  No new issues     Review of Systems   Unable to perform ROS: Dementia   Objective:     Vital Signs (Most Recent):  Temp: 98.4 °F (36.9 °C) (05/27/22 0721)  Pulse: 64 (05/27/22 0721)  Resp: 17 (05/27/22 0721)  BP: (!) 129/54 (05/27/22 0721)  SpO2: 98 % (05/27/22 0721)   Vital Signs (24h Range):  Temp:  [97.7 °F (36.5 °C)-98.4 °F (36.9 °C)] 98.4 °F (36.9 °C)  Pulse:  [64-77] 64  Resp:  [17-18] 17  SpO2:  [95 %-100 %] 98 %  BP: (129-165)/(54-74) 129/54     Weight: 90 kg (198 lb 6.6 oz)  Body mass index is 36.29 kg/m².    Intake/Output Summary (Last 24 hours) at 5/27/2022 1029  Last data filed at 5/27/2022 0900  Gross per 24 hour   Intake 720 ml   Output 1450 ml   Net -730 ml      Physical Exam  Vitals and nursing note reviewed.   Constitutional:       General: She is not in acute distress.     Appearance: Normal appearance. She is obese. She is not ill-appearing, toxic-appearing or diaphoretic.   HENT:      Head: Normocephalic and atraumatic.   Eyes:      Conjunctiva/sclera: Conjunctivae normal.   Cardiovascular:      Rate and Rhythm: Normal rate and regular rhythm.   Pulmonary:      Effort: Pulmonary effort is normal. No respiratory distress.      Breath sounds: No wheezing.   Abdominal:      General: Abdomen is flat. There is no distension.      Tenderness: There is no abdominal tenderness.   Skin:     General: Skin is warm and dry.   Neurological:      Mental Status: She is alert and oriented to person, place, and time.   Psychiatric:         Mood and Affect: Mood normal.         Behavior: Behavior normal.         Thought Content: Thought content normal.       Significant Labs: All pertinent labs within the past 24 hours have been reviewed.  BMP:   Recent Labs   Lab 05/26/22  1027   GLU 66*   *   K 4.6   *   CO2 22*   BUN 45*   CREATININE 2.0*   CALCIUM 8.9     CBC: No results for input(s): WBC, HGB, HCT, PLT in the last 48 hours.    Significant Imaging:

## 2022-05-27 NOTE — PLAN OF CARE
[facility] - Telemetry  Discharge Reassessment    Primary Care Provider: Viky Bean MD    Expected Discharge Date: 5/27/2022    Reassessment (most recent)       Discharge Reassessment - 05/27/22 1210          Discharge Reassessment    Assessment Type Discharge Planning Reassessment     Did the patient's condition or plan change since previous assessment? No     Communicated FREDERICK with patient/caregiver Date not available/Unable to determine     Discharge Plan A Return to nursing home     Discharge Plan B Return to Nursing Home     DME Needed Upon Discharge  none     Discharge Barriers Identified None     Why the patient remains in the hospital Requires continued medical care        Post-Acute Status    Discharge Delays None known at this time

## 2022-05-27 NOTE — CONSULTS
SW received ambulance authorization # [id number] from [facility] ([facility]) for [facility] ambulance.

## 2022-05-28 LAB
ANION GAP SERPL CALC-SCNC: 5 MMOL/L (ref 8–16)
BUN SERPL-MCNC: 30 MG/DL (ref 8–23)
CALCIUM SERPL-MCNC: 9.3 MG/DL (ref 8.7–10.5)
CHLORIDE SERPL-SCNC: 120 MMOL/L (ref 95–110)
CO2 SERPL-SCNC: 21 MMOL/L (ref 23–29)
CREAT SERPL-MCNC: 1.4 MG/DL (ref 0.5–1.4)
EST. GFR  (AFRICAN AMERICAN): 40 ML/MIN/1.73 M^2
EST. GFR  (NON AFRICAN AMERICAN): 35 ML/MIN/1.73 M^2
GLUCOSE SERPL-MCNC: 48 MG/DL (ref 70–110)
POCT GLUCOSE: 109 MG/DL (ref 70–110)
POCT GLUCOSE: 111 MG/DL (ref 70–110)
POCT GLUCOSE: 160 MG/DL (ref 70–110)
POCT GLUCOSE: 50 MG/DL (ref 70–110)
POCT GLUCOSE: 88 MG/DL (ref 70–110)
POTASSIUM SERPL-SCNC: 4.5 MMOL/L (ref 3.5–5.1)
SODIUM SERPL-SCNC: 146 MMOL/L (ref 136–145)

## 2022-05-28 PROCEDURE — 63600175 PHARM REV CODE 636 W HCPCS: Performed by: INTERNAL MEDICINE

## 2022-05-28 PROCEDURE — 80048 BASIC METABOLIC PNL TOTAL CA: CPT | Performed by: INTERNAL MEDICINE

## 2022-05-28 PROCEDURE — 21400001 HC TELEMETRY ROOM

## 2022-05-28 PROCEDURE — C9399 UNCLASSIFIED DRUGS OR BIOLOG: HCPCS | Performed by: INTERNAL MEDICINE

## 2022-05-28 PROCEDURE — 27000207 HC ISOLATION

## 2022-05-28 PROCEDURE — 36415 COLL VENOUS BLD VENIPUNCTURE: CPT | Performed by: INTERNAL MEDICINE

## 2022-05-28 PROCEDURE — 25000003 PHARM REV CODE 250: Performed by: INTERNAL MEDICINE

## 2022-05-28 RX ADMIN — INSULIN DETEMIR 26 UNITS: 100 INJECTION, SOLUTION SUBCUTANEOUS at 09:05

## 2022-05-28 RX ADMIN — SODIUM CHLORIDE: 0.9 INJECTION, SOLUTION INTRAVENOUS at 06:05

## 2022-05-28 RX ADMIN — SODIUM CHLORIDE: 0.9 INJECTION, SOLUTION INTRAVENOUS at 09:05

## 2022-05-28 RX ADMIN — ENOXAPARIN SODIUM 90 MG: 100 INJECTION SUBCUTANEOUS at 06:05

## 2022-05-28 RX ADMIN — SODIUM CHLORIDE: 0.9 INJECTION, SOLUTION INTRAVENOUS at 12:05

## 2022-05-28 RX ADMIN — MELATONIN TAB 3 MG 9 MG: 3 TAB at 09:05

## 2022-05-28 RX ADMIN — DEXTROSE 250 ML: 10 SOLUTION INTRAVENOUS at 08:05

## 2022-05-28 RX ADMIN — FOLIC ACID 1 MG: 1 TABLET ORAL at 08:05

## 2022-05-28 RX ADMIN — OXYBUTYNIN CHLORIDE 15 MG: 5 TABLET, EXTENDED RELEASE ORAL at 08:05

## 2022-05-28 RX ADMIN — TIMOLOL MALEATE 1 DROP: 5 SOLUTION OPHTHALMIC at 06:05

## 2022-05-28 RX ADMIN — LATANOPROST 1 DROP: 50 SOLUTION OPHTHALMIC at 09:05

## 2022-05-28 RX ADMIN — ATORVASTATIN CALCIUM 20 MG: 10 TABLET, FILM COATED ORAL at 08:05

## 2022-05-28 RX ADMIN — ASPIRIN 81 MG: 81 TABLET, DELAYED RELEASE ORAL at 09:05

## 2022-05-28 NOTE — NURSING
[facility] MEWS TRIGGER FOLLOW UP       MEWS Monitoring, Score is:3  Indication for review: RR 30    Bedside Nurse, Leslie contacted, no concerns verbalized at this time, instructed to call [phone] for further concerns or assistance..

## 2022-05-28 NOTE — SUBJECTIVE & OBJECTIVE
Interval History:  No new issues     Review of Systems   Unable to perform ROS: Dementia   Objective:     Vital Signs (Most Recent):  Temp: 97.6 °F (36.4 °C) (05/28/22 0613)  Pulse: 78 (05/28/22 0613)  Resp: 20 (05/28/22 0613)  BP: (!) 124/58 (05/28/22 0613)  SpO2: 96 % (05/28/22 0613)   Vital Signs (24h Range):  Temp:  [97.5 °F (36.4 °C)-98.5 °F (36.9 °C)] 97.6 °F (36.4 °C)  Pulse:  [64-83] 78  Resp:  [17-20] 20  SpO2:  [96 %-100 %] 96 %  BP: (124-151)/(54-84) 124/58     Weight: 90 kg (198 lb 6.6 oz)  Body mass index is 36.29 kg/m².    Intake/Output Summary (Last 24 hours) at 5/28/2022 0708  Last data filed at 5/28/2022 0609  Gross per 24 hour   Intake 1815 ml   Output 700 ml   Net 1115 ml      Physical Exam  Vitals and nursing note reviewed.   Constitutional:       General: She is not in acute distress.     Appearance: Normal appearance. She is obese. She is not ill-appearing, toxic-appearing or diaphoretic.   HENT:      Head: Normocephalic and atraumatic.   Eyes:      Conjunctiva/sclera: Conjunctivae normal.   Cardiovascular:      Rate and Rhythm: Normal rate and regular rhythm.   Pulmonary:      Effort: Pulmonary effort is normal. No respiratory distress.      Breath sounds: No wheezing.   Abdominal:      General: Abdomen is flat. There is no distension.      Tenderness: There is no abdominal tenderness.   Skin:     General: Skin is warm and dry.   Neurological:      Mental Status: She is alert and oriented to person, place, and time.   Psychiatric:         Mood and Affect: Mood normal.         Behavior: Behavior normal.         Thought Content: Thought content normal.       Significant Labs: All pertinent labs within the past 24 hours have been reviewed.  BMP:   Recent Labs   Lab 05/27/22  1002   *   *   K 5.0   *   CO2 19*   BUN 38*   CREATININE 1.7*   CALCIUM 8.9     CBC: No results for input(s): WBC, HGB, HCT, PLT in the last 48 hours.    Significant Imaging:

## 2022-05-28 NOTE — PROGRESS NOTES
Chart reviewed including photos of wound.    Can benefit from surgical debridement. Will schedule for Monday.

## 2022-05-28 NOTE — PLAN OF CARE
No adverse events occurred. Pt had no IV access, so a Midline was placed late in the day. The remdesivir was given late due to that.   Problem: Skin Injury Risk Increased  Goal: Skin Health and Integrity  Outcome: Ongoing, Progressing     Problem: Adult Inpatient Plan of Care  Goal: Plan of Care Review  Outcome: Ongoing, Progressing  Goal: Patient-Specific Goal (Individualized)  Outcome: Ongoing, Progressing  Goal: Absence of Hospital-Acquired Illness or Injury  Outcome: Ongoing, Progressing  Goal: Optimal Comfort and Wellbeing  Outcome: Ongoing, Progressing  Goal: Readiness for Transition of Care  Outcome: Ongoing, Progressing     Problem: Diabetes Comorbidity  Goal: Blood Glucose Level Within Targeted Range  Outcome: Ongoing, Progressing     Problem: Fluid and Electrolyte Imbalance (Acute Kidney Injury/Impairment)  Goal: Fluid and Electrolyte Balance  Outcome: Ongoing, Progressing     Problem: Oral Intake Inadequate (Acute Kidney Injury/Impairment)  Goal: Optimal Nutrition Intake  Outcome: Ongoing, Progressing     Problem: Renal Function Impairment (Acute Kidney Injury/Impairment)  Goal: Effective Renal Function  Outcome: Ongoing, Progressing     Problem: Impaired Wound Healing  Goal: Optimal Wound Healing  Outcome: Ongoing, Progressing     Problem: Infection  Goal: Absence of Infection Signs and Symptoms  Outcome: Ongoing, Progressing

## 2022-05-28 NOTE — PROGRESS NOTES
[facility] Medicine  Progress Note    Patient Name: Ana James  MRN: [id number]  Patient Class: IP- Inpatient   Admission Date: 5/24/2022  Length of Stay: 4 days  Attending Physician: Darin Garcia MD  Primary Care Provider: Viky Bean MD        Subjective:     Principal Problem:Acute on chronic renal failure        HPI:  Mrs. James is a 80 yo F who presents from [facility] with a CC of hypotension.  The patient was diagnosed with Covid yesterday. 02 sats in the ED were 98% with a clear CXR. Patient not able to give accurate history. Likely decreased po intake in the previous days. She presents with a BUN/CRT of 88/4.0.  Last month her CRT was 1.6.  She has known CKD 3.  She is bed bound and has an unstageable sacral wound.  Otherwise no further sig. History obtained. She is non-ill appearing       Overview/Hospital Course:  Patient admitted to the hospital with acute renal failure- secondary to poor input from Covid 19.   Patient has unstageable sacral decub- wound care was consulted and rec: surgery consult for possible debridement.  Patient's renal function improved daily with IV fluids         Interval History:  No new issues     Review of Systems   Unable to perform ROS: Dementia   Objective:     Vital Signs (Most Recent):  Temp: 97.6 °F (36.4 °C) (05/28/22 0613)  Pulse: 78 (05/28/22 0613)  Resp: 20 (05/28/22 0613)  BP: (!) 124/58 (05/28/22 0613)  SpO2: 96 % (05/28/22 0613)   Vital Signs (24h Range):  Temp:  [97.5 °F (36.4 °C)-98.5 °F (36.9 °C)] 97.6 °F (36.4 °C)  Pulse:  [64-83] 78  Resp:  [17-20] 20  SpO2:  [96 %-100 %] 96 %  BP: (124-151)/(54-84) 124/58     Weight: 90 kg (198 lb 6.6 oz)  Body mass index is 36.29 kg/m².    Intake/Output Summary (Last 24 hours) at 5/28/2022 0708  Last data filed at 5/28/2022 0609  Gross per 24 hour   Intake 1815 ml   Output 700 ml   Net 1115 ml      Physical Exam  Vitals and nursing note reviewed.   Constitutional:       General: She  is not in acute distress.     Appearance: Normal appearance. She is obese. She is not ill-appearing, toxic-appearing or diaphoretic.   HENT:      Head: Normocephalic and atraumatic.   Eyes:      Conjunctiva/sclera: Conjunctivae normal.   Cardiovascular:      Rate and Rhythm: Normal rate and regular rhythm.   Pulmonary:      Effort: Pulmonary effort is normal. No respiratory distress.      Breath sounds: No wheezing.   Abdominal:      General: Abdomen is flat. There is no distension.      Tenderness: There is no abdominal tenderness.   Skin:     General: Skin is warm and dry.   Neurological:      Mental Status: She is alert and oriented to person, place, and time.   Psychiatric:         Mood and Affect: Mood normal.         Behavior: Behavior normal.         Thought Content: Thought content normal.       Significant Labs: All pertinent labs within the past 24 hours have been reviewed.  BMP:   Recent Labs   Lab 05/27/22  1002   *   *   K 5.0   *   CO2 19*   BUN 38*   CREATININE 1.7*   CALCIUM 8.9     CBC: No results for input(s): WBC, HGB, HCT, PLT in the last 48 hours.    Significant Imaging:       Assessment/Plan:      * Acute on chronic renal failure  Likely pre-renal from decreased po intake due to covid. IV fluids. BMP in am.  Has baseline CKD 3.    Improving       COVID-19  98% on RA. Supportive management       Debility  Bed bound       Atrial fibrillation        Anemia of chronic disease  No acute issues       Chronic diastolic heart failure  No acute issues       Hyperlipidemia    Resume any home meds     Hyperkalemia  Should improve with fluids       Controlled type 2 diabetes mellitus with stage 3 chronic kidney disease, with long-term current use of insulin  A1c. Well controlled. Sliding scale/long acting insulin       Class 2 obesity in adult  Body mass index is 36.58 kg/m². Morbid obesity complicates all aspects of disease management from diagnostic modalities to treatment. Weight loss  encouraged and health benefits explained to patient.         Essential hypertension  Holding any BP meds for hypotension. Now in the ED she is sys. 140.      Sacral decub- surgery consulted for debridement     VTE Risk Mitigation (From admission, onward)         Ordered     enoxaparin injection 90 mg  Every 24 hours (non-standard times)         05/24/22 1415                Discharge Planning   FREDERICK: 5/27/2022     Code Status: DNR   Is the patient medically ready for discharge?:     Reason for patient still in hospital (select all that apply): Patient unstable  Discharge Plan A: Return to nursing home   Discharge Delays: None known at this time              Darin Qiu MD  Department of Hospital Medicine   [facility] - [facility]

## 2022-05-29 LAB
POCT GLUCOSE: 125 MG/DL (ref 70–110)
POCT GLUCOSE: 177 MG/DL (ref 70–110)
POCT GLUCOSE: 213 MG/DL (ref 70–110)
POCT GLUCOSE: 61 MG/DL (ref 70–110)
POCT GLUCOSE: 96 MG/DL (ref 70–110)

## 2022-05-29 PROCEDURE — 25000003 PHARM REV CODE 250: Performed by: INTERNAL MEDICINE

## 2022-05-29 PROCEDURE — 92610 EVALUATE SWALLOWING FUNCTION: CPT

## 2022-05-29 PROCEDURE — 27000207 HC ISOLATION

## 2022-05-29 PROCEDURE — 21400001 HC TELEMETRY ROOM

## 2022-05-29 PROCEDURE — 63600175 PHARM REV CODE 636 W HCPCS: Performed by: INTERNAL MEDICINE

## 2022-05-29 RX ORDER — DEXTROSE MONOHYDRATE AND SODIUM CHLORIDE 5; .9 G/100ML; G/100ML
INJECTION, SOLUTION INTRAVENOUS CONTINUOUS
Status: DISCONTINUED | OUTPATIENT
Start: 2022-05-29 | End: 2022-05-31

## 2022-05-29 RX ADMIN — TIMOLOL MALEATE 1 DROP: 5 SOLUTION OPHTHALMIC at 06:05

## 2022-05-29 RX ADMIN — DEXTROSE AND SODIUM CHLORIDE: 5; .9 INJECTION, SOLUTION INTRAVENOUS at 11:05

## 2022-05-29 RX ADMIN — ATORVASTATIN CALCIUM 20 MG: 10 TABLET, FILM COATED ORAL at 09:05

## 2022-05-29 RX ADMIN — FOLIC ACID 1 MG: 1 TABLET ORAL at 09:05

## 2022-05-29 RX ADMIN — INSULIN DETEMIR 26 UNITS: 100 INJECTION, SOLUTION SUBCUTANEOUS at 09:05

## 2022-05-29 RX ADMIN — DEXTROSE AND SODIUM CHLORIDE: 5; .9 INJECTION, SOLUTION INTRAVENOUS at 01:05

## 2022-05-29 RX ADMIN — ASPIRIN 81 MG: 81 TABLET, DELAYED RELEASE ORAL at 09:05

## 2022-05-29 RX ADMIN — OXYBUTYNIN CHLORIDE 15 MG: 5 TABLET, EXTENDED RELEASE ORAL at 09:05

## 2022-05-29 RX ADMIN — MELATONIN TAB 3 MG 9 MG: 3 TAB at 09:05

## 2022-05-29 RX ADMIN — ENOXAPARIN SODIUM 90 MG: 100 INJECTION SUBCUTANEOUS at 06:05

## 2022-05-29 RX ADMIN — LATANOPROST 1 DROP: 50 SOLUTION OPHTHALMIC at 09:05

## 2022-05-29 RX ADMIN — DEXTROSE 125 ML: 10 SOLUTION INTRAVENOUS at 09:05

## 2022-05-29 RX ADMIN — SODIUM CHLORIDE: 0.9 INJECTION, SOLUTION INTRAVENOUS at 06:05

## 2022-05-29 NOTE — PROGRESS NOTES
[facility] Medicine  Progress Note    Patient Name: Ana James  MRN: [id number]  Patient Class: IP- Inpatient   Admission Date: 5/24/2022  Length of Stay: 5 days  Attending Physician: Darin Garcia MD  Primary Care Provider: Viky Bean MD        Subjective:     Principal Problem:Acute on chronic renal failure        HPI:  Mrs. James is a 82 yo F who presents from [facility] with a CC of hypotension.  The patient was diagnosed with Covid yesterday. 02 sats in the ED were 98% with a clear CXR. Patient not able to give accurate history. Likely decreased po intake in the previous days. She presents with a BUN/CRT of 88/4.0.  Last month her CRT was 1.6.  She has known CKD 3.  She is bed bound and has an unstageable sacral wound.  Otherwise no further sig. History obtained. She is non-ill appearing       Overview/Hospital Course:  Patient admitted to the hospital with acute renal failure- secondary to poor input from Covid 19.   Patient has unstageable sacral decub- wound care was consulted and rec: surgery consult for possible debridement.  Patient's renal function improved daily with IV fluids. Surgery planned for debridement on 5/30        Interval History:  No new issues     Review of Systems   Unable to perform ROS: Dementia   Objective:     Vital Signs (Most Recent):  Temp: 96.7 °F (35.9 °C) (05/29/22 0833)  Pulse: 70 (05/29/22 0833)  Resp: (!) 24 (05/29/22 0833)  BP: 134/82 (05/29/22 0833)  SpO2: 100 % (05/29/22 0833)   Vital Signs (24h Range):  Temp:  [96.7 °F (35.9 °C)-98 °F (36.7 °C)] 96.7 °F (35.9 °C)  Pulse:  [70-85] 70  Resp:  [16-30] 24  SpO2:  [92 %-100 %] 100 %  BP: (123-154)/(61-89) 134/82     Weight: 90 kg (198 lb 6.6 oz)  Body mass index is 36.29 kg/m².    Intake/Output Summary (Last 24 hours) at 5/29/2022 1122  Last data filed at 5/29/2022 0626  Gross per 24 hour   Intake 2082 ml   Output 1050 ml   Net 1032 ml      Physical Exam  Vitals and nursing note  reviewed.   Constitutional:       General: She is not in acute distress.     Appearance: Normal appearance. She is obese. She is not ill-appearing, toxic-appearing or diaphoretic.   HENT:      Head: Normocephalic and atraumatic.   Eyes:      Conjunctiva/sclera: Conjunctivae normal.   Cardiovascular:      Rate and Rhythm: Normal rate and regular rhythm.   Pulmonary:      Effort: Pulmonary effort is normal. No respiratory distress.      Breath sounds: No wheezing.   Abdominal:      General: Abdomen is flat. There is no distension.      Tenderness: There is no abdominal tenderness.   Skin:     General: Skin is warm and dry.   Neurological:      Mental Status: She is alert and oriented to person, place, and time.   Psychiatric:         Mood and Affect: Mood normal.         Behavior: Behavior normal.         Thought Content: Thought content normal.       Significant Labs: All pertinent labs within the past 24 hours have been reviewed.  BMP:   Recent Labs   Lab 05/28/22  0820   GLU 48*   *   K 4.5   *   CO2 21*   BUN 30*   CREATININE 1.4   CALCIUM 9.3     CBC: No results for input(s): WBC, HGB, HCT, PLT in the last 48 hours.    Significant Imaging:       Assessment/Plan:      * Acute on chronic renal failure  Likely pre-renal from decreased po intake due to covid. IV fluids. BMP in am.  Has baseline CKD 3.    Improving       COVID-19  98% on RA. Supportive management       Debility  Bed bound       Atrial fibrillation        Anemia of chronic disease  No acute issues       Chronic diastolic heart failure  No acute issues       Hyperlipidemia    Resume any home meds     Hyperkalemia  Should improve with fluids       Controlled type 2 diabetes mellitus with stage 3 chronic kidney disease, with long-term current use of insulin  A1c. Well controlled. Sliding scale/long acting insulin       Class 2 obesity in adult  Body mass index is 36.58 kg/m². Morbid obesity complicates all aspects of disease management from  diagnostic modalities to treatment. Weight loss encouraged and health benefits explained to patient.         Essential hypertension  Holding any BP meds for hypotension. Now in the ED she is sys. 140.      Sacral decub- unstageable- POA.    Surgery consulted and plans for debridement on 5/30/22.     VTE Risk Mitigation (From admission, onward)         Ordered     enoxaparin injection 90 mg  Every 24 hours (non-standard times)         05/24/22 1415                Discharge Planning   FREDERICK: 5/27/2022     Code Status: DNR   Is the patient medically ready for discharge?:     Reason for patient still in hospital (select all that apply): Patient unstable  Discharge Plan A: Return to nursing home   Discharge Delays: None known at this time              Darin Qiu MD  Department of Hospital Medicine   [facility] - [facility]

## 2022-05-29 NOTE — SUBJECTIVE & OBJECTIVE
Interval History:  No new issues     Review of Systems   Unable to perform ROS: Dementia   Objective:     Vital Signs (Most Recent):  Temp: 96.7 °F (35.9 °C) (05/29/22 0833)  Pulse: 70 (05/29/22 0833)  Resp: (!) 24 (05/29/22 0833)  BP: 134/82 (05/29/22 0833)  SpO2: 100 % (05/29/22 0833)   Vital Signs (24h Range):  Temp:  [96.7 °F (35.9 °C)-98 °F (36.7 °C)] 96.7 °F (35.9 °C)  Pulse:  [70-85] 70  Resp:  [16-30] 24  SpO2:  [92 %-100 %] 100 %  BP: (123-154)/(61-89) 134/82     Weight: 90 kg (198 lb 6.6 oz)  Body mass index is 36.29 kg/m².    Intake/Output Summary (Last 24 hours) at 5/29/2022 1122  Last data filed at 5/29/2022 0626  Gross per 24 hour   Intake 2082 ml   Output 1050 ml   Net 1032 ml      Physical Exam  Vitals and nursing note reviewed.   Constitutional:       General: She is not in acute distress.     Appearance: Normal appearance. She is obese. She is not ill-appearing, toxic-appearing or diaphoretic.   HENT:      Head: Normocephalic and atraumatic.   Eyes:      Conjunctiva/sclera: Conjunctivae normal.   Cardiovascular:      Rate and Rhythm: Normal rate and regular rhythm.   Pulmonary:      Effort: Pulmonary effort is normal. No respiratory distress.      Breath sounds: No wheezing.   Abdominal:      General: Abdomen is flat. There is no distension.      Tenderness: There is no abdominal tenderness.   Skin:     General: Skin is warm and dry.   Neurological:      Mental Status: She is alert and oriented to person, place, and time.   Psychiatric:         Mood and Affect: Mood normal.         Behavior: Behavior normal.         Thought Content: Thought content normal.       Significant Labs: All pertinent labs within the past 24 hours have been reviewed.  BMP:   Recent Labs   Lab 05/28/22  0820   GLU 48*   *   K 4.5   *   CO2 21*   BUN 30*   CREATININE 1.4   CALCIUM 9.3     CBC: No results for input(s): WBC, HGB, HCT, PLT in the last 48 hours.    Significant Imaging:

## 2022-05-29 NOTE — PT/OT/SLP EVAL
Speech Language Pathology Evaluation  Bedside Swallow    Patient Name:  Ana James   MRN:  [id number]  Admitting Diagnosis: Acute on chronic renal failure    Recommendations:                 General Recommendations:  Dysphagia therapy  Diet recommendations:   , Full liquids , crush  Aspiration Precautions: 1 bite/sip at a time   General Precautions: Standard, aspiration  Communication strategies:  provide increased time to answer    History:     Past Medical History:   Diagnosis Date    Arthritis lower extremities    Asthma     Blind left eye     Diabetes mellitus     Edema of both lower legs     CHRONIC    Fall 02/08/2022    Gall stones     Hypertension     Kidney stones     Lives alone with help available     HOME HEALTH    Nephrolithiasis 1/22/2016    Obesity     PVD (peripheral vascular disease)     Renal disorder     Retinopathy     Sleep apnea     Vaginal delivery     x1    Weakness of both lower extremities     uses a cane, rollator & power chair       Past Surgical History:   Procedure Laterality Date    CARDIAC CATHETERIZATION      cataract      CHOLECYSTECTOMY      EYE SURGERY      Rt cataract surgery    HYSTERECTOMY      URETERAL STENT PLACEMENT       Social History: Patient [facility] at [facility]    Chest X-Rays: 5/24 Heart size normal.  The lungs are clear.  No pleural effusion    Prior diet: mech soft with thin    Subjective   Per nursing Pt coughing and choking on solids specifically rice and meds.   Patient goals: to feel better    Pain/Comfort:  · Pain Rating 1: 0/10    Respiratory Status: Room air    Objective:     Oral Musculature Evaluation  · Oral Musculature: WFL  · Dentition: edentulous  · Secretion Management: adequate  · Mucosal Quality: good  · Mandibular Strength and Mobility: other (see comments) (underbite)  · Oral Labial Strength and Mobility: functional retraction  · Lingual Strength and Mobility: WFL  · Velar Elevation: WFL  · Buccal Strength and Mobility:  WFL  · Voice Prior to PO Intake: wfl  · Oral Musculature Comments: open mouth position, underbite    Bedside Swallow Eval:   Consistencies Assessed:  · Thin liquids ~4oz multiple trials via straw     Oral Phase:   · Pt refuse trials of solids and puree, readily accepted liquids.   · tongue thrust    Pharyngeal Phase:   · no overt clinical signs/symptoms of aspiration    Compensatory Strategies  · None    Treatment: please note silent aspiration cannot be r/o at bedside.     Assessment:     Ana James is a 81 y.o. female with dx of Acute on chronic renal failure she presents with functional swallow for liquids, refusing solids/purees at this time.     Goals:   Multidisciplinary Problems     SLP Goals        Problem: SLP    Goal Priority Disciplines Outcome   SLP Goal    Low SLP Ongoing, Progressing   Description: Pt will participate in ongoing assessment of swallow                   Plan:     · Patient to be seen:  3 x/week   · Plan of Care expires:  06/03/22  · Plan of Care reviewed with:  patient   · SLP Follow-Up:  Yes       Discharge recommendations:  other (see comments) (TBD)   Barriers to Discharge:  None    Time Tracking:     SLP Treatment Date:   05/29/22  Speech Start Time:  1250  Speech Stop Time:  1300     Speech Total Time (min):  10 min    Billable Minutes: Eval Swallow and Oral Function 10    05/29/2022

## 2022-05-30 ENCOUNTER — ANESTHESIA EVENT (OUTPATIENT)
Dept: SURGERY | Facility: HOSPITAL | Age: 82
DRG: 981 | End: 2022-05-30
Payer: MEDICARE

## 2022-05-30 ENCOUNTER — ANESTHESIA (OUTPATIENT)
Dept: SURGERY | Facility: HOSPITAL | Age: 82
DRG: 981 | End: 2022-05-30
Payer: MEDICARE

## 2022-05-30 LAB
ABO + RH BLD: NORMAL
ANION GAP SERPL CALC-SCNC: 2 MMOL/L (ref 8–16)
BASOPHILS # BLD AUTO: 0.01 K/UL (ref 0–0.2)
BASOPHILS NFR BLD: 0.1 % (ref 0–1.9)
BLD GP AB SCN CELLS X3 SERPL QL: NORMAL
BUN SERPL-MCNC: 18 MG/DL (ref 8–23)
CALCIUM SERPL-MCNC: 8.2 MG/DL (ref 8.7–10.5)
CHLORIDE SERPL-SCNC: 119 MMOL/L (ref 95–110)
CO2 SERPL-SCNC: 20 MMOL/L (ref 23–29)
CREAT SERPL-MCNC: 1.1 MG/DL (ref 0.5–1.4)
DIFFERENTIAL METHOD: ABNORMAL
EOSINOPHIL # BLD AUTO: 0.3 K/UL (ref 0–0.5)
EOSINOPHIL NFR BLD: 2.3 % (ref 0–8)
ERYTHROCYTE [DISTWIDTH] IN BLOOD BY AUTOMATED COUNT: 16.2 % (ref 11.5–14.5)
EST. GFR  (AFRICAN AMERICAN): 54 ML/MIN/1.73 M^2
EST. GFR  (NON AFRICAN AMERICAN): 47 ML/MIN/1.73 M^2
GLUCOSE SERPL-MCNC: 170 MG/DL (ref 70–110)
HCT VFR BLD AUTO: 25.6 % (ref 37–48.5)
HGB BLD-MCNC: 7.9 G/DL (ref 12–16)
IMM GRANULOCYTES # BLD AUTO: 0.17 K/UL (ref 0–0.04)
IMM GRANULOCYTES NFR BLD AUTO: 1.6 % (ref 0–0.5)
LYMPHOCYTES # BLD AUTO: 1 K/UL (ref 1–4.8)
LYMPHOCYTES NFR BLD: 9.1 % (ref 18–48)
MCH RBC QN AUTO: 27.6 PG (ref 27–31)
MCHC RBC AUTO-ENTMCNC: 30.9 G/DL (ref 32–36)
MCV RBC AUTO: 90 FL (ref 82–98)
MONOCYTES # BLD AUTO: 0.7 K/UL (ref 0.3–1)
MONOCYTES NFR BLD: 6.9 % (ref 4–15)
NEUTROPHILS # BLD AUTO: 8.5 K/UL (ref 1.8–7.7)
NEUTROPHILS NFR BLD: 80 % (ref 38–73)
NRBC BLD-RTO: 0 /100 WBC
PLATELET # BLD AUTO: 295 K/UL (ref 150–450)
PMV BLD AUTO: 9.9 FL (ref 9.2–12.9)
POCT GLUCOSE: 117 MG/DL (ref 70–110)
POCT GLUCOSE: 77 MG/DL (ref 70–110)
POCT GLUCOSE: 80 MG/DL (ref 70–110)
POCT GLUCOSE: 84 MG/DL (ref 70–110)
POTASSIUM SERPL-SCNC: 4 MMOL/L (ref 3.5–5.1)
RBC # BLD AUTO: 2.86 M/UL (ref 4–5.4)
SODIUM SERPL-SCNC: 141 MMOL/L (ref 136–145)
WBC # BLD AUTO: 10.65 K/UL (ref 3.9–12.7)

## 2022-05-30 PROCEDURE — 93010 EKG 12-LEAD: ICD-10-PCS | Mod: ,,, | Performed by: INTERNAL MEDICINE

## 2022-05-30 PROCEDURE — 25000003 PHARM REV CODE 250: Performed by: INTERNAL MEDICINE

## 2022-05-30 PROCEDURE — 80048 BASIC METABOLIC PNL TOTAL CA: CPT | Performed by: INTERNAL MEDICINE

## 2022-05-30 PROCEDURE — 36415 COLL VENOUS BLD VENIPUNCTURE: CPT | Performed by: ANESTHESIOLOGY

## 2022-05-30 PROCEDURE — 93005 ELECTROCARDIOGRAM TRACING: CPT

## 2022-05-30 PROCEDURE — 93010 ELECTROCARDIOGRAM REPORT: CPT | Mod: ,,, | Performed by: INTERNAL MEDICINE

## 2022-05-30 PROCEDURE — 99222 1ST HOSP IP/OBS MODERATE 55: CPT | Mod: ,,, | Performed by: SURGERY

## 2022-05-30 PROCEDURE — 27000207 HC ISOLATION

## 2022-05-30 PROCEDURE — 85025 COMPLETE CBC W/AUTO DIFF WBC: CPT | Performed by: ANESTHESIOLOGY

## 2022-05-30 PROCEDURE — 63600175 PHARM REV CODE 636 W HCPCS: Performed by: INTERNAL MEDICINE

## 2022-05-30 PROCEDURE — 86901 BLOOD TYPING SEROLOGIC RH(D): CPT | Performed by: ANESTHESIOLOGY

## 2022-05-30 PROCEDURE — 21400001 HC TELEMETRY ROOM

## 2022-05-30 PROCEDURE — 99222 PR INITIAL HOSPITAL CARE,LEVL II: ICD-10-PCS | Mod: ,,, | Performed by: SURGERY

## 2022-05-30 RX ADMIN — MELATONIN TAB 3 MG 9 MG: 3 TAB at 10:05

## 2022-05-30 RX ADMIN — OXYBUTYNIN CHLORIDE 15 MG: 5 TABLET, EXTENDED RELEASE ORAL at 10:05

## 2022-05-30 RX ADMIN — TIMOLOL MALEATE 1 DROP: 5 SOLUTION OPHTHALMIC at 06:05

## 2022-05-30 RX ADMIN — INSULIN DETEMIR 26 UNITS: 100 INJECTION, SOLUTION SUBCUTANEOUS at 10:05

## 2022-05-30 RX ADMIN — DEXTROSE AND SODIUM CHLORIDE: 5; .9 INJECTION, SOLUTION INTRAVENOUS at 02:05

## 2022-05-30 RX ADMIN — FOLIC ACID 1 MG: 1 TABLET ORAL at 10:05

## 2022-05-30 RX ADMIN — LATANOPROST 1 DROP: 50 SOLUTION OPHTHALMIC at 10:05

## 2022-05-30 RX ADMIN — ASPIRIN 81 MG: 81 TABLET, DELAYED RELEASE ORAL at 10:05

## 2022-05-30 RX ADMIN — ATORVASTATIN CALCIUM 20 MG: 10 TABLET, FILM COATED ORAL at 10:05

## 2022-05-30 NOTE — PLAN OF CARE
Due to OR scheduling issues, we will plan to perform surgery (debridement of sacral wounds) tomorrow.  Ok for diet today  NPO at midnight.

## 2022-05-30 NOTE — ANESTHESIA PREPROCEDURE EVALUATION
05/30/2022    Pre-operative evaluation for Procedure(s) (LRB):  DEBRIDEMENT, WOUND, SACRUM (N/A)    NPO >8  METS 1-3; bedbound, dementia NH pt    Vitals:    05/30/22 2035 05/30/22 2355 05/31/22 0545 05/31/22 0801   BP: (!) 166/72 134/60 (!) 156/71 (!) 163/74   BP Location: Left arm Left arm Left arm Left arm   Patient Position: Lying Lying Lying Lying   Pulse: 81 99 74 64   Resp: 18 18 18 18   Temp: 36.4 °C (97.6 °F) 36.4 °C (97.6 °F) 36.3 °C (97.3 °F) 36.8 °C (98.2 °F)   TempSrc: Oral Oral Oral Oral   SpO2: 100% 100% 100% 100%   Weight:       Height:             Patient Active Problem List   Diagnosis    HILLS (dyspnea on exertion)    UTI (urinary tract infection)    Essential hypertension    Class 2 obesity in adult    Peripheral vascular disease, unspecified    Acute renal failure superimposed on stage 3 chronic kidney disease    Controlled type 2 diabetes mellitus with stage 3 chronic kidney disease, with long-term current use of insulin    Physical debility    Hyperkalemia    Hyperlipidemia    Chronic diastolic heart failure    Anemia of chronic disease    Secondary hyperparathyroidism of renal origin    Chest pain    Atrial fibrillation    Acute on chronic renal failure    Encephalopathy    Acute right hip pain    Fall at home, initial encounter    Hypothermia    Altered mental status    Arrhythmia    Palliative care encounter    Debility    DNR (do not resuscitate)    COVID-19       Review of patient's allergies indicates:   Allergen Reactions    Adhesive Rash     Paper tape       No current facility-administered medications on file prior to encounter.     Current Outpatient Medications on File Prior to Encounter   Medication Sig Dispense Refill    albuterol (PROVENTIL/VENTOLIN HFA) 90 mcg/actuation inhaler INHALE 2 PUFFS INTO THE LUNGS EVERY 6 (SIX) HOURS AS NEEDED FOR  "WHEEZING. RESCUE 8.5 g 11    arginine-vitamin C-vitamin E (ARGINAID) 4.5 gram-156 mg/9.2 gram PwPk Take 1 packet by mouth 2 (two) times a day. Add to H2O to promot wound healing      ascorbic acid, vitamin C, (VITAMIN C) 500 MG tablet Take 500 mg by mouth 2 (two) times daily.      aspirin (ECOTRIN) 81 MG EC tablet Take 81 mg by mouth once daily.      atorvastatin (LIPITOR) 20 MG tablet TAKE 1 TABLET BY MOUTH EVERY DAY 90 tablet 2    folic acid (FOLVITE) 1 MG tablet Take 1 tablet (1 mg total) by mouth once daily.  0    HUMALOG KWIKPEN INSULIN 100 unit/mL pen Inject into the skin. Per  House sliding scale; 0-200= DO NOT ADMINISTER; 201-249= 2 Units; 250-299= 4 Units; 300-349= 6 Units; 350-399= 8 Units; 400 OR GREATER = 10 Units      latanoprost 0.005 % ophthalmic solution Place 1 drop into both eyes every evening.  3    melatonin (MELATIN) 3 mg tablet Take 3 tablets (9 mg total) by mouth nightly.  0    NOVOFINE AUTOCOVER 30 gauge x 1/3" Ndle       nut.tx.gluc.intol,lac-free,soy (GLUCERNA) Liqd Take 250 mLs by mouth 3 (three) times daily.      oxybutynin (DITROPAN XL) 15 MG TR24 TAKE 1 TABLET BY MOUTH EVERY DAY 90 tablet 1    polyethylene glycol (GLYCOLAX) 17 gram PwPk Take 17 g by mouth once daily.  0    sulfamethoxazole-trimethoprim 800-160mg (BACTRIM DS) 800-160 mg Tab Take 1 tablet by mouth 2 (two) times daily.      timolol maleate 0.5% (TIMOPTIC) 0.5 % Drop Place 1 drop into both eyes every morning.  2    zinc gluconate 50 mg tablet Take 50 mg by mouth once daily. To promote wound healing x 45 days      blood sugar diagnostic Strp Test 3 times daily 100 each 11    lancets Misc Test 3 times daily 100 each 11    lancets Misc To check BG 3 times daily, to use with insurance preferred meter 200 each 3    pen needle, diabetic (BD ULTRA-FINE BREE PEN NEEDLE) 32 gauge x 5/32" Ndle INJECT INSULIN THREE TIMES DAILY 100 each 3    [DISCONTINUED] amLODIPine (NORVASC) 10 MG tablet TAKE 1 TABLET BY MOUTH " EVERY DAY 90 tablet 0    [DISCONTINUED] carvediloL (COREG) 12.5 MG tablet Take 1 tablet (12.5 mg total) by mouth 2 (two) times daily. 180 tablet 0    [DISCONTINUED] hydrALAZINE (APRESOLINE) 50 MG tablet Take 1 tablet (50 mg total) by mouth every 8 (eight) hours. 90 tablet 11    [DISCONTINUED] insulin detemir U-100 (LEVEMIR FLEXTOUCH U-100 INSULN) 100 unit/mL (3 mL) InPn pen Inject 26 Units into the skin every evening. HOLD UNTIL YOU SEE YOUR PCP  0    [DISCONTINUED] TRADJENTA 5 mg Tab tablet TAKE 1 TABLET BY MOUTH EVERY DAY 90 tablet 1    [DISCONTINUED] traZODone (DESYREL) 50 MG tablet TAKE 1 TABLET (50 MG TOTAL) BY MOUTH EVERY EVENING. 90 tablet 3       Past Surgical History:   Procedure Laterality Date    CARDIAC CATHETERIZATION      cataract      CHOLECYSTECTOMY      EYE SURGERY      Rt cataract surgery    HYSTERECTOMY      URETERAL STENT PLACEMENT         Social History     Socioeconomic History    Marital status:    Occupational History    Occupation: retired   Tobacco Use    Smoking status: Never Smoker    Smokeless tobacco: Never Used   Substance and Sexual Activity    Alcohol use: No    Drug use: Never    Sexual activity: Not Currently     Partners: Male         CBC: No results for input(s): WBC, RBC, HGB, HCT, PLT, MCV, MCH, MCHC in the last 72 hours.    CMP:   Recent Labs     05/28/22  0820 05/30/22  0410   * 141   K 4.5 4.0   * 119*   CO2 21* 20*   BUN 30* 18   CREATININE 1.4 1.1   GLU 48* 170*   CALCIUM 9.3 8.2*       INR  No results for input(s): PT, INR, PROTIME, APTT in the last 72 hours.        Diagnostic Studies:      EKG:  Vent. Rate : 055 BPM     Atrial Rate : 000 BPM      P-R Int : 000 ms          QRS Dur : 070 ms       QT Int : 408 ms       P-R-T Axes : 000 017 134 degrees      QTc Int : 390 ms     Poor data quality   Undetermined rhythm   Low voltage QRS   Inferior infarct ,age undetermined   Abnormal ECG     Confirmed by Jimmy Hernandez MD ([id number]) on 5/1/2022  8:04:55 PM       2D Echo:  2022  · The left ventricle is normal in size with concentric hypertrophy and normal systolic function.  · The estimated ejection fraction is 70%.  · Indeterminate left ventricular diastolic function.  · Normal right ventricular size with normal right ventricular systolic function.  · TDS      Results for orders placed or performed during the hospital encounter of 10/27/16   2D Echo w/ Color Flow Doppler   Result Value Ref Range    EF + QEF 55 55 - 65    Diastolic Dysfunction Yes (A)     Est. PA Systolic Pressure 14.15     Pericardial Effusion NONE     Mitral Valve Mobility NORMAL            Pre-op Assessment    I have reviewed the Patient Summary Reports.     I have reviewed the Nursing Notes. I have reviewed the NPO Status.   I have reviewed the Medications.     Review of Systems  Anesthesia Hx:  No problems with previous Anesthesia  History of prior surgery of interest to airway management or planning:  Denies Personal Hx of Anesthesia complications.   Social:  Non-Smoker    Cardiovascular:   Exercise tolerance: poor Denies Pacemaker. Hypertension  Denies Valvular problems/Murmurs. Dysrhythmias atrial fibrillation PVD hyperlipidemia HILLS 2022 echo   · The left ventricle is normal in size with concentric hypertrophy and normal systolic function.  · The estimated ejection fraction is 70%.  · Indeterminate left ventricular diastolic function.  · Normal right ventricular size with normal right ventricular systolic function.  · TDS     Pulmonary:   Asthma Shortness of breath Sleep Apnea    Renal/:   Chronic Renal Disease    Endocrine:   Diabetes, type 2, using insulin  Obesity / BMI > 30         Anesthesia Plan  Type of Anesthesia, risks & benefits discussed:    Anesthesia Type: Gen ETT, Gen Supraglottic Airway  Intra-op Monitoring Plan: Standard ASA Monitors  Post Op Pain Control Plan: multimodal analgesia  Induction:  IV  Airway Plan: Video and Direct, Post-Induction  Informed Consent:  Informed consent signed with the Patient representative and all parties understand the risks and agree with anesthesia plan.  All questions answered.   ASA Score: 3  Day of Surgery Review of History & Physical: H&P Update referred to the surgeon/provider.  Anesthesia Plan Notes: Paper phone consent with brother Prince Elder [phone] obtained and placed in chart. Discussed suspending DNR intraop and recovery.    Ready For Surgery From Anesthesia Perspective.     .

## 2022-05-30 NOTE — SUBJECTIVE & OBJECTIVE
Interval History:  No new issues     Review of Systems   Unable to perform ROS: Dementia   Objective:     Vital Signs (Most Recent):  Temp: 99.1 °F (37.3 °C) (05/30/22 0807)  Pulse: 73 (05/30/22 0807)  Resp: 19 (05/30/22 0807)  BP: (!) 140/67 (05/30/22 0807)  SpO2: 99 % (05/30/22 0807)   Vital Signs (24h Range):  Temp:  [97.5 °F (36.4 °C)-99.1 °F (37.3 °C)] 99.1 °F (37.3 °C)  Pulse:  [69-80] 73  Resp:  [17-30] 19  SpO2:  [99 %-100 %] 99 %  BP: (131-158)/(67-76) 140/67     Weight: 90 kg (198 lb 6.6 oz)  Body mass index is 36.29 kg/m².    Intake/Output Summary (Last 24 hours) at 5/30/2022 0946  Last data filed at 5/30/2022 0600  Gross per 24 hour   Intake 720 ml   Output 800 ml   Net -80 ml      Physical Exam  Vitals and nursing note reviewed.   Constitutional:       General: She is not in acute distress.     Appearance: Normal appearance. She is obese. She is not ill-appearing, toxic-appearing or diaphoretic.   HENT:      Head: Normocephalic and atraumatic.   Eyes:      Conjunctiva/sclera: Conjunctivae normal.   Cardiovascular:      Rate and Rhythm: Normal rate and regular rhythm.   Pulmonary:      Effort: Pulmonary effort is normal. No respiratory distress.      Breath sounds: No wheezing.   Abdominal:      General: Abdomen is flat. There is no distension.      Tenderness: There is no abdominal tenderness.   Skin:     General: Skin is warm and dry.   Neurological:      Mental Status: She is alert and oriented to person, place, and time.   Psychiatric:         Mood and Affect: Mood normal.         Behavior: Behavior normal.         Thought Content: Thought content normal.       Significant Labs: All pertinent labs within the past 24 hours have been reviewed.  BMP:   Recent Labs   Lab 05/30/22  0410   *      K 4.0   *   CO2 20*   BUN 18   CREATININE 1.1   CALCIUM 8.2*     CBC:   Recent Labs   Lab 05/30/22  0721   WBC 10.65   HGB 7.9*   HCT 25.6*          Significant Imaging:

## 2022-05-30 NOTE — NURSING
Received report from ALEXANDRA Temple. Patient lying in bed resting, NAD noted. Safety Precautions maintained, Will Monitor.

## 2022-05-30 NOTE — PLAN OF CARE
[facility] - Telemetry  Discharge Reassessment    Primary Care Provider: Viky Bean MD    Expected Discharge Date: 5/27/2022    Reassessment (most recent)       Discharge Reassessment - 05/30/22 1329          Discharge Reassessment    Assessment Type Discharge Planning Reassessment     Did the patient's condition or plan change since previous assessment? Yes     Discharge Plan discussed with: Sibling     Name(s) and Number(s) Nereida Mcmahon [phone]     Communicated FREDERICK with patient/caregiver Date not available/Unable to determine     Discharge Plan A Return to nursing home     Discharge Plan B Return to Nursing Home     DME Needed Upon Discharge  none     Discharge Barriers Identified None     Why the patient remains in the hospital Requires continued medical care        Post-Acute Status    Coverage PHN     Discharge Delays None known at this time

## 2022-05-30 NOTE — PROGRESS NOTES
[facility] Medicine  Progress Note    Patient Name: Ana James  MRN: [id number]  Patient Class: IP- Inpatient   Admission Date: 5/24/2022  Length of Stay: 6 days  Attending Physician: Darin Garcia MD  Primary Care Provider: Viky Bean MD        Subjective:     Principal Problem:Acute on chronic renal failure        HPI:  Mrs. James is a 80 yo F who presents from [facility] with a CC of hypotension.  The patient was diagnosed with Covid yesterday. 02 sats in the ED were 98% with a clear CXR. Patient not able to give accurate history. Likely decreased po intake in the previous days. She presents with a BUN/CRT of 88/4.0.  Last month her CRT was 1.6.  She has known CKD 3.  She is bed bound and has an unstageable sacral wound.  Otherwise no further sig. History obtained. She is non-ill appearing       Overview/Hospital Course:  Patient admitted to the hospital with acute renal failure- secondary to poor input from Covid 19.   Patient has unstageable sacral decub- wound care was consulted and rec: surgery consult for possible debridement.  Patient's renal function improved daily with IV fluids. Surgery planned for debridement on 5/30        Interval History:  No new issues     Review of Systems   Unable to perform ROS: Dementia   Objective:     Vital Signs (Most Recent):  Temp: 99.1 °F (37.3 °C) (05/30/22 0807)  Pulse: 73 (05/30/22 0807)  Resp: 19 (05/30/22 0807)  BP: (!) 140/67 (05/30/22 0807)  SpO2: 99 % (05/30/22 0807)   Vital Signs (24h Range):  Temp:  [97.5 °F (36.4 °C)-99.1 °F (37.3 °C)] 99.1 °F (37.3 °C)  Pulse:  [69-80] 73  Resp:  [17-30] 19  SpO2:  [99 %-100 %] 99 %  BP: (131-158)/(67-76) 140/67     Weight: 90 kg (198 lb 6.6 oz)  Body mass index is 36.29 kg/m².    Intake/Output Summary (Last 24 hours) at 5/30/2022 0946  Last data filed at 5/30/2022 0600  Gross per 24 hour   Intake 720 ml   Output 800 ml   Net -80 ml      Physical Exam  Vitals and nursing note reviewed.    Constitutional:       General: She is not in acute distress.     Appearance: Normal appearance. She is obese. She is not ill-appearing, toxic-appearing or diaphoretic.   HENT:      Head: Normocephalic and atraumatic.   Eyes:      Conjunctiva/sclera: Conjunctivae normal.   Cardiovascular:      Rate and Rhythm: Normal rate and regular rhythm.   Pulmonary:      Effort: Pulmonary effort is normal. No respiratory distress.      Breath sounds: No wheezing.   Abdominal:      General: Abdomen is flat. There is no distension.      Tenderness: There is no abdominal tenderness.   Skin:     General: Skin is warm and dry.   Neurological:      Mental Status: She is alert and oriented to person, place, and time.   Psychiatric:         Mood and Affect: Mood normal.         Behavior: Behavior normal.         Thought Content: Thought content normal.       Significant Labs: All pertinent labs within the past 24 hours have been reviewed.  BMP:   Recent Labs   Lab 05/30/22  0410   *      K 4.0   *   CO2 20*   BUN 18   CREATININE 1.1   CALCIUM 8.2*     CBC:   Recent Labs   Lab 05/30/22  0721   WBC 10.65   HGB 7.9*   HCT 25.6*          Significant Imaging:       Assessment/Plan:      * Acute on chronic renal failure  Likely pre-renal from decreased po intake due to covid. IV fluids. BMP in am.  Has baseline CKD 3.    Improving       COVID-19  98% on RA. Supportive management       Debility  Bed bound       Atrial fibrillation        Anemia of chronic disease  No acute issues       Chronic diastolic heart failure  No acute issues       Hyperlipidemia    Resume any home meds     Hyperkalemia  Should improve with fluids       Controlled type 2 diabetes mellitus with stage 3 chronic kidney disease, with long-term current use of insulin  A1c. Well controlled. Sliding scale/long acting insulin       Class 2 obesity in adult  Body mass index is 36.58 kg/m². Morbid obesity complicates all aspects of disease management  from diagnostic modalities to treatment. Weight loss encouraged and health benefits explained to patient.         Essential hypertension  Holding any BP meds for hypotension. Now in the ED she is sys. 140.      Sacral decub- to surgery today     VTE Risk Mitigation (From admission, onward)         Ordered     enoxaparin injection 90 mg  Every 24 hours (non-standard times)         05/24/22 1415                Discharge Planning   FREDERICK: 5/27/2022     Code Status: DNR   Is the patient medically ready for discharge?:     Reason for patient still in hospital (select all that apply): Patient unstable  Discharge Plan A: Return to nursing home   Discharge Delays: None known at this time              Darin Qiu MD  Department of Hospital Medicine   [facility]

## 2022-05-30 NOTE — CONSULTS
Ana James  [id number]    Consultant: Dr. Garcia    CC/Reason for Consultation:     HPI:  81 y.o. female w/ hx of DM, HTN, PVD and CKD that presents from NH with hypotension,  AUTUMN and new diagnosis of covid. Patient has unstagable sacral decubitus ulcers that would benefit from debridement. Unable to provide history    Past Medical History:   Diagnosis Date    Arthritis lower extremities    Asthma     Blind left eye     Diabetes mellitus     Edema of both lower legs     CHRONIC    Fall 02/08/2022    Gall stones     Hypertension     Kidney stones     Lives alone with help available     HOME HEALTH    Nephrolithiasis 1/22/2016    Obesity     PVD (peripheral vascular disease)     Renal disorder     Retinopathy     Sleep apnea     Vaginal delivery     x1    Weakness of both lower extremities     uses a cane, rollator & power chair       Past Surgical History:   Procedure Laterality Date    CARDIAC CATHETERIZATION      cataract      CHOLECYSTECTOMY      EYE SURGERY      Rt cataract surgery    HYSTERECTOMY      URETERAL STENT PLACEMENT         Social History     Socioeconomic History    Marital status:    Occupational History    Occupation: retired   Tobacco Use    Smoking status: Never Smoker    Smokeless tobacco: Never Used   Substance and Sexual Activity    Alcohol use: No    Drug use: Never    Sexual activity: Not Currently     Partners: Male       Family History   Problem Relation Age of Onset    Kidney failure Mother     Hypertension Father     Diabetes Brother     Cancer Sister        Review of patient's allergies indicates:   Allergen Reactions    Adhesive Rash     Paper tape       ROS - Unable to obtain 2/2 patient's mental status    Objective    Vitals:    05/30/22 1107   BP: (!) 144/66   Pulse: 70   Resp: 18   Temp: 98.2 °F (36.8 °C)       CBC   Recent Labs   Lab 05/24/22  1114 05/30/22  0721   WBC 15.36* 10.65   HGB 9.0* 7.9*   HCT 29.4* 25.6*    295        CHEM   Recent Labs   Lab 05/28/22  0820 05/30/22  0410   * 141   K 4.5 4.0   * 119*   CO2 21* 20*   BUN 30* 18   CREATININE 1.4 1.1   GLU 48* 170*       GEN: NAD, laying comfortably in bed   HEENT: NCAT  RESP: Normal WOB, no distress  CV: RRR  ABD: Soft, NTND, no guarding/rebound  Skin: Pictures included in wound care note. Currently bandaged      Imaging Results          X-Ray Chest AP Portable (Final result)  Result time 05/24/22 12:47:20    Final result by Yosi Herrera MD (05/24/22 12:47:20)                 Impression:      See above      Electronically signed by: Yosi Herrera MD  Date:    05/24/2022  Time:    12:47             Narrative:    EXAMINATION:  XR CHEST AP PORTABLE    CLINICAL HISTORY:  Cough, unspecified    TECHNIQUE:  Single frontal view of the chest was performed.    COMPARISON:  04/24/2022 none    FINDINGS:  Heart size normal.  The lungs are clear.  No pleural effusion                                  A/P: 81 y.o. female with sacral decubitus ulcer    Will plan on debridement tomorrow. NPO at midnight      [facility]  Surgery PGY V

## 2022-05-30 NOTE — H&P
Surgery H&P    Full consult note to be dictated.  In short, this is a black female, 81 yr old w DM, HTN and active COVID 19 now with a sacral decubitus ulcer. Needs debridement    Consent signed  To OR today for debridement of sacral decubitus ulcer.

## 2022-05-30 NOTE — PLAN OF CARE
05/30/22 1328   Medicare Message   Important Message from Medicare regarding Discharge Appeal Rights Given to patient/caregiver;Explained to patient/caregiver;Signed/date by patient/caregiver   Date IMM was signed 05/30/22   Time IMM was signed 1329

## 2022-05-30 NOTE — PLAN OF CARE
Problem: Skin Injury Risk Increased  Goal: Skin Health and Integrity  Outcome: Ongoing, Progressing     Problem: Adult Inpatient Plan of Care  Goal: Plan of Care Review  Outcome: Ongoing, Progressing  Goal: Patient-Specific Goal (Individualized)  Outcome: Ongoing, Progressing  Goal: Absence of Hospital-Acquired Illness or Injury  Outcome: Ongoing, Progressing  Goal: Optimal Comfort and Wellbeing  Outcome: Ongoing, Progressing  Goal: Readiness for Transition of Care  Outcome: Ongoing, Progressing     Problem: Diabetes Comorbidity  Goal: Blood Glucose Level Within Targeted Range  Outcome: Ongoing, Progressing     Problem: Fluid and Electrolyte Imbalance (Acute Kidney Injury/Impairment)  Goal: Fluid and Electrolyte Balance  Outcome: Ongoing, Progressing     Problem: Oral Intake Inadequate (Acute Kidney Injury/Impairment)  Goal: Optimal Nutrition Intake  Outcome: Ongoing, Progressing     Problem: Impaired Wound Healing  Goal: Optimal Wound Healing  Outcome: Ongoing, Progressing     Problem: Infection  Goal: Absence of Infection Signs and Symptoms  Outcome: Ongoing, Progressing

## 2022-05-31 PROBLEM — L89.150 PRESSURE INJURY OF SACRAL REGION, UNSTAGEABLE: Status: ACTIVE | Noted: 2022-05-31

## 2022-05-31 LAB
ANION GAP SERPL CALC-SCNC: 4 MMOL/L (ref 8–16)
BASOPHILS # BLD AUTO: 0.02 K/UL (ref 0–0.2)
BASOPHILS NFR BLD: 0.2 % (ref 0–1.9)
BUN SERPL-MCNC: 13 MG/DL (ref 8–23)
CALCIUM SERPL-MCNC: 8.3 MG/DL (ref 8.7–10.5)
CHLORIDE SERPL-SCNC: 121 MMOL/L (ref 95–110)
CO2 SERPL-SCNC: 18 MMOL/L (ref 23–29)
CREAT SERPL-MCNC: 0.8 MG/DL (ref 0.5–1.4)
DIFFERENTIAL METHOD: ABNORMAL
EOSINOPHIL # BLD AUTO: 0.2 K/UL (ref 0–0.5)
EOSINOPHIL NFR BLD: 1.6 % (ref 0–8)
ERYTHROCYTE [DISTWIDTH] IN BLOOD BY AUTOMATED COUNT: 16.4 % (ref 11.5–14.5)
EST. GFR  (AFRICAN AMERICAN): >60 ML/MIN/1.73 M^2
EST. GFR  (NON AFRICAN AMERICAN): >60 ML/MIN/1.73 M^2
GLUCOSE SERPL-MCNC: 87 MG/DL (ref 70–110)
GRAM STN SPEC: NORMAL
HCT VFR BLD AUTO: 27 % (ref 37–48.5)
HGB BLD-MCNC: 8.2 G/DL (ref 12–16)
IMM GRANULOCYTES # BLD AUTO: 0.16 K/UL (ref 0–0.04)
IMM GRANULOCYTES NFR BLD AUTO: 1.4 % (ref 0–0.5)
LYMPHOCYTES # BLD AUTO: 1.2 K/UL (ref 1–4.8)
LYMPHOCYTES NFR BLD: 9.9 % (ref 18–48)
MCH RBC QN AUTO: 27.2 PG (ref 27–31)
MCHC RBC AUTO-ENTMCNC: 30.4 G/DL (ref 32–36)
MCV RBC AUTO: 90 FL (ref 82–98)
MONOCYTES # BLD AUTO: 1 K/UL (ref 0.3–1)
MONOCYTES NFR BLD: 8.6 % (ref 4–15)
NEUTROPHILS # BLD AUTO: 9.3 K/UL (ref 1.8–7.7)
NEUTROPHILS NFR BLD: 78.3 % (ref 38–73)
NRBC BLD-RTO: 0 /100 WBC
PLATELET # BLD AUTO: 307 K/UL (ref 150–450)
PMV BLD AUTO: 9.4 FL (ref 9.2–12.9)
POCT GLUCOSE: 130 MG/DL (ref 70–110)
POCT GLUCOSE: 203 MG/DL (ref 70–110)
POCT GLUCOSE: 44 MG/DL (ref 70–110)
POCT GLUCOSE: 87 MG/DL (ref 70–110)
POTASSIUM SERPL-SCNC: 3.5 MMOL/L (ref 3.5–5.1)
RBC # BLD AUTO: 3.01 M/UL (ref 4–5.4)
SODIUM SERPL-SCNC: 143 MMOL/L (ref 136–145)
WBC # BLD AUTO: 11.85 K/UL (ref 3.9–12.7)

## 2022-05-31 PROCEDURE — 63600175 PHARM REV CODE 636 W HCPCS: Performed by: NURSE ANESTHETIST, CERTIFIED REGISTERED

## 2022-05-31 PROCEDURE — 80048 BASIC METABOLIC PNL TOTAL CA: CPT | Performed by: ANESTHESIOLOGY

## 2022-05-31 PROCEDURE — 87102 FUNGUS ISOLATION CULTURE: CPT | Performed by: SURGERY

## 2022-05-31 PROCEDURE — D9220A PRA ANESTHESIA: Mod: CRNA,,, | Performed by: NURSE ANESTHETIST, CERTIFIED REGISTERED

## 2022-05-31 PROCEDURE — 37000009 HC ANESTHESIA EA ADD 15 MINS: Performed by: SURGERY

## 2022-05-31 PROCEDURE — 37000008 HC ANESTHESIA 1ST 15 MINUTES: Performed by: SURGERY

## 2022-05-31 PROCEDURE — 87205 SMEAR GRAM STAIN: CPT | Performed by: SURGERY

## 2022-05-31 PROCEDURE — 85025 COMPLETE CBC W/AUTO DIFF WBC: CPT | Performed by: ANESTHESIOLOGY

## 2022-05-31 PROCEDURE — 87077 CULTURE AEROBIC IDENTIFY: CPT | Performed by: SURGERY

## 2022-05-31 PROCEDURE — 36415 COLL VENOUS BLD VENIPUNCTURE: CPT | Performed by: ANESTHESIOLOGY

## 2022-05-31 PROCEDURE — 25000003 PHARM REV CODE 250: Performed by: NURSE ANESTHETIST, CERTIFIED REGISTERED

## 2022-05-31 PROCEDURE — D9220A PRA ANESTHESIA: ICD-10-PCS | Mod: ANES,,, | Performed by: ANESTHESIOLOGY

## 2022-05-31 PROCEDURE — 11045 PR DEB SUBQ TISSUE ADD-ON: ICD-10-PCS | Mod: ,,, | Performed by: SURGERY

## 2022-05-31 PROCEDURE — 11042 DBRDMT SUBQ TIS 1ST 20SQCM/<: CPT | Mod: ,,, | Performed by: SURGERY

## 2022-05-31 PROCEDURE — D9220A PRA ANESTHESIA: Mod: ANES,,, | Performed by: ANESTHESIOLOGY

## 2022-05-31 PROCEDURE — 87116 MYCOBACTERIA CULTURE: CPT | Performed by: SURGERY

## 2022-05-31 PROCEDURE — D9220A PRA ANESTHESIA: ICD-10-PCS | Mod: CRNA,,, | Performed by: NURSE ANESTHETIST, CERTIFIED REGISTERED

## 2022-05-31 PROCEDURE — 11042 PR DEBRIDEMENT, SKIN, SUB-Q TISSUE,=<20 SQ CM: ICD-10-PCS | Mod: ,,, | Performed by: SURGERY

## 2022-05-31 PROCEDURE — 87186 SC STD MICRODIL/AGAR DIL: CPT | Performed by: SURGERY

## 2022-05-31 PROCEDURE — 87070 CULTURE OTHR SPECIMN AEROBIC: CPT | Performed by: SURGERY

## 2022-05-31 PROCEDURE — 11045 DBRDMT SUBQ TISS EACH ADDL: CPT | Mod: ,,, | Performed by: SURGERY

## 2022-05-31 PROCEDURE — 27000207 HC ISOLATION

## 2022-05-31 PROCEDURE — 36000707: Performed by: SURGERY

## 2022-05-31 PROCEDURE — 25000003 PHARM REV CODE 250: Performed by: INTERNAL MEDICINE

## 2022-05-31 PROCEDURE — 36000706: Performed by: SURGERY

## 2022-05-31 PROCEDURE — 87206 SMEAR FLUORESCENT/ACID STAI: CPT | Performed by: SURGERY

## 2022-05-31 PROCEDURE — 63600175 PHARM REV CODE 636 W HCPCS: Performed by: ANESTHESIOLOGY

## 2022-05-31 PROCEDURE — 71000033 HC RECOVERY, INTIAL HOUR: Performed by: SURGERY

## 2022-05-31 PROCEDURE — 25000003 PHARM REV CODE 250: Performed by: STUDENT IN AN ORGANIZED HEALTH CARE EDUCATION/TRAINING PROGRAM

## 2022-05-31 PROCEDURE — 21400001 HC TELEMETRY ROOM

## 2022-05-31 PROCEDURE — 71000039 HC RECOVERY, EACH ADD'L HOUR: Performed by: SURGERY

## 2022-05-31 PROCEDURE — 63600175 PHARM REV CODE 636 W HCPCS: Performed by: INTERNAL MEDICINE

## 2022-05-31 PROCEDURE — 87075 CULTR BACTERIA EXCEPT BLOOD: CPT | Performed by: SURGERY

## 2022-05-31 RX ORDER — ACETAMINOPHEN 10 MG/ML
1000 INJECTION, SOLUTION INTRAVENOUS ONCE
Status: COMPLETED | OUTPATIENT
Start: 2022-05-31 | End: 2022-05-31

## 2022-05-31 RX ORDER — PROPOFOL 10 MG/ML
VIAL (ML) INTRAVENOUS
Status: DISCONTINUED | OUTPATIENT
Start: 2022-05-31 | End: 2022-05-31

## 2022-05-31 RX ORDER — LIDOCAINE HYDROCHLORIDE 20 MG/ML
INJECTION INTRAVENOUS
Status: DISCONTINUED | OUTPATIENT
Start: 2022-05-31 | End: 2022-05-31

## 2022-05-31 RX ORDER — FENTANYL CITRATE 50 UG/ML
INJECTION, SOLUTION INTRAMUSCULAR; INTRAVENOUS
Status: DISCONTINUED | OUTPATIENT
Start: 2022-05-31 | End: 2022-05-31

## 2022-05-31 RX ORDER — ONDANSETRON 2 MG/ML
INJECTION INTRAMUSCULAR; INTRAVENOUS
Status: DISCONTINUED | OUTPATIENT
Start: 2022-05-31 | End: 2022-05-31

## 2022-05-31 RX ORDER — SODIUM CHLORIDE 0.9 % (FLUSH) 0.9 %
10 SYRINGE (ML) INJECTION
Status: DISCONTINUED | OUTPATIENT
Start: 2022-05-31 | End: 2022-05-31

## 2022-05-31 RX ORDER — ROCURONIUM BROMIDE 10 MG/ML
INJECTION, SOLUTION INTRAVENOUS
Status: DISCONTINUED | OUTPATIENT
Start: 2022-05-31 | End: 2022-05-31

## 2022-05-31 RX ORDER — FENTANYL CITRATE 50 UG/ML
25 INJECTION, SOLUTION INTRAMUSCULAR; INTRAVENOUS EVERY 5 MIN PRN
Status: DISCONTINUED | OUTPATIENT
Start: 2022-05-31 | End: 2022-05-31

## 2022-05-31 RX ORDER — DEXAMETHASONE SODIUM PHOSPHATE 4 MG/ML
INJECTION, SOLUTION INTRA-ARTICULAR; INTRALESIONAL; INTRAMUSCULAR; INTRAVENOUS; SOFT TISSUE
Status: DISCONTINUED | OUTPATIENT
Start: 2022-05-31 | End: 2022-05-31

## 2022-05-31 RX ORDER — EPHEDRINE SULFATE 50 MG/ML
INJECTION, SOLUTION INTRAVENOUS
Status: DISCONTINUED | OUTPATIENT
Start: 2022-05-31 | End: 2022-05-31

## 2022-05-31 RX ADMIN — EPHEDRINE SULFATE 20 MG: 50 INJECTION INTRAVENOUS at 11:05

## 2022-05-31 RX ADMIN — SODIUM CHLORIDE, SODIUM LACTATE, POTASSIUM CHLORIDE, AND CALCIUM CHLORIDE: .6; .31; .03; .02 INJECTION, SOLUTION INTRAVENOUS at 11:05

## 2022-05-31 RX ADMIN — ROCURONIUM BROMIDE 50 MG: 10 INJECTION, SOLUTION INTRAVENOUS at 11:05

## 2022-05-31 RX ADMIN — FOLIC ACID 1 MG: 1 TABLET ORAL at 09:05

## 2022-05-31 RX ADMIN — GLYCOPYRROLATE 0.4 MG: 0.2 INJECTION, SOLUTION INTRAMUSCULAR; INTRAVITREAL at 11:05

## 2022-05-31 RX ADMIN — ENOXAPARIN SODIUM 90 MG: 100 INJECTION SUBCUTANEOUS at 07:05

## 2022-05-31 RX ADMIN — EPHEDRINE SULFATE 10 MG: 50 INJECTION INTRAVENOUS at 11:05

## 2022-05-31 RX ADMIN — PROPOFOL 100 MG: 10 INJECTION, EMULSION INTRAVENOUS at 11:05

## 2022-05-31 RX ADMIN — FENTANYL CITRATE 100 MCG: 50 INJECTION, SOLUTION INTRAMUSCULAR; INTRAVENOUS at 11:05

## 2022-05-31 RX ADMIN — TIMOLOL MALEATE 1 DROP: 5 SOLUTION OPHTHALMIC at 06:05

## 2022-05-31 RX ADMIN — DEXTROSE 250 ML: 10 SOLUTION INTRAVENOUS at 02:05

## 2022-05-31 RX ADMIN — ONDANSETRON 4 MG: 2 INJECTION, SOLUTION INTRAMUSCULAR; INTRAVENOUS at 12:05

## 2022-05-31 RX ADMIN — ATORVASTATIN CALCIUM 20 MG: 10 TABLET, FILM COATED ORAL at 09:05

## 2022-05-31 RX ADMIN — ACETAMINOPHEN 1000 MG: 10 INJECTION INTRAVENOUS at 12:05

## 2022-05-31 RX ADMIN — MELATONIN TAB 3 MG 9 MG: 3 TAB at 09:05

## 2022-05-31 RX ADMIN — DEXAMETHASONE SODIUM PHOSPHATE 4 MG: 4 INJECTION, SOLUTION INTRAMUSCULAR; INTRAVENOUS at 11:05

## 2022-05-31 RX ADMIN — LIDOCAINE HYDROCHLORIDE 100 MG: 20 INJECTION, SOLUTION INTRAVENOUS at 11:05

## 2022-05-31 RX ADMIN — LATANOPROST 1 DROP: 50 SOLUTION OPHTHALMIC at 09:05

## 2022-05-31 RX ADMIN — ASPIRIN 81 MG: 81 TABLET, DELAYED RELEASE ORAL at 09:05

## 2022-05-31 RX ADMIN — SUGAMMADEX 200 MG: 100 INJECTION, SOLUTION INTRAVENOUS at 12:05

## 2022-05-31 RX ADMIN — OXYBUTYNIN CHLORIDE 15 MG: 5 TABLET, EXTENDED RELEASE ORAL at 09:05

## 2022-05-31 RX ADMIN — DEXTROSE AND SODIUM CHLORIDE: 5; .9 INJECTION, SOLUTION INTRAVENOUS at 01:05

## 2022-05-31 NOTE — PROGRESS NOTES
[facility] Medicine  Progress Note    Patient Name: Ana James  MRN: [id number]  Patient Class: IP- Inpatient   Admission Date: 5/24/2022  Length of Stay: 7 days  Attending Physician: Luz Alcazar MD  Primary Care Provider: Viky Bean MD        Subjective:     Principal Problem:Acute on chronic renal failure        HPI:  Mrs. James is a 82 yo F who presents from [facility] with a CC of hypotension.  The patient was diagnosed with Covid yesterday. 02 sats in the ED were 98% with a clear CXR. Patient not able to give accurate history. Likely decreased po intake in the previous days. She presents with a BUN/CRT of 88/4.0.  Last month her CRT was 1.6.  She has known CKD 3.  She is bed bound and has an unstageable sacral wound.  Otherwise no further sig. History obtained. She is non-ill appearing       Overview/Hospital Course:  Patient admitted to the hospital with acute renal failure- secondary to poor input from Covid 19.   Patient has unstageable sacral decub- wound care was consulted and rec: surgery consult for possible debridement.  Patient's renal function improved daily with IV fluids. Surgery planned for debridement on 5/30        Interval History: stable. Patient has no complaints.     Review of Systems   Constitutional: Negative.    Respiratory: Negative.     Cardiovascular: Negative.    Gastrointestinal: Negative.    Objective:     Vital Signs (Most Recent):  Temp: 98.2 °F (36.8 °C) (05/31/22 0801)  Pulse: 66 (05/31/22 1439)  Resp: 14 (05/31/22 1439)  BP: (!) 162/74 (05/31/22 1439)  SpO2: 98 % (05/31/22 1439)   Vital Signs (24h Range):  Temp:  [97.3 °F (36.3 °C)-98.3 °F (36.8 °C)] 98.2 °F (36.8 °C)  Pulse:  [64-99] 66  Resp:  [14-20] 14  SpO2:  [96 %-100 %] 98 %  BP: (134-172)/(60-77) 162/74     Weight: 90 kg (198 lb 6.6 oz)  Body mass index is 36.29 kg/m².    Intake/Output Summary (Last 24 hours) at 5/31/2022 1456  Last data filed at 5/31/2022 0801  Gross per 24 hour    Intake 0 ml   Output 800 ml   Net -800 ml      Physical Exam  Vitals and nursing note reviewed.   Constitutional:       General: She is not in acute distress.  Cardiovascular:      Rate and Rhythm: Normal rate and regular rhythm.   Pulmonary:      Effort: Pulmonary effort is normal.      Breath sounds: No wheezing or rales.   Abdominal:      Palpations: Abdomen is soft.   Neurological:      Mental Status: She is alert and oriented to person, place, and time.   Psychiatric:         Mood and Affect: Mood normal.         Speech: Speech normal.         Behavior: Behavior is slowed.         Cognition and Memory: Memory is impaired.       Significant Labs: All pertinent labs within the past 24 hours have been reviewed.    Significant Imaging: I have reviewed all pertinent imaging results/findings within the past 24 hours.  I have reviewed and interpreted all pertinent imaging results/findings within the past 24 hours.      Assessment/Plan:      * Acute on chronic renal failure  AUTUMN resolved. Will stop normal saline as Chloride is significantly high which is likely causing low bicarb        Pressure injury of sacral region, unstageable  Pressure wound due to bed confinement. Is afebrile and non toxic appearing   General surgery to proceed with debridement today  Wound care following.       COVID-19  98% on RA. Supportive management       Debility  Bed bound       Atrial fibrillation  Currently NSR. Will review home meds to see if patient is on chronic anticoagulation for stroke prevention. Is currently on full dose lovenox as per DVT prophylaxis based on COVID protocols.       Anemia of chronic disease  No acute issues. Hgb 8.2 today       Chronic diastolic heart failure  No acute issues       Hyperlipidemia  Resume statin     Hyperkalemia  Resolved       Controlled type 2 diabetes mellitus with stage 3 chronic kidney disease, with long-term current use of insulin  A1c < 6.5%. Well controlled. Is at risk for hypoglycemia  and is NPO. Adjust insulin regimen.     Class 2 obesity in adult  Body mass index is 36.29 kg/m². Morbid obesity complicates all aspects of disease management from diagnostic modalities to treatment. Weight loss encouraged and health benefits explained to patient.         Essential hypertension  I do not see antihypertensives listed under home meds. Will verify with NH  Address pain and discomfort first. If BP remains high will give antihypertensives        VTE Risk Mitigation (From admission, onward)         Ordered     enoxaparin injection 90 mg  Every 24 hours (non-standard times)         05/24/22 1415                Discharge Planning   FREDERICK: 5/27/2022     Code Status: DNR   Is the patient medically ready for discharge?:     Reason for patient still in hospital (select all that apply): Treatment  Discharge Plan A: Return to nursing home   Discharge Delays: None known at this time              Luz Stoddard MD  Department of Hospital Medicine   [facility] - Telemetry

## 2022-05-31 NOTE — PROGRESS NOTES
05/31/22 1610   Goal: Minimized Risk/Safety Maintenance   Elevated Risk Identified impaired wound healing   Problem Identified impaired wound healing   Outcome Minimized Risk and Safety ongoing, progressing   Goal: Physiologic Homeostasis   Problem Identified bleeding;thermal instability   Thermoregulation Maintenance head covering maintained;skin exposure reduced;warm blankets applied;warming devices utilized   Outcome Physiologic Homeostasis met   Interventions   Warming Method warming blanket   VTE Prevention/Management bleeding precations maintained   Safety Promotion/Fall Prevention fall prevention program maintained   Trust Relationship/Rapport care explained;reassurance provided   Goal: Optimal Comfort and Wellbeing   Elevated Risk Identified pain   Problem Identified none   Outcome Optimal Comfort and Wellbeing met   Goal: Anesthesia/Sedation Recovery   Outcome Anesthesia/Sedation Recovery criteria met for transfer   Outcome Summary   Plan of Care Reviewed With patient

## 2022-05-31 NOTE — PT/OT/SLP PROGRESS
Speech Language Pathology      Anabrandon James  MRN: [id number]    Patient not seen today secondary to surgery. Will follow. Roxy Segundo CCC-SLP

## 2022-05-31 NOTE — OP NOTE
Date of Procedure: 5/31/2022     Procedure: Procedure(s) (LRB):  DEBRIDEMENT, WOUND, SACRUM (N/A)       Surgeon(s) and Role:     * Aram Dickens MD - Primary    Assisting Surgeon: Fuentes Romero MD     Pre-Operative Diagnosis: Physical debility [R53.81]  - Sacral decubitus ulcer    Post-Operative Diagnosis: Post-Op Diagnosis Codes:     * Physical debility [R53.81]  - Sacral decubitus ulcer    Anesthesia: General    Indications for Procedure: Unstageable sacral decubitus ulcer with necrotic tissue    Procedure in Detail:   Patient was brought to the operating room and general anesthesia was initiated. Patient was turned in prone position and the wound was prepped and draped in standard sterile fashion. Timeout was performed verifying the patient, procedure, all necessary materials were in the room, and that antibiotics were given prior to incision  Started the debridement by using bovie electrocautery to excise the obvious necrotic portions of skin. In the midline, the extent of necrosis was down to the level of the presacral fascia. On the patient's left this excision was carried down to the gluteus muscle on the superior aspect of the wound while inferiorly we were able to reach healthy subcutaneous tissue as identified by bleeding. The wound did not extend on the patient's right side. There was some tracking on the superior portion of the incision and thus the skin in the area was excised, again with electrocautery, and the necrotic tissue excised. Otherwise, there was no other obvious pockets of purulence or tracking that needed to be unroofed/debrided. Total dimensions of the wound were 15cm x 18cm x 3cm.  All bleeding was controlled using electrocautery. Wound was irrigated with vashe solution and then packed with vashe-soaked kerlex. All instruments, needles and sponge counts were correct x2. This concluded the procedure      Significant Surgical Tasks Conducted by the Assistant(s), if Applicable:  debridement    Estimated Blood Loss (EBL): 30cc           Implants: * No implants in log *    Specimens:   Specimen (24h ago, onward)            None                  Condition: Good    Disposition: PACU - hemodynamically stable.

## 2022-05-31 NOTE — PROGRESS NOTES
Dextrose administered per order. See MAR     Latest Reference Range & Units 05/31/22 14:44 05/31/22 15:07    70 - 110 mg/dL 44 (LL) 203 (H)

## 2022-05-31 NOTE — ASSESSMENT & PLAN NOTE
I do not see antihypertensives listed under home meds. Will verify with NH  Address pain and discomfort first. If BP remains high will give antihypertensives

## 2022-05-31 NOTE — ASSESSMENT & PLAN NOTE
Pressure wound due to bed confinement. Is afebrile and non toxic appearing   General surgery to proceed with debridement today  Wound care following.

## 2022-05-31 NOTE — NURSING
Reported off to oncoming nurse, patient resting in bed, aaox1. Patient can make needs known to staff, max assist required for adls and transfers, no acute distress noted, safety precautions maintained. Airborne/Contact/Droplet precautions in progress.     Chart check completed.

## 2022-05-31 NOTE — ASSESSMENT & PLAN NOTE
AUTUMN resolved. Will stop normal saline as Chloride is significantly high which is likely causing low bicarb

## 2022-05-31 NOTE — ASSESSMENT & PLAN NOTE
Currently NSR. Will review home meds to see if patient is on chronic anticoagulation for stroke prevention. Is currently on full dose lovenox as per DVT prophylaxis based on COVID protocols.

## 2022-05-31 NOTE — ANESTHESIA PROCEDURE NOTES
Intubation    Date/Time: 5/31/2022 11:32 AM  Performed by: Monty Nelson CRNA  Authorized by: Candy Esposito MD     Intubation:     Induction:  Intravenous    Intubated:  Postinduction    Mask Ventilation:  Easy with oral airway    Attempts:  1    Attempted By:  CRNA    Method of Intubation:  Video laryngoscopy    Blade:  Juarez 3    Laryngeal View Grade: Grade I - full view of cords      Difficult Airway Encountered?: No      Complications:  None    Airway Device:  Oral endotracheal tube    Airway Device Size:  7.0    Style/Cuff Inflation:  Cuffed (inflated to minimal occlusive pressure)    Tube secured:  21    Secured at:  The lips    Placement Verified By:  Capnometry    Complicating Factors:  None    Findings Post-Intubation:  BS equal bilateral and atraumatic/condition of teeth unchanged

## 2022-05-31 NOTE — ANESTHESIA POSTPROCEDURE EVALUATION
Anesthesia Post Evaluation    Patient: Ana James    Procedure(s) Performed: Procedure(s) (LRB):  DEBRIDEMENT, WOUND, SACRUM (N/A)    Final Anesthesia Type: general      Patient location during evaluation: PACU  Patient participation: Yes- Able to Participate  Level of consciousness: awake and alert and oriented  Post-procedure vital signs: reviewed and stable  Pain management: adequate  Airway patency: patent    PONV status at discharge: No PONV  Anesthetic complications: no      Cardiovascular status: blood pressure returned to baseline, hemodynamically stable and stable  Respiratory status: spontaneous ventilation and nasal cannula  Hydration status: euvolemic  Follow-up not needed.          Vitals Value Taken Time   /58 05/31/22 1551   Temp 36.8 °C (98.2 °F) 05/31/22 0801   Pulse 72 05/31/22 1551   Resp 16 05/31/22 1520   SpO2 98 % 05/31/22 1551         No case tracking events are documented in the log.      Pain/Vasquez Score: Pain Rating Prior to Med Admin: 0 (5/31/2022  1:12 PM)  Vasquez Score: 9 (5/31/2022  2:00 PM)

## 2022-05-31 NOTE — SUBJECTIVE & OBJECTIVE
Interval History: stable. Patient has no complaints.     Review of Systems   Constitutional: Negative.    Respiratory: Negative.     Cardiovascular: Negative.    Gastrointestinal: Negative.    Objective:     Vital Signs (Most Recent):  Temp: 98.2 °F (36.8 °C) (05/31/22 0801)  Pulse: 66 (05/31/22 1439)  Resp: 14 (05/31/22 1439)  BP: (!) 162/74 (05/31/22 1439)  SpO2: 98 % (05/31/22 1439)   Vital Signs (24h Range):  Temp:  [97.3 °F (36.3 °C)-98.3 °F (36.8 °C)] 98.2 °F (36.8 °C)  Pulse:  [64-99] 66  Resp:  [14-20] 14  SpO2:  [96 %-100 %] 98 %  BP: (134-172)/(60-77) 162/74     Weight: 90 kg (198 lb 6.6 oz)  Body mass index is 36.29 kg/m².    Intake/Output Summary (Last 24 hours) at 5/31/2022 1456  Last data filed at 5/31/2022 0801  Gross per 24 hour   Intake 0 ml   Output 800 ml   Net -800 ml      Physical Exam  Vitals and nursing note reviewed.   Constitutional:       General: She is not in acute distress.  Cardiovascular:      Rate and Rhythm: Normal rate and regular rhythm.   Pulmonary:      Effort: Pulmonary effort is normal.      Breath sounds: No wheezing or rales.   Abdominal:      Palpations: Abdomen is soft.   Neurological:      Mental Status: She is alert and oriented to person, place, and time.   Psychiatric:         Mood and Affect: Mood normal.         Speech: Speech normal.         Behavior: Behavior is slowed.         Cognition and Memory: Memory is impaired.       Significant Labs: All pertinent labs within the past 24 hours have been reviewed.    Significant Imaging: I have reviewed all pertinent imaging results/findings within the past 24 hours.  I have reviewed and interpreted all pertinent imaging results/findings within the past 24 hours.

## 2022-05-31 NOTE — ASSESSMENT & PLAN NOTE
Body mass index is 36.29 kg/m². Morbid obesity complicates all aspects of disease management from diagnostic modalities to treatment. Weight loss encouraged and health benefits explained to patient.

## 2022-06-01 LAB
POCT GLUCOSE: 109 MG/DL (ref 70–110)
POCT GLUCOSE: 79 MG/DL (ref 70–110)
POCT GLUCOSE: 93 MG/DL (ref 70–110)
POCT GLUCOSE: 96 MG/DL (ref 70–110)
POCT GLUCOSE: 99 MG/DL (ref 70–110)

## 2022-06-01 PROCEDURE — 25000003 PHARM REV CODE 250: Performed by: STUDENT IN AN ORGANIZED HEALTH CARE EDUCATION/TRAINING PROGRAM

## 2022-06-01 PROCEDURE — 27000207 HC ISOLATION

## 2022-06-01 PROCEDURE — 21400001 HC TELEMETRY ROOM

## 2022-06-01 PROCEDURE — 63600175 PHARM REV CODE 636 W HCPCS: Performed by: STUDENT IN AN ORGANIZED HEALTH CARE EDUCATION/TRAINING PROGRAM

## 2022-06-01 RX ADMIN — ENOXAPARIN SODIUM 90 MG: 100 INJECTION SUBCUTANEOUS at 07:06

## 2022-06-01 RX ADMIN — LATANOPROST 1 DROP: 50 SOLUTION OPHTHALMIC at 09:06

## 2022-06-01 RX ADMIN — ATORVASTATIN CALCIUM 20 MG: 10 TABLET, FILM COATED ORAL at 10:06

## 2022-06-01 RX ADMIN — ASPIRIN 81 MG: 81 TABLET, DELAYED RELEASE ORAL at 09:06

## 2022-06-01 RX ADMIN — TIMOLOL MALEATE 1 DROP: 5 SOLUTION OPHTHALMIC at 06:06

## 2022-06-01 RX ADMIN — OXYBUTYNIN CHLORIDE 15 MG: 5 TABLET, EXTENDED RELEASE ORAL at 10:06

## 2022-06-01 RX ADMIN — MELATONIN TAB 3 MG 9 MG: 3 TAB at 09:06

## 2022-06-01 RX ADMIN — FOLIC ACID 1 MG: 1 TABLET ORAL at 10:06

## 2022-06-01 NOTE — ASSESSMENT & PLAN NOTE
Pressure wound due to bed confinement. Is afebrile and non toxic appearing   POD 1 debridement.  No indication for abx treatment at this time  Will monitor for signs and symptoms of course  Wound care following.

## 2022-06-01 NOTE — PLAN OF CARE
Problem: Skin Injury Risk Increased  Goal: Skin Health and Integrity  Outcome: Ongoing, Progressing     Problem: Adult Inpatient Plan of Care  Goal: Plan of Care Review  Outcome: Ongoing, Progressing  Goal: Patient-Specific Goal (Individualized)  Outcome: Ongoing, Progressing  Goal: Absence of Hospital-Acquired Illness or Injury  Outcome: Ongoing, Progressing  Goal: Optimal Comfort and Wellbeing  Outcome: Ongoing, Progressing  Goal: Readiness for Transition of Care  Outcome: Ongoing, Progressing     Problem: Diabetes Comorbidity  Goal: Blood Glucose Level Within Targeted Range  Outcome: Ongoing, Progressing     Problem: Fluid and Electrolyte Imbalance (Acute Kidney Injury/Impairment)  Goal: Fluid and Electrolyte Balance  Outcome: Ongoing, Progressing     Problem: Oral Intake Inadequate (Acute Kidney Injury/Impairment)  Goal: Optimal Nutrition Intake  Outcome: Ongoing, Progressing     Problem: Renal Function Impairment (Acute Kidney Injury/Impairment)  Goal: Effective Renal Function  Outcome: Ongoing, Progressing     Problem: Impaired Wound Healing  Goal: Optimal Wound Healing  Outcome: Ongoing, Progressing     Problem: Infection  Goal: Absence of Infection Signs and Symptoms  Outcome: Ongoing, Progressing     Problem: Fall Injury Risk  Goal: Absence of Fall and Fall-Related Injury  Outcome: Ongoing, Progressing

## 2022-06-01 NOTE — ASSESSMENT & PLAN NOTE
A1c < 6.5%. Well controlled. Is at risk for hypoglycemia   Will hold insulin as she did have an episode of hypoglycemia causing hypothermia  These have resolved. Encouraged PO intake. Will discuss food consistency with Speech

## 2022-06-01 NOTE — CONSULTS
Consulted for sacral pressure injury S/P surgical debridement; recommendations for further wound care  5/31 S/P debridement of sacral unstageable pressure injury per Dr. Dickens  Resume local wound care to sacral/buttock pressure injury daily and prn with Vashe moistened gauze. Avoid positioning patient on wound.

## 2022-06-01 NOTE — PROGRESS NOTES
Unable to obtain temp orally or axillary after several attempts. Temp 94.1 rectally. Applied 3 warm blankets and asked charge nurse to order blanket for bear hugger machine

## 2022-06-01 NOTE — PROGRESS NOTES
[facility] Medicine  Progress Note    Patient Name: Ana James  MRN: [id number]  Patient Class: IP- Inpatient   Admission Date: 5/24/2022  Length of Stay: 8 days  Attending Physician: Luz Alcazar MD  Primary Care Provider: Viky Bean MD        Subjective:     Principal Problem:Acute on chronic renal failure        HPI:  Mrs. James is a 80 yo F who presents from [facility] with a CC of hypotension.  The patient was diagnosed with Covid yesterday. 02 sats in the ED were 98% with a clear CXR. Patient not able to give accurate history. Likely decreased po intake in the previous days. She presents with a BUN/CRT of 88/4.0.  Last month her CRT was 1.6.  She has known CKD 3.  She is bed bound and has an unstageable sacral wound.  Otherwise no further sig. History obtained. She is non-ill appearing     - Darin Garcia MD      Overview/Hospital Course:  Patient admitted to the hospital with acute renal failure- secondary to poor input from Covid 19.   Patient has unstageable sacral decub- wound care was consulted and rec: surgery consult for possible debridement.  Patient's renal function improved daily with IV fluids. Surgery planned for debridement on 5/30        Interval History: hypoglycemic and hypothermic overnight. Both resolved but patient states she has no appetite.     Review of Systems   Constitutional: Negative.    Respiratory: Negative.     Cardiovascular: Negative.    Gastrointestinal: Negative.    Objective:     Vital Signs (Most Recent):  Temp: 97.6 °F (36.4 °C) (06/01/22 1330)  Pulse: 90 (06/01/22 1330)  Resp: 18 (06/01/22 1330)  BP: 137/67 (06/01/22 1330)  SpO2: 100 % (06/01/22 1330)   Vital Signs (24h Range):  Temp:  [94 °F (34.4 °C)-98.4 °F (36.9 °C)] 97.6 °F (36.4 °C)  Pulse:  [72-98] 90  Resp:  [16-18] 18  SpO2:  [97 %-100 %] 100 %  BP: (136-166)/(58-78) 137/67     Weight: 90 kg (198 lb 6.6 oz)  Body mass index is 36.29 kg/m².    Intake/Output Summary  (Last 24 hours) at 6/1/2022 1510  Last data filed at 6/1/2022 1330  Gross per 24 hour   Intake 240 ml   Output 600 ml   Net -360 ml        Physical Exam  Vitals and nursing note reviewed.   Constitutional:       General: She is not in acute distress.     Comments: A lot more alert and interactive today.    Cardiovascular:      Rate and Rhythm: Normal rate and regular rhythm.   Pulmonary:      Effort: Pulmonary effort is normal.      Breath sounds: No wheezing or rales.   Abdominal:      Palpations: Abdomen is soft.   Neurological:      Mental Status: She is alert and oriented to person, place, and time.   Psychiatric:         Mood and Affect: Mood normal.         Speech: Speech normal.         Behavior: Behavior normal.         Cognition and Memory: Cognition and memory normal.       Significant Labs: All pertinent labs within the past 24 hours have been reviewed.    Significant Imaging: I have reviewed all pertinent imaging results/findings within the past 24 hours.  I have reviewed and interpreted all pertinent imaging results/findings within the past 24 hours.      Assessment/Plan:      * Acute on chronic renal failure  AUTUMN resolved. Will stop normal saline as Chloride is significantly high which is likely causing low bicarb        Pressure injury of sacral region, unstageable  Pressure wound due to bed confinement. Is afebrile and non toxic appearing   POD 1 debridement.  No indication for abx treatment at this time  Will monitor for signs and symptoms of course  Wound care following.       COVID-19  98% on RA. Supportive management       Debility  Bed bound       Atrial fibrillation  Currently NSR. Will review home meds to see if patient is on chronic anticoagulation for stroke prevention. Is currently on full dose lovenox as per DVT prophylaxis based on COVID protocols.       Anemia of chronic disease  No acute issues. Hgb 8.2 today       Chronic diastolic heart failure  No acute issues        Hyperlipidemia  Resume statin     Hyperkalemia  Resolved       Controlled type 2 diabetes mellitus with stage 3 chronic kidney disease, with long-term current use of insulin  A1c < 6.5%. Well controlled. Is at risk for hypoglycemia   Will hold insulin as she did have an episode of hypoglycemia causing hypothermia  These have resolved. Encouraged PO intake. Will discuss food consistency with Speech    Class 2 obesity in adult  Body mass index is 36.29 kg/m². Morbid obesity complicates all aspects of disease management from diagnostic modalities to treatment. Weight loss encouraged and health benefits explained to patient.         Essential hypertension  I do not see antihypertensives listed under home meds. Will verify with NH  Address pain and discomfort first. If BP remains high will give antihypertensives      VTE Risk Mitigation (From admission, onward)         Ordered     enoxaparin injection 90 mg  Every 24 hours (non-standard times)         05/24/22 1415                Discharge Planning   FREDERICK: 5/27/2022     Code Status: DNR   Is the patient medically ready for discharge?:     Reason for patient still in hospital (select all that apply): Treatment  Discharge Plan A: Return to nursing home   Discharge Delays: None known at this time      Called Prince (brother) as per patient's request but no answer. I left voice message for a call back.         uLz Stoddard MD  Department of Hospital Medicine   [facility] - Telemetry

## 2022-06-01 NOTE — PROGRESS NOTES
Surgery Progress Note    S: NAEO. POD 1 from sacral decub debridement. Doing well.    O:  Temp:  [94 °F (34.4 °C)-98.4 °F (36.9 °C)] 97.6 °F (36.4 °C)  Pulse:  [66-98] 90  Resp:  [14-20] 18  SpO2:  [97 %-100 %] 100 %  BP: (136-172)/(58-78) 137/67    I/O last 3 completed shifts:  In: 0   Out: 900 [Urine:900]  I/O this shift:  In: 120 [P.O.:120]  Out: -     Decubitus ulcer debrided back to healthy tissue, no evidence of remaining infection on exam            Imaging Results          X-Ray Chest AP Portable (Final result)  Result time 05/24/22 12:47:20    Final result by Yosi Herrera MD (05/24/22 12:47:20)                 Impression:      See above      Electronically signed by: Yosi Herrera MD  Date:    05/24/2022  Time:    12:47             Narrative:    EXAMINATION:  XR CHEST AP PORTABLE    CLINICAL HISTORY:  Cough, unspecified    TECHNIQUE:  Single frontal view of the chest was performed.    COMPARISON:  04/24/2022 none    FINDINGS:  Heart size normal.  The lungs are clear.  No pleural effusion                                A/P:  80 yo female with sacral decubitus ulcer s/p debridement in OR on 5/31    Wound care consulted for recommendations  Continue daily dressing changes  Remainder of care per primary    Surgery will sign off. Please call with any questions or future surgical needs.    Temi HORN  Surgery PGY5

## 2022-06-02 VITALS
WEIGHT: 198.44 LBS | DIASTOLIC BLOOD PRESSURE: 72 MMHG | RESPIRATION RATE: 17 BRPM | HEIGHT: 62 IN | OXYGEN SATURATION: 95 % | TEMPERATURE: 97 F | BODY MASS INDEX: 36.52 KG/M2 | SYSTOLIC BLOOD PRESSURE: 148 MMHG | HEART RATE: 82 BPM

## 2022-06-02 LAB
BACTERIA SPEC AEROBE CULT: ABNORMAL
POCT GLUCOSE: 112 MG/DL (ref 70–110)
POCT GLUCOSE: 74 MG/DL (ref 70–110)

## 2022-06-02 PROCEDURE — 25000003 PHARM REV CODE 250: Performed by: STUDENT IN AN ORGANIZED HEALTH CARE EDUCATION/TRAINING PROGRAM

## 2022-06-02 PROCEDURE — 92526 ORAL FUNCTION THERAPY: CPT

## 2022-06-02 RX ADMIN — OXYBUTYNIN CHLORIDE 15 MG: 5 TABLET, EXTENDED RELEASE ORAL at 09:06

## 2022-06-02 RX ADMIN — ATORVASTATIN CALCIUM 20 MG: 10 TABLET, FILM COATED ORAL at 09:06

## 2022-06-02 RX ADMIN — FOLIC ACID 1 MG: 1 TABLET ORAL at 09:06

## 2022-06-02 RX ADMIN — TIMOLOL MALEATE 1 DROP: 5 SOLUTION OPHTHALMIC at 06:06

## 2022-06-02 NOTE — PLAN OF CARE
[facility]     Department of Hospital Medicine     [facility] [place] [address]     [phone] after hours  [phone] fax       NURSING HOME ORDERS    Patient Name: Ana James  YOB: [birth year]    Admit to Nursing Home:  Regular Bed       Diagnoses:  Active Hospital Problems    Diagnosis  POA    *Acute on chronic renal failure [N17.9, N18.9]  Yes    Pressure injury of sacral region, unstageable [L89.150]  Yes    COVID-19 [U07.1]  Yes    Debility [R53.81]  Yes    Atrial fibrillation [I48.91]  Yes    Anemia of chronic disease [D63.8]  Yes     Chronic    Chronic diastolic heart failure [I50.32]  Yes     Chronic    Hyperkalemia [E87.5]  Yes    Hyperlipidemia [E78.5]  Yes     Chronic    Controlled type 2 diabetes mellitus with stage 3 chronic kidney disease, with long-term current use of insulin [E11.22, N18.30, Z79.4]  Not Applicable     Chronic    Class 2 obesity in adult [E66.9]  Yes    Essential hypertension [I10]  Yes     Chronic      Resolved Hospital Problems   No resolved problems to display.       Patient is homebound due to:  Acute on chronic renal failure    Allergies:  Review of patient's allergies indicates:   Allergen Reactions    Adhesive Rash     Paper tape       Vitals:  Per facility protocol  Diet: pureed diet with thin liquids       Supplement:  1 can every three times a day with meals                         Type:  House       Acitivities:    - Up in a chair each morning as tolerated      Nursing Precautions:   - Aspiration precautions:             -  Upright 90 degrees befor during and after meals             -  Suction at bedside          - Fall precautions per nursing home protocol   - Decubitus precautions:        -  for positioning   - Pressure reducing foam mattress   - Turn patient every two hours. Use wedge pillows to anchor patient    CONSULTS:    Wound care: sacral pressure  wounds:   Local wound care to left buttock/sacral wound daily and prn- Cleanse with Vashe. Apply 3M Cavilon No Sting Film Barrier to periwound skin. Fill wound with Vashe moistened gauze and cover with dry gauze. Secure with Medipore tape.    MISCELLANEOUS CARE:     Routine Skin for Bedridden Patients:  Apply moisture barrier cream to all    skin folds and wet areas in perineal area daily and after baths and                           all bowel movements.                   DIABETES CARE:       Check blood sugar:       Fingerstick blood sugar AC and HS      Report CBG < 60 or > 400 to physician.                                          Insulin Sliding Scale          Glucose  Novolog Insulin Subcutaneous        0 - 60   Orange juice or glucose tablet, hold insulin      No insulin   201-250  2 units   251-300  4 units   301-350  6 units   351-400  8 units   >400   10 units then call physician      Medications: Discontinue all previous medication orders, if any. See new list below.     Medication List      CONTINUE taking these medications    albuterol 90 mcg/actuation inhaler  Commonly known as: PROVENTIL/VENTOLIN HFA  INHALE 2 PUFFS INTO THE LUNGS EVERY 6 (SIX) HOURS AS NEEDED FOR WHEEZING. RESCUE     aspirin 81 MG EC tablet  Commonly known as: ECOTRIN     atorvastatin 20 MG tablet  Commonly known as: LIPITOR  TAKE 1 TABLET BY MOUTH EVERY DAY     oxybutynin 15 MG Tr24  Commonly known as: DITROPAN XL  TAKE 1 TABLET BY MOUTH EVERY DAY     polyethylene glycol 17 gram Pwpk  Commonly known as: GLYCOLAX  Take 17 g by mouth once daily.       Electronically signed   _________________________________  Luz Stoddard MD  06/02/2022

## 2022-06-02 NOTE — NURSING
Discharge papers reviewed and given to patient.  Patient verbalizes understanding.  All questions answered.     Telemetry box removed and returned to monitor tech.  All IV lines removed with no complications.  Bleeding controlled.    All belongings gathered by patient/family.

## 2022-06-02 NOTE — PLAN OF CARE
Problem: SLP  Goal: SLP Goal  Description: Goals:  1. Pt will tolerate pureed diet with thin liquids w/o overt s/s of aspiration.   2. Pt will participate in ongoing assessment of swallow (On-going)  Outcome: Ongoing, Progressing    Solid po trials attempted, with pt demo prolonged mastication. Pt is not safe to advance to consume soft solids at this time, however, can safely advance to a pureed diet.

## 2022-06-02 NOTE — PLAN OF CARE
VINNY spoke with Karolyn (N) re: updating authorization date for ambulance transportation. VINNY also inquired about patient being able to return as SNF as Abigail requested in [facility]. Karolyn explained that SNF would be sent to [facility] if requested. VINNY voiced understanding.     VINNY spoke with Gui (Abigail) for follow up with reviewing plan of care. Gui stated she will print plan of care and have Abigail nurse review plan of care.     VINNY will continue to follow up for report information.

## 2022-06-02 NOTE — PLAN OF CARE
[facility] - Telemetry  Discharge Final Note    Primary Care Provider: Viky Bean MD    Expected Discharge Date: 6/2/2022    Final Discharge Note (most recent)       Final Note - 06/02/22 1306          Final Note    Assessment Type Final Discharge Note     Anticipated Discharge Disposition retirement Nursing Home     What phone number can be called within the next 1-3 days to see how you are doing after discharge? [phone]     Hospital Resources/Appts/Education Provided Provided patient/caregiver with written discharge plan information        Post-Acute Status    Post-Acute Authorization Placement     Post-Acute Placement Status Set-up Complete/Auth obtained     Coverage PHN     Discharge Delays None known at this time                     Important Message from Medicare  Important Message from Medicare regarding Discharge Appeal Rights: Given to patient/caregiver, Explained to patient/caregiver, Signed/date by patient/caregiver     Date IMM was signed: 05/30/22  Time IMM was signed: 1324    SW received report information from [facility]. [facility] to [facility] nurse Laura) at [phone].      secure chat report information to [facility] and also discuss  time for 3:00 pm.     Patient is cleared from case management standpoint.     ADT 30 order placed for Van Transportation.  Requested  time: 3:00 pm with JORGE  If transportation does not arrive at ETA time nurse will be instructed to follow protocol for transportation below:  How can I get in touch directly with dispatch, if needed?                 Non-emergent dispatch: [phone]      +++NURSING:  If Van does not arrive at requested time please call the above Non Emergent Dispatcher.  If issue not resolved please escalate to your charge nurse for further instructions.

## 2022-06-05 LAB — BACTERIA SPEC ANAEROBE CULT: NORMAL

## 2022-06-06 NOTE — DISCHARGE SUMMARY
[facility] Medicine  Discharge Summary      Patient Name: Ana James  MRN: [id number]  Patient Class: IP- Inpatient  Admission Date: 5/24/2022  Hospital Length of Stay: 9 days  Discharge Date and Time:  06/06/2022 5:43 PM  Attending Physician: No att. providers found   Discharging Provider: Luz Stoddard MD  Primary Care Provider: Viky Bean MD      HPI:   Mrs. James is a 82 yo F who presents from [facility] with a CC of hypotension.  The patient was diagnosed with Covid yesterday. 02 sats in the ED were 98% with a clear CXR. Patient not able to give accurate history. Likely decreased po intake in the previous days. She presents with a BUN/CRT of 88/4.0.  Last month her CRT was 1.6.  She has known CKD 3.  She is bed bound and has an unstageable sacral wound.  Otherwise no further sig. History obtained. She is non-ill appearing     - Anmol Garcia MD      Procedure(s) (LRB):  DEBRIDEMENT, WOUND, SACRUM (N/A)      Hospital Course:   Patient admitted to the hospital with acute renal failure- secondary to poor input from Covid 19.   Patient has unstageable sacral decub- wound care was consulted and rec: surgery consult for possible debridement.  Patient's renal function improved daily with IV fluids. AUTUMN eventually resolved. Surgery planned for debridement on 5/30. Tolerated procedure well. No indication for abx. Wound care. Speech also evaluated patient for dysphagia. Recommended pureed with with thin liquids. Tolerated this well. Discharged back to nursing home in stable condition. Patient had also requested discharge. Discussed with son (Prince) over the phone.          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] 05/31/2022 Yes    COVID-19 [U07.1] 05/24/2022 Yes    Debility [R53.81] 04/27/2022 Yes    Atrial fibrillation [I48.91] 01/24/2022 Yes    Anemia of chronic disease [D63.8] 02/27/2020 Yes     Chronic    Chronic diastolic heart failure [I50.32] 02/27/2020 Yes     Chronic    Hyperkalemia [E87.5] 02/27/2020 Yes    Hyperlipidemia [E78.5] 02/27/2020 Yes     Chronic    Controlled type 2 diabetes mellitus with stage 3 chronic kidney disease, with long-term current use of insulin [E11.22, N18.30, Z79.4] 07/11/2018 Not Applicable     Chronic    Class 2 obesity in adult [E66.9] 03/02/2014 Yes    Essential hypertension [I10] 03/02/2014 Yes     Chronic      Problems Resolved During this Admission:       Discharged Condition: stable    Disposition: halfway Nursing Home     Medications:  Reconciled Home Medications:      Medication List      CONTINUE taking these medications    albuterol 90 mcg/actuation inhaler  Commonly known as: PROVENTIL/VENTOLIN HFA  INHALE 2 PUFFS INTO THE LUNGS EVERY 6 (SIX) HOURS AS NEEDED FOR WHEEZING. RESCUE     ARGINAID 4.5 gram-156 mg/9.2 gram Pwpk  Generic drug: arginine-vitamin C-vitamin E  Take 1 packet by mouth 2 (two) times a day. Add to H2O to promot wound healing     aspirin 81 MG EC tablet  Commonly known as: ECOTRIN  Take 81 mg by mouth once daily.     atorvastatin 20 MG tablet  Commonly known as: LIPITOR  TAKE 1 TABLET BY MOUTH EVERY DAY     blood sugar diagnostic Strp  Test 3 times daily     folic acid 1 MG tablet  Commonly known as: FOLVITE  Take 1 tablet (1 mg total) by mouth once daily.     GLUCERNA Liqd  Generic drug: nut.tx.gluc.intol,lac-free,soy  Take 250 mLs by mouth 3 (three) times daily.    "  HumaLOG KwikPen Insulin 100 unit/mL pen  Generic drug: insulin lispro  Inject into the skin. Per  House sliding scale; 0-200= DO NOT ADMINISTER; 201-249= 2 Units; 250-299= 4 Units; 300-349= 6 Units; 350-399= 8 Units; 400 OR GREATER = 10 Units     * lancets Misc  Test 3 times daily     * lancets Misc  To check BG 3 times daily, to use with insurance preferred meter     latanoprost 0.005 % ophthalmic solution  Place 1 drop into both eyes every evening.     melatonin 3 mg tablet  Commonly known as: MELATIN  Take 3 tablets (9 mg total) by mouth nightly.     NOVOFINE AUTOCOVER 30 gauge x 1/3" Ndle  Generic drug: pen needle, diabetic, safety     oxybutynin 15 MG Tr24  Commonly known as: DITROPAN XL  TAKE 1 TABLET BY MOUTH EVERY DAY     pen needle, diabetic 32 gauge x 5/32" Ndle  Commonly known as: BD ULTRA-FINE BREE PEN NEEDLE  INJECT INSULIN THREE TIMES DAILY     polyethylene glycol 17 gram Pwpk  Commonly known as: GLYCOLAX  Take 17 g by mouth once daily.     timolol maleate 0.5% 0.5 % Drop  Commonly known as: TIMOPTIC  Place 1 drop into both eyes every morning.     VITAMIN C 500 MG tablet  Generic drug: ascorbic acid (vitamin C)  Take 500 mg by mouth 2 (two) times daily.     zinc gluconate 50 mg tablet  Take 50 mg by mouth once daily. To promote wound healing x 45 days          Indwelling Lines/Drains at time of discharge:   Lines/Drains/Airways     None                 Time spent on the discharge of patient: > 35 minutes         Luz Stoddard MD  Department of Hospital Medicine  [facility] - Telemetry  "

## 2022-06-08 NOTE — PHYSICIAN QUERY
PT Name: Ana James  MR #: [id number]     Documentation Clarification      CDS/: Олег Rubin RN, CCDS       Contact information:   [email or web address]        This form is a permanent document in the medical record.     Query Date: June 8, 2022    By submitting this query, we are merely seeking further clarification of documentation. Please utilize your independent clinical judgment when addressing the question(s) below.    The Medical Record reflects the following:    Clinical Findings Location in Medical Records   Chronic diastolic heart failure  No acute issues  5/24 H&P: Hospital medicine ()   Past history TTE:   estimated ejection fraction is 70%.  Indeterminate left ventricular diastolic function.     1/25/2022 TTE   Atorvastatin, PO; 5/31-6/2    Home med: atorvastatin, PO MAR     5/24 H&P:    Final Active Diagnoses:  Chronic diastolic heart failure      6/2 Discharge summary                                                                                 Provider, please clarify the diagnosis of __chronic diastolic heart failure _____:      [ x  ] Diagnosis is confirmed and additional clinical support/decision-making indicators include (please specify):________________     [   ] Above stated diagnosis is not confirmed and/or it has been ruled out     [   ] Above stated diagnosis is not confirmed and/or it has been ruled out, other diagnosis ruled in (please specify):_______________     [   ] Other clarification (please specify): ___________________     [  ] Clinically undetermined

## 2022-06-17 ENCOUNTER — HOSPITAL ENCOUNTER (OUTPATIENT)
Facility: HOSPITAL | Age: 82
Discharge: NURSING, REHAB OR LONG-TERM CARE | End: 2022-06-18
Attending: EMERGENCY MEDICINE | Admitting: HOSPITALIST
Payer: MEDICARE

## 2022-06-17 DIAGNOSIS — D64.9 ANEMIA, UNSPECIFIED TYPE: Primary | ICD-10-CM

## 2022-06-17 DIAGNOSIS — R07.9 CHEST PAIN: ICD-10-CM

## 2022-06-17 LAB
ABO + RH BLD: NORMAL
ALBUMIN SERPL BCP-MCNC: 1.5 G/DL (ref 3.5–5.2)
ALP SERPL-CCNC: 106 U/L (ref 55–135)
ALT SERPL W/O P-5'-P-CCNC: 38 U/L (ref 10–44)
ANION GAP SERPL CALC-SCNC: 6 MMOL/L (ref 8–16)
AST SERPL-CCNC: 66 U/L (ref 10–40)
BASOPHILS # BLD AUTO: 0.02 K/UL (ref 0–0.2)
BASOPHILS NFR BLD: 0.2 % (ref 0–1.9)
BILIRUB SERPL-MCNC: 0.6 MG/DL (ref 0.1–1)
BLD GP AB SCN CELLS X3 SERPL QL: NORMAL
BLD PROD TYP BPU: NORMAL
BLOOD UNIT EXPIRATION DATE: NORMAL
BLOOD UNIT TYPE CODE: 5100
BLOOD UNIT TYPE: NORMAL
BUN SERPL-MCNC: 19 MG/DL (ref 8–23)
CALCIUM SERPL-MCNC: 8.6 MG/DL (ref 8.7–10.5)
CHLORIDE SERPL-SCNC: 103 MMOL/L (ref 95–110)
CO2 SERPL-SCNC: 29 MMOL/L (ref 23–29)
CODING SYSTEM: NORMAL
CREAT SERPL-MCNC: 1.3 MG/DL (ref 0.5–1.4)
CTP QC/QA: YES
DIFFERENTIAL METHOD: ABNORMAL
DISPENSE STATUS: NORMAL
EOSINOPHIL # BLD AUTO: 0.3 K/UL (ref 0–0.5)
EOSINOPHIL NFR BLD: 2.8 % (ref 0–8)
ERYTHROCYTE [DISTWIDTH] IN BLOOD BY AUTOMATED COUNT: 16.8 % (ref 11.5–14.5)
EST. GFR  (AFRICAN AMERICAN): 44 ML/MIN/1.73 M^2
EST. GFR  (NON AFRICAN AMERICAN): 38 ML/MIN/1.73 M^2
FERRITIN SERPL-MCNC: 833 NG/ML (ref 20–300)
GLUCOSE SERPL-MCNC: 126 MG/DL (ref 70–110)
HCT VFR BLD AUTO: 23.4 % (ref 37–48.5)
HGB BLD-MCNC: 7.1 G/DL (ref 12–16)
IMM GRANULOCYTES # BLD AUTO: 0.07 K/UL (ref 0–0.04)
IMM GRANULOCYTES NFR BLD AUTO: 0.7 % (ref 0–0.5)
INR PPP: 1.1 (ref 0.8–1.2)
IRON SERPL-MCNC: 18 UG/DL (ref 30–160)
LDH SERPL L TO P-CCNC: 335 U/L (ref 110–260)
LYMPHOCYTES # BLD AUTO: 1.5 K/UL (ref 1–4.8)
LYMPHOCYTES NFR BLD: 15.7 % (ref 18–48)
MCH RBC QN AUTO: 26.3 PG (ref 27–31)
MCHC RBC AUTO-ENTMCNC: 30.3 G/DL (ref 32–36)
MCV RBC AUTO: 87 FL (ref 82–98)
MONOCYTES # BLD AUTO: 0.7 K/UL (ref 0.3–1)
MONOCYTES NFR BLD: 7.3 % (ref 4–15)
NEUTROPHILS # BLD AUTO: 6.9 K/UL (ref 1.8–7.7)
NEUTROPHILS NFR BLD: 73.3 % (ref 38–73)
NRBC BLD-RTO: 0 /100 WBC
PLATELET # BLD AUTO: 409 K/UL (ref 150–450)
PMV BLD AUTO: 9.5 FL (ref 9.2–12.9)
POCT GLUCOSE: 125 MG/DL (ref 70–110)
POCT GLUCOSE: 210 MG/DL (ref 70–110)
POTASSIUM SERPL-SCNC: 4.3 MMOL/L (ref 3.5–5.1)
PROT SERPL-MCNC: 6.8 G/DL (ref 6–8.4)
PROTHROMBIN TIME: 12.1 SEC (ref 9–12.5)
RBC # BLD AUTO: 2.7 M/UL (ref 4–5.4)
RETICS/RBC NFR AUTO: 2.6 % (ref 0.5–2.5)
SARS-COV-2 RDRP RESP QL NAA+PROBE: POSITIVE
SATURATED IRON: 12 % (ref 20–50)
SODIUM SERPL-SCNC: 138 MMOL/L (ref 136–145)
TOTAL IRON BINDING CAPACITY: 154 UG/DL (ref 250–450)
TRANS ERYTHROCYTES VOL PATIENT: NORMAL ML
TRANSFERRIN SERPL-MCNC: 104 MG/DL (ref 200–375)
WBC # BLD AUTO: 9.36 K/UL (ref 3.9–12.7)

## 2022-06-17 PROCEDURE — G0378 HOSPITAL OBSERVATION PER HR: HCPCS

## 2022-06-17 PROCEDURE — 85045 AUTOMATED RETICULOCYTE COUNT: CPT | Performed by: EMERGENCY MEDICINE

## 2022-06-17 PROCEDURE — 86900 BLOOD TYPING SEROLOGIC ABO: CPT | Performed by: EMERGENCY MEDICINE

## 2022-06-17 PROCEDURE — 25000003 PHARM REV CODE 250: Performed by: PHYSICIAN ASSISTANT

## 2022-06-17 PROCEDURE — 84466 ASSAY OF TRANSFERRIN: CPT | Performed by: EMERGENCY MEDICINE

## 2022-06-17 PROCEDURE — 85025 COMPLETE CBC W/AUTO DIFF WBC: CPT | Performed by: EMERGENCY MEDICINE

## 2022-06-17 PROCEDURE — 80053 COMPREHEN METABOLIC PANEL: CPT | Performed by: EMERGENCY MEDICINE

## 2022-06-17 PROCEDURE — 36430 TRANSFUSION BLD/BLD COMPNT: CPT

## 2022-06-17 PROCEDURE — 96372 THER/PROPH/DIAG INJ SC/IM: CPT | Performed by: PHYSICIAN ASSISTANT

## 2022-06-17 PROCEDURE — 86901 BLOOD TYPING SEROLOGIC RH(D): CPT | Performed by: EMERGENCY MEDICINE

## 2022-06-17 PROCEDURE — 82728 ASSAY OF FERRITIN: CPT | Performed by: EMERGENCY MEDICINE

## 2022-06-17 PROCEDURE — 83010 ASSAY OF HAPTOGLOBIN QUANT: CPT | Performed by: EMERGENCY MEDICINE

## 2022-06-17 PROCEDURE — 99291 CRITICAL CARE FIRST HOUR: CPT | Mod: 25

## 2022-06-17 PROCEDURE — 82746 ASSAY OF FOLIC ACID SERUM: CPT | Performed by: EMERGENCY MEDICINE

## 2022-06-17 PROCEDURE — 63600175 PHARM REV CODE 636 W HCPCS: Performed by: PHYSICIAN ASSISTANT

## 2022-06-17 PROCEDURE — U0002 COVID-19 LAB TEST NON-CDC: HCPCS | Performed by: EMERGENCY MEDICINE

## 2022-06-17 PROCEDURE — 83615 LACTATE (LD) (LDH) ENZYME: CPT | Performed by: EMERGENCY MEDICINE

## 2022-06-17 PROCEDURE — 85610 PROTHROMBIN TIME: CPT | Performed by: EMERGENCY MEDICINE

## 2022-06-17 PROCEDURE — 86920 COMPATIBILITY TEST SPIN: CPT | Performed by: EMERGENCY MEDICINE

## 2022-06-17 PROCEDURE — P9021 RED BLOOD CELLS UNIT: HCPCS | Performed by: EMERGENCY MEDICINE

## 2022-06-17 RX ORDER — INSULIN ASPART 100 [IU]/ML
0-5 INJECTION, SOLUTION INTRAVENOUS; SUBCUTANEOUS
Status: DISCONTINUED | OUTPATIENT
Start: 2022-06-17 | End: 2022-06-18 | Stop reason: HOSPADM

## 2022-06-17 RX ORDER — ACETAMINOPHEN 325 MG/1
650 TABLET ORAL EVERY 4 HOURS PRN
Status: DISCONTINUED | OUTPATIENT
Start: 2022-06-17 | End: 2022-06-18 | Stop reason: HOSPADM

## 2022-06-17 RX ORDER — SODIUM CHLORIDE 0.9 % (FLUSH) 0.9 %
10 SYRINGE (ML) INJECTION EVERY 8 HOURS
Status: DISCONTINUED | OUTPATIENT
Start: 2022-06-17 | End: 2022-06-17

## 2022-06-17 RX ORDER — HYDROCODONE BITARTRATE AND ACETAMINOPHEN 500; 5 MG/1; MG/1
TABLET ORAL
Status: DISCONTINUED | OUTPATIENT
Start: 2022-06-17 | End: 2022-06-18 | Stop reason: HOSPADM

## 2022-06-17 RX ORDER — SODIUM CHLORIDE 0.9 % (FLUSH) 0.9 %
10 SYRINGE (ML) INJECTION
Status: DISCONTINUED | OUTPATIENT
Start: 2022-06-17 | End: 2022-06-18 | Stop reason: HOSPADM

## 2022-06-17 RX ORDER — ATORVASTATIN CALCIUM 10 MG/1
20 TABLET, FILM COATED ORAL DAILY
Status: DISCONTINUED | OUTPATIENT
Start: 2022-06-18 | End: 2022-06-18 | Stop reason: HOSPADM

## 2022-06-17 RX ORDER — GLUCAGON 1 MG
1 KIT INJECTION
Status: DISCONTINUED | OUTPATIENT
Start: 2022-06-17 | End: 2022-06-18 | Stop reason: HOSPADM

## 2022-06-17 RX ORDER — IBUPROFEN 200 MG
16 TABLET ORAL
Status: DISCONTINUED | OUTPATIENT
Start: 2022-06-17 | End: 2022-06-18 | Stop reason: HOSPADM

## 2022-06-17 RX ORDER — LANOLIN ALCOHOL/MO/W.PET/CERES
800 CREAM (GRAM) TOPICAL
Status: DISCONTINUED | OUTPATIENT
Start: 2022-06-17 | End: 2022-06-18 | Stop reason: HOSPADM

## 2022-06-17 RX ORDER — NALOXONE HCL 0.4 MG/ML
0.02 VIAL (ML) INJECTION
Status: DISCONTINUED | OUTPATIENT
Start: 2022-06-17 | End: 2022-06-18 | Stop reason: HOSPADM

## 2022-06-17 RX ORDER — ASPIRIN 81 MG/1
81 TABLET ORAL DAILY
Status: DISCONTINUED | OUTPATIENT
Start: 2022-06-18 | End: 2022-06-18 | Stop reason: HOSPADM

## 2022-06-17 RX ORDER — IBUPROFEN 200 MG
24 TABLET ORAL
Status: DISCONTINUED | OUTPATIENT
Start: 2022-06-17 | End: 2022-06-18 | Stop reason: HOSPADM

## 2022-06-17 RX ORDER — AMOXICILLIN 250 MG
1 CAPSULE ORAL DAILY PRN
Status: DISCONTINUED | OUTPATIENT
Start: 2022-06-17 | End: 2022-06-18 | Stop reason: HOSPADM

## 2022-06-17 RX ORDER — TALC
6 POWDER (GRAM) TOPICAL NIGHTLY PRN
Status: DISCONTINUED | OUTPATIENT
Start: 2022-06-17 | End: 2022-06-18 | Stop reason: HOSPADM

## 2022-06-17 RX ADMIN — Medication 6 MG: at 09:06

## 2022-06-17 RX ADMIN — INSULIN ASPART 1 UNITS: 100 INJECTION, SOLUTION INTRAVENOUS; SUBCUTANEOUS at 09:06

## 2022-06-17 NOTE — ED TRIAGE NOTES
Patient come sin from nursing home, states low blood counts, H/H 18.8/6.2. Patient states feeling fine, other than pain, was given oral pain meds PTA large un stageable pressure ulcer noted to sacrum, dressing and bruising noted to left upper arm.

## 2022-06-17 NOTE — NURSING
Pt arrived to the floor from the ED. Pt transferred from stretcher to bed with assist x3. Pressure injury noted to L elbow, L hip, and sacrum. Sacral wound dressing reinforced, mepilex applied to L hip. Dressing present on L elbow; dressing c/d/i. Green boots applied to B/L heels. Blood transfusing. 1Hr transfusion vitals taken upon arrival to unit. Telemetry unit applied. Pt complains of discomfort at 8 on a scale of 0-10 when turning patient. Able to position pt in comfortable position with pillow support currently under left side.

## 2022-06-17 NOTE — ASSESSMENT & PLAN NOTE
Lab Results   Component Value Date    HGBA1C 6.3 (H) 05/24/2022     Will start sliding scale insulin, diabetic pureed diet, goal -180    Cr today of 1.3. baseline of 0.8-1.2. renal dose medications avoid nephrotoxic drugs monitor intake and output

## 2022-06-17 NOTE — ASSESSMENT & PLAN NOTE
Per chart review history of afib. No currently on anticoagulation or beta blocker  Monitor on telemetry

## 2022-06-17 NOTE — ED NOTES
Call from floor to clarify COVID status, explained that patient will show + due to recent COVID DX and treatment, PA at bedside, states aware of COVID + dx and patient does not need COVID precautions at this time.

## 2022-06-17 NOTE — SUBJECTIVE & OBJECTIVE
Past Medical History:   Diagnosis Date    Arthritis lower extremities    Asthma     Blind left eye     Diabetes mellitus     Edema of both lower legs     CHRONIC    Fall 02/08/2022    Gall stones     Hypertension     Kidney stones     Lives alone with help available     HOME HEALTH    Nephrolithiasis 1/22/2016    Obesity     PVD (peripheral vascular disease)     Renal disorder     Retinopathy     Sleep apnea     Vaginal delivery     x1    Weakness of both lower extremities     uses a cane, rollator & power chair       Past Surgical History:   Procedure Laterality Date    CARDIAC CATHETERIZATION      cataract      CHOLECYSTECTOMY      DEBRIDEMENT OF SACRAL WOUND N/A 5/31/2022    Procedure: DEBRIDEMENT, WOUND, SACRUM;  Surgeon: Timmy Allison MD;  Location: [facility];  Service: General;  Laterality: N/A;    EYE SURGERY      Rt cataract surgery    HYSTERECTOMY      URETERAL STENT PLACEMENT         Review of patient's allergies indicates:   Allergen Reactions    Adhesive Rash     Paper tape       No current facility-administered medications on file prior to encounter.     Current Outpatient Medications on File Prior to Encounter   Medication Sig    albuterol (PROVENTIL/VENTOLIN HFA) 90 mcg/actuation inhaler INHALE 2 PUFFS INTO THE LUNGS EVERY 6 (SIX) HOURS AS NEEDED FOR WHEEZING. RESCUE    arginine-vitamin C-vitamin E (ARGINAID) 4.5 gram-156 mg/9.2 gram PwPk Take 1 packet by mouth 2 (two) times a day. Add to H2O to promot wound healing    ascorbic acid, vitamin C, (VITAMIN C) 500 MG tablet Take 500 mg by mouth 2 (two) times daily.    aspirin (ECOTRIN) 81 MG EC tablet Take 81 mg by mouth once daily.    atorvastatin (LIPITOR) 20 MG tablet TAKE 1 TABLET BY MOUTH EVERY DAY    blood sugar diagnostic Strp Test 3 times daily    folic acid (FOLVITE) 1 MG tablet Take 1 tablet (1 mg total) by mouth once daily.    HUMALOG KWIKPEN INSULIN 100 unit/mL pen Inject into the skin. Per [facility] House sliding  "scale; 0-200= DO NOT ADMINISTER; 201-249= 2 Units; 250-299= 4 Units; 300-349= 6 Units; 350-399= 8 Units; 400 OR GREATER = 10 Units    lancets Misc Test 3 times daily    lancets Misc To check BG 3 times daily, to use with insurance preferred meter    latanoprost 0.005 % ophthalmic solution Place 1 drop into both eyes every evening.    melatonin (MELATIN) 3 mg tablet Take 3 tablets (9 mg total) by mouth nightly.    NOVOFINE AUTOCOVER 30 gauge x 1/3" Ndle     nut.tx.gluc.intol,lac-free,soy (GLUCERNA) Liqd Take 250 mLs by mouth 3 (three) times daily.    oxybutynin (DITROPAN XL) 15 MG TR24 TAKE 1 TABLET BY MOUTH EVERY DAY    pen needle, diabetic (BD ULTRA-FINE BREE PEN NEEDLE) 32 gauge x 5/32" Ndle INJECT INSULIN THREE TIMES DAILY    polyethylene glycol (GLYCOLAX) 17 gram PwPk Take 17 g by mouth once daily.    sulfamethoxazole-trimethoprim 800-160mg (BACTRIM DS) 800-160 mg Tab Take 1 tablet by mouth 2 (two) times daily.    timolol maleate 0.5% (TIMOPTIC) 0.5 % Drop Place 1 drop into both eyes every morning.    zinc gluconate 50 mg tablet Take 50 mg by mouth once daily. To promote wound healing x 45 days    [DISCONTINUED] amLODIPine (NORVASC) 10 MG tablet TAKE 1 TABLET BY MOUTH EVERY DAY    [DISCONTINUED] carvediloL (COREG) 12.5 MG tablet Take 1 tablet (12.5 mg total) by mouth 2 (two) times daily.    [DISCONTINUED] hydrALAZINE (APRESOLINE) 50 MG tablet Take 1 tablet (50 mg total) by mouth every 8 (eight) hours.    [DISCONTINUED] insulin detemir U-100 (LEVEMIR FLEXTOUCH U-100 INSULN) 100 unit/mL (3 mL) InPn pen Inject 26 Units into the skin every evening. HOLD UNTIL YOU SEE YOUR PCP    [DISCONTINUED] TRADJENTA 5 mg Tab tablet TAKE 1 TABLET BY MOUTH EVERY DAY    [DISCONTINUED] traZODone (DESYREL) 50 MG tablet TAKE 1 TABLET (50 MG TOTAL) BY MOUTH EVERY EVENING.     Family History       Problem Relation (Age of Onset)    Cancer Sister    Diabetes Brother    Hypertension Father    Kidney failure Mother      "     Tobacco Use    Smoking status: Never Smoker    Smokeless tobacco: Never Used   Substance and Sexual Activity    Alcohol use: No    Drug use: Never    Sexual activity: Not Currently     Partners: Male     Review of Systems   Constitutional:  Positive for fatigue. Negative for chills and fever.   HENT:  Negative for nosebleeds and tinnitus.    Eyes:  Negative for photophobia and visual disturbance.   Respiratory:  Negative for shortness of breath and wheezing.    Cardiovascular:  Negative for chest pain, palpitations and leg swelling.   Gastrointestinal:  Negative for abdominal distention, nausea and vomiting.   Genitourinary:  Negative for dysuria, flank pain and hematuria.   Musculoskeletal:  Negative for gait problem and joint swelling.   Skin:  Negative for rash and wound.   Neurological:  Positive for light-headedness. Negative for seizures and syncope.   Objective:     Vital Signs (Most Recent):  Temp: 99 °F (37.2 °C) (06/17/22 1731)  Pulse: 80 (06/17/22 1733)  Resp: 18 (06/17/22 1731)  BP: (!) 129/58 (06/17/22 1733)  SpO2: 97 % (06/17/22 1733)   Vital Signs (24h Range):  Temp:  [98.1 °F (36.7 °C)-99 °F (37.2 °C)] 99 °F (37.2 °C)  Pulse:  [78-91] 80  Resp:  [18-20] 18  SpO2:  [95 %-99 %] 97 %  BP: (118-129)/(56-65) 129/58     Weight: 90 kg (198 lb 6.6 oz)  Body mass index is 36.29 kg/m².    Physical Exam  Vitals and nursing note reviewed.   Constitutional:       General: She is not in acute distress.     Appearance: She is well-developed. She is not diaphoretic.   HENT:      Head: Normocephalic and atraumatic.      Right Ear: External ear normal.      Left Ear: External ear normal.   Eyes:      General:         Right eye: No discharge.         Left eye: No discharge.      Conjunctiva/sclera: Conjunctivae normal.   Neck:      Thyroid: No thyromegaly.   Cardiovascular:      Rate and Rhythm: Normal rate and regular rhythm.      Heart sounds: No murmur heard.  Pulmonary:      Effort: Pulmonary effort is  normal. No respiratory distress.      Breath sounds: Normal breath sounds.   Abdominal:      General: Bowel sounds are normal. There is no distension.      Palpations: Abdomen is soft. There is no mass.      Tenderness: There is no abdominal tenderness.   Musculoskeletal:         General: Deformity and signs of injury present.      Cervical back: Normal range of motion.      Comments: Stage 4 sacral decubitus wound without purulent drainage   Skin:     General: Skin is warm and dry.   Neurological:      Mental Status: She is alert and oriented to person, place, and time.      Sensory: No sensory deficit.   Psychiatric:         Mood and Affect: Mood normal.         Behavior: Behavior normal.           Significant Labs: CBC:   Recent Labs   Lab 06/17/22  1501   WBC 9.36   HGB 7.1*   HCT 23.4*        CMP:   Recent Labs   Lab 06/17/22  1501      K 4.3      CO2 29   *   BUN 19   CREATININE 1.3   CALCIUM 8.6*   PROT 6.8   ALBUMIN 1.5*   BILITOT 0.6   ALKPHOS 106   AST 66*   ALT 38   ANIONGAP 6*   EGFRNONAA 38*     Coagulation:   Recent Labs   Lab 06/17/22  1501   INR 1.1       Significant Imaging:   Imaging Results    None

## 2022-06-17 NOTE — ED NOTES
Hospitalist aware of + covid test states no need for precautions, last had Covid + in may will continue to come back positive for a while.

## 2022-06-17 NOTE — ED NOTES
Temp prior to blood, BP noted to be lower provider at bedside, states ok, give 1 unit, will speak to floor.

## 2022-06-17 NOTE — ASSESSMENT & PLAN NOTE
Complaints of fatigue and weakness found to have hemoglboin 7.1. no bleeding witnessed per nursing home. GUAIAC negative per ED.   Type and screen   Transfuse 1 unit  Iron studies/folate/b12 pending

## 2022-06-17 NOTE — ED PROVIDER NOTES
Encounter Date: 6/17/2022       History     Chief Complaint   Patient presents with    abnormal labs     [facility] sent pt over due to low H&H resulted in blood work. Pt reports SOB and dizziness. Hx of dementia. Stage 4 ulcer to sacrum that is currently being treated.      81 y.o. 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 Pt does note feeling slightly fatigued/dizzy. Did feels sob on exertion but not while at rest.  No fevers chills nausea vomiting diarrhea dysuria or other complaints        Review of patient's allergies indicates:   Allergen Reactions    Adhesive Rash     Paper tape     Past Medical History:   Diagnosis Date    Arthritis lower extremities    Asthma     Blind left eye     Diabetes mellitus     Edema of both lower legs     CHRONIC    Fall 02/08/2022    Gall stones     Hypertension     Kidney stones     Lives alone with help available     HOME HEALTH    Nephrolithiasis 1/22/2016    Obesity     PVD (peripheral vascular disease)     Renal disorder     Retinopathy     Sleep apnea     Vaginal delivery     x1    Weakness of both lower extremities     uses a cane, rollator & power chair     Past Surgical History:   Procedure Laterality Date    CARDIAC CATHETERIZATION      cataract      CHOLECYSTECTOMY      DEBRIDEMENT OF SACRAL WOUND N/A 5/31/2022     Procedure: DEBRIDEMENT, WOUND, SACRUM;  Surgeon: Timmy Allison MD;  Location: [facility];  Service: General;  Laterality: N/A;    EYE SURGERY      Rt cataract surgery    HYSTERECTOMY      URETERAL STENT PLACEMENT       Family History   Problem Relation Age of Onset    Kidney failure Mother     Hypertension Father     Diabetes Brother     Cancer Sister      Social History     Tobacco Use    Smoking status: Never Smoker    Smokeless tobacco: Never Used   Substance Use Topics    Alcohol use: No    Drug use: Never     Review of Systems   Constitutional: Negative for fever.   HENT: Negative for sore throat.    Respiratory: Negative for shortness of breath.    Cardiovascular: Negative for chest pain.   Gastrointestinal: Negative for nausea.   Genitourinary: Negative for dysuria.   Musculoskeletal: Negative for back pain.   Skin: Negative for rash.   Neurological: Negative for weakness.   Hematological: Does not bruise/bleed easily.   All other systems reviewed and are negative.      Physical Exam     Initial Vitals [06/17/22 1435]   BP Pulse Resp Temp SpO2   125/65 82 20 98.1 °F (36.7 °C) 99 %      MAP       --         Physical Exam    Nursing note and vitals reviewed.  Constitutional: She appears well-developed and well-nourished.   HENT:   Head: Normocephalic and atraumatic.   Eyes: Conjunctivae and EOM are normal. Pupils are equal, round, and reactive to light.   Neck:   Normal range of motion.  Cardiovascular: Normal rate and regular rhythm.   Pulmonary/Chest: Breath sounds normal. No respiratory distress.   Abdominal: She exhibits no distension.   Musculoskeletal:         General: Normal range of motion.      Cervical back: Normal range of motion.     Neurological: She is alert. No cranial nerve deficit. GCS score is 15. GCS eye subscore is 4. GCS verbal subscore is 5. GCS motor subscore is 6.   Skin: Skin is warm and dry.   Psychiatric: She has a normal mood and affect. Thought content normal.       hemoccult test done on stool in diaper: heme negative brown stool    Stage 4 sacral decub  ED Course   Procedures  Labs Reviewed   CBC W/ AUTO DIFFERENTIAL - Abnormal; Notable for the following components:       Result Value    RBC 2.70 (*)     Hemoglobin 7.1 (*)     Hematocrit 23.4 (*)     MCH 26.3 (*)     MCHC 30.3 (*)     RDW 16.8 (*)     Immature Granulocytes 0.7 (*)     Immature Grans (Abs) 0.07 (*)     Gran % 73.3 (*)     Lymph % 15.7 (*)     All other components within normal limits   COMPREHENSIVE METABOLIC PANEL - Abnormal; Notable for the following components:    Glucose 126 (*)     Calcium 8.6 (*)     Albumin 1.5 (*)     AST 66 (*)     Anion Gap 6 (*)     eGFR if  44 (*)     eGFR if non  38 (*)     All other components within normal limits   PROTIME-INR   HAPTOGLOBIN   FERRITIN   RETICULOCYTES   FOLATE   SARS-COV-2 RDRP GENE   TYPE & SCREEN   PREPARE RBC SOFT          Imaging Results    None          Medications   0.9%  NaCl infusion (for blood administration) (has no administration in time range)                Attending Attestation:         Attending Critical Care:   Critical Care Times:   Direct Patient Care (initial evaluation, reassessments, and time considering the case)................................................................20 minutes.   Additional History from reviewing old medical records or taking additional history from the family, EMS, PCP, etc.......................10 minutes.   Ordering, Reviewing, and Interpreting Diagnostic Studies...............................................................................................................10 minutes.   Documentation..................................................................................................................................................................................10 minutes.   Consultation with other Physicians.  .................................................................................................................................................10 minutes.   ==============================================================  · Total Critical Care Time - exclusive of procedural time: 60 minutes.  ==============================================================                   Clinical Impression:   Final diagnoses:  [D64.9] Anemia, unspecified type (Primary)          ED Disposition Condition    Observation               Anders Low MD  06/17/22 2310

## 2022-06-17 NOTE — HPI
Ana James 81 y.o. female with HTN, CKD, DM, anemia, and history of sacral decubitus wound presents hospital chief complaint of abnormal lab.  Per discussion with [facility] she had outpatient routine laboratory evaluation was found have a hemoglobin was below 8. She was directed to the emergency room.  She has been her normal state of health to this.  There have been no complaints of fever witnessed episodes bleeding at the nursing.  There has been no syncope.    Patient reports she feels fatigued and lightheaded but denies any dizziness.  She denies any syncope.  Her only complaint is that she wishes to have an Ensure with dinner tonight.  She states she does not take any blood thinners other than a daily aspirin.     In the ED, hemoglobin 7.1, afebrile without leukocytosis, creatinine 1.3 O positive type and screen COVID positive.

## 2022-06-17 NOTE — ASSESSMENT & PLAN NOTE
Positive for COVID 5/2022. Repeat test today again positive. Denies any symptoms. Has been positive for over 20 days. Does not require precautions

## 2022-06-17 NOTE — H&P
[facility] Emergency Dept  [facility] Medicine  History & Physical    Patient Name: Ana James  MRN: [id number]  Patient Class: OP- Observation  Admission Date: 6/17/2022  Attending Physician: Anders Low, *   Primary Care Provider: Viky Bean MD         Patient information was obtained from patient, past medical records and ER records.     Subjective:     Principal Problem:Anemia of chronic disease    Chief Complaint:   Chief Complaint   Patient presents with    abnormal labs     [facility] sent pt over due to low H&H resulted in blood work. Pt reports SOB and dizziness. Hx of dementia. Stage 4 ulcer to sacrum that is currently being treated.         HPI: Ana James 81 y.o. female with HTN, CKD, DM, anemia, and history of sacral decubitus wound presents hospital chief complaint of abnormal lab.  Per discussion with [facility] she had outpatient routine laboratory evaluation was found have a hemoglobin was below 8. She was directed to the emergency room.  She has been her normal state of health to this.  There have been no complaints of fever witnessed episodes bleeding at the nursing.  There has been no syncope.    Patient reports she feels fatigued and lightheaded but denies any dizziness.  She denies any syncope.  Her only complaint is that she wishes to have an Ensure with dinner tonight.  She states she does not take any blood thinners other than a daily aspirin.     In the ED, hemoglobin 7.1, afebrile without leukocytosis, creatinine 1.3 O positive type and screen COVID positive.      Past Medical History:   Diagnosis Date    Arthritis lower extremities    Asthma     Blind left eye     Diabetes mellitus     Edema of both lower legs     CHRONIC    Fall 02/08/2022    Gall stones     Hypertension     Kidney stones     Lives alone with help available     HOME HEALTH    Nephrolithiasis 1/22/2016    Obesity     PVD (peripheral vascular disease)      Renal disorder     Retinopathy     Sleep apnea     Vaginal delivery     x1    Weakness of both lower extremities     uses a cane, rollator & power chair       Past Surgical History:   Procedure Laterality Date    CARDIAC CATHETERIZATION      cataract      CHOLECYSTECTOMY      DEBRIDEMENT OF SACRAL WOUND N/A 5/31/2022    Procedure: DEBRIDEMENT, WOUND, SACRUM;  Surgeon: Timmy Allison MD;  Location: [facility];  Service: General;  Laterality: N/A;    EYE SURGERY      Rt cataract surgery    HYSTERECTOMY      URETERAL STENT PLACEMENT         Review of patient's allergies indicates:   Allergen Reactions    Adhesive Rash     Paper tape       No current facility-administered medications on file prior to encounter.     Current Outpatient Medications on File Prior to Encounter   Medication Sig    albuterol (PROVENTIL/VENTOLIN HFA) 90 mcg/actuation inhaler INHALE 2 PUFFS INTO THE LUNGS EVERY 6 (SIX) HOURS AS NEEDED FOR WHEEZING. RESCUE    arginine-vitamin C-vitamin E (ARGINAID) 4.5 gram-156 mg/9.2 gram PwPk Take 1 packet by mouth 2 (two) times a day. Add to H2O to promot wound healing    ascorbic acid, vitamin C, (VITAMIN C) 500 MG tablet Take 500 mg by mouth 2 (two) times daily.    aspirin (ECOTRIN) 81 MG EC tablet Take 81 mg by mouth once daily.    atorvastatin (LIPITOR) 20 MG tablet TAKE 1 TABLET BY MOUTH EVERY DAY    blood sugar diagnostic Strp Test 3 times daily    folic acid (FOLVITE) 1 MG tablet Take 1 tablet (1 mg total) by mouth once daily.    HUMALOG KWIKPEN INSULIN 100 unit/mL pen Inject into the skin. Per  House sliding scale; 0-200= DO NOT ADMINISTER; 201-249= 2 Units; 250-299= 4 Units; 300-349= 6 Units; 350-399= 8 Units; 400 OR GREATER = 10 Units    lancets Misc Test 3 times daily    lancets Misc To check BG 3 times daily, to use with insurance preferred meter    latanoprost 0.005 % ophthalmic solution Place 1 drop into both eyes every evening.    melatonin (MELATIN) 3 mg tablet Take 3  "tablets (9 mg total) by mouth nightly.    NOVOFINE AUTOCOVER 30 gauge x 1/3" Ndle     nut.tx.gluc.intol,lac-free,soy (GLUCERNA) Liqd Take 250 mLs by mouth 3 (three) times daily.    oxybutynin (DITROPAN XL) 15 MG TR24 TAKE 1 TABLET BY MOUTH EVERY DAY    pen needle, diabetic (BD ULTRA-FINE BREE PEN NEEDLE) 32 gauge x 5/32" Ndle INJECT INSULIN THREE TIMES DAILY    polyethylene glycol (GLYCOLAX) 17 gram PwPk Take 17 g by mouth once daily.    sulfamethoxazole-trimethoprim 800-160mg (BACTRIM DS) 800-160 mg Tab Take 1 tablet by mouth 2 (two) times daily.    timolol maleate 0.5% (TIMOPTIC) 0.5 % Drop Place 1 drop into both eyes every morning.    zinc gluconate 50 mg tablet Take 50 mg by mouth once daily. To promote wound healing x 45 days    [DISCONTINUED] amLODIPine (NORVASC) 10 MG tablet TAKE 1 TABLET BY MOUTH EVERY DAY    [DISCONTINUED] carvediloL (COREG) 12.5 MG tablet Take 1 tablet (12.5 mg total) by mouth 2 (two) times daily.    [DISCONTINUED] hydrALAZINE (APRESOLINE) 50 MG tablet Take 1 tablet (50 mg total) by mouth every 8 (eight) hours.    [DISCONTINUED] insulin detemir U-100 (LEVEMIR FLEXTOUCH U-100 INSULN) 100 unit/mL (3 mL) InPn pen Inject 26 Units into the skin every evening. HOLD UNTIL YOU SEE YOUR PCP    [DISCONTINUED] TRADJENTA 5 mg Tab tablet TAKE 1 TABLET BY MOUTH EVERY DAY    [DISCONTINUED] traZODone (DESYREL) 50 MG tablet TAKE 1 TABLET (50 MG TOTAL) BY MOUTH EVERY EVENING.     Family History       Problem Relation (Age of Onset)    Cancer Sister    Diabetes Brother    Hypertension Father    Kidney failure Mother          Tobacco Use    Smoking status: Never Smoker    Smokeless tobacco: Never Used   Substance and Sexual Activity    Alcohol use: No    Drug use: Never    Sexual activity: Not Currently     Partners: Male     Review of Systems   Constitutional:  Positive for fatigue. Negative for chills and fever.   HENT:  Negative for nosebleeds and tinnitus.    Eyes:  Negative for " photophobia and visual disturbance.   Respiratory:  Negative for shortness of breath and wheezing.    Cardiovascular:  Negative for chest pain, palpitations and leg swelling.   Gastrointestinal:  Negative for abdominal distention, nausea and vomiting.   Genitourinary:  Negative for dysuria, flank pain and hematuria.   Musculoskeletal:  Negative for gait problem and joint swelling.   Skin:  Negative for rash and wound.   Neurological:  Positive for light-headedness. Negative for seizures and syncope.   Objective:     Vital Signs (Most Recent):  Temp: 99 °F (37.2 °C) (06/17/22 1731)  Pulse: 80 (06/17/22 1733)  Resp: 18 (06/17/22 1731)  BP: (!) 129/58 (06/17/22 1733)  SpO2: 97 % (06/17/22 1733)   Vital Signs (24h Range):  Temp:  [98.1 °F (36.7 °C)-99 °F (37.2 °C)] 99 °F (37.2 °C)  Pulse:  [78-91] 80  Resp:  [18-20] 18  SpO2:  [95 %-99 %] 97 %  BP: (118-129)/(56-65) 129/58     Weight: 90 kg (198 lb 6.6 oz)  Body mass index is 36.29 kg/m².    Physical Exam  Vitals and nursing note reviewed.   Constitutional:       General: She is not in acute distress.     Appearance: She is well-developed. She is not diaphoretic.   HENT:      Head: Normocephalic and atraumatic.      Right Ear: External ear normal.      Left Ear: External ear normal.   Eyes:      General:         Right eye: No discharge.         Left eye: No discharge.      Conjunctiva/sclera: Conjunctivae normal.   Neck:      Thyroid: No thyromegaly.   Cardiovascular:      Rate and Rhythm: Normal rate and regular rhythm.      Heart sounds: No murmur heard.  Pulmonary:      Effort: Pulmonary effort is normal. No respiratory distress.      Breath sounds: Normal breath sounds.   Abdominal:      General: Bowel sounds are normal. There is no distension.      Palpations: Abdomen is soft. There is no mass.      Tenderness: There is no abdominal tenderness.   Musculoskeletal:         General: Deformity and signs of injury present.      Cervical back: Normal range of motion.       Comments: Stage 4 sacral decubitus wound without purulent drainage   Skin:     General: Skin is warm and dry.   Neurological:      Mental Status: She is alert and oriented to person, place, and time.      Sensory: No sensory deficit.   Psychiatric:         Mood and Affect: Mood normal.         Behavior: Behavior normal.           Significant Labs: CBC:   Recent Labs   Lab 06/17/22  1501   WBC 9.36   HGB 7.1*   HCT 23.4*        CMP:   Recent Labs   Lab 06/17/22  1501      K 4.3      CO2 29   *   BUN 19   CREATININE 1.3   CALCIUM 8.6*   PROT 6.8   ALBUMIN 1.5*   BILITOT 0.6   ALKPHOS 106   AST 66*   ALT 38   ANIONGAP 6*   EGFRNONAA 38*     Coagulation:   Recent Labs   Lab 06/17/22  1501   INR 1.1       Significant Imaging:   Imaging Results    None           Assessment/Plan:     * Anemia of chronic disease  Complaints of fatigue and weakness found to have hemoglboin 7.1. no bleeding witnessed per nursing home. GUAIAC negative per ED.   Type and screen   Transfuse 1 unit  Iron studies/folate/b12 pending    Pressure injury of sacral region, unstageable  Stable at baseline. Underwent debridement 6/2022.   Wound care consulted    Lab test positive for detection of COVID-19 virus  Positive for COVID 5/2022. Repeat test today again positive. Denies any symptoms. Has been positive for over 20 days. Does not require precautions    Atrial fibrillation  Per chart review history of afib. No currently on anticoagulation or beta blocker  Monitor on telemetry    Hyperlipidemia  Continue home statin    Controlled type 2 diabetes mellitus with stage 3 chronic kidney disease, with long-term current use of insulin  Lab Results   Component Value Date    HGBA1C 6.3 (H) 05/24/2022     Will start sliding scale insulin, diabetic pureed diet, goal -180    Cr today of 1.3. baseline of 0.8-1.2. renal dose medications avoid nephrotoxic drugs monitor intake and output    Class 2 obesity in adult  Body mass index  is 36.29 kg/m². Morbid obesity complicates all aspects of disease management from diagnostic modalities to treatment. Weight loss encouraged and health benefits explained to patient.     Essential hypertension  Well controlled not currently on medication outpatient. Continue to monitor      VTE Risk Mitigation (From admission, onward)         Ordered     Place BAMBI hose  Until discontinued         06/17/22 1720     IP VTE HIGH RISK PATIENT  Once         06/17/22 1720     Place sequential compression device  Until discontinued         06/17/22 1720              VTE: BAMBI/SCD  Code: previously DNR, geoff Pro currently on file Full CODE and full treatment  Diet: diabetic/pureeed with thin liquids  Dispo: pending transfusion  As clarification, on 6/17/2022, patient should be admitted for hospital observation. services under my care in collaboration with Edel Navarro MD. Phani Lazaro PA-C  Department of Hospital Medicine   [facility] - Emergency Dept

## 2022-06-18 VITALS
HEIGHT: 62 IN | DIASTOLIC BLOOD PRESSURE: 57 MMHG | SYSTOLIC BLOOD PRESSURE: 147 MMHG | HEART RATE: 77 BPM | WEIGHT: 213.88 LBS | OXYGEN SATURATION: 94 % | RESPIRATION RATE: 18 BRPM | TEMPERATURE: 99 F | BODY MASS INDEX: 39.36 KG/M2

## 2022-06-18 DIAGNOSIS — U07.1 COVID-19 VIRUS DETECTED: ICD-10-CM

## 2022-06-18 PROBLEM — L89.150 PRESSURE INJURY OF SACRAL REGION, UNSTAGEABLE: Status: RESOLVED | Noted: 2022-05-31 | Resolved: 2022-06-18

## 2022-06-18 LAB
ALBUMIN SERPL BCP-MCNC: 1.6 G/DL (ref 3.5–5.2)
ALP SERPL-CCNC: 110 U/L (ref 55–135)
ALT SERPL W/O P-5'-P-CCNC: 36 U/L (ref 10–44)
ANION GAP SERPL CALC-SCNC: 8 MMOL/L (ref 8–16)
AST SERPL-CCNC: 47 U/L (ref 10–40)
BASOPHILS # BLD AUTO: 0.03 K/UL (ref 0–0.2)
BASOPHILS NFR BLD: 0.3 % (ref 0–1.9)
BILIRUB SERPL-MCNC: 1 MG/DL (ref 0.1–1)
BUN SERPL-MCNC: 19 MG/DL (ref 8–23)
CALCIUM SERPL-MCNC: 8.8 MG/DL (ref 8.7–10.5)
CHLORIDE SERPL-SCNC: 102 MMOL/L (ref 95–110)
CO2 SERPL-SCNC: 29 MMOL/L (ref 23–29)
CREAT SERPL-MCNC: 1.4 MG/DL (ref 0.5–1.4)
DIFFERENTIAL METHOD: ABNORMAL
EOSINOPHIL # BLD AUTO: 0.2 K/UL (ref 0–0.5)
EOSINOPHIL NFR BLD: 1.3 % (ref 0–8)
ERYTHROCYTE [DISTWIDTH] IN BLOOD BY AUTOMATED COUNT: 16.1 % (ref 11.5–14.5)
EST. GFR  (AFRICAN AMERICAN): 40 ML/MIN/1.73 M^2
EST. GFR  (NON AFRICAN AMERICAN): 35 ML/MIN/1.73 M^2
FOLATE SERPL-MCNC: 17.2 NG/ML (ref 4–24)
GLUCOSE SERPL-MCNC: 165 MG/DL (ref 70–110)
HAPTOGLOB SERPL-MCNC: 339 MG/DL (ref 30–250)
HCT VFR BLD AUTO: 29 % (ref 37–48.5)
HGB BLD-MCNC: 8.9 G/DL (ref 12–16)
IMM GRANULOCYTES # BLD AUTO: 0.12 K/UL (ref 0–0.04)
IMM GRANULOCYTES NFR BLD AUTO: 1.1 % (ref 0–0.5)
LYMPHOCYTES # BLD AUTO: 1.1 K/UL (ref 1–4.8)
LYMPHOCYTES NFR BLD: 10.1 % (ref 18–48)
MCH RBC QN AUTO: 26.2 PG (ref 27–31)
MCHC RBC AUTO-ENTMCNC: 30.7 G/DL (ref 32–36)
MCV RBC AUTO: 85 FL (ref 82–98)
MONOCYTES # BLD AUTO: 1 K/UL (ref 0.3–1)
MONOCYTES NFR BLD: 8.8 % (ref 4–15)
NEUTROPHILS # BLD AUTO: 8.8 K/UL (ref 1.8–7.7)
NEUTROPHILS NFR BLD: 78.4 % (ref 38–73)
NRBC BLD-RTO: 0 /100 WBC
PLATELET # BLD AUTO: 413 K/UL (ref 150–450)
PMV BLD AUTO: 9.4 FL (ref 9.2–12.9)
POCT GLUCOSE: 140 MG/DL (ref 70–110)
POCT GLUCOSE: 148 MG/DL (ref 70–110)
POCT GLUCOSE: 178 MG/DL (ref 70–110)
POTASSIUM SERPL-SCNC: 4.4 MMOL/L (ref 3.5–5.1)
PROT SERPL-MCNC: 6.9 G/DL (ref 6–8.4)
RBC # BLD AUTO: 3.4 M/UL (ref 4–5.4)
SODIUM SERPL-SCNC: 139 MMOL/L (ref 136–145)
VIT B12 SERPL-MCNC: 1034 PG/ML (ref 210–950)
WBC # BLD AUTO: 11.2 K/UL (ref 3.9–12.7)

## 2022-06-18 PROCEDURE — 25000003 PHARM REV CODE 250: Performed by: PHYSICIAN ASSISTANT

## 2022-06-18 PROCEDURE — G0378 HOSPITAL OBSERVATION PER HR: HCPCS

## 2022-06-18 PROCEDURE — 36415 COLL VENOUS BLD VENIPUNCTURE: CPT | Performed by: PHYSICIAN ASSISTANT

## 2022-06-18 PROCEDURE — 82607 VITAMIN B-12: CPT | Performed by: PHYSICIAN ASSISTANT

## 2022-06-18 PROCEDURE — 85025 COMPLETE CBC W/AUTO DIFF WBC: CPT | Performed by: PHYSICIAN ASSISTANT

## 2022-06-18 PROCEDURE — 80053 COMPREHEN METABOLIC PANEL: CPT | Performed by: PHYSICIAN ASSISTANT

## 2022-06-18 RX ORDER — FERROUS SULFATE 325(65) MG
325 TABLET, DELAYED RELEASE (ENTERIC COATED) ORAL DAILY
Qty: 90 TABLET | Refills: 0 | Status: ON HOLD | OUTPATIENT
Start: 2022-06-18 | End: 2022-07-18

## 2022-06-18 RX ADMIN — ATORVASTATIN CALCIUM 20 MG: 10 TABLET, FILM COATED ORAL at 08:06

## 2022-06-18 RX ADMIN — ASPIRIN 81 MG: 81 TABLET, COATED ORAL at 08:06

## 2022-06-18 NOTE — PROGRESS NOTES
[facility]: [facility]  Case Management Services    WRITTEN HEALTHCARE DISCHARGE INFORMATION      Things that YOU are RESPONSIBLE for to Manage Your Care At Home:    WRITTEN DISCHARGE INSTURCTIONS  These are your WRITTEN DISCHARGE INSTRUCTIONS from your hospital stay. Please be sure to know and understand that you are responsible for the following, so that you will be able to better manage your care at home:   1.  all medications that have been called in for you or those that were written on prescription paper and provided to you to get filled.  2. BE SURE TO TAKE YOUR MEDICATION AS PRESCRIBED.  3. Be sure to attend your follow-up appointments that were scheduled for you or to schedule any appointments that you will be scheduling.     If you are unable to make your follow up appointments, please call the number listed and reschedule this appointment.      ________[HELP AT HOME]________    Be sure that you will have someone to help you at home during your recovery.     Experiencing any SIGNS or SYMPTOMS: YOU CAN     Schedule a same day appointment with your Primary Care Doctor or  you can call [facility] On Call Nurse Care Line for 24/7 assistance at [phone]     If you are experience any signs or symptoms that have become severe, Call 911 and come to your nearest Emergency Room.     Thank you for choosing [facility] and allowing us to care for you.     From your care management team:   You should receive a call from [facility] Discharge Department within 48-72 hours to help manage your care after discharge. Please try to make sure that you answer your phone for this important phone call.    Your follow-up appointments have been made according to your preferences. The following are your scheduled appointments or those that you will need to schedule for yourself.    FOLLOW-UPS   Follow-up Information     Viky Bean MD Follow up.    Specialties: Family Medicine, Internal Medicine  Why:  [facility] Follow-Up in 1 week.  Contact information:  [address] [place] [address] [phone]

## 2022-06-18 NOTE — PROGRESS NOTES
ADT-30 Order placed for Non-Emergent Transport: Stretcher  Request  time: 1415    If transportation does not arrive at anticipated ETA, nurse should follow protocol for transportation:  1. How can I get in touch directly with dispatch if needed?  2. Non-Emergent Transport Dispatch/Stretcher: [phone]

## 2022-06-18 NOTE — PLAN OF CARE
[facility] Transfer Orders        Admit to: [facility]    Diagnoses:   Active Hospital Problems    Diagnosis  POA    *Anemia of chronic disease [D63.8]  Yes     Chronic    Lab test positive for detection of COVID-19 virus [U07.1]  Yes    Atrial fibrillation [I48.91]  Yes    Hyperlipidemia [E78.5]  Yes     Chronic    Controlled type 2 diabetes mellitus with stage 3 chronic kidney disease, with long-term current use of insulin [E11.22, N18.30, Z79.4]  Not Applicable     Chronic    Essential hypertension [I10]  Yes     Chronic    Class 2 obesity in adult [E66.9]  Yes      Resolved Hospital Problems    Diagnosis Date Resolved POA    Pressure injury of sacral region, unstageable [L89.150] 06/18/2022 Yes     Allergies:   Review of patient's allergies indicates:   Allergen Reactions    Adhesive Rash     Paper tape       Code Status: Full code    Vitals: Routine       Diet: pureed diet thin Diabetic diet    Activity: Activity as tolerated    Nursing Precautions: Aspiration , Fall, Seizure and Pressure ulcer prevention    Bed/Surface: Low Air Loss      Oxygen: room air    Dialysis: Patient is not on dialysis.      Pending Diagnostic Studies:     Procedure Component Value Units Date/Time    Folate [[id number]] Collected: 06/17/22 1536    Order Status: Sent Lab Status: In process Updated: 06/17/22 1555    Specimen: Blood     Haptoglobin [[id number]] Collected: 06/17/22 1536    Order Status: Sent Lab Status: In process Updated: 06/17/22 1555    Specimen: Blood     Vitamin B12 [[id number]] Collected: 06/18/22 0223    Order Status: Sent Lab Status: In process Updated: 06/18/22 0223    Specimen: Blood             Miscellaneous Care:   Routine Skin for Bedridden Patients:  Apply moisture barrier cream to all         Medications: Discontinue all previous medication orders, if any. See new list below.  Current Discharge Medication List      START taking these medications    Details   ferrous  sulfate 325 (65 FE) MG EC tablet Take 1 tablet (325 mg total) by mouth once daily.  Qty: 90 tablet, Refills: 0         CONTINUE these medications which have NOT CHANGED    Details   albuterol (PROVENTIL/VENTOLIN HFA) 90 mcg/actuation inhaler INHALE 2 PUFFS INTO THE LUNGS EVERY 6 (SIX) HOURS AS NEEDED FOR WHEEZING. RESCUE  Qty: 8.5 g, Refills: 11    Associated Diagnoses: Wheezing      arginine-vitamin C-vitamin E (ARGINAID) 4.5 gram-156 mg/9.2 gram PwPk Take 1 packet by mouth 2 (two) times a day. Add to H2O to promot wound healing      ascorbic acid, vitamin C, (VITAMIN C) 500 MG tablet Take 500 mg by mouth 2 (two) times daily.      aspirin (ECOTRIN) 81 MG EC tablet Take 81 mg by mouth once daily.      atorvastatin (LIPITOR) 20 MG tablet TAKE 1 TABLET BY MOUTH EVERY DAY  Qty: 90 tablet, Refills: 2    Associated Diagnoses: Controlled type 2 diabetes mellitus with stage 4 chronic kidney disease, with long-term current use of insulin      blood sugar diagnostic Strp Test 3 times daily  Qty: 100 each, Refills: 11    Comments: True Results  Associated Diagnoses: Type 2 diabetes, uncontrolled, with renal manifestation      folic acid (FOLVITE) 1 MG tablet Take 1 tablet (1 mg total) by mouth once daily.  Refills: 0      HUMALOG KWIKPEN INSULIN 100 unit/mL pen Inject into the skin. Per  House sliding scale; 0-200= DO NOT ADMINISTER; 201-249= 2 Units; 250-299= 4 Units; 300-349= 6 Units; 350-399= 8 Units; 400 OR GREATER = 10 Units      !! lancets Misc Test 3 times daily  Qty: 100 each, Refills: 11    Comments: True Results  Associated Diagnoses: Type 2 diabetes, uncontrolled, with renal manifestation      !! lancets Misc To check BG 3 times daily, to use with insurance preferred meter  Qty: 200 each, Refills: 3      latanoprost 0.005 % ophthalmic solution Place 1 drop into both eyes every evening.  Refills: 3      melatonin (MELATIN) 3 mg tablet Take 3 tablets (9 mg total) by mouth nightly.  Refills: 0      NOVOFINE  "AUTOCOVER 30 gauge x 1/3" Ndle       nut.tx.gluc.intol,lac-free,soy (GLUCERNA) Liqd Take 250 mLs by mouth 3 (three) times daily.      oxybutynin (DITROPAN XL) 15 MG TR24 TAKE 1 TABLET BY MOUTH EVERY DAY  Qty: 90 tablet, Refills: 1      pen needle, diabetic (BD ULTRA-FINE BREE PEN NEEDLE) 32 gauge x 5/32" Ndle INJECT INSULIN THREE TIMES DAILY  Qty: 100 each, Refills: 3      polyethylene glycol (GLYCOLAX) 17 gram PwPk Take 17 g by mouth once daily.  Refills: 0      timolol maleate 0.5% (TIMOPTIC) 0.5 % Drop Place 1 drop into both eyes every morning.  Refills: 2      zinc gluconate 50 mg tablet Take 50 mg by mouth once daily. To promote wound healing x 45 days       !! - Potential duplicate medications found. Please discuss with provider.      STOP taking these medications       amLODIPine (NORVASC) 10 MG tablet Comments:   Reason for Stopping:         carvediloL (COREG) 12.5 MG tablet Comments:   Reason for Stopping:         hydrALAZINE (APRESOLINE) 50 MG tablet Comments:   Reason for Stopping:         insulin detemir U-100 (LEVEMIR FLEXTOUCH U-100 INSULN) 100 unit/mL (3 mL) InPn pen Comments:   Reason for Stopping:         sulfamethoxazole-trimethoprim 800-160mg (BACTRIM DS) 800-160 mg Tab Comments:   Reason for Stopping:         TRADJENTA 5 mg Tab tablet Comments:   Reason for Stopping:         traZODone (DESYREL) 50 MG tablet Comments:   Reason for Stopping:             Follow up:    Follow-up Information     Viky Bean MD Follow up.    Specialties: Family Medicine, Internal Medicine  Why: [facility] Follow-Up in 1 week.  Contact information:  [address] [place] [address] [phone]             [facility] Follow up.    Specialty: Skilled Nursing Facility  Why: Nursing Home: return  Contact information:  [address] [address] [address] [phone]                       Immunizations Administered as of 6/18/2022  "    Name Date Dose VIS Date Route Exp Date    COVID-19, MRNA, LN-S, PF (Moderna) 11/18/2021 0.25 mL -- Intramuscular --    Site: Right deltoid     Lot: [id number]     COVID-19, MRNA, LN-S, PF (Pfizer) (Purple Cap) 2/24/2021 -- -- -- --    : Pf[employer] Inc     Lot: [id number]     COVID-19, MRNA, LN-S, PF (Pfizer) (Purple Cap) 2/4/2021 -- -- -- --    External: Patient reported           This patient has had both covid vaccinations    Some patients may experience side effects after vaccination.  These may include fever, headache, muscle or joint aches.  Most symptoms resolve with 24-48 hours and do not require urgent medical evaluation unless they persist for more than 72 hours or symptoms are concerning for an unrelated medical condition.          Xander Sylvester MD

## 2022-06-18 NOTE — PROGRESS NOTES
Contacted Sneha with N to get ambulance/stretcher authorization; Sneha reported that she will need order from hospitalist; contacted Dr. Sylvester to get order for ambulance.    [id number] Contact Sneha for ambulance authorization: [phone] for [facility]

## 2022-06-18 NOTE — PLAN OF CARE
[facility] [place]  Initial Discharge Assessment       Primary Care Provider: Viky Bean MD    Admission Diagnosis: Chest pain [R07.9]  Anemia, unspecified type [D64.9]    Admission Date: 6/17/2022  Expected Discharge Date: 6/18/2022    Discharge Barriers Identified: None    Payor: [employer] MA[employer]GED MEDICARE / Plan: [employer] HEALTH / Product Type: Medicare Advantage /     Extended Emergency Contact Information  Primary Emergency Contact: Poonam Doshi   [facility]  Home Phone: [phone]  Mobile Phone: [phone]  Relation: Friend  Secondary Emergency Contact: ElderPrince   [facility]  Home Phone: [phone]  Mobile Phone: [phone]  Relation: Brother    Discharge Plan A: Return to nursing home ([place])  Discharge Plan B: Return to Nursing Home ([place])      [facility]/pharmacy #[id number] - [place], [place] - [address]  [address] [address]  Phone: [phone] Fax: [phone]    [facility] [place], [place] - [address] AT Portal to Registered [facility] Sites  [address]  [facility] [address]  Phone: [phone] Fax: [phone]    [facility]/pharmacy #[id number] - [place], [place] - [address]  [address] [facility] [address]  Phone: [phone] Fax: [phone]      Initial Assessment (most recent)     Adult Discharge Assessment - 06/18/22 0933        Discharge Assessment    Assessment Type Discharge Planning Assessment     Confirmed/corrected address, phone number and insurance Yes   [facility]    Confirmed Demographics Correct on Facesheet     Source of Information other (see comments);health record;family     Does patient/caregiver understand observation status Yes     Reason For Admission Chest Pain     Lives With facility resident     Facility Arrived From: [place]     Do you expect to return to your current living situation? Yes     Do you have help at home or  someone to help you manage your care at home? Yes     Who are your caregiver(s) and their phone number(s)? NH Staff     Prior to hospitilization cognitive status: Not Oriented to Person;Not Oriented to Place;Not Oriented to Time     Current cognitive status: Not Oriented to Person;Not Oriented to Time     Walking or Climbing Stairs Difficulty ambulation difficulty, requires equipment     Mobility Management WC     Dressing/Bathing Difficulty bathing difficulty, assistance 1 person     Dressing/Bathing Management Assist with bathing, dressing; NH staff assist     Do you have any problems with: Errands/Grocery;Needs other help     Specify other help NH staff assists and transports     Home Accessibility wheelchair accessible     Home Layout Able to live on 1st floor     Equipment Currently Used at Home wheelchair     Readmission within 30 days? No     Patient currently being followed by outpatient case management? No     Do you currently have service(s) that help you manage your care at home? No     Do you take prescription medications? Yes     Do you have prescription coverage? Yes     Coverage PHN     Do you have any problems affording any of your prescribed medications? No     Is the patient taking medications as prescribed? yes     Who is going to help you get home at discharge? [facility] staff     How do you get to doctors appointments? agency     Are you on dialysis? No     Do you take coumadin? No     Discharge Plan A Return to nursing home   [facility]    Discharge Plan B Return to Nursing Home   [facility]    DME Needed Upon Discharge  other (see comments)   TBD    Discharge Plan discussed with: Friend     Name(s) and Number(s) Poonam-friend: [phone]; [phone]     Discharge Barriers Identified None        Relationship/Environment    Name(s) of Who Lives With Patient Nursing Home staff             SW Role explained to patient; two patient identifiers recognized; SW contact information placed on  Communication board. Discussed patient managing health care at home; determined who would be helping patient at home with recovery: [facility] staff will help with recovery.    PCP: Viky Bean MD     Extended Emergency Contact Information  Primary Emergency Contact: Poonam Doshi   [facility]  Home Phone: [phone]  Mobile Phone: [phone]  Relation: Friend  Secondary Emergency Contact: Prince Elder   [facility]  Home Phone: [phone]  Mobile Phone: [phone]  Relation: Brother     [facility]/pharmacy #[id number] - [place], [place] - [address] [facility]  [address] [facility] [address] [facility] [address]  Phone: [phone] Fax: [phone]    [facility] - [place] - [address] AT Portal to Registered [facility] Sites  [address]  [facility] [address]  Phone: [phone] Fax: [phone]    [facility]/pharmacy #[id number] - [place] - [address]  [address]  [place] [address]  Phone: [phone] Fax: [phone]    Payor: [employer] MANAGED MEDICARE / Plan: [employer] [facility] HEALTH / Product Type: Medicare Advantage /

## 2022-06-18 NOTE — NURSING
Pt refusing to be repositioned at this time, pt screaming during 4 eye assessment. Pt does not want to be touched, screaming she wants staff to call police, pt states she is in an unknown house. Pt oriented to time, place, & situation. Pt educated on the importance of repositioning every 2 hours to prevent pressure ulcers. Pt needs further education on the matter. No family at bedside.

## 2022-06-18 NOTE — NURSING
[facility] NADN or reported. Pt refuses dinner, resting comfortably.

## 2022-06-18 NOTE — PLAN OF CARE
Problem: Skin Injury Risk Increased  Goal: Skin Health and Integrity  Outcome: ONgoing, NOT Progressing  Pt refuses to be turned. Education provided, needs reinforcement.      Problem: Adult Inpatient Plan of Care  Goal: Plan of Care Review  Outcome: Met  Goal: Patient-Specific Goal (Individualized)  Outcome: Met  Goal: Absence of Hospital-Acquired Illness or Injury  Outcome: Met  Goal: Optimal Comfort and Wellbeing  Outcome: Met  Goal: Readiness for Transition of Care  Outcome: Met     Problem: Diabetes Comorbidity  Goal: Blood Glucose Level Within Targeted Range  Outcome: Met     Problem: Fluid and Electrolyte Imbalance (Acute Kidney Injury/Impairment)  Goal: Fluid and Electrolyte Balance  Outcome: Met     Problem: Oral Intake Inadequate (Acute Kidney Injury/Impairment)  Goal: Optimal Nutrition Intake  Outcome: Met     Problem: Impaired Wound Healing  Goal: Optimal Wound Healing  Outcome: Ongoing, NOT Progressing  Pt non compliant with treatment to be turned q2.      Problem: Fall Injury Risk  Goal: Absence of Fall and Fall-Related Injury  Outcome: Met

## 2022-06-18 NOTE — PROGRESS NOTES
Contacted transfer center who reported that [facility] would was not working and would not be able to take patient. Contacted Sneha with [facility] to change authorization to [facility].     Auth #[id number]    Contacted transfer center and [facility] to provide auth number.

## 2022-06-18 NOTE — PROGRESS NOTES
Contacted Ashwin at [place] who reported that she had received plan of care and patient information; reviewing; will follow-up with her in 1 hour and then have nurse, Angelo call report.

## 2022-06-18 NOTE — PLAN OF CARE
06/18/22 0849   Post-Acute Status   Post-Acute Authorization Other  (Home; follow-up)   Other Status No Post-Acute Service Needs   Hospital Resources/Appts/Education Provided Provided patient/caregiver with written discharge plan information;Appointments scheduled by Navigator/Coordinator;Provided education on problems/symptoms using teachback;Appointments scheduled and added to AVS   Discharge Delays None known at this time   Discharge Plan   Discharge Plan A Home with family  (Follow-up)   Discharge Plan B Home with family  (Follow-up)

## 2022-06-18 NOTE — HOSPITAL COURSE
Patient admitted for anemia. Was transfused 2 units of prbcs. Workup showed iron deficiency superimposed on Anemia chronic discease. HnH improved was medically stable for discharge

## 2022-06-18 NOTE — NURSING
Wound dressing and packing changed. Pt was soiled, skin cleansed, new diaper applied. Pt continues to refuse to be turned. Pt left via [facility] to  NH.

## 2022-06-18 NOTE — CONSULTS
Patient to discharge and return to [facility]; Ashwin accepted patient back; ambulance authorization obtained from Sneha with PH[employer].

## 2022-06-18 NOTE — PROGRESS NOTES
Patient to discharge and return to [facility]; contacted agency to set up return; faxed patient information to [facility] to review; awaiting Plan of Care for return.    Provided call to report number and contact, Ashwin Bains

## 2022-06-18 NOTE — DISCHARGE SUMMARY
[facility] Medicine  Discharge Summary      Patient Name: Ana James  MRN: [id number]  Patient Class: OP- Observation  Admission Date: 6/17/2022  Hospital Length of Stay: 0 days  Discharge Date and Time:  06/18/2022 2:17 PM  Attending Physician: Xander Sylvestre MD   Discharging Provider: Xander Sylvester MD  Primary Care Provider: Viky Bean MD      HPI:   Ana James 81 y.o. female with HTN, CKD, DM, anemia, and history of sacral decubitus wound presents hospital chief complaint of abnormal lab.  Per discussion with [facility] she had outpatient routine laboratory evaluation was found have a hemoglobin was below 8. She was directed to the emergency room.  She has been her normal state of health to this.  There have been no complaints of fever witnessed episodes bleeding at the nursing.  There has been no syncope.    Patient reports she feels fatigued and lightheaded but denies any dizziness.  She denies any syncope.  Her only complaint is that she wishes to have an Ensure with dinner tonight.  She states she does not take any blood thinners other than a daily aspirin.     In the ED, hemoglobin 7.1, afebrile without leukocytosis, creatinine 1.3 O positive type and screen COVID positive.      * No surgery found *      Hospital Course:   Patient admitted for anemia. Was transfused 2 units of prbcs. Workup showed iron deficiency superimposed on Anemia chronic discease. HnH improved was medically stable for discharge       Goals of Care Treatment Preferences:  Code Status: Full Code       LaPOST: Yes           Consults:   Consults (From admission, onward)        Status Ordering Provider     Case Management/  Once        Provider:  (Not yet assigned)    JULIANA Martin          No new Assessment & Plan notes have been filed under this hospital service since the last note was generated.  Service: Hospital Medicine    Final  Active Diagnoses:    Diagnosis Date Noted POA    PRINCIPAL PROBLEM:  Anemia of chronic disease [D63.8] 02/27/2020 Yes     Chronic    Lab test positive for detection of COVID-19 virus [U07.1] 05/24/2022 Yes    Atrial fibrillation [I48.91] 01/24/2022 Yes    Hyperlipidemia [E78.5] 02/27/2020 Yes     Chronic    Controlled type 2 diabetes mellitus with stage 3 chronic kidney disease, with long-term current use of insulin [E11.22, N18.30, Z79.4] 07/11/2018 Not Applicable     Chronic    Essential hypertension [I10] 03/02/2014 Yes     Chronic    Class 2 obesity in adult [E66.9] 03/02/2014 Yes      Problems Resolved During this Admission:    Diagnosis Date Noted Date Resolved POA    Pressure injury of sacral region, unstageable [L89.150] 05/31/2022 06/18/2022 Yes       Discharged Condition: stable    Disposition: Home or Self Care    Follow Up:   Follow-up Information     Viky Bean MD Follow up.    Specialties: Family Medicine, Internal Medicine  Why: Out-Patient Primary Care Hospital Follow-Up in 1 week.  Contact information:  [facility] [place] [id number] [phone]             [facility] Follow up.    Specialty: Skilled Nursing Facility  Why: Nursing Home: return  Contact information:  [address] [address] [phone]                     Patient Instructions:      Activity as tolerated       Significant Diagnostic Studies: Labs:   CMP   Recent Labs   Lab 06/17/22  1501 06/18/22  0223    139   K 4.3 4.4    102   CO2 29 29   * 165*   BUN 19 19   CREATININE 1.3 1.4   CALCIUM 8.6* 8.8   PROT 6.8 6.9   ALBUMIN 1.5* 1.6*   BILITOT 0.6 1.0   ALKPHOS 106 110   AST 66* 47*   ALT 38 36   ANIONGAP 6* 8   ESTGFRAFRICA 44* 40*   EGFRNONAA 38* 35*    and CBC   Recent Labs   Lab 06/17/22  1501 06/18/22  0223   WBC 9.36 11.20   HGB 7.1* 8.9*   HCT 23.4* 29.0*    413     Microbiology:   Blood Culture   Lab  Results   Component Value Date    LABBLOO No Growth after 4 days.  04/24/2022   , Sputum Culture No results found for: GSRESP, RESPIRATORYC and Urine Culture    Lab Results   Component Value Date    LABURIN ESCHERICHIA COLI  >100,000 cfu/ml   (A) 01/26/2022       Pending Diagnostic Studies:     Procedure Component Value Units Date/Time    Folate [[id number]] Collected: 06/17/22 1536    Order Status: Sent Lab Status: In process Updated: 06/17/22 1555    Specimen: Blood     Haptoglobin [[id number]] Collected: 06/17/22 1536    Order Status: Sent Lab Status: In process Updated: 06/17/22 1555    Specimen: Blood     Vitamin B12 [[id number]] Collected: 06/18/22 0223    Order Status: Sent Lab Status: In process Updated: 06/18/22 0223    Specimen: Blood          Medications:  Reconciled Home Medications:      Medication List      START taking these medications    ferrous sulfate 325 (65 FE) MG EC tablet  Take 1 tablet (325 mg total) by mouth once daily.        CONTINUE taking these medications    albuterol 90 mcg/actuation inhaler  Commonly known as: PROVENTIL/VENTOLIN HFA  INHALE 2 PUFFS INTO THE LUNGS EVERY 6 (SIX) HOURS AS NEEDED FOR WHEEZING. RESCUE     ARGINAID 4.5 gram-156 mg/9.2 gram Pwpk  Generic drug: arginine-vitamin C-vitamin E  Take 1 packet by mouth 2 (two) times a day. Add to H2O to promot wound healing     aspirin 81 MG EC tablet  Commonly known as: ECOTRIN  Take 81 mg by mouth once daily.     atorvastatin 20 MG tablet  Commonly known as: LIPITOR  TAKE 1 TABLET BY MOUTH EVERY DAY     blood sugar diagnostic Strp  Test 3 times daily     folic acid 1 MG tablet  Commonly known as: FOLVITE  Take 1 tablet (1 mg total) by mouth once daily.     GLUCERNA Liqd  Generic drug: nut.tx.gluc.intol,lac-free,soy  Take 250 mLs by mouth 3 (three) times daily.     HumaLOG KwikPen Insulin 100 unit/mL pen  Generic drug: insulin lispro  Inject into the skin. Per [facility] House sliding scale; 0-200= DO NOT ADMINISTER; 201-249= 2 Units;  "250-299= 4 Units; 300-349= 6 Units; 350-399= 8 Units; 400 OR GREATER = 10 Units     * lancets Misc  Test 3 times daily     * lancets Misc  To check BG 3 times daily, to use with insurance preferred meter     latanoprost 0.005 % ophthalmic solution  Place 1 drop into both eyes every evening.     melatonin 3 mg tablet  Commonly known as: MELATIN  Take 3 tablets (9 mg total) by mouth nightly.     NOVOFINE AUTOCOVER 30 gauge x 1/3" Ndle  Generic drug: pen needle, diabetic, safety     oxybutynin 15 MG Tr24  Commonly known as: DITROPAN XL  TAKE 1 TABLET BY MOUTH EVERY DAY     pen needle, diabetic 32 gauge x 5/32" Ndle  Commonly known as: BD ULTRA-FINE BREE PEN NEEDLE  INJECT INSULIN THREE TIMES DAILY     polyethylene glycol 17 gram Pwpk  Commonly known as: GLYCOLAX  Take 17 g by mouth once daily.     timolol maleate 0.5% 0.5 % Drop  Commonly known as: TIMOPTIC  Place 1 drop into both eyes every morning.     VITAMIN C 500 MG tablet  Generic drug: ascorbic acid (vitamin C)  Take 500 mg by mouth 2 (two) times daily.     zinc gluconate 50 mg tablet  Take 50 mg by mouth once daily. To promote wound healing x 45 days         * This list has 2 medication(s) that are the same as other medications prescribed for you. Read the directions carefully, and ask your doctor or other care provider to review them with you.            STOP taking these medications    amLODIPine 10 MG tablet  Commonly known as: NORVASC     carvediloL 12.5 MG tablet  Commonly known as: COREG     hydrALAZINE 50 MG tablet  Commonly known as: APRESOLINE     LEVEMIR FLEXTOUCH U-100 INSULN 100 unit/mL (3 mL) Inpn pen  Generic drug: insulin detemir U-100     sulfamethoxazole-trimethoprim 800-160mg 800-160 mg Tab  Commonly known as: BACTRIM DS     TRADJENTA 5 mg Tab tablet  Generic drug: linaGLIPtin     traZODone 50 MG tablet  Commonly known as: DESYREL            Indwelling Lines/Drains at time of discharge:   Lines/Drains/Airways     Drain  Duration           " Female External Urinary Catheter 06/17/22 3730 <1 day                Time spent on the discharge of patient: 60 minutes         Xander Sylvester MD  Department of Hospital Medicine  [facility] - Telemetry

## 2022-06-18 NOTE — NURSING
[facility] Nurses Note -- 4 Eyes      6/17/2022   10:23 PM      Skin assessed during: admission      [] No Pressure Injuries Present    []Prevention Measures Documented      [x] Yes- Altered Skin Integrity Present or Discovered   [x] LDA Added if Not in Epic (Describe Wound)   [x] New Altered Skin Integrity was Present on Admit and Documented in LDA   [] Wound Image Taken      Attending RN:  Evon Romo RN     Second RN/Staff Member:  Bib Mercer PCT.

## 2022-06-18 NOTE — NURSING
Report called to [place] at [facility]. All questions answered. Yosi CASILLAS to handle transport request.

## 2022-06-20 ENCOUNTER — TELEPHONE (OUTPATIENT)
Dept: FAMILY MEDICINE | Facility: CLINIC | Age: 82
End: 2022-06-20
Payer: MEDICARE

## 2022-06-20 DIAGNOSIS — N18.4 ANEMIA DUE TO STAGE 4 CHRONIC KIDNEY DISEASE: ICD-10-CM

## 2022-06-20 DIAGNOSIS — U07.1 COVID: Primary | ICD-10-CM

## 2022-06-20 DIAGNOSIS — D63.1 ANEMIA DUE TO STAGE 4 CHRONIC KIDNEY DISEASE: ICD-10-CM

## 2022-06-20 NOTE — TELEPHONE ENCOUNTER
----- Message from Yosi Brown III, LCSW sent at 6/18/2022  8:53 AM CDT -----  Regarding: [facility] Follow-up in 1 week  Patient discharged on 6/18/2022. Please contact patient for Out-Patient PCP Hospital Follow-Up in 1 week. Thanks. Yosi

## 2022-06-20 NOTE — TELEPHONE ENCOUNTER
Placed order for home tcc visit as pt has physical debility and difficulty with tranporting and high risk for readmission

## 2022-06-27 ENCOUNTER — PES CALL (OUTPATIENT)
Dept: ADMINISTRATIVE | Facility: CLINIC | Age: 82
End: 2022-06-27
Payer: MEDICARE

## 2022-06-28 ENCOUNTER — PES CALL (OUTPATIENT)
Dept: ADMINISTRATIVE | Facility: CLINIC | Age: 82
End: 2022-06-28
Payer: MEDICARE

## 2022-06-28 ENCOUNTER — TELEPHONE (OUTPATIENT)
Dept: WOUND CARE | Facility: HOSPITAL | Age: 82
End: 2022-06-28
Payer: MEDICARE

## 2022-06-29 ENCOUNTER — PES CALL (OUTPATIENT)
Dept: ADMINISTRATIVE | Facility: CLINIC | Age: 82
End: 2022-06-29
Payer: MEDICARE

## 2022-07-01 LAB — FUNGUS SPEC CULT: NORMAL

## 2022-07-05 ENCOUNTER — PES CALL (OUTPATIENT)
Dept: ADMINISTRATIVE | Facility: CLINIC | Age: 82
End: 2022-07-05
Payer: MEDICARE

## 2022-07-15 ENCOUNTER — HOSPITAL ENCOUNTER (INPATIENT)
Facility: HOSPITAL | Age: 82
LOS: 3 days | Discharge: HOSPICE | DRG: 871 | End: 2022-07-18
Attending: EMERGENCY MEDICINE | Admitting: STUDENT IN AN ORGANIZED HEALTH CARE EDUCATION/TRAINING PROGRAM
Payer: MEDICARE

## 2022-07-15 DIAGNOSIS — M79.601 PAIN AND SWELLING OF RIGHT UPPER EXTREMITY: ICD-10-CM

## 2022-07-15 DIAGNOSIS — R07.9 CHEST PAIN: ICD-10-CM

## 2022-07-15 DIAGNOSIS — L89.90 PRESSURE INJURY OF SKIN, UNSPECIFIED INJURY STAGE, UNSPECIFIED LOCATION: ICD-10-CM

## 2022-07-15 DIAGNOSIS — R41.82 ALTERED MENTAL STATUS: ICD-10-CM

## 2022-07-15 DIAGNOSIS — T68.XXXA HYPOTHERMIA, INITIAL ENCOUNTER: ICD-10-CM

## 2022-07-15 DIAGNOSIS — G93.40 ACUTE ENCEPHALOPATHY: ICD-10-CM

## 2022-07-15 DIAGNOSIS — A41.9 SEPTIC SHOCK: Primary | ICD-10-CM

## 2022-07-15 DIAGNOSIS — R65.21 SEPTIC SHOCK: Primary | ICD-10-CM

## 2022-07-15 DIAGNOSIS — A41.9 SEPSIS WITHOUT ACUTE ORGAN DYSFUNCTION, DUE TO UNSPECIFIED ORGANISM: ICD-10-CM

## 2022-07-15 DIAGNOSIS — M79.89 PAIN AND SWELLING OF RIGHT UPPER EXTREMITY: ICD-10-CM

## 2022-07-15 DIAGNOSIS — R57.9 SHOCK: ICD-10-CM

## 2022-07-15 DIAGNOSIS — E86.0 SEVERE DEHYDRATION: ICD-10-CM

## 2022-07-15 DIAGNOSIS — D64.9 SEVERE ANEMIA: ICD-10-CM

## 2022-07-15 DIAGNOSIS — R00.1 BRADYCARDIA: ICD-10-CM

## 2022-07-15 DIAGNOSIS — T83.9XXA FOLEY CATHETER PROBLEM, INITIAL ENCOUNTER: ICD-10-CM

## 2022-07-15 PROBLEM — R22.31 LOCALIZED SWELLING ON RIGHT HAND: Status: ACTIVE | Noted: 2022-07-15

## 2022-07-15 LAB
ALBUMIN SERPL BCP-MCNC: 1.5 G/DL (ref 3.5–5.2)
ALP SERPL-CCNC: 200 U/L (ref 55–135)
ALT SERPL W/O P-5'-P-CCNC: 111 U/L (ref 10–44)
AMMONIA PLAS-SCNC: 30 UMOL/L (ref 10–50)
ANION GAP SERPL CALC-SCNC: 6 MMOL/L (ref 8–16)
ANISOCYTOSIS BLD QL SMEAR: SLIGHT
AST SERPL-CCNC: 113 U/L (ref 10–40)
BACTERIA #/AREA URNS HPF: ABNORMAL /HPF
BASOPHILS # BLD AUTO: 0.02 K/UL (ref 0–0.2)
BASOPHILS NFR BLD: 0.2 % (ref 0–1.9)
BILIRUB SERPL-MCNC: 0.5 MG/DL (ref 0.1–1)
BILIRUB UR QL STRIP: NEGATIVE
BUN SERPL-MCNC: 72 MG/DL (ref 8–23)
CALCIUM SERPL-MCNC: 9.2 MG/DL (ref 8.7–10.5)
CHLORIDE SERPL-SCNC: 112 MMOL/L (ref 95–110)
CLARITY UR: ABNORMAL
CO2 SERPL-SCNC: 27 MMOL/L (ref 23–29)
COLOR UR: YELLOW
CREAT SERPL-MCNC: 2.6 MG/DL (ref 0.5–1.4)
DIFFERENTIAL METHOD: ABNORMAL
EOSINOPHIL # BLD AUTO: 0.1 K/UL (ref 0–0.5)
EOSINOPHIL NFR BLD: 0.5 % (ref 0–8)
ERYTHROCYTE [DISTWIDTH] IN BLOOD BY AUTOMATED COUNT: 21.6 % (ref 11.5–14.5)
EST. GFR  (AFRICAN AMERICAN): 19 ML/MIN/1.73 M^2
EST. GFR  (NON AFRICAN AMERICAN): 16 ML/MIN/1.73 M^2
GLUCOSE SERPL-MCNC: 88 MG/DL (ref 70–110)
GLUCOSE UR QL STRIP: NEGATIVE
GRAN CASTS #/AREA URNS LPF: 2 /LPF
HCT VFR BLD AUTO: 26.1 % (ref 37–48.5)
HGB BLD-MCNC: 7.7 G/DL (ref 12–16)
HGB UR QL STRIP: ABNORMAL
HYALINE CASTS #/AREA URNS LPF: 1 /LPF
IMM GRANULOCYTES # BLD AUTO: 0.05 K/UL (ref 0–0.04)
IMM GRANULOCYTES NFR BLD AUTO: 0.5 % (ref 0–0.5)
INR PPP: 1.2 (ref 0.8–1.2)
KETONES UR QL STRIP: NEGATIVE
LACTATE SERPL-SCNC: 0.7 MMOL/L (ref 0.5–2.2)
LACTATE SERPL-SCNC: 0.9 MMOL/L (ref 0.5–2.2)
LEUKOCYTE ESTERASE UR QL STRIP: ABNORMAL
LYMPHOCYTES # BLD AUTO: 1.8 K/UL (ref 1–4.8)
LYMPHOCYTES NFR BLD: 18.1 % (ref 18–48)
MAGNESIUM SERPL-MCNC: 2.7 MG/DL (ref 1.6–2.6)
MCH RBC QN AUTO: 26 PG (ref 27–31)
MCHC RBC AUTO-ENTMCNC: 29.5 G/DL (ref 32–36)
MCV RBC AUTO: 88 FL (ref 82–98)
MICROSCOPIC COMMENT: ABNORMAL
MONOCYTES # BLD AUTO: 0.4 K/UL (ref 0.3–1)
MONOCYTES NFR BLD: 3.7 % (ref 4–15)
NEUTROPHILS # BLD AUTO: 7.6 K/UL (ref 1.8–7.7)
NEUTROPHILS NFR BLD: 77 % (ref 38–73)
NITRITE UR QL STRIP: NEGATIVE
NRBC BLD-RTO: 1 /100 WBC
OVALOCYTES BLD QL SMEAR: ABNORMAL
PH UR STRIP: 6 [PH] (ref 5–8)
PLATELET # BLD AUTO: 141 K/UL (ref 150–450)
PLATELET BLD QL SMEAR: ABNORMAL
PMV BLD AUTO: 10.9 FL (ref 9.2–12.9)
POCT GLUCOSE: 113 MG/DL (ref 70–110)
POIKILOCYTOSIS BLD QL SMEAR: SLIGHT
POTASSIUM SERPL-SCNC: 5.4 MMOL/L (ref 3.5–5.1)
PROCALCITONIN SERPL IA-MCNC: 0.45 NG/ML
PROT SERPL-MCNC: 6.8 G/DL (ref 6–8.4)
PROT UR QL STRIP: ABNORMAL
PROTHROMBIN TIME: 12.6 SEC (ref 9–12.5)
RBC # BLD AUTO: 2.96 M/UL (ref 4–5.4)
RBC #/AREA URNS HPF: 55 /HPF (ref 0–4)
SODIUM SERPL-SCNC: 145 MMOL/L (ref 136–145)
SP GR UR STRIP: 1.02 (ref 1–1.03)
SQUAMOUS #/AREA URNS HPF: 1 /HPF
T4 FREE SERPL-MCNC: 0.8 NG/DL (ref 0.71–1.51)
TSH SERPL DL<=0.005 MIU/L-ACNC: 4.51 UIU/ML (ref 0.4–4)
UNIDENT CRYS URNS QL MICRO: ABNORMAL
URN SPEC COLLECT METH UR: ABNORMAL
UROBILINOGEN UR STRIP-ACNC: NEGATIVE EU/DL
WBC # BLD AUTO: 9.83 K/UL (ref 3.9–12.7)
WBC #/AREA URNS HPF: 38 /HPF (ref 0–5)
WBC CLUMPS URNS QL MICRO: ABNORMAL
YEAST URNS QL MICRO: ABNORMAL

## 2022-07-15 PROCEDURE — 83605 ASSAY OF LACTIC ACID: CPT | Mod: 91 | Performed by: EMERGENCY MEDICINE

## 2022-07-15 PROCEDURE — 87070 CULTURE OTHR SPECIMN AEROBIC: CPT | Performed by: STUDENT IN AN ORGANIZED HEALTH CARE EDUCATION/TRAINING PROGRAM

## 2022-07-15 PROCEDURE — 83735 ASSAY OF MAGNESIUM: CPT | Performed by: EMERGENCY MEDICINE

## 2022-07-15 PROCEDURE — 82140 ASSAY OF AMMONIA: CPT | Performed by: STUDENT IN AN ORGANIZED HEALTH CARE EDUCATION/TRAINING PROGRAM

## 2022-07-15 PROCEDURE — 81000 URINALYSIS NONAUTO W/SCOPE: CPT | Performed by: EMERGENCY MEDICINE

## 2022-07-15 PROCEDURE — 93005 ELECTROCARDIOGRAM TRACING: CPT

## 2022-07-15 PROCEDURE — 96361 HYDRATE IV INFUSION ADD-ON: CPT

## 2022-07-15 PROCEDURE — 63600175 PHARM REV CODE 636 W HCPCS: Performed by: EMERGENCY MEDICINE

## 2022-07-15 PROCEDURE — 63600175 PHARM REV CODE 636 W HCPCS: Performed by: STUDENT IN AN ORGANIZED HEALTH CARE EDUCATION/TRAINING PROGRAM

## 2022-07-15 PROCEDURE — 86592 SYPHILIS TEST NON-TREP QUAL: CPT | Performed by: STUDENT IN AN ORGANIZED HEALTH CARE EDUCATION/TRAINING PROGRAM

## 2022-07-15 PROCEDURE — 87086 URINE CULTURE/COLONY COUNT: CPT | Performed by: EMERGENCY MEDICINE

## 2022-07-15 PROCEDURE — 25000003 PHARM REV CODE 250: Performed by: EMERGENCY MEDICINE

## 2022-07-15 PROCEDURE — 82607 VITAMIN B-12: CPT | Performed by: STUDENT IN AN ORGANIZED HEALTH CARE EDUCATION/TRAINING PROGRAM

## 2022-07-15 PROCEDURE — 20000000 HC ICU ROOM

## 2022-07-15 PROCEDURE — 87077 CULTURE AEROBIC IDENTIFY: CPT | Mod: 59 | Performed by: STUDENT IN AN ORGANIZED HEALTH CARE EDUCATION/TRAINING PROGRAM

## 2022-07-15 PROCEDURE — 84443 ASSAY THYROID STIM HORMONE: CPT | Performed by: STUDENT IN AN ORGANIZED HEALTH CARE EDUCATION/TRAINING PROGRAM

## 2022-07-15 PROCEDURE — 84439 ASSAY OF FREE THYROXINE: CPT | Performed by: STUDENT IN AN ORGANIZED HEALTH CARE EDUCATION/TRAINING PROGRAM

## 2022-07-15 PROCEDURE — 25000003 PHARM REV CODE 250: Performed by: STUDENT IN AN ORGANIZED HEALTH CARE EDUCATION/TRAINING PROGRAM

## 2022-07-15 PROCEDURE — 93010 ELECTROCARDIOGRAM REPORT: CPT | Mod: ,,, | Performed by: INTERNAL MEDICINE

## 2022-07-15 PROCEDURE — 99291 CRITICAL CARE FIRST HOUR: CPT | Mod: 25

## 2022-07-15 PROCEDURE — 87186 SC STD MICRODIL/AGAR DIL: CPT | Performed by: STUDENT IN AN ORGANIZED HEALTH CARE EDUCATION/TRAINING PROGRAM

## 2022-07-15 PROCEDURE — 85610 PROTHROMBIN TIME: CPT | Performed by: EMERGENCY MEDICINE

## 2022-07-15 PROCEDURE — 80307 DRUG TEST PRSMV CHEM ANLYZR: CPT | Performed by: STUDENT IN AN ORGANIZED HEALTH CARE EDUCATION/TRAINING PROGRAM

## 2022-07-15 PROCEDURE — 80053 COMPREHEN METABOLIC PANEL: CPT | Performed by: EMERGENCY MEDICINE

## 2022-07-15 PROCEDURE — 85025 COMPLETE CBC W/AUTO DIFF WBC: CPT | Performed by: EMERGENCY MEDICINE

## 2022-07-15 PROCEDURE — 93010 EKG 12-LEAD: ICD-10-PCS | Mod: ,,, | Performed by: INTERNAL MEDICINE

## 2022-07-15 PROCEDURE — 96365 THER/PROPH/DIAG IV INF INIT: CPT

## 2022-07-15 PROCEDURE — 87040 BLOOD CULTURE FOR BACTERIA: CPT | Mod: 59 | Performed by: EMERGENCY MEDICINE

## 2022-07-15 PROCEDURE — 84145 PROCALCITONIN (PCT): CPT | Performed by: EMERGENCY MEDICINE

## 2022-07-15 RX ORDER — INSULIN ASPART 100 [IU]/ML
1-10 INJECTION, SOLUTION INTRAVENOUS; SUBCUTANEOUS
Status: DISCONTINUED | OUTPATIENT
Start: 2022-07-15 | End: 2022-07-16

## 2022-07-15 RX ORDER — OXYCODONE AND ACETAMINOPHEN 5; 325 MG/1; MG/1
TABLET ORAL
Status: ON HOLD | COMMUNITY
Start: 2022-06-27 | End: 2022-07-18

## 2022-07-15 RX ORDER — NALOXONE HCL 0.4 MG/ML
0.02 VIAL (ML) INJECTION
Status: DISCONTINUED | OUTPATIENT
Start: 2022-07-15 | End: 2022-07-18 | Stop reason: HOSPADM

## 2022-07-15 RX ORDER — SODIUM CHLORIDE 9 MG/ML
INJECTION, SOLUTION INTRAVENOUS CONTINUOUS
Status: DISCONTINUED | OUTPATIENT
Start: 2022-07-15 | End: 2022-07-16

## 2022-07-15 RX ORDER — HEPARIN SODIUM 5000 [USP'U]/ML
5000 INJECTION, SOLUTION INTRAVENOUS; SUBCUTANEOUS EVERY 8 HOURS
Status: DISCONTINUED | OUTPATIENT
Start: 2022-07-15 | End: 2022-07-18 | Stop reason: HOSPADM

## 2022-07-15 RX ORDER — SODIUM CHLORIDE 0.9 % (FLUSH) 0.9 %
10 SYRINGE (ML) INJECTION EVERY 12 HOURS PRN
Status: DISCONTINUED | OUTPATIENT
Start: 2022-07-15 | End: 2022-07-18

## 2022-07-15 RX ORDER — ACETAMINOPHEN 325 MG/1
650 TABLET ORAL EVERY 4 HOURS PRN
Status: DISCONTINUED | OUTPATIENT
Start: 2022-07-15 | End: 2022-07-16

## 2022-07-15 RX ORDER — IBUPROFEN 200 MG
16 TABLET ORAL
Status: DISCONTINUED | OUTPATIENT
Start: 2022-07-15 | End: 2022-07-16

## 2022-07-15 RX ORDER — MUPIROCIN 20 MG/G
OINTMENT TOPICAL 2 TIMES DAILY
Status: DISCONTINUED | OUTPATIENT
Start: 2022-07-15 | End: 2022-07-18 | Stop reason: HOSPADM

## 2022-07-15 RX ORDER — DRONABINOL 2.5 MG/1
2.5 CAPSULE ORAL
Status: ON HOLD | COMMUNITY
End: 2022-07-18

## 2022-07-15 RX ORDER — ATORVASTATIN CALCIUM 40 MG/1
40 TABLET, FILM COATED ORAL DAILY
Status: DISCONTINUED | OUTPATIENT
Start: 2022-07-16 | End: 2022-07-16

## 2022-07-15 RX ORDER — GLUCAGON 1 MG
1 KIT INJECTION
Status: DISCONTINUED | OUTPATIENT
Start: 2022-07-15 | End: 2022-07-18 | Stop reason: HOSPADM

## 2022-07-15 RX ORDER — ONDANSETRON 2 MG/ML
4 INJECTION INTRAMUSCULAR; INTRAVENOUS EVERY 8 HOURS PRN
Status: DISCONTINUED | OUTPATIENT
Start: 2022-07-15 | End: 2022-07-18 | Stop reason: HOSPADM

## 2022-07-15 RX ORDER — CEFTRIAXONE 500 MG/1
500 INJECTION, POWDER, FOR SOLUTION INTRAMUSCULAR; INTRAVENOUS 2 TIMES DAILY
Status: ON HOLD | COMMUNITY
Start: 2022-07-15 | End: 2022-07-18

## 2022-07-15 RX ORDER — IBUPROFEN 200 MG
24 TABLET ORAL
Status: DISCONTINUED | OUTPATIENT
Start: 2022-07-15 | End: 2022-07-16

## 2022-07-15 RX ORDER — ATROPINE SULFATE 0.1 MG/ML
INJECTION INTRAVENOUS
Status: DISPENSED
Start: 2022-07-15 | End: 2022-07-16

## 2022-07-15 RX ORDER — CEFEPIME HYDROCHLORIDE 1 G/50ML
1 INJECTION, SOLUTION INTRAVENOUS
Status: DISCONTINUED | OUTPATIENT
Start: 2022-07-15 | End: 2022-07-17

## 2022-07-15 RX ORDER — NAPROXEN SODIUM 220 MG/1
81 TABLET, FILM COATED ORAL DAILY
Status: DISCONTINUED | OUTPATIENT
Start: 2022-07-16 | End: 2022-07-16

## 2022-07-15 RX ORDER — HYDROCODONE BITARTRATE AND ACETAMINOPHEN 5; 325 MG/1; MG/1
1 TABLET ORAL EVERY 6 HOURS PRN
Status: DISCONTINUED | OUTPATIENT
Start: 2022-07-15 | End: 2022-07-16

## 2022-07-15 RX ORDER — LANOLIN ALCOHOL/MO/W.PET/CERES
1 CREAM (GRAM) TOPICAL DAILY
Status: DISCONTINUED | OUTPATIENT
Start: 2022-07-16 | End: 2022-07-16

## 2022-07-15 RX ORDER — TALC
6 POWDER (GRAM) TOPICAL NIGHTLY PRN
Status: DISCONTINUED | OUTPATIENT
Start: 2022-07-15 | End: 2022-07-16

## 2022-07-15 RX ADMIN — MUPIROCIN: 20 OINTMENT TOPICAL at 09:07

## 2022-07-15 RX ADMIN — SODIUM CHLORIDE 2000 ML: 0.9 INJECTION, SOLUTION INTRAVENOUS at 02:07

## 2022-07-15 RX ADMIN — SODIUM CHLORIDE 1000 ML: 0.9 INJECTION, SOLUTION INTRAVENOUS at 12:07

## 2022-07-15 RX ADMIN — CEFEPIME 1 G: 1 INJECTION, POWDER, FOR SOLUTION INTRAMUSCULAR; INTRAVENOUS at 06:07

## 2022-07-15 RX ADMIN — HEPARIN SODIUM 5000 UNITS: 5000 INJECTION INTRAVENOUS; SUBCUTANEOUS at 09:07

## 2022-07-15 RX ADMIN — SODIUM CHLORIDE: 0.9 INJECTION, SOLUTION INTRAVENOUS at 06:07

## 2022-07-15 RX ADMIN — PIPERACILLIN SODIUM AND TAZOBACTAM SODIUM 4.5 G: 4; .5 INJECTION, POWDER, LYOPHILIZED, FOR SOLUTION INTRAVENOUS at 01:07

## 2022-07-15 RX ADMIN — VANCOMYCIN HYDROCHLORIDE 1500 MG: 1.5 INJECTION, POWDER, LYOPHILIZED, FOR SOLUTION INTRAVENOUS at 02:07

## 2022-07-15 NOTE — PROGRESS NOTES
Pharmacokinetic Initial Assessment: IV Vancomycin    Assessment/Plan:    Initiate intravenous vancomycin with loading dose of 1500 mg once with subsequent doses when random concentrations are less than 20 mcg/mL  Desired empiric serum trough concentration is 10 to 20 mcg/mL  Draw vancomycin random level on 7/16/22 at 06:00.  Pharmacy will continue to follow and monitor vancomycin.      Please contact pharmacy at extension [phone] with any questions regarding this assessment.     Thank you for the consult,   Eva Collins       Patient brief summary:  Ana James is a 81 y.o. female initiated on antimicrobial therapy with IV Vancomycin for treatment of suspected bone/joint infection    Drug Allergies:   Review of patient's allergies indicates:   Allergen Reactions    Adhesive Rash     Paper tape       Actual Body Weight:   96.6 kg    Renal Function:   Estimated Creatinine Clearance: 17.8 mL/min (A) (based on SCr of 2.6 mg/dL (H)).,     Dialysis Method (if applicable):  N/A    CBC (last 72 hours):  Recent Labs   Lab Result Units 07/15/22  1121   WBC K/uL 9.83   Hemoglobin g/dL 7.7*   Hematocrit % 26.1*   Platelets K/uL 141*   Gran % % 77.0*   Lymph % % 18.1   Mono % % 3.7*   Eosinophil % % 0.5   Basophil % % 0.2   Differential Method  Automated       Metabolic Panel (last 72 hours):  Recent Labs   Lab Result Units 07/15/22  1121 07/15/22  1546   Sodium mmol/L 145  --    Potassium mmol/L 5.4*  --    Chloride mmol/L 112*  --    CO2 mmol/L 27  --    Glucose mg/dL 88  --    Glucose, UA   --  Negative   BUN mg/dL 72*  --    Creatinine mg/dL 2.6*  --    Albumin g/dL 1.5*  --    Total Bilirubin mg/dL 0.5  --    Alkaline Phosphatase U/L 200*  --    AST U/L 113*  --    ALT U/L 111*  --    Magnesium mg/dL 2.7*  --        Drug levels (last 3 results):  No results for input(s): VANCOMYCINRA, VANCORANDOM, VANCOMYCINPE, VANCOPEAK, VANCOMYCINTR, VANCOTROUGH in the last 72 hours.    Microbiologic Results:  Microbiology  Results (last 7 days)       Procedure Component Value Units Date/Time    Aerobic culture [[id number]]     Order Status: No result Specimen: Decubitus     Urine culture [[id number]] Collected: 07/15/22 1546    Order Status: No result Specimen: Urine Updated: 07/15/22 1637    Blood culture x two cultures. Draw prior to antibiotics. [[id number]] Collected: 07/15/22 1205    Order Status: Sent Specimen: Blood from Peripheral, Forearm, Left Updated: 07/15/22 1212    Blood culture x two cultures. Draw prior to antibiotics. [[id number]] Collected: 07/15/22 1121    Order Status: Sent Specimen: Blood from Peripheral, Upper Arm, Left Updated: 07/15/22 1127

## 2022-07-15 NOTE — SUBJECTIVE & OBJECTIVE
Past Medical History:   Diagnosis Date    Arthritis lower extremities    Asthma     Blind left eye     Diabetes mellitus     Edema of both lower legs     CHRONIC    Fall 02/08/2022    Gall stones     Hypertension     Kidney stones     Lives alone with help available     HOME HEALTH    Nephrolithiasis 1/22/2016    Obesity     PVD (peripheral vascular disease)     Renal disorder     Retinopathy     Sleep apnea     Vaginal delivery     x1    Weakness of both lower extremities     uses a cane, rollator & power chair       Past Surgical History:   Procedure Laterality Date    CARDIAC CATHETERIZATION      cataract      CHOLECYSTECTOMY      DEBRIDEMENT OF SACRAL WOUND N/A 5/31/2022    Procedure: DEBRIDEMENT, WOUND, SACRUM;  Surgeon: Timmy Allison MD;  [facility];  Service: General;  Laterality: N/A;    EYE SURGERY      Rt cataract surgery    HYSTERECTOMY      URETERAL STENT PLACEMENT         Review of patient's allergies indicates:   Allergen Reactions    Adhesive Rash     Paper tape       No current facility-administered medications on file prior to encounter.     Current Outpatient Medications on File Prior to Encounter   Medication Sig    albuterol (PROVENTIL/VENTOLIN HFA) 90 mcg/actuation inhaler INHALE 2 PUFFS INTO THE LUNGS EVERY 6 (SIX) HOURS AS NEEDED FOR WHEEZING. RESCUE    arginine-vitamin C-vitamin E (ARGINAID) 4.5 gram-156 mg/9.2 gram PwPk Take 1 packet by mouth 2 (two) times a day. Add to H2O to promot wound healing    ascorbic acid, vitamin C, (VITAMIN C) 500 MG tablet Take 500 mg by mouth 2 (two) times daily.    aspirin (ECOTRIN) 81 MG EC tablet Take 81 mg by mouth once daily.    atorvastatin (LIPITOR) 20 MG tablet TAKE 1 TABLET BY MOUTH EVERY DAY (Patient taking differently: every evening.)    blood sugar diagnostic Strp Test 3 times daily    cefTRIAXone (ROCEPHIN) 500 mg injection Inject 500 mg into the vein 2 (two) times a day. X 14 days    dronabinoL (MARINOL) 2.5 MG capsule Take 2.5 mg by mouth  "2 (two) times daily before meals.    ferrous sulfate 325 (65 FE) MG EC tablet Take 1 tablet (325 mg total) by mouth once daily.    HUMALOG KWIKPEN INSULIN 100 unit/mL pen Inject into the skin. Per [facility] sliding scale; 0-200= DO NOT ADMINISTER; 201-249= 2 Units; 250-299= 4 Units; 300-349= 6 Units; 350-399= 8 Units; 400 OR GREATER = 10 Units    nut.tx.gluc.intol,lac-free,soy (GLUCERNA) Liqd Take 250 mLs by mouth 3 (three) times daily.    polyethylene glycol (GLYCOLAX) 17 gram PwPk Take 17 g by mouth once daily.    zinc gluconate 50 mg tablet Take 50 mg by mouth once daily. To promote wound healing x 45 days    folic acid (FOLVITE) 1 MG tablet Take 1 tablet (1 mg total) by mouth once daily.    lancets Misc Test 3 times daily    lancets Misc To check BG 3 times daily, to use with insurance preferred meter    latanoprost 0.005 % ophthalmic solution Place 1 drop into both eyes every evening.    melatonin (MELATIN) 3 mg tablet Take 3 tablets (9 mg total) by mouth nightly.    NOVOFINE AUTOCOVER 30 gauge x 1/3" Ndle     oxybutynin (DITROPAN XL) 15 MG TR24 TAKE 1 TABLET BY MOUTH EVERY DAY    oxyCODONE-acetaminophen (PERCOCET) 5-325 mg per tablet     pen needle, diabetic (BD ULTRA-FINE BREE PEN NEEDLE) 32 gauge x 5/32" Ndle INJECT INSULIN THREE TIMES DAILY    timolol maleate 0.5% (TIMOPTIC) 0.5 % Drop Place 1 drop into both eyes every morning.    [DISCONTINUED] amLODIPine (NORVASC) 10 MG tablet TAKE 1 TABLET BY MOUTH EVERY DAY    [DISCONTINUED] carvediloL (COREG) 12.5 MG tablet Take 1 tablet (12.5 mg total) by mouth 2 (two) times daily.    [DISCONTINUED] hydrALAZINE (APRESOLINE) 50 MG tablet Take 1 tablet (50 mg total) by mouth every 8 (eight) hours.    [DISCONTINUED] insulin detemir U-100 (LEVEMIR FLEXTOUCH U-100 INSULN) 100 unit/mL (3 mL) InPn pen Inject 26 Units into the skin every evening. HOLD UNTIL YOU SEE YOUR PCP    [DISCONTINUED] TRADJENTA 5 mg Tab tablet TAKE 1 TABLET BY MOUTH EVERY DAY    [DISCONTINUED] traZODone " (DESYREL) 50 MG tablet TAKE 1 TABLET (50 MG TOTAL) BY MOUTH EVERY EVENING.     Family History       Problem Relation (Age of Onset)    Cancer Sister    Diabetes Brother    Hypertension Father    Kidney failure Mother          Tobacco Use    Smoking status: Never Smoker    Smokeless tobacco: Never Used   Substance and Sexual Activity    Alcohol use: No    Drug use: Never    Sexual activity: Not Currently     Partners: Male     Review of Systems   Reason unable to perform ROS: altered.   Objective:     Vital Signs (Most Recent):  Temp: (!) 92.1 °F (33.4 °C) (07/15/22 1545)  Pulse: (!) 59 (07/15/22 1700)  Resp: 20 (07/15/22 1700)  BP: (!) 95/50 (07/15/22 1657)  SpO2: 99 % (07/15/22 1700)   Vital Signs (24h Range):  Temp:  [91.5 °F (33.1 °C)-92.1 °F (33.4 °C)] 92.1 °F (33.4 °C)  Pulse:  [58-62] 59  Resp:  [15-22] 20  SpO2:  [96 %-100 %] 99 %  BP: ()/(48-70) 95/50     Weight: 96.6 kg (213 lb)  Body mass index is 38.96 kg/m².    Physical Exam  Constitutional:       Comments: Elderly morbidly obese  female laying in bed moaning in pain   HENT:      Head: Normocephalic and atraumatic.   Eyes:      General:         Right eye: No discharge.         Left eye: No discharge.      Conjunctiva/sclera: Conjunctivae normal.      Pupils: Pupils are equal, round, and reactive to light.   Cardiovascular:      Rate and Rhythm: Regular rhythm.      Pulses: Normal pulses.      Heart sounds: Normal heart sounds. No murmur heard.     Comments: Borderline Fa[place]án 55-65  Pulmonary:      Effort: Pulmonary effort is normal.      Breath sounds: Normal breath sounds. No stridor. No wheezing or rales.      Comments: Very poor inspiratory efforts  Abdominal:      General: There is no distension.      Palpations: There is no mass.      Tenderness: There is no guarding.      Comments: Morbidly obese abdomen otherwise soft    Musculoskeletal:      Comments: Swelling noted around the right hand.  Right arm PICC line in place site  appears clean   Skin:     Capillary Refill: Capillary refill takes less than 2 seconds.   Neurological:      Mental Status: She is disoriented.      Comments: Moans in pain, able to say yes or no otherwise mostly nonverbal and does not follow any commands limiting neurological exam         CRANIAL NERVES     CN III, IV, VI   Pupils are equal, round, and reactive to light.     Significant Labs: All pertinent labs within the past 24 hours have been reviewed.  Blood Culture: No results for input(s): LABBLOO in the last 48 hours.  BMP:   Recent Labs   Lab 07/15/22  1121   GLU 88      K 5.4*   *   CO2 27   BUN 72*   CREATININE 2.6*   CALCIUM 9.2   MG 2.7*     CBC:   Recent Labs   Lab 07/15/22  1121   WBC 9.83   HGB 7.7*   HCT 26.1*   *     CMP:   Recent Labs   Lab 07/15/22  1121      K 5.4*   *   CO2 27   GLU 88   BUN 72*   CREATININE 2.6*   CALCIUM 9.2   PROT 6.8   ALBUMIN 1.5*   BILITOT 0.5   ALKPHOS 200*   *   *   ANIONGAP 6*   EGFRNONAA 16*       Significant Imaging: I have reviewed all pertinent imaging results/findings within the past 24 hours.

## 2022-07-15 NOTE — ASSESSMENT & PLAN NOTE
Renal function appears worsened from her baseline  Appears 2/2 dehydration - patient has received about 3L of IVF in the ER prior to my exam, she has made about 15-20ml of urine so far  US did not show any acute new findings  Will continue maintenance fluid foe now, but will be cautious considering she has diastolic CHF to avoid fluid overload

## 2022-07-15 NOTE — ASSESSMENT & PLAN NOTE
Body mass index is 38.96 kg/m². Morbid obesity complicates all aspects of disease management from diagnostic modalities to treatment. Weight loss encouraged and health benefits explained to patient.

## 2022-07-15 NOTE — ASSESSMENT & PLAN NOTE
Unclear etiology, however from NH explanation, patient appears to be close to her baseline, she was able to say a few yes or no and would moan during wound dressing change  She has a large tunneling sacral decubitus which she has been on ceftriaxone for. Urine appear grossly dirty, CTH negative and CXR unremarkable  Will start on broad spectrum antibiotics for now considering that she is septic and quite hypotensive  Plan to deescalate antibiotics as soon as possible  Obtain TSH, B12, folate, ammonia, RPR

## 2022-07-15 NOTE — ASSESSMENT & PLAN NOTE
Started on broad spectrum antibiotics - suspect from UTI or resistant bug from sacral decubitus ulcer  She is also quite hypotensive, currently on broad spectrum abx and maintenance IVF. Will keep a threshold of initiating vasopressors if need be  Continue bear hug/warming blankets for hypothermia  Blood cultures obtained, will follow up result. Urine culture cooking  Will also obtain new culture from the decubitus ulcer

## 2022-07-15 NOTE — HPI
Patient is an 81-year-old  female with history of T2DM, , HTN, PVD, distally CHF, CKD and Afib who was brought in from [facility] due to concern of RUE swelling around her PICC line site with change in her LOC. Patient unable to provide any information, I called the NH and spoke to the nurse who was taking care of patient who stated that patient's right hand/arm was noticed to be swollen. Nursing staff also stated that patient's mental status changed which maximo described as patient appearing more lethargic, not moaning as she used to when is in pain, and also unable to say yes or no which is her baseline. She is unable to hold normal conversation or follow adequate commands but she can relate to nursing staff her pain, hunger, thirst by saying one word sentences.     Upon ER presentation, patient noted to be hypothermic at 91.5, HR in the 50s and BP in low 90s. Labs show WBC 9.8, H/H 7.7/26, , Na 145, K 5.4, Cl 112, HCO3 22, BUN/Cr 72/2.6, glc 88.    CXR, CTH and venous US or RUE all unremarkable

## 2022-07-15 NOTE — ED PROVIDER NOTES
Encounter Date: 7/15/2022       History     Chief Complaint   Patient presents with    Vascular Access Problem     Pt via EMS from [place]. Pt right arm and hand swelling x this morning. Pt has PICC line to right upper arm for IV antibiotics related to a sacral wound. Pt also has bilateral heels wrapped. Urethral catheter in place.      HPI   This 81-year-old white female presents to the emergency room transferred from the nursing home for right hand swelling this morning.  The patient is on 14 days of Rocephin IV for a sacral pressure ulcer.  The patient has pressure ulcers on the bilateral heels.  She is able to give no history at this time.  The nursing home is reporting a change in her mental status and swelling of the right upper extremity.  There is a PICC line in place in the right upper extremity for the administration of the Rocephin.   Review of patient's allergies indicates:   Allergen Reactions    Adhesive Rash     Paper tape     Past Medical History:   Diagnosis Date    Arthritis lower extremities    Asthma     Blind left eye     Diabetes mellitus     Edema of both lower legs     CHRONIC    Fall 02/08/2022    Gall stones     Hypertension     Kidney stones     Lives alone with help available     HOME HEALTH    Nephrolithiasis 1/22/2016    Obesity     PVD (peripheral vascular disease)     Renal disorder     Retinopathy     Sleep apnea     Vaginal delivery     x1    Weakness of both lower extremities     uses a cane, rollator & power chair     Past Surgical History:   Procedure Laterality Date    CARDIAC CATHETERIZATION      cataract      CHOLECYSTECTOMY      DEBRIDEMENT OF SACRAL WOUND N/A 5/31/2022    Procedure: DEBRIDEMENT, WOUND, SACRUM;  Surgeon: Timmy Allison MD;  Location: [facility] OR;  Service: General;  Laterality: N/A;    EYE SURGERY      Rt cataract surgery    HYSTERECTOMY      URETERAL STENT PLACEMENT       Family History   Problem Relation Age of Onset    Kidney  failure Mother     Hypertension Father     Diabetes Brother     Cancer Sister      Social History     Tobacco Use    Smoking status: Never Smoker    Smokeless tobacco: Never Used   Substance Use Topics    Alcohol use: No    Drug use: Never     Review of Systems  The patient was questioned specifically with regard to the following.  General: Fever, chills, sweats. Neuro: Headache. Eyes: eye problems. ENT: Ear pain, sore throat. Cardiovascular: Chest pain. Respiratory: Cough, shortness of breath. Gastrointestinal: Abdominal pain, vomiting, diarrhea. Genitourinary: Painful urination.  Musculoskeletal: Arm and leg problems. Skin: Rash.  The review of systems was negative except for the following:  Altered mental status, swelling of the right upper extremity, sacral decubiti I requiring IV antibiotics.  PICC line in right humeral arm.  Physical Exam     Initial Vitals   BP Pulse Resp Temp SpO2   07/15/22 1045 07/15/22 1029 07/15/22 1029 07/15/22 1236 07/15/22 1029   (!) 110/58 62 19 (!) 91.5 °F (33.1 °C) 97 %      MAP       --                Physical Exam  The patient was examined specifically for the following:   General:No significant distress, Good color, Warm and dry. Head and neck:Scalp atraumatic, Neck supple. Neurological:Appropriate conversation, Gross motor deficits. Eyes:Conjugate gaze, Clear corneas. ENT: No epistaxis. Cardiac: Regular rate and rhythm, Grossly normal heart tones. Pulmonary: Wheezing, Rales. Gastrointestinal: Abdominal tenderness, Abdominal distention. Musculoskeletal: Extremity deformity, Apparent pain with range of motion of the joints. Skin: Rash.   The findings on examination were normal except for the following:  Patient has an elevated BMI.  She is wearing dressings on both heels.  She has mild wheezing bilaterally on the initial examination.  Oxygen saturations are 97%.  There is no clinical evidence of respiratory distress.  The patient's heart rate is 62. The respiratory rate is  19. There is no definite abdominal tenderness.  The patient will track me with her eyes.  At times it looks like she would like to speak.  At time she mumbles.     ED Course   Critical Care    Date/Time: 7/15/2022 1:25 PM  Performed by: Yosi Lerma MD  Authorized by: Yosi Lerma MD   Direct patient critical care time: 22 minutes  Additional history critical care time: 11 minutes  Ordering / reviewing critical care time: 11 minutes  Documentation critical care time: 11 minutes  Consulting other physicians critical care time: 5 minutes  Total critical care time (exclusive of procedural time) : 60 minutes  Critical care time was exclusive of separately billable procedures and treating other patients and teaching time.  Critical care was necessary to treat or prevent imminent or life-threatening deterioration of the following conditions: dehydration, CNS failure or compromise and sepsis.  Critical care was time spent personally by me on the following activities: evaluation of patient's response to treatment, obtaining history from patient or surrogate, ordering and review of laboratory studies, pulse oximetry, review of old charts, re-evaluation of patient's condition, ordering and review of radiographic studies, ordering and performing treatments and interventions, examination of patient, discussions with primary provider and development of treatment plan with patient or surrogate.        Labs Reviewed   CBC W/ AUTO DIFFERENTIAL - Abnormal; Notable for the following components:       Result Value    RBC 2.96 (*)     Hemoglobin 7.7 (*)     Hematocrit 26.1 (*)     MCH 26.0 (*)     MCHC 29.5 (*)     RDW 21.6 (*)     Platelets 141 (*)     Immature Grans (Abs) 0.05 (*)     nRBC 1 (*)     Gran % 77.0 (*)     Mono % 3.7 (*)     Platelet Estimate Decreased (*)     All other components within normal limits   COMPREHENSIVE METABOLIC PANEL - Abnormal; Notable for the following components:    Potassium 5.4 (*)      Chloride 112 (*)     BUN 72 (*)     Creatinine 2.6 (*)     Albumin 1.5 (*)     Alkaline Phosphatase 200 (*)      (*)      (*)     Anion Gap 6 (*)     eGFR if  19 (*)     eGFR if non  16 (*)     All other components within normal limits   MAGNESIUM - Abnormal; Notable for the following components:    Magnesium 2.7 (*)     All other components within normal limits   PROTIME-INR - Abnormal; Notable for the following components:    Prothrombin Time 12.6 (*)     All other components within normal limits   PROCALCITONIN - Abnormal; Notable for the following components:    Procalcitonin 0.45 (*)     All other components within normal limits   CULTURE, BLOOD   CULTURE, BLOOD   LACTIC ACID, PLASMA   LACTIC ACID, PLASMA   URINALYSIS, REFLEX TO URINE CULTURE   POCT GLUCOSE MONITORING CONTINUOUS     EKG Readings: (Independently Interpreted)   This patient is in a sinus rhythm with a heart rate of 56. There is diffuse T-wave flattening.  There are no ST segment changes.  There is low voltage QRS complexes.  There is no definite evidence of acute myocardial infarction or malignant arrhythmia.         Imaging Results          X-Ray Chest AP Portable (Final result)  Result time 07/15/22 13:48:36    Final result by Sg Chery III, MD (07/15/22 13:48:36)                 Impression:      No acute process seen.      Electronically signed by: Sg Chery MD  Date:    07/15/2022  Time:    13:48             Narrative:    EXAMINATION:  XR CHEST AP PORTABLE    CLINICAL HISTORY:  Sepsis;    FINDINGS:  Chest one view AP portable.    Central line tip mid SVC.  Heart size is normal.  Lungs are clear and the bones show nothing unusual.                               CT Head Without Contrast (Final result)  Result time 07/15/22 13:15:00    Final result by Sg Chery III, MD (07/15/22 13:15:00)                 Impression:      No acute process seen.      Electronically signed by: Sg  MD Vik  Date:    07/15/2022  Time:    13:15             Narrative:    EXAMINATION:  CT HEAD WITHOUT CONTRAST    CLINICAL HISTORY:  Mental status change, unknown cause;  Altered mental status, unspecified    FINDINGS:  There is involutional change.  There is microvascular small vessel ischemic change.  No acute bleed, mass, or mass effect seen.  There is a left eye prosthesis.  There is a right cataract implants.  No skull lesion or skull fracture seen.  The bones show nothing unusual.                               US Upper Extremity Veins Right (Final result)  Result time 07/15/22 12:25:25    Final result by Jayjay Romero MD (07/15/22 12:25:25)                 Impression:      No thrombus in central veins of the right upper extremity.      Electronically signed by: Jayjay Romero MD  Date:    07/15/2022  Time:    12:25             Narrative:    EXAMINATION:  US UPPER EXTREMITY VEINS RIGHT    CLINICAL HISTORY:  Right upper extremity swelling;    TECHNIQUE:  Duplex and color flow Doppler evaluation and dynamic compression was performed of the right upper extremity veins.    COMPARISON:  None    FINDINGS:  Central veins: The internal jugular, subclavian, and axillary veins are patent and free of thrombus.    Arm veins: The brachial, basilic, and cephalic veins are patent and compressible.    Contralateral subclavian/internal jugular veins: The left subclavian and internal jugular veins are patent and free of thrombus.    Other findings: None.                              Medical decision making:  Given the above this patient presents to the emergency room basically to be evaluated from swelling of the right arm an altered mental status.  Ultrasound of the right upper extremity fails to reveal evidence of deep venous thrombosis.  There is a PICC line in place there.  The patient was to receive 14 days of Rocephin for a sacral decubitus ulcer.  The PICC line was paced on June 29th.  The patient is found have a rectal  temperature of 91° F. sepsis is considered.  We removed the patient's Reeves catheter, but we were unable to replace it.  Laboratory work reveals a BUN of 72 and a creatinine of 2.6.  This is severe dehydration likely.  The patient has an elevated magnesium.  Patient will require IV fluid resuscitation.  A 3 L bolus was ordered.  The patient is being treated with vancomycin and Zosyn.  I will  admit this patient to hospital medicine.  Chest x-ray is pending.  CT of the head is unremarkable.        Medications   vancomycin 1.5 g in dextrose 5 % 250 mL IVPB (ready to mix) (has no administration in time range)   sodium chloride 0.9% bolus 2,000 mL (has no administration in time range)   sodium chloride 0.9% bolus 1,000 mL (0 mLs Intravenous Stopped 7/15/22 1308)   piperacillin-tazobactam 4.5 g in dextrose 5 % 100 mL IVPB (ready to mix system) (4.5 g Intravenous New Bag 7/15/22 1319)                          Clinical Impression:   Final diagnoses:  [R41.82] Altered mental status  [T68.XXXA] Hypothermia, initial encounter (Primary)  [A41.9] Sepsis without acute organ dysfunction, due to unspecified organism  [T83.9XXA] Reeves catheter problem, initial encounter  [E86.0] Severe dehydration  [M79.601, M79.89] Pain and swelling of right upper extremity  [D64.9] Severe anemia  [L89.90] Pressure injury of skin, unspecified injury stage, unspecified location          ED Disposition Condition    Admit               Yosi Lerma MD  07/15/22 1321       Yosi Lerma MD  07/15/22 1326       Yosi Lerma MD  07/15/22 0312       Yosi Lerma MD  07/15/22 6327

## 2022-07-15 NOTE — ASSESSMENT & PLAN NOTE
On maintenance IVF, however will plan to discontinue as soon as possible once her hemodynamics improve

## 2022-07-15 NOTE — H&P
[facility]  [facility] Medicine  History & Physical    Patient Name: Ana James  MRN: [id number]  Patient Class: IP- Inpatient  Admission Date: 7/15/2022  Attending Physician: Yosi Lerma MD   Primary Care Provider: Viky Bean MD         Patient information was obtained from nursing home and ER records.     Subjective:     Principal Problem:Altered mental status    Chief Complaint:   Chief Complaint   Patient presents with    Vascular Access Problem     Pt via EMS from [place]. Pt right arm and hand swelling x this morning. Pt has PICC line to right upper arm for IV antibiotics related to a sacral wound. Pt also has bilateral heels wrapped. Urethral catheter in place.         HPI: Patient is an 81-year-old  female with history of T2DM, , HTN, PVD, distally CHF, CKD and Afib who was brought in from [facility] due to concern of RUE swelling around her PICC line site with change in her LOC. Patient unable to provide any information, I called the NH and spoke to the nurse who was taking care of patient who stated that patient's right hand/arm was noticed to be swollen. Nursing staff also stated that patient's mental status changed which maximo described as patient appearing more lethargic, not moaning as she used to when is in pain, and also unable to say yes or no which is her baseline. She is unable to hold normal conversation or follow adequate commands but she can relate to nursing staff her pain, hunger, thirst by saying one word sentences.     Upon ER presentation, patient noted to be hypothermic at 91.5, HR in the 50s and BP in low 90s. Labs show WBC 9.8, H/H 7.7/26, , Na 145, K 5.4, Cl 112, HCO3 22, BUN/Cr 72/2.6, glc 88.    CXR, CTH and venous US or RUE all unremarkable      Past Medical History:   Diagnosis Date    Arthritis lower extremities    Asthma     Blind left eye     Diabetes mellitus     Edema of both lower legs      CHRONIC    Fall 02/08/2022    Gall stones     Hypertension     Kidney stones     Lives alone with help available     HOME HEALTH    Nephrolithiasis 1/22/2016    Obesity     PVD (peripheral vascular disease)     Renal disorder     Retinopathy     Sleep apnea     Vaginal delivery     x1    Weakness of both lower extremities     uses a cane, rollator & power chair       Past Surgical History:   Procedure Laterality Date    CARDIAC CATHETERIZATION      cataract      CHOLECYSTECTOMY      DEBRIDEMENT OF SACRAL WOUND N/A 5/31/2022    Procedure: DEBRIDEMENT, WOUND, SACRUM;  Surgeon: Timmy Allison MD;  Location: [facility];  Service: General;  Laterality: N/A;    EYE SURGERY      Rt cataract surgery    HYSTERECTOMY      URETERAL STENT PLACEMENT         Review of patient's allergies indicates:   Allergen Reactions    Adhesive Rash     Paper tape       No current facility-administered medications on file prior to encounter.     Current Outpatient Medications on File Prior to Encounter   Medication Sig    albuterol (PROVENTIL/VENTOLIN HFA) 90 mcg/actuation inhaler INHALE 2 PUFFS INTO THE LUNGS EVERY 6 (SIX) HOURS AS NEEDED FOR WHEEZING. RESCUE    arginine-vitamin C-vitamin E (ARGINAID) 4.5 gram-156 mg/9.2 gram PwPk Take 1 packet by mouth 2 (two) times a day. Add to H2O to promot wound healing    ascorbic acid, vitamin C, (VITAMIN C) 500 MG tablet Take 500 mg by mouth 2 (two) times daily.    aspirin (ECOTRIN) 81 MG EC tablet Take 81 mg by mouth once daily.    atorvastatin (LIPITOR) 20 MG tablet TAKE 1 TABLET BY MOUTH EVERY DAY (Patient taking differently: every evening.)    blood sugar diagnostic Strp Test 3 times daily    cefTRIAXone (ROCEPHIN) 500 mg injection Inject 500 mg into the vein 2 (two) times a day. X 14 days    dronabinoL (MARINOL) 2.5 MG capsule Take 2.5 mg by mouth 2 (two) times daily before meals.    ferrous sulfate 325 (65 FE) MG EC tablet Take 1 tablet (325 mg total) by mouth once  "daily.    HUMALOG KWIKPEN INSULIN 100 unit/mL pen Inject into the skin. Per  House sliding scale; 0-200= DO NOT ADMINISTER; 201-249= 2 Units; 250-299= 4 Units; 300-349= 6 Units; 350-399= 8 Units; 400 OR GREATER = 10 Units    nut.tx.gluc.intol,lac-free,soy (GLUCERNA) Liqd Take 250 mLs by mouth 3 (three) times daily.    polyethylene glycol (GLYCOLAX) 17 gram PwPk Take 17 g by mouth once daily.    zinc gluconate 50 mg tablet Take 50 mg by mouth once daily. To promote wound healing x 45 days    folic acid (FOLVITE) 1 MG tablet Take 1 tablet (1 mg total) by mouth once daily.    lancets Misc Test 3 times daily    lancets Misc To check BG 3 times daily, to use with insurance preferred meter    latanoprost 0.005 % ophthalmic solution Place 1 drop into both eyes every evening.    melatonin (MELATIN) 3 mg tablet Take 3 tablets (9 mg total) by mouth nightly.    NOVOFINE AUTOCOVER 30 gauge x 1/3" Ndle     oxybutynin (DITROPAN XL) 15 MG TR24 TAKE 1 TABLET BY MOUTH EVERY DAY    oxyCODONE-acetaminophen (PERCOCET) 5-325 mg per tablet     pen needle, diabetic (BD ULTRA-FINE BREE PEN NEEDLE) 32 gauge x 5/32" Ndle INJECT INSULIN THREE TIMES DAILY    timolol maleate 0.5% (TIMOPTIC) 0.5 % Drop Place 1 drop into both eyes every morning.    [DISCONTINUED] amLODIPine (NORVASC) 10 MG tablet TAKE 1 TABLET BY MOUTH EVERY DAY    [DISCONTINUED] carvediloL (COREG) 12.5 MG tablet Take 1 tablet (12.5 mg total) by mouth 2 (two) times daily.    [DISCONTINUED] hydrALAZINE (APRESOLINE) 50 MG tablet Take 1 tablet (50 mg total) by mouth every 8 (eight) hours.    [DISCONTINUED] insulin detemir U-100 (LEVEMIR FLEXTOUCH U-100 INSULN) 100 unit/mL (3 mL) InPn pen Inject 26 Units into the skin every evening. HOLD UNTIL YOU SEE YOUR PCP    [DISCONTINUED] TRADJENTA 5 mg Tab tablet TAKE 1 TABLET BY MOUTH EVERY DAY    [DISCONTINUED] traZODone (DESYREL) 50 MG tablet TAKE 1 TABLET (50 MG TOTAL) BY MOUTH EVERY EVENING.     Family History       " Problem Relation (Age of Onset)    Cancer Sister    Diabetes Brother    Hypertension Father    Kidney failure Mother          Tobacco Use    Smoking status: Never Smoker    Smokeless tobacco: Never Used   Substance and Sexual Activity    Alcohol use: No    Drug use: Never    Sexual activity: Not Currently     Partners: Male     Review of Systems   Reason unable to perform ROS: altered.   Objective:     Vital Signs (Most Recent):  Temp: (!) 92.1 °F (33.4 °C) (07/15/22 1545)  Pulse: (!) 59 (07/15/22 1700)  Resp: 20 (07/15/22 1700)  BP: (!) 95/50 (07/15/22 1657)  SpO2: 99 % (07/15/22 1700)   Vital Signs (24h Range):  Temp:  [91.5 °F (33.1 °C)-92.1 °F (33.4 °C)] 92.1 °F (33.4 °C)  Pulse:  [58-62] 59  Resp:  [15-22] 20  SpO2:  [96 %-100 %] 99 %  BP: ()/(48-70) 95/50     Weight: 96.6 kg (213 lb)  Body mass index is 38.96 kg/m².    Physical Exam  Constitutional:       Comments: Elderly morbidly obese  female laying in bed moaning in pain   HENT:      Head: Normocephalic and atraumatic.   Eyes:      General:         Right eye: No discharge.         Left eye: No discharge.      Conjunctiva/sclera: Conjunctivae normal.      Pupils: Pupils are equal, round, and reactive to light.   Cardiovascular:      Rate and Rhythm: Regular rhythm.      Pulses: Normal pulses.      Heart sounds: Normal heart sounds. No murmur heard.     Comments: Borderline Fabián 55-65  Pulmonary:      Effort: Pulmonary effort is normal.      Breath sounds: Normal breath sounds. No stridor. No wheezing or rales.      Comments: Very poor inspiratory efforts  Abdominal:      General: There is no distension.      Palpations: There is no mass.      Tenderness: There is no guarding.      Comments: Morbidly obese abdomen otherwise soft    Musculoskeletal:      Comments: Swelling noted around the right hand.  Right arm PICC line in place site appears clean   Skin:     Capillary Refill: Capillary refill takes less than 2 seconds.    Neurological:      Mental Status: She is disoriented.      Comments: Moans in pain, able to say yes or no otherwise mostly nonverbal and does not follow any commands limiting neurological exam         CRANIAL NERVES     CN III, IV, VI   Pupils are equal, round, and reactive to light.     Significant Labs: All pertinent labs within the past 24 hours have been reviewed.  Blood Culture: No results for input(s): LABBLOO in the last 48 hours.  BMP:   Recent Labs   Lab 07/15/22  1121   GLU 88      K 5.4*   *   CO2 27   BUN 72*   CREATININE 2.6*   CALCIUM 9.2   MG 2.7*     CBC:   Recent Labs   Lab 07/15/22  1121   WBC 9.83   HGB 7.7*   HCT 26.1*   *     CMP:   Recent Labs   Lab 07/15/22  1121      K 5.4*   *   CO2 27   GLU 88   BUN 72*   CREATININE 2.6*   CALCIUM 9.2   PROT 6.8   ALBUMIN 1.5*   BILITOT 0.5   ALKPHOS 200*   *   *   ANIONGAP 6*   EGFRNONAA 16*       Significant Imaging: I have reviewed all pertinent imaging results/findings within the past 24 hours.    Assessment/Plan:     * Altered mental status  Unclear etiology, however from NH explanation, patient appears to be close to her baseline, she was able to say a few yes or no and would moan during wound dressing change  She has a large tunneling sacral decubitus which she has been on ceftriaxone for. Urine appear grossly dirty, CTH negative and CXR unremarkable  Will start on broad spectrum antibiotics for now considering that she is septic and quite hypotensive  Plan to deescalate antibiotics as soon as possible  Obtain TSH, B12, folate, ammonia, RPR      Sepsis  Started on broad spectrum antibiotics - suspect from UTI or resistant bug from sacral decubitus ulcer  She is also quite hypotensive, currently on broad spectrum abx and maintenance IVF. Will keep a threshold of initiating vasopressors if need be  Continue bear hug/warming blankets for hypothermia  Blood cultures obtained, will follow up result. Urine  culture cooking  Will also obtain new culture from the decubitus ulcer      Localized swelling on right hand  RUE US shows no DVT      Debility  Appears chronic, she is bedbound at baseline      Acute on chronic renal failure  Renal function appears worsened from her baseline  Appears 2/2 dehydration - patient has received about 3L of IVF in the ER prior to my exam, she has made about 15-20ml of urine so far  US did not show any acute new findings  Will continue maintenance fluid foe now, but will be cautious considering she has diastolic CHF to avoid fluid overload      Atrial fibrillation  Restart home rate controlling med, but hold off on anticoagulation for now due to her anemia      Anemia of chronic disease  Hg chronically low but appears stable with no indication for transfusion for now      Chronic diastolic heart failure  On maintenance IVF, however will plan to discontinue as soon as possible once her hemodynamics improve      Hyperlipidemia  Restart home meds      Controlled type 2 diabetes mellitus with stage 3 chronic kidney disease, with long-term current use of insulin  Will start on SSI, monitor BGL and initiate basal insulin when appropriate      Class 2 obesity in adult  Body mass index is 38.96 kg/m². Morbid obesity complicates all aspects of disease management from diagnostic modalities to treatment. Weight loss encouraged and health benefits explained to patient.         UTI (urinary tract infection)  On broad spectrum abx        VTE Risk Mitigation (From admission, onward)    None             Catalino Tobar MD  Department of Hospital Medicine   [facility] [place]

## 2022-07-16 PROBLEM — D69.6 THROMBOCYTOPENIA: Status: ACTIVE | Noted: 2022-07-16

## 2022-07-16 PROBLEM — I48.0 PAROXYSMAL ATRIAL FIBRILLATION: Status: ACTIVE | Noted: 2022-01-24

## 2022-07-16 PROBLEM — I50.33 ACUTE ON CHRONIC DIASTOLIC HEART FAILURE: Status: ACTIVE | Noted: 2020-02-27

## 2022-07-16 PROBLEM — R07.9 CHEST PAIN: Status: RESOLVED | Noted: 2022-01-24 | Resolved: 2022-07-16

## 2022-07-16 PROBLEM — R22.31 LOCALIZED SWELLING ON RIGHT HAND: Status: RESOLVED | Noted: 2022-07-15 | Resolved: 2022-07-16

## 2022-07-16 PROBLEM — R65.21 SEPTIC SHOCK: Status: ACTIVE | Noted: 2022-07-15

## 2022-07-16 PROBLEM — G93.40 ACUTE ENCEPHALOPATHY: Status: ACTIVE | Noted: 2022-04-24

## 2022-07-16 PROBLEM — M25.551 ACUTE RIGHT HIP PAIN: Status: RESOLVED | Noted: 2022-01-27 | Resolved: 2022-07-16

## 2022-07-16 PROBLEM — I49.9 ARRHYTHMIA: Status: RESOLVED | Noted: 2022-04-24 | Resolved: 2022-07-16

## 2022-07-16 PROBLEM — N18.32 ACUTE RENAL FAILURE SUPERIMPOSED ON STAGE 3B CHRONIC KIDNEY DISEASE: Status: ACTIVE | Noted: 2022-01-26

## 2022-07-16 LAB
ABO + RH BLD: NORMAL
ALBUMIN SERPL BCP-MCNC: 1.3 G/DL (ref 3.5–5.2)
ALBUMIN SERPL BCP-MCNC: 1.3 G/DL (ref 3.5–5.2)
ALLENS TEST: ABNORMAL
ALP SERPL-CCNC: 158 U/L (ref 55–135)
ALP SERPL-CCNC: 165 U/L (ref 55–135)
ALT SERPL W/O P-5'-P-CCNC: 82 U/L (ref 10–44)
ALT SERPL W/O P-5'-P-CCNC: 82 U/L (ref 10–44)
AMPHET+METHAMPHET UR QL: NEGATIVE
ANION GAP SERPL CALC-SCNC: 6 MMOL/L (ref 8–16)
ANION GAP SERPL CALC-SCNC: 7 MMOL/L (ref 8–16)
ANION GAP SERPL CALC-SCNC: 7 MMOL/L (ref 8–16)
AORTIC ROOT ANNULUS: 3.15 CM
AORTIC VALVE CUSP SEPERATION: 1.52 CM
ASCENDING AORTA: 3.25 CM
AST SERPL-CCNC: 72 U/L (ref 10–40)
AST SERPL-CCNC: 73 U/L (ref 10–40)
AV INDEX (PROSTH): 0.54
AV MEAN GRADIENT: 9 MMHG
AV PEAK GRADIENT: 15 MMHG
AV VALVE AREA: 1.83 CM2
AV VELOCITY RATIO: 0.54
BARBITURATES UR QL SCN>200 NG/ML: NEGATIVE
BASOPHILS # BLD AUTO: 0.03 K/UL (ref 0–0.2)
BASOPHILS NFR BLD: 0.3 % (ref 0–1.9)
BENZODIAZ UR QL SCN>200 NG/ML: NEGATIVE
BILIRUB SERPL-MCNC: 0.4 MG/DL (ref 0.1–1)
BILIRUB SERPL-MCNC: 0.5 MG/DL (ref 0.1–1)
BLD GP AB SCN CELLS X3 SERPL QL: NORMAL
BLD PROD TYP BPU: NORMAL
BLOOD UNIT EXPIRATION DATE: NORMAL
BLOOD UNIT TYPE CODE: 5100
BLOOD UNIT TYPE: NORMAL
BNP SERPL-MCNC: 39 PG/ML (ref 0–99)
BSA FOR ECHO PROCEDURE: 2.06 M2
BUN SERPL-MCNC: 71 MG/DL (ref 8–23)
BUN SERPL-MCNC: 71 MG/DL (ref 8–23)
BUN SERPL-MCNC: 75 MG/DL (ref 8–23)
BZE UR QL SCN: NEGATIVE
CALCIUM SERPL-MCNC: 8.1 MG/DL (ref 8.7–10.5)
CALCIUM SERPL-MCNC: 8.2 MG/DL (ref 8.7–10.5)
CALCIUM SERPL-MCNC: 8.4 MG/DL (ref 8.7–10.5)
CANNABINOIDS UR QL SCN: ABNORMAL
CHLORIDE SERPL-SCNC: 114 MMOL/L (ref 95–110)
CHLORIDE SERPL-SCNC: 115 MMOL/L (ref 95–110)
CHLORIDE SERPL-SCNC: 117 MMOL/L (ref 95–110)
CO2 SERPL-SCNC: 22 MMOL/L (ref 23–29)
CO2 SERPL-SCNC: 22 MMOL/L (ref 23–29)
CO2 SERPL-SCNC: 23 MMOL/L (ref 23–29)
CODING SYSTEM: NORMAL
CREAT SERPL-MCNC: 2.6 MG/DL (ref 0.5–1.4)
CREAT UR-MCNC: 56.9 MG/DL (ref 15–325)
CRP SERPL-MCNC: 227 MG/L (ref 0–8.2)
CV ECHO LV RWT: 0.69 CM
DELSYS: ABNORMAL
DIFFERENTIAL METHOD: ABNORMAL
DISPENSE STATUS: NORMAL
DOP CALC AO PEAK VEL: 1.96 M/S
DOP CALC AO VTI: 36.49 CM
DOP CALC LVOT AREA: 3.4 CM2
DOP CALC LVOT DIAMETER: 2.08 CM
DOP CALC LVOT PEAK VEL: 1.06 M/S
DOP CALC LVOT STROKE VOLUME: 66.77 CM3
DOP CALCLVOT PEAK VEL VTI: 19.66 CM
E WAVE DECELERATION TIME: 204.47 MSEC
E/A RATIO: 0.74
E/E' RATIO: 13.57 M/S
ECHO LV POSTERIOR WALL: 1.28 CM (ref 0.6–1.1)
EJECTION FRACTION: 65 %
EOSINOPHIL # BLD AUTO: 0.1 K/UL (ref 0–0.5)
EOSINOPHIL NFR BLD: 0.5 % (ref 0–8)
ERYTHROCYTE [DISTWIDTH] IN BLOOD BY AUTOMATED COUNT: 21.4 % (ref 11.5–14.5)
ERYTHROCYTE [SEDIMENTATION RATE] IN BLOOD BY WESTERGREN METHOD: 104 MM/HR (ref 0–20)
EST. GFR  (AFRICAN AMERICAN): 19 ML/MIN/1.73 M^2
EST. GFR  (NON AFRICAN AMERICAN): 16 ML/MIN/1.73 M^2
FLOW: 5
FRACTIONAL SHORTENING: 28 % (ref 28–44)
GLUCOSE SERPL-MCNC: 57 MG/DL (ref 70–110)
GLUCOSE SERPL-MCNC: 68 MG/DL (ref 70–110)
GLUCOSE SERPL-MCNC: 69 MG/DL (ref 70–110)
HCO3 UR-SCNC: 25.3 MMOL/L (ref 24–28)
HCT VFR BLD AUTO: 22.6 % (ref 37–48.5)
HGB BLD-MCNC: 6.7 G/DL (ref 12–16)
IMM GRANULOCYTES # BLD AUTO: 0.1 K/UL (ref 0–0.04)
IMM GRANULOCYTES NFR BLD AUTO: 0.9 % (ref 0–0.5)
INTERVENTRICULAR SEPTUM: 1.28 CM (ref 0.6–1.1)
IVRT: 129.4 MSEC
LA MAJOR: 4.75 CM
LA MINOR: 4.73 CM
LA WIDTH: 3.3 CM
LEFT ATRIUM SIZE: 2.21 CM
LEFT ATRIUM VOLUME INDEX: 15 ML/M2
LEFT ATRIUM VOLUME: 29.38 CM3
LEFT INTERNAL DIMENSION IN SYSTOLE: 2.68 CM (ref 2.1–4)
LEFT VENTRICLE DIASTOLIC VOLUME INDEX: 30.32 ML/M2
LEFT VENTRICLE DIASTOLIC VOLUME: 59.43 ML
LEFT VENTRICLE MASS INDEX: 84 G/M2
LEFT VENTRICLE SYSTOLIC VOLUME INDEX: 13.5 ML/M2
LEFT VENTRICLE SYSTOLIC VOLUME: 26.45 ML
LEFT VENTRICULAR INTERNAL DIMENSION IN DIASTOLE: 3.73 CM (ref 3.5–6)
LEFT VENTRICULAR MASS: 164.47 G
LV LATERAL E/E' RATIO: 13.57 M/S
LV SEPTAL E/E' RATIO: 13.57 M/S
LYMPHOCYTES # BLD AUTO: 1.8 K/UL (ref 1–4.8)
LYMPHOCYTES NFR BLD: 15.2 % (ref 18–48)
MAGNESIUM SERPL-MCNC: 2.5 MG/DL (ref 1.6–2.6)
MAGNESIUM SERPL-MCNC: 2.5 MG/DL (ref 1.6–2.6)
MCH RBC QN AUTO: 26.2 PG (ref 27–31)
MCHC RBC AUTO-ENTMCNC: 29.6 G/DL (ref 32–36)
MCV RBC AUTO: 88 FL (ref 82–98)
METHADONE UR QL SCN>300 NG/ML: NEGATIVE
MODE: ABNORMAL
MONOCYTES # BLD AUTO: 0.5 K/UL (ref 0.3–1)
MONOCYTES NFR BLD: 4.1 % (ref 4–15)
MV PEAK A VEL: 1.28 M/S
MV PEAK E VEL: 0.95 M/S
MV STENOSIS PRESSURE HALF TIME: 59.3 MS
MV VALVE AREA P 1/2 METHOD: 3.71 CM2
NEUTROPHILS # BLD AUTO: 9.2 K/UL (ref 1.8–7.7)
NEUTROPHILS NFR BLD: 79 % (ref 38–73)
NRBC BLD-RTO: 1 /100 WBC
OPIATES UR QL SCN: ABNORMAL
PCO2 BLDA: 42.9 MMHG (ref 35–45)
PCP UR QL SCN>25 NG/ML: NEGATIVE
PH SMN: 7.38 [PH] (ref 7.35–7.45)
PHOSPHATE SERPL-MCNC: 3.9 MG/DL (ref 2.7–4.5)
PISA TR MAX VEL: 2.43 M/S
PLATELET # BLD AUTO: 125 K/UL (ref 150–450)
PMV BLD AUTO: 11.6 FL (ref 9.2–12.9)
PO2 BLDA: 197 MMHG (ref 80–100)
POC BE: 0 MMOL/L
POC SATURATED O2: 100 % (ref 95–100)
POC TCO2: 27 MMOL/L (ref 23–27)
POCT GLUCOSE: 121 MG/DL (ref 70–110)
POCT GLUCOSE: 174 MG/DL (ref 70–110)
POCT GLUCOSE: 177 MG/DL (ref 70–110)
POCT GLUCOSE: 55 MG/DL (ref 70–110)
POCT GLUCOSE: 76 MG/DL (ref 70–110)
POCT GLUCOSE: 83 MG/DL (ref 70–110)
POCT GLUCOSE: 97 MG/DL (ref 70–110)
POTASSIUM SERPL-SCNC: 5.1 MMOL/L (ref 3.5–5.1)
POTASSIUM SERPL-SCNC: 5.5 MMOL/L (ref 3.5–5.1)
POTASSIUM SERPL-SCNC: 5.6 MMOL/L (ref 3.5–5.1)
POTASSIUM SERPL-SCNC: 6.1 MMOL/L (ref 3.5–5.1)
POTASSIUM SERPL-SCNC: ABNORMAL MMOL/L (ref 3.5–5.1)
PROT SERPL-MCNC: 5.9 G/DL (ref 6–8.4)
PROT SERPL-MCNC: 5.9 G/DL (ref 6–8.4)
PULM VEIN S/D RATIO: 1.53
PV PEAK D VEL: 0.45 M/S
PV PEAK S VEL: 0.69 M/S
PV PEAK VELOCITY: 1.05 CM/S
RA MAJOR: 4.43 CM
RA PRESSURE: 3 MMHG
RA WIDTH: 3.38 CM
RBC # BLD AUTO: 2.56 M/UL (ref 4–5.4)
RIGHT VENTRICULAR END-DIASTOLIC DIMENSION: 3.35 CM
RV TISSUE DOPPLER FREE WALL SYSTOLIC VELOCITY 1 (APICAL 4 CHAMBER VIEW): 9.17 CM/S
SAMPLE: ABNORMAL
SINUS: 3.1 CM
SITE: ABNORMAL
SODIUM SERPL-SCNC: 143 MMOL/L (ref 136–145)
SODIUM SERPL-SCNC: 145 MMOL/L (ref 136–145)
SODIUM SERPL-SCNC: 145 MMOL/L (ref 136–145)
STJ: 2.74 CM
TDI LATERAL: 0.07 M/S
TDI SEPTAL: 0.07 M/S
TDI: 0.07 M/S
TOXICOLOGY INFORMATION: ABNORMAL
TR MAX PG: 24 MMHG
TRANS ERYTHROCYTES VOL PATIENT: NORMAL ML
TRICUSPID ANNULAR PLANE SYSTOLIC EXCURSION: 1.51 CM
TROPONIN I SERPL DL<=0.01 NG/ML-MCNC: 0.02 NG/ML (ref 0–0.03)
TV REST PULMONARY ARTERY PRESSURE: 27 MMHG
VANCOMYCIN SERPL-MCNC: 16.9 UG/ML
VIT B12 SERPL-MCNC: >2000 PG/ML (ref 210–950)
WBC # BLD AUTO: 11.57 K/UL (ref 3.9–12.7)

## 2022-07-16 PROCEDURE — 25000242 PHARM REV CODE 250 ALT 637 W/ HCPCS: Performed by: HOSPITALIST

## 2022-07-16 PROCEDURE — 86140 C-REACTIVE PROTEIN: CPT | Performed by: HOSPITALIST

## 2022-07-16 PROCEDURE — 36415 COLL VENOUS BLD VENIPUNCTURE: CPT | Performed by: HOSPITALIST

## 2022-07-16 PROCEDURE — 36569 INSJ PICC 5 YR+ W/O IMAGING: CPT

## 2022-07-16 PROCEDURE — 51702 INSERT TEMP BLADDER CATH: CPT

## 2022-07-16 PROCEDURE — 36415 COLL VENOUS BLD VENIPUNCTURE: CPT | Performed by: INTERNAL MEDICINE

## 2022-07-16 PROCEDURE — 94644 CONT INHLJ TX 1ST HOUR: CPT

## 2022-07-16 PROCEDURE — 63600175 PHARM REV CODE 636 W HCPCS: Performed by: STUDENT IN AN ORGANIZED HEALTH CARE EDUCATION/TRAINING PROGRAM

## 2022-07-16 PROCEDURE — 85025 COMPLETE CBC W/AUTO DIFF WBC: CPT | Performed by: STUDENT IN AN ORGANIZED HEALTH CARE EDUCATION/TRAINING PROGRAM

## 2022-07-16 PROCEDURE — 94761 N-INVAS EAR/PLS OXIMETRY MLT: CPT

## 2022-07-16 PROCEDURE — 93010 ELECTROCARDIOGRAM REPORT: CPT | Mod: ,,, | Performed by: INTERNAL MEDICINE

## 2022-07-16 PROCEDURE — 20000000 HC ICU ROOM

## 2022-07-16 PROCEDURE — C1751 CATH, INF, PER/CENT/MIDLINE: HCPCS

## 2022-07-16 PROCEDURE — 63600175 PHARM REV CODE 636 W HCPCS: Performed by: HOSPITALIST

## 2022-07-16 PROCEDURE — 84100 ASSAY OF PHOSPHORUS: CPT | Performed by: HOSPITALIST

## 2022-07-16 PROCEDURE — 80202 ASSAY OF VANCOMYCIN: CPT | Performed by: EMERGENCY MEDICINE

## 2022-07-16 PROCEDURE — 83880 ASSAY OF NATRIURETIC PEPTIDE: CPT | Performed by: HOSPITALIST

## 2022-07-16 PROCEDURE — 25000003 PHARM REV CODE 250: Performed by: STUDENT IN AN ORGANIZED HEALTH CARE EDUCATION/TRAINING PROGRAM

## 2022-07-16 PROCEDURE — 80048 BASIC METABOLIC PNL TOTAL CA: CPT | Mod: XB | Performed by: INTERNAL MEDICINE

## 2022-07-16 PROCEDURE — 83735 ASSAY OF MAGNESIUM: CPT | Mod: 91 | Performed by: HOSPITALIST

## 2022-07-16 PROCEDURE — 36415 COLL VENOUS BLD VENIPUNCTURE: CPT | Performed by: STUDENT IN AN ORGANIZED HEALTH CARE EDUCATION/TRAINING PROGRAM

## 2022-07-16 PROCEDURE — 84132 ASSAY OF SERUM POTASSIUM: CPT | Mod: 91 | Performed by: HOSPITALIST

## 2022-07-16 PROCEDURE — 83735 ASSAY OF MAGNESIUM: CPT | Performed by: INTERNAL MEDICINE

## 2022-07-16 PROCEDURE — 99900035 HC TECH TIME PER 15 MIN (STAT)

## 2022-07-16 PROCEDURE — 84484 ASSAY OF TROPONIN QUANT: CPT | Performed by: INTERNAL MEDICINE

## 2022-07-16 PROCEDURE — 25000003 PHARM REV CODE 250: Performed by: INTERNAL MEDICINE

## 2022-07-16 PROCEDURE — P9021 RED BLOOD CELLS UNIT: HCPCS | Performed by: HOSPITALIST

## 2022-07-16 PROCEDURE — 86920 COMPATIBILITY TEST SPIN: CPT | Performed by: HOSPITALIST

## 2022-07-16 PROCEDURE — 93010 EKG 12-LEAD: ICD-10-PCS | Mod: ,,, | Performed by: INTERNAL MEDICINE

## 2022-07-16 PROCEDURE — 36600 WITHDRAWAL OF ARTERIAL BLOOD: CPT

## 2022-07-16 PROCEDURE — 93005 ELECTROCARDIOGRAM TRACING: CPT

## 2022-07-16 PROCEDURE — 86850 RBC ANTIBODY SCREEN: CPT | Performed by: HOSPITALIST

## 2022-07-16 PROCEDURE — 80053 COMPREHEN METABOLIC PANEL: CPT | Performed by: STUDENT IN AN ORGANIZED HEALTH CARE EDUCATION/TRAINING PROGRAM

## 2022-07-16 PROCEDURE — 36430 TRANSFUSION BLD/BLD COMPNT: CPT

## 2022-07-16 PROCEDURE — 80053 COMPREHEN METABOLIC PANEL: CPT | Mod: 91 | Performed by: HOSPITALIST

## 2022-07-16 PROCEDURE — 82803 BLOOD GASES ANY COMBINATION: CPT

## 2022-07-16 PROCEDURE — 85652 RBC SED RATE AUTOMATED: CPT | Performed by: HOSPITALIST

## 2022-07-16 PROCEDURE — 25000003 PHARM REV CODE 250: Performed by: HOSPITALIST

## 2022-07-16 PROCEDURE — 94640 AIRWAY INHALATION TREATMENT: CPT

## 2022-07-16 PROCEDURE — 27000221 HC OXYGEN, UP TO 24 HOURS

## 2022-07-16 RX ORDER — ALBUTEROL SULFATE 2.5 MG/.5ML
10 SOLUTION RESPIRATORY (INHALATION) ONCE
Status: COMPLETED | OUTPATIENT
Start: 2022-07-16 | End: 2022-07-16

## 2022-07-16 RX ORDER — HYDROCODONE BITARTRATE AND ACETAMINOPHEN 500; 5 MG/1; MG/1
TABLET ORAL
Status: DISCONTINUED | OUTPATIENT
Start: 2022-07-16 | End: 2022-07-17

## 2022-07-16 RX ORDER — CALCIUM GLUCONATE 20 MG/ML
1 INJECTION, SOLUTION INTRAVENOUS ONCE
Status: COMPLETED | OUTPATIENT
Start: 2022-07-16 | End: 2022-07-16

## 2022-07-16 RX ORDER — NOREPINEPHRINE BITARTRATE/D5W 4MG/250ML
0-3 PLASTIC BAG, INJECTION (ML) INTRAVENOUS CONTINUOUS
Status: DISCONTINUED | OUTPATIENT
Start: 2022-07-16 | End: 2022-07-18 | Stop reason: HOSPADM

## 2022-07-16 RX ORDER — HYDROMORPHONE HYDROCHLORIDE 2 MG/ML
0.5 INJECTION, SOLUTION INTRAMUSCULAR; INTRAVENOUS; SUBCUTANEOUS EVERY 6 HOURS PRN
Status: DISCONTINUED | OUTPATIENT
Start: 2022-07-16 | End: 2022-07-18 | Stop reason: HOSPADM

## 2022-07-16 RX ORDER — SODIUM CHLORIDE 0.9 % (FLUSH) 0.9 %
10 SYRINGE (ML) INJECTION EVERY 6 HOURS
Status: DISCONTINUED | OUTPATIENT
Start: 2022-07-17 | End: 2022-07-18 | Stop reason: HOSPADM

## 2022-07-16 RX ORDER — INSULIN ASPART 100 [IU]/ML
0-5 INJECTION, SOLUTION INTRAVENOUS; SUBCUTANEOUS EVERY 6 HOURS PRN
Status: DISCONTINUED | OUTPATIENT
Start: 2022-07-16 | End: 2022-07-18 | Stop reason: HOSPADM

## 2022-07-16 RX ORDER — SODIUM CHLORIDE 0.9 % (FLUSH) 0.9 %
10 SYRINGE (ML) INJECTION
Status: DISCONTINUED | OUTPATIENT
Start: 2022-07-16 | End: 2022-07-18 | Stop reason: HOSPADM

## 2022-07-16 RX ORDER — CALCIUM GLUCONATE 20 MG/ML
1 INJECTION, SOLUTION INTRAVENOUS EVERY 10 MIN PRN
Status: DISCONTINUED | OUTPATIENT
Start: 2022-07-16 | End: 2022-07-17

## 2022-07-16 RX ADMIN — MUPIROCIN: 20 OINTMENT TOPICAL at 08:07

## 2022-07-16 RX ADMIN — CALCIUM GLUCONATE 1 G: 20 INJECTION, SOLUTION INTRAVENOUS at 12:07

## 2022-07-16 RX ADMIN — Medication 0.08 MCG/KG/MIN: at 05:07

## 2022-07-16 RX ADMIN — SODIUM CHLORIDE: 0.9 INJECTION, SOLUTION INTRAVENOUS at 05:07

## 2022-07-16 RX ADMIN — HYDROMORPHONE HYDROCHLORIDE 0.5 MG: 2 INJECTION, SOLUTION INTRAMUSCULAR; INTRAVENOUS; SUBCUTANEOUS at 02:07

## 2022-07-16 RX ADMIN — ALBUTEROL SULFATE 10 MG: 2.5 SOLUTION RESPIRATORY (INHALATION) at 12:07

## 2022-07-16 RX ADMIN — HYDROMORPHONE HYDROCHLORIDE 0.5 MG: 2 INJECTION, SOLUTION INTRAMUSCULAR; INTRAVENOUS; SUBCUTANEOUS at 09:07

## 2022-07-16 RX ADMIN — CEFEPIME 1 G: 1 INJECTION, POWDER, FOR SOLUTION INTRAMUSCULAR; INTRAVENOUS at 05:07

## 2022-07-16 RX ADMIN — VANCOMYCIN HYDROCHLORIDE 1500 MG: 1.5 INJECTION, POWDER, LYOPHILIZED, FOR SOLUTION INTRAVENOUS at 12:07

## 2022-07-16 RX ADMIN — HEPARIN SODIUM 5000 UNITS: 5000 INJECTION INTRAVENOUS; SUBCUTANEOUS at 05:07

## 2022-07-16 RX ADMIN — DEXTROSE MONOHYDRATE 12.5 G: 25 INJECTION, SOLUTION INTRAVENOUS at 06:07

## 2022-07-16 RX ADMIN — HEPARIN SODIUM 5000 UNITS: 5000 INJECTION INTRAVENOUS; SUBCUTANEOUS at 09:07

## 2022-07-16 RX ADMIN — DEXTROSE MONOHYDRATE 25 G: 25 INJECTION, SOLUTION INTRAVENOUS at 12:07

## 2022-07-16 RX ADMIN — HEPARIN SODIUM 5000 UNITS: 5000 INJECTION INTRAVENOUS; SUBCUTANEOUS at 02:07

## 2022-07-16 RX ADMIN — Medication 0.02 MCG/KG/MIN: at 03:07

## 2022-07-16 RX ADMIN — INSULIN HUMAN 9.66 UNITS: 100 INJECTION, SOLUTION PARENTERAL at 12:07

## 2022-07-16 NOTE — EICU
EICU BRIEF ADMIT NOTE:    Reason for ICU admission:  Severe sepsis    Please refer to admission H&P for details.     Comfortably lying in the bed.  Not in distress.    Chart reviewed: yes  Recent MD notes reviewed: Yes  Labs results reviewed: yes  Radiology: Chest images reviewed. Reports for others reviewed.  Telemetry tracing: yes  Evaluation via interactive audio and video telecommunications: yes comfortably lying in the bed.  Not in distress.  Communicated issues/orders/plan with: bedside ICU RN    Best Practices Review:  Stress ulcer prophylaxis: H2 blocker. PPI not indicated  DVT prophylaxis: Pharmacological .   Blood glucose monitoring: Ordered        Ventilator review:  Intubated : No      Assessment & Plan:     Impression:  Severe sepsis  Urine tract infection  Decubitus ulcer with infection  Acute kidney injury with history of chronic kidney disease  Transaminitis  Altered mental status  Anemia      Plan:  Continue broad-spectrum antibiotic with cefepime and vancomycin.  Follow cultures.  Deescalate antibiotics based on further clinical data.  Wound care consult for large decubitus ulcer.  Continue fluid resuscitation.  Levophed if continue to be hypotensive with a map less than 65. Lactic acid is normal.  Monitor mental status.  Hold off antihypertensive medications.  Currently her anticoagulation is on hold because of anemia.  Continue to monitor H&H and transfuse PRBC if indicated.  Ultrasound of the right upper quadrant.  Continue to trend LFTs.  Continue rest of supportive care.        Thank you for allowing the EICU to participate in your patient's care. Please feel free to call us as needed.     I have encourage bedside RN to call me with the results of labs/imaging if ordered.         Cee Pompa MD  [facility]  [phone]      Addendum:  Her heart rate seems to be variable.  She goes to as high as 80s and 90s and drops down to 30s and 40s.  She had a long pause with ?  Asystole but she recovered and  now heart rate seems to be in 70s and 80s.  She has a pulse.  Her blood pressure seems to be within acceptable range.  I have ordered EKG/BMP is prior BMP showed production of 5.4/troponin.  She does seem to have nausea vomiting and could be a vasovagal response. I will review of the EKG when I receive to look for any blocks.  If she continued to be bradycardic then all start her on dopamine.      Addendum:  Called for hypotension.  She has received 3 L of fluid already as part of sepsis protocol.  Initially blood pressure which was very labile but now consistently on the lower side.  Heart rate is within acceptable range of 70s.  I will start her on Levophed.  She does have low urine output as well.  Levophed is being started through peripheral line is central line is not available.

## 2022-07-16 NOTE — ASSESSMENT & PLAN NOTE
Currently sinus rhythm  Holding BB in setting of shock  Holding anticoagulation in setting of Hgb drop requiring transfusion- monitor for blood loss

## 2022-07-16 NOTE — HPI
Ana James is a 81 y.o. lady     Patient is an 81-year-old  female with history of T2DM, , HTN, PVD, distally CHF, CKD and Afib who was brought in from [facility] due to concern of RUE swelling around her PICC line site with change in her LOC. Patient unable to provide any information, I called the NH and spoke to the nurse who was taking care of patient who stated that patient's right hand/arm was noticed to be swollen. Nursing staff also stated that patient's mental status changed which maximo described as patient appearing more lethargic, not moaning as she used to when is in pain, and also unable to say yes or no which is her baseline. She is unable to hold normal conversation or follow adequate commands but she can relate to nursing staff her pain, hunger, thirst by saying one word sentences.      Upon ER presentation, patient noted to be hypothermic at 91.5, HR in the 50s and BP in low 90s. Labs show WBC 9.8, H/H 7.7/26, , Na 145, K 5.4, Cl 112, HCO3 22, BUN/Cr 72/2.6, glc 88.

## 2022-07-16 NOTE — NURSING
Patient had a period of asystole on the monitor and lost a pulse. Pulse and heart rate returned spontaneously when patient was being rolled to place pads. Patient then had several episodes of emesis. EKG and blood gas ordered.

## 2022-07-16 NOTE — PROGRESS NOTES
Pharmacokinetic Assessment Follow Up: IV Vancomycin    Vancomycin serum concentration assessment(s):    The random level was drawn correctly and can be used to guide therapy at this time. The measurement is within the desired definitive target range of 10 to 20 mcg/mL.    Vancomycin Regimen Plan:    Dose 1500 mg x 1 today 7/16    Re-dose when the random level is less than 20 mcg/mL, next level to be drawn at 0400 on 7/17    Drug levels (last 3 results):  Recent Labs   Lab Result Units 07/16/22  0231   Vancomycin, Random ug/mL 16.9       Pharmacy will continue to follow and monitor vancomycin.    Please contact pharmacy at extension [phone] for questions regarding this assessment.    Thank you for the consult,   Seng Clark       Patient brief summary:  Ana James is a 81 y.o. female initiated on antimicrobial therapy with IV Vancomycin for treatment of bone/joint infection      Drug Allergies:   Review of patient's allergies indicates:   Allergen Reactions    Adhesive Rash     Paper tape       Actual Body Weight:   96.6 kg    Renal Function:   Estimated Creatinine Clearance: 17.8 mL/min (A) (based on SCr of 2.6 mg/dL (H)).,     Dialysis Method (if applicable):  N/A    CBC (last 72 hours):  Recent Labs   Lab Result Units 07/15/22  1121 07/16/22  0231   WBC K/uL 9.83 11.57   Hemoglobin g/dL 7.7* 6.7*   Hematocrit % 26.1* 22.6*   Platelets K/uL 141* 125*   Gran % % 77.0* 79.0*   Lymph % % 18.1 15.2*   Mono % % 3.7* 4.1   Eosinophil % % 0.5 0.5   Basophil % % 0.2 0.3   Differential Method  Automated Automated       Metabolic Panel (last 72 hours):  Recent Labs   Lab Result Units 07/15/22  1121 07/15/22  1546 07/16/22  0044 07/16/22  0231   Sodium mmol/L 145  --  143 145   Potassium mmol/L 5.4*  --  SEE COMMENT 5.5*   Chloride mmol/L 112*  --  114* 117*   CO2 mmol/L 27  --  22* 22*   Glucose mg/dL 88  --  68* 69*   Glucose, UA   --  Negative  --   --    BUN mg/dL 72*  --  71* 71*   Creatinine mg/dL 2.6*   --  2.6* 2.6*   Albumin g/dL 1.5*  --   --  1.3*   Total Bilirubin mg/dL 0.5  --   --  0.4   Alkaline Phosphatase U/L 200*  --   --  158*   AST U/L 113*  --   --  73*   ALT U/L 111*  --   --  82*   Magnesium mg/dL 2.7*  --  2.5  --        Vancomycin Administrations:  vancomycin given in the last 96 hours                     vancomycin 1.5 g in dextrose 5 % 250 mL IVPB (ready to mix) ()  Restarted 07/15/22 1500     1,500 mg New Bag  1425                    Microbiologic Results:  Microbiology Results (last 7 days)       Procedure Component Value Units Date/Time    Aerobic culture [[id number]] Collected: 07/15/22 2020    Order Status: Sent Specimen: Decubitus from Coccyx Updated: 07/15/22 2036    Blood culture x two cultures. Draw prior to antibiotics. [[id number]] Collected: 07/15/22 1121    Order Status: Completed Specimen: Blood from Peripheral, Upper Arm, Left Updated: 07/15/22 1912     Blood Culture, Routine No Growth to date    Narrative:      Aerobic and anaerobic    Blood culture x two cultures. Draw prior to antibiotics. [[id number]] Collected: 07/15/22 1205    Order Status: Completed Specimen: Blood from Peripheral, Forearm, Left Updated: 07/15/22 1912     Blood Culture, Routine No Growth to date    Narrative:      Aerobic and anaerobic    Urine culture [[id number]] Collected: 07/15/22 1546    Order Status: No result Specimen: Urine Updated: 07/15/22 1637

## 2022-07-16 NOTE — PLAN OF CARE
Patient in ICU on low dose levophed and IV fluids. She is alert, but non-verbal, and moans when moved. She had an episode of asystole with no pulse that resolved when she was turned to put pads on. Stage IV decubitus on sacrum swabbed for culture. Jacob causey applied. EKG, ABG and labs done. Levophed is running through a new peripheral IV which has been checked q hour and maintains good blood return.

## 2022-07-16 NOTE — PROGRESS NOTES
[facility] Medicine  Progress Note    Patient Name: Ana James  MRN: [id number]  Patient Class: IP- Inpatient   Admission Date: 7/15/2022  Length of Stay: 1 days  Attending Physician: Edel Navarro MD  Primary Care Provider: Viky Bean MD        Subjective:     Principal Problem:Acute encephalopathy        HPI:  Patient is an 81-year-old  female with history of T2DM, , HTN, PVD, distally CHF, CKD and Afib who was brought in from [facility] due to concern of RUE swelling around her PICC line site with change in her LOC. Patient unable to provide any information, I called the NH and spoke to the nurse who was taking care of patient who stated that patient's right hand/arm was noticed to be swollen. Nursing staff also stated that patient's mental status changed which maximo described as patient appearing more lethargic, not moaning as she used to when is in pain, and also unable to say yes or no which is her baseline. She is unable to hold normal conversation or follow adequate commands but she can relate to nursing staff her pain, hunger, thirst by saying one word sentences.     Upon ER presentation, patient noted to be hypothermic at 91.5, HR in the 50s and BP in low 90s. Labs show WBC 9.8, H/H 7.7/26, , Na 145, K 5.4, Cl 112, HCO3 22, BUN/Cr 72/2.6, glc 88.    CXR, CTH and venous US or RUE all unremarkable      Overview/Hospital Course:  Ms Ana James is a 81 y.o. woman admitted with acute encephalopathy and AUTUMN on CKD due to septic shock. Most likely source sacral wound.      Interval History: Opens eyes, moans, does not follow commands, does not speak. On 0.02 levophed. Had long sinus pause overnight but did not receive chest compressions or medications.    Review of Systems   Unable to perform ROS: Mental status change   Objective:     Vital Signs (Most Recent):  Temp: 96.62 °F (35.9 °C) (22 0810)  Pulse: 79  (07/16/22 0836)  Resp: 17 (07/16/22 0836)  BP: (!) 112/51 (07/16/22 0836)  SpO2: 99 % (07/16/22 0836)   Vital Signs (24h Range):  Temp:  [91.5 °F (33.1 °C)-96.62 °F (35.9 °C)] 96.62 °F (35.9 °C)  Pulse:  [58-83] 79  Resp:  [13-31] 17  SpO2:  [90 %-100 %] 99 %  BP: ()/() 112/51     Weight: 96.6 kg (213 lb)  Body mass index is 38.96 kg/m².    Intake/Output Summary (Last 24 hours) at 7/16/2022 0917  Last data filed at 7/16/2022 0728  Gross per 24 hour   Intake 1649.04 ml   Output 130 ml   Net 1519.04 ml      Physical Exam  Vitals and nursing note reviewed.   Constitutional:       General: She is not in acute distress.     Appearance: She is obese. She is ill-appearing. She is not diaphoretic.   HENT:      Head: Normocephalic and atraumatic.      Nose: Nose normal.      Mouth/Throat:      Mouth: Mucous membranes are dry.   Eyes:      General: No scleral icterus.     Conjunctiva/sclera: Conjunctivae normal.   Cardiovascular:      Rate and Rhythm: Normal rate and regular rhythm.      Pulses: Normal pulses.      Heart sounds: Normal heart sounds. No murmur heard.    No gallop.   Pulmonary:      Effort: Pulmonary effort is normal. No respiratory distress.      Breath sounds: No wheezing or rales.      Comments: O2 by NC  Abdominal:      General: Bowel sounds are normal. There is no distension.      Palpations: Abdomen is soft.      Tenderness: There is no abdominal tenderness. There is no guarding.   Musculoskeletal:         General: Swelling present.      Right lower leg: Edema present.      Left lower leg: Edema present.   Skin:     General: Skin is warm and dry.   Neurological:      Mental Status: She is disoriented.     Left hip      Sacral wound      Significant Labs: All pertinent labs within the past 24 hours have been reviewed.    Significant Imaging: I have reviewed all pertinent imaging results/findings within the past 24 hours.      Assessment/Plan:      * Acute encephalopathy  At baseline able to say  "a few yes or no and would moan during wound dressing change. On admit not speaking, moans. This seems to be somewhat near baseline.   - due to septic shock  - CTH no acute changes      Thrombocytopenia  Plts decreasing from admit, likely due to septic shock      Septic shock  This patient does have evidence of infective focus  My overall impression is septic shock.  Source: sacral wound   Antibiotics given-   Antibiotics (From admission, onward)            Start     Stop Route Frequency Ordered    07/16/22 0800  vancomycin 1.5 g in dextrose 5 % 250 mL IVPB (ready to mix)         07/16 1959 IV ED 1 Time 07/16/22 0737    07/15/22 2145  mupirocin 2 % ointment         07/20 2059 Nasl 2 times daily 07/15/22 2032    07/15/22 1859  vancomycin - pharmacy to dose  (vancomycin IVPB)        "And" Linked Group Details    -- IV pharmacy to manage frequency 07/15/22 1800    07/15/22 1830  cefepime in dextrose 5 % 1 gram/50 mL IVPB 1 g         -- IV Every 12 hours (non-standard times) 07/15/22 1800        Latest lactate reviewed-  Recent Labs   Lab 07/15/22  1121 07/15/22  1502   LACTATE 0.9 0.7     Organ dysfunction indicated by Acute kidney injury and Encephalopathy     Shock with decreased perfusion noted, Fluid challenge was given at 30cc/kg    Post- resuscitation assessment- (Done after fluids given for shock)  Vital signs post fluid administrations were-  Temp Readings from Last 1 Encounters:   07/16/22 96.62 °F (35.9 °C)     BP Readings from Last 1 Encounters:   07/16/22 (!) 112/51     Pulse Readings from Last 1 Encounters:   07/16/22 79       Perfusion assessment post bolus shows Inadequate tissue perfusion assessed by non-invasive monitoring  Will Start Pressors- Levophed for MAP of 65  Source control achieved by: will discuss goals of care with family. She is chronically bed bound. This ulcer will not heal if she continues to lay on it. Will consult surgery.         Debility  Bedbound at baseline with indwelling " aram  Suspect this is from dementia- will need to clarify with family  Large sacral wound present       Goals of care, counseling/discussion  Advance Care Planning   Reviewed prior records. DNR on last hospitalization. Will call family and confirm code status today. Will also discuss other interventions/goals of care.       Acute renal failure superimposed on stage 3b chronic kidney disease  Presented with AUTUMN. Cr on admit 2.6 from baseline Cr 1.4  Suspect due to septic shock  UA few bacteria, mod yeast  Aram changed on admit  Given IVF. She is grossly volume overloaded. DC IVF  BMP in AM      Paroxysmal atrial fibrillation  Currently sinus rhythm  Holding BB in setting of shock  Holding anticoagulation in setting of Hgb drop requiring transfusion- monitor for blood loss       Anemia of chronic disease  Anemia of chronic disease + iron deficiency anemia.  - unable to take PO iron currently due to AMS  - Hgb decreased to 6.7- will discuss transfusion with family      Acute on chronic diastolic heart failure  Last TTE 1/2022 EF 70%, indeterminate diastolic function  Grossly volume overloaded on exam  Check BNP  Given IVF- DC now       Hyperlipidemia  Unable to tolerate PO due to AMS      Hyperkalemia  K 5.5 earlier this AM, associated with AUTUMN  - repeat BMP now and shift if needed. Cannot tolerate PO       Controlled type 2 diabetes mellitus with stage 3 chronic kidney disease, with long-term current use of insulin  A1c:   Lab Results   Component Value Date    HGBA1C 6.3 (H) 05/24/2022     Meds: SSI PRN to maintain goal 140-180  accuchecks, hypoglycemic protocol  Glucose controlled      Class 2 obesity in adult  Body mass index is 38.96 kg/m². Morbid obesity complicates all aspects of disease management from diagnostic modalities to treatment. Weight loss encouraged and health benefits explained to patient.           VTE Risk Mitigation (From admission, onward)         Ordered     heparin (porcine) injection 5,000  Units  Every 8 hours         07/15/22 1758     Place BAMBI hose  Until discontinued         07/15/22 1758     IP VTE HIGH RISK PATIENT  Once         07/15/22 1758     Place sequential compression device  Until discontinued         07/15/22 1758                Discharge Planning   FREDERICK:      Code Status: DNR   Is the patient medically ready for discharge?:     Reason for patient still in hospital (select all that apply): Patient unstable         3:00 PM Called patient's brother Prince Elder to update him. Discussed wound that is not improving. He says that she was previously evaluated by hospice and was told that she did not qualify. He confirmed DNR status. Prior LAPOST voided. New LAPOST needed prior to discharge.     Critical care time spent on the evaluation and treatment of severe organ dysfunction, review of pertinent labs and imaging studies, discussions with consulting providers and discussions with patient/family: 45 minutes.      Edel Navarro MD  Department of Hospital Medicine   [facility] - [place] - Intensive Care

## 2022-07-16 NOTE — ASSESSMENT & PLAN NOTE
K 5.5 earlier this AM, associated with AUTUMN  - repeat BMP now and shift if needed. Cannot tolerate PO

## 2022-07-16 NOTE — PROCEDURES
"Ana James is a 81 y.o. female patient.    Temp: 97.34 °F (36.3 °C) (07/16/22 1634)  Pulse: 71 (07/16/22 1745)  Resp: (!) 21 (07/16/22 1745)  BP: (!) 94/52 (07/16/22 1745)  SpO2: 100 % (07/16/22 1745)  Weight: 96.6 kg (213 lb) (07/16/22 1000)  Height: 5' 2" (157.5 cm) (07/16/22 1000)    PICC  Date/Time: 7/16/2022 6:25 PM  Performed by: Piotr López RN  Consent Done: Yes  Time out: Immediately prior to procedure a time out was called to verify the correct patient, procedure, equipment, support staff and site/side marked as required  Indications: med administration and vascular access  Anesthesia: local infiltration  Local anesthetic: lidocaine 1% without epinephrine  Anesthetic Total (mL): 5  Preparation: skin prepped with ChloraPrep  Skin prep agent dried: skin prep agent completely dried prior to procedure  Sterile barriers: all five maximum sterile barriers used - cap, mask, sterile gown, sterile gloves, and large sterile sheet  Hand hygiene: hand hygiene performed prior to central venous catheter insertion  Location details: right brachial  Catheter type: triple lumen  Catheter size: 5 Fr  Catheter Length: 41cm    Ultrasound guidance: yes  Vessel Caliber: medium, compressibility normal  Needle advanced into vessel with real time Ultrasound guidance.  Guidewire confirmed in vessel.  Sterile sheath used.  Number of attempts: 1  Post-procedure: blood return through all ports, chlorhexidine patch and sterile dressing applied            Name piotr lópez  7/16/2022  "

## 2022-07-16 NOTE — ASSESSMENT & PLAN NOTE
Bedbound at baseline with indwelling chiu  Suspect this is from dementia- will need to clarify with family  Large sacral wound present

## 2022-07-16 NOTE — PLAN OF CARE
Problem: Adult Inpatient Plan of Care  Goal: Plan of Care Review  Outcome: Ongoing, Progressing  Goal: Patient-Specific Goal (Individualized)  Outcome: Ongoing, Progressing  Goal: Absence of Hospital-Acquired Illness or Injury  Outcome: Ongoing, Progressing  Goal: Optimal Comfort and Wellbeing  Outcome: Ongoing, Progressing  Goal: Readiness for Transition of Care  Outcome: Ongoing, Progressing     Problem: Diabetes Comorbidity  Goal: Blood Glucose Level Within Targeted Range  Outcome: Ongoing, Progressing     Problem: Adjustment to Illness (Sepsis/Septic Shock)  Goal: Optimal Coping  Outcome: Ongoing, Progressing     Problem: Bleeding (Sepsis/Septic Shock)  Goal: Absence of Bleeding  Outcome: Ongoing, Progressing     Problem: Glycemic Control Impaired (Sepsis/Septic Shock)  Goal: Blood Glucose Level Within Desired Range  Outcome: Ongoing, Progressing     Problem: Infection Progression (Sepsis/Septic Shock)  Goal: Absence of Infection Signs and Symptoms  Outcome: Ongoing, Progressing     Problem: Nutrition Impaired (Sepsis/Septic Shock)  Goal: Optimal Nutrition Intake  Outcome: Ongoing, Progressing     Problem: Fluid and Electrolyte Imbalance (Acute Kidney Injury/Impairment)  Goal: Fluid and Electrolyte Balance  Outcome: Ongoing, Progressing     Problem: Oral Intake Inadequate (Acute Kidney Injury/Impairment)  Goal: Optimal Nutrition Intake  Outcome: Ongoing, Progressing     Problem: Renal Function Impairment (Acute Kidney Injury/Impairment)  Goal: Effective Renal Function  Outcome: Ongoing, Progressing     Problem: Impaired Wound Healing  Goal: Optimal Wound Healing  Outcome: Ongoing, Progressing     Problem: Fall Injury Risk  Goal: Absence of Fall and Fall-Related Injury  Outcome: Ongoing, Progressing     Problem: Skin Injury Risk Increased  Goal: Skin Health and Integrity  Outcome: Ongoing, Progressing     Problem: Infection  Goal: Absence of Infection Signs and Symptoms  Outcome: Ongoing, Progressing      Problem: Coping Ineffective  Goal: Effective Coping  Outcome: Ongoing, Progressing

## 2022-07-16 NOTE — PT/OT/SLP PROGRESS
Physical Therapy      Patient Name:  Ana James   MRN:  [id number]    PT orders DCd by Dr. Bagley. Pt was t/f to ICU. Will sign off.

## 2022-07-16 NOTE — NURSING
[facility]  Nurses Note -- 4 Eyes      7/16/2022       Skin assessed on: Q Shift      [] No Pressure Injuries Present    []Prevention Measures Documented    [x] Yes LDA  for Pressure Injury Previously documented     [] Yes New Pressure Injury Discovered   [] LDA for New Pressure Injury Added      Attending RN:  Lizzeth Ahuja RN     Second RN:  Dr. Melanie MD

## 2022-07-16 NOTE — ASSESSMENT & PLAN NOTE
Presented with AUTUMN. Cr on admit 2.6 from baseline Cr 1.4  Suspect due to septic shock  UA few bacteria, mod yeast  Reeves changed on admit  Given IVF. She is grossly volume overloaded. DC IVF  BMP in AM

## 2022-07-16 NOTE — ASSESSMENT & PLAN NOTE
A1c:   Lab Results   Component Value Date    HGBA1C 6.3 (H) 05/24/2022     Meds: SSI PRN to maintain goal 140-180  accuchecks, hypoglycemic protocol  Glucose controlled

## 2022-07-16 NOTE — ASSESSMENT & PLAN NOTE
At baseline able to say a few yes or no and would moan during wound dressing change. On admit not speaking, moans. This seems to be somewhat near baseline.   - due to septic shock  - CTH no acute changes

## 2022-07-16 NOTE — SUBJECTIVE & OBJECTIVE
Interval History: Opens eyes, moans, does not follow commands, does not speak. On 0.02 levophed. Had long sinus pause overnight but did not receive chest compressions or medications.    Review of Systems   Unable to perform ROS: Mental status change   Objective:     Vital Signs (Most Recent):  Temp: 96.62 °F (35.9 °C) (07/16/22 0810)  Pulse: 79 (07/16/22 0836)  Resp: 17 (07/16/22 0836)  BP: (!) 112/51 (07/16/22 0836)  SpO2: 99 % (07/16/22 0836)   Vital Signs (24h Range):  Temp:  [91.5 °F (33.1 °C)-96.62 °F (35.9 °C)] 96.62 °F (35.9 °C)  Pulse:  [58-83] 79  Resp:  [13-31] 17  SpO2:  [90 %-100 %] 99 %  BP: ()/() 112/51     Weight: 96.6 kg (213 lb)  Body mass index is 38.96 kg/m².    Intake/Output Summary (Last 24 hours) at 7/16/2022 0917  Last data filed at 7/16/2022 0728  Gross per 24 hour   Intake 1649.04 ml   Output 130 ml   Net 1519.04 ml      Physical Exam  Vitals and nursing note reviewed.   Constitutional:       General: She is not in acute distress.     Appearance: She is obese. She is ill-appearing. She is not diaphoretic.   HENT:      Head: Normocephalic and atraumatic.      Nose: Nose normal.      Mouth/Throat:      Mouth: Mucous membranes are dry.   Eyes:      General: No scleral icterus.     Conjunctiva/sclera: Conjunctivae normal.   Cardiovascular:      Rate and Rhythm: Normal rate and regular rhythm.      Pulses: Normal pulses.      Heart sounds: Normal heart sounds. No murmur heard.    No gallop.   Pulmonary:      Effort: Pulmonary effort is normal. No respiratory distress.      Breath sounds: No wheezing or rales.      Comments: O2 by NC  Abdominal:      General: Bowel sounds are normal. There is no distension.      Palpations: Abdomen is soft.      Tenderness: There is no abdominal tenderness. There is no guarding.   Musculoskeletal:         General: Swelling present.      Right lower leg: Edema present.      Left lower leg: Edema present.   Skin:     General: Skin is warm and dry.    Neurological:      Mental Status: She is disoriented.     Left hip      Sacral wound      Significant Labs: All pertinent labs within the past 24 hours have been reviewed.    Significant Imaging: I have reviewed all pertinent imaging results/findings within the past 24 hours.

## 2022-07-16 NOTE — CONSULTS
General Surgery Consult Note    Consult received, chart reviewed. Briefly, Ana James is a 81 y.o. lady with a complicated medical history who presents with acute encephalopathy due to septic shock. Her sacral decubitus ulcer may be her source. She is DNR and is pending discussions with family about whether they would like her decubitus ulcer debrided.    Will await results of those discussions before intervention. For now, continue packing with gauze daily.     Graham Stanley MD  [facility] PGY3

## 2022-07-16 NOTE — ASSESSMENT & PLAN NOTE
Anemia of chronic disease + iron deficiency anemia.  - unable to take PO iron currently due to AMS  - Hgb decreased to 6.7- will discuss transfusion with family

## 2022-07-16 NOTE — CARE UPDATE
[facility] Intensive Care  ICU Shift Summary  Date: 7/16/2022      Prehospitalization: Nursing Home  Admit Date / LOS : 7/15/2022/ 1 days    Diagnosis: Acute encephalopathy    Consults:        Active: Gen Surg, Nephro, Palliative, Pulm CC and Wound Care       Needed: N/A     Code Status: DNR   Advanced Directive: Not Received    LDA:  Lines/Drains/Airways     Drain  Duration                Urethral Catheter 07/15/22 1800 <1 day          Peripheral Intravenous Line  Duration                Peripheral IV - Single Lumen 07/15/22 1824 20 G Left Hand <1 day         Peripheral IV - Single Lumen 07/16/22 0320 20 G Anterior;Right Upper Arm <1 day              Central Lines/Site/Justification:Patient Does Not Have Central Line  Urinary Cath/Order/Justification:Non Healing Sacral/Perineal Wound, Critically Ill in the ICU and requiring intensive monitoring and Chronic Indwelling Urinary Cathether on Admission    Vasopressors/Infusions:    NORepinephrine bitartrate-D5W 0.08 mcg/kg/min (07/16/22 1258)          GOALS: Volume/ Hemodynamic: MAP >65                     RASS: N/A    Pain Management: IV       Pain Controlled: yes     Rhythm: A-Fib    Respiratory Device: Nasal Cannula                  Most Recent SBT/ SAT: N/A       MOVE Screen: FAIL  ICU Liberation: not applicable    VTE Prophylaxis: Pharm and Mechanical  Mobility: Bedrest  Stress Ulcer Prophylaxis: No    Isolation: No active isolations    Dietary:   Current Diet Order   Procedures    Diet NPO      Tolerance: not applicable  Advancement: no    I & O (24h):    Intake/Output Summary (Last 24 hours) at 7/16/2022 1526  Last data filed at 7/16/2022 1258  Gross per 24 hour   Intake 2023.35 ml   Output 130 ml   Net 1893.35 ml        Restraints: No    Significant Dates:  Post Op Date: N/A  Rescue Date: N/A  Imaging/ Diagnostics: echo    Noteworthy Labs:  See below    COVID Test: (--)  CBC/Anemia Labs: Coags:    Recent Labs   Lab 07/15/22  1121 07/16/22  0231   WBC 9.83  11.57   HGB 7.7* 6.7*   HCT 26.1* 22.6*   * 125*   MCV 88 88   RDW 21.6* 21.4*    Recent Labs   Lab 07/15/22  1121   INR 1.2        Chemistries:   Recent Labs   Lab 07/16/22  0044 07/16/22  0231 07/16/22  1100    145 145   K SEE COMMENT 5.5* 5.6*   * 117* 115*   CO2 22* 22* 23   BUN 71* 71* 75*   CREATININE 2.6* 2.6* 2.6*   CALCIUM 8.4* 8.2* 8.1*   PROT  --  5.9* 5.9*   BILITOT  --  0.4 0.5   ALKPHOS  --  158* 165*   ALT  --  82* 82*   AST  --  73* 72*   MG 2.5  --  2.5   PHOS  --   --  3.9        Cardiac Enzymes: Ejection Fractions:    Recent Labs     07/16/22  0044   TROPONINI 0.018    EF   Date Value Ref Range Status   07/16/2022 65 % Final        POCT Glucose: HbA1c:    Recent Labs   Lab 07/16/22  1308 07/16/22  1336 07/16/22  1406   POCTGLUCOSE 177* 174* 121*    Hemoglobin A1C   Date Value Ref Range Status   05/24/2022 6.3 (H) 4.0 - 5.6 % Final     Comment:     ADA Screening Guidelines:  5.7-6.4%  Consistent with prediabetes  >or=6.5%  Consistent with diabetes    High levels of fetal hemoglobin interfere with the HbA1C  assay. Heterozygous hemoglobin variants (HbS, HgC, etc)do  not significantly interfere with this assay.   However, presence of multiple variants may affect accuracy.             ICU LOS 19h  Level of Care: Critical Care    Chart Check: 12 HR Done  Shift Summary/Plan for the shift: Pt's brother Prince updated on pt's condition and plan of care via phone by Dr. Navarro. Pt turned Q2hr. Wound care performed; LDAs updated. PICC line placed. Pt only moans/screams occasionally at rest and with turning. Pain medication ordered and given. Pt remains free from injury. Vs and assessment per flow sheet. Pt remains in ICU.

## 2022-07-16 NOTE — ASSESSMENT & PLAN NOTE
Last TTE 1/2022 EF 70%, indeterminate diastolic function  Grossly volume overloaded on exam  Check BNP  Given IVF- DC now

## 2022-07-16 NOTE — ASSESSMENT & PLAN NOTE
Advance Care Planning   Reviewed prior records. DNR on last hospitalization. Will call family and confirm code status today. Will also discuss other interventions/goals of care.

## 2022-07-16 NOTE — ASSESSMENT & PLAN NOTE
"This patient does have evidence of infective focus  My overall impression is septic shock.  Source: sacral wound   Antibiotics given-   Antibiotics (From admission, onward)            Start     Stop Route Frequency Ordered    07/16/22 0800  vancomycin 1.5 g in dextrose 5 % 250 mL IVPB (ready to mix)         07/16 1959 IV ED 1 Time 07/16/22 0737    07/15/22 2145  mupirocin 2 % ointment         07/20 2059 Nasl 2 times daily 07/15/22 2032    07/15/22 1859  vancomycin - pharmacy to dose  (vancomycin IVPB)        "And" Linked Group Details    -- IV pharmacy to manage frequency 07/15/22 1800    07/15/22 1830  cefepime in dextrose 5 % 1 gram/50 mL IVPB 1 g         -- IV Every 12 hours (non-standard times) 07/15/22 1800        Latest lactate reviewed-  Recent Labs   Lab 07/15/22  1121 07/15/22  1502   LACTATE 0.9 0.7     Organ dysfunction indicated by Acute kidney injury and Encephalopathy     Shock with decreased perfusion noted, Fluid challenge was given at 30cc/kg    Post- resuscitation assessment- (Done after fluids given for shock)  Vital signs post fluid administrations were-  Temp Readings from Last 1 Encounters:   07/16/22 96.62 °F (35.9 °C)     BP Readings from Last 1 Encounters:   07/16/22 (!) 112/51     Pulse Readings from Last 1 Encounters:   07/16/22 79       Perfusion assessment post bolus shows Inadequate tissue perfusion assessed by non-invasive monitoring  Will Start Pressors- Levophed for MAP of 65  Source control achieved by: will discuss goals of care with family. She is chronically bed bound. This ulcer will not heal if she continues to lay on it. Will consult surgery.       "

## 2022-07-16 NOTE — HOSPITAL COURSE
Ms Ana James is a 81 y.o. woman admitted with acute encephalopathy and AUTUMN on CKD due to septic shock due to stage 4 sacral wound infection. Started antibiotics and vasopressors. Shock worsened. Unable to turn without runs of Vfib and worsening shock on 7/17. Encephalopathy worsening also. Discussed goals of care with family. Discussed that antibiotics alone will not cure her wound and that surgery is not an option due to instability. Code status confirmed DNR. She is appropriate for hospice care. Shock is now improving, renal function improving. Palliative care consulted. She was discharged to nursing home with hospice care.

## 2022-07-17 PROBLEM — L89.154 PRESSURE INJURY OF SACRAL REGION, STAGE 4: Status: ACTIVE | Noted: 2022-05-31

## 2022-07-17 LAB
ALBUMIN SERPL BCP-MCNC: 1.3 G/DL (ref 3.5–5.2)
ALP SERPL-CCNC: 142 U/L (ref 55–135)
ALT SERPL W/O P-5'-P-CCNC: 68 U/L (ref 10–44)
ANION GAP SERPL CALC-SCNC: 6 MMOL/L (ref 8–16)
AST SERPL-CCNC: 56 U/L (ref 10–40)
BACTERIA UR CULT: NORMAL
BASOPHILS # BLD AUTO: 0.02 K/UL (ref 0–0.2)
BASOPHILS NFR BLD: 0.1 % (ref 0–1.9)
BILIRUB SERPL-MCNC: 0.7 MG/DL (ref 0.1–1)
BUN SERPL-MCNC: 68 MG/DL (ref 8–23)
CALCIUM SERPL-MCNC: 8.2 MG/DL (ref 8.7–10.5)
CHLORIDE SERPL-SCNC: 115 MMOL/L (ref 95–110)
CO2 SERPL-SCNC: 23 MMOL/L (ref 23–29)
CREAT SERPL-MCNC: 2.7 MG/DL (ref 0.5–1.4)
DIFFERENTIAL METHOD: ABNORMAL
EOSINOPHIL # BLD AUTO: 0.1 K/UL (ref 0–0.5)
EOSINOPHIL NFR BLD: 0.5 % (ref 0–8)
ERYTHROCYTE [DISTWIDTH] IN BLOOD BY AUTOMATED COUNT: 19.4 % (ref 11.5–14.5)
EST. GFR  (AFRICAN AMERICAN): 18 ML/MIN/1.73 M^2
EST. GFR  (NON AFRICAN AMERICAN): 16 ML/MIN/1.73 M^2
GLUCOSE SERPL-MCNC: 92 MG/DL (ref 70–110)
HCT VFR BLD AUTO: 25.1 % (ref 37–48.5)
HGB BLD-MCNC: 7.9 G/DL (ref 12–16)
IMM GRANULOCYTES # BLD AUTO: 0.08 K/UL (ref 0–0.04)
IMM GRANULOCYTES NFR BLD AUTO: 0.6 % (ref 0–0.5)
LYMPHOCYTES # BLD AUTO: 2.5 K/UL (ref 1–4.8)
LYMPHOCYTES NFR BLD: 17.3 % (ref 18–48)
MCH RBC QN AUTO: 28.1 PG (ref 27–31)
MCHC RBC AUTO-ENTMCNC: 31.5 G/DL (ref 32–36)
MCV RBC AUTO: 89 FL (ref 82–98)
MONOCYTES # BLD AUTO: 0.7 K/UL (ref 0.3–1)
MONOCYTES NFR BLD: 4.8 % (ref 4–15)
NEUTROPHILS # BLD AUTO: 11 K/UL (ref 1.8–7.7)
NEUTROPHILS NFR BLD: 76.7 % (ref 38–73)
NRBC BLD-RTO: 1 /100 WBC
PLATELET # BLD AUTO: 118 K/UL (ref 150–450)
PMV BLD AUTO: 10.5 FL (ref 9.2–12.9)
POCT GLUCOSE: 119 MG/DL (ref 70–110)
POCT GLUCOSE: 148 MG/DL (ref 70–110)
POCT GLUCOSE: 182 MG/DL (ref 70–110)
POCT GLUCOSE: 229 MG/DL (ref 70–110)
POCT GLUCOSE: 97 MG/DL (ref 70–110)
POCT GLUCOSE: 98 MG/DL (ref 70–110)
POCT GLUCOSE: >500 MG/DL (ref 70–110)
POTASSIUM SERPL-SCNC: 5.1 MMOL/L (ref 3.5–5.1)
PROT SERPL-MCNC: 5.6 G/DL (ref 6–8.4)
RBC # BLD AUTO: 2.81 M/UL (ref 4–5.4)
SODIUM SERPL-SCNC: 144 MMOL/L (ref 136–145)
VANCOMYCIN SERPL-MCNC: 26.4 UG/ML
WBC # BLD AUTO: 14.29 K/UL (ref 3.9–12.7)

## 2022-07-17 PROCEDURE — 25000003 PHARM REV CODE 250: Performed by: HOSPITALIST

## 2022-07-17 PROCEDURE — 20000000 HC ICU ROOM

## 2022-07-17 PROCEDURE — 80053 COMPREHEN METABOLIC PANEL: CPT | Performed by: STUDENT IN AN ORGANIZED HEALTH CARE EDUCATION/TRAINING PROGRAM

## 2022-07-17 PROCEDURE — 25000003 PHARM REV CODE 250: Performed by: INTERNAL MEDICINE

## 2022-07-17 PROCEDURE — 63600175 PHARM REV CODE 636 W HCPCS: Performed by: STUDENT IN AN ORGANIZED HEALTH CARE EDUCATION/TRAINING PROGRAM

## 2022-07-17 PROCEDURE — A4216 STERILE WATER/SALINE, 10 ML: HCPCS | Performed by: HOSPITALIST

## 2022-07-17 PROCEDURE — 63600175 PHARM REV CODE 636 W HCPCS: Performed by: HOSPITALIST

## 2022-07-17 PROCEDURE — 85025 COMPLETE CBC W/AUTO DIFF WBC: CPT | Performed by: STUDENT IN AN ORGANIZED HEALTH CARE EDUCATION/TRAINING PROGRAM

## 2022-07-17 PROCEDURE — 94761 N-INVAS EAR/PLS OXIMETRY MLT: CPT

## 2022-07-17 PROCEDURE — 80202 ASSAY OF VANCOMYCIN: CPT | Performed by: EMERGENCY MEDICINE

## 2022-07-17 PROCEDURE — 27000221 HC OXYGEN, UP TO 24 HOURS

## 2022-07-17 PROCEDURE — 25000003 PHARM REV CODE 250: Performed by: STUDENT IN AN ORGANIZED HEALTH CARE EDUCATION/TRAINING PROGRAM

## 2022-07-17 RX ORDER — MEROPENEM AND SODIUM CHLORIDE 1 G/50ML
1 INJECTION, SOLUTION INTRAVENOUS
Status: DISCONTINUED | OUTPATIENT
Start: 2022-07-17 | End: 2022-07-18 | Stop reason: HOSPADM

## 2022-07-17 RX ADMIN — Medication 10 ML: at 05:07

## 2022-07-17 RX ADMIN — HYDROMORPHONE HYDROCHLORIDE 0.5 MG: 2 INJECTION, SOLUTION INTRAMUSCULAR; INTRAVENOUS; SUBCUTANEOUS at 03:07

## 2022-07-17 RX ADMIN — INSULIN ASPART 1 UNITS: 100 INJECTION, SOLUTION INTRAVENOUS; SUBCUTANEOUS at 11:07

## 2022-07-17 RX ADMIN — VASOPRESSIN 0.04 UNITS/MIN: 20 INJECTION INTRAVENOUS at 11:07

## 2022-07-17 RX ADMIN — Medication 10 ML: at 12:07

## 2022-07-17 RX ADMIN — MUPIROCIN: 20 OINTMENT TOPICAL at 08:07

## 2022-07-17 RX ADMIN — VASOPRESSIN 0.04 UNITS/MIN: 20 INJECTION INTRAVENOUS at 06:07

## 2022-07-17 RX ADMIN — Medication 0.28 MCG/KG/MIN: at 11:07

## 2022-07-17 RX ADMIN — MEROPENEM AND SODIUM CHLORIDE 1 G: 1 INJECTION, SOLUTION INTRAVENOUS at 11:07

## 2022-07-17 RX ADMIN — GLYCOPYRROLATE 0.1 MG: 0.2 INJECTION, SOLUTION INTRAMUSCULAR; INTRAVITREAL at 08:07

## 2022-07-17 RX ADMIN — MUPIROCIN: 20 OINTMENT TOPICAL at 09:07

## 2022-07-17 RX ADMIN — CEFEPIME 1 G: 1 INJECTION, POWDER, FOR SOLUTION INTRAMUSCULAR; INTRAVENOUS at 05:07

## 2022-07-17 RX ADMIN — GLYCOPYRROLATE 0.1 MG: 0.2 INJECTION, SOLUTION INTRAMUSCULAR; INTRAVITREAL at 11:07

## 2022-07-17 RX ADMIN — Medication 0.18 MCG/KG/MIN: at 03:07

## 2022-07-17 RX ADMIN — GLYCOPYRROLATE 0.1 MG: 0.2 INJECTION, SOLUTION INTRAMUSCULAR; INTRAVITREAL at 03:07

## 2022-07-17 RX ADMIN — HEPARIN SODIUM 5000 UNITS: 5000 INJECTION INTRAVENOUS; SUBCUTANEOUS at 03:07

## 2022-07-17 RX ADMIN — HEPARIN SODIUM 5000 UNITS: 5000 INJECTION INTRAVENOUS; SUBCUTANEOUS at 05:07

## 2022-07-17 RX ADMIN — Medication 10 ML: at 11:07

## 2022-07-17 RX ADMIN — HYDROCORTISONE SODIUM SUCCINATE 100 MG: 100 INJECTION, POWDER, FOR SOLUTION INTRAMUSCULAR; INTRAVENOUS at 03:07

## 2022-07-17 RX ADMIN — Medication 0.1 MCG/KG/MIN: at 08:07

## 2022-07-17 RX ADMIN — HEPARIN SODIUM 5000 UNITS: 5000 INJECTION INTRAVENOUS; SUBCUTANEOUS at 09:07

## 2022-07-17 RX ADMIN — Medication 10 ML: at 06:07

## 2022-07-17 RX ADMIN — HYDROCORTISONE SODIUM SUCCINATE 100 MG: 100 INJECTION, POWDER, FOR SOLUTION INTRAMUSCULAR; INTRAVENOUS at 09:07

## 2022-07-17 RX ADMIN — Medication 0.02 MCG/KG/MIN: at 04:07

## 2022-07-17 RX ADMIN — HYDROMORPHONE HYDROCHLORIDE 0.5 MG: 2 INJECTION, SOLUTION INTRAMUSCULAR; INTRAVENOUS; SUBCUTANEOUS at 08:07

## 2022-07-17 RX ADMIN — MEROPENEM AND SODIUM CHLORIDE 1 G: 1 INJECTION, SOLUTION INTRAVENOUS at 10:07

## 2022-07-17 NOTE — ASSESSMENT & PLAN NOTE
Currently sinus rhythm  Holding BB in setting of shock  Holding anticoagulation in setting of Hgb drop requiring transfusion and inability to take PO

## 2022-07-17 NOTE — ASSESSMENT & PLAN NOTE
At baseline able to say a few yes or no and would moan during wound dressing change. On admit not speaking, moans. Worsening, now not responsive   - due to septic shock  - CTH no acute changes

## 2022-07-17 NOTE — PLAN OF CARE
[facility] Intensive Care  Initial Discharge Assessment       Primary Care Provider: Viky Bean MD    Admission Diagnosis: Altered mental status [R41.82]  Severe dehydration [E86.0]  Chest pain [R07.9]  Hypothermia, initial encounter [T68.XXXA]  Reeves catheter problem, initial encounter [T83.9XXA]  Severe anemia [D64.9]  Pain and swelling of right upper extremity [M79.601, M79.89]  Pressure injury of skin, unspecified injury stage, unspecified location [L89.90]  Sepsis without acute organ dysfunction, due to unspecified organism [A41.9]    Admission Date: 7/15/2022  Expected Discharge Date:     Discharge Barriers Identified: None    Payor: CONEXANCE MD MEDICARE / Plan: [employer] / Product Type: Medicare Advantage /     Extended Emergency Contact Information  Primary Emergency Contact: Poonam Doshi   [facility]  Home Phone: [phone]  Mobile Phone: [phone]  Relation: Friend  Secondary Emergency Contact: Prince Elder   [facility]  Home Phone: [phone]  Mobile Phone: [phone]  Relation: Brother    Discharge Plan A: Skilled Nursing Facility  Discharge Plan B: Return to Nursing Home      [id number] - [place], [place] - [address] [id number] [facility] [address]  Phone: [phone] Fax: [phone]    [facility] - [place], [place] - [address] AT Portal to Registered [facility] Sites  [address]  [facility] [address]  Phone: [phone] Fax: [phone]    [id number] - [place] - [address]  [address]  [place] [address]  Phone: [phone] Fax: [phone]      Initial Assessment (most recent)     Adult Discharge Assessment - 07/17/22 1105        Discharge Assessment    Assessment Type Discharge Planning Assessment     Confirmed/corrected address, phone number and insurance No     Reason Other (see comment)   pt from [place]    Source  of Information health record     Communicated FREDERICK with patient/caregiver Date not available/Unable to determine     Reason For Admission Acute encephalopathy     Lives With facility resident     Facility Arrived From: [facility]     Do you expect to return to your current living situation? Yes     Prior to hospitilization cognitive status: Unable to Assess     Current cognitive status: Unable to Assess     Walking or Climbing Stairs Difficulty stair climbing difficulty, dependent     Dressing/Bathing Difficulty dressing difficulty, dependent     Equipment Currently Used at Home other (see comments)   [facility]    Readmission within 30 days? Yes     Patient currently being followed by outpatient case management? No     Do you take prescription medications? Yes     Do you have prescription coverage? Yes     Coverage PHN     Do you have any problems affording any of your prescribed medications? No     Discharge Plan A Skilled Nursing Facility     Discharge Plan B Return to Nursing Home     Discharge Barriers Identified None

## 2022-07-17 NOTE — ASSESSMENT & PLAN NOTE
Anemia of chronic disease + iron deficiency anemia.  - unable to take PO iron currently due to AMS  - transfused 1U RBCs on 7/16 with appropriate response   - no signs of active bleeding

## 2022-07-17 NOTE — CARE UPDATE
General Surgery Update    Attempted to evaluate wound at bedside today. Went into a run of Vfib while positioning for bedside evaluation and quickly became more hemodynamically stable afterwards. Will hold off on evaluation and debridement for now.    Graham Stanley MD  [facility] PGY3

## 2022-07-17 NOTE — SUBJECTIVE & OBJECTIVE
Interval History: Does not follow commands or speak. Lethargic. Runs of Vfib when trying to turn to look at wound. Increasing vasopressor requirements.    Review of Systems   Unable to perform ROS: Mental status change   Objective:     Vital Signs (Most Recent):  Temp: 97.34 °F (36.3 °C) (07/17/22 0816)  Pulse: 67 (07/17/22 1015)  Resp: 10 (07/17/22 1015)  BP: (!) 117/44 (07/17/22 1015)  SpO2: 100 % (07/17/22 1015)   Vital Signs (24h Range):  Temp:  [95.72 °F (35.4 °C)-97.34 °F (36.3 °C)] 97.34 °F (36.3 °C)  Pulse:  [62-93] 67  Resp:  [9-41] 10  SpO2:  [97 %-100 %] 100 %  BP: ()/(33-65) 117/44     Weight: 96.6 kg (213 lb)  Body mass index is 38.96 kg/m².    Intake/Output Summary (Last 24 hours) at 7/17/2022 1106  Last data filed at 7/17/2022 1028  Gross per 24 hour   Intake 1606.67 ml   Output 640 ml   Net 966.67 ml        Physical Exam  Vitals and nursing note reviewed.   Constitutional:       General: She is not in acute distress.     Appearance: She is obese. She is ill-appearing and toxic-appearing. She is not diaphoretic.   HENT:      Head: Normocephalic and atraumatic.      Nose: Nose normal.      Mouth/Throat:      Mouth: Mucous membranes are dry.   Eyes:      Comments: L eye cloudy   Cardiovascular:      Rate and Rhythm: Normal rate and regular rhythm.      Pulses: Normal pulses.      Heart sounds: Normal heart sounds. No murmur heard.    No gallop.   Pulmonary:      Effort: Pulmonary effort is normal. No respiratory distress.      Breath sounds: No wheezing or rales.      Comments: O2 by NC  Abdominal:      General: Bowel sounds are normal. There is no distension.      Palpations: Abdomen is soft.      Tenderness: There is no abdominal tenderness. There is no guarding.   Musculoskeletal:         General: Swelling present.      Right lower leg: Edema present.      Left lower leg: Edema present.   Neurological:      Mental Status: She is disoriented.       Significant Labs: All pertinent labs within the  past 24 hours have been reviewed.    Significant Imaging: I have reviewed all pertinent imaging results/findings within the past 24 hours.

## 2022-07-17 NOTE — ASSESSMENT & PLAN NOTE
"This patient does have evidence of infective focus  My overall impression is septic shock.  Source: sacral wound   Antibiotics given-   Antibiotics (From admission, onward)            Start     Stop Route Frequency Ordered    07/17/22 1145  meropenem-0.9% sodium chloride 1 g/50 mL IVPB         -- IV Every 12 hours (non-standard times) 07/17/22 1042    07/15/22 2145  mupirocin 2 % ointment         07/20 2059 Nasl 2 times daily 07/15/22 2032    07/15/22 1859  vancomycin - pharmacy to dose  (vancomycin IVPB)        "And" Linked Group Details    -- IV pharmacy to manage frequency 07/15/22 1800        Latest lactate reviewed-  Recent Labs   Lab 07/15/22  1121 07/15/22  1502   LACTATE 0.9 0.7     Organ dysfunction indicated by Acute kidney injury and Encephalopathy     Shock with decreased perfusion noted, Fluid challenge was given at 30cc/kg    Post- resuscitation assessment- (Done after fluids given for shock)  Vital signs post fluid administrations were-  Temp Readings from Last 1 Encounters:   07/17/22 97.34 °F (36.3 °C)     BP Readings from Last 1 Encounters:   07/17/22 (!) 117/44     Pulse Readings from Last 1 Encounters:   07/17/22 67       Perfusion assessment post bolus shows Inadequate tissue perfusion assessed by non-invasive monitoring  Will Start Pressors- Levophed for MAP of 65    - worsening. Add vasopressin and hydrocortisone  - antibiotics changed for ESBL organism to vanc/nelson  - General surgery consulted. unable to achieve source control-- too unstable to turn on 7/17  - discussing goals of care with family  "

## 2022-07-17 NOTE — PLAN OF CARE
Recommendation:   1. Initiate diet when medically acceptable. Pt will need ADA Cardiac restrictions added to diet.   2. When diet started add Joseph BID and beneprotein TID to promote wound healing.   3. Monitor weight/labs.   4. RD to follow to monitor po intake.    Goals:   Diet will be started by RD follow up  Nutrition Goal Status: new

## 2022-07-17 NOTE — PLAN OF CARE
Patient remains in ICU on levophed. She has been awake and moaning throughout the night. She received one dose of PRN dilaudid which was effective for about three hours. She received one unit of PRBC which raised her Hgb to 7.9. Potassium has been 5.1 for both sets of labs. Turned q 2, sacral wound repacked with wet to dry dressing.

## 2022-07-17 NOTE — CARE UPDATE
[facility] Intensive Care  ICU Shift Summary  Date: 7/17/2022      Prehospitalization: Nursing Home  Admit Date / LOS : 7/15/2022/ 2 days    Diagnosis: Acute encephalopathy    Consults:        Active: Gen Surg, Palliative, Pulm CC and Wound Care       Needed: N/A     Code Status: DNR   Advanced Directive: Not Received    LDA:  Lines/Drains/Airways     Peripherally Inserted Central Catheter Line  Duration           PICC Triple Lumen 07/16/22 1825 right brachial 1 day          Drain  Duration                Urethral Catheter 07/15/22 1800 2 days          Peripheral Intravenous Line  Duration                Peripheral IV - Single Lumen 07/15/22 1824 20 G Left Hand 2 days         Peripheral IV - Single Lumen 07/16/22 0320 20 G Anterior;Right Upper Arm 1 day              Central Lines/Site/Justification:Pressors and Unable to Obtain/Maintain PIV  Urinary Cath/Order/Justification:Non Healing Sacral/Perineal Wound and Chronic Indwelling Urinary Cathether on Admission    Vasopressors/Infusions:    NORepinephrine bitartrate-D5W 0.14 mcg/kg/min (07/17/22 1811)    vasopressin 0.04 Units/min (07/17/22 1813)          GOALS: Volume/ Hemodynamic: MAP >65                     RASS: N/A    Pain Management: IV       Pain Controlled: yes     Rhythm: NSR    Respiratory Device: Nasal Cannula                  Most Recent SBT/ SAT: N/A       MOVE Screen: FAIL  ICU Liberation: not applicable    VTE Prophylaxis: Pharm and Mechanical  Mobility: Bedrest  Stress Ulcer Prophylaxis: No    Isolation: No active isolations    Dietary:   Current Diet Order   Procedures    Diet NPO      Tolerance: not applicable  Advancement: no    I & O (24h):    Intake/Output Summary (Last 24 hours) at 7/17/2022 1834  Last data filed at 7/17/2022 1811  Gross per 24 hour   Intake 1534.67 ml   Output 1280 ml   Net 254.67 ml        Restraints: No    Significant Dates:  Post Op Date: N/A  Rescue Date: N/A  Imaging/ Diagnostics: N/A    Noteworthy Labs:  See  below    COVID Test: (--)  CBC/Anemia Labs: Coags:    Recent Labs   Lab 07/15/22  1121 07/16/22  0231 07/17/22  0525   WBC 9.83 11.57 14.29*   HGB 7.7* 6.7* 7.9*   HCT 26.1* 22.6* 25.1*   * 125* 118*   MCV 88 88 89   RDW 21.6* 21.4* 19.4*   [id number] >2000*  --   --     Recent Labs   Lab 07/15/22  1121   INR 1.2        Chemistries:   Recent Labs   Lab 07/16/22  0044 07/16/22  0231 07/16/22  1100 07/16/22  1440 07/16/22  2018 07/17/22  0525      < > 145  --   --  144   K SEE COMMENT   < > 5.6*   < > 5.1 5.1   *   < > 115*  --   --  115*   CO2 22*   < > 23  --   --  23   BUN 71*   < > 75*  --   --  68*   CREATININE 2.6*   < > 2.6*  --   --  2.7*   CALCIUM 8.4*   < > 8.1*  --   --  8.2*   PROT  --    < > 5.9*  --   --  5.6*   BILITOT  --    < > 0.5  --   --  0.7   ALKPHOS  --    < > 165*  --   --  142*   ALT  --    < > 82*  --   --  68*   AST  --    < > 72*  --   --  56*   MG 2.5  --  2.5  --   --   --    PHOS  --   --  3.9  --   --   --     < > = values in this interval not displayed.        Cardiac Enzymes: Ejection Fractions:    Recent Labs     07/16/22  0044   TROPONINI 0.018    EF   Date Value Ref Range Status   07/16/2022 65 % Final        POCT Glucose: HbA1c:    Recent Labs   Lab 07/17/22  1155 07/17/22  1328 07/17/22  1805   POCTGLUCOSE 119* 148* 182*    Hemoglobin A1C   Date Value Ref Range Status   05/24/2022 6.3 (H) 4.0 - 5.6 % Final     Comment:     ADA Screening Guidelines:  5.7-6.4%  Consistent with prediabetes  >or=6.5%  Consistent with diabetes    High levels of fetal hemoglobin interfere with the HbA1C  assay. Heterozygous hemoglobin variants (HbS, HgC, etc)do  not significantly interfere with this assay.   However, presence of multiple variants may affect accuracy.             ICU LOS 1d 22h  Level of Care: Critical Care    Chart Check: 12 HR Done  Shift Summary/Plan for the shift:   7532 - attempted to perform wound care/bedside debridement with general surgery resident. While  attempting to turn pt, pt went into v tach pt converted out of v tach on own.   0846 - attempted to turn pt again another episode of v tach and pt converted on own.  1500 - attempted to turn pt again frequent PVCs noted.   Wound care and Q2hr turning not performed as pt too unstable to movement. Dr. Navarro aware. Vasopressor requirements increased and vasopressin added for support able to wean down levophed gtt.   Q6hr BG performed at 1200 BG >500 rechecked 119; rechecked again at 1328 ; Dr. Navarro notified of BG issues; PRN insulin ordered.   Multiple family members at bedside throughout shift. Pt's brother and sister updated on pt's condition. Sister mentioned that she wanted to keep pt comfortable, plans to address comfort care/hospice with family tomorrow. Urine output increased. No BM. Hypothermia resolved and off of alfonso hugger and maintaining temp at 98.6. Positive wound cultures abx updated. BUE swelling slightly decreased by end of shift. Pt remains free from injury. VS and assessment per flow sheet. Pt remains in ICU.

## 2022-07-17 NOTE — CONSULTS
"  [facility] - Intensive Care  Adult Nutrition  Consult Note    SUMMARY     Recommendations    Recommendation:   1. Initiate diet when medically acceptable. Pt will need ADA Cardiac restrictions added to diet.   2. When diet started add Joseph BID and beneprotein TID to promote wound healing.   3. Monitor weight/labs.   4. RD to follow to monitor po intake.    Goals:   Diet will be started by RD follow up  Nutrition Goal Status: new  Communication of RD Recs: other (comment) (POC)    Assessment and Plan  Nutrition Problem  Increased protein needs    Related to (etiology):   Wound healing    Signs and Symptoms (as evidenced by):   L hip wound, sacral spine wound, B heel wounds    Interventions:  Collaboration with other providers    Nutrition Diagnosis Status:   New     Malnutrition Assessment  Weight Loss (Malnutrition):  (7% x 6 months)       Reason for Assessment  Reason For Assessment: consult (altered skin integrity)  Diagnosis:  (acute encephalopathy)  Relevant Medical History: HTN, DM, renal disorder, arthritis, obesity, kidney stones, asthma, sleep apnea, PVD, hysterectomy, cardiac cath  General Information Comments: Pt currently NPO. Transferred from NH for R hand swelling. PICC line. Natan 12- L hip wound, sacral spine wound, B heel wounds. Unable to assess NFPE 2/2 RD covering remotely for weekend coverage. Noted 17lb weight loss x 6 months.  Nutrition Discharge Planning: d/c needs to be determined    Nutrition Risk Screen  Nutrition Risk Screen: large or nonhealing wound, burn or pressure injury    Nutrition/Diet History  Food Preferences: no Mandaen or cultural food prefs identified  Factors Affecting Nutritional Intake: NPO    Anthropometrics  Temp: 97.34 °F (36.3 °C)  Height Method: Stated  Height: 5' 2" (157.5 cm)  Height (inches): 62 in  Weight Method: Bed Scale  Weight: 96.6 kg (212 lb 15.4 oz)  Weight (lb): 212.97 lb  Ideal Body Weight (IBW), Female: 110 lb  % Ideal Body Weight, Female (lb): " 193.61 %  BMI (Calculated): 38.9  BMI Grade: 35 - 39.9 - obesity - grade II  Usual Body Weight (UBW), k.3 kg ()  % Usual Body Weight: 92.81  % Weight Change From Usual Weight: -7.38 %     Lab/Procedures/Meds  Pertinent Labs Reviewed: reviewed  Pertinent Labs Comments: BUN 68H, Crea 2.7H, Ca 8.2L, Alb 1.3L  Pertinent Medications Reviewed: reviewed  Pertinent Medications Comments: heparin, norepinephrine, vasopressin    Estimated/Assessed Needs  Weight Used For Calorie Calculations: 96.6 kg (212 lb 15.4 oz)  Energy Calorie Requirements (kcal): 1786  Energy Need Method: [place]-St Jeor (x1.3)  Protein Requirements: 96g (1.0g/kg)  Weight Used For Protein Calculations: 96.6 kg (212 lb 15.4 oz)  Estimated Fluid Requirement Method: RDA Method  RDA Method (mL): 1786  CHO Requirement: 200g    Nutrition Prescription Ordered  Current Diet Order: NPO    Evaluation of Received Nutrient/Fluid Intake  I/O: 1863/640  Energy Calories Required: not meeting needs  Protein Required: not meeting needs  Fluid Required: not meeting needs  Comments: LBM 7/15  % Intake of Estimated Energy Needs: Other: NPO  % Meal Intake: NPO    Nutrition Risk  Level of Risk/Frequency of Follow-up:  (2xweekly)     Monitor and Evaluation  Food and Nutrient Intake: food and beverage intake  Food and Nutrient Adminstration: diet order  Physical Activity and Function: nutrition-related ADLs and IADLs  Anthropometric Measurements: weight  Biochemical Data, Medical Tests and Procedures: electrolyte and renal panel  Nutrition-Focused Physical Findings: overall appearance     Nutrition Follow-Up  RD Follow-up?: Yes

## 2022-07-17 NOTE — ASSESSMENT & PLAN NOTE
Advance Care Planning     Date: 07/17/2022  Discussed with brother Mr Elder. Confirmed code status DNR. Discussed increasing vasopressor requirements and inability to turn to clean wound. Discussed that antibiotics alone will not cure this. Encouraged family to visit. Discussed adding comfort care to antibiotics. Will discuss hospice measures when family arrives. Time spent on ACP= 16 minutes

## 2022-07-17 NOTE — ASSESSMENT & PLAN NOTE
Presented with AUTUMN. Cr on admit 2.6 from baseline Cr 1.4  Due to septic shock  UA few bacteria, mod yeast  Reeves changed on admit  Not improving   BMP in AM

## 2022-07-17 NOTE — NURSING
[facility] [place]  Nurses Note -- 4 Eyes      7/17/2022       Skin assessed on: Q Shift      [] No Pressure Injuries Present    []Prevention Measures Documented    [] Yes LDA  for Pressure Injury Previously documented     [] Yes New Pressure Injury Discovered   [] LDA for New Pressure Injury Added      Attending RN:  Lizzeth Ahuja RN     Second RN:  Unable to perform today (read note)

## 2022-07-17 NOTE — PLAN OF CARE
Patient admitted from [facility]; [place] forwarded initial clinicals via Careport to confirm if patient was SNF vs nursing home resident and to inquire if patient is cleared to return once deemed medically stable for discharge; pending review/response        07/17/22 1125   Post-Acute Status   Post-Acute Authorization Placement   Post-Acute Placement Status Referrals Sent   Discharge Delays None known at this time   Discharge Plan   Discharge Plan A Skilled Nursing Facility   Discharge Plan B Return to Nursing Home

## 2022-07-17 NOTE — ASSESSMENT & PLAN NOTE
A1c:   Lab Results   Component Value Date    HGBA1C 6.3 (H) 05/24/2022     Meds: SSI PRN to maintain goal 140-180  accuchecks, hypoglycemic protocol

## 2022-07-17 NOTE — PROGRESS NOTES
[facility] Medicine  Progress Note    Patient Name: Ana James  MRN: [id number]  Patient Class: IP- Inpatient   Admission Date: 7/15/2022  Length of Stay: 2 days  Attending Physician: Edel Navarro MD  Primary Care Provider: Viky Bean MD        Subjective:     Principal Problem:Acute encephalopathy        HPI:  Patient is an 81-year-old  female with history of T2DM, , HTN, PVD, distally CHF, CKD and Afib who was brought in from [facility] due to concern of RUE swelling around her PICC line site with change in her LOC. Patient unable to provide any information, I called the NH and spoke to the nurse who was taking care of patient who stated that patient's right hand/arm was noticed to be swollen. Nursing staff also stated that patient's mental status changed which maximo described as patient appearing more lethargic, not moaning as she used to when is in pain, and also unable to say yes or no which is her baseline. She is unable to hold normal conversation or follow adequate commands but she can relate to nursing staff her pain, hunger, thirst by saying one word sentences.     Upon ER presentation, patient noted to be hypothermic at 91.5, HR in the 50s and BP in low 90s. Labs show WBC 9.8, H/H 7.7/26, , Na 145, K 5.4, Cl 112, HCO3 22, BUN/Cr 72/2.6, glc 88.    CXR, CTH and venous US or RUE all unremarkable      Overview/Hospital Course:  Ms Ana James is a 81 y.o. woman admitted with acute encephalopathy and AUTUMN on CKD due to septic shock due to stage 4 sacral wound infection. Started antibiotics and vasopressors. Shock worsening. Unable to turn without runs of Vfib and worsening shock. Encephalopathy worsening also.       Interval History: Does not follow commands or speak. Lethargic. Runs of Vfib when trying to turn to look at wound. Increasing vasopressor requirements.    Review of Systems   Unable to perform ROS:  Mental status change   Objective:     Vital Signs (Most Recent):  Temp: 97.34 °F (36.3 °C) (07/17/22 0816)  Pulse: 67 (07/17/22 1015)  Resp: 10 (07/17/22 1015)  BP: (!) 117/44 (07/17/22 1015)  SpO2: 100 % (07/17/22 1015)   Vital Signs (24h Range):  Temp:  [95.72 °F (35.4 °C)-97.34 °F (36.3 °C)] 97.34 °F (36.3 °C)  Pulse:  [62-93] 67  Resp:  [9-41] 10  SpO2:  [97 %-100 %] 100 %  BP: ()/(33-65) 117/44     Weight: 96.6 kg (213 lb)  Body mass index is 38.96 kg/m².    Intake/Output Summary (Last 24 hours) at 7/17/2022 1106  Last data filed at 7/17/2022 1028  Gross per 24 hour   Intake 1606.67 ml   Output 640 ml   Net 966.67 ml        Physical Exam  Vitals and nursing note reviewed.   Constitutional:       General: She is not in acute distress.     Appearance: She is obese. She is ill-appearing and toxic-appearing. She is not diaphoretic.   HENT:      Head: Normocephalic and atraumatic.      Nose: Nose normal.      Mouth/Throat:      Mouth: Mucous membranes are dry.   Eyes:      Comments: L eye cloudy   Cardiovascular:      Rate and Rhythm: Normal rate and regular rhythm.      Pulses: Normal pulses.      Heart sounds: Normal heart sounds. No murmur heard.    No gallop.   Pulmonary:      Effort: Pulmonary effort is normal. No respiratory distress.      Breath sounds: No wheezing or rales.      Comments: O2 by NC  Abdominal:      General: Bowel sounds are normal. There is no distension.      Palpations: Abdomen is soft.      Tenderness: There is no abdominal tenderness. There is no guarding.   Musculoskeletal:         General: Swelling present.      Right lower leg: Edema present.      Left lower leg: Edema present.   Neurological:      Mental Status: She is disoriented.       Significant Labs: All pertinent labs within the past 24 hours have been reviewed.    Significant Imaging: I have reviewed all pertinent imaging results/findings within the past 24 hours.      Assessment/Plan:      * Acute encephalopathy  At  "baseline able to say a few yes or no and would moan during wound dressing change. On admit not speaking, moans. Worsening, now not responsive   - due to septic shock  - CTH no acute changes      Thrombocytopenia  Plts decreasing from admit, likely due to septic shock      Septic shock  This patient does have evidence of infective focus  My overall impression is septic shock.  Source: sacral wound   Antibiotics given-   Antibiotics (From admission, onward)            Start     Stop Route Frequency Ordered    07/17/22 1145  meropenem-0.9% sodium chloride 1 g/50 mL IVPB         -- IV Every 12 hours (non-standard times) 07/17/22 1042    07/15/22 2145  mupirocin 2 % ointment         07/20 2059 Nasl 2 times daily 07/15/22 2032    07/15/22 1859  vancomycin - pharmacy to dose  (vancomycin IVPB)        "And" Linked Group Details    -- IV pharmacy to manage frequency 07/15/22 1800        Latest lactate reviewed-  Recent Labs   Lab 07/15/22  1121 07/15/22  1502   LACTATE 0.9 0.7     Organ dysfunction indicated by Acute kidney injury and Encephalopathy     Shock with decreased perfusion noted, Fluid challenge was given at 30cc/kg    Post- resuscitation assessment- (Done after fluids given for shock)  Vital signs post fluid administrations were-  Temp Readings from Last 1 Encounters:   07/17/22 97.34 °F (36.3 °C)     BP Readings from Last 1 Encounters:   07/17/22 (!) 117/44     Pulse Readings from Last 1 Encounters:   07/17/22 67       Perfusion assessment post bolus shows Inadequate tissue perfusion assessed by non-invasive monitoring  Will Start Pressors- Levophed for MAP of 65    - worsening. Add vasopressin and hydrocortisone  - antibiotics changed for ESBL organism to vanc/nelson  - General surgery consulted. unable to achieve source control-- too unstable to turn on 7/17  - discussing goals of care with family    Pressure injury of sacral region, stage 4  With infection. See septic shock       Debility  Bedbound at baseline " with indwelling chiu  Large sacral wound present       Goals of care, counseling/discussion  Advance Care Planning     Date: 07/17/2022  Discussed with brother Mr Elder. Confirmed code status DNR. Discussed increasing vasopressor requirements and inability to turn to clean wound. Discussed that antibiotics alone will not cure this. Encouraged family to visit. Discussed adding comfort care to antibiotics. Will discuss hospice measures when family arrives. Time spent on ACP= 16 minutes         Acute renal failure superimposed on stage 3b chronic kidney disease  Presented with AUTUMN. Cr on admit 2.6 from baseline Cr 1.4  Due to septic shock  UA few bacteria, mod yeast  Chiu changed on admit  Not improving   BMP in AM      Paroxysmal atrial fibrillation  Currently sinus rhythm  Holding BB in setting of shock  Holding anticoagulation in setting of Hgb drop requiring transfusion and inability to take PO      Anemia of chronic disease  Anemia of chronic disease + iron deficiency anemia.  - unable to take PO iron currently due to AMS  - transfused 1U RBCs on 7/16 with appropriate response   - no signs of active bleeding       Acute on chronic diastolic heart failure  Last TTE 1/2022 EF 70%, indeterminate diastolic function  Grossly volume overloaded on exam  In shock due to sepsis. Will hold diuresis       Hyperlipidemia  Unable to tolerate PO due to AMS      Hyperkalemia  Associated with AUTUMN. Resolved.       Controlled type 2 diabetes mellitus with stage 3 chronic kidney disease, with long-term current use of insulin  A1c:   Lab Results   Component Value Date    HGBA1C 6.3 (H) 05/24/2022     Meds: SSI PRN to maintain goal 140-180  accuchecks, hypoglycemic protocol    Class 2 obesity in adult  Body mass index is 38.96 kg/m². Morbid obesity complicates all aspects of disease management from diagnostic modalities to treatment. Weight loss encouraged and health benefits explained to patient.           VTE Risk Mitigation  (From admission, onward)         Ordered     heparin (porcine) injection 5,000 Units  Every 8 hours         07/15/22 1758     Place BAMBI hose  Until discontinued         07/15/22 1758     IP VTE HIGH RISK PATIENT  Once         07/15/22 1758     Place sequential compression device  Until discontinued         07/15/22 1758                Discharge Planning   FREDERICK:      Code Status: DNR   Is the patient medically ready for discharge?:     Reason for patient still in hospital (select all that apply): Patient unstable           1:48 PM  Updated sister Nereida Mcmahon at bedside.     Critical care time spent on the evaluation and treatment of severe organ dysfunction, review of pertinent labs and imaging studies, discussions with consulting providers and discussions with patient/family: 45 minutes.      Edel Navarro MD  Department of Hospital Medicine   [facility] [place] - Intensive Care

## 2022-07-17 NOTE — ASSESSMENT & PLAN NOTE
Last TTE 1/2022 EF 70%, indeterminate diastolic function  Grossly volume overloaded on exam  In shock due to sepsis. Will hold diuresis

## 2022-07-18 VITALS
OXYGEN SATURATION: 100 % | WEIGHT: 212.94 LBS | HEART RATE: 48 BPM | BODY MASS INDEX: 39.19 KG/M2 | DIASTOLIC BLOOD PRESSURE: 66 MMHG | RESPIRATION RATE: 10 BRPM | SYSTOLIC BLOOD PRESSURE: 99 MMHG | HEIGHT: 62 IN | TEMPERATURE: 98 F

## 2022-07-18 LAB
ALBUMIN SERPL BCP-MCNC: 1.3 G/DL (ref 3.5–5.2)
ALP SERPL-CCNC: 168 U/L (ref 55–135)
ALT SERPL W/O P-5'-P-CCNC: 62 U/L (ref 10–44)
ANION GAP SERPL CALC-SCNC: 6 MMOL/L (ref 8–16)
AST SERPL-CCNC: 44 U/L (ref 10–40)
BACTERIA SPEC AEROBE CULT: ABNORMAL
BASOPHILS # BLD AUTO: 0.01 K/UL (ref 0–0.2)
BASOPHILS NFR BLD: 0.1 % (ref 0–1.9)
BILIRUB SERPL-MCNC: 0.7 MG/DL (ref 0.1–1)
BUN SERPL-MCNC: 53 MG/DL (ref 8–23)
CALCIUM SERPL-MCNC: 8.1 MG/DL (ref 8.7–10.5)
CHLORIDE SERPL-SCNC: 109 MMOL/L (ref 95–110)
CO2 SERPL-SCNC: 21 MMOL/L (ref 23–29)
CREAT SERPL-MCNC: 2.4 MG/DL (ref 0.5–1.4)
DIFFERENTIAL METHOD: ABNORMAL
EOSINOPHIL # BLD AUTO: 0 K/UL (ref 0–0.5)
EOSINOPHIL NFR BLD: 0 % (ref 0–8)
ERYTHROCYTE [DISTWIDTH] IN BLOOD BY AUTOMATED COUNT: 20.3 % (ref 11.5–14.5)
EST. GFR  (AFRICAN AMERICAN): 21 ML/MIN/1.73 M^2
EST. GFR  (NON AFRICAN AMERICAN): 18 ML/MIN/1.73 M^2
GLUCOSE SERPL-MCNC: 349 MG/DL (ref 70–110)
HCT VFR BLD AUTO: 25.5 % (ref 37–48.5)
HGB BLD-MCNC: 8 G/DL (ref 12–16)
IMM GRANULOCYTES # BLD AUTO: 0.14 K/UL (ref 0–0.04)
IMM GRANULOCYTES NFR BLD AUTO: 1.5 % (ref 0–0.5)
LYMPHOCYTES # BLD AUTO: 1.7 K/UL (ref 1–4.8)
LYMPHOCYTES NFR BLD: 17.4 % (ref 18–48)
MAGNESIUM SERPL-MCNC: 2.1 MG/DL (ref 1.6–2.6)
MCH RBC QN AUTO: 27.4 PG (ref 27–31)
MCHC RBC AUTO-ENTMCNC: 31.4 G/DL (ref 32–36)
MCV RBC AUTO: 87 FL (ref 82–98)
MONOCYTES # BLD AUTO: 0.2 K/UL (ref 0.3–1)
MONOCYTES NFR BLD: 1.8 % (ref 4–15)
NEUTROPHILS # BLD AUTO: 7.6 K/UL (ref 1.8–7.7)
NEUTROPHILS NFR BLD: 79.2 % (ref 38–73)
NRBC BLD-RTO: 1 /100 WBC
PLATELET # BLD AUTO: 112 K/UL (ref 150–450)
PMV BLD AUTO: 10.5 FL (ref 9.2–12.9)
POCT GLUCOSE: 187 MG/DL (ref 70–110)
POCT GLUCOSE: 325 MG/DL (ref 70–110)
POCT GLUCOSE: 333 MG/DL (ref 70–110)
POTASSIUM SERPL-SCNC: 5 MMOL/L (ref 3.5–5.1)
PROT SERPL-MCNC: 6.1 G/DL (ref 6–8.4)
RBC # BLD AUTO: 2.92 M/UL (ref 4–5.4)
RPR SER QL: NORMAL
SARS-COV-2 RDRP RESP QL NAA+PROBE: NEGATIVE
SODIUM SERPL-SCNC: 136 MMOL/L (ref 136–145)
VANCOMYCIN SERPL-MCNC: 20.1 UG/ML
WBC # BLD AUTO: 9.55 K/UL (ref 3.9–12.7)

## 2022-07-18 PROCEDURE — U0002 COVID-19 LAB TEST NON-CDC: HCPCS | Performed by: HOSPITALIST

## 2022-07-18 PROCEDURE — 83735 ASSAY OF MAGNESIUM: CPT | Performed by: HOSPITALIST

## 2022-07-18 PROCEDURE — 99223 1ST HOSP IP/OBS HIGH 75: CPT | Mod: ,,, | Performed by: REGISTERED NURSE

## 2022-07-18 PROCEDURE — 63600175 PHARM REV CODE 636 W HCPCS: Performed by: STUDENT IN AN ORGANIZED HEALTH CARE EDUCATION/TRAINING PROGRAM

## 2022-07-18 PROCEDURE — A4216 STERILE WATER/SALINE, 10 ML: HCPCS | Performed by: HOSPITALIST

## 2022-07-18 PROCEDURE — 99223 PR INITIAL HOSPITAL CARE,LEVL III: ICD-10-PCS | Mod: ,,, | Performed by: REGISTERED NURSE

## 2022-07-18 PROCEDURE — 80053 COMPREHEN METABOLIC PANEL: CPT | Performed by: STUDENT IN AN ORGANIZED HEALTH CARE EDUCATION/TRAINING PROGRAM

## 2022-07-18 PROCEDURE — 80202 ASSAY OF VANCOMYCIN: CPT | Performed by: EMERGENCY MEDICINE

## 2022-07-18 PROCEDURE — 63600175 PHARM REV CODE 636 W HCPCS: Performed by: HOSPITALIST

## 2022-07-18 PROCEDURE — 25000003 PHARM REV CODE 250: Performed by: HOSPITALIST

## 2022-07-18 PROCEDURE — 85025 COMPLETE CBC W/AUTO DIFF WBC: CPT | Performed by: STUDENT IN AN ORGANIZED HEALTH CARE EDUCATION/TRAINING PROGRAM

## 2022-07-18 RX ADMIN — INSULIN ASPART 4 UNITS: 100 INJECTION, SOLUTION INTRAVENOUS; SUBCUTANEOUS at 05:07

## 2022-07-18 RX ADMIN — HEPARIN SODIUM 5000 UNITS: 5000 INJECTION INTRAVENOUS; SUBCUTANEOUS at 02:07

## 2022-07-18 RX ADMIN — HYDROCORTISONE SODIUM SUCCINATE 100 MG: 100 INJECTION, POWDER, FOR SOLUTION INTRAMUSCULAR; INTRAVENOUS at 02:07

## 2022-07-18 RX ADMIN — HEPARIN SODIUM 5000 UNITS: 5000 INJECTION INTRAVENOUS; SUBCUTANEOUS at 05:07

## 2022-07-18 RX ADMIN — HYDROMORPHONE HYDROCHLORIDE 0.5 MG: 2 INJECTION, SOLUTION INTRAMUSCULAR; INTRAVENOUS; SUBCUTANEOUS at 01:07

## 2022-07-18 RX ADMIN — MEROPENEM AND SODIUM CHLORIDE 1 G: 1 INJECTION, SOLUTION INTRAVENOUS at 12:07

## 2022-07-18 RX ADMIN — VASOPRESSIN 0.04 UNITS/MIN: 20 INJECTION INTRAVENOUS at 03:07

## 2022-07-18 RX ADMIN — Medication 10 ML: at 05:07

## 2022-07-18 RX ADMIN — HYDROCORTISONE SODIUM SUCCINATE 100 MG: 100 INJECTION, POWDER, FOR SOLUTION INTRAMUSCULAR; INTRAVENOUS at 05:07

## 2022-07-18 RX ADMIN — Medication 10 ML: at 02:07

## 2022-07-18 RX ADMIN — MUPIROCIN: 20 OINTMENT TOPICAL at 08:07

## 2022-07-18 RX ADMIN — GLYCOPYRROLATE 0.1 MG: 0.2 INJECTION, SOLUTION INTRAMUSCULAR; INTRAVITREAL at 08:07

## 2022-07-18 NOTE — SUBJECTIVE & OBJECTIVE
Interval History: Lying in bed. Appears comfortable. Levo at 0.04.     Review of Systems   Unable to perform ROS: Mental status change   Objective:     Vital Signs (Most Recent):  Temp: 96.44 °F (35.8 °C) (07/18/22 0630)  Pulse: (!) 51 (07/18/22 0630)  Resp: 11 (07/18/22 0630)  BP: 131/62 (07/18/22 0630)  SpO2: 100 % (07/18/22 0630)   Vital Signs (24h Range):  Temp:  [96.44 °F (35.8 °C)-98.78 °F (37.1 °C)] 96.44 °F (35.8 °C)  Pulse:  [51-90] 51  Resp:  [7-105] 11  SpO2:  [92 %-100 %] 100 %  BP: ()/(37-65) 131/62     Weight: 96.6 kg (212 lb 15.4 oz)  Body mass index is 38.95 kg/m².    Intake/Output Summary (Last 24 hours) at 7/18/2022 0931  Last data filed at 7/18/2022 0300  Gross per 24 hour   Intake 1287.87 ml   Output 1980 ml   Net -692.13 ml        Physical Exam  Vitals and nursing note reviewed.   Constitutional:       General: She is not in acute distress.     Appearance: She is obese. She is ill-appearing and toxic-appearing. She is not diaphoretic.   HENT:      Head: Normocephalic and atraumatic.      Nose: Nose normal.      Mouth/Throat:      Mouth: Mucous membranes are dry.   Eyes:      Comments: L eye opaque   Cardiovascular:      Rate and Rhythm: Normal rate and regular rhythm.      Pulses: Normal pulses.      Heart sounds: Normal heart sounds. No murmur heard.    No gallop.   Pulmonary:      Effort: Pulmonary effort is normal. No respiratory distress.      Breath sounds: No wheezing or rales.      Comments: O2 by NC  Abdominal:      General: Bowel sounds are normal. There is no distension.      Palpations: Abdomen is soft.      Tenderness: There is no abdominal tenderness. There is no guarding.   Genitourinary:     Comments: Lala  Musculoskeletal:         General: Swelling present.      Right lower leg: Edema present.      Left lower leg: Edema present.   Skin:     Comments: R arm PICC   Neurological:      Mental Status: She is disoriented.       Significant Labs: All pertinent labs within the  past 24 hours have been reviewed.    Significant Imaging: I have reviewed all pertinent imaging results/findings within the past 24 hours.

## 2022-07-18 NOTE — PROGRESS NOTES
VINNY received a return call from Ashley with [facility].  VINNY advised her that NP felt that patient needed to be admitted today because her blood pressure was low.  Ashley inquired of BP. Per chart review/VS  BP 99/66 and Pulse 48.  Per Jaime, the BP is not extremely low, pulse on the slow side.  Ashley stated that orders were sent down but they still plan to see patient tomorrow.  She stated that the facility can contact them if needed.

## 2022-07-18 NOTE — PROGRESS NOTES
Nurse, Belkys, gave report.  When giving report, nurse was advised that patient t to be admitted to hospice tomorrow.  SW contacted [facility] @ [phone] and spoke with answering service.  Message left stating that it is emperative that patient be admitted to hospice today.  Awaiting call back from [facility].

## 2022-07-18 NOTE — ACP (ADVANCE CARE PLANNING)
Advance Care Planning     Date: 07/18/2022  Admitted with septic shock due to sacral wound. DNR code status confirmed. Treated with vasopressors which have been weaned. Palliative consulted to discuss goals of care. Discharging back to nursing home with hospice care.    Edel Navarro MD  07/18/2022  2:57 PM

## 2022-07-18 NOTE — ASSESSMENT & PLAN NOTE
A1c:   Lab Results   Component Value Date    HGBA1C 6.3 (H) 05/24/2022     Glucose has been labile  Meds: SSI PRN to maintain goal 140-180  accuchecks, hypoglycemic protocol

## 2022-07-18 NOTE — PROGRESS NOTES
Pharmacokinetic Assessment Follow Up: IV Vancomycin    Vancomycin serum concentration assessment(s):    The random level was drawn correctly and can be used to guide therapy at this time. The measurement is above the desired definitive target range of 10 to 20 mcg/mL.    Vancomycin Regimen Plan:    No dose today.  Re-dose when the random level is less than 20 mcg/mL, next level to be drawn at 0300 on 7/19/2022    Drug levels (last 3 results):  Recent Labs   Lab Result Units 07/16/22  0231 07/17/22  0525 07/18/22  0459   Vancomycin, Random ug/mL 16.9 26.4 20.1       Pharmacy will continue to follow and monitor vancomycin.    Please contact pharmacy at extension [phone] for questions regarding this assessment.    Thank you for the consult,   Vincent Solares Jr       Patient brief summary:  Ana James is a 81 y.o. female initiated on antimicrobial therapy with IV Vancomycin for treatment of bone/joint infection    Drug Allergies:   Review of patient's allergies indicates:   Allergen Reactions    Adhesive Rash     Paper tape       Actual Body Weight:   96.6 kg    Renal Function:   Estimated Creatinine Clearance: 19.3 mL/min (A) (based on SCr of 2.4 mg/dL (H)).,     Dialysis Method (if applicable):  N/A    CBC (last 72 hours):  Recent Labs   Lab Result Units 07/15/22  1121 07/16/22  0231 07/17/22  0525 07/18/22  0459   WBC K/uL 9.83 11.57 14.29* 9.55   Hemoglobin g/dL 7.7* 6.7* 7.9* 8.0*   Hematocrit % 26.1* 22.6* 25.1* 25.5*   Platelets K/uL 141* 125* 118* 112*   Gran % % 77.0* 79.0* 76.7* 79.2*   Lymph % % 18.1 15.2* 17.3* 17.4*   Mono % % 3.7* 4.1 4.8 1.8*   Eosinophil % % 0.5 0.5 0.5 0.0   Basophil % % 0.2 0.3 0.1 0.1   Differential Method  Automated Automated Automated Automated       Metabolic Panel (last 72 hours):  Recent Labs   Lab Result Units 07/15/22  1121 07/15/22  1546 07/15/22  1856 07/16/22  0044 07/16/22  0231 07/16/22  1100 07/16/22  1440 07/16/22  2018 07/17/22  0525 07/18/22  0459   Sodium  mmol/L 145  --   --  143 145 145  --   --  144 136   Potassium mmol/L 5.4*  --   --  SEE COMMENT 5.5* 5.6* 6.1* 5.1 5.1 5.0   Chloride mmol/L 112*  --   --  114* 117* 115*  --   --  115* 109   CO2 mmol/L 27  --   --  22* 22* 23  --   --  23 21*   Glucose mg/dL 88  --   --  68* 69* 57*  --   --  92 349*   Glucose, UA   --  Negative  --   --   --   --   --   --   --   --    BUN mg/dL 72*  --   --  71* 71* 75*  --   --  68* 53*   Creatinine mg/dL 2.6*  --   --  2.6* 2.6* 2.6*  --   --  2.7* 2.4*   Creatinine, Urine mg/dL  --   --  56.9  --   --   --   --   --   --   --    Albumin g/dL 1.5*  --   --   --  1.3* 1.3*  --   --  1.3* 1.3*   Total Bilirubin mg/dL 0.5  --   --   --  0.4 0.5  --   --  0.7 0.7   Alkaline Phosphatase U/L 200*  --   --   --  158* 165*  --   --  142* 168*   AST U/L 113*  --   --   --  73* 72*  --   --  56* 44*   ALT U/L 111*  --   --   --  82* 82*  --   --  68* 62*   Magnesium mg/dL 2.7*  --   --  2.5  --  2.5  --   --   --  2.1   Phosphorus mg/dL  --   --   --   --   --  3.9  --   --   --   --        Vancomycin Administrations:  vancomycin given in the last 96 hours                     vancomycin 1.5 g in dextrose 5 % 250 mL IVPB (ready to mix) ()  Restarted 07/16/22 1404      Restarted  1300     1,500 mg New Bag  1257    vancomycin 1.5 g in dextrose 5 % 250 mL IVPB (ready to mix) ()  Restarted 07/15/22 1500     1,500 mg New Bag  1425                    Microbiologic Results:  Microbiology Results (last 7 days)       Procedure Component Value Units Date/Time    Aerobic culture [[id number]]  (Abnormal)  (Susceptibility) Collected: 07/15/22 2020    Order Status: Completed Specimen: Decubitus from Coccyx Updated: 07/18/22 0715     Aerobic Bacterial Culture PROTEUS MIRABILIS  Many        KLEBSIELLA PNEUMONIAE ESBL  Moderate  Called to [facility] 07/17/2022  08:59        ENTEROCOCCUS FAECALIS  Moderate      Blood culture x two cultures. Draw prior to antibiotics. [[id number]] Collected:  07/15/22 1205    Order Status: Completed Specimen: Blood from Peripheral, Forearm, Left Updated: 07/17/22 1303     Blood Culture, Routine No Growth to date      No Growth to date      No Growth to date    Narrative:      Aerobic and anaerobic    Blood culture x two cultures. Draw prior to antibiotics. [[id number]] Collected: 07/15/22 1121    Order Status: Completed Specimen: Blood from Peripheral, Upper Arm, Left Updated: 07/17/22 1303     Blood Culture, Routine No Growth to date      No Growth to date      No Growth to date    Narrative:      Aerobic and anaerobic    Urine culture [[id number]] Collected: 07/15/22 1546    Order Status: Completed Specimen: Urine Updated: 07/17/22 0915     Urine Culture, Routine No significant growth    Narrative:      Specimen Source->Urine

## 2022-07-18 NOTE — PLAN OF CARE
[facility] - Intensive Care  Discharge Final Note    Primary Care Provider: Viky Bean MD    Expected Discharge Date: 7/18/2022    Final Discharge Note (most recent)       Final Note - 07/18/22 1539          Final Note    Assessment Type Final Discharge Note     Anticipated Discharge Disposition Hospice/Home     What phone number can be called within the next 1-3 days to see how you are doing after discharge? [phone]     Hospital Resources/Appts/Education Provided Appointments scheduled and added to AVS        Post-Acute Status    Post-Acute Authorization Hospice     Post-Acute Placement Status Set-up Complete/Auth obtained     Hospice Status Set-up Complete/Auth obtained     Coverage [facility]     Discharge Delays None known at this time                     Important Message from Medicare             Contact Info       [facility]   Specialty: Nursing Home Agency, SNF Agency, [address] [address]   Phone: [phone]       Next Steps: Follow up    Instructions: Nursing Home    [facility]   Specialty: Hospice and Palliative Medicine, Hospice Services    [address] [place] [address] [address]  [facility] [address]   Phone: [phone]       Next Steps: Follow up    Instructions: Hospice          Patient returning to [facility] with [facility].

## 2022-07-18 NOTE — CONSULTS
[facility] - Intensive Care  Palliative Medicine  Consult Note    Patient Name: Ana James  MRN: [id number]  Admission Date: 7/15/2022  Hospital Length of Stay: 3 days  Code Status: DNR   Attending Provider: Edel Navarro MD  Consulting Provider: Estefany Weeks NP  Primary Care Physician: Viky Bean MD  Principal Problem:Acute encephalopathy    Patient information was obtained from relative(s), past medical records and primary team.      Inpatient consult to Palliative Care  Consult performed by: Estefany Weeks NP  Consult ordered by: Catalino Tobar MD  Reason for consult: Goals of Care and Discussion of Hospice         Assessment/Plan:   Advance Care Planning   Date: 07/18/2022  Palliative Encounter/Consult  - consult received  - chart reviewed in detail   - patient discussed in ICU rounds   - known to palliative medicine team from previous admit; 4/2022; DNR;[facility] on file   - initial ACP and GOC discussion initiated by primary, Dr. Navarro; brother (Prince) previously advised patient did not qualify for hospice in past; but interested in discussing further since now qualifying  - Brief visit with pt this morning; arousable but not responsive; intermittent moaning.   - Call placed to Prince (brother, [phone]) to learn more able Ms. Perez outside of the hospital, her baselines, and for discussion of GOC and Hospice qualifications. Pt previously residing in Nursing home and will return at time of discharge.  Prince confirms decline from baseline mental status.   - GOC and ACP discussed.  Prince agreeable to hospice at previous nursing facility.  He confirms he is available today for further conversations with hospice company/nursing for more information, consents, and needs for facilitating transfer by CM/SW.   - Emotional support provided; answered questions and concerns.    - CM/VINNY, Madison Espinoza, updated  - Primary, Dr. Navarro, updated      Acute encephalopathy  -  AMS; Per primary: At baseline able to say a few yes or no and would moan during wound dressing change. On admit not speaking, moans. Worsening, now not responsive   - due to septic shock  - CTH no acute changes  - Fast Score: 7E (possibly 7F patient moaning in pain at time of assessment did not want to further discomfort); Hospice qualifying    Thrombocytopenia  - noted; management per primary     Septic shock  - septic shock with no currently identified source; sacral wound suspected but unable to confirm due to condition   - surgery team unable to perform bedside debridement; pt did not tolerate turning; unoperable  - AUTUMN; encephalopathy   - noted; management per primary  - hospice qualifying     Pressure injury of sacral region, stage 4  - see septic shock   - noted; management per primary     Debility  - Bedbound at baseline with indwelling chiu  - Large sacral wound present       Acute renal failure superimposed on stage 3b chronic kidney disease  - Presented with AUTUNM. Cr on admit 2.6 from baseline Cr 1.4  - non-improving per primary and record review   - noted; management per primary      Paroxysmal atrial fibrillation  - noted; management per primary      Anemia of chronic disease  - Anemia of chronic disease + iron deficiency anemia.  - unable to take PO iron currently due to AMS  - transfused 1U RBCs on 7/16   - no signs of active bleeding per primary  - noted; management per primary     Acute on chronic diastolic heart failure  - Last TTE 1/2022 EF 70%, indeterminate diastolic function  - noted; management per primary      Hyperlipidemia  - Unable to tolerate PO due to AMS  - noted; management per primary     Hyperkalemia  - Associated with AUTUMN. Resolved.   - noted; management per primary     Controlled type 2 diabetes mellitus with stage 3 chronic kidney disease, with long-term current use of insulin       - noted; management per primary     Class 2 obesity in adult  - Body mass index is 38.96 kg/m².   -  "noted; management per primary    Thank you for your consult. I will sign off. Please contact us if you have any additional questions.    Subjective:     HPI:   From H&P: "Patient is an 81-year-old  female with history of T2DM, , HTN, PVD, distally CHF, CKD and Afib who was brought in from [facility] due to concern of RUE swelling around her PICC line site with change in her LOC. Patient unable to provide any information, I called the NH and spoke to the nurse who was taking care of patient who stated that patient's right hand/arm was noticed to be swollen. Nursing staff also stated that patient's mental status changed which maximo described as patient appearing more lethargic, not moaning as she used to when is in pain, and also unable to say yes or no which is her baseline. She is unable to hold normal conversation or follow adequate commands but she can relate to nursing staff her pain, hunger, thirst by saying one word sentences.   Upon ER presentation, patient noted to be hypothermic at 91.5, HR in the 50s and BP in low 90s. Labs show WBC 9.8, H/H 7.7/26, , Na 145, K 5.4, Cl 112, HCO3 22, BUN/Cr 72/2.6, glc 88.  CXR, CTH and venous US or RUE all unremarkable"     Palliative medicine consulted for advance care planning and continuity of care; Met with pt at bedside briefly and conducted telephone discussion with brother Prince; for details of visit, see advance care planning section of plan.       Hospital Course:  No notes on file    Interval History: Initial Consult    Past Medical History:   Diagnosis Date    Arthritis lower extremities    Asthma     Blind left eye     Diabetes mellitus     Edema of both lower legs     CHRONIC    Fall 2022    Gall stones     Hypertension     Kidney stones     Lives alone with help available     HOME HEALTH    Nephrolithiasis 2016    Obesity     PVD (peripheral vascular disease)     Renal disorder     Retinopathy     " Sleep apnea     Vaginal delivery     x1    Weakness of both lower extremities     uses a cane, rollator & power chair       Past Surgical History:   Procedure Laterality Date    CARDIAC CATHETERIZATION      cataract      CHOLECYSTECTOMY      DEBRIDEMENT OF SACRAL WOUND N/A 5/31/2022    Procedure: DEBRIDEMENT, WOUND, SACRUM;  Surgeon: Timmy Allison MD;  Location: [facility];  Service: General;  Laterality: N/A;    EYE SURGERY      Rt cataract surgery    HYSTERECTOMY      URETERAL STENT PLACEMENT         Review of patient's allergies indicates:   Allergen Reactions    Adhesive Rash     Paper tape       Medications:  Continuous Infusions:   NORepinephrine bitartrate-D5W 0.04 mcg/kg/min (07/18/22 0429)    vasopressin 0.04 Units/min (07/18/22 0300)     Scheduled Meds:   glycopyrrolate (PF)  0.1 mg Intravenous TID    heparin (porcine)  5,000 Units Subcutaneous Q8H    hydrocortisone sodium succinate  100 mg Intravenous Q8H    meropenem (MERREM) IVPB  1 g Intravenous Q12H    mupirocin   Nasal BID    sodium chloride 0.9%  10 mL Intravenous Q6H     PRN Meds:dextrose 10%, dextrose 10%, dextrose 50%, dextrose 50%, glucagon (human recombinant), HYDROmorphone, insulin aspart U-100, naloxone, ondansetron, Flushing PICC Protocol **AND** sodium chloride 0.9% **AND** sodium chloride 0.9%    Family History       Problem Relation (Age of Onset)    Cancer Sister    Diabetes Brother    Hypertension Father    Kidney failure Mother          Tobacco Use    Smoking status: Never Smoker    Smokeless tobacco: Never Used   Substance and Sexual Activity    Alcohol use: No    Drug use: Never    Sexual activity: Not Currently     Partners: Male       Review of Systems   Unable to perform ROS: Dementia   Objective:     Vital Signs (Most Recent):  Temp: 96.44 °F (35.8 °C) (07/18/22 0630)  Pulse: (!) 51 (07/18/22 0630)  Resp: 11 (07/18/22 0630)  BP: 131/62 (07/18/22 0630)  SpO2: 100 % (07/18/22 0630)   Vital Signs (24h  Range):  Temp:  [96.44 °F (35.8 °C)-98.78 °F (37.1 °C)] 96.44 °F (35.8 °C)  Pulse:  [51-90] 51  Resp:  [7-105] 11  SpO2:  [92 %-100 %] 100 %  BP: ()/(37-65) 131/62     Weight: 96.6 kg (212 lb 15.4 oz)  Body mass index is 38.95 kg/m².    Physical Exam  Constitutional:       Appearance: She is obese.      Comments: Lying in bed, arousable with intermittent moaning    HENT:      Head: Normocephalic and atraumatic.      Nose: Nose normal.   Pulmonary:      Effort: Pulmonary effort is normal.      Comments: NC in place  Skin:     Comments: Known sacral wound, unable to assess due to pain   Neurological:      Mental Status: She is disoriented.       Significant Labs: All pertinent labs within the past 24 hours have been reviewed.  CBC:   Recent Labs   Lab 07/18/22  0459   WBC 9.55   HGB 8.0*   HCT 25.5*   MCV 87   *     BMP:  Recent Labs   Lab 07/18/22  0459   *      K 5.0      CO2 21*   BUN 53*   CREATININE 2.4*   CALCIUM 8.1*   MG 2.1     LFT:  Lab Results   Component Value Date    AST 44 (H) 07/18/2022    ALKPHOS 168 (H) 07/18/2022    BILITOT 0.7 07/18/2022     Albumin:   Albumin   Date Value Ref Range Status   07/18/2022 1.3 (L) 3.5 - 5.2 g/dL Final     Protein:   Total Protein   Date Value Ref Range Status   07/18/2022 6.1 6.0 - 8.4 g/dL Final     Lactic acid:   Lab Results   Component Value Date    LACTATE 0.7 07/15/2022    LACTATE 0.9 07/15/2022       Significant Imaging: I have reviewed all pertinent imaging results/findings within the past 24 hours.    Total visit time: 70 minutes    > 50% of  70  min visit spent in chart review, face to face discussion of symptom assessment, coordination of care with other specialists, and discharge planning. ACP planning included in 70 minute visit as well; goals of care, emotional support, formulating and communicating prognosis, exploring burden/ benefit of various approaches of treatment.       Estefany Weeks NP  Palliative  Medicine  [facility] [place] [facility]

## 2022-07-18 NOTE — PROGRESS NOTES
Ambulance authorization obtained from [facility] ([place]).  [facility] Case #[id number].

## 2022-07-18 NOTE — PROGRESS NOTES
Secure Chat sent to [facility] Requesting  to have transport time pushed back to 3:30 p.m. because we are still awaiting COVID results and call report.  Cannot get call report until COVID test has been submitted to the nursing home.

## 2022-07-18 NOTE — DISCHARGE SUMMARY
[facility] Medicine  Discharge Summary      Patient Name: Ana James  MRN: [id number]  Patient Class: IP- Inpatient  Admission Date: 7/15/2022  Hospital Length of Stay: 3 days  Discharge Date and Time:  2022 9:38 AM  Attending Physician: Edel Navarro MD   Discharging Provider: Edel Navarro MD  Primary Care Provider: Viky Bean MD      HPI:   Patient is an 81-year-old  female with history of T2DM, , HTN, PVD, distally CHF, CKD and Afib who was brought in from [facility] due to concern of RUE swelling around her PICC line site with change in her LOC. Patient unable to provide any information, I called the NH and spoke to the nurse who was taking care of patient who stated that patient's right hand/arm was noticed to be swollen. Nursing staff also stated that patient's mental status changed which maximo described as patient appearing more lethargic, not moaning as she used to when is in pain, and also unable to say yes or no which is her baseline. She is unable to hold normal conversation or follow adequate commands but she can relate to nursing staff her pain, hunger, thirst by saying one word sentences.     Upon ER presentation, patient noted to be hypothermic at 91.5, HR in the 50s and BP in low 90s. Labs show WBC 9.8, H/H 7.7/26, , Na 145, K 5.4, Cl 112, HCO3 22, BUN/Cr 72/2.6, glc 88.    CXR, CTH and venous US or RUE all unremarkable      * No surgery found *      Hospital Course:   Ms Ana James is a 81 y.o. woman admitted with acute encephalopathy and AUTUMN on CKD due to septic shock due to stage 4 sacral wound infection. Started antibiotics and vasopressors. Shock worsened. Unable to turn without runs of Vfib and worsening shock on . Encephalopathy worsening also. Discussed goals of care with family. Discussed that antibiotics alone will not cure her wound and that surgery is not an option due to  instability. Code status confirmed DNR. She is appropriate for hospice care. Shock is now improving, renal function improving. Palliative care consulted. She was discharged to nursing home with hospice care.        Goals of Care Treatment Preferences:  Code Status: DNR       LaPOST: Yes           Consults:   Consults (From admission, onward)        Status Ordering Provider     Inpatient consult to Registered Dietitian/Nutritionist  Once        Provider:  (Not yet assigned)    Completed DES LEGGETT     [facility]  Once        Provider:  (Not yet assigned)    Completed DES LEGGETT     Inpatient consult to General Surgery  Once        Provider:  Hakan Grant MD    Completed DES LEGGETT     Inpatient consult to Palliative Care  Once        Provider:  (Not yet assigned)    Acknowledged FRANCISCO MORRISON            Final Active Diagnoses:    Diagnosis Date Noted POA    PRINCIPAL PROBLEM:  Acute encephalopathy [G93.40] 04/24/2022 Yes    Thrombocytopenia [D69.6] 07/16/2022 Yes    Septic shock [A41.9, R65.21] 07/15/2022 Yes    Pressure injury of sacral region, stage 4 [L89.154] 05/31/2022 Yes    Debility [R53.81] 04/27/2022 Yes    Goals of care, counseling/discussion [Z71.89] 04/26/2022 Not Applicable    Acute renal failure superimposed on stage 3b chronic kidney disease [N17.9, N18.32] 01/26/2022 Yes    Paroxysmal atrial fibrillation [I48.0] 01/24/2022 Yes    Anemia of chronic disease [D63.8] 02/27/2020 Yes     Chronic    Acute on chronic diastolic heart failure [I50.33] 02/27/2020 Yes    Hyperlipidemia [E78.5] 02/27/2020 Yes     Chronic    Hyperkalemia [E87.5] 02/27/2020 Yes    Controlled type 2 diabetes mellitus with stage 3 chronic kidney disease, with long-term current use of insulin [E11.22, N18.30, Z79.4] 07/11/2018 Not Applicable     Chronic    Class 2 obesity in adult [E66.9] 03/02/2014 Yes      Problems Resolved During this Admission:    Diagnosis  Date Noted Date Resolved POA    Localized swelling on right hand [R22.31] 07/15/2022 07/16/2022 Yes    UTI (urinary tract infection) [N39.0] 03/02/2014 07/16/2022 Yes       Discharged Condition: stable for transfer to hospice    Disposition: Hospice/Home    Follow Up: with hospice company at nursing home     Patient Instructions:      Diet NPO     Notify your health care provider if you experience any of the following:   Order Comments: Notify hospice of any issues     Activity as tolerated       Significant Diagnostic Studies: Labs: All labs within the past 24 hours have been reviewed    Pending Diagnostic Studies:     Procedure Component Value Units Date/Time    RPR [[id number]] Collected: 07/15/22 1915    Order Status: Sent Lab Status: In process Updated: 07/16/22 1540    Specimen: Blood     Narrative:      Collection has been rescheduled by ROBERT at 07/15/2022 18:48 Reason:   Unable to collect very hard stick         Medications:  Reconciled Home Medications:      Medication List      STOP taking these medications    albuterol 90 mcg/actuation inhaler  Commonly known as: PROVENTIL/VENTOLIN HFA     amLODIPine 10 MG tablet  Commonly known as: NORVASC     ARGINAID 4.5 gram-156 mg/9.2 gram Pwpk  Generic drug: arginine-vitamin C-vitamin E     ascorbic acid (vitamin C) 500 MG tablet  Commonly known as: VITAMIN C     aspirin 81 MG EC tablet  Commonly known as: ECOTRIN     atorvastatin 20 MG tablet  Commonly known as: LIPITOR     blood sugar diagnostic Strp     carvediloL 12.5 MG tablet  Commonly known as: COREG     cefTRIAXone 500 mg injection  Commonly known as: ROCEPHIN     dronabinoL 2.5 MG capsule  Commonly known as: MARINOL     ferrous sulfate 325 (65 FE) MG EC tablet     folic acid 1 MG tablet  Commonly known as: FOLVITE     GLUCERNA Liqd  Generic drug: nut.tx.gluc.intol,lac-free,soy     HumaLOG KwikPen Insulin 100 unit/mL pen  Generic drug: insulin lispro     hydrALAZINE 50 MG tablet  Commonly known as:  "APRESOLINE     lancets Misc     latanoprost 0.005 % ophthalmic solution     LEVEMIR FLEXTOUCH U-100 INSULN 100 unit/mL (3 mL) Inpn pen  Generic drug: insulin detemir U-100     melatonin 3 mg tablet  Commonly known as: MELATIN     NOVOFINE AUTOCOVER 30 gauge x 1/3" Ndle  Generic drug: pen needle, diabetic, safety     oxybutynin 15 MG Tr24  Commonly known as: DITROPAN XL     oxyCODONE-acetaminophen 5-325 mg per tablet  Commonly known as: PERCOCET     pen needle, diabetic 32 gauge x 5/32" Ndle  Commonly known as: BD ULTRA-FINE BREE PEN NEEDLE     polyethylene glycol 17 gram Pwpk  Commonly known as: GLYCOLAX     timolol maleate 0.5% 0.5 % Drop  Commonly known as: TIMOPTIC     TRADJENTA 5 mg Tab tablet  Generic drug: linaGLIPtin     traZODone 50 MG tablet  Commonly known as: DESYREL     zinc gluconate 50 mg tablet            Indwelling Lines/Drains at time of discharge:   Lines/Drains/Airways            Drain  Duration                Urethral Catheter 07/15/22 1800 2 days                Time spent on the discharge of patient: 35 minutes        Edel Navarro MD  Department of Hospital Medicine  [facility] - Intensive Care  "

## 2022-07-18 NOTE — PLAN OF CARE
[facility]    HOSPICE  ORDERS    07/18/2022    Admit to Hospice:  Home Service - hospice at nursing home     Diagnoses:   Active Hospital Problems    Diagnosis  POA    *Acute encephalopathy [G93.40]  Yes    Thrombocytopenia [D69.6]  Yes    Septic shock [A41.9, R65.21]  Yes    Pressure injury of sacral region, stage 4 [L89.154]  Yes    Debility [R53.81]  Yes    Goals of care, counseling/discussion [Z71.89]  Not Applicable    Acute renal failure superimposed on stage 3b chronic kidney disease [N17.9, N18.32]  Yes    Paroxysmal atrial fibrillation [I48.0]  Yes    Anemia of chronic disease [D63.8]  Yes     Chronic    Acute on chronic diastolic heart failure [I50.33]  Yes    Hyperlipidemia [E78.5]  Yes     Chronic    Hyperkalemia [E87.5]  Yes    Controlled type 2 diabetes mellitus with stage 3 chronic kidney disease, with long-term current use of insulin [E11.22, N18.30, Z79.4]  Not Applicable     Chronic    Class 2 obesity in adult [E66.9]  Yes      Resolved Hospital Problems    Diagnosis Date Resolved POA    Localized swelling on right hand [R22.31] 07/16/2022 Yes    UTI (urinary tract infection) [N39.0] 07/16/2022 Yes     Dx updated per 2019 IMO Load         Hospice Qualifying Diagnoses:       Patient has a life expectancy < 6 months due to:  1) Primary Hospice Diagnosis: septic shock from stage 4 sacral decubitus ulcer  2) Comorbid Conditions Contributing to Decline: acute on chronic renal failure     Vital Signs: Routine per Hospice Protocol.    Code Status:DNR    Allergies:   Review of patient's allergies indicates:   Allergen Reactions    Adhesive Rash     Paper tape       Diet: comfort feeds as tolerated, currently NPO    Activities: As tolerated    Goals of Care Treatment Preferences:  Code Status: DNR       LaPOST: Yes           Nursing: Per Hospice Routine    Reeves Care: Empty Reeves bag Q shift and PRN.  Change Reeves every month.    Routine Skin for Bedridden Patients: Apply  "moisture barrier cream to all skin folds and   wet areas in perineal area daily and after baths and all bowel movements.      Oxygen: 2L NC as needed for comfort    Medications:        Medication List      STOP taking these medications    albuterol 90 mcg/actuation inhaler  Commonly known as: PROVENTIL/VENTOLIN HFA     amLODIPine 10 MG tablet  Commonly known as: NORVASC     ARGINAID 4.5 gram-156 mg/9.2 gram Pwpk  Generic drug: arginine-vitamin C-vitamin E     ascorbic acid (vitamin C) 500 MG tablet  Commonly known as: VITAMIN C     aspirin 81 MG EC tablet  Commonly known as: ECOTRIN     atorvastatin 20 MG tablet  Commonly known as: LIPITOR     blood sugar diagnostic Strp     carvediloL 12.5 MG tablet  Commonly known as: COREG     cefTRIAXone 500 mg injection  Commonly known as: ROCEPHIN     dronabinoL 2.5 MG capsule  Commonly known as: MARINOL     ferrous sulfate 325 (65 FE) MG EC tablet     folic acid 1 MG tablet  Commonly known as: FOLVITE     GLUCERNA Liqd  Generic drug: nut.tx.gluc.intol,lac-free,soy     HumaLOG KwikPen Insulin 100 unit/mL pen  Generic drug: insulin lispro     hydrALAZINE 50 MG tablet  Commonly known as: APRESOLINE     lancets Misc     latanoprost 0.005 % ophthalmic solution     LEVEMIR FLEXTOUCH U-100 INSULN 100 unit/mL (3 mL) Inpn pen  Generic drug: insulin detemir U-100     melatonin 3 mg tablet  Commonly known as: MELATIN     NOVOFINE AUTOCOVER 30 gauge x 1/3" Ndle  Generic drug: pen needle, diabetic, safety     oxybutynin 15 MG Tr24  Commonly known as: DITROPAN XL     oxyCODONE-acetaminophen 5-325 mg per tablet  Commonly known as: PERCOCET     pen needle, diabetic 32 gauge x 5/32" Ndle  Commonly known as: BD ULTRA-FINE BREE PEN NEEDLE     polyethylene glycol 17 gram Pwpk  Commonly known as: GLYCOLAX     timolol maleate 0.5% 0.5 % Drop  Commonly known as: TIMOPTIC     TRADJENTA 5 mg Tab tablet  Generic drug: linaGLIPtin     traZODone 50 MG tablet  Commonly known as: DESYREL     zinc " gluconate 50 mg tablet                Future Orders:  Hospice Medical Director may dictate new orders for comfortable care measures & sign death certificate.          _________________________________  Edel Navarro MD  07/18/2022

## 2022-07-18 NOTE — HPI
"From H&P: "Patient is an 81-year-old  female with history of T2DM, , HTN, PVD, distally CHF, CKD and Afib who was brought in from [facility] due to concern of RUE swelling around her PICC line site with change in her LOC. Patient unable to provide any information, I called the NH and spoke to the nurse who was taking care of patient who stated that patient's right hand/arm was noticed to be swollen. Nursing staff also stated that patient's mental status changed which maximo described as patient appearing more lethargic, not moaning as she used to when is in pain, and also unable to say yes or no which is her baseline. She is unable to hold normal conversation or follow adequate commands but she can relate to nursing staff her pain, hunger, thirst by saying one word sentences.   Upon ER presentation, patient noted to be hypothermic at 91.5, HR in the 50s and BP in low 90s. Labs show WBC 9.8, H/H 7.7/26, , Na 145, K 5.4, Cl 112, HCO3 22, BUN/Cr 72/2.6, glc 88.  CXR, CTH and venous US or RUE all unremarkable"     Palliative medicine consulted for advance care planning and continuity of care; Met with pt at bedside briefly and conducted telephone discussion with brother Prince; for details of visit, see advance care planning section of plan.     "

## 2022-07-18 NOTE — CONSULTS
[facility] - Intensive Care  Wound Care    Patient Name:  Ana James   MRN:  [id number]  Date: 7/18/2022  Diagnosis: Acute encephalopathy    History:     Past Medical History:   Diagnosis Date    Arthritis lower extremities    Asthma     Blind left eye     Diabetes mellitus     Edema of both lower legs     CHRONIC    Fall 02/08/2022    Gall stones     Hypertension     Kidney stones     Lives alone with help available     HOME HEALTH    Nephrolithiasis 1/22/2016    Obesity     PVD (peripheral vascular disease)     Renal disorder     Retinopathy     Sleep apnea     Vaginal delivery     x1    Weakness of both lower extremities     uses a cane, rollator & power chair       Social History     Socioeconomic History    Marital status:    Occupational History    Occupation: retired   Tobacco Use    Smoking status: Never Smoker    Smokeless tobacco: Never Used   Substance and Sexual Activity    Alcohol use: No    Drug use: Never    Sexual activity: Not Currently     Partners: Male       Precautions:     Allergies as of 07/15/2022 - Reviewed 07/15/2022   Allergen Reaction Noted    Adhesive Rash 01/15/2015       WOC Assessment Details/Treatment   Consulted per WOGs for altered skin integrity lip, hip, sacral  An 81 year old female admitted 7/15/22 from [facility] with altered mental status; sepsis; localized swelling of right hand; debility; acute on chronic renal failure; Afib; anemia of chronic disease; chronic diastolic heart failure; HLD; controlled DM with Stage 3 CKD; obesity; UTI  7/18 WBC 9.55 Hgb 8.0 Hct 25.5 Platelets 112K Alb 1.3 Weight 212 lb  5/24 A1C 6.3  On Isolibrium mattress; Natan score 12  6/1 WOC consult- S/P debridement sacral unstageable pressure injury per Dr. Dickens- resume local wound care to sacral/buttock pressure injury daily and prn with Vashe moistened gauze; avoid positioning patient on wound  Noted patient to discharge back to NH today; hospice  recommended; orders per hospice  Patient discharge before WOC nurse assessment.    07/18/2022

## 2022-07-18 NOTE — ASSESSMENT & PLAN NOTE
Body mass index is 38.95 kg/m². Morbid obesity complicates all aspects of disease management from diagnostic modalities to treatment. Weight loss encouraged and health benefits explained to patient.

## 2022-07-18 NOTE — SUBJECTIVE & OBJECTIVE
Interval History: Initial Consult    Past Medical History:   Diagnosis Date    Arthritis lower extremities    Asthma     Blind left eye     Diabetes mellitus     Edema of both lower legs     CHRONIC    Fall 02/08/2022    Gall stones     Hypertension     Kidney stones     Lives alone with help available     HOME HEALTH    Nephrolithiasis 1/22/2016    Obesity     PVD (peripheral vascular disease)     Renal disorder     Retinopathy     Sleep apnea     Vaginal delivery     x1    Weakness of both lower extremities     uses a cane, rollator & power chair       Past Surgical History:   Procedure Laterality Date    CARDIAC CATHETERIZATION      cataract      CHOLECYSTECTOMY      DEBRIDEMENT OF SACRAL WOUND N/A 5/31/2022    Procedure: DEBRIDEMENT, WOUND, SACRUM;  Surgeon: Timmy Allison MD;  Location: [facility];  Service: General;  Laterality: N/A;    EYE SURGERY      Rt cataract surgery    HYSTERECTOMY      URETERAL STENT PLACEMENT         Review of patient's allergies indicates:   Allergen Reactions    Adhesive Rash     Paper tape       Medications:  Continuous Infusions:   NORepinephrine bitartrate-D5W 0.04 mcg/kg/min (07/18/22 0429)    vasopressin 0.04 Units/min (07/18/22 0300)     Scheduled Meds:   glycopyrrolate (PF)  0.1 mg Intravenous TID    heparin (porcine)  5,000 Units Subcutaneous Q8H    hydrocortisone sodium succinate  100 mg Intravenous Q8H    meropenem (MERREM) IVPB  1 g Intravenous Q12H    mupirocin   Nasal BID    sodium chloride 0.9%  10 mL Intravenous Q6H     PRN Meds:dextrose 10%, dextrose 10%, dextrose 50%, dextrose 50%, glucagon (human recombinant), HYDROmorphone, insulin aspart U-100, naloxone, ondansetron, Flushing PICC Protocol **AND** sodium chloride 0.9% **AND** sodium chloride 0.9%    Family History       Problem Relation (Age of Onset)    Cancer Sister    Diabetes Brother    Hypertension Father    Kidney failure Mother          Tobacco Use    Smoking status: Never Smoker    Smokeless tobacco: Never  Used   Substance and Sexual Activity    Alcohol use: No    Drug use: Never    Sexual activity: Not Currently     Partners: Male       Review of Systems   Unable to perform ROS: Dementia   Objective:     Vital Signs (Most Recent):  Temp: 96.44 °F (35.8 °C) (07/18/22 0630)  Pulse: (!) 51 (07/18/22 0630)  Resp: 11 (07/18/22 0630)  BP: 131/62 (07/18/22 0630)  SpO2: 100 % (07/18/22 0630)   Vital Signs (24h Range):  Temp:  [96.44 °F (35.8 °C)-98.78 °F (37.1 °C)] 96.44 °F (35.8 °C)  Pulse:  [51-90] 51  Resp:  [7-105] 11  SpO2:  [92 %-100 %] 100 %  BP: ()/(37-65) 131/62     Weight: 96.6 kg (212 lb 15.4 oz)  Body mass index is 38.95 kg/m².    Physical Exam  Constitutional:       Appearance: She is obese.      Comments: Lying in bed, arousable with intermittent moaning    HENT:      Head: Normocephalic and atraumatic.      Nose: Nose normal.   Pulmonary:      Effort: Pulmonary effort is normal.      Comments: NC in place  Skin:     Comments: Known sacral wound, unable to assess due to pain   Neurological:      Mental Status: She is disoriented.       Significant Labs: All pertinent labs within the past 24 hours have been reviewed.  CBC:   Recent Labs   Lab 07/18/22 0459   WBC 9.55   HGB 8.0*   HCT 25.5*   MCV 87   *     BMP:  Recent Labs   Lab 07/18/22  0459   *      K 5.0      CO2 21*   BUN 53*   CREATININE 2.4*   CALCIUM 8.1*   MG 2.1     LFT:  Lab Results   Component Value Date    AST 44 (H) 07/18/2022    ALKPHOS 168 (H) 07/18/2022    BILITOT 0.7 07/18/2022     Albumin:   Albumin   Date Value Ref Range Status   07/18/2022 1.3 (L) 3.5 - 5.2 g/dL Final     Protein:   Total Protein   Date Value Ref Range Status   07/18/2022 6.1 6.0 - 8.4 g/dL Final     Lactic acid:   Lab Results   Component Value Date    LACTATE 0.7 07/15/2022    LACTATE 0.9 07/15/2022       Significant Imaging: I have reviewed all pertinent imaging results/findings within the past 24 hours.

## 2022-07-18 NOTE — NURSING
Ambulance arrived to bedside to transfer patient to [place] with [facility]. Levophed stopped, all PIV and PICC pulled, tip intact for all catheters. Call placed to patients brother, Prince  [phone], no answer. Message left to inform him of transfer to [place]. Call placed to patients sister, Mrs. Padron, to inform her of patients transfer to [place]. She states that she will inform Mr. Morrison.

## 2022-07-18 NOTE — ASSESSMENT & PLAN NOTE
Anemia of chronic disease + iron deficiency anemia.  - unable to take PO iron currently due to AMS  - transfused 1U RBCs on 7/16 with appropriate sustained response   - no signs of active bleeding

## 2022-07-18 NOTE — PROGRESS NOTES
VINNY spoke with patient's brother, Prince Elder, to advise him to today's discharge and recommendation for hospice.  VINNY provided Mr. Morrison with the names of hospice provider's ([facility] and [facility]) contracted with [facility].  Mr. Morrison would like to use [facility].  VINNY will submit referral to this provider.

## 2022-07-18 NOTE — ASSESSMENT & PLAN NOTE
Presented with AUTUMN. Cr on admit 2.6 from baseline Cr 1.4  Due to septic shock  UA few bacteria, mod yeast  Reeves changed on admit  Improving on 7/18  BMP in AM

## 2022-07-18 NOTE — PLAN OF CARE
07/18/22 1335   Post-Acute Status   Post-Acute Placement Status   (Awaiting COVID Results.)   Coverage N   Hospital Resources/Appts/Education Provided Post-Acute resouces added to AVS   Discharge Delays (!) Other  (REsults of COVID test)   Discharge Plan   Discharge Plan A Return to nursing home   Discharge Plan B Hospice/home

## 2022-07-18 NOTE — ASSESSMENT & PLAN NOTE
At baseline able to say a few yes or no and would moan during wound dressing change. On admit not speaking, moans. Worsening, now not responsive to voice or touch.   - due to septic shock  - CTH no acute changes

## 2022-07-18 NOTE — PROGRESS NOTES
VINNY spoke with Buffy at [facility] to advise of hospice orders.  Order, demographics, H&P, Discharge Summary, and Palliative Care consult faxed to [phone].   VINNY spoke with [email or web address]@ [email or web address] to advise that patient will return [facility].    VINNY f/u with Authorization Nurse, Karolyn, at [facility] to request an ambulance authorization for patient returning to the nursing.  Auth Nurse will f/u .

## 2022-07-19 LAB
BACTERIA BLD CULT: NORMAL
BACTERIA BLD CULT: NORMAL

## 2022-07-20 LAB
ACID FAST MOD KINY STN SPEC: NORMAL
MYCOBACTERIUM SPEC QL CULT: NORMAL

## 2022-08-30 ENCOUNTER — TELEPHONE (OUTPATIENT)
Dept: FAMILY MEDICINE | Facility: CLINIC | Age: 82
End: 2022-08-30
Payer: MEDICARE

## 2022-11-12 NOTE — PT/OT/SLP PROGRESS
"Speech Language Pathology Treatment    Patient Name:  Ana James   MRN:  [id number]  Admitting Diagnosis: Acute on chronic renal failure    Recommendations:                 General Recommendations:  Dysphagia therapy  Diet recommendations:  Puree Diet - IDDSI Level 4, Liquid Diet Level: Thin   Aspiration Precautions: Alternating bites/sips, HOB to 90 degrees, Meds crushed in puree and Small bites/sips   General Precautions: Standard, aspiration  Communication strategies:  none    Subjective     Pt was lying in bed, fully alert and awake upon ST entrance. Pt more agreeable to consume solid po trials during this tx session. Pt became emotional throughout tx session, stating she wanted to go home, and requesting to speak to MD in regards to going home today. ST spoke with MD in regards to pt's wishes.     Patient goals: "To go home"     Pain/Comfort:  · Pain Rating 1: 0/10    Respiratory Status: Room air    Objective:     Has the patient been evaluated by SLP for swallowing?   Yes  Keep patient NPO? No   Current Respiratory Status:        The pt refused to consume breakfast tray, however, was agreeable to consume solid po trials and single straw sips of water. Pt consumed x4 single straw sips of water and x1 small bite of saltine cracker. Pt with adequate ability to strip straw to draw liquids, with no overt s/s of aspiration noted. Pt with severely prolonged mastication of cracker trials, with most of cracker un-masticated. Pt requesting to eject cracker, with pt spitting un-masticated cracker into napkin presented by ST. Pt deferring all other po trials at this time 2/2 becoming emotional and continuously stating she wants to go home. ST provided support, and assured the pt that the MD will be in to discuss d/c wishes.     Assessment:     Ana James is a 81 y.o. female with a dx of Acute on chronic renal failure who is also COVID positive. Pt presents with prolonged mastication of solid po " trials, which was negatively impacted by pt being edentulous (pt states dentures are at home). ST recs pt advance to pureed diet with thin liquids, and not advance to mech soft until dentures are in place or pt d/c home with dentures. ST will cont to follow to assess safety with advanced diet.     Goals:   Multidisciplinary Problems     SLP Goals        Problem: SLP    Goal Priority Disciplines Outcome   SLP Goal    Low SLP Ongoing, Progressing   Description: Goals:  1. Pt will tolerate pureed diet with thin liquids w/o overt s/s of aspiration.   2. Pt will participate in ongoing assessment of swallow (On-going)                   Plan:     · Patient to be seen:  3 x/week   · Plan of Care expires:  06/03/22  · Plan of Care reviewed with:  patient   · SLP Follow-Up:  Yes       Discharge recommendations:  other (see comments) (TBD)   Barriers to Discharge:  None    Time Tracking:     SLP Treatment Date:   06/02/22  Speech Start Time:  0814  Speech Stop Time:  0829     Speech Total Time (min):  15 min    Billable Minutes: Treatment Swallowing Dysfunction 15    06/02/2022   [facility]

## 2023-01-01 NOTE — ED NOTES
Report received from ALEXANDRA Burton. The nurse reported that med that is past due was held.    Unknown

## 2023-02-16 ENCOUNTER — PATIENT OUTREACH (OUTPATIENT)
Dept: ADMINISTRATIVE | Facility: HOSPITAL | Age: 83
End: 2023-02-16
Payer: MEDICARE

## 2023-02-16 NOTE — LETTER
February 16, 2023            Ana Roosevelt James  [address] [address]       Dear Ms. James,      Our records indicate that you are overdue for an annual checkup. Currently, Viky Bean MD is listed as your primary care provider. If this is incorrect, please notify your primary care clinic so we can update your health record.    Your [facility] care team is committed to supporting your overall health. We look forward to seeing you soon and appreciate the opportunity to serve you.    If you would like to schedule your appointment, please contact your primary care clinic.    Sincerely,     Viky Bean MD and your [facility] Primary Care Team

## 2023-02-16 NOTE — PROGRESS NOTES
HM and immunization's reviewed and updated. Due for visit, bone density, eye exam and labs.  Unable to LM. Sent letter.

## 2023-03-17 NOTE — TELEPHONE ENCOUNTER
Patient state forms was dropped off a week ago and was placed on PCP desk to be advise. Notified PCP will be out and will return next Wednesday. Will call back with update if done. Please advise of forms if received    none

## 2025-01-14 NOTE — ED TRIAGE NOTES
MD entered lab orders ~ pls let pt know if not done already    Patient brought in by EMS from Nursing Home for complaints of AMS. Per EMS, nursing home reported that patient has a change in mentation but could state what her normal mentation is. Patient doesn't answer when asked what is her name but does moan and groan. NH also reports R arm swelling, PICC in place to right arm for antibiotics, reports sacral wound, DENIA heels are wrapped as well. Reeves catheter in place draining dark chuck urine. Dr. Lerma at bedside.
